# Patient Record
Sex: MALE | Race: WHITE | Employment: OTHER | URBAN - NONMETROPOLITAN AREA
[De-identification: names, ages, dates, MRNs, and addresses within clinical notes are randomized per-mention and may not be internally consistent; named-entity substitution may affect disease eponyms.]

---

## 2017-02-27 ENCOUNTER — OFFICE VISIT (OUTPATIENT)
Dept: INTERNAL MEDICINE CLINIC | Age: 62
End: 2017-02-27

## 2017-02-27 VITALS
WEIGHT: 135 LBS | TEMPERATURE: 98.8 F | SYSTOLIC BLOOD PRESSURE: 134 MMHG | BODY MASS INDEX: 21.69 KG/M2 | OXYGEN SATURATION: 93 % | HEIGHT: 66 IN | DIASTOLIC BLOOD PRESSURE: 66 MMHG | RESPIRATION RATE: 16 BRPM | HEART RATE: 92 BPM

## 2017-02-27 DIAGNOSIS — K52.832 LYMPHOCYTIC COLITIS: ICD-10-CM

## 2017-02-27 DIAGNOSIS — M54.2 NECK PAIN: ICD-10-CM

## 2017-02-27 DIAGNOSIS — J44.9 COPD, SEVERE (HCC): ICD-10-CM

## 2017-02-27 DIAGNOSIS — Z72.0 TOBACCO ABUSE DISORDER: ICD-10-CM

## 2017-02-27 DIAGNOSIS — F10.20 UNCOMPLICATED ALCOHOL DEPENDENCE (HCC): ICD-10-CM

## 2017-02-27 PROCEDURE — 99213 OFFICE O/P EST LOW 20 MIN: CPT | Performed by: INTERNAL MEDICINE

## 2017-02-27 RX ORDER — ALBUTEROL SULFATE 2.5 MG/3ML
2.5 SOLUTION RESPIRATORY (INHALATION) EVERY 6 HOURS
Qty: 120 VIAL | Refills: 5 | Status: SHIPPED | OUTPATIENT
Start: 2017-02-27 | End: 2017-05-10 | Stop reason: SDUPTHER

## 2017-02-27 RX ORDER — ALBUTEROL SULFATE 90 UG/1
2 AEROSOL, METERED RESPIRATORY (INHALATION) EVERY 6 HOURS
Qty: 1 INHALER | Refills: 5 | Status: SHIPPED | OUTPATIENT
Start: 2017-02-27 | End: 2017-05-10 | Stop reason: SDUPTHER

## 2017-02-27 RX ORDER — BUDESONIDE AND FORMOTEROL FUMARATE DIHYDRATE 160; 4.5 UG/1; UG/1
2 AEROSOL RESPIRATORY (INHALATION) 2 TIMES DAILY
Qty: 1 INHALER | Refills: 5 | Status: SHIPPED | OUTPATIENT
Start: 2017-02-27 | End: 2017-05-10 | Stop reason: SDUPTHER

## 2017-02-27 ASSESSMENT — ENCOUNTER SYMPTOMS
SPUTUM PRODUCTION: 1
GASTROINTESTINAL NEGATIVE: 1
WHEEZING: 1
HEMOPTYSIS: 0
EYES NEGATIVE: 1
COUGH: 1

## 2017-05-09 ENCOUNTER — TELEPHONE (OUTPATIENT)
Dept: INTERNAL MEDICINE CLINIC | Age: 62
End: 2017-05-09

## 2017-05-10 DIAGNOSIS — J44.9 COPD, SEVERE (HCC): ICD-10-CM

## 2017-05-10 RX ORDER — ALBUTEROL SULFATE 2.5 MG/3ML
2.5 SOLUTION RESPIRATORY (INHALATION) EVERY 6 HOURS
Qty: 120 VIAL | Refills: 5 | Status: SHIPPED | OUTPATIENT
Start: 2017-05-10 | End: 2017-09-11 | Stop reason: ALTCHOICE

## 2017-05-10 RX ORDER — BUDESONIDE AND FORMOTEROL FUMARATE DIHYDRATE 160; 4.5 UG/1; UG/1
2 AEROSOL RESPIRATORY (INHALATION) 2 TIMES DAILY
Qty: 1 INHALER | Refills: 5 | Status: SHIPPED | OUTPATIENT
Start: 2017-05-10 | End: 2017-12-04 | Stop reason: SDUPTHER

## 2017-05-10 RX ORDER — ALBUTEROL SULFATE 90 UG/1
2 AEROSOL, METERED RESPIRATORY (INHALATION) EVERY 6 HOURS
Qty: 1 INHALER | Refills: 5 | Status: SHIPPED | OUTPATIENT
Start: 2017-05-10 | End: 2017-12-04 | Stop reason: SDUPTHER

## 2017-05-26 ENCOUNTER — OFFICE VISIT (OUTPATIENT)
Dept: INTERNAL MEDICINE CLINIC | Age: 62
End: 2017-05-26

## 2017-05-26 VITALS
DIASTOLIC BLOOD PRESSURE: 78 MMHG | OXYGEN SATURATION: 94 % | SYSTOLIC BLOOD PRESSURE: 114 MMHG | HEART RATE: 72 BPM | TEMPERATURE: 98.6 F | RESPIRATION RATE: 16 BRPM | BODY MASS INDEX: 20.56 KG/M2 | WEIGHT: 127.4 LBS

## 2017-05-26 DIAGNOSIS — M72.0 DUPUYTREN'S CONTRACTURE: ICD-10-CM

## 2017-05-26 DIAGNOSIS — Z72.0 TOBACCO ABUSE DISORDER: ICD-10-CM

## 2017-05-26 DIAGNOSIS — M48.02 SPINAL STENOSIS OF CERVICAL REGION: ICD-10-CM

## 2017-05-26 DIAGNOSIS — Z12.5 SCREENING FOR PROSTATE CANCER: ICD-10-CM

## 2017-05-26 DIAGNOSIS — J44.9 COPD, SEVERE (HCC): Primary | ICD-10-CM

## 2017-05-26 DIAGNOSIS — Z11.4 SCREENING FOR HIV WITHOUT PRESENCE OF RISK FACTORS: ICD-10-CM

## 2017-05-26 DIAGNOSIS — Z11.59 NEED FOR HEPATITIS C SCREENING TEST: ICD-10-CM

## 2017-05-26 PROCEDURE — G8420 CALC BMI NORM PARAMETERS: HCPCS | Performed by: INTERNAL MEDICINE

## 2017-05-26 PROCEDURE — 3017F COLORECTAL CA SCREEN DOC REV: CPT | Performed by: INTERNAL MEDICINE

## 2017-05-26 PROCEDURE — G8427 DOCREV CUR MEDS BY ELIG CLIN: HCPCS | Performed by: INTERNAL MEDICINE

## 2017-05-26 PROCEDURE — 4004F PT TOBACCO SCREEN RCVD TLK: CPT | Performed by: INTERNAL MEDICINE

## 2017-05-26 PROCEDURE — 3023F SPIROM DOC REV: CPT | Performed by: INTERNAL MEDICINE

## 2017-05-26 PROCEDURE — G8926 SPIRO NO PERF OR DOC: HCPCS | Performed by: INTERNAL MEDICINE

## 2017-05-26 PROCEDURE — 99213 OFFICE O/P EST LOW 20 MIN: CPT | Performed by: INTERNAL MEDICINE

## 2017-05-26 ASSESSMENT — PATIENT HEALTH QUESTIONNAIRE - PHQ9
SUM OF ALL RESPONSES TO PHQ9 QUESTIONS 1 & 2: 0
SUM OF ALL RESPONSES TO PHQ QUESTIONS 1-9: 0
1. LITTLE INTEREST OR PLEASURE IN DOING THINGS: 0
2. FEELING DOWN, DEPRESSED OR HOPELESS: 0

## 2017-06-09 ENCOUNTER — HOSPITAL ENCOUNTER (OUTPATIENT)
Dept: LAB | Age: 62
Discharge: HOME | End: 2017-06-09
Attending: INTERNAL MEDICINE | Admitting: INTERNAL MEDICINE

## 2017-06-09 LAB
HEPATITIS C ANTIBODY: NON REACTIVE
HIV SCREEN: NON REACTIVE
PROSTATE SPECIFIC ANTIGEN: 1.13 NG/ML (ref 0–4)

## 2017-09-11 ENCOUNTER — OFFICE VISIT (OUTPATIENT)
Dept: INTERNAL MEDICINE CLINIC | Age: 62
End: 2017-09-11

## 2017-09-11 VITALS
TEMPERATURE: 98.9 F | BODY MASS INDEX: 19.5 KG/M2 | SYSTOLIC BLOOD PRESSURE: 122 MMHG | HEART RATE: 84 BPM | RESPIRATION RATE: 20 BRPM | OXYGEN SATURATION: 94 % | DIASTOLIC BLOOD PRESSURE: 72 MMHG | WEIGHT: 120.8 LBS

## 2017-09-11 DIAGNOSIS — M48.02 SPINAL STENOSIS OF CERVICAL REGION: ICD-10-CM

## 2017-09-11 DIAGNOSIS — M72.0 DUPUYTREN'S CONTRACTURE: ICD-10-CM

## 2017-09-11 DIAGNOSIS — F10.20 UNCOMPLICATED ALCOHOL DEPENDENCE (HCC): ICD-10-CM

## 2017-09-11 DIAGNOSIS — R07.89 CHEST WALL PAIN: Primary | ICD-10-CM

## 2017-09-11 DIAGNOSIS — Z72.0 TOBACCO ABUSE DISORDER: ICD-10-CM

## 2017-09-11 DIAGNOSIS — J44.9 COPD, SEVERE (HCC): ICD-10-CM

## 2017-09-11 DIAGNOSIS — R63.4 WEIGHT LOSS: ICD-10-CM

## 2017-09-11 PROCEDURE — G8427 DOCREV CUR MEDS BY ELIG CLIN: HCPCS | Performed by: INTERNAL MEDICINE

## 2017-09-11 PROCEDURE — 4004F PT TOBACCO SCREEN RCVD TLK: CPT | Performed by: INTERNAL MEDICINE

## 2017-09-11 PROCEDURE — G8926 SPIRO NO PERF OR DOC: HCPCS | Performed by: INTERNAL MEDICINE

## 2017-09-11 PROCEDURE — 99214 OFFICE O/P EST MOD 30 MIN: CPT | Performed by: INTERNAL MEDICINE

## 2017-09-11 PROCEDURE — G8420 CALC BMI NORM PARAMETERS: HCPCS | Performed by: INTERNAL MEDICINE

## 2017-09-11 PROCEDURE — 3023F SPIROM DOC REV: CPT | Performed by: INTERNAL MEDICINE

## 2017-09-11 PROCEDURE — 3017F COLORECTAL CA SCREEN DOC REV: CPT | Performed by: INTERNAL MEDICINE

## 2017-09-11 RX ORDER — IPRATROPIUM BROMIDE AND ALBUTEROL SULFATE 2.5; .5 MG/3ML; MG/3ML
1 SOLUTION RESPIRATORY (INHALATION) EVERY 4 HOURS
Qty: 360 ML | Refills: 0 | Status: SHIPPED | OUTPATIENT
Start: 2017-09-11 | End: 2017-12-04 | Stop reason: CLARIF

## 2017-09-11 RX ORDER — NAPROXEN 500 MG/1
500 TABLET ORAL 2 TIMES DAILY WITH MEALS
Qty: 30 TABLET | Refills: 0 | Status: SHIPPED | OUTPATIENT
Start: 2017-09-11 | End: 2017-12-04 | Stop reason: ALTCHOICE

## 2017-09-11 ASSESSMENT — ENCOUNTER SYMPTOMS
COUGH: 1
DIARRHEA: 0
HEMOPTYSIS: 0
SPUTUM PRODUCTION: 0
BLOOD IN STOOL: 0
ABDOMINAL PAIN: 0
CONSTIPATION: 0
EYES NEGATIVE: 1
ORTHOPNEA: 0
GASTROINTESTINAL NEGATIVE: 1
WHEEZING: 0

## 2017-09-12 ENCOUNTER — HOSPITAL ENCOUNTER (OUTPATIENT)
Dept: LAB | Age: 62
Discharge: HOME | End: 2017-09-12
Attending: INTERNAL MEDICINE | Admitting: INTERNAL MEDICINE

## 2017-09-12 DIAGNOSIS — R07.89 OTHER CHEST PAIN: ICD-10-CM

## 2017-09-12 LAB
ALBUMIN SERPL-MCNC: 3.8 GM/DL (ref 3.4–5)
ALP BLD-CCNC: 55 IU/L (ref 40–129)
ALT SERPL-CCNC: 14 U/L (ref 10–40)
ANION GAP SERPL CALCULATED.3IONS-SCNC: 10 MMOL/L (ref 4–16)
AST SERPL-CCNC: 19 IU/L (ref 15–37)
BASOPHILS ABSOLUTE: 0.1 K/CU MM
BASOPHILS RELATIVE PERCENT: 0.6 % (ref 0–1)
BILIRUB SERPL-MCNC: 0.5 MG/DL (ref 0–1)
BUN BLDV-MCNC: 8 MG/DL (ref 6–23)
CALCIUM SERPL-MCNC: 9.5 MG/DL (ref 8.3–10.6)
CHLORIDE BLD-SCNC: 100 MMOL/L (ref 99–110)
CO2: 30 MMOL/L (ref 21–32)
CREAT SERPL-MCNC: 0.7 MG/DL (ref 0.9–1.3)
DIFFERENTIAL TYPE: ABNORMAL
EOSINOPHILS ABSOLUTE: 0 K/CU MM
EOSINOPHILS RELATIVE PERCENT: 0 % (ref 0–3)
GFR AFRICAN AMERICAN: >60 ML/MIN/1.73M2
GFR NON-AFRICAN AMERICAN: >60 ML/MIN/1.73M2
GLUCOSE BLD-MCNC: 141 MG/DL (ref 70–140)
HCT VFR BLD CALC: 51.5 % (ref 42–52)
HEMOGLOBIN: 17.4 GM/DL (ref 13.5–18)
IMMATURE NEUTROPHIL %: 0.5 % (ref 0–0.43)
LYMPHOCYTES ABSOLUTE: 1.4 K/CU MM
LYMPHOCYTES RELATIVE PERCENT: 15.9 % (ref 24–44)
MCH RBC QN AUTO: 31.9 PG (ref 27–31)
MCHC RBC AUTO-ENTMCNC: 33.8 % (ref 32–36)
MCV RBC AUTO: 94.5 FL (ref 78–100)
MONOCYTES ABSOLUTE: 0.9 K/CU MM
MONOCYTES RELATIVE PERCENT: 10.1 % (ref 0–4)
PDW BLD-RTO: 15.5 % (ref 11.7–14.9)
PLATELET # BLD: 278 K/CU MM (ref 140–440)
PMV BLD AUTO: 8.9 FL (ref 7.5–11.1)
POTASSIUM SERPL-SCNC: 4.7 MMOL/L (ref 3.5–5.1)
RBC # BLD: 5.45 M/CU MM (ref 4.6–6.2)
SEGMENTED NEUTROPHILS ABSOLUTE COUNT: 6.4 K/CU MM
SEGMENTED NEUTROPHILS RELATIVE PERCENT: 72.9 % (ref 36–66)
SODIUM BLD-SCNC: 140 MMOL/L (ref 135–145)
TOTAL IMMATURE NEUTOROPHIL: 0.04 K/CU MM
TOTAL PROTEIN: 6.7 GM/DL (ref 6.4–8.2)
TSH HIGH SENSITIVITY: 1.55 UIU/ML (ref 0.27–4.2)
WBC # BLD: 8.8 K/CU MM (ref 4–10.5)

## 2017-09-29 ENCOUNTER — OFFICE VISIT (OUTPATIENT)
Dept: INTERNAL MEDICINE CLINIC | Age: 62
End: 2017-09-29

## 2017-09-29 VITALS
RESPIRATION RATE: 20 BRPM | BODY MASS INDEX: 19.82 KG/M2 | DIASTOLIC BLOOD PRESSURE: 80 MMHG | OXYGEN SATURATION: 94 % | WEIGHT: 122.8 LBS | TEMPERATURE: 98.5 F | HEART RATE: 92 BPM | SYSTOLIC BLOOD PRESSURE: 126 MMHG

## 2017-09-29 DIAGNOSIS — Z72.0 TOBACCO ABUSE DISORDER: ICD-10-CM

## 2017-09-29 DIAGNOSIS — J44.9 COPD, SEVERE (HCC): Primary | ICD-10-CM

## 2017-09-29 DIAGNOSIS — F10.20 UNCOMPLICATED ALCOHOL DEPENDENCE (HCC): ICD-10-CM

## 2017-09-29 DIAGNOSIS — R63.4 WEIGHT LOSS: ICD-10-CM

## 2017-09-29 DIAGNOSIS — J20.9 ACUTE BRONCHITIS, UNSPECIFIED ORGANISM: ICD-10-CM

## 2017-09-29 PROCEDURE — 99213 OFFICE O/P EST LOW 20 MIN: CPT | Performed by: INTERNAL MEDICINE

## 2017-09-29 PROCEDURE — G8420 CALC BMI NORM PARAMETERS: HCPCS | Performed by: INTERNAL MEDICINE

## 2017-09-29 PROCEDURE — G8926 SPIRO NO PERF OR DOC: HCPCS | Performed by: INTERNAL MEDICINE

## 2017-09-29 PROCEDURE — 3017F COLORECTAL CA SCREEN DOC REV: CPT | Performed by: INTERNAL MEDICINE

## 2017-09-29 PROCEDURE — G8427 DOCREV CUR MEDS BY ELIG CLIN: HCPCS | Performed by: INTERNAL MEDICINE

## 2017-09-29 PROCEDURE — 4004F PT TOBACCO SCREEN RCVD TLK: CPT | Performed by: INTERNAL MEDICINE

## 2017-09-29 PROCEDURE — 3023F SPIROM DOC REV: CPT | Performed by: INTERNAL MEDICINE

## 2017-09-29 ASSESSMENT — ENCOUNTER SYMPTOMS
SHORTNESS OF BREATH: 1
HEMOPTYSIS: 0
COUGH: 1
GASTROINTESTINAL NEGATIVE: 1
EYES NEGATIVE: 1

## 2017-12-04 ENCOUNTER — OFFICE VISIT (OUTPATIENT)
Dept: INTERNAL MEDICINE CLINIC | Age: 62
End: 2017-12-04

## 2017-12-04 VITALS
WEIGHT: 120.2 LBS | BODY MASS INDEX: 19.4 KG/M2 | SYSTOLIC BLOOD PRESSURE: 128 MMHG | TEMPERATURE: 98.4 F | DIASTOLIC BLOOD PRESSURE: 72 MMHG | RESPIRATION RATE: 16 BRPM | HEART RATE: 72 BPM | OXYGEN SATURATION: 96 %

## 2017-12-04 DIAGNOSIS — R63.4 WEIGHT LOSS: ICD-10-CM

## 2017-12-04 DIAGNOSIS — Z72.0 TOBACCO ABUSE DISORDER: ICD-10-CM

## 2017-12-04 DIAGNOSIS — Z23 NEED FOR PROPHYLACTIC VACCINATION AND INOCULATION AGAINST INFLUENZA: ICD-10-CM

## 2017-12-04 DIAGNOSIS — M72.0 DUPUYTREN'S CONTRACTURE: ICD-10-CM

## 2017-12-04 DIAGNOSIS — Z23 NEED FOR PROPHYLACTIC VACCINATION AGAINST STREPTOCOCCUS PNEUMONIAE (PNEUMOCOCCUS): ICD-10-CM

## 2017-12-04 DIAGNOSIS — J44.9 COPD, SEVERE (HCC): Primary | ICD-10-CM

## 2017-12-04 PROCEDURE — G8484 FLU IMMUNIZE NO ADMIN: HCPCS | Performed by: INTERNAL MEDICINE

## 2017-12-04 PROCEDURE — 99213 OFFICE O/P EST LOW 20 MIN: CPT | Performed by: INTERNAL MEDICINE

## 2017-12-04 PROCEDURE — G8926 SPIRO NO PERF OR DOC: HCPCS | Performed by: INTERNAL MEDICINE

## 2017-12-04 PROCEDURE — 3023F SPIROM DOC REV: CPT | Performed by: INTERNAL MEDICINE

## 2017-12-04 PROCEDURE — 3017F COLORECTAL CA SCREEN DOC REV: CPT | Performed by: INTERNAL MEDICINE

## 2017-12-04 PROCEDURE — G0009 ADMIN PNEUMOCOCCAL VACCINE: HCPCS | Performed by: INTERNAL MEDICINE

## 2017-12-04 PROCEDURE — G8427 DOCREV CUR MEDS BY ELIG CLIN: HCPCS | Performed by: INTERNAL MEDICINE

## 2017-12-04 PROCEDURE — 4004F PT TOBACCO SCREEN RCVD TLK: CPT | Performed by: INTERNAL MEDICINE

## 2017-12-04 PROCEDURE — G8420 CALC BMI NORM PARAMETERS: HCPCS | Performed by: INTERNAL MEDICINE

## 2017-12-04 PROCEDURE — 90686 IIV4 VACC NO PRSV 0.5 ML IM: CPT | Performed by: INTERNAL MEDICINE

## 2017-12-04 PROCEDURE — G0008 ADMIN INFLUENZA VIRUS VAC: HCPCS | Performed by: INTERNAL MEDICINE

## 2017-12-04 PROCEDURE — 90732 PPSV23 VACC 2 YRS+ SUBQ/IM: CPT | Performed by: INTERNAL MEDICINE

## 2017-12-04 RX ORDER — BUDESONIDE AND FORMOTEROL FUMARATE DIHYDRATE 160; 4.5 UG/1; UG/1
2 AEROSOL RESPIRATORY (INHALATION) 2 TIMES DAILY
Qty: 1 INHALER | Refills: 5 | Status: SHIPPED | OUTPATIENT
Start: 2017-12-04 | End: 2018-06-20 | Stop reason: SDUPTHER

## 2017-12-04 RX ORDER — ALBUTEROL SULFATE 2.5 MG/3ML
2.5 SOLUTION RESPIRATORY (INHALATION) EVERY 6 HOURS PRN
COMMUNITY
End: 2018-08-03

## 2017-12-04 RX ORDER — ALBUTEROL SULFATE 90 UG/1
2 AEROSOL, METERED RESPIRATORY (INHALATION) EVERY 6 HOURS
Qty: 1 INHALER | Refills: 5 | Status: SHIPPED | OUTPATIENT
Start: 2017-12-04 | End: 2018-06-20 | Stop reason: SDUPTHER

## 2017-12-04 RX ORDER — DEXTROMETHORPHAN HYDROBROMIDE AND PROMETHAZINE HYDROCHLORIDE 15; 6.25 MG/5ML; MG/5ML
5 SYRUP ORAL 4 TIMES DAILY PRN
Qty: 180 ML | Refills: 0 | Status: SHIPPED | OUTPATIENT
Start: 2017-12-04 | End: 2017-12-11

## 2017-12-04 ASSESSMENT — ENCOUNTER SYMPTOMS
SHORTNESS OF BREATH: 0
HEMOPTYSIS: 0
COUGH: 1
GASTROINTESTINAL NEGATIVE: 1
SPUTUM PRODUCTION: 0
EYES NEGATIVE: 1

## 2017-12-04 NOTE — PROGRESS NOTES
 Dupuytren's contracture     Tobacco abuse disorder     Weight loss     Need for prophylactic vaccination against Streptococcus pneumoniae (pneumococcus)     Need for prophylactic vaccination and inoculation against influenza        ASSESSMENT/PLAN:    COPD, severe ([address]  PFTs showed severe COPD in 5/14  Pt uses albuterol neb treatment and prn ventolin  Pt is on Symbicort   Pt is having difficulty getting other inhalers from insurance  Advanced to quit smoking. Patient has cough is sometimes. He is requesting some cough medicine. Dupuytren's contracture  Right hand contracture is little better    Tobacco abuse disorder  Counseled to quit smoking. Discussed options. Weight loss  Chest x ray : COPD  Labs normal  Colonoscopy : 9/2014  Pt will decide whom to pick as GI for repeat colonoscopy as Dr Ludy Goodwin moved    Will give flu vaccine and pneumovax. 23Today    Return to office in 3 months        Mediations reviewed with the patient. Continue current medications. Appropriate prescriptions are addressed. After visit summery provided. Follow up as directed sooner if needed. Questions answered and patient verbalizes understanding. Call for any problems, questions, or concerns. No Known Allergies  Current Outpatient Prescriptions   Medication Sig Dispense Refill    albuterol (PROVENTIL) (2.5 MG/3ML) 0.083% nebulizer solution Take 2.5 mg by nebulization every 6 hours as needed for Wheezing      Multiple Vitamins-Minerals (MULTIVITAMIN ADULTS PO) Take by mouth      albuterol sulfate HFA (VENTOLIN HFA) 108 (90 BASE) MCG/ACT inhaler Inhale 2 puffs into the lungs every 6 hours 1 Inhaler 5    budesonide-formoterol (SYMBICORT) 160-4.5 MCG/ACT AERO Inhale 2 puffs into the lungs 2 times daily 1 Inhaler 5     No current facility-administered medications for this visit.       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

## 2017-12-04 NOTE — PATIENT INSTRUCTIONS
Vaccine Information Sheet, \"Influenza - Inactivated\"  given to Erick Salmeron, or parent/legal guardian of  Erick Salmeron and verbalized understanding. Patient responses:    Have you ever had a reaction to a flu vaccine? NO  Are you able to eat eggs without adverse effects? YES  Do you have any current illness? NO  Have you ever had Guillian Corona Syndrome? NO    Flu vaccine given per order. Please see immunization tab.

## 2018-03-07 ENCOUNTER — OFFICE VISIT (OUTPATIENT)
Dept: INTERNAL MEDICINE CLINIC | Age: 63
End: 2018-03-07

## 2018-03-07 VITALS
OXYGEN SATURATION: 94 % | DIASTOLIC BLOOD PRESSURE: 82 MMHG | SYSTOLIC BLOOD PRESSURE: 120 MMHG | TEMPERATURE: 98.7 F | RESPIRATION RATE: 18 BRPM | HEART RATE: 86 BPM | WEIGHT: 122.4 LBS | BODY MASS INDEX: 19.76 KG/M2

## 2018-03-07 DIAGNOSIS — Z72.0 TOBACCO ABUSE DISORDER: ICD-10-CM

## 2018-03-07 DIAGNOSIS — F10.20 UNCOMPLICATED ALCOHOL DEPENDENCE (HCC): ICD-10-CM

## 2018-03-07 DIAGNOSIS — R63.4 WEIGHT LOSS: ICD-10-CM

## 2018-03-07 DIAGNOSIS — J44.9 COPD, SEVERE (HCC): ICD-10-CM

## 2018-03-07 PROCEDURE — G8484 FLU IMMUNIZE NO ADMIN: HCPCS | Performed by: INTERNAL MEDICINE

## 2018-03-07 PROCEDURE — G8427 DOCREV CUR MEDS BY ELIG CLIN: HCPCS | Performed by: INTERNAL MEDICINE

## 2018-03-07 PROCEDURE — 99213 OFFICE O/P EST LOW 20 MIN: CPT | Performed by: INTERNAL MEDICINE

## 2018-03-07 PROCEDURE — G8420 CALC BMI NORM PARAMETERS: HCPCS | Performed by: INTERNAL MEDICINE

## 2018-03-07 PROCEDURE — G8926 SPIRO NO PERF OR DOC: HCPCS | Performed by: INTERNAL MEDICINE

## 2018-03-07 PROCEDURE — 3023F SPIROM DOC REV: CPT | Performed by: INTERNAL MEDICINE

## 2018-03-07 PROCEDURE — 3017F COLORECTAL CA SCREEN DOC REV: CPT | Performed by: INTERNAL MEDICINE

## 2018-03-07 PROCEDURE — 4004F PT TOBACCO SCREEN RCVD TLK: CPT | Performed by: INTERNAL MEDICINE

## 2018-03-07 ASSESSMENT — ENCOUNTER SYMPTOMS
EYES NEGATIVE: 1
WHEEZING: 0
GASTROINTESTINAL NEGATIVE: 1
COUGH: 1

## 2018-03-07 NOTE — ASSESSMENT & PLAN NOTE
Discussed wt loss in detail. Lost 10-12 lbs in the last year  Chest x ray : COPD  Labs normal  Colonoscopy : 9/2014. Repeat with GI. He will call Dr Arreola Cough  Repeat labs.

## 2018-03-09 ENCOUNTER — HOSPITAL ENCOUNTER (OUTPATIENT)
Dept: CT IMAGING | Age: 63
Discharge: HOME | End: 2018-03-09
Attending: INTERNAL MEDICINE | Admitting: INTERNAL MEDICINE

## 2018-03-09 DIAGNOSIS — R63.4 LOSS OF WEIGHT: ICD-10-CM

## 2018-03-09 DIAGNOSIS — Z72.0 TOBACCO ABUSE: ICD-10-CM

## 2018-03-09 DIAGNOSIS — J44.9 OBSTRUCTIVE CHRONIC BRONCHITIS WITHOUT EXACERBATION (HCC): ICD-10-CM

## 2018-03-09 LAB
ALBUMIN SERPL-MCNC: 4 GM/DL (ref 3.4–5)
ALP BLD-CCNC: 46 IU/L (ref 40–129)
ALT SERPL-CCNC: 14 U/L (ref 10–40)
ANION GAP SERPL CALCULATED.3IONS-SCNC: 9 MMOL/L (ref 4–16)
AST SERPL-CCNC: 15 IU/L (ref 15–37)
BASOPHILS ABSOLUTE: 0.1 K/CU MM
BASOPHILS RELATIVE PERCENT: 0.7 % (ref 0–1)
BILIRUB SERPL-MCNC: 0.4 MG/DL (ref 0–1)
BUN BLDV-MCNC: 6 MG/DL (ref 6–23)
CALCIUM SERPL-MCNC: 9.1 MG/DL (ref 8.3–10.6)
CHLORIDE BLD-SCNC: 100 MMOL/L (ref 99–110)
CO2: 32 MMOL/L (ref 21–32)
CREAT SERPL-MCNC: 0.8 MG/DL (ref 0.9–1.3)
DIFFERENTIAL TYPE: ABNORMAL
EOSINOPHILS ABSOLUTE: 0 K/CU MM
EOSINOPHILS RELATIVE PERCENT: 0.1 % (ref 0–3)
GFR AFRICAN AMERICAN: >60 ML/MIN/1.73M2
GFR NON-AFRICAN AMERICAN: >60 ML/MIN/1.73M2
GLUCOSE FASTING: 138 MG/DL (ref 70–99)
HCT VFR BLD CALC: 48.5 % (ref 42–52)
HEMOGLOBIN: 16.2 GM/DL (ref 13.5–18)
IMMATURE NEUTROPHIL %: 0.3 % (ref 0–0.43)
LYMPHOCYTES ABSOLUTE: 1.5 K/CU MM
LYMPHOCYTES RELATIVE PERCENT: 15.6 % (ref 24–44)
MCH RBC QN AUTO: 32.5 PG (ref 27–31)
MCHC RBC AUTO-ENTMCNC: 33.4 % (ref 32–36)
MCV RBC AUTO: 97.4 FL (ref 78–100)
MONOCYTES ABSOLUTE: 1 K/CU MM
MONOCYTES RELATIVE PERCENT: 10.1 % (ref 0–4)
PDW BLD-RTO: 13.3 % (ref 11.7–14.9)
PLATELET # BLD: 252 K/CU MM (ref 140–440)
PMV BLD AUTO: 8.4 FL (ref 7.5–11.1)
POTASSIUM SERPL-SCNC: 4.4 MMOL/L (ref 3.5–5.1)
RBC # BLD: 4.98 M/CU MM (ref 4.6–6.2)
SEGMENTED NEUTROPHILS ABSOLUTE COUNT: 6.9 K/CU MM
SEGMENTED NEUTROPHILS RELATIVE PERCENT: 73.2 % (ref 36–66)
SODIUM BLD-SCNC: 141 MMOL/L (ref 135–145)
T4 FREE: 0.95 NG/DL (ref 0.9–1.8)
TOTAL IMMATURE NEUTOROPHIL: 0.03 K/CU MM
TOTAL PROTEIN: 6.5 GM/DL (ref 6.4–8.2)
TSH HIGH SENSITIVITY: 1.6 UIU/ML (ref 0.27–4.2)
WBC # BLD: 9.4 K/CU MM (ref 4–10.5)

## 2018-04-02 ENCOUNTER — OFFICE VISIT (OUTPATIENT)
Dept: INTERNAL MEDICINE CLINIC | Age: 63
End: 2018-04-02

## 2018-04-02 VITALS
DIASTOLIC BLOOD PRESSURE: 80 MMHG | RESPIRATION RATE: 16 BRPM | BODY MASS INDEX: 19.5 KG/M2 | OXYGEN SATURATION: 93 % | SYSTOLIC BLOOD PRESSURE: 128 MMHG | HEART RATE: 80 BPM | WEIGHT: 120.8 LBS | TEMPERATURE: 98.4 F

## 2018-04-02 DIAGNOSIS — J44.9 COPD, SEVERE (HCC): ICD-10-CM

## 2018-04-02 DIAGNOSIS — F10.20 UNCOMPLICATED ALCOHOL DEPENDENCE (HCC): ICD-10-CM

## 2018-04-02 DIAGNOSIS — Z72.0 TOBACCO ABUSE DISORDER: ICD-10-CM

## 2018-04-02 DIAGNOSIS — R63.4 WEIGHT LOSS: Primary | ICD-10-CM

## 2018-04-02 DIAGNOSIS — R73.09 HIGH GLUCOSE: ICD-10-CM

## 2018-04-02 DIAGNOSIS — M72.0 DUPUYTREN'S CONTRACTURE: ICD-10-CM

## 2018-04-02 DIAGNOSIS — K52.832 LYMPHOCYTIC COLITIS: ICD-10-CM

## 2018-04-02 LAB — HBA1C MFR BLD: 4.4 %

## 2018-04-02 PROCEDURE — 99213 OFFICE O/P EST LOW 20 MIN: CPT | Performed by: INTERNAL MEDICINE

## 2018-04-02 PROCEDURE — 4004F PT TOBACCO SCREEN RCVD TLK: CPT | Performed by: INTERNAL MEDICINE

## 2018-04-02 PROCEDURE — 3017F COLORECTAL CA SCREEN DOC REV: CPT | Performed by: INTERNAL MEDICINE

## 2018-04-02 PROCEDURE — G8427 DOCREV CUR MEDS BY ELIG CLIN: HCPCS | Performed by: INTERNAL MEDICINE

## 2018-04-02 PROCEDURE — 3023F SPIROM DOC REV: CPT | Performed by: INTERNAL MEDICINE

## 2018-04-02 PROCEDURE — 83036 HEMOGLOBIN GLYCOSYLATED A1C: CPT | Performed by: INTERNAL MEDICINE

## 2018-04-02 PROCEDURE — G8420 CALC BMI NORM PARAMETERS: HCPCS | Performed by: INTERNAL MEDICINE

## 2018-04-02 PROCEDURE — G8926 SPIRO NO PERF OR DOC: HCPCS | Performed by: INTERNAL MEDICINE

## 2018-04-02 ASSESSMENT — ENCOUNTER SYMPTOMS
RESPIRATORY NEGATIVE: 1
COUGH: 0
EYES NEGATIVE: 1
GASTROINTESTINAL NEGATIVE: 1
BLOOD IN STOOL: 0

## 2018-04-03 PROBLEM — J18.9 LINGULAR PNEUMONIA: Status: ACTIVE | Noted: 2018-04-03

## 2018-04-08 PROBLEM — F10.939 ALCOHOL WITHDRAWAL (HCC): Status: ACTIVE | Noted: 2018-04-08

## 2018-04-10 ENCOUNTER — TELEPHONE (OUTPATIENT)
Dept: INTERNAL MEDICINE CLINIC | Age: 63
End: 2018-04-10

## 2018-04-10 ENCOUNTER — CARE COORDINATION (OUTPATIENT)
Dept: CASE MANAGEMENT | Age: 63
End: 2018-04-10

## 2018-04-11 ENCOUNTER — OFFICE VISIT (OUTPATIENT)
Dept: INTERNAL MEDICINE CLINIC | Age: 63
End: 2018-04-11

## 2018-04-11 VITALS
BODY MASS INDEX: 21.05 KG/M2 | TEMPERATURE: 98.3 F | SYSTOLIC BLOOD PRESSURE: 130 MMHG | RESPIRATION RATE: 16 BRPM | DIASTOLIC BLOOD PRESSURE: 82 MMHG | WEIGHT: 130.4 LBS | HEART RATE: 82 BPM | OXYGEN SATURATION: 90 %

## 2018-04-11 DIAGNOSIS — J18.9 PNEUMONIA OF BOTH LUNGS DUE TO INFECTIOUS ORGANISM, UNSPECIFIED PART OF LUNG: Primary | ICD-10-CM

## 2018-04-11 DIAGNOSIS — M25.552 LEFT HIP PAIN: ICD-10-CM

## 2018-04-11 DIAGNOSIS — J44.9 COPD, SEVERE (HCC): ICD-10-CM

## 2018-04-11 DIAGNOSIS — R10.9 LEFT FLANK PAIN: ICD-10-CM

## 2018-04-11 DIAGNOSIS — F10.930 ALCOHOL WITHDRAWAL SYNDROME WITHOUT COMPLICATION (HCC): ICD-10-CM

## 2018-04-11 DIAGNOSIS — Z72.0 TOBACCO ABUSE DISORDER: ICD-10-CM

## 2018-04-11 PROCEDURE — G8427 DOCREV CUR MEDS BY ELIG CLIN: HCPCS | Performed by: INTERNAL MEDICINE

## 2018-04-11 PROCEDURE — G8926 SPIRO NO PERF OR DOC: HCPCS | Performed by: INTERNAL MEDICINE

## 2018-04-11 PROCEDURE — 4004F PT TOBACCO SCREEN RCVD TLK: CPT | Performed by: INTERNAL MEDICINE

## 2018-04-11 PROCEDURE — G8420 CALC BMI NORM PARAMETERS: HCPCS | Performed by: INTERNAL MEDICINE

## 2018-04-11 PROCEDURE — 1111F DSCHRG MED/CURRENT MED MERGE: CPT | Performed by: INTERNAL MEDICINE

## 2018-04-11 PROCEDURE — 99214 OFFICE O/P EST MOD 30 MIN: CPT | Performed by: INTERNAL MEDICINE

## 2018-04-11 PROCEDURE — 3017F COLORECTAL CA SCREEN DOC REV: CPT | Performed by: INTERNAL MEDICINE

## 2018-04-11 PROCEDURE — 3023F SPIROM DOC REV: CPT | Performed by: INTERNAL MEDICINE

## 2018-04-11 RX ORDER — HYDROCODONE BITARTRATE AND ACETAMINOPHEN 5; 325 MG/1; MG/1
1 TABLET ORAL EVERY 6 HOURS PRN
Qty: 25 TABLET | Refills: 0 | Status: SHIPPED | OUTPATIENT
Start: 2018-04-11 | End: 2018-04-18

## 2018-04-12 ENCOUNTER — HOSPITAL ENCOUNTER (OUTPATIENT)
Dept: CT IMAGING | Age: 63
Discharge: HOME | End: 2018-04-12
Attending: INTERNAL MEDICINE | Admitting: INTERNAL MEDICINE

## 2018-04-12 DIAGNOSIS — R10.9 ABDOMINAL PAIN: ICD-10-CM

## 2018-04-12 DIAGNOSIS — M25.552 LEFT HIP PAIN: ICD-10-CM

## 2018-04-12 DIAGNOSIS — R10.9 FLANK PAIN: ICD-10-CM

## 2018-04-16 ENCOUNTER — OFFICE VISIT (OUTPATIENT)
Dept: INTERNAL MEDICINE CLINIC | Age: 63
End: 2018-04-16

## 2018-04-16 ENCOUNTER — CARE COORDINATION (OUTPATIENT)
Dept: CASE MANAGEMENT | Age: 63
End: 2018-04-16

## 2018-04-16 VITALS
TEMPERATURE: 98.2 F | DIASTOLIC BLOOD PRESSURE: 78 MMHG | HEART RATE: 82 BPM | RESPIRATION RATE: 16 BRPM | WEIGHT: 126.2 LBS | BODY MASS INDEX: 20.37 KG/M2 | SYSTOLIC BLOOD PRESSURE: 122 MMHG | OXYGEN SATURATION: 90 %

## 2018-04-16 DIAGNOSIS — M54.5 LEFT LOW BACK PAIN, UNSPECIFIED CHRONICITY, WITH SCIATICA PRESENCE UNSPECIFIED: Primary | ICD-10-CM

## 2018-04-16 DIAGNOSIS — Z72.0 TOBACCO ABUSE DISORDER: ICD-10-CM

## 2018-04-16 DIAGNOSIS — R07.81 RIB PAIN ON LEFT SIDE: ICD-10-CM

## 2018-04-16 DIAGNOSIS — J44.9 COPD, SEVERE (HCC): ICD-10-CM

## 2018-04-16 DIAGNOSIS — J69.0 ASPIRATION PNEUMONIA, UNSPECIFIED ASPIRATION PNEUMONIA TYPE, UNSPECIFIED LATERALITY, UNSPECIFIED PART OF LUNG (HCC): ICD-10-CM

## 2018-04-16 PROCEDURE — 99213 OFFICE O/P EST LOW 20 MIN: CPT | Performed by: INTERNAL MEDICINE

## 2018-04-16 PROCEDURE — 1111F DSCHRG MED/CURRENT MED MERGE: CPT | Performed by: INTERNAL MEDICINE

## 2018-04-16 PROCEDURE — 4004F PT TOBACCO SCREEN RCVD TLK: CPT | Performed by: INTERNAL MEDICINE

## 2018-04-16 PROCEDURE — G8420 CALC BMI NORM PARAMETERS: HCPCS | Performed by: INTERNAL MEDICINE

## 2018-04-16 PROCEDURE — 3023F SPIROM DOC REV: CPT | Performed by: INTERNAL MEDICINE

## 2018-04-16 PROCEDURE — G8926 SPIRO NO PERF OR DOC: HCPCS | Performed by: INTERNAL MEDICINE

## 2018-04-16 PROCEDURE — G8427 DOCREV CUR MEDS BY ELIG CLIN: HCPCS | Performed by: INTERNAL MEDICINE

## 2018-04-16 PROCEDURE — 3017F COLORECTAL CA SCREEN DOC REV: CPT | Performed by: INTERNAL MEDICINE

## 2018-04-16 RX ORDER — GUAIFENESIN 600 MG/1
600 TABLET, EXTENDED RELEASE ORAL 2 TIMES DAILY
Qty: 60 TABLET | Refills: 2 | Status: SHIPPED | OUTPATIENT
Start: 2018-04-16 | End: 2018-04-25

## 2018-04-19 ENCOUNTER — CARE COORDINATION (OUTPATIENT)
Dept: CASE MANAGEMENT | Age: 63
End: 2018-04-19

## 2018-04-22 ASSESSMENT — ENCOUNTER SYMPTOMS
BLOOD IN STOOL: 0
NAUSEA: 0
EYES NEGATIVE: 1
SHORTNESS OF BREATH: 1
HEARTBURN: 0
ABDOMINAL PAIN: 1
ORTHOPNEA: 0
COUGH: 0
CONSTIPATION: 0
VOMITING: 0
SPUTUM PRODUCTION: 0

## 2018-04-23 ENCOUNTER — HOSPITAL ENCOUNTER (OUTPATIENT)
Dept: GENERAL RADIOLOGY | Age: 63
Discharge: HOME | End: 2018-04-23
Attending: INTERNAL MEDICINE | Admitting: INTERNAL MEDICINE

## 2018-04-23 ENCOUNTER — TELEPHONE (OUTPATIENT)
Dept: GASTROENTEROLOGY | Age: 63
End: 2018-04-23

## 2018-04-23 ENCOUNTER — TELEPHONE (OUTPATIENT)
Dept: INTERNAL MEDICINE CLINIC | Age: 63
End: 2018-04-23

## 2018-04-23 DIAGNOSIS — M79.89 PAIN AND SWELLING OF TOE, RIGHT: Primary | ICD-10-CM

## 2018-04-23 DIAGNOSIS — M79.89 SWELLING OF LIMB: ICD-10-CM

## 2018-04-23 DIAGNOSIS — M79.674 PAIN AND SWELLING OF TOE, RIGHT: Primary | ICD-10-CM

## 2018-04-23 DIAGNOSIS — M79.671 RIGHT FOOT PAIN: ICD-10-CM

## 2018-04-23 LAB — URIC ACID: 0.2 MG/DL (ref 3.5–7.2)

## 2018-04-24 ENCOUNTER — CARE COORDINATION (OUTPATIENT)
Dept: CASE MANAGEMENT | Age: 63
End: 2018-04-24

## 2018-04-25 ENCOUNTER — OFFICE VISIT (OUTPATIENT)
Dept: INTERNAL MEDICINE CLINIC | Age: 63
End: 2018-04-25

## 2018-04-25 VITALS
RESPIRATION RATE: 16 BRPM | DIASTOLIC BLOOD PRESSURE: 80 MMHG | SYSTOLIC BLOOD PRESSURE: 130 MMHG | TEMPERATURE: 98.5 F | OXYGEN SATURATION: 96 % | WEIGHT: 122 LBS | HEART RATE: 84 BPM | BODY MASS INDEX: 19.69 KG/M2

## 2018-04-25 DIAGNOSIS — Z72.0 TOBACCO ABUSE DISORDER: ICD-10-CM

## 2018-04-25 DIAGNOSIS — J44.9 COPD, SEVERE (HCC): ICD-10-CM

## 2018-04-25 DIAGNOSIS — M79.671 RIGHT FOOT PAIN: Primary | ICD-10-CM

## 2018-04-25 PROCEDURE — 99213 OFFICE O/P EST LOW 20 MIN: CPT | Performed by: INTERNAL MEDICINE

## 2018-04-25 PROCEDURE — 4004F PT TOBACCO SCREEN RCVD TLK: CPT | Performed by: INTERNAL MEDICINE

## 2018-04-25 PROCEDURE — G8427 DOCREV CUR MEDS BY ELIG CLIN: HCPCS | Performed by: INTERNAL MEDICINE

## 2018-04-25 PROCEDURE — G8420 CALC BMI NORM PARAMETERS: HCPCS | Performed by: INTERNAL MEDICINE

## 2018-04-25 PROCEDURE — 3017F COLORECTAL CA SCREEN DOC REV: CPT | Performed by: INTERNAL MEDICINE

## 2018-04-25 PROCEDURE — G8926 SPIRO NO PERF OR DOC: HCPCS | Performed by: INTERNAL MEDICINE

## 2018-04-25 PROCEDURE — 3023F SPIROM DOC REV: CPT | Performed by: INTERNAL MEDICINE

## 2018-04-25 PROCEDURE — 1111F DSCHRG MED/CURRENT MED MERGE: CPT | Performed by: INTERNAL MEDICINE

## 2018-05-04 ENCOUNTER — CARE COORDINATION (OUTPATIENT)
Dept: CARE COORDINATION | Age: 63
End: 2018-05-04

## 2018-05-07 ENCOUNTER — OFFICE VISIT (OUTPATIENT)
Dept: GASTROENTEROLOGY | Age: 63
End: 2018-05-07

## 2018-05-07 VITALS
BODY MASS INDEX: 19.73 KG/M2 | OXYGEN SATURATION: 96 % | HEART RATE: 91 BPM | DIASTOLIC BLOOD PRESSURE: 78 MMHG | HEIGHT: 66 IN | WEIGHT: 122.8 LBS | SYSTOLIC BLOOD PRESSURE: 110 MMHG

## 2018-05-07 DIAGNOSIS — Z86.010 HISTORY OF COLON POLYPS: Primary | ICD-10-CM

## 2018-05-07 DIAGNOSIS — Z87.19 HISTORY OF COLITIS: ICD-10-CM

## 2018-05-07 DIAGNOSIS — R63.4 WEIGHT LOSS, UNINTENTIONAL: ICD-10-CM

## 2018-05-07 PROCEDURE — 4004F PT TOBACCO SCREEN RCVD TLK: CPT | Performed by: NURSE PRACTITIONER

## 2018-05-07 PROCEDURE — 1111F DSCHRG MED/CURRENT MED MERGE: CPT | Performed by: NURSE PRACTITIONER

## 2018-05-07 PROCEDURE — 3017F COLORECTAL CA SCREEN DOC REV: CPT | Performed by: NURSE PRACTITIONER

## 2018-05-07 PROCEDURE — G8427 DOCREV CUR MEDS BY ELIG CLIN: HCPCS | Performed by: NURSE PRACTITIONER

## 2018-05-07 PROCEDURE — G8420 CALC BMI NORM PARAMETERS: HCPCS | Performed by: NURSE PRACTITIONER

## 2018-05-07 PROCEDURE — 99204 OFFICE O/P NEW MOD 45 MIN: CPT | Performed by: NURSE PRACTITIONER

## 2018-05-07 RX ORDER — POLYETHYLENE GLYCOL 3350 17 G/17G
238 POWDER, FOR SOLUTION ORAL ONCE
Qty: 1 BOTTLE | Refills: 0 | Status: SHIPPED | OUTPATIENT
Start: 2018-05-07 | End: 2018-05-07

## 2018-05-07 ASSESSMENT — ENCOUNTER SYMPTOMS
BACK PAIN: 1
COUGH: 1
BLURRED VISION: 0
CONSTIPATION: 0
EYE PAIN: 0
DOUBLE VISION: 0
ABDOMINAL PAIN: 0
NAUSEA: 0
VOMITING: 0
SPUTUM PRODUCTION: 1
HEARTBURN: 0
SHORTNESS OF BREATH: 1
BLOOD IN STOOL: 1
WHEEZING: 1
DIARRHEA: 0

## 2018-05-08 ENCOUNTER — TELEPHONE (OUTPATIENT)
Dept: INTERNAL MEDICINE CLINIC | Age: 63
End: 2018-05-08

## 2018-05-10 ENCOUNTER — CARE COORDINATION (OUTPATIENT)
Dept: CARE COORDINATION | Age: 63
End: 2018-05-10

## 2018-05-10 RX ORDER — MELOXICAM 15 MG/1
15 TABLET ORAL DAILY
Qty: 30 TABLET | Refills: 0 | Status: SHIPPED | OUTPATIENT
Start: 2018-05-10 | End: 2018-05-17

## 2018-05-10 RX ORDER — MELOXICAM 15 MG/1
15 TABLET ORAL DAILY
COMMUNITY
End: 2018-05-10 | Stop reason: SDUPTHER

## 2018-05-17 ENCOUNTER — CARE COORDINATION (OUTPATIENT)
Dept: CARE COORDINATION | Age: 63
End: 2018-05-17

## 2018-05-17 ENCOUNTER — OFFICE VISIT (OUTPATIENT)
Dept: INTERNAL MEDICINE CLINIC | Age: 63
End: 2018-05-17

## 2018-05-17 VITALS
RESPIRATION RATE: 20 BRPM | WEIGHT: 123.4 LBS | HEART RATE: 100 BPM | DIASTOLIC BLOOD PRESSURE: 68 MMHG | OXYGEN SATURATION: 95 % | SYSTOLIC BLOOD PRESSURE: 110 MMHG | BODY MASS INDEX: 19.92 KG/M2 | TEMPERATURE: 98.9 F

## 2018-05-17 DIAGNOSIS — M72.0 DUPUYTREN'S CONTRACTURE: ICD-10-CM

## 2018-05-17 DIAGNOSIS — Z72.0 TOBACCO ABUSE DISORDER: ICD-10-CM

## 2018-05-17 DIAGNOSIS — R63.4 WEIGHT LOSS: ICD-10-CM

## 2018-05-17 DIAGNOSIS — J44.9 COPD, SEVERE (HCC): Primary | ICD-10-CM

## 2018-05-17 DIAGNOSIS — J18.9 LINGULAR PNEUMONIA: ICD-10-CM

## 2018-05-17 DIAGNOSIS — F10.20 UNCOMPLICATED ALCOHOL DEPENDENCE (HCC): ICD-10-CM

## 2018-05-17 DIAGNOSIS — K52.832 LYMPHOCYTIC COLITIS: ICD-10-CM

## 2018-05-17 PROCEDURE — 99213 OFFICE O/P EST LOW 20 MIN: CPT | Performed by: INTERNAL MEDICINE

## 2018-05-17 PROCEDURE — 3023F SPIROM DOC REV: CPT | Performed by: INTERNAL MEDICINE

## 2018-05-17 PROCEDURE — G8420 CALC BMI NORM PARAMETERS: HCPCS | Performed by: INTERNAL MEDICINE

## 2018-05-17 PROCEDURE — 3017F COLORECTAL CA SCREEN DOC REV: CPT | Performed by: INTERNAL MEDICINE

## 2018-05-17 PROCEDURE — G8926 SPIRO NO PERF OR DOC: HCPCS | Performed by: INTERNAL MEDICINE

## 2018-05-17 PROCEDURE — G8427 DOCREV CUR MEDS BY ELIG CLIN: HCPCS | Performed by: INTERNAL MEDICINE

## 2018-05-17 PROCEDURE — 4004F PT TOBACCO SCREEN RCVD TLK: CPT | Performed by: INTERNAL MEDICINE

## 2018-05-17 RX ORDER — MELOXICAM 15 MG/1
15 TABLET ORAL DAILY
COMMUNITY
End: 2018-06-20 | Stop reason: ALTCHOICE

## 2018-05-17 ASSESSMENT — ENCOUNTER SYMPTOMS
CONSTIPATION: 0
GASTROINTESTINAL NEGATIVE: 1
SPUTUM PRODUCTION: 0
BLOOD IN STOOL: 0
HEMOPTYSIS: 0
HEARTBURN: 0
DIARRHEA: 0
COUGH: 1
EYES NEGATIVE: 1
SORE THROAT: 0

## 2018-06-18 ENCOUNTER — TELEPHONE (OUTPATIENT)
Dept: INTERNAL MEDICINE CLINIC | Age: 63
End: 2018-06-18

## 2018-06-18 ENCOUNTER — CARE COORDINATION (OUTPATIENT)
Dept: CASE MANAGEMENT | Age: 63
End: 2018-06-18

## 2018-06-19 ENCOUNTER — CARE COORDINATION (OUTPATIENT)
Dept: CASE MANAGEMENT | Age: 63
End: 2018-06-19

## 2018-06-19 ENCOUNTER — TELEPHONE (OUTPATIENT)
Dept: GASTROENTEROLOGY | Age: 63
End: 2018-06-19

## 2018-06-19 ENCOUNTER — CARE COORDINATION (OUTPATIENT)
Dept: CARE COORDINATION | Age: 63
End: 2018-06-19

## 2018-06-20 ENCOUNTER — OFFICE VISIT (OUTPATIENT)
Dept: INTERNAL MEDICINE CLINIC | Age: 63
End: 2018-06-20

## 2018-06-20 VITALS
RESPIRATION RATE: 19 BRPM | HEIGHT: 66 IN | HEART RATE: 95 BPM | DIASTOLIC BLOOD PRESSURE: 82 MMHG | SYSTOLIC BLOOD PRESSURE: 128 MMHG | WEIGHT: 123.2 LBS | BODY MASS INDEX: 19.8 KG/M2 | OXYGEN SATURATION: 96 % | TEMPERATURE: 98.7 F

## 2018-06-20 DIAGNOSIS — J44.9 COPD, SEVERE (HCC): Primary | ICD-10-CM

## 2018-06-20 DIAGNOSIS — Z72.0 TOBACCO ABUSE DISORDER: ICD-10-CM

## 2018-06-20 DIAGNOSIS — F10.20 UNCOMPLICATED ALCOHOL DEPENDENCE (HCC): ICD-10-CM

## 2018-06-20 DIAGNOSIS — R63.4 WEIGHT LOSS: ICD-10-CM

## 2018-06-20 PROCEDURE — 3023F SPIROM DOC REV: CPT | Performed by: INTERNAL MEDICINE

## 2018-06-20 PROCEDURE — 3017F COLORECTAL CA SCREEN DOC REV: CPT | Performed by: INTERNAL MEDICINE

## 2018-06-20 PROCEDURE — G8420 CALC BMI NORM PARAMETERS: HCPCS | Performed by: INTERNAL MEDICINE

## 2018-06-20 PROCEDURE — 4004F PT TOBACCO SCREEN RCVD TLK: CPT | Performed by: INTERNAL MEDICINE

## 2018-06-20 PROCEDURE — G8926 SPIRO NO PERF OR DOC: HCPCS | Performed by: INTERNAL MEDICINE

## 2018-06-20 PROCEDURE — G8427 DOCREV CUR MEDS BY ELIG CLIN: HCPCS | Performed by: INTERNAL MEDICINE

## 2018-06-20 PROCEDURE — 99213 OFFICE O/P EST LOW 20 MIN: CPT | Performed by: INTERNAL MEDICINE

## 2018-06-20 RX ORDER — IPRATROPIUM BROMIDE AND ALBUTEROL SULFATE 2.5; .5 MG/3ML; MG/3ML
1 SOLUTION RESPIRATORY (INHALATION) EVERY 6 HOURS
Qty: 360 ML | Refills: 1 | Status: ON HOLD | OUTPATIENT
Start: 2018-06-20 | End: 2018-11-29

## 2018-06-20 RX ORDER — ALBUTEROL SULFATE 90 UG/1
2 AEROSOL, METERED RESPIRATORY (INHALATION) EVERY 6 HOURS
Qty: 1 INHALER | Refills: 5 | Status: SHIPPED | OUTPATIENT
Start: 2018-06-20 | End: 2018-11-08 | Stop reason: SDUPTHER

## 2018-06-20 RX ORDER — BUDESONIDE AND FORMOTEROL FUMARATE DIHYDRATE 160; 4.5 UG/1; UG/1
2 AEROSOL RESPIRATORY (INHALATION) 2 TIMES DAILY
Qty: 1 INHALER | Refills: 5 | Status: ON HOLD | OUTPATIENT
Start: 2018-06-20 | End: 2018-11-29 | Stop reason: HOSPADM

## 2018-06-20 ASSESSMENT — PATIENT HEALTH QUESTIONNAIRE - PHQ9
1. LITTLE INTEREST OR PLEASURE IN DOING THINGS: 0
SUM OF ALL RESPONSES TO PHQ9 QUESTIONS 1 & 2: 0
2. FEELING DOWN, DEPRESSED OR HOPELESS: 0
SUM OF ALL RESPONSES TO PHQ QUESTIONS 1-9: 0

## 2018-06-21 PROBLEM — J18.9 LINGULAR PNEUMONIA: Status: RESOLVED | Noted: 2018-04-03 | Resolved: 2018-06-21

## 2018-07-16 ENCOUNTER — TELEPHONE (OUTPATIENT)
Dept: GASTROENTEROLOGY | Age: 63
End: 2018-07-16

## 2018-07-16 NOTE — TELEPHONE ENCOUNTER
Patient callled to reschedule c-scope/egd at [facility]. He did not give a reason. Pt rescheduled to soonest available, 10/4/18 at 900am, arrive at 700am at [facility]. Verified patient address and mailed new prep instruction packet. [facility] notified via fax. Patient expressed understanding and had no further questions.

## 2018-07-19 ENCOUNTER — CARE COORDINATION (OUTPATIENT)
Dept: CARE COORDINATION | Age: 63
End: 2018-07-19

## 2018-07-30 ENCOUNTER — TELEPHONE (OUTPATIENT)
Dept: GASTROENTEROLOGY | Age: 63
End: 2018-07-30

## 2018-07-30 NOTE — TELEPHONE ENCOUNTER
Pt called back stating he needed to keep his appt the same. He just enrolled in a Quit Smoking program through [facility] and that is every Wednesday. He is unable to schedule a Wednesday procedure appt. He will keep the appt the same.

## 2018-08-03 ENCOUNTER — CARE COORDINATION (OUTPATIENT)
Dept: CARE COORDINATION | Age: 63
End: 2018-08-03

## 2018-08-03 ENCOUNTER — OFFICE VISIT (OUTPATIENT)
Dept: INTERNAL MEDICINE CLINIC | Age: 63
End: 2018-08-03

## 2018-08-03 VITALS
OXYGEN SATURATION: 94 % | DIASTOLIC BLOOD PRESSURE: 74 MMHG | RESPIRATION RATE: 24 BRPM | HEART RATE: 95 BPM | TEMPERATURE: 98.6 F | HEIGHT: 66 IN | SYSTOLIC BLOOD PRESSURE: 102 MMHG | WEIGHT: 119.6 LBS | BODY MASS INDEX: 19.22 KG/M2

## 2018-08-03 DIAGNOSIS — K52.832 LYMPHOCYTIC COLITIS: ICD-10-CM

## 2018-08-03 DIAGNOSIS — R63.4 WEIGHT LOSS: ICD-10-CM

## 2018-08-03 DIAGNOSIS — J44.9 COPD, SEVERE (HCC): Primary | ICD-10-CM

## 2018-08-03 DIAGNOSIS — M72.0 DUPUYTREN'S CONTRACTURE: ICD-10-CM

## 2018-08-03 DIAGNOSIS — F10.20 UNCOMPLICATED ALCOHOL DEPENDENCE (HCC): ICD-10-CM

## 2018-08-03 DIAGNOSIS — Z72.0 TOBACCO ABUSE DISORDER: ICD-10-CM

## 2018-08-03 PROCEDURE — G8427 DOCREV CUR MEDS BY ELIG CLIN: HCPCS | Performed by: INTERNAL MEDICINE

## 2018-08-03 PROCEDURE — G8420 CALC BMI NORM PARAMETERS: HCPCS | Performed by: INTERNAL MEDICINE

## 2018-08-03 PROCEDURE — 4004F PT TOBACCO SCREEN RCVD TLK: CPT | Performed by: INTERNAL MEDICINE

## 2018-08-03 PROCEDURE — 3017F COLORECTAL CA SCREEN DOC REV: CPT | Performed by: INTERNAL MEDICINE

## 2018-08-03 PROCEDURE — G8926 SPIRO NO PERF OR DOC: HCPCS | Performed by: INTERNAL MEDICINE

## 2018-08-03 PROCEDURE — 3023F SPIROM DOC REV: CPT | Performed by: INTERNAL MEDICINE

## 2018-08-03 PROCEDURE — 99213 OFFICE O/P EST LOW 20 MIN: CPT | Performed by: INTERNAL MEDICINE

## 2018-08-03 NOTE — ASSESSMENT & PLAN NOTE
Had colonoscopy in 2014. Dr Nedra Espinal.   Had diarrhea for few days and that has resolved last week

## 2018-08-03 NOTE — PROGRESS NOTES
Karla Quiros  Patient's  is 1955  Seen in office on 8/3/2018      SUBJECTIVE:  Ramu Hernandez is a 66 y. o.year old male presents today   Chief Complaint   Patient presents with    COPD     patient is here for a 2 month follow up omn COPD. Patient is here for follow-up of COPD. Patient is taking inhalers he is feeling fairly well. Taking medications regularly. No side effects noted. Patient is a smoker. No chest pain. No shortness of breath. No cough or sputum production. No nausea, vomiting or diarrhea. Patient has weight loss. He had test done. ROS  Normal except as in HPI  OBJECTIVE: /74 (Site: Left Arm, Position: Sitting, Cuff Size: Medium Adult)   Pulse 95   Temp 98.6 °F (37 °C) (Oral)   Resp 24   Ht 5' 6\" (1.676 m)   Wt 119 lb 9.6 oz (54.3 kg)   SpO2 94%   BMI 19.30 kg/m²     Wt Readings from Last 3 Encounters:   18 119 lb 9.6 oz (54.3 kg)   18 123 lb 3.2 oz (55.9 kg)   18 125 lb (56.7 kg)      GENERAL:  Alert, oriented, pleasant, in no apparent distress. HEENT:  Conjunctiva pink, no scleral icterus. ENT clear. NECK:  Supple. No jugular venous distention noted. No masses felt,  CARDIOVASCULAR:  Normal S1 and S2    PULMONARY:  No respiratory distress. No wheezes or rales. ABDOMEN:  Soft and non-tender,no masses  or organomegaly. EXTREMITIES:  No cyanosis, clubbing, or significant edema. SKIN: Skin is warm and dry. NEUROLOGICAL:  Cranial nerves II through XII are grossly intact. IMPRESSION:    Encounter Diagnoses   Name Primary?  COPD, severe ([address] Yes    Tobacco abuse disorder     Lymphocytic colitis     Uncomplicated alcohol dependence ([address]     Dupuytren's contracture     Weight loss        ASSESSMENT/PLAN:    COPD, severe ([facility])  PFTs showed severe COPD in   Pt uses Duoneb neb treatment and prn ventolin  Pt is on Symbicort   Uses 2L/ O2 at night angela 10/17  Pt is stable. Tobacco abuse disorder  Patient is a smoker. Counseled to quit smoking. Various options given. Help number 1-800 QUIT NOW  given to patient. Pt states he stopped for 2 weeks and then started again  Pt is sent to [facility] and has appointment to see them next week    Alcohol dependence ([address]  Still drinking alcohol 3-4 beers every 2 days  Unable to quit them  Going to see [facility] for that. Dupuytren's contracture  Patient is stable. Weight loss  Chest x ray : COPD  Labs normal  Colonoscopy : 9/2014  CT chest in 3/18 and 4/18 : COPD and pneumonia : no mass  CT abdomen  In 4/2018: NAD    Lymphocytic colitis  Had colonoscopy in 2014. Dr Star Gonzalez. Had diarrhea for few days and that has resolved last week    Return to office in 3 months. Mediations reviewed with the patient. Continue current medications. Appropriate prescriptions are addressed. After visit summery provided. Follow up as directed sooner if needed. Questions answered and patient verbalizes understanding. Call for any problems, questions, or concerns. No Known Allergies  Current Outpatient Prescriptions   Medication Sig Dispense Refill    budesonide-formoterol (SYMBICORT) 160-4.5 MCG/ACT AERO Inhale 2 puffs into the lungs 2 times daily 1 Inhaler 5    albuterol sulfate HFA (VENTOLIN HFA) 108 (90 Base) MCG/ACT inhaler Inhale 2 puffs into the lungs every 6 hours 1 Inhaler 5    ipratropium-albuterol (DUONEB) 0.5-2.5 (3) MG/3ML SOLN nebulizer solution Inhale 3 mLs into the lungs every 6 hours 360 mL 1    Nutritional Supplements (ENSURE ACTIVE HIGH PROTEIN PO) Take 6 oz by mouth daily      OXYGEN Inhale 2 L into the lungs nightly      Multiple Vitamins-Minerals (MULTIVITAMIN ADULTS PO) Take by mouth daily       bisacodyl (BISACODYL) 5 MG EC tablet Take 4 tablets once for colonoscopy prep 4 tablet 0     No current facility-administered medications for this visit.       Past Medical History:   Diagnosis Date    Bacterial pneumonia 6/10/2016    Cervical abnormality     congenital Renal:   Lab Results   Component Value Date    BUN 8 06/18/2018    CREATININE 0.7 06/18/2018     06/18/2018    K 4.0 06/18/2018    ALT 13 04/03/2018    AST 16 04/03/2018    GLUCOSE 109 06/18/2018     PT/INR: No results found for: INR  A1C:   Lab Results   Component Value Date    LABA1C 4.4 04/02/2018           Edgar Win MD, 8/3/2018 , 10:37 AM

## 2018-08-10 ENCOUNTER — CARE COORDINATION (OUTPATIENT)
Dept: CARE COORDINATION | Age: 63
End: 2018-08-10

## 2018-09-04 ENCOUNTER — TELEPHONE (OUTPATIENT)
Dept: GASTROENTEROLOGY | Age: 63
End: 2018-09-04

## 2018-09-05 ENCOUNTER — TELEPHONE (OUTPATIENT)
Dept: GASTROENTEROLOGY | Age: 63
End: 2018-09-05

## 2018-10-02 ENCOUNTER — TELEPHONE (OUTPATIENT)
Dept: GASTROENTEROLOGY | Age: 63
End: 2018-10-02

## 2018-10-03 ENCOUNTER — TELEPHONE (OUTPATIENT)
Dept: GASTROENTEROLOGY | Age: 63
End: 2018-10-03

## 2018-10-03 ENCOUNTER — ANESTHESIA EVENT (OUTPATIENT)
Dept: ENDOSCOPY | Age: 63
End: 2018-10-03
Payer: MEDICARE

## 2018-10-03 ASSESSMENT — COPD QUESTIONNAIRES: CAT_SEVERITY: SEVERE

## 2018-10-03 ASSESSMENT — LIFESTYLE VARIABLES: SMOKING_STATUS: 1

## 2018-10-03 NOTE — ANESTHESIA PRE PROCEDURE
home oxygen therapy     2l/nc at night only    Sepsis due to pneumonia ([address] 6/10/2016    Spinal stenosis in cervical region 5/19/2014    MRI showed  Disc protrusion. Severe spinal stenosis at C3-C4    Spinal stenosis of cervical region 3/14/2016    S/P cervical discectomy and fusion with plate and screw. On 7/22/16. Dr Frederick Smallwood at [address] [employer] abuse disorder 5/19/2014    Wears glasses     to read       Past Surgical History:        Procedure Laterality Date    CERVICAL SPINE SURGERY  2015    \"have plate in neck done at [address]      9- Dr Nayely Hammond. f/u 3 years   [address]         Social History:    Social History   Substance Use Topics    Smoking status: Current Every Day Smoker     Packs/day: 1.00     Years: 50.00     Types: Cigarettes    Smokeless tobacco: Never Used      Comment: starts the [employer] program 8/8/18 to stop smoking    Alcohol use 1.8 oz/week     3 Cans of beer per week      Comment: every couple days/ average\" 6 pk of beer per day\" per pt with phone assessment 10/3/2018                                Ready to quit: Not Answered  Counseling given: Not Answered      Vital Signs (Current):   Vitals:    10/03/18 0827   Weight: 120 lb (54.4 kg)   Height: 5' 6\" (1.676 m)                                              BP Readings from Last 3 Encounters:   08/03/18 102/74   06/20/18 128/82   06/18/18 100/66       NPO Status:                                                                                 BMI:   Wt Readings from Last 3 Encounters:   08/13/18 140 lb (63.5 kg)   08/03/18 119 lb 9.6 oz (54.3 kg)   06/20/18 123 lb 3.2 oz (55.9 kg)     Body mass index is 19.37 kg/m².     CBC:   Lab Results   Component Value Date    WBC 8.8 06/18/2018    RBC 5.00 06/18/2018    HGB 15.9 06/18/2018    HCT 47.9 06/18/2018    MCV 95.8 06/18/2018    RDW 13.6 06/18/2018     06/18/2018       CMP:   Lab Results   Component Value Date     06/18/2018    K

## 2018-10-03 NOTE — TELEPHONE ENCOUNTER
Nazanin Pérez this patient is scheduled for procedure with you tomorrow at 900am. There were 2 cancellations so there is a gap from 1000am to 1200pm. Would you like me to mvoe this patient's procedure to 1000am so you can be done by 1100am and then have an hour for lunch or would you like me to leave this patient at the 900am appt time?

## 2018-10-04 ENCOUNTER — HOSPITAL ENCOUNTER (OUTPATIENT)
Age: 63
Setting detail: OUTPATIENT SURGERY
Discharge: HOME | End: 2018-10-04
Attending: INTERNAL MEDICINE | Admitting: INTERNAL MEDICINE
Payer: MEDICARE

## 2018-10-04 ENCOUNTER — ANESTHESIA (OUTPATIENT)
Dept: ENDOSCOPY | Age: 63
End: 2018-10-04
Payer: MEDICARE

## 2018-10-04 ENCOUNTER — TELEPHONE (OUTPATIENT)
Dept: GASTROENTEROLOGY | Age: 63
End: 2018-10-04

## 2018-10-04 VITALS
WEIGHT: 120 LBS | HEIGHT: 66 IN | BODY MASS INDEX: 19.29 KG/M2 | DIASTOLIC BLOOD PRESSURE: 85 MMHG | HEART RATE: 73 BPM | OXYGEN SATURATION: 100 % | TEMPERATURE: 98.3 F | SYSTOLIC BLOOD PRESSURE: 141 MMHG | RESPIRATION RATE: 16 BRPM

## 2018-10-04 VITALS — SYSTOLIC BLOOD PRESSURE: 114 MMHG | DIASTOLIC BLOOD PRESSURE: 85 MMHG | OXYGEN SATURATION: 100 %

## 2018-10-04 PROBLEM — R63.4 WEIGHT LOSS: Status: ACTIVE | Noted: 2018-10-04

## 2018-10-04 PROBLEM — K92.2 GI BLEEDING: Status: ACTIVE | Noted: 2018-10-04

## 2018-10-04 LAB
ANION GAP SERPL CALCULATED.3IONS-SCNC: 11 MMOL/L (ref 4–16)
BUN BLDV-MCNC: 5 MG/DL (ref 6–23)
CALCIUM SERPL-MCNC: 8.6 MG/DL (ref 8.3–10.6)
CHLORIDE BLD-SCNC: 100 MMOL/L (ref 99–110)
CO2: 28 MMOL/L (ref 21–32)
CREAT SERPL-MCNC: 0.6 MG/DL (ref 0.9–1.3)
GFR AFRICAN AMERICAN: >60 ML/MIN/1.73M2
GFR NON-AFRICAN AMERICAN: >60 ML/MIN/1.73M2
GLUCOSE BLD-MCNC: 86 MG/DL (ref 70–99)
POTASSIUM SERPL-SCNC: 4.2 MMOL/L (ref 3.5–5.1)
SODIUM BLD-SCNC: 139 MMOL/L (ref 135–145)

## 2018-10-04 PROCEDURE — 2709999900 HC NON-CHARGEABLE SUPPLY: Performed by: INTERNAL MEDICINE

## 2018-10-04 PROCEDURE — 7100000010 HC PHASE II RECOVERY - FIRST 15 MIN: Performed by: INTERNAL MEDICINE

## 2018-10-04 PROCEDURE — 2500000003 HC RX 250 WO HCPCS: Performed by: NURSE ANESTHETIST, CERTIFIED REGISTERED

## 2018-10-04 PROCEDURE — 87073 CULTURE BACTERIA ANAEROBIC: CPT

## 2018-10-04 PROCEDURE — 80048 BASIC METABOLIC PNL TOTAL CA: CPT

## 2018-10-04 PROCEDURE — 3609012400 HC EGD TRANSORAL BIOPSY SINGLE/MULTIPLE: Performed by: INTERNAL MEDICINE

## 2018-10-04 PROCEDURE — 7100000011 HC PHASE II RECOVERY - ADDTL 15 MIN: Performed by: INTERNAL MEDICINE

## 2018-10-04 PROCEDURE — 45385 COLONOSCOPY W/LESION REMOVAL: CPT | Performed by: INTERNAL MEDICINE

## 2018-10-04 PROCEDURE — 2580000003 HC RX 258: Performed by: ANESTHESIOLOGY

## 2018-10-04 PROCEDURE — 6360000002 HC RX W HCPCS: Performed by: NURSE ANESTHETIST, CERTIFIED REGISTERED

## 2018-10-04 PROCEDURE — 87205 SMEAR GRAM STAIN: CPT

## 2018-10-04 PROCEDURE — 87071 CULTURE AEROBIC QUANT OTHER: CPT

## 2018-10-04 PROCEDURE — 3609010600 HC COLONOSCOPY POLYPECTOMY SNARE/COLD BIOPSY: Performed by: INTERNAL MEDICINE

## 2018-10-04 PROCEDURE — 88305 TISSUE EXAM BY PATHOLOGIST: CPT

## 2018-10-04 PROCEDURE — 88342 IMHCHEM/IMCYTCHM 1ST ANTB: CPT

## 2018-10-04 PROCEDURE — 3700000000 HC ANESTHESIA ATTENDED CARE: Performed by: INTERNAL MEDICINE

## 2018-10-04 PROCEDURE — 43239 EGD BIOPSY SINGLE/MULTIPLE: CPT | Performed by: INTERNAL MEDICINE

## 2018-10-04 PROCEDURE — 3700000001 HC ADD 15 MINUTES (ANESTHESIA): Performed by: INTERNAL MEDICINE

## 2018-10-04 RX ORDER — LIDOCAINE HYDROCHLORIDE 20 MG/ML
INJECTION, SOLUTION EPIDURAL; INFILTRATION; INTRACAUDAL; PERINEURAL PRN
Status: DISCONTINUED | OUTPATIENT
Start: 2018-10-04 | End: 2018-10-04 | Stop reason: SDUPTHER

## 2018-10-04 RX ORDER — SODIUM CHLORIDE, SODIUM LACTATE, POTASSIUM CHLORIDE, CALCIUM CHLORIDE 600; 310; 30; 20 MG/100ML; MG/100ML; MG/100ML; MG/100ML
INJECTION, SOLUTION INTRAVENOUS CONTINUOUS
Status: DISCONTINUED | OUTPATIENT
Start: 2018-10-04 | End: 2018-10-04 | Stop reason: HOSPADM

## 2018-10-04 RX ORDER — OMEPRAZOLE 20 MG/1
20 CAPSULE, DELAYED RELEASE ORAL DAILY
Qty: 30 CAPSULE | Refills: 3 | Status: SHIPPED | OUTPATIENT
Start: 2018-10-04 | End: 2019-03-01 | Stop reason: ALTCHOICE

## 2018-10-04 RX ORDER — PROPOFOL 10 MG/ML
INJECTION, EMULSION INTRAVENOUS PRN
Status: DISCONTINUED | OUTPATIENT
Start: 2018-10-04 | End: 2018-10-04 | Stop reason: SDUPTHER

## 2018-10-04 RX ADMIN — PROPOFOL 30 MG: 10 INJECTION, EMULSION INTRAVENOUS at 09:25

## 2018-10-04 RX ADMIN — PROPOFOL 30 MG: 10 INJECTION, EMULSION INTRAVENOUS at 09:45

## 2018-10-04 RX ADMIN — PROPOFOL 50 MG: 10 INJECTION, EMULSION INTRAVENOUS at 09:22

## 2018-10-04 RX ADMIN — PROPOFOL 50 MG: 10 INJECTION, EMULSION INTRAVENOUS at 09:31

## 2018-10-04 RX ADMIN — PROPOFOL 50 MG: 10 INJECTION, EMULSION INTRAVENOUS at 09:53

## 2018-10-04 RX ADMIN — PROPOFOL 50 MG: 10 INJECTION, EMULSION INTRAVENOUS at 09:12

## 2018-10-04 RX ADMIN — PROPOFOL 50 MG: 10 INJECTION, EMULSION INTRAVENOUS at 10:06

## 2018-10-04 RX ADMIN — PROPOFOL 70 MG: 10 INJECTION, EMULSION INTRAVENOUS at 09:57

## 2018-10-04 RX ADMIN — LIDOCAINE HYDROCHLORIDE 100 MG: 20 INJECTION, SOLUTION EPIDURAL; INFILTRATION; INTRACAUDAL; PERINEURAL at 09:12

## 2018-10-04 RX ADMIN — PROPOFOL 20 MG: 10 INJECTION, EMULSION INTRAVENOUS at 09:27

## 2018-10-04 RX ADMIN — PROPOFOL 50 MG: 10 INJECTION, EMULSION INTRAVENOUS at 09:49

## 2018-10-04 RX ADMIN — PROPOFOL 70 MG: 10 INJECTION, EMULSION INTRAVENOUS at 09:38

## 2018-10-04 RX ADMIN — SODIUM CHLORIDE, POTASSIUM CHLORIDE, SODIUM LACTATE AND CALCIUM CHLORIDE: 600; 310; 30; 20 INJECTION, SOLUTION INTRAVENOUS at 10:09

## 2018-10-04 RX ADMIN — PROPOFOL 50 MG: 10 INJECTION, EMULSION INTRAVENOUS at 10:01

## 2018-10-04 RX ADMIN — PROPOFOL 30 MG: 10 INJECTION, EMULSION INTRAVENOUS at 09:51

## 2018-10-04 RX ADMIN — PROPOFOL 50 MG: 10 INJECTION, EMULSION INTRAVENOUS at 10:03

## 2018-10-04 RX ADMIN — PROPOFOL 50 MG: 10 INJECTION, EMULSION INTRAVENOUS at 09:20

## 2018-10-04 RX ADMIN — PROPOFOL 50 MG: 10 INJECTION, EMULSION INTRAVENOUS at 09:42

## 2018-10-04 RX ADMIN — SODIUM CHLORIDE, POTASSIUM CHLORIDE, SODIUM LACTATE AND CALCIUM CHLORIDE: 600; 310; 30; 20 INJECTION, SOLUTION INTRAVENOUS at 08:12

## 2018-10-04 RX ADMIN — SODIUM CHLORIDE, POTASSIUM CHLORIDE, SODIUM LACTATE AND CALCIUM CHLORIDE: 600; 310; 30; 20 INJECTION, SOLUTION INTRAVENOUS at 09:10

## 2018-10-04 ASSESSMENT — COPD QUESTIONNAIRES: CAT_SEVERITY: SEVERE

## 2018-10-04 ASSESSMENT — LIFESTYLE VARIABLES: SMOKING_STATUS: 1

## 2018-10-04 NOTE — ANESTHESIA PRE PROCEDURE
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 On 7/22/16. Dr Rachel Bunch at [address] polyps 6-    Dr. Green Begun COPD (chronic obstructive pulmonary disease) ([facility] 5/19/2014    PFTs showed severe COPD in 5/14- follows with Dr Shannan Ayala COPD exacerbation [facility]) 6/10/2016    Diverticulosis 6-    Dupuytren's contracture 5/26/2017    Right palm    H/O colonoscopy 9-18-14    Dr. Green Begun H/O echocardiogram 08/17/2016    EF 55-60%. Abnormal diastolic function. Mild TR. Borderline right atrium and RV dilatation.  History of alcohol abuse     \"average 6 pk beer per day\"    History of drug abuse     does use marijuana\"last used cocaine over 10 yrs ago\" per pt on 10/3/2018    Internal hemorrhoids 9-    Intervertebral disc protrusion 5/19/2014    Cervical region on MRI    Lingular pneumonia 4/3/2018    Lymphocytic colitis 9-18-14    Dr. Ham Cote, will treat if symptomatic    On home oxygen therapy     2l/nc at night only    Sepsis due to pneumonia ([facility] 6/10/2016    Spinal stenosis in cervical region 5/19/2014    MRI showed  Disc protrusion. Severe spinal stenosis at C3-C4    Spinal stenosis of cervical region 3/14/2016    S/P cervical discectomy and fusion with plate and screw. On 7/22/16. Dr Rachel Bunch at [address] [employer] abuse disorder 5/19/2014    Wears glasses     to read       Past Surgical History:        Procedure Laterality Date    CERVICAL SPINE SURGERY  2015    \"have plate in neck done at [address] [facility]      9- Dr Ani Cote.  f/u 3 years   [facility]    SIGMOIDOSCOPY         Social History:    Social History   Substance Use Topics    Smoking status: Current Every Day Smoker     Packs/day: 1.00     Years: 50.00     Types: Cigarettes    Smokeless tobacco: Never Used      Comment: starts the [employer] program 8/8/18 to stop smoking    Alcohol use 1.8 oz/week     3 Cans of beer per week      Comment: every couple days/ average\" 6 pk of beer per day\" per pt with phone assessment 10/3/2018                                Ready to quit: Not Answered  Counseling given: Not Answered      Vital Signs (Current):   Vitals:    10/03/18 0827   Weight: 120 lb (54.4 kg)   Height: 5' 6\" (1.676 m)                                              BP Readings from Last 3 Encounters:   08/03/18 102/74   06/20/18 128/82   06/18/18 100/66       NPO Status:                                                                                 BMI:   Wt Readings from Last 3 Encounters:   10/03/18 120 lb (54.4 kg)   08/13/18 140 lb (63.5 kg)   08/03/18 119 lb 9.6 oz (54.3 kg)     Body mass index is 19.37 kg/m². CBC:   Lab Results   Component Value Date    WBC 8.8 06/18/2018    RBC 5.00 06/18/2018    HGB 15.9 06/18/2018    HCT 47.9 06/18/2018    MCV 95.8 06/18/2018    RDW 13.6 06/18/2018     06/18/2018       CMP:   Lab Results   Component Value Date     06/18/2018    K 4.0 06/18/2018     06/18/2018    CO2 24 06/18/2018    BUN 8 06/18/2018    CREATININE 0.7 06/18/2018    GFRAA >60 06/18/2018    LABGLOM >60 06/18/2018    GLUCOSE 109 06/18/2018    PROT 6.8 04/03/2018    PROT 5.7 02/21/2012    CALCIUM 9.1 06/18/2018    BILITOT 0.7 04/03/2018    ALKPHOS 51 04/03/2018    AST 16 04/03/2018    ALT 13 04/03/2018       POC Tests: No results for input(s): POCGLU, POCNA, POCK, POCCL, POCBUN, POCHEMO, POCHCT in the last 72 hours.     Coags: No results found for: PROTIME, INR, APTT    HCG (If Applicable): No results found for: PREGTESTUR, PREGSERUM, HCG, HCGQUANT     ABGs:   Lab Results   Component Value Date    PO2ART 68.1 09/25/2017    EGP8CSJ 36.6 09/25/2017    ZVL0PGH 22.3 09/25/2017        Type & Screen (If Applicable):  No results found for: [facility]    Anesthesia Evaluation    Airway: Mallampati: II  TM distance: >3 FB   Neck ROM: full  Comment: MRI showed  Disc protrusion. Severe spinal stenosis at C3-C4  Mouth opening: > = 3 FB Dental:    (+) edentulous      Pulmonary:   (+) pneumonia:  COPD: severe,  decreased breath sounds,  current smoker                          ROS comment: 2l/nc at night only   Cardiovascular:            Rhythm: regular  Rate: normal           Beta Blocker:  Not on Beta Blocker      ROS comment: EF 55-60%. Abnormal diastolic function. Mild TR. Borderline right atrium and RV dilatation. Neuro/Psych:   (+) psychiatric history (ETOH):            GI/Hepatic/Renal:             Endo/Other:    (+) : arthritis: OA., .                  ROS comment: does use marijuana\"last used cocaine over 10 yrs ago\" per pt on 10/3/2018    average 6 pk beer per day Abdominal:           Vascular:                                      Anesthesia Plan      general and MAC     ASA 4       Induction: intravenous. Anesthetic plan and risks discussed with patient. Pre Anesthesia Evaluation complete. Anesthesia plan, risks, benefits, alternatives, and personnel discussed with patient and/or legal guardian. Patient and/or legal guardian verbalized an understanding and agreed to proceed. Anesthesia plan discussed with care team members and agreed upon.   HEMALATHA Nieto CRNA  10/4/2018        HEMALATHA Nieto CRNA   10/4/2018

## 2018-10-04 NOTE — ANESTHESIA POSTPROCEDURE EVALUATION
Department of Anesthesiology  Postprocedure Note    Patient: James Gutierrez  MRN: [id number]  YOB: 1955  Date of evaluation: 10/4/2018  Time:  10:45 AM     Procedure Summary     Date:  10/04/18 Room / Location:  [address] [place] [address] ENDO 01 / [facility] ENDOSCOPY    Anesthesia Start:  0911 Anesthesia Stop:  1009    Procedures:       EGD BIOPSY (N/A )      COLONOSCOPY POLYPECTOMY SNARE/COLD BIOPSY (N/A ) Diagnosis:  (wt loss, hx colitits)    Surgeon:  Joelle Moscoso MD Responsible Provider:  Fatmata Dixon DO    Anesthesia Type:  general, MAC ASA Status:  4          Anesthesia Type: general, MAC    Mi Phase I: Mi Score: 10    Mi Phase II:      Last vitals: Reviewed and per EMR flowsheets.        Anesthesia Post Evaluation    Patient location during evaluation: bedside  Patient participation: complete - patient participated  Level of consciousness: sleepy but conscious  Pain score: 0  Airway patency: patent  Nausea & Vomiting: no nausea and no vomiting  Complications: no  Cardiovascular status: blood pressure returned to baseline and hemodynamically stable  Respiratory status: acceptable, nasal cannula and spontaneous ventilation  Hydration status: euvolemic

## 2018-10-05 ENCOUNTER — TELEPHONE (OUTPATIENT)
Dept: GASTROENTEROLOGY | Age: 63
End: 2018-10-05

## 2018-10-08 NOTE — TELEPHONE ENCOUNTER
Per Dinh Parks:   Please notify patient that Prilosec 20 mg was sent to his pharmacy and is ready for . He is to take this first thing in the morning on an empty stomach half an hour before breakfast on an empty stomach.

## 2018-10-18 ENCOUNTER — CARE COORDINATION (OUTPATIENT)
Dept: CARE COORDINATION | Age: 63
End: 2018-10-18

## 2018-11-08 ENCOUNTER — OFFICE VISIT (OUTPATIENT)
Dept: INTERNAL MEDICINE CLINIC | Age: 63
End: 2018-11-08
Payer: MEDICARE

## 2018-11-08 VITALS
WEIGHT: 120.6 LBS | TEMPERATURE: 99 F | BODY MASS INDEX: 19.38 KG/M2 | SYSTOLIC BLOOD PRESSURE: 118 MMHG | HEIGHT: 66 IN | HEART RATE: 84 BPM | OXYGEN SATURATION: 94 % | RESPIRATION RATE: 16 BRPM | DIASTOLIC BLOOD PRESSURE: 80 MMHG

## 2018-11-08 DIAGNOSIS — F10.20 UNCOMPLICATED ALCOHOL DEPENDENCE (HCC): ICD-10-CM

## 2018-11-08 DIAGNOSIS — Z23 NEED FOR PROPHYLACTIC VACCINATION AND INOCULATION AGAINST VARICELLA: Primary | ICD-10-CM

## 2018-11-08 DIAGNOSIS — R63.4 WEIGHT LOSS: ICD-10-CM

## 2018-11-08 DIAGNOSIS — J44.9 COPD, SEVERE (HCC): ICD-10-CM

## 2018-11-08 DIAGNOSIS — Z72.0 TOBACCO ABUSE DISORDER: ICD-10-CM

## 2018-11-08 DIAGNOSIS — K52.832 LYMPHOCYTIC COLITIS: ICD-10-CM

## 2018-11-08 PROBLEM — F10.939 ALCOHOL WITHDRAWAL (HCC): Status: RESOLVED | Noted: 2018-04-08 | Resolved: 2018-11-08

## 2018-11-08 PROCEDURE — 99213 OFFICE O/P EST LOW 20 MIN: CPT | Performed by: INTERNAL MEDICINE

## 2018-11-08 ASSESSMENT — ENCOUNTER SYMPTOMS
EYES NEGATIVE: 1
RESPIRATORY NEGATIVE: 1
ALLERGIC/IMMUNOLOGIC NEGATIVE: 1
GASTROINTESTINAL NEGATIVE: 1

## 2018-11-08 NOTE — PROGRESS NOTES
Shelbie Cecy  Patient's  is 1955  Seen in office on 2018      SUBJECTIVE:  Hira crowder 66 y. o.year old male presents today   Chief Complaint   Patient presents with    3 Month Follow-Up     COPD     Flu Vaccine     wants the flu vaccine      Pt is here for f/u of COPD and wt loss  Pt states he is feeling well  Had colonoscopy in 10/18 : was good. Polyps were adenomas : f/u 5 years  EGD showed mild inflammation in stomach. Recommended PPI  Pt is feeling good  No chest pain. No SOB. No headache. Weight has been stable. Taking medications regularly. No side effects noted. Review of Systems   Constitutional: Negative. HENT: Negative. Eyes: Negative. Respiratory: Negative. Cardiovascular: Negative. Gastrointestinal: Negative. Endocrine: Negative. Genitourinary: Negative. Musculoskeletal: Negative. Skin: Negative. Allergic/Immunologic: Negative. Neurological: Negative. Hematological: Negative. Psychiatric/Behavioral: Negative. OBJECTIVE: /80 (Site: Left Upper Arm, Position: Sitting, Cuff Size: Medium Adult)   Pulse 84   Temp 99 °F (37.2 °C) (Oral)   Resp 16   Ht 5' 6\" (1.676 m)   Wt 120 lb 9.6 oz (54.7 kg)   SpO2 94%   BMI 19.47 kg/m²     Wt Readings from Last 3 Encounters:   18 120 lb 9.6 oz (54.7 kg)   10/04/18 120 lb (54.4 kg)   18 140 lb (63.5 kg) weight not correct      GENERAL: - Alert, oriented, pleasant, in no apparent distress. HEENT: - Conjunctiva pink, no scleral icterus. ENT clear. NECK: -Supple. No jugular venous distention noted. No masses felt,  CARDIOVASCULAR: - Normal S1 and S2    PULMONARY: - No respiratory distress. No wheezes or rales. ABDOMEN: - Soft and non-tender,no masses  ororganomegaly. EXTREMITIES: - No cyanosis, clubbing, or significant edema. SKIN: Skin is warm and dry. NEUROLOGICAL: - Cranial nerves II through XII are grossly intact. IMPRESSION:    Encounter Diagnoses   Name Primary?

## 2018-11-27 ENCOUNTER — APPOINTMENT (OUTPATIENT)
Dept: GENERAL RADIOLOGY | Age: 63
DRG: 193 | End: 2018-11-27
Payer: MEDICARE

## 2018-11-27 ENCOUNTER — HOSPITAL ENCOUNTER (INPATIENT)
Age: 63
LOS: 2 days | Discharge: HOME | DRG: 193 | End: 2018-11-29
Attending: EMERGENCY MEDICINE | Admitting: HOSPITALIST
Payer: MEDICARE

## 2018-11-27 DIAGNOSIS — J44.1 COPD EXACERBATION (HCC): ICD-10-CM

## 2018-11-27 DIAGNOSIS — F10.231 ALCOHOL DEPENDENCE WITH WITHDRAWAL DELIRIUM (HCC): ICD-10-CM

## 2018-11-27 DIAGNOSIS — J18.9 PNEUMONIA DUE TO ORGANISM: ICD-10-CM

## 2018-11-27 DIAGNOSIS — J44.9 COPD, SEVERE (HCC): ICD-10-CM

## 2018-11-27 DIAGNOSIS — Z72.0 TOBACCO ABUSE DISORDER: Primary | ICD-10-CM

## 2018-11-27 LAB
ADENOVIRUS DETECTION BY PCR: NOT DETECTED
ALBUMIN SERPL-MCNC: 3.1 GM/DL (ref 3.4–5)
ALP BLD-CCNC: 63 IU/L (ref 40–129)
ALT SERPL-CCNC: 14 U/L (ref 10–40)
AMYLASE: 42 U/L (ref 25–115)
ANION GAP SERPL CALCULATED.3IONS-SCNC: 15 MMOL/L (ref 4–16)
AST SERPL-CCNC: 13 IU/L (ref 15–37)
BASE EXCESS MIXED: 2.9 (ref 0–1.2)
BASE EXCESS: ABNORMAL (ref 0–3.3)
BASOPHILS ABSOLUTE: 0.1 K/CU MM
BASOPHILS RELATIVE PERCENT: 0.3 % (ref 0–1)
BILIRUB SERPL-MCNC: 0.5 MG/DL (ref 0–1)
BORDETELLA PERTUSSIS PCR: NOT DETECTED
BUN BLDV-MCNC: 5 MG/DL (ref 6–23)
CALCIUM SERPL-MCNC: 8.9 MG/DL (ref 8.3–10.6)
CHLAMYDOPHILA PNEUMONIA PCR: NOT DETECTED
CHLORIDE BLD-SCNC: 98 MMOL/L (ref 99–110)
CO2 CONTENT: 29.6 MMOL/L (ref 19–24)
CO2: 24 MMOL/L (ref 21–32)
CORONAVIRUS 229E PCR: NOT DETECTED
CORONAVIRUS HKU1 PCR: NOT DETECTED
CORONAVIRUS NL63 PCR: NOT DETECTED
CORONAVIRUS OC43 PCR: NOT DETECTED
CREAT SERPL-MCNC: 0.6 MG/DL (ref 0.9–1.3)
D DIMER: 346 NG/ML(DDU)
DIFFERENTIAL TYPE: ABNORMAL
EOSINOPHILS ABSOLUTE: 0 K/CU MM
EOSINOPHILS RELATIVE PERCENT: 0.3 % (ref 0–3)
GFR AFRICAN AMERICAN: >60 ML/MIN/1.73M2
GFR NON-AFRICAN AMERICAN: >60 ML/MIN/1.73M2
GLUCOSE BLD-MCNC: 106 MG/DL (ref 70–99)
HCO3 ARTERIAL: 28.2 MMOL/L (ref 18–23)
HCT VFR BLD CALC: 42.2 % (ref 42–52)
HCT VFR BLD CALC: 44 % (ref 42–52)
HEMOGLOBIN: 13.8 GM/DL (ref 13.5–18)
HEMOGLOBIN: 14.3 GM/DL (ref 13.5–18)
HUMAN METAPNEUMOVIRUS PCR: NOT DETECTED
IMMATURE NEUTROPHIL %: 0.5 % (ref 0–0.43)
INFLUENZA A BY PCR: NOT DETECTED
INFLUENZA A H1 (2009) PCR: NOT DETECTED
INFLUENZA A H1 PANDEMIC PCR: NOT DETECTED
INFLUENZA A H3 PCR: NOT DETECTED
INFLUENZA B BY PCR: NOT DETECTED
LIPASE: 24 IU/L (ref 13–60)
LYMPHOCYTES ABSOLUTE: 1.4 K/CU MM
LYMPHOCYTES RELATIVE PERCENT: 9.9 % (ref 24–44)
MCH RBC QN AUTO: 31.7 PG (ref 27–31)
MCH RBC QN AUTO: 32.3 PG (ref 27–31)
MCHC RBC AUTO-ENTMCNC: 32.5 % (ref 32–36)
MCHC RBC AUTO-ENTMCNC: 32.7 % (ref 32–36)
MCV RBC AUTO: 97.6 FL (ref 78–100)
MCV RBC AUTO: 98.8 FL (ref 78–100)
MONOCYTES ABSOLUTE: 1.7 K/CU MM
MONOCYTES RELATIVE PERCENT: 11.9 % (ref 0–4)
MYCOPLASMA PNEUMONIAE PCR: NOT DETECTED
O2 SATURATION: 97.4 % (ref 96–97)
PARAINFLUENZA 1 PCR: NOT DETECTED
PARAINFLUENZA 2 PCR: NOT DETECTED
PARAINFLUENZA 3 PCR: NOT DETECTED
PARAINFLUENZA 4 PCR: NOT DETECTED
PCO2 ARTERIAL: 44.9 MMHG (ref 32–45)
PDW BLD-RTO: 13.2 % (ref 11.7–14.9)
PDW BLD-RTO: 13.4 % (ref 11.7–14.9)
PH BLOOD: 7.41 (ref 7.34–7.45)
PHOSPHORUS: 3.3 MG/DL (ref 2.5–4.9)
PLATELET # BLD: 240 K/CU MM (ref 140–440)
PLATELET # BLD: 252 K/CU MM (ref 140–440)
PMV BLD AUTO: 8.9 FL (ref 7.5–11.1)
PMV BLD AUTO: 9 FL (ref 7.5–11.1)
PO2 ARTERIAL: 95.2 MMHG (ref 75–100)
POTASSIUM SERPL-SCNC: 4.4 MMOL/L (ref 3.5–5.1)
RBC # BLD: 4.27 M/CU MM (ref 4.6–6.2)
RBC # BLD: 4.51 M/CU MM (ref 4.6–6.2)
RHINOVIRUS ENTEROVIRUS PCR: NOT DETECTED
RSV PCR: NOT DETECTED
SEGMENTED NEUTROPHILS ABSOLUTE COUNT: 11 K/CU MM
SEGMENTED NEUTROPHILS RELATIVE PERCENT: 77.1 % (ref 36–66)
SODIUM BLD-SCNC: 137 MMOL/L (ref 135–145)
SOURCE, BLOOD GAS: ABNORMAL
TOTAL IMMATURE NEUTOROPHIL: 0.07 K/CU MM
TOTAL PROTEIN: 6.2 GM/DL (ref 6.4–8.2)
TROPONIN T: <0.01 NG/ML
TSH HIGH SENSITIVITY: 1.14 UIU/ML (ref 0.27–4.2)
WBC # BLD: 12.1 K/CU MM (ref 4–10.5)
WBC # BLD: 14.3 K/CU MM (ref 4–10.5)

## 2018-11-27 PROCEDURE — 89220 SPUTUM SPECIMEN COLLECTION: CPT

## 2018-11-27 PROCEDURE — 36600 WITHDRAWAL OF ARTERIAL BLOOD: CPT

## 2018-11-27 PROCEDURE — 93010 ELECTROCARDIOGRAM REPORT: CPT | Performed by: INTERNAL MEDICINE

## 2018-11-27 PROCEDURE — 87899 AGENT NOS ASSAY W/OPTIC: CPT

## 2018-11-27 PROCEDURE — 87040 BLOOD CULTURE FOR BACTERIA: CPT

## 2018-11-27 PROCEDURE — 2580000003 HC RX 258: Performed by: EMERGENCY MEDICINE

## 2018-11-27 PROCEDURE — 87070 CULTURE OTHR SPECIMN AEROBIC: CPT

## 2018-11-27 PROCEDURE — 6370000000 HC RX 637 (ALT 250 FOR IP): Performed by: NURSE PRACTITIONER

## 2018-11-27 PROCEDURE — 87449 NOS EACH ORGANISM AG IA: CPT

## 2018-11-27 PROCEDURE — 2140000000 HC CCU INTERMEDIATE R&B

## 2018-11-27 PROCEDURE — 84100 ASSAY OF PHOSPHORUS: CPT

## 2018-11-27 PROCEDURE — 96365 THER/PROPH/DIAG IV INF INIT: CPT

## 2018-11-27 PROCEDURE — 71045 X-RAY EXAM CHEST 1 VIEW: CPT

## 2018-11-27 PROCEDURE — 6370000000 HC RX 637 (ALT 250 FOR IP): Performed by: EMERGENCY MEDICINE

## 2018-11-27 PROCEDURE — 6370000000 HC RX 637 (ALT 250 FOR IP)

## 2018-11-27 PROCEDURE — 82803 BLOOD GASES ANY COMBINATION: CPT

## 2018-11-27 PROCEDURE — 84443 ASSAY THYROID STIM HORMONE: CPT

## 2018-11-27 PROCEDURE — 84484 ASSAY OF TROPONIN QUANT: CPT

## 2018-11-27 PROCEDURE — 82150 ASSAY OF AMYLASE: CPT

## 2018-11-27 PROCEDURE — 94640 AIRWAY INHALATION TREATMENT: CPT

## 2018-11-27 PROCEDURE — 85027 COMPLETE CBC AUTOMATED: CPT

## 2018-11-27 PROCEDURE — 87186 SC STD MICRODIL/AGAR DIL: CPT

## 2018-11-27 PROCEDURE — 85379 FIBRIN DEGRADATION QUANT: CPT

## 2018-11-27 PROCEDURE — 6360000002 HC RX W HCPCS: Performed by: NURSE PRACTITIONER

## 2018-11-27 PROCEDURE — 93005 ELECTROCARDIOGRAM TRACING: CPT | Performed by: EMERGENCY MEDICINE

## 2018-11-27 PROCEDURE — 83690 ASSAY OF LIPASE: CPT

## 2018-11-27 PROCEDURE — 80053 COMPREHEN METABOLIC PANEL: CPT

## 2018-11-27 PROCEDURE — 96367 TX/PROPH/DG ADDL SEQ IV INF: CPT

## 2018-11-27 PROCEDURE — 6370000000 HC RX 637 (ALT 250 FOR IP): Performed by: HOSPITALIST

## 2018-11-27 PROCEDURE — 87798 DETECT AGENT NOS DNA AMP: CPT

## 2018-11-27 PROCEDURE — 6360000002 HC RX W HCPCS: Performed by: EMERGENCY MEDICINE

## 2018-11-27 PROCEDURE — 85025 COMPLETE CBC W/AUTO DIFF WBC: CPT

## 2018-11-27 PROCEDURE — 87077 CULTURE AEROBIC IDENTIFY: CPT

## 2018-11-27 PROCEDURE — 2580000003 HC RX 258: Performed by: NURSE PRACTITIONER

## 2018-11-27 PROCEDURE — 6360000002 HC RX W HCPCS: Performed by: HOSPITALIST

## 2018-11-27 PROCEDURE — 87205 SMEAR GRAM STAIN: CPT

## 2018-11-27 PROCEDURE — 2700000000 HC OXYGEN THERAPY PER DAY

## 2018-11-27 PROCEDURE — 36415 COLL VENOUS BLD VENIPUNCTURE: CPT

## 2018-11-27 PROCEDURE — 99285 EMERGENCY DEPT VISIT HI MDM: CPT

## 2018-11-27 RX ORDER — 0.9 % SODIUM CHLORIDE 0.9 %
500 INTRAVENOUS SOLUTION INTRAVENOUS ONCE
Status: COMPLETED | OUTPATIENT
Start: 2018-11-27 | End: 2018-11-27

## 2018-11-27 RX ORDER — FOLIC ACID 1 MG/1
1 TABLET ORAL DAILY
Status: DISCONTINUED | OUTPATIENT
Start: 2018-11-27 | End: 2018-11-29 | Stop reason: HOSPADM

## 2018-11-27 RX ORDER — ASPIRIN 81 MG/1
324 TABLET, CHEWABLE ORAL ONCE
Status: COMPLETED | OUTPATIENT
Start: 2018-11-27 | End: 2018-11-27

## 2018-11-27 RX ORDER — LORAZEPAM 1 MG/1
1 TABLET ORAL
Status: DISCONTINUED | OUTPATIENT
Start: 2018-11-27 | End: 2018-11-29 | Stop reason: HOSPADM

## 2018-11-27 RX ORDER — M-VIT,TX,IRON,MINS/CALC/FOLIC 27MG-0.4MG
1 TABLET ORAL DAILY
Status: DISCONTINUED | OUTPATIENT
Start: 2018-11-27 | End: 2018-11-29 | Stop reason: HOSPADM

## 2018-11-27 RX ORDER — BENZONATATE 100 MG/1
100 CAPSULE ORAL 3 TIMES DAILY PRN
Status: DISCONTINUED | OUTPATIENT
Start: 2018-11-27 | End: 2018-11-29 | Stop reason: HOSPADM

## 2018-11-27 RX ORDER — LORAZEPAM 1 MG/1
4 TABLET ORAL
Status: DISCONTINUED | OUTPATIENT
Start: 2018-11-27 | End: 2018-11-29 | Stop reason: HOSPADM

## 2018-11-27 RX ORDER — KETOROLAC TROMETHAMINE 30 MG/ML
15 INJECTION, SOLUTION INTRAMUSCULAR; INTRAVENOUS EVERY 6 HOURS PRN
Status: DISCONTINUED | OUTPATIENT
Start: 2018-11-27 | End: 2018-11-29 | Stop reason: HOSPADM

## 2018-11-27 RX ORDER — DOCUSATE SODIUM 100 MG/1
100 CAPSULE, LIQUID FILLED ORAL 2 TIMES DAILY
Status: DISCONTINUED | OUTPATIENT
Start: 2018-11-27 | End: 2018-11-29 | Stop reason: HOSPADM

## 2018-11-27 RX ORDER — THIAMINE MONONITRATE (VIT B1) 100 MG
100 TABLET ORAL DAILY
Status: DISCONTINUED | OUTPATIENT
Start: 2018-11-27 | End: 2018-11-29 | Stop reason: HOSPADM

## 2018-11-27 RX ORDER — LORAZEPAM 1 MG/1
2 TABLET ORAL
Status: DISCONTINUED | OUTPATIENT
Start: 2018-11-27 | End: 2018-11-29 | Stop reason: HOSPADM

## 2018-11-27 RX ORDER — LORAZEPAM 2 MG/ML
2 INJECTION INTRAMUSCULAR
Status: DISCONTINUED | OUTPATIENT
Start: 2018-11-27 | End: 2018-11-29 | Stop reason: HOSPADM

## 2018-11-27 RX ORDER — LORAZEPAM 1 MG/1
3 TABLET ORAL
Status: DISCONTINUED | OUTPATIENT
Start: 2018-11-27 | End: 2018-11-29 | Stop reason: HOSPADM

## 2018-11-27 RX ORDER — NICOTINE 21 MG/24HR
1 PATCH, TRANSDERMAL 24 HOURS TRANSDERMAL DAILY
Status: DISCONTINUED | OUTPATIENT
Start: 2018-11-27 | End: 2018-11-29 | Stop reason: HOSPADM

## 2018-11-27 RX ORDER — ONDANSETRON 2 MG/ML
4 INJECTION INTRAMUSCULAR; INTRAVENOUS EVERY 6 HOURS PRN
Status: DISCONTINUED | OUTPATIENT
Start: 2018-11-27 | End: 2018-11-29 | Stop reason: HOSPADM

## 2018-11-27 RX ORDER — PANTOPRAZOLE SODIUM 40 MG/1
40 TABLET, DELAYED RELEASE ORAL
Status: DISCONTINUED | OUTPATIENT
Start: 2018-11-27 | End: 2018-11-29 | Stop reason: HOSPADM

## 2018-11-27 RX ORDER — HYDROCODONE BITARTRATE AND ACETAMINOPHEN 5; 325 MG/1; MG/1
1 TABLET ORAL EVERY 6 HOURS PRN
Status: DISCONTINUED | OUTPATIENT
Start: 2018-11-27 | End: 2018-11-29 | Stop reason: HOSPADM

## 2018-11-27 RX ORDER — IPRATROPIUM BROMIDE AND ALBUTEROL SULFATE 2.5; .5 MG/3ML; MG/3ML
1 SOLUTION RESPIRATORY (INHALATION)
Status: DISCONTINUED | OUTPATIENT
Start: 2018-11-27 | End: 2018-11-29 | Stop reason: HOSPADM

## 2018-11-27 RX ORDER — LORAZEPAM 2 MG/ML
1 INJECTION INTRAMUSCULAR
Status: DISCONTINUED | OUTPATIENT
Start: 2018-11-27 | End: 2018-11-29 | Stop reason: HOSPADM

## 2018-11-27 RX ORDER — LORAZEPAM 2 MG/ML
3 INJECTION INTRAMUSCULAR
Status: DISCONTINUED | OUTPATIENT
Start: 2018-11-27 | End: 2018-11-29 | Stop reason: HOSPADM

## 2018-11-27 RX ORDER — LORAZEPAM 2 MG/ML
4 INJECTION INTRAMUSCULAR
Status: DISCONTINUED | OUTPATIENT
Start: 2018-11-27 | End: 2018-11-29 | Stop reason: HOSPADM

## 2018-11-27 RX ORDER — IPRATROPIUM BROMIDE AND ALBUTEROL SULFATE 2.5; .5 MG/3ML; MG/3ML
SOLUTION RESPIRATORY (INHALATION)
Status: COMPLETED
Start: 2018-11-27 | End: 2018-11-27

## 2018-11-27 RX ORDER — METHYLPREDNISOLONE SODIUM SUCCINATE 40 MG/ML
40 INJECTION, POWDER, LYOPHILIZED, FOR SOLUTION INTRAMUSCULAR; INTRAVENOUS EVERY 12 HOURS
Status: DISCONTINUED | OUTPATIENT
Start: 2018-11-27 | End: 2018-11-28

## 2018-11-27 RX ORDER — SODIUM CHLORIDE 9 MG/ML
INJECTION, SOLUTION INTRAVENOUS CONTINUOUS
Status: DISCONTINUED | OUTPATIENT
Start: 2018-11-27 | End: 2018-11-29 | Stop reason: HOSPADM

## 2018-11-27 RX ADMIN — SODIUM CHLORIDE: 9 INJECTION, SOLUTION INTRAVENOUS at 19:56

## 2018-11-27 RX ADMIN — BENZONATATE 100 MG: 100 CAPSULE ORAL at 15:44

## 2018-11-27 RX ADMIN — MULTIPLE VITAMINS W/ MINERALS TAB 1 TABLET: TAB at 09:18

## 2018-11-27 RX ADMIN — Medication 100 MG: at 09:18

## 2018-11-27 RX ADMIN — MOMETASONE FUROATE AND FORMOTEROL FUMARATE DIHYDRATE 2 PUFF: 200; 5 AEROSOL RESPIRATORY (INHALATION) at 09:19

## 2018-11-27 RX ADMIN — METHYLPREDNISOLONE SODIUM SUCCINATE 40 MG: 40 INJECTION, POWDER, FOR SOLUTION INTRAMUSCULAR; INTRAVENOUS at 19:47

## 2018-11-27 RX ADMIN — HYDROCODONE BITARTRATE AND ACETAMINOPHEN 1 TABLET: 5; 325 TABLET ORAL at 06:11

## 2018-11-27 RX ADMIN — HYDROCODONE BITARTRATE AND ACETAMINOPHEN 1 TABLET: 5; 325 TABLET ORAL at 13:21

## 2018-11-27 RX ADMIN — KETOROLAC TROMETHAMINE 15 MG: 30 INJECTION, SOLUTION INTRAMUSCULAR at 21:57

## 2018-11-27 RX ADMIN — MOMETASONE FUROATE AND FORMOTEROL FUMARATE DIHYDRATE 2 PUFF: 200; 5 AEROSOL RESPIRATORY (INHALATION) at 19:47

## 2018-11-27 RX ADMIN — FOLIC ACID 1 MG: 1 TABLET ORAL at 09:18

## 2018-11-27 RX ADMIN — AZITHROMYCIN MONOHYDRATE 500 MG: 500 INJECTION, POWDER, LYOPHILIZED, FOR SOLUTION INTRAVENOUS at 04:10

## 2018-11-27 RX ADMIN — ENOXAPARIN SODIUM 40 MG: 40 INJECTION SUBCUTANEOUS at 09:18

## 2018-11-27 RX ADMIN — ASPIRIN 81 MG 324 MG: 81 TABLET ORAL at 03:34

## 2018-11-27 RX ADMIN — SODIUM CHLORIDE: 9 INJECTION, SOLUTION INTRAVENOUS at 06:11

## 2018-11-27 RX ADMIN — PANTOPRAZOLE SODIUM 40 MG: 40 TABLET, DELAYED RELEASE ORAL at 06:11

## 2018-11-27 RX ADMIN — IPRATROPIUM BROMIDE AND ALBUTEROL SULFATE 1 AMPULE: .5; 3 SOLUTION RESPIRATORY (INHALATION) at 14:58

## 2018-11-27 RX ADMIN — IPRATROPIUM BROMIDE AND ALBUTEROL SULFATE: .5; 3 SOLUTION RESPIRATORY (INHALATION) at 03:31

## 2018-11-27 RX ADMIN — DOCUSATE SODIUM 100 MG: 100 CAPSULE, LIQUID FILLED ORAL at 19:47

## 2018-11-27 RX ADMIN — IPRATROPIUM BROMIDE AND ALBUTEROL SULFATE 1 AMPULE: .5; 3 SOLUTION RESPIRATORY (INHALATION) at 20:05

## 2018-11-27 RX ADMIN — CEFTRIAXONE SODIUM 1 G: 1 INJECTION, POWDER, FOR SOLUTION INTRAMUSCULAR; INTRAVENOUS at 04:09

## 2018-11-27 RX ADMIN — IPRATROPIUM BROMIDE AND ALBUTEROL SULFATE 1 AMPULE: .5; 3 SOLUTION RESPIRATORY (INHALATION) at 11:38

## 2018-11-27 RX ADMIN — IPRATROPIUM BROMIDE AND ALBUTEROL SULFATE 1 AMPULE: .5; 3 SOLUTION RESPIRATORY (INHALATION) at 07:45

## 2018-11-27 RX ADMIN — SODIUM CHLORIDE 500 ML: 9 INJECTION, SOLUTION INTRAVENOUS at 03:34

## 2018-11-27 RX ADMIN — METHYLPREDNISOLONE SODIUM SUCCINATE 40 MG: 40 INJECTION, POWDER, FOR SOLUTION INTRAMUSCULAR; INTRAVENOUS at 06:11

## 2018-11-27 RX ADMIN — KETOROLAC TROMETHAMINE 15 MG: 30 INJECTION, SOLUTION INTRAMUSCULAR at 15:44

## 2018-11-27 ASSESSMENT — PAIN DESCRIPTION - LOCATION
LOCATION: CHEST

## 2018-11-27 ASSESSMENT — PAIN DESCRIPTION - PAIN TYPE
TYPE: ACUTE PAIN

## 2018-11-27 ASSESSMENT — PAIN SCALES - GENERAL
PAINLEVEL_OUTOF10: 9
PAINLEVEL_OUTOF10: 9
PAINLEVEL_OUTOF10: 7
PAINLEVEL_OUTOF10: 9
PAINLEVEL_OUTOF10: 7
PAINLEVEL_OUTOF10: 10
PAINLEVEL_OUTOF10: 8

## 2018-11-27 ASSESSMENT — PAIN DESCRIPTION - DESCRIPTORS
DESCRIPTORS: SHARP
DESCRIPTORS: ACHING;PRESSURE

## 2018-11-27 ASSESSMENT — PAIN DESCRIPTION - ORIENTATION
ORIENTATION: LEFT
ORIENTATION: OUTER;LEFT

## 2018-11-27 ASSESSMENT — PAIN DESCRIPTION - PROGRESSION: CLINICAL_PROGRESSION: NOT CHANGED

## 2018-11-27 ASSESSMENT — PAIN DESCRIPTION - FREQUENCY: FREQUENCY: CONTINUOUS

## 2018-11-27 ASSESSMENT — PAIN DESCRIPTION - ONSET: ONSET: ON-GOING

## 2018-11-27 NOTE — H&P
History and Physical      Name:  Preet Mark DOB/Age/Sex: 1955  (66 y.o. male)   MRN & CSN:  [id number] & [id number] Admission Date/Time: 2018  2:02 AM   Location:   PCP: Mike Soto MD       Hospital Day: 1        Admitting Physician: Dr. Gladys Hendricks MD     Assessment and Plan:   Preet Mark is a 66 y.o. male who presents with  Shortness of Breath  and Chest Pain     Acute on chronic respiratory failure. Likely multifactorial 2/2 COPD and LLL pneumonia. Presenting with Tachypneia(24) Leukocytosis(14.3). Increased chronic oxygen requirement to maintain saturation. CXR: Bill Fuss expanded lung volume. Bilateral patchy and nodular opacities, most pronounced in the left lung base . Hx of sepsis 2/2 strep pneumoniae (2018)   Admit to Med/Surg   Respiratory interventions- IS, supplemental oxygen, continuous/ spot check pulse oximetry, and TCDB. Continue routine inhalers and add prn bronchodilators, expectorants, and antitussives. IV abx- azithromycin/rocephin   Pending sputum and blood cultures   Pending viral PCR   Droplet precautions    IV steroids   Pending ABG     Chest pain-acute onset likely pleuritic. Neg troponin. No personal hx of CAD but has family hx. Telemetry monitoring/serial troponin    Considering CTA r/o PE given elevated d-dimer (346) but chronically elevated. Well score of 1- will hold for now and defer to daytime rounding physician.  Chronic alcohol abuse- ~752 oz alcoholic beverages per day   CIWA algorithm- ativan prn       Tobacco Use 1-2 PPD   Smoking cessation protocol   Nicotine supplementation     The above were explained to the patient and family in lay language, who indicated understanding and agree to the above plan. All concerns and questions addressed.     Diet  General   DVT Prophylaxis [x] Lovenox, []  Heparin, [] SCDs, [] Ambulation  [] Long term AC   GI Prophylaxis [x] PPI,  [] H2 Blocker,  [] Carafate,  [] Diet/Tube Feeds   Code Status Full     Disposition Admit to inpatient. Patient plans to return home upon discharge   MDM [] Low, [] Moderate,[x]  High     -Patient assessment and plan discussed and reviewed with admitting physician: Radhika Oshea MD.     History of Present Illness:     Chief Complaint: Shortness of Breath and Chest Pain    Apolonio Brunner is a 66 y.o. male  Who presents with acute onset left sided chest pain at 1900 that awoke him from sleep. PTA. Described as sharp stabbing 9/10 pain. Currently on symptomatic with inspiration. Presents with productive cough x 3 days with purulent sputum expectorated. He has hx of COPD with supplemental oxygen at night only but reports wearing throughout daySimilar symptoms with prior admission for sepsis 2/2 strep pneumoniae in april. States thought he was getting pneumonia d/t pain. Louise Bar He states he smokes 1-2 PPD. He describes himself as alcoholic abd drinks three 40 oz alcoholic beverages per day. States withdrawal symptoms occur \"right at day three\"     Ten point ROS: reviewed negative, unless as noted in above HPI. Objective:   No intake or output data in the 24 hours ending 11/27/18 0423     Vitals:   Vitals:    11/27/18 0213 11/27/18 0258   BP: 122/85    Pulse: 96    Resp: 22 24   SpO2: 96% 95%   Weight: 120 lb (54.4 kg)    Height: 5' 6\" (1.676 m)        Physical Exam: 11/27/18     GEN -Awake chronically ill appearing male, sitting upright in bed  lean body habitus. Appears given age. Unkempt appearance. EYES -Pupils are equally round. No scleral erythema, discharge, or conjunctivitis. HENT -MM are moist. Oral pharynx without exudates, no evidence of thrush. NECK -Supple, no apparent thyromegaly or masses. RESP -LLL insp/exp wheezing. RLL significantly diminished. Bronchial rhonchi. Symmetric chest movement while on 2 LPM NC. O2 Sats at 94-96%  C/V -S1/S2 auscultated. RRR without appreciable M/R/G (limited d/t wheezing). No JVD or carotid bruits.  Peripheral pulses equal bilaterally and palpable. Cap refill <3 sec. No peripheral edema. GI -Abdomen is soft non distended and without significant TTP. + BS. No masses or guarding. Rectal exam deferred.  -No CVA/ flank tenderness. Banuelos catheter is not present. LYMPH-No palpable cervical lymphadenopathy and no hepatosplenomegaly. No petechiae or ecchymoses. MS -No gross joint deformities. SKIN -Normal coloration, warm, dry. NEURO-Cranial nerves appear grossly intact, normal speech, no lateralizing weakness. PSYC-Awake, alert, oriented x 4- person, place, time, situation,  Appropriate affect. 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 On 7/22/16. Dr Mk Sanchez at [address] [facility] polyps 0-    Dr. Keara Lennon COPD (chronic obstructive pulmonary disease) ([facility] [address] 5/19/2014    PFTs showed severe COPD in 5/14- follows with Dr Brown Ahumada COPD exacerbation [facility]) 6/10/2016    Diverticulosis 0-    Dupuytren's contracture 5/26/2017    Right palm    H/O colonoscopy 9-18-14    Dr. Keara Lennon H/O echocardiogram 08/17/2016    EF 55-60%. Abnormal diastolic function. Mild TR. Borderline right atrium and RV dilatation.  History of alcohol abuse     \"average 6 pk beer per day\"    History of drug abuse     does use marijuana\"last used cocaine over 10 yrs ago\" per pt on 10/3/2018    Internal hemorrhoids 9-    Intervertebral disc protrusion 5/19/2014    Cervical region on MRI    Lingular pneumonia 4/3/2018    Lymphocytic colitis 9-18-14    Dr. Díaz Smoke, will treat if symptomatic    On home oxygen therapy     2l/nc at night only    Sepsis due to pneumonia ([facility] [address] 6/10/2016    Spinal stenosis in cervical region 5/19/2014    MRI showed  Disc protrusion.  Severe spinal

## 2018-11-27 NOTE — ED PROVIDER NOTES
Emergency [address] EMERGENCY DEPARTMENT    Patient: Harry Hargrove  MRN: [id number]  : 1955  Date of Evaluation: 2018  ED Provider: Juliette Lei MD    Chief Complaint       Chief Complaint   Patient presents with    Shortness of Breath     Patient states has had productive cough and SOB for past 3 days and was woken from sleep tonight at 1900 with left sided CP    Chest Pain     Jayant Reynoso is a 66 y.o. male who presents to the emergency department Report that he has shortness of breath and left sided sharp chest pain. She felt like it did in April when he had pneumonia. The patient smokes 1-2 packs of cigarettes daily. He said thick phlegm with coughing for last 2-3 days. He has been afebrile. He denies diaphoresis or nausea vomiting. The patient was seen by the respiratory therapist prior to me seeing him who knows the patient very well. He received 2 DuoNeb treatments prior to me evaluating him. Patient stated he felt much better. ROS:     At least 10 systems reviewed and otherwise acutely negative except as in the [address] 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 On 16. Dr Mamadou Duncan at [address] [facility] polyps 1-    Dr. Estefany Munoz COPD (chronic obstructive pulmonary disease) ([facility] [address] 2014    PFTs showed severe COPD in - follows with Dr Anatoly Vigil COPD exacerbation [facility]) 6/10/2016    Diverticulosis 0-    Dupuytren's contracture 2017    Right palm    H/O colonoscopy 14    Dr. Estefany Munoz H/O echocardiogram 2016    EF 55-60%. Abnormal diastolic function. Mild TR. Borderline right atrium and RV dilatation.      History of alcohol abuse     \"average 6 pk beer CHEST 11/27/2018 2:43 am COMPARISON: 06/18/2018. HISTORY: ORDERING SYSTEM PROVIDED HISTORY: Chest pain, shortness of breath TECHNOLOGIST PROVIDED HISTORY: Reason for exam:->Chest pain, shortness of breath Ordering Physician Provided Reason for Exam: Chest pain, shortness of breath, hx COPD Acuity: Acute Type of Exam: Initial Relevant Medical/Surgical History: Chest pain, shortness of breath, hx COPD FINDINGS: Hyper expanded lung volume. Bilateral patchy and nodular opacities, most pronounced in the left lung base. No pleural effusion or pneumothorax. Stable cardiomediastinal silhouette and great vessels. Stable regional skeleton. Bilateral patchy and nodular opacities are most pronounced in the left lung base and may relate to an infectious/inflammatory process. Follow up chest radiographs after the completion of medical therapy may be helpful to document improvement and evaluate for malignant potential. Hyper expanded lung volume can be seen in the setting of COPD. Procedures/EKG:   EKG Interpretation    Interpreted by emergency department physician    Rhythm: normal sinus   Rate: 97 beats per minute  Axis: right  Ectopy: none  Conduction: normal  ST Segments: nonspecific changes  T Waves: non specific changes  Q Waves: none    Clinical Impression: Sinus rhythm with rightward axis and no acute changes when compared to previous 12-lead EKG from June 18, 2018. [facility]      ED Course and MDM   In brief, Josephine Sullivan is a 66 y.o. male who presented to the emergency department With report that he has had a cough for last 3-4 days with production of phlegm. He's been afebrile. He wears home oxygen. The patient has end-stage COPD and continues to smoke a pack to 2 packs of cigarettes daily. The patient had a chest x-ray demonstrated bilateral patchy and nodular opacities more pronounced at the left side where he is having chest discomfort that is sharp. This is likely pneumonia.   The patient was started on Rocephin and a sister mycin. The patient will be admitted to the hospitalist service. He continues to wheeze after 3 DuoNeb treatments the first initiated at home. The patient however states he is feeling much better. He'll be admitted to the hospitalist service. He is stable in no acute distress. ED Medication Orders     Start Ordered     Status Ordering Provider    11/27/18 0415 11/27/18 0400  cefTRIAXone (ROCEPHIN) 1 g IVPB in 50 mL D5W minibag  ONCE      Last MAR action:  New Bag - by Amanda Jha on 11/27/18 at 0409 Nighat CHAN    11/27/18 0415 11/27/18 0400  azithromycin (ZITHROMAX) 500 mg in dextrose 5 % 250 mL IVPB  ONCE      Acknowledged BLAYNE MCCALL    11/27/18 0300 11/27/18 0246  0.9 % sodium chloride bolus  ONCE      Last MAR action:  New Bag - by Nolberto Rose on 11/27/18 at 0334 Miguel Segundo    11/27/18 0242 11/27/18 0242  ipratropium-albuterol (DUONEB) 0.5-2.5 (3) MG/3ML nebulizer solution     Comments:  BERNARDINO Decker: cabinet override    Last MAR action:  Given - by Nolberto Rose on 11/27/18 at 0331     11/27/18 0230 11/27/18 0229  aspirin chewable tablet 324 mg  ONCE      Last MAR action:  Given - by Belkis MCGUIRE on 11/27/18 at 0334 Nighat Cohn M      Total critical care time today provided was at least 15 minutes. This excludes seperately billable procedure. Critical care time provided for COPD exacerbation that required close evaluation and/or intervention with concern for patient decompensation. I have discussed [facility] ED presentation and ED course with Radha Cherry from the [facility] hospitalist service. Based on that discussion we have decided to admit Jeramie Castillo to their service for further evaluation and care. Final Impression      1. COPD exacerbation ([address]    2.  Pneumonia due to organism      DISPOSITION Decision To Admit 11/27/2018 03:59:01 AM     (Please note that portions of this note may

## 2018-11-28 LAB
BASE EXCESS MIXED: 1.4 (ref 0–1.2)
BASE EXCESS: ABNORMAL (ref 0–3.3)
CO2 CONTENT: 28.2 MMOL/L (ref 19–24)
HCO3 ARTERIAL: 26.8 MMOL/L (ref 18–23)
HCT VFR BLD CALC: 38.7 % (ref 42–52)
HEMOGLOBIN: 12.7 GM/DL (ref 13.5–18)
HIGH SENSITIVE C-REACTIVE PROTEIN: 85.3 MG/L
LEGIONELLA URINARY AG: NEGATIVE
MAGNESIUM: 2.3 MG/DL (ref 1.8–2.4)
MCH RBC QN AUTO: 32.1 PG (ref 27–31)
MCHC RBC AUTO-ENTMCNC: 32.8 % (ref 32–36)
MCV RBC AUTO: 97.7 FL (ref 78–100)
O2 SATURATION: 95.4 % (ref 96–97)
PCO2 ARTERIAL: 44.2 MMHG (ref 32–45)
PDW BLD-RTO: 13.1 % (ref 11.7–14.9)
PH BLOOD: 7.39 (ref 7.34–7.45)
PLATELET # BLD: 269 K/CU MM (ref 140–440)
PMV BLD AUTO: 9.1 FL (ref 7.5–11.1)
PO2 ARTERIAL: 79.4 MMHG (ref 75–100)
PROCALCITONIN: 0.06
RBC # BLD: 3.96 M/CU MM (ref 4.6–6.2)
SOURCE, BLOOD GAS: ABNORMAL
STREP PNEUMONIAE ANTIGEN: NORMAL
WBC # BLD: 11.4 K/CU MM (ref 4–10.5)

## 2018-11-28 PROCEDURE — 36415 COLL VENOUS BLD VENIPUNCTURE: CPT

## 2018-11-28 PROCEDURE — 87070 CULTURE OTHR SPECIMN AEROBIC: CPT

## 2018-11-28 PROCEDURE — 2140000000 HC CCU INTERMEDIATE R&B

## 2018-11-28 PROCEDURE — 94640 AIRWAY INHALATION TREATMENT: CPT

## 2018-11-28 PROCEDURE — 2700000000 HC OXYGEN THERAPY PER DAY

## 2018-11-28 PROCEDURE — 6370000000 HC RX 637 (ALT 250 FOR IP): Performed by: HOSPITALIST

## 2018-11-28 PROCEDURE — 83735 ASSAY OF MAGNESIUM: CPT

## 2018-11-28 PROCEDURE — 6370000000 HC RX 637 (ALT 250 FOR IP): Performed by: NURSE PRACTITIONER

## 2018-11-28 PROCEDURE — G0008 ADMIN INFLUENZA VIRUS VAC: HCPCS | Performed by: NURSE PRACTITIONER

## 2018-11-28 PROCEDURE — 36600 WITHDRAWAL OF ARTERIAL BLOOD: CPT

## 2018-11-28 PROCEDURE — 82803 BLOOD GASES ANY COMBINATION: CPT

## 2018-11-28 PROCEDURE — 6370000000 HC RX 637 (ALT 250 FOR IP): Performed by: FAMILY MEDICINE

## 2018-11-28 PROCEDURE — 6360000002 HC RX W HCPCS: Performed by: NURSE PRACTITIONER

## 2018-11-28 PROCEDURE — 84145 PROCALCITONIN (PCT): CPT

## 2018-11-28 PROCEDURE — 86141 C-REACTIVE PROTEIN HS: CPT

## 2018-11-28 PROCEDURE — 2580000003 HC RX 258: Performed by: NURSE PRACTITIONER

## 2018-11-28 PROCEDURE — 6360000002 HC RX W HCPCS: Performed by: FAMILY MEDICINE

## 2018-11-28 PROCEDURE — 90686 IIV4 VACC NO PRSV 0.5 ML IM: CPT | Performed by: NURSE PRACTITIONER

## 2018-11-28 PROCEDURE — 87205 SMEAR GRAM STAIN: CPT

## 2018-11-28 PROCEDURE — 2500000003 HC RX 250 WO HCPCS: Performed by: NURSE PRACTITIONER

## 2018-11-28 PROCEDURE — 85027 COMPLETE CBC AUTOMATED: CPT

## 2018-11-28 PROCEDURE — 6360000002 HC RX W HCPCS: Performed by: HOSPITALIST

## 2018-11-28 RX ORDER — METHYLPREDNISOLONE SODIUM SUCCINATE 125 MG/2ML
80 INJECTION, POWDER, LYOPHILIZED, FOR SOLUTION INTRAMUSCULAR; INTRAVENOUS EVERY 12 HOURS
Status: DISCONTINUED | OUTPATIENT
Start: 2018-11-28 | End: 2018-11-29 | Stop reason: HOSPADM

## 2018-11-28 RX ORDER — CODEINE PHOSPHATE AND GUAIFENESIN 10; 100 MG/5ML; MG/5ML
5 SOLUTION ORAL EVERY 4 HOURS PRN
Status: DISCONTINUED | OUTPATIENT
Start: 2018-11-28 | End: 2018-11-29 | Stop reason: HOSPADM

## 2018-11-28 RX ORDER — ACETYLCYSTEINE 200 MG/ML
600 SOLUTION ORAL; RESPIRATORY (INHALATION) 2 TIMES DAILY
Status: DISCONTINUED | OUTPATIENT
Start: 2018-11-28 | End: 2018-11-29 | Stop reason: HOSPADM

## 2018-11-28 RX ORDER — METHYLPREDNISOLONE SODIUM SUCCINATE 125 MG/2ML
80 INJECTION, POWDER, LYOPHILIZED, FOR SOLUTION INTRAMUSCULAR; INTRAVENOUS ONCE
Status: COMPLETED | OUTPATIENT
Start: 2018-11-28 | End: 2018-11-28

## 2018-11-28 RX ORDER — METHYLPREDNISOLONE SODIUM SUCCINATE 125 MG/2ML
125 INJECTION, POWDER, LYOPHILIZED, FOR SOLUTION INTRAMUSCULAR; INTRAVENOUS EVERY 12 HOURS
Status: DISCONTINUED | OUTPATIENT
Start: 2018-11-28 | End: 2018-11-28

## 2018-11-28 RX ADMIN — KETOROLAC TROMETHAMINE 15 MG: 30 INJECTION, SOLUTION INTRAMUSCULAR at 17:34

## 2018-11-28 RX ADMIN — FOLIC ACID 1 MG: 1 TABLET ORAL at 08:41

## 2018-11-28 RX ADMIN — ENOXAPARIN SODIUM 40 MG: 40 INJECTION SUBCUTANEOUS at 08:41

## 2018-11-28 RX ADMIN — KETOROLAC TROMETHAMINE 15 MG: 30 INJECTION, SOLUTION INTRAMUSCULAR at 11:01

## 2018-11-28 RX ADMIN — IPRATROPIUM BROMIDE AND ALBUTEROL SULFATE 1 AMPULE: .5; 3 SOLUTION RESPIRATORY (INHALATION) at 02:19

## 2018-11-28 RX ADMIN — GUAIFENESIN AND CODEINE PHOSPHATE 5 ML: 10; 100 LIQUID ORAL at 17:35

## 2018-11-28 RX ADMIN — IPRATROPIUM BROMIDE AND ALBUTEROL SULFATE 1 AMPULE: .5; 3 SOLUTION RESPIRATORY (INHALATION) at 19:14

## 2018-11-28 RX ADMIN — SODIUM CHLORIDE: 9 INJECTION, SOLUTION INTRAVENOUS at 10:14

## 2018-11-28 RX ADMIN — IPRATROPIUM BROMIDE AND ALBUTEROL SULFATE 1 AMPULE: .5; 3 SOLUTION RESPIRATORY (INHALATION) at 07:50

## 2018-11-28 RX ADMIN — MULTIPLE VITAMINS W/ MINERALS TAB 1 TABLET: TAB at 08:41

## 2018-11-28 RX ADMIN — KETOROLAC TROMETHAMINE 15 MG: 30 INJECTION, SOLUTION INTRAMUSCULAR at 23:28

## 2018-11-28 RX ADMIN — AZITHROMYCIN MONOHYDRATE 500 MG: 500 INJECTION, POWDER, LYOPHILIZED, FOR SOLUTION INTRAVENOUS at 03:37

## 2018-11-28 RX ADMIN — INFLUENZA A VIRUS A/MICHIGAN/45/2015 X-275 (H1N1) ANTIGEN (FORMALDEHYDE INACTIVATED), INFLUENZA A VIRUS A/SINGAPORE/INFIMH-16-0019/2016 IVR-186 (H3N2) ANTIGEN (FORMALDEHYDE INACTIVATED), INFLUENZA B VIRUS B/PHUKET/3073/2013 ANTIGEN (FORMALDEHYDE INACTIVATED), AND INFLUENZA B VIRUS B/MARYLAND/15/2016 BX-69A ANTIGEN (FORMALDEHYDE INACTIVATED) 0.5 ML: 15; 15; 15; 15 INJECTION, SUSPENSION INTRAMUSCULAR at 09:02

## 2018-11-28 RX ADMIN — CEFTRIAXONE SODIUM 1 G: 1 INJECTION, POWDER, FOR SOLUTION INTRAMUSCULAR; INTRAVENOUS at 02:51

## 2018-11-28 RX ADMIN — LORAZEPAM 2 MG: 1 TABLET ORAL at 12:54

## 2018-11-28 RX ADMIN — FOLIC ACID: 5 INJECTION, SOLUTION INTRAMUSCULAR; INTRAVENOUS; SUBCUTANEOUS at 19:59

## 2018-11-28 RX ADMIN — DOCUSATE SODIUM 100 MG: 100 CAPSULE, LIQUID FILLED ORAL at 19:58

## 2018-11-28 RX ADMIN — MOMETASONE FUROATE AND FORMOTEROL FUMARATE DIHYDRATE 2 PUFF: 200; 5 AEROSOL RESPIRATORY (INHALATION) at 08:54

## 2018-11-28 RX ADMIN — BENZONATATE 100 MG: 100 CAPSULE ORAL at 02:17

## 2018-11-28 RX ADMIN — METHYLPREDNISOLONE SODIUM SUCCINATE 40 MG: 40 INJECTION, POWDER, FOR SOLUTION INTRAMUSCULAR; INTRAVENOUS at 05:56

## 2018-11-28 RX ADMIN — IPRATROPIUM BROMIDE AND ALBUTEROL SULFATE 1 AMPULE: .5; 3 SOLUTION RESPIRATORY (INHALATION) at 15:30

## 2018-11-28 RX ADMIN — METHYLPREDNISOLONE SODIUM SUCCINATE 80 MG: 125 INJECTION, POWDER, FOR SOLUTION INTRAMUSCULAR; INTRAVENOUS at 09:44

## 2018-11-28 RX ADMIN — GUAIFENESIN AND CODEINE PHOSPHATE 5 ML: 10; 100 LIQUID ORAL at 22:10

## 2018-11-28 RX ADMIN — IPRATROPIUM BROMIDE AND ALBUTEROL SULFATE 1 AMPULE: .5; 3 SOLUTION RESPIRATORY (INHALATION) at 11:55

## 2018-11-28 RX ADMIN — ACETYLCYSTEINE 600 MG: 200 INHALANT RESPIRATORY (INHALATION) at 19:14

## 2018-11-28 RX ADMIN — KETOROLAC TROMETHAMINE 15 MG: 30 INJECTION, SOLUTION INTRAMUSCULAR at 04:07

## 2018-11-28 RX ADMIN — PANTOPRAZOLE SODIUM 40 MG: 40 TABLET, DELAYED RELEASE ORAL at 05:56

## 2018-11-28 RX ADMIN — GUAIFENESIN AND CODEINE PHOSPHATE 5 ML: 10; 100 LIQUID ORAL at 09:43

## 2018-11-28 RX ADMIN — METHYLPREDNISOLONE SODIUM SUCCINATE 80 MG: 125 INJECTION, POWDER, FOR SOLUTION INTRAMUSCULAR; INTRAVENOUS at 17:24

## 2018-11-28 RX ADMIN — Medication 100 MG: at 08:42

## 2018-11-28 RX ADMIN — LORAZEPAM 2 MG: 1 TABLET ORAL at 22:10

## 2018-11-28 RX ADMIN — DOCUSATE SODIUM 100 MG: 100 CAPSULE, LIQUID FILLED ORAL at 08:41

## 2018-11-28 RX ADMIN — ACETYLCYSTEINE 600 MG: 200 INHALANT RESPIRATORY (INHALATION) at 11:55

## 2018-11-28 RX ADMIN — MOMETASONE FUROATE AND FORMOTEROL FUMARATE DIHYDRATE 2 PUFF: 200; 5 AEROSOL RESPIRATORY (INHALATION) at 20:03

## 2018-11-28 ASSESSMENT — PAIN SCALES - GENERAL
PAINLEVEL_OUTOF10: 9
PAINLEVEL_OUTOF10: 8
PAINLEVEL_OUTOF10: 9
PAINLEVEL_OUTOF10: 8

## 2018-11-29 VITALS
HEIGHT: 66 IN | BODY MASS INDEX: 19.29 KG/M2 | RESPIRATION RATE: 18 BRPM | SYSTOLIC BLOOD PRESSURE: 130 MMHG | OXYGEN SATURATION: 99 % | WEIGHT: 120 LBS | TEMPERATURE: 97.4 F | DIASTOLIC BLOOD PRESSURE: 83 MMHG | HEART RATE: 86 BPM

## 2018-11-29 PROBLEM — J18.9 PNEUMONIA: Status: RESOLVED | Noted: 2018-11-27 | Resolved: 2018-11-29

## 2018-11-29 LAB
ANION GAP SERPL CALCULATED.3IONS-SCNC: 9 MMOL/L (ref 4–16)
BUN BLDV-MCNC: 8 MG/DL (ref 6–23)
CALCIUM SERPL-MCNC: 8.9 MG/DL (ref 8.3–10.6)
CHLORIDE BLD-SCNC: 106 MMOL/L (ref 99–110)
CO2: 26 MMOL/L (ref 21–32)
CREAT SERPL-MCNC: 0.6 MG/DL (ref 0.9–1.3)
GFR AFRICAN AMERICAN: >60 ML/MIN/1.73M2
GFR NON-AFRICAN AMERICAN: >60 ML/MIN/1.73M2
GLUCOSE BLD-MCNC: 156 MG/DL (ref 70–99)
HCT VFR BLD CALC: 36.3 % (ref 42–52)
HEMOGLOBIN: 11.7 GM/DL (ref 13.5–18)
HIGH SENSITIVE C-REACTIVE PROTEIN: 30.9 MG/L
MCH RBC QN AUTO: 31.8 PG (ref 27–31)
MCHC RBC AUTO-ENTMCNC: 32.2 % (ref 32–36)
MCV RBC AUTO: 98.6 FL (ref 78–100)
PDW BLD-RTO: 13.4 % (ref 11.7–14.9)
PLATELET # BLD: 280 K/CU MM (ref 140–440)
PMV BLD AUTO: 9.1 FL (ref 7.5–11.1)
POTASSIUM SERPL-SCNC: 4.6 MMOL/L (ref 3.5–5.1)
RBC # BLD: 3.68 M/CU MM (ref 4.6–6.2)
SODIUM BLD-SCNC: 141 MMOL/L (ref 135–145)
WBC # BLD: 10.6 K/CU MM (ref 4–10.5)

## 2018-11-29 PROCEDURE — 80048 BASIC METABOLIC PNL TOTAL CA: CPT

## 2018-11-29 PROCEDURE — 6360000002 HC RX W HCPCS: Performed by: FAMILY MEDICINE

## 2018-11-29 PROCEDURE — 86141 C-REACTIVE PROTEIN HS: CPT

## 2018-11-29 PROCEDURE — 36415 COLL VENOUS BLD VENIPUNCTURE: CPT

## 2018-11-29 PROCEDURE — 85027 COMPLETE CBC AUTOMATED: CPT

## 2018-11-29 PROCEDURE — 6360000002 HC RX W HCPCS: Performed by: HOSPITALIST

## 2018-11-29 PROCEDURE — 2700000000 HC OXYGEN THERAPY PER DAY

## 2018-11-29 PROCEDURE — 6360000002 HC RX W HCPCS: Performed by: NURSE PRACTITIONER

## 2018-11-29 PROCEDURE — 6370000000 HC RX 637 (ALT 250 FOR IP): Performed by: NURSE PRACTITIONER

## 2018-11-29 PROCEDURE — 6370000000 HC RX 637 (ALT 250 FOR IP): Performed by: FAMILY MEDICINE

## 2018-11-29 PROCEDURE — 94640 AIRWAY INHALATION TREATMENT: CPT

## 2018-11-29 PROCEDURE — 2580000003 HC RX 258: Performed by: NURSE PRACTITIONER

## 2018-11-29 RX ORDER — BUDESONIDE 0.5 MG/2ML
500 INHALANT ORAL 2 TIMES DAILY
Qty: 60 AMPULE | Refills: 0 | Status: SHIPPED | OUTPATIENT
Start: 2018-11-29 | End: 2018-12-04 | Stop reason: CLARIF

## 2018-11-29 RX ORDER — FOLIC ACID 1 MG/1
1 TABLET ORAL DAILY
Qty: 30 TABLET | Refills: 0 | Status: SHIPPED | OUTPATIENT
Start: 2018-11-30 | End: 2019-03-01

## 2018-11-29 RX ORDER — AZITHROMYCIN 500 MG/1
500 TABLET, FILM COATED ORAL DAILY
Qty: 3 TABLET | Refills: 0 | Status: SHIPPED | OUTPATIENT
Start: 2018-11-29 | End: 2018-12-02

## 2018-11-29 RX ORDER — CODEINE PHOSPHATE AND GUAIFENESIN 10; 100 MG/5ML; MG/5ML
5 SOLUTION ORAL 4 TIMES DAILY PRN
Qty: 60 ML | Refills: 0 | Status: SHIPPED | OUTPATIENT
Start: 2018-11-29 | End: 2018-12-02

## 2018-11-29 RX ORDER — CEPHALEXIN 250 MG/1
250 CAPSULE ORAL 4 TIMES DAILY
Qty: 20 CAPSULE | Refills: 0 | Status: SHIPPED | OUTPATIENT
Start: 2018-11-29 | End: 2018-12-04

## 2018-11-29 RX ORDER — ACETYLCYSTEINE 200 MG/ML
600 SOLUTION ORAL; RESPIRATORY (INHALATION) 2 TIMES DAILY
Qty: 30 ML | Refills: 0 | Status: SHIPPED | OUTPATIENT
Start: 2018-11-29 | End: 2019-05-01 | Stop reason: ALTCHOICE

## 2018-11-29 RX ORDER — LANOLIN ALCOHOL/MO/W.PET/CERES
100 CREAM (GRAM) TOPICAL DAILY
Qty: 30 TABLET | Refills: 0 | Status: SHIPPED | OUTPATIENT
Start: 2018-11-30 | End: 2019-03-01

## 2018-11-29 RX ORDER — PREDNISONE 20 MG/1
40 TABLET ORAL DAILY
Qty: 6 TABLET | Refills: 0 | Status: SHIPPED | OUTPATIENT
Start: 2018-11-29 | End: 2018-12-02

## 2018-11-29 RX ORDER — FORMOTEROL FUMARATE 20 UG/2ML
20 SOLUTION RESPIRATORY (INHALATION) 2 TIMES DAILY
Qty: 120 ML | Refills: 0 | Status: SHIPPED | OUTPATIENT
Start: 2018-11-29 | End: 2018-12-04 | Stop reason: CLARIF

## 2018-11-29 RX ORDER — IPRATROPIUM BROMIDE AND ALBUTEROL SULFATE 2.5; .5 MG/3ML; MG/3ML
1 SOLUTION RESPIRATORY (INHALATION) 4 TIMES DAILY
Qty: 360 ML | Refills: 0 | Status: SHIPPED | OUTPATIENT
Start: 2018-11-29 | End: 2019-03-01 | Stop reason: SDUPTHER

## 2018-11-29 RX ADMIN — GUAIFENESIN AND CODEINE PHOSPHATE 5 ML: 10; 100 LIQUID ORAL at 03:51

## 2018-11-29 RX ADMIN — METHYLPREDNISOLONE SODIUM SUCCINATE 80 MG: 125 INJECTION, POWDER, FOR SOLUTION INTRAMUSCULAR; INTRAVENOUS at 06:10

## 2018-11-29 RX ADMIN — Medication 100 MG: at 09:23

## 2018-11-29 RX ADMIN — DOCUSATE SODIUM 100 MG: 100 CAPSULE, LIQUID FILLED ORAL at 09:23

## 2018-11-29 RX ADMIN — PANTOPRAZOLE SODIUM 40 MG: 40 TABLET, DELAYED RELEASE ORAL at 06:10

## 2018-11-29 RX ADMIN — LORAZEPAM 2 MG: 2 INJECTION INTRAMUSCULAR; INTRAVENOUS at 09:23

## 2018-11-29 RX ADMIN — CEFTRIAXONE SODIUM 1 G: 1 INJECTION, POWDER, FOR SOLUTION INTRAMUSCULAR; INTRAVENOUS at 03:20

## 2018-11-29 RX ADMIN — KETOROLAC TROMETHAMINE 15 MG: 30 INJECTION, SOLUTION INTRAMUSCULAR at 06:22

## 2018-11-29 RX ADMIN — MULTIPLE VITAMINS W/ MINERALS TAB 1 TABLET: TAB at 09:23

## 2018-11-29 RX ADMIN — ACETYLCYSTEINE 600 MG: 200 INHALANT RESPIRATORY (INHALATION) at 07:55

## 2018-11-29 RX ADMIN — FOLIC ACID 1 MG: 1 TABLET ORAL at 09:23

## 2018-11-29 RX ADMIN — MOMETASONE FUROATE AND FORMOTEROL FUMARATE DIHYDRATE 2 PUFF: 200; 5 AEROSOL RESPIRATORY (INHALATION) at 09:23

## 2018-11-29 RX ADMIN — AZITHROMYCIN MONOHYDRATE 500 MG: 500 INJECTION, POWDER, LYOPHILIZED, FOR SOLUTION INTRAVENOUS at 03:51

## 2018-11-29 RX ADMIN — ENOXAPARIN SODIUM 40 MG: 40 INJECTION SUBCUTANEOUS at 09:23

## 2018-11-29 RX ADMIN — IPRATROPIUM BROMIDE AND ALBUTEROL SULFATE 1 AMPULE: .5; 3 SOLUTION RESPIRATORY (INHALATION) at 07:55

## 2018-11-29 RX ADMIN — LORAZEPAM 1 MG: 1 TABLET ORAL at 06:23

## 2018-11-29 ASSESSMENT — PAIN SCALES - GENERAL
PAINLEVEL_OUTOF10: 0
PAINLEVEL_OUTOF10: 8

## 2018-11-29 NOTE — DISCHARGE SUMMARY
edema  Respi: Moderate air entry in both lung fields with occasional wheezes heard bilaterally  GI: soft & non tender, with +ve bowel sounds, no guarding ,rigidity or rebound tenderness  : no inguinal lymphadenopathy, no CVA tenderness  CNS: no focal weakness or sensory deficits peripherally, DTR's equal bilaterally, CN2-12 normal  Skin: no rash, purpurea or eruptions  MS: normal muscle strength, normal ROM in all major joints      Procedures:  None significant    Significant Diagnostic Studies at discharge:   As above    Patient Instructions:   [facility]   Home Medication Instructions QGQ:[id number]    Printed on:11/29/18 1118   Medication Information                      acetylcysteine (MUCOMYST) 20 % nebulizer solution  Inhale 3 mLs into the lungs 2 times daily             albuterol sulfate HFA (VENTOLIN HFA) 108 (90 Base) MCG/ACT inhaler  Inhale 2 puffs into the lungs every 6 hours             azithromycin (ZITHROMAX) 500 MG tablet  Take 1 tablet by mouth daily for 3 days             budesonide (PULMICORT) 0.5 MG/2ML nebulizer suspension  Take 2 mLs by nebulization 2 times daily             cephALEXin (KEFLEX) 250 MG capsule  Take 1 capsule by mouth 4 times daily for 5 days             folic acid (FOLVITE) 1 MG tablet  Take 1 tablet by mouth daily             formoterol (PERFOROMIST) 20 MCG/2ML nebulizer solution  Take 2 mLs by nebulization 2 times daily             guaiFENesin-codeine (GUAIFENESIN AC) 100-10 MG/5ML liquid  Take 5 mLs by mouth 4 times daily as needed for Cough for up to 3 days. Laurena Rad              ipratropium-albuterol (DUONEB) 0.5-2.5 (3) MG/3ML SOLN nebulizer solution  Inhale 3 mLs into the lungs 4 times daily             Multiple Vitamins-Minerals (MULTIVITAMIN ADULTS PO)  Take by mouth daily              Nutritional Supplements (ENSURE ACTIVE HIGH PROTEIN PO)  Take 6 oz by mouth daily             omeprazole (PRILOSEC) 20 MG delayed release capsule  Take 1 capsule by mouth daily Take on an

## 2018-11-30 ENCOUNTER — CARE COORDINATION (OUTPATIENT)
Dept: CASE MANAGEMENT | Age: 63
End: 2018-11-30

## 2018-11-30 LAB
EKG ATRIAL RATE: 97 BPM
EKG DIAGNOSIS: NORMAL
EKG P AXIS: 85 DEGREES
EKG P-R INTERVAL: 148 MS
EKG Q-T INTERVAL: 356 MS
EKG QRS DURATION: 88 MS
EKG QTC CALCULATION (BAZETT): 452 MS
EKG R AXIS: 103 DEGREES
EKG T AXIS: 71 DEGREES
EKG VENTRICULAR RATE: 97 BPM

## 2018-12-01 ENCOUNTER — CARE COORDINATION (OUTPATIENT)
Dept: CASE MANAGEMENT | Age: 63
End: 2018-12-01

## 2018-12-01 LAB
CULTURE: NORMAL
GRAM SMEAR: NORMAL
Lab: NORMAL
Lab: NORMAL
ORGANISM: NORMAL
REPORT STATUS: NORMAL
REPORT STATUS: NORMAL
SPECIMEN: NORMAL
SPECIMEN: NORMAL

## 2018-12-02 ENCOUNTER — CARE COORDINATION (OUTPATIENT)
Dept: CASE MANAGEMENT | Age: 63
End: 2018-12-02

## 2018-12-02 LAB
CULTURE: NORMAL
CULTURE: NORMAL
Lab: NORMAL
Lab: NORMAL
REPORT STATUS: NORMAL
REPORT STATUS: NORMAL
SPECIMEN: NORMAL
SPECIMEN: NORMAL

## 2018-12-02 NOTE — CARE COORDINATION
[facility] Transitions Initial Follow Up Call    Call within 2 business days of discharge: Yes    Patient: Darryn Dixon Patient : 1955   MRN: [id number]  Reason for Admission: There are no discharge diagnoses documented for the most recent discharge. Discharge Date: 18 RARS: Readmission Risk Score: 12         Facility:   [address] [place] [address] Transitions 24 Hour Call    Do you have all of your prescriptions and are they filled?:  Yes  Patient DME:  Straight cane  Patient Home Equipment:  Oxygen, Nebulizer  Do you have support at home?:  Partner/Spouse/SO  Are you an active caregiver in your home?:  No  Care Transitions Interventions         Follow Up: Attempted to reach patient for Care Transition. No answer to phone. Message left on voicemail to inform of final transition call. CTC contact information given with request for call back. No further outreach planned.    Future Appointments  Date Time Provider Sukumar Skinner   2018 9:45 AM Anthony Roberts MD [facility]   2019 8:00 AM Anthony Roberts MD [facility]   2019 9:00 AM Nellie Shukla MD [facility]       Jo Welsh RN

## 2018-12-04 ENCOUNTER — OFFICE VISIT (OUTPATIENT)
Dept: INTERNAL MEDICINE CLINIC | Age: 63
End: 2018-12-04
Payer: MEDICARE

## 2018-12-04 ENCOUNTER — CARE COORDINATION (OUTPATIENT)
Dept: CARE COORDINATION | Age: 63
End: 2018-12-04

## 2018-12-04 VITALS
WEIGHT: 118.2 LBS | TEMPERATURE: 98.7 F | HEART RATE: 89 BPM | OXYGEN SATURATION: 98 % | HEIGHT: 66 IN | DIASTOLIC BLOOD PRESSURE: 70 MMHG | SYSTOLIC BLOOD PRESSURE: 112 MMHG | BODY MASS INDEX: 18.99 KG/M2

## 2018-12-04 DIAGNOSIS — Z72.0 TOBACCO ABUSE DISORDER: ICD-10-CM

## 2018-12-04 DIAGNOSIS — J18.9 PNEUMONIA OF BOTH LUNGS DUE TO INFECTIOUS ORGANISM, UNSPECIFIED PART OF LUNG: Primary | ICD-10-CM

## 2018-12-04 DIAGNOSIS — F10.231 ALCOHOL DEPENDENCE WITH WITHDRAWAL DELIRIUM (HCC): ICD-10-CM

## 2018-12-04 DIAGNOSIS — J44.9 COPD, SEVERE (HCC): ICD-10-CM

## 2018-12-04 PROCEDURE — 1111F DSCHRG MED/CURRENT MED MERGE: CPT | Performed by: INTERNAL MEDICINE

## 2018-12-04 PROCEDURE — 99496 TRANSJ CARE MGMT HIGH F2F 7D: CPT | Performed by: INTERNAL MEDICINE

## 2018-12-04 RX ORDER — BUDESONIDE AND FORMOTEROL FUMARATE DIHYDRATE 160; 4.5 UG/1; UG/1
AEROSOL RESPIRATORY (INHALATION)
COMMUNITY
Start: 2018-12-03 | End: 2019-03-01 | Stop reason: SDUPTHER

## 2018-12-04 ASSESSMENT — ENCOUNTER SYMPTOMS
SHORTNESS OF BREATH: 1
SORE THROAT: 0
WHEEZING: 1
CHEST TIGHTNESS: 0
CHOKING: 0
VOICE CHANGE: 0
COUGH: 1
GASTROINTESTINAL NEGATIVE: 1
ALLERGIC/IMMUNOLOGIC NEGATIVE: 1

## 2018-12-12 ENCOUNTER — HOSPITAL ENCOUNTER (OUTPATIENT)
Dept: GENERAL RADIOLOGY | Age: 63
Discharge: HOME | End: 2018-12-12
Payer: MEDICARE

## 2018-12-12 ENCOUNTER — HOSPITAL ENCOUNTER (OUTPATIENT)
Age: 63
Discharge: HOME | End: 2018-12-12
Payer: MEDICARE

## 2018-12-12 PROCEDURE — 71046 X-RAY EXAM CHEST 2 VIEWS: CPT

## 2018-12-17 ENCOUNTER — OFFICE VISIT (OUTPATIENT)
Dept: INTERNAL MEDICINE CLINIC | Age: 63
End: 2018-12-17
Payer: MEDICARE

## 2018-12-17 VITALS
WEIGHT: 117.2 LBS | OXYGEN SATURATION: 95 % | SYSTOLIC BLOOD PRESSURE: 110 MMHG | RESPIRATION RATE: 20 BRPM | BODY MASS INDEX: 18.92 KG/M2 | TEMPERATURE: 99 F | DIASTOLIC BLOOD PRESSURE: 70 MMHG | HEART RATE: 80 BPM

## 2018-12-17 DIAGNOSIS — J44.9 COPD, SEVERE (HCC): ICD-10-CM

## 2018-12-17 DIAGNOSIS — K52.832 LYMPHOCYTIC COLITIS: ICD-10-CM

## 2018-12-17 DIAGNOSIS — J18.9 PNEUMONIA OF LEFT LOWER LOBE DUE TO INFECTIOUS ORGANISM: Primary | ICD-10-CM

## 2018-12-17 DIAGNOSIS — Z72.0 TOBACCO ABUSE DISORDER: ICD-10-CM

## 2018-12-17 DIAGNOSIS — F10.231 ALCOHOL DEPENDENCE WITH WITHDRAWAL DELIRIUM (HCC): ICD-10-CM

## 2018-12-17 DIAGNOSIS — Z12.5 SCREENING PSA (PROSTATE SPECIFIC ANTIGEN): ICD-10-CM

## 2018-12-17 DIAGNOSIS — R63.4 WEIGHT LOSS: ICD-10-CM

## 2018-12-17 LAB — PROSTATE SPECIFIC ANTIGEN: 6.12 NG/ML (ref 0–4)

## 2018-12-17 PROCEDURE — 99213 OFFICE O/P EST LOW 20 MIN: CPT | Performed by: INTERNAL MEDICINE

## 2018-12-17 PROCEDURE — 36415 COLL VENOUS BLD VENIPUNCTURE: CPT | Performed by: INTERNAL MEDICINE

## 2018-12-17 ASSESSMENT — ENCOUNTER SYMPTOMS
GASTROINTESTINAL NEGATIVE: 1
RESPIRATORY NEGATIVE: 1
SHORTNESS OF BREATH: 0
COUGH: 0
WHEEZING: 0
EYES NEGATIVE: 1

## 2018-12-21 PROBLEM — R97.20 ELEVATED PSA: Status: ACTIVE | Noted: 2018-12-21

## 2018-12-27 DIAGNOSIS — R97.20 ELEVATED PSA: Primary | ICD-10-CM

## 2019-01-24 ENCOUNTER — CARE COORDINATION (OUTPATIENT)
Dept: CARE COORDINATION | Age: 64
End: 2019-01-24

## 2019-02-19 ENCOUNTER — OFFICE VISIT (OUTPATIENT)
Dept: INTERNAL MEDICINE CLINIC | Age: 64
End: 2019-02-19
Payer: MEDICARE

## 2019-02-19 VITALS
WEIGHT: 124 LBS | DIASTOLIC BLOOD PRESSURE: 62 MMHG | BODY MASS INDEX: 20.01 KG/M2 | HEART RATE: 88 BPM | TEMPERATURE: 98.7 F | OXYGEN SATURATION: 96 % | SYSTOLIC BLOOD PRESSURE: 100 MMHG

## 2019-02-19 DIAGNOSIS — J44.9 COPD, SEVERE (HCC): ICD-10-CM

## 2019-02-19 DIAGNOSIS — S61.452A CAT BITE OF HAND, LEFT, INITIAL ENCOUNTER: Primary | ICD-10-CM
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
## 2019-02-19 PROCEDURE — 99213 OFFICE O/P EST LOW 20 MIN: CPT | Performed by: INTERNAL MEDICINE

## 2019-02-19 RX ORDER — AMOXICILLIN AND CLAVULANATE POTASSIUM 875; 125 MG/1; MG/1
1 TABLET, FILM COATED ORAL 2 TIMES DAILY
Qty: 20 TABLET | Refills: 0 | Status: SHIPPED | OUTPATIENT
Start: 2019-02-19 | End: 2019-03-01 | Stop reason: ALTCHOICE

## 2019-02-19 RX ORDER — TRAMADOL HYDROCHLORIDE 50 MG/1
50 TABLET ORAL EVERY 6 HOURS PRN
Qty: 8 TABLET | Refills: 0 | Status: SHIPPED | OUTPATIENT
Start: 2019-02-19 | End: 2019-02-21

## 2019-02-19 ASSESSMENT — PATIENT HEALTH QUESTIONNAIRE - PHQ9: DEPRESSION UNABLE TO ASSESS: PT REFUSES

## 2019-03-01 ENCOUNTER — OFFICE VISIT (OUTPATIENT)
Dept: INTERNAL MEDICINE CLINIC | Age: 64
End: 2019-03-01
Payer: MEDICARE

## 2019-03-01 VITALS
OXYGEN SATURATION: 96 % | TEMPERATURE: 98.6 F | DIASTOLIC BLOOD PRESSURE: 70 MMHG | WEIGHT: 124.2 LBS | SYSTOLIC BLOOD PRESSURE: 112 MMHG | BODY MASS INDEX: 20.05 KG/M2 | HEART RATE: 72 BPM | RESPIRATION RATE: 16 BRPM

## 2019-03-01 DIAGNOSIS — T14.8XXA ANIMAL BITE: Primary | ICD-10-CM

## 2019-03-01 DIAGNOSIS — J44.9 COPD, SEVERE (HCC): ICD-10-CM

## 2019-03-01 PROCEDURE — 99213 OFFICE O/P EST LOW 20 MIN: CPT | Performed by: INTERNAL MEDICINE

## 2019-03-01 RX ORDER — IPRATROPIUM BROMIDE AND ALBUTEROL SULFATE 2.5; .5 MG/3ML; MG/3ML
1 SOLUTION RESPIRATORY (INHALATION) 4 TIMES DAILY
Qty: 360 ML | Refills: 5 | Status: SHIPPED | OUTPATIENT
Start: 2019-03-01 | End: 2019-08-20 | Stop reason: SDUPTHER

## 2019-03-01 RX ORDER — BUDESONIDE AND FORMOTEROL FUMARATE DIHYDRATE 160; 4.5 UG/1; UG/1
2 AEROSOL RESPIRATORY (INHALATION) 2 TIMES DAILY
Qty: 1 INHALER | Refills: 5 | Status: SHIPPED | OUTPATIENT
Start: 2019-03-01 | End: 2019-08-20 | Stop reason: SDUPTHER

## 2019-03-01 RX ORDER — ALBUTEROL SULFATE 90 UG/1
2 AEROSOL, METERED RESPIRATORY (INHALATION) EVERY 6 HOURS
Qty: 1 INHALER | Refills: 5 | Status: ON HOLD | OUTPATIENT
Start: 2019-03-01 | End: 2019-06-30 | Stop reason: SDUPTHER

## 2019-03-01 ASSESSMENT — PATIENT HEALTH QUESTIONNAIRE - PHQ9
SUM OF ALL RESPONSES TO PHQ QUESTIONS 1-9: 0
SUM OF ALL RESPONSES TO PHQ9 QUESTIONS 1 & 2: 0
1. LITTLE INTEREST OR PLEASURE IN DOING THINGS: 0
2. FEELING DOWN, DEPRESSED OR HOPELESS: 0
SUM OF ALL RESPONSES TO PHQ QUESTIONS 1-9: 0

## 2019-03-07 ENCOUNTER — CARE COORDINATION (OUTPATIENT)
Dept: CARE COORDINATION | Age: 64
End: 2019-03-07

## 2019-03-08 ENCOUNTER — CARE COORDINATION (OUTPATIENT)
Dept: CARE COORDINATION | Age: 64
End: 2019-03-08

## 2019-04-12 ENCOUNTER — HOSPITAL ENCOUNTER (OUTPATIENT)
Age: 64
Discharge: HOME | End: 2019-04-12
Payer: MEDICARE

## 2019-04-12 PROCEDURE — 36415 COLL VENOUS BLD VENIPUNCTURE: CPT

## 2019-04-12 PROCEDURE — 84153 ASSAY OF PSA TOTAL: CPT

## 2019-04-12 PROCEDURE — 84154 ASSAY OF PSA FREE: CPT

## 2019-04-14 LAB
PROSTATE SPECIFIC ANTIGEN FREE: 0.2 NG/ML
PROSTATE SPECIFIC ANTIGEN PERCENT FREE: 11 %
PROSTATE SPECIFIC ANTIGEN PERCENT FREE: NORMAL %
PROSTATE SPECIFIC ANTIGEN: 1.9 NG/ML (ref 0–4)
PROSTATE SPECIFIC ANTIGEN: NORMAL NG/ML (ref 0–4)

## 2019-05-01 ENCOUNTER — OFFICE VISIT (OUTPATIENT)
Dept: INTERNAL MEDICINE CLINIC | Age: 64
End: 2019-05-01
Payer: COMMERCIAL

## 2019-05-01 VITALS
BODY MASS INDEX: 19.4 KG/M2 | SYSTOLIC BLOOD PRESSURE: 122 MMHG | RESPIRATION RATE: 20 BRPM | HEART RATE: 96 BPM | WEIGHT: 120.2 LBS | TEMPERATURE: 98.8 F | DIASTOLIC BLOOD PRESSURE: 70 MMHG | OXYGEN SATURATION: 93 %

## 2019-05-01 DIAGNOSIS — M54.50 ACUTE MIDLINE LOW BACK PAIN WITHOUT SCIATICA: ICD-10-CM

## 2019-05-01 DIAGNOSIS — J44.9 COPD, SEVERE (HCC): Primary | ICD-10-CM

## 2019-05-01 DIAGNOSIS — R97.20 ELEVATED PSA: ICD-10-CM

## 2019-05-01 DIAGNOSIS — K52.832 LYMPHOCYTIC COLITIS: ICD-10-CM

## 2019-05-01 PROBLEM — J18.9 PNEUMONIA OF LEFT LOWER LOBE DUE TO INFECTIOUS ORGANISM: Status: RESOLVED | Noted: 2018-11-27 | Resolved: 2019-05-01

## 2019-05-01 PROCEDURE — 99213 OFFICE O/P EST LOW 20 MIN: CPT | Performed by: INTERNAL MEDICINE

## 2019-05-01 RX ORDER — TIZANIDINE 4 MG/1
4 TABLET ORAL EVERY 8 HOURS PRN
Qty: 30 TABLET | Refills: 0 | Status: SHIPPED | OUTPATIENT
Start: 2019-05-01 | End: 2019-09-12 | Stop reason: SDUPTHER

## 2019-05-01 RX ORDER — METHYLPREDNISOLONE 4 MG/1
TABLET ORAL
Qty: 1 KIT | Refills: 0 | Status: SHIPPED | OUTPATIENT
Start: 2019-05-01 | End: 2019-05-07

## 2019-05-01 NOTE — PROGRESS NOTES
Layla Cash  Patient's  is 1955  Seen in office on 2019      SUBJECTIVE:  Augustin Blunt vel 60 y. o.year old male presents today   Chief Complaint   Patient presents with    Wheezing     inspiratory and expiratory    Arthritis     lower back pain, worse last two weeks, otc meds not helping     Pt is here for f/u of COPD   Has wheezing off and on. Not much cough. No fever or chills  C/o low back pain for 2 weeks. Denies any injury. No problems controlling bladder or bowel  No saddle anesthesia   No fever or chills  No weakness of the legs. Denies any diarrhea. Bowels normal  Now. No melena or hematochezia. No other complaints. Taking medications regularly. No side effects noted. Review of Systems    OBJECTIVE: /70   Pulse 96   Temp 98.8 °F (37.1 °C)   Resp 20   Wt 120 lb 3.2 oz (54.5 kg)   SpO2 93%   BMI 19.40 kg/m²     Wt Readings from Last 3 Encounters:   19 120 lb 3.2 oz (54.5 kg)   19 124 lb 3.2 oz (56.3 kg)   19 124 lb (56.2 kg)      GENERAL: - Alert, oriented, pleasant, in no apparent distress. HEENT: - Conjunctiva pink, no scleral icterus. ENT clear. NECK: -Supple. No jugular venous distention noted. No masses felt,  CARDIOVASCULAR: - Normal S1 and S2    PULMONARY: - No respiratory distress. No wheezes or rales. ABDOMEN: - Soft and non-tender,no masses  ororganomegaly. EXTREMITIES: - No cyanosis, clubbing, or significant edema. SKIN: Skin is warm and dry. NEUROLOGICAL: - Cranial nerves II through XII are grossly intact. Back : mild tenderness of the lower back     IMPRESSION:    Encounter Diagnoses   Name Primary?     COPD, severe (HCC) Yes    Elevated PSA     Lymphocytic colitis     Acute midline low back pain without sciatica        ASSESSMENT/PLAN:    COPD, severe (HCC)  PFTs showed severe COPD in   Pt uses Duoneb neb treatment and prn ventolin and symbicort   Uses 2L/ O2 at night angela 10/17  Pt still smokes off and on about 3 packs a week.    Elevated PSA  12/2018 : PSA is 6 : was referred to Dr. Bess Cerrato  Repeat PSA on 4/12/19 : was 1.9      Lymphocytic colitis  Pt states he is not having any more diarrhea. Feels well. Acute midline low back pain without sciatica  Pt has lower back pain for 2 weeks  Will get x ray of lumbar spine  Treat with zanaflex and medrol dose lorne    Orders Placed This Encounter   Procedures    XR LUMBAR SPINE (2-3 VIEWS)     Orders Placed This Encounter   Medications    tiZANidine (ZANAFLEX) 4 MG tablet     Sig: Take 1 tablet by mouth every 8 hours as needed (back pain)     Dispense:  30 tablet     Refill:  0    methylPREDNISolone (MEDROL DOSEPACK) 4 MG tablet     Sig: Take by mouth. Dispense:  1 kit     Refill:  0         Mediations reviewed with the patient. Continue current medications. Appropriate prescriptions are addressed. After visit summeryprovided. Follow up as directed sooner if needed. Questions answered and patient verbalizes understanding. Call for any problems, questions, or concerns. No Known Allergies  Current Outpatient Medications   Medication Sig Dispense Refill    SYMBICORT 160-4.5 MCG/ACT AERO Inhale 2 puffs into the lungs 2 times daily 1 Inhaler 5    ipratropium-albuterol (DUONEB) 0.5-2.5 (3) MG/3ML SOLN nebulizer solution Inhale 3 mLs into the lungs 4 times daily 360 mL 5    albuterol sulfate HFA (VENTOLIN HFA) 108 (90 Base) MCG/ACT inhaler Inhale 2 puffs into the lungs every 6 hours 1 Inhaler 5    OXYGEN Inhale 2 L into the lungs nightly      Multiple Vitamins-Minerals (MULTIVITAMIN ADULTS PO) Take by mouth daily        No current facility-administered medications for this visit. 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  On 7/22/16. Dr Lisa Coleman at [address] [facility] polyps 3-    Dr. Nevaeh Cruz COPD (chronic obstructive pulmonary disease) ([facility] [address] 5/19/2014    PFTs showed severe COPD in 5/14- follows with Dr Naveed Rider COPD exacerbation [facility]) 6/10/2016    Diverticulosis 9-    Dupuytren's contracture 5/26/2017    Right palm    Elevated PSA 12/21/2018 12/2018 : PSA is 6    H/O colonoscopy 9-18-14    Dr. Nevaeh Cruz H/O echocardiogram 08/17/2016    EF 55-60%. Abnormal diastolic function. Mild TR. Borderline right atrium and RV dilatation.  Hiatal hernia     History of alcohol abuse     \"average 6 pk beer per day\"    History of drug abuse     does use marijuana\"last used cocaine over 10 yrs ago\" per pt on 10/3/2018    Internal hemorrhoids 9-    Intervertebral disc protrusion 5/19/2014    Cervical region on MRI    Lingular pneumonia 4/3/2018    Lymphocytic colitis 9-18-14    Dr. Caitlyn Kee, will treat if symptomatic    On home oxygen therapy     2l/nc at night only    Pneumonia of left lower lobe due to infectious organism ([facility] [address] 11/27/2018    Sepsis due to pneumonia ([facility] [address] 6/10/2016    Spinal stenosis in cervical region 5/19/2014    MRI showed  Disc protrusion. Severe spinal stenosis at C3-C4    Spinal stenosis of cervical region 3/14/2016    S/P cervical discectomy and fusion with plate and screw. On 7/22/16. Dr Lisa Coleman [place] [address] [place] [place] [facility] 5/19/2014    Wears glasses     to read     Past Surgical History:   Procedure Laterality Date    CERVICAL SPINE SURGERY  2015    \"have plate in neck done at [facility]      9- Dr Chrissie Kee.  f/u 3 years    COLONOSCOPY  10/04/2018    COLONOSCOPY N/A 10/4/2018    COLONOSCOPY POLYPECTOMY SNARE/COLD BIOPSY performed by Lisbet Archuleta MD at [facility] in 5 years   [facility] POLYPECTOMY  10/04/2018    2 Polyps Removed    SIGMOIDOSCOPY      UPPER GASTROINTESTINAL ENDOSCOPY  10/04/2018    with Biopsy    UPPER GASTROINTESTINAL

## 2019-05-06 ENCOUNTER — CARE COORDINATION (OUTPATIENT)
Dept: CARE COORDINATION | Age: 64
End: 2019-05-06

## 2019-05-07 NOTE — ASSESSMENT & PLAN NOTE
Pt has lower back pain for 2 weeks  Will get x ray of lumbar spine  Treat with zanaflex and medrol dose lorne

## 2019-05-08 ENCOUNTER — HOSPITAL ENCOUNTER (OUTPATIENT)
Age: 64
Discharge: HOME | End: 2019-05-08
Payer: MEDICARE

## 2019-05-08 ENCOUNTER — HOSPITAL ENCOUNTER (OUTPATIENT)
Dept: GENERAL RADIOLOGY | Age: 64
Discharge: HOME | End: 2019-05-08
Payer: MEDICARE

## 2019-05-08 DIAGNOSIS — M54.50 ACUTE MIDLINE LOW BACK PAIN WITHOUT SCIATICA: ICD-10-CM

## 2019-05-08 PROCEDURE — 72100 X-RAY EXAM L-S SPINE 2/3 VWS: CPT

## 2019-05-30 ENCOUNTER — TELEPHONE (OUTPATIENT)
Dept: INTERNAL MEDICINE CLINIC | Age: 64
End: 2019-05-30

## 2019-05-30 ENCOUNTER — HOSPITAL ENCOUNTER (EMERGENCY)
Age: 64
Discharge: HOME | End: 2019-05-30
Attending: EMERGENCY MEDICINE
Payer: MEDICARE

## 2019-05-30 ENCOUNTER — APPOINTMENT (OUTPATIENT)
Dept: GENERAL RADIOLOGY | Age: 64
End: 2019-05-30
Payer: MEDICARE

## 2019-05-30 VITALS
BODY MASS INDEX: 19.29 KG/M2 | SYSTOLIC BLOOD PRESSURE: 110 MMHG | HEIGHT: 66 IN | HEART RATE: 81 BPM | OXYGEN SATURATION: 95 % | DIASTOLIC BLOOD PRESSURE: 74 MMHG | WEIGHT: 120 LBS | RESPIRATION RATE: 23 BRPM | TEMPERATURE: 97.4 F

## 2019-05-30 DIAGNOSIS — R53.1 GENERALIZED WEAKNESS: ICD-10-CM

## 2019-05-30 DIAGNOSIS — R05.9 COUGH: Primary | ICD-10-CM

## 2019-05-30 DIAGNOSIS — R07.81 RIB PAIN: ICD-10-CM

## 2019-05-30 LAB
ALBUMIN SERPL-MCNC: 3.9 GM/DL (ref 3.4–5)
ALP BLD-CCNC: 48 IU/L (ref 40–129)
ALT SERPL-CCNC: 19 U/L (ref 10–40)
ANION GAP SERPL CALCULATED.3IONS-SCNC: 20 MMOL/L (ref 4–16)
AST SERPL-CCNC: 24 IU/L (ref 15–37)
BASOPHILS ABSOLUTE: 0.1 K/CU MM
BASOPHILS RELATIVE PERCENT: 0.6 % (ref 0–1)
BILIRUB SERPL-MCNC: 0.3 MG/DL (ref 0–1)
BUN BLDV-MCNC: 7 MG/DL (ref 6–23)
CALCIUM SERPL-MCNC: 8.6 MG/DL (ref 8.3–10.6)
CHLORIDE BLD-SCNC: 100 MMOL/L (ref 99–110)
CO2: 18 MMOL/L (ref 21–32)
CREAT SERPL-MCNC: 0.8 MG/DL (ref 0.9–1.3)
D DIMER: <200 NG/ML(DDU)
DIFFERENTIAL TYPE: ABNORMAL
EOSINOPHILS ABSOLUTE: 0 K/CU MM
EOSINOPHILS RELATIVE PERCENT: 0.1 % (ref 0–3)
GFR AFRICAN AMERICAN: >60 ML/MIN/1.73M2
GFR NON-AFRICAN AMERICAN: >60 ML/MIN/1.73M2
GLUCOSE BLD-MCNC: 124 MG/DL (ref 70–99)
HCT VFR BLD CALC: 47.3 % (ref 42–52)
HEMOGLOBIN: 15.5 GM/DL (ref 13.5–18)
IMMATURE NEUTROPHIL %: 0.2 % (ref 0–0.43)
LYMPHOCYTES ABSOLUTE: 1.8 K/CU MM
LYMPHOCYTES RELATIVE PERCENT: 21.2 % (ref 24–44)
MCH RBC QN AUTO: 31.4 PG (ref 27–31)
MCHC RBC AUTO-ENTMCNC: 32.8 % (ref 32–36)
MCV RBC AUTO: 95.7 FL (ref 78–100)
MONOCYTES ABSOLUTE: 0.9 K/CU MM
MONOCYTES RELATIVE PERCENT: 10.4 % (ref 0–4)
PDW BLD-RTO: 13.6 % (ref 11.7–14.9)
PH VENOUS: 7.38 (ref 7.32–7.42)
PLATELET # BLD: 278 K/CU MM (ref 140–440)
PMV BLD AUTO: 8.8 FL (ref 7.5–11.1)
POTASSIUM SERPL-SCNC: 3.7 MMOL/L (ref 3.5–5.1)
RBC # BLD: 4.94 M/CU MM (ref 4.6–6.2)
SEGMENTED NEUTROPHILS ABSOLUTE COUNT: 5.7 K/CU MM
SEGMENTED NEUTROPHILS RELATIVE PERCENT: 67.5 % (ref 36–66)
SODIUM BLD-SCNC: 138 MMOL/L (ref 135–145)
TOTAL IMMATURE NEUTOROPHIL: 0.02 K/CU MM
TOTAL PROTEIN: 6.2 GM/DL (ref 6.4–8.2)
TROPONIN T: <0.01 NG/ML
TROPONIN T: <0.01 NG/ML
WBC # BLD: 8.5 K/CU MM (ref 4–10.5)

## 2019-05-30 PROCEDURE — 85025 COMPLETE CBC W/AUTO DIFF WBC: CPT

## 2019-05-30 PROCEDURE — 6370000000 HC RX 637 (ALT 250 FOR IP): Performed by: EMERGENCY MEDICINE

## 2019-05-30 PROCEDURE — 93005 ELECTROCARDIOGRAM TRACING: CPT | Performed by: EMERGENCY MEDICINE

## 2019-05-30 PROCEDURE — 94640 AIRWAY INHALATION TREATMENT: CPT

## 2019-05-30 PROCEDURE — 99284 EMERGENCY DEPT VISIT MOD MDM: CPT

## 2019-05-30 PROCEDURE — 84484 ASSAY OF TROPONIN QUANT: CPT

## 2019-05-30 PROCEDURE — 93010 ELECTROCARDIOGRAM REPORT: CPT | Performed by: INTERNAL MEDICINE

## 2019-05-30 PROCEDURE — 82800 BLOOD PH: CPT

## 2019-05-30 PROCEDURE — 2580000003 HC RX 258: Performed by: EMERGENCY MEDICINE

## 2019-05-30 PROCEDURE — 71046 X-RAY EXAM CHEST 2 VIEWS: CPT

## 2019-05-30 PROCEDURE — 85379 FIBRIN DEGRADATION QUANT: CPT

## 2019-05-30 PROCEDURE — 80053 COMPREHEN METABOLIC PANEL: CPT

## 2019-05-30 RX ORDER — IPRATROPIUM BROMIDE AND ALBUTEROL SULFATE 2.5; .5 MG/3ML; MG/3ML
1 SOLUTION RESPIRATORY (INHALATION) ONCE
Status: COMPLETED | OUTPATIENT
Start: 2019-05-30 | End: 2019-05-30

## 2019-05-30 RX ORDER — PREDNISONE 20 MG/1
40 TABLET ORAL DAILY
Qty: 8 TABLET | Refills: 0 | Status: SHIPPED | OUTPATIENT
Start: 2019-05-30 | End: 2019-06-03

## 2019-05-30 RX ORDER — 0.9 % SODIUM CHLORIDE 0.9 %
1000 INTRAVENOUS SOLUTION INTRAVENOUS ONCE
Status: COMPLETED | OUTPATIENT
Start: 2019-05-30 | End: 2019-05-30

## 2019-05-30 RX ADMIN — SODIUM CHLORIDE 1000 ML: 9 INJECTION, SOLUTION INTRAVENOUS at 17:33

## 2019-05-30 RX ADMIN — IPRATROPIUM BROMIDE AND ALBUTEROL SULFATE 1 AMPULE: .5; 3 SOLUTION RESPIRATORY (INHALATION) at 17:15

## 2019-05-30 ASSESSMENT — PAIN DESCRIPTION - ORIENTATION: ORIENTATION: RIGHT;LEFT

## 2019-05-30 ASSESSMENT — PAIN SCALES - GENERAL: PAINLEVEL_OUTOF10: 4

## 2019-05-30 ASSESSMENT — PAIN DESCRIPTION - PAIN TYPE: TYPE: ACUTE PAIN;CHRONIC PAIN

## 2019-05-30 ASSESSMENT — PAIN DESCRIPTION - LOCATION: LOCATION: RIB CAGE

## 2019-05-30 NOTE — ED PROVIDER NOTES
Triage Chief Complaint:   Hypotension (has been using friends wrist BP. (has been in the 90's. Feels weak.)); Dizziness; and Cough (states (has severe COPD). c/o bilateral rib pain. )    [place]:  Nelson Waterman is a 60 y.o. male that presents with concern regarding low blood pressure. Patient reports that this afternoon he felt increasingly tired and with generalized weakness as well as bilateral rib pains. Patient went to a friend's house and he checked his blood pressure there and it was in the \"90s\". This is lower than patient's normal blood pressure and this scared him. Additionally, patient with generalized lightheadedness and fatigue. Furthermore, patient with bilateral rib pains that are described as a \"pressure\" currently 4 out of 10 and constant. Baseline coughing. No fevers. No central chest pain. Patient is still smoking and with severe COPD. Patient wears oxygen at 2 L at night.     ROS:  General:  No fevers, no chills, + generalized weakness, + fatigue  Eyes:  No recent vison changes, no discharge  ENT:  No sore throat, no nasal congestion, no hearing changes  Cardiovascular:  No chest pain, no palpitations, + chest pressure, + lightheadedness  Respiratory:  No shortness of breath, + chronic cough, no wheezing  Gastrointestinal:  No pain, no nausea, no vomiting, no diarrhea  Musculoskeletal:  No muscle pain, no joint pain  Skin:  No rash, no pruritis, no easy bruising  Neurologic:  No speech problems, no headache, no extremity numbness, no extremity tingling, no extremity weakness  Psychiatric:  No anxiety  Genitourinary:  No dysuria, no hematuria  Endocrine:  No unexpected weight gain, no unexpected weight loss  Extremities:  no edema, no pain    Past Medical History:   Diagnosis Date    Arthritis     \"in my back and neck\"    Bacterial pneumonia 6/10/2016    Cervical abnormality     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 On 7/22/16. Dr Latasha Ureña at [address] [facility] polyps 7-    Dr. Gomez Blizzard COPD (chronic obstructive pulmonary disease) ([facility] [address] [address] 5/19/2014    PFTs showed severe COPD in 5/14- follows with Dr Valente Innocent COPD exacerbation [facility]) 6/10/2016    Diverticulosis 9-    Dupuytren's contracture 5/26/2017    Right palm    Elevated PSA 12/21/2018 12/2018 : PSA is 6    H/O colonoscopy 9-18-14    Dr. Gomez Blizzard H/O echocardiogram 08/17/2016    EF 55-60%. Abnormal diastolic function. Mild TR. Borderline right atrium and RV dilatation.  Hiatal hernia     History of alcohol abuse     \"average 6 pk beer per day\"    History of drug abuse     does use marijuana\"last used cocaine over 10 yrs ago\" per pt on 10/3/2018    Internal hemorrhoids 9-    Intervertebral disc protrusion 5/19/2014    Cervical region on MRI    Lingular pneumonia 4/3/2018    Lymphocytic colitis 9-18-14    Dr. Jacque Castorena, will treat if symptomatic    On home oxygen therapy     2l/nc at night only    Pneumonia of left lower lobe due to infectious organism ([facility] [address] [facility] 11/27/2018    Sepsis due to pneumonia ([facility] [address] 6/10/2016    Spinal stenosis in cervical region 5/19/2014    MRI showed  Disc protrusion. Severe spinal stenosis at C3-C4    Spinal stenosis of cervical region 3/14/2016    S/P cervical discectomy and fusion with plate and screw. On 7/22/16. Dr Latasha Ureña at [address] [employer] abuse disorder 5/19/2014    Wears glasses     to read     Past Surgical History:   Procedure Laterality Date    CERVICAL SPINE SURGERY  2015    \"have plate in neck done at [facility]      9- Dr Yasmany Castorena.  f/u 3 years    COLONOSCOPY  10/04/2018    COLONOSCOPY N/A 10/4/2018    COLONOSCOPY POLYPECTOMY SNARE/COLD BIOPSY performed by Daina Barker MD at [facility] in 5 years   [address] POLYPECTOMY  10/04/2018    2 Polyps Removed    SIGMOIDOSCOPY      UPPER GASTROINTESTINAL ENDOSCOPY  10/04/2018    with Biopsy    UPPER GASTROINTESTINAL ENDOSCOPY N/A 10/4/2018    EGD BIOPSY performed by Denis Womack MD at [facility] ENDOSCOPY     Family History   Problem Relation Age of Onset   Rosalva Green Cancer Mother         lung ca    Heart Disease Father     Heart Attack Father     Diabetes Father     Colon Cancer Neg Hx      Social History     Socioeconomic History    Marital status: Single     Spouse name: Not on file    Number of children: Not on file    Years of education: Not on file    Highest education level: Not on file   Occupational History    Not on file   Social Needs    Financial resource strain: Not on file    Food insecurity:     Worry: Not on file     Inability: Not on file    Transportation needs:     Medical: Not on file     Non-medical: Not on file   Tobacco Use    Smoking status: Current Every Day Smoker     Packs/day: 2.00     Years: 50.00     Pack years: 100.00     Types: Cigarettes    Smokeless tobacco: Never Used    Tobacco comment: smokes 1-2 ppd   Substance and Sexual Activity    Alcohol use:  Yes     Alcohol/week: 1.8 oz     Types: 3 Cans of beer per week     Comment: per day of 12 ozs    Drug use: Yes     Types: Marijuana     Comment: 1-2 times per week marijuana    Sexual activity: Not on file   Lifestyle    Physical activity:     Days per week: Not on file     Minutes per session: Not on file    Stress: Not on file   Relationships    Social connections:     Talks on phone: Not on file     Gets together: Not on file     Attends Rastafari service: Not on file     Active member of club or organization: Not on file     Attends meetings of clubs or organizations: Not on file     Relationship status: Not on file    Intimate partner violence:     Fear of current or ex partner: Not on file     Emotionally abused: Not on file     Physically abused: Not on file     Forced sexual activity: Not on file   Other Topics Concern    Not on file   Social History Narrative    Not on file     No current facility-administered medications for this encounter. Current Outpatient Medications   Medication Sig Dispense Refill    predniSONE (DELTASONE) 20 MG tablet Take 2 tablets by mouth daily for 4 days 8 tablet 0    tiZANidine (ZANAFLEX) 4 MG tablet Take 1 tablet by mouth every 8 hours as needed (back pain) 30 tablet 0    SYMBICORT 160-4.5 MCG/ACT AERO Inhale 2 puffs into the lungs 2 times daily 1 Inhaler 5    ipratropium-albuterol (DUONEB) 0.5-2.5 (3) MG/3ML SOLN nebulizer solution Inhale 3 mLs into the lungs 4 times daily 360 mL 5    albuterol sulfate HFA (VENTOLIN HFA) 108 (90 Base) MCG/ACT inhaler Inhale 2 puffs into the lungs every 6 hours 1 Inhaler 5    OXYGEN Inhale 2 L into the lungs nightly      Multiple Vitamins-Minerals (MULTIVITAMIN ADULTS PO) Take by mouth daily        No Known Allergies    Nursing Notes Reviewed    Physical Exam:  ED Triage Vitals [05/30/19 1626]   Enc Vitals Group      BP (!) 111/58      Pulse 98      Resp       Temp 97.4 °F (36.3 °C)      Temp Source Oral      SpO2 91 %      Weight 120 lb (54.4 kg)      Height 5' 6\" (1.676 m)      Head Circumference       Peak Flow       Pain Score       Pain Loc       Pain Edu? Excl. in [address]? My pulse ox interpretation is - normal    General appearance:  No acute distress. Appears chronically ill and older than stated age. Pleasant. Skin:  Warm. Dry. Tan. Eye:  Extraocular movements intact. Ears, nose, mouth and throat:  Dentures in place. Neck:  Trachea midline. Extremity:  No swelling. Normal ROM     Heart:  Regular rate and rhythm, normal S1 & S2, no extra heart sounds. Perfusion:  intact  Respiratory:  Faint expiratory wheezing bilaterally. Moving good air. Respirations nonlabored. Speaking clearly in full sentences. Abdominal:  Normal bowel sounds. Soft. Nontender. Non distended. Back:  No CVA tenderness to palpation     Neurological:  Alert and oriented times 3. No focal neuro deficits.              Psychiatric: Interval 180 ms    QRS Duration 96 ms    Q-T Interval 374 ms    QTc Calculation (Bazett) 439 ms    P Axis 85 degrees    R Axis 103 degrees    T Axis 74 degrees    Diagnosis       Normal sinus rhythm  Pulmonary disease pattern  Incomplete right bundle branch block  Possible Right ventricular hypertrophy  Abnormal ECG  When compared with ECG of 27-NOV-2018 02:10,  No significant change was found  Confirmed by NORMAN Nathan ([id number]) on 5/30/2019 6:14:38 PM        Radiographs (if obtained):  [] The following radiograph was interpreted by myself in the absence of a radiologist:   [x] Radiologist's Report Reviewed:  XR CHEST STANDARD (2 VW)   Final Result   No acute process. EKG (if obtained): (All EKG's are interpreted by myself in the absence of a cardiologist)  12 lead EKG per my interpretation:  Normal Sinus Rhythm at 83  Axis is   Normal  QTc is  within an acceptable range  There is no specific T wave changes appreciated. There is no specific ST wave changes appreciated. Incomplete RBBB  NO STEMI    Prior EKG to compare with was available and no clinically significant change and overall morphology noted. Chart review shows recent radiographs:  Xr Lumbar Spine (2-3 Views)    Result Date: 5/8/2019  EXAMINATION: 3 XRAY VIEWS OF THE LUMBAR SPINE 5/8/2019 10:12 am COMPARISON: 01/17/2009. HISTORY: ORDERING SYSTEM PROVIDED HISTORY: Acute midline low back pain without sciatica TECHNOLOGIST PROVIDED HISTORY: Ordering Physician Provided Reason for Exam: low back pain Acuity: Chronic Type of Exam: Ongoing Additional signs and symptoms: NKI or trauma Relevant Medical/Surgical History: arthritis FINDINGS: There are 5 non rib-bearing lumbar type vertebra. The osseous structures appear osteopenic. No acute osseous abnormality is seen. The vertebral body heights appear maintained. No significant spondylolisthesis seen. Mild scattered osteophytic changes of the lumbar spine.   Loss of disc space height with endplate irregularity at L5-S1. Scattered facet arthrosis also noted. 1. Osteopenia without acute abnormality of the lumbar spine. 2. Degenerative changes most prominent at L5-S1. MDM:  Pt presents as above. Emergent conditions considered. Presentation prompted initial labs, EKG and imaging. IV was established and IV fluid bolus was given. DuoNeb given. EKG with normal sinus rhythm as above. CBC and CMP without clinically significant derangement. Troponin negative. Dimer negative. VBG without acidemia. Chest x-ray negative for acute cardiopulmonary process. Given patient's risk factors decision made to obtain delta troponin which was pending and signed out to the overnight physician, Dr. Kathie Castellanos. I do believe patient with likely COPD exacerbation. Patient was concerned regarding low blood pressures at home but was using a wrist blood pressure cuff and blood pressure here in the emergency department is with normotension. Should patient's delta troponin be negative acutely patient is appropriate for outpatient follow up. Clinical Impression:  1. Cough    2. Generalized weakness    3.  Rib pain      Disposition referral (if applicable):  Alfredo Garcia MD  [facility] [address] [phone]    Schedule an appointment as soon as possible for a visit   Follow-up with your primary doctor    [facility] Emergency Department  [address] [place] [address]  [phone]  Go to   As needed    Disposition medications (if applicable):  Discharge Medication List as of 5/30/2019  9:18 PM      START taking these medications    Details   predniSONE (DELTASONE) 20 MG tablet Take 2 tablets by mouth daily for 4 days, Disp-8 tablet, R-0Print             Comment: Please note this report has been produced using speech recognition software and may contain errors related to that system including errors in grammar, punctuation, and spelling, as well as words and phrases that may be inappropriate. If there are any questions or concerns please feel free to contact the dictating provider for clarification.        Abdelrahman Diaz MD  06/02/19 1424

## 2019-05-30 NOTE — ED PROVIDER NOTES
19: 1829 [facility] was checked out to me by Dr. Tamara Cooper. Please see his/her initial documentation for details of the patient's ED presentation, physical exam and completed studies. In brief, Carin Diane is a 60 y.o. male that presents for complaints of generalized weakness and bilateral rib pain as well as cough and concerns for potential hypotension prior to arrival. At this time, we are currently awaiting repeat troponin level which is due at 8 PM. Patient was reassessed, he denies any active symptoms at this time and states that he feels better after treatment. Vital signs are stable at this time.     Diagnostic Work-up:  Results for orders placed or performed during the hospital encounter of 05/30/19   CBC auto diff   Result Value Ref Range    WBC 8.5 4.0 - 10.5 K/CU MM    RBC 4.94 4.6 - 6.2 M/CU MM    Hemoglobin 15.5 13.5 - 18.0 GM/DL    Hematocrit 47.3 42 - 52 %    MCV 95.7 78 - 100 FL    MCH 31.4 (H) 27 - 31 PG    MCHC 32.8 32.0 - 36.0 %    RDW 13.6 11.7 - 14.9 %    Platelets 931 214 - 967 K/CU MM    MPV 8.8 7.5 - 11.1 FL    Differential Type AUTOMATED DIFFERENTIAL     Segs Relative 67.5 (H) 36 - 66 %    Lymphocytes % 21.2 (L) 24 - 44 %    Monocytes % 10.4 (H) 0 - 4 %    Eosinophils % 0.1 0 - 3 %    Basophils % 0.6 0 - 1 %    Segs Absolute 5.7 K/CU MM    Lymphocytes # 1.8 K/CU MM    Monocytes # 0.9 K/CU MM    Eosinophils # 0.0 K/CU MM    Basophils # 0.1 K/CU MM    Immature Neutrophil % 0.2 0 - 0.43 %    Total Immature Neutrophil 0.02 K/CU MM   CMP   Result Value Ref Range    Sodium 138 135 - 145 MMOL/L    Potassium 3.7 3.5 - 5.1 MMOL/L    Chloride 100 99 - 110 mMol/L    CO2 18 (L) 21 - 32 MMOL/L    BUN 7 6 - 23 MG/DL    CREATININE 0.8 (L) 0.9 - 1.3 MG/DL    Glucose 124 (H) 70 - 99 MG/DL    Calcium 8.6 8.3 - 10.6 MG/DL    Alb 3.9 3.4 - 5.0 GM/DL    Total Protein 6.2 (L) 6.4 - 8.2 GM/DL    Total Bilirubin 0.3 0.0 - 1.0 MG/DL    ALT 19 10 - 40 U/L    AST 24 15 - 37 IU/L    Alkaline Phosphatase 48 40 - 129 IU/L    GFR Non-African American >60 >60 mL/min/1.73m2    GFR African American >60 >60 mL/min/1.73m2    Anion Gap 20 (H) 4 - 16   Troponin   Result Value Ref Range    Troponin T <0.010 <0.01 NG/ML   pH, venous   Result Value Ref Range    pH, Vamsi 7.38 7.32 - 7.42   Troponin   Result Value Ref Range    Troponin T <0.010 <0.01 NG/ML   D-dimer, Quantitative   Result Value Ref Range    D-Dimer, Quant <200 <230 NG/mL(DDU)     MDM:  Repeat troponin level remained negative. Patient asymptomatic at this time. Vital signs are stable. He requests to be discharged home and I feel he is stable to do so. Will discharge patient on a short burst of prednisone. He has DuoNeb treatments and albuterol inhaler at home to use. Recommended close follow-up with his PCP and returning to the ER for new or worsening symptoms. Patient voiced full understanding of all discharge instructions and return precautions. He is discharged in stable condition. Final Impression:  1. Cough    2. Generalized weakness    3. Rib pain          Please note that portions of this note may have been complete with a voice recognition program.  Efforts were made to edit the dictations, but occasional words are mis-transcribed.          Dina Kim DO  05/30/19 [id number]

## 2019-05-31 ENCOUNTER — CARE COORDINATION (OUTPATIENT)
Dept: CARE COORDINATION | Age: 64
End: 2019-05-31

## 2019-05-31 LAB
EKG ATRIAL RATE: 83 BPM
EKG DIAGNOSIS: NORMAL
EKG P AXIS: 85 DEGREES
EKG P-R INTERVAL: 180 MS
EKG Q-T INTERVAL: 374 MS
EKG QRS DURATION: 96 MS
EKG QTC CALCULATION (BAZETT): 439 MS
EKG R AXIS: 103 DEGREES
EKG T AXIS: 74 DEGREES
EKG VENTRICULAR RATE: 83 BPM

## 2019-06-11 ENCOUNTER — OFFICE VISIT (OUTPATIENT)
Dept: INTERNAL MEDICINE CLINIC | Age: 64
End: 2019-06-11
Payer: MEDICARE

## 2019-06-11 ENCOUNTER — CARE COORDINATION (OUTPATIENT)
Dept: CARE COORDINATION | Age: 64
End: 2019-06-11

## 2019-06-11 VITALS
WEIGHT: 118.4 LBS | BODY MASS INDEX: 19.11 KG/M2 | SYSTOLIC BLOOD PRESSURE: 120 MMHG | HEART RATE: 84 BPM | DIASTOLIC BLOOD PRESSURE: 70 MMHG | OXYGEN SATURATION: 96 % | TEMPERATURE: 98.7 F | RESPIRATION RATE: 16 BRPM

## 2019-06-11 DIAGNOSIS — J44.9 COPD, SEVERE (HCC): Primary | ICD-10-CM

## 2019-06-11 DIAGNOSIS — K52.832 LYMPHOCYTIC COLITIS: ICD-10-CM

## 2019-06-11 DIAGNOSIS — F10.231 ALCOHOL DEPENDENCE WITH WITHDRAWAL DELIRIUM (HCC): ICD-10-CM

## 2019-06-11 DIAGNOSIS — M72.0 DUPUYTREN'S CONTRACTURE: ICD-10-CM

## 2019-06-11 DIAGNOSIS — M54.50 ACUTE MIDLINE LOW BACK PAIN WITHOUT SCIATICA: ICD-10-CM

## 2019-06-11 DIAGNOSIS — Z87.891 PERSONAL HISTORY OF TOBACCO USE: ICD-10-CM

## 2019-06-11 DIAGNOSIS — R97.20 ELEVATED PSA: ICD-10-CM

## 2019-06-11 DIAGNOSIS — R63.4 WEIGHT LOSS: ICD-10-CM

## 2019-06-11 DIAGNOSIS — Z72.0 TOBACCO ABUSE DISORDER: ICD-10-CM

## 2019-06-11 PROCEDURE — 3023F SPIROM DOC REV: CPT | Performed by: INTERNAL MEDICINE

## 2019-06-11 PROCEDURE — G8427 DOCREV CUR MEDS BY ELIG CLIN: HCPCS | Performed by: INTERNAL MEDICINE

## 2019-06-11 PROCEDURE — G8926 SPIRO NO PERF OR DOC: HCPCS | Performed by: INTERNAL MEDICINE

## 2019-06-11 PROCEDURE — 99214 OFFICE O/P EST MOD 30 MIN: CPT | Performed by: INTERNAL MEDICINE

## 2019-06-11 PROCEDURE — 4004F PT TOBACCO SCREEN RCVD TLK: CPT | Performed by: INTERNAL MEDICINE

## 2019-06-11 PROCEDURE — 3017F COLORECTAL CA SCREEN DOC REV: CPT | Performed by: INTERNAL MEDICINE

## 2019-06-11 PROCEDURE — G8420 CALC BMI NORM PARAMETERS: HCPCS | Performed by: INTERNAL MEDICINE

## 2019-06-11 PROCEDURE — G0296 VISIT TO DETERM LDCT ELIG: HCPCS | Performed by: INTERNAL MEDICINE

## 2019-06-11 ASSESSMENT — ENCOUNTER SYMPTOMS
COUGH: 1
SHORTNESS OF BREATH: 1
EYES NEGATIVE: 1
GASTROINTESTINAL NEGATIVE: 1
ALLERGIC/IMMUNOLOGIC NEGATIVE: 1
WHEEZING: 1

## 2019-06-11 NOTE — PROGRESS NOTES
Breanne Briceno  Patient's  is 1955  Seen in office on 2019      SUBJECTIVE:  Tres crowder 60 y. o.year old male presents today   Chief Complaint   Patient presents with    Follow-Up from ER      ED follow up 2019    Wheezing     inspiratory wheeze    Fatigue     constant    Shortness of Breath     \"I got winded just walking inside office today\"     Pt states he went to ER for weakness and low BP  W/u in ER good  Patient stated he had weakness at that day. He had chest pain on the sides of the ribs. He was also short of breath. Chest x-ray showed no acute disease. Troponins were negative. Patient was discharged home on prednisone. Patient has started smoking again and is smoking 1 pack/day. Is using his inhalers on a regular basis. He uses oxygen at night. Patient states he gets short of breath when he ambulates but is not using oxygen at that time. He has not seen Dr. Farooq Jones recently. Advised him to see him also. Patient is on DuoNeb, Symbicort and Ventolin as needed. Back pain resolved. X-ray of the lumbar spine showed degenerative arthritis but no fractures. Patient is not taking tizanidine now. Patient is complaining of fatigue. CMP was normal and CBC was also normal.  White count was normal  D-dimer and troponins were negative  Taking medications regularly. No side effects noted. Review of Systems   Constitutional: Positive for fatigue. Negative for chills and fever. HENT: Negative. Eyes: Negative. Respiratory: Positive for cough, shortness of breath and wheezing. Cardiovascular: Negative. Negative for chest pain, palpitations and leg swelling. Gastrointestinal: Negative. Endocrine: Negative. Genitourinary: Negative. Musculoskeletal: Negative. Skin: Negative. Allergic/Immunologic: Negative. Neurological: Negative. Hematological: Negative. Psychiatric/Behavioral: Negative.         OBJECTIVE: /70   Pulse 84   Temp 98.7 °F (37.1 °C) (Oral)   Resp 16   Wt 118 lb 6.4 oz (53.7 kg)   SpO2 96%   BMI 19.11 kg/m²     Wt Readings from Last 3 Encounters:   06/11/19 118 lb 6.4 oz (53.7 kg)   05/30/19 120 lb (54.4 kg)   05/01/19 120 lb 3.2 oz (54.5 kg)      GENERAL: - Alert, oriented, pleasant, in no apparent distress. HEENT: - Conjunctiva pink, no scleral icterus. ENT clear. NECK: -Supple. No jugular venous distention noted. No masses felt,  CARDIOVASCULAR: - Normal S1 and S2    PULMONARY: - No respiratory distress. Patient has poor air entry bilaterally. Has expiratory wheezes bilaterally. ABDOMEN: - Soft and non-tender,no masses  ororganomegaly. EXTREMITIES: - No cyanosis, clubbing, or significant edema. SKIN: Skin is warm and dry. NEUROLOGICAL: - Cranial nerves II through XII are grossly intact. IMPRESSION:    Encounter Diagnoses   Name Primary?  COPD, severe ([address] Yes    Acute midline low back pain without sciatica     Alcohol dependence with withdrawal delirium ([address]     Dupuytren's contracture     Lymphocytic colitis     Tobacco abuse disorder     Weight loss        ASSESSMENT/PLAN:    COPD, severe ([address]  He has has severe COPD and is using DuoNeb and Symbicort. Using oxygen at night only  Advised patient to take oxygen while ambulating. Patient to see Dr. An Hutson also. Patient will call and make the appointment    Acute midline low back pain without sciatica  The lower back pain has resolved. Patient has Zanaflex which she will take as needed. Alcohol dependence ([address]  Patient is drinking alcohol on a regular basis. Patient states he drinks every fourth day  Advised patient to quit. Give the number of Ernesto Fries for the smoking and alcohol. Dupuytren's contracture  Patient has Dupuytren's contracture on the right palm. Patient is using rubber balls to squeeze.     Elevated PSA  Patient had elevated PSA but has came down to normal.  Patient does not want to go back to Dr. Elli Blas colitis  She is doing well. Has no diarrhea. No abdominal cramps. Tobacco abuse disorder  Discussed with patient in detail to stop smoking. Gave various options. Advised him to see [facility] again. Patient states he had been there for smoking cessation program.    Weight loss  Patient has lost weight work-up did not show any abnormality. Patient has severe COPD. Return to office in 3 months  We will get low-dose CT scan. Mediations reviewed with the patient. Continue current medications. Appropriate prescriptions are addressed. After visit summeryprovided. Follow up as directed sooner if needed. Questions answered and patient verbalizes understanding. Call for any problems, questions, or concerns. No Known Allergies  Current Outpatient Medications   Medication Sig Dispense Refill    tiZANidine (ZANAFLEX) 4 MG tablet Take 1 tablet by mouth every 8 hours as needed (back pain) 30 tablet 0    SYMBICORT 160-4.5 MCG/ACT AERO Inhale 2 puffs into the lungs 2 times daily 1 Inhaler 5    ipratropium-albuterol (DUONEB) 0.5-2.5 (3) MG/3ML SOLN nebulizer solution Inhale 3 mLs into the lungs 4 times daily 360 mL 5    albuterol sulfate HFA (VENTOLIN HFA) 108 (90 Base) MCG/ACT inhaler Inhale 2 puffs into the lungs every 6 hours 1 Inhaler 5    OXYGEN Inhale 2 L into the lungs nightly      Multiple Vitamins-Minerals (MULTIVITAMIN ADULTS PO) Take by mouth daily        No current facility-administered medications for this visit. 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 On 7/22/16.  Dr Kathie Steele at [facility] polyps 5-    Dr. Mary Lazo COPD (chronic obstructive pulmonary disease) ([facility] 5/19/2014    PFTs showed severe COPD in 5/14- follows with Dr Lissy James exacerbation ([facility] 6/10/2016    Diverticulosis 3-    Dupuytren's contracture 5/26/2017    Right palm    Elevated PSA 12/21/2018 12/2018 : PSA is 6    H/O colonoscopy 9-18-14    Dr. Franco Wild H/O echocardiogram 08/17/2016    EF 55-60%. Abnormal diastolic function. Mild TR. Borderline right atrium and RV dilatation.  Hiatal hernia     History of alcohol abuse     \"average 6 pk beer per day\"    History of drug abuse     does use marijuana\"last used cocaine over 10 yrs ago\" per pt on 10/3/2018    Internal hemorrhoids 9-    Intervertebral disc protrusion 5/19/2014    Cervical region on MRI    Lingular pneumonia 4/3/2018    Lymphocytic colitis 9-18-14    Dr. Anayeli Muñiz, will treat if symptomatic    On home oxygen therapy     2l/nc at night only    Pneumonia of left lower lobe due to infectious organism ([facility] 11/27/2018    Sepsis due to pneumonia ([facility] 6/10/2016    Spinal stenosis in cervical region 5/19/2014    MRI showed  Disc protrusion. Severe spinal stenosis at C3-C4    Spinal stenosis of cervical region 3/14/2016    S/P cervical discectomy and fusion with plate and screw. On 7/22/16. Dr Santiago Munguia at [address] [facility] abuse disorder 5/19/2014    Wears glasses     to read     Past Surgical History:   Procedure Laterality Date    CERVICAL SPINE SURGERY  2015    \"have plate in neck done at [facility]      9- Dr Fredi Muñiz.  f/u 3 years    COLONOSCOPY  10/04/2018    COLONOSCOPY N/A 10/4/2018    COLONOSCOPY POLYPECTOMY SNARE/COLD BIOPSY performed by Marvin Goldsmith MD at [facility] 5 [facility]   [facility] [place]  10/04/2018    2 Polyps Removed    SIGMOIDOSCOPY      UPPER GASTROINTESTINAL ENDOSCOPY  10/04/2018    with Biopsy    UPPER GASTROINTESTINAL ENDOSCOPY N/A 10/4/2018    EGD BIOPSY performed by Marvin Goldsmith MD at [facility] ENDOSCOPY     Social History     Tobacco Use    Smoking status: Current Every Day Smoker     Packs/day: 2.00     Years: 50.00 Pack years: 100.00     Types: Cigarettes    Smokeless tobacco: Never Used    Tobacco comment: smokes 1-2 ppd   Substance Use Topics    Alcohol use: Yes     Alcohol/week: 1.8 oz     Types: 3 Cans of beer per week     Comment: per day of 12 ozs       LAB REVIEW:  CBC:   Lab Results   Component Value Date    WBC 8.5 05/30/2019    HGB 15.5 05/30/2019    HCT 47.3 05/30/2019     05/30/2019     Lipids:   Lab Results   Component Value Date    CHOL 210 (H) 01/29/2015    TRIG 61 01/29/2015    HDL 77 01/29/2015    LDLDIRECT 129 (H) 01/29/2015     Renal:   Lab Results   Component Value Date    BUN 7 05/30/2019    CREATININE 0.8 05/30/2019     05/30/2019    K 3.7 05/30/2019    ALT 19 05/30/2019    AST 24 05/30/2019    GLUCOSE 124 05/30/2019     PT/INR: No results found for: INR  A1C:   Lab Results   Component Value Date    LABA1C 4.4 04/02/2018           Luisa Verma MD, 6/11/2019 , 9:38 AM   Low Dose CT (LDCT) Lung Screening criteria met   Age 55-77   Pack year smoking >30   Still smoking or less than 15 year since quit   No sign or symptoms of lung cancer   > 11 months since last LDCT     Risks and benefits of lung cancer screening with LDCT scans discussed:    Significance of positive screen - False-positive LDCT results often occur. 95% of all positive results do not lead to a diagnosis of cancer. Usually further imaging can resolve most false-positive results; however, some patients may require invasive procedures. Over diagnosis risk - 10% to 12% of screen-detected lung cancer cases are over diagnosed--that is, the cancer would not have been detected in the patient's lifetime without the screening. Need for follow up screens annually to continue lung cancer screening effectiveness     Risks associated with radiation from annual LDCT- Radiation exposure is about the same as for a mammogram, which is about 1/3 of the annual background radiation exposure from everyday life.   Starting screening at age 54 is not likely to increase cancer risk from radiation exposure. Patients with comorbidities resulting in life expectancy of < 10 years, or that would preclude treatment of an abnormality identified on CT, should not be screened due to lack of benefit.     To obtain maximal benefit from this screening, smoking cessation and long-term abstinence from smoking is critical

## 2019-06-11 NOTE — CARE COORDINATION
Ambulatory Care Coordination Note  CM Risk Score: 2  David Mortality Risk Score:      ACC: Mary Ricks RN    Summary Note:Met with pt briefly following pcp ov. Pt reports he is getting meds ok and denies need/barriers at this time. Reports was advised by pcp to wear o2 more as had been just wearing at night. Advised to call if any needs arise, will continue to follow. Care Coordination Interventions    Program Enrollment:  Rising Risk  Referral from Primary Care Provider:  No  Suggested Interventions and Community Resources  Smoking Cessation:  Not Started  Zone Management Tools: In Process         Goals Addressed                 This Visit's Progress     Wellness Goal   Worsening     Patient Self-Management Goal for Health Maintenance  Goal: I will chose a goal related to tobacco cessation:  I will continue to remain smoke free. Barriers: none  Plan for overcoming my barriers: N/A  Confidence: 10/10  Anticipated Goal Completion Date: 2/4/19            Prior to Admission medications    Medication Sig Start Date End Date Taking?  Authorizing Provider   tiZANidine (ZANAFLEX) 4 MG tablet Take 1 tablet by mouth every 8 hours as needed (back pain) 5/1/19   Melany Weathers MD   SYMBICORT 160-4.5 MCG/ACT AERO Inhale 2 puffs into the lungs 2 times daily 3/1/19   Melany Weathers MD   ipratropium-albuterol (DUONEB) 0.5-2.5 (3) MG/3ML SOLN nebulizer solution Inhale 3 mLs into the lungs 4 times daily 3/1/19   Melany Weathers MD   albuterol sulfate HFA (VENTOLIN HFA) 108 (90 Base) MCG/ACT inhaler Inhale 2 puffs into the lungs every 6 hours 3/1/19   Melany Weathers MD   OXYGEN Inhale 2 L into the lungs nightly    Historical Provider, MD   Multiple Vitamins-Minerals (MULTIVITAMIN ADULTS PO) Take by mouth daily     Historical Provider, MD       Future Appointments   Date Time Provider Sukumar Skinner   6/18/2019 10:30 AM [facility] CT ROOM 1 [facility]   9/11/2019  8:30 AM Melany Weathers MD [facility]

## 2019-06-11 NOTE — ASSESSMENT & PLAN NOTE
He has has severe COPD and is using DuoNeb and Symbicort. Using oxygen at night only  Advised patient to take oxygen while ambulating. Patient to see Dr. Lani Bro also.   Patient will call and make the appointment

## 2019-06-11 NOTE — ASSESSMENT & PLAN NOTE
Discussed with patient in detail to stop smoking. Gave various options. Advised him to see [id number] [facility] reach again.   Patient states he had been there for smoking cessation program.

## 2019-06-18 ENCOUNTER — HOSPITAL ENCOUNTER (OUTPATIENT)
Dept: CT IMAGING | Age: 64
Discharge: HOME | End: 2019-06-18
Payer: MEDICARE

## 2019-06-18 DIAGNOSIS — Z87.891 PERSONAL HISTORY OF TOBACCO USE: ICD-10-CM

## 2019-06-18 PROCEDURE — G0297 LDCT FOR LUNG CA SCREEN: HCPCS

## 2019-06-28 ENCOUNTER — APPOINTMENT (OUTPATIENT)
Dept: GENERAL RADIOLOGY | Age: 64
End: 2019-06-28
Payer: MEDICARE

## 2019-06-28 ENCOUNTER — HOSPITAL ENCOUNTER (OUTPATIENT)
Age: 64
Setting detail: OBSERVATION
Discharge: HOME | End: 2019-06-30
Attending: EMERGENCY MEDICINE | Admitting: FAMILY MEDICINE
Payer: MEDICARE

## 2019-06-28 DIAGNOSIS — J44.9 COPD, SEVERE (HCC): ICD-10-CM

## 2019-06-28 DIAGNOSIS — R06.03 RESPIRATORY DISTRESS: ICD-10-CM

## 2019-06-28 DIAGNOSIS — J44.1 COPD EXACERBATION (HCC): Primary | ICD-10-CM

## 2019-06-28 LAB
ALBUMIN SERPL-MCNC: 3.7 GM/DL (ref 3.4–5)
ALP BLD-CCNC: 55 IU/L (ref 40–129)
ALT SERPL-CCNC: 19 U/L (ref 10–40)
ANION GAP SERPL CALCULATED.3IONS-SCNC: 12 MMOL/L (ref 4–16)
AST SERPL-CCNC: 24 IU/L (ref 15–37)
BASOPHILS ABSOLUTE: 0.1 K/CU MM
BASOPHILS RELATIVE PERCENT: 0.5 % (ref 0–1)
BILIRUB SERPL-MCNC: 0.5 MG/DL (ref 0–1)
BUN BLDV-MCNC: 6 MG/DL (ref 6–23)
CALCIUM SERPL-MCNC: 8.8 MG/DL (ref 8.3–10.6)
CHLORIDE BLD-SCNC: 100 MMOL/L (ref 99–110)
CO2: 26 MMOL/L (ref 21–32)
CREAT SERPL-MCNC: 0.7 MG/DL (ref 0.9–1.3)
DIFFERENTIAL TYPE: ABNORMAL
EOSINOPHILS ABSOLUTE: 0 K/CU MM
EOSINOPHILS RELATIVE PERCENT: 0 % (ref 0–3)
GFR AFRICAN AMERICAN: >60 ML/MIN/1.73M2
GFR NON-AFRICAN AMERICAN: >60 ML/MIN/1.73M2
GLUCOSE BLD-MCNC: 107 MG/DL (ref 70–99)
HCT VFR BLD CALC: 43.9 % (ref 42–52)
HEMOGLOBIN: 14.4 GM/DL (ref 13.5–18)
IMMATURE NEUTROPHIL %: 0.3 % (ref 0–0.43)
LYMPHOCYTES ABSOLUTE: 1.4 K/CU MM
LYMPHOCYTES RELATIVE PERCENT: 12.4 % (ref 24–44)
MCH RBC QN AUTO: 31.7 PG (ref 27–31)
MCHC RBC AUTO-ENTMCNC: 32.8 % (ref 32–36)
MCV RBC AUTO: 96.7 FL (ref 78–100)
MONOCYTES ABSOLUTE: 1.4 K/CU MM
MONOCYTES RELATIVE PERCENT: 11.8 % (ref 0–4)
PDW BLD-RTO: 14.2 % (ref 11.7–14.9)
PLATELET # BLD: 245 K/CU MM (ref 140–440)
PMV BLD AUTO: 9 FL (ref 7.5–11.1)
POTASSIUM SERPL-SCNC: 4 MMOL/L (ref 3.5–5.1)
PRO-BNP: 81.05 PG/ML
RBC # BLD: 4.54 M/CU MM (ref 4.6–6.2)
SEGMENTED NEUTROPHILS ABSOLUTE COUNT: 8.6 K/CU MM
SEGMENTED NEUTROPHILS RELATIVE PERCENT: 75 % (ref 36–66)
SODIUM BLD-SCNC: 138 MMOL/L (ref 135–145)
TOTAL IMMATURE NEUTOROPHIL: 0.04 K/CU MM
TOTAL PROTEIN: 6.4 GM/DL (ref 6.4–8.2)
TROPONIN T: <0.01 NG/ML
TROPONIN T: <0.01 NG/ML
WBC # BLD: 11.4 K/CU MM (ref 4–10.5)

## 2019-06-28 PROCEDURE — G0378 HOSPITAL OBSERVATION PER HR: HCPCS

## 2019-06-28 PROCEDURE — 87798 DETECT AGENT NOS DNA AMP: CPT

## 2019-06-28 PROCEDURE — 6370000000 HC RX 637 (ALT 250 FOR IP): Performed by: NURSE PRACTITIONER

## 2019-06-28 PROCEDURE — 2580000003 HC RX 258: Performed by: NURSE PRACTITIONER

## 2019-06-28 PROCEDURE — 87633 RESP VIRUS 12-25 TARGETS: CPT

## 2019-06-28 PROCEDURE — 6360000002 HC RX W HCPCS: Performed by: NURSE PRACTITIONER

## 2019-06-28 PROCEDURE — 93005 ELECTROCARDIOGRAM TRACING: CPT | Performed by: EMERGENCY MEDICINE

## 2019-06-28 PROCEDURE — 2700000000 HC OXYGEN THERAPY PER DAY

## 2019-06-28 PROCEDURE — 96376 TX/PRO/DX INJ SAME DRUG ADON: CPT

## 2019-06-28 PROCEDURE — 96375 TX/PRO/DX INJ NEW DRUG ADDON: CPT

## 2019-06-28 PROCEDURE — 83880 ASSAY OF NATRIURETIC PEPTIDE: CPT

## 2019-06-28 PROCEDURE — 99285 EMERGENCY DEPT VISIT HI MDM: CPT

## 2019-06-28 PROCEDURE — 96365 THER/PROPH/DIAG IV INF INIT: CPT

## 2019-06-28 PROCEDURE — 85025 COMPLETE CBC W/AUTO DIFF WBC: CPT

## 2019-06-28 PROCEDURE — 94640 AIRWAY INHALATION TREATMENT: CPT

## 2019-06-28 PROCEDURE — 87486 CHLMYD PNEUM DNA AMP PROBE: CPT

## 2019-06-28 PROCEDURE — 6360000002 HC RX W HCPCS: Performed by: EMERGENCY MEDICINE

## 2019-06-28 PROCEDURE — 6370000000 HC RX 637 (ALT 250 FOR IP): Performed by: EMERGENCY MEDICINE

## 2019-06-28 PROCEDURE — 84484 ASSAY OF TROPONIN QUANT: CPT

## 2019-06-28 PROCEDURE — 96374 THER/PROPH/DIAG INJ IV PUSH: CPT

## 2019-06-28 PROCEDURE — 80053 COMPREHEN METABOLIC PANEL: CPT

## 2019-06-28 PROCEDURE — 36415 COLL VENOUS BLD VENIPUNCTURE: CPT

## 2019-06-28 PROCEDURE — 71045 X-RAY EXAM CHEST 1 VIEW: CPT

## 2019-06-28 PROCEDURE — 87581 M.PNEUMON DNA AMP PROBE: CPT

## 2019-06-28 RX ORDER — IPRATROPIUM BROMIDE AND ALBUTEROL SULFATE 2.5; .5 MG/3ML; MG/3ML
SOLUTION RESPIRATORY (INHALATION)
Status: DISCONTINUED
Start: 2019-06-28 | End: 2019-06-28

## 2019-06-28 RX ORDER — BACLOFEN 10 MG/1
10 TABLET ORAL 3 TIMES DAILY
Status: DISCONTINUED | OUTPATIENT
Start: 2019-06-28 | End: 2019-06-29

## 2019-06-28 RX ORDER — SODIUM CHLORIDE 0.9 % (FLUSH) 0.9 %
10 SYRINGE (ML) INJECTION EVERY 12 HOURS SCHEDULED
Status: DISCONTINUED | OUTPATIENT
Start: 2019-06-28 | End: 2019-06-30 | Stop reason: HOSPADM

## 2019-06-28 RX ORDER — LORAZEPAM 2 MG/ML
4 INJECTION INTRAMUSCULAR
Status: DISCONTINUED | OUTPATIENT
Start: 2019-06-28 | End: 2019-06-30 | Stop reason: HOSPADM

## 2019-06-28 RX ORDER — ONDANSETRON 2 MG/ML
4 INJECTION INTRAMUSCULAR; INTRAVENOUS EVERY 6 HOURS PRN
Status: DISCONTINUED | OUTPATIENT
Start: 2019-06-28 | End: 2019-06-30 | Stop reason: HOSPADM

## 2019-06-28 RX ORDER — SODIUM CHLORIDE 0.9 % (FLUSH) 0.9 %
10 SYRINGE (ML) INJECTION PRN
Status: DISCONTINUED | OUTPATIENT
Start: 2019-06-28 | End: 2019-06-30 | Stop reason: HOSPADM

## 2019-06-28 RX ORDER — NICOTINE 21 MG/24HR
1 PATCH, TRANSDERMAL 24 HOURS TRANSDERMAL DAILY
Status: DISCONTINUED | OUTPATIENT
Start: 2019-06-28 | End: 2019-06-30 | Stop reason: HOSPADM

## 2019-06-28 RX ORDER — IPRATROPIUM BROMIDE AND ALBUTEROL SULFATE 2.5; .5 MG/3ML; MG/3ML
1 SOLUTION RESPIRATORY (INHALATION) ONCE
Status: COMPLETED | OUTPATIENT
Start: 2019-06-28 | End: 2019-06-28

## 2019-06-28 RX ORDER — METHYLPREDNISOLONE SODIUM SUCCINATE 125 MG/2ML
60 INJECTION, POWDER, LYOPHILIZED, FOR SOLUTION INTRAMUSCULAR; INTRAVENOUS ONCE
Status: COMPLETED | OUTPATIENT
Start: 2019-06-28 | End: 2019-06-28

## 2019-06-28 RX ORDER — LORAZEPAM 1 MG/1
2 TABLET ORAL
Status: DISCONTINUED | OUTPATIENT
Start: 2019-06-28 | End: 2019-06-30 | Stop reason: HOSPADM

## 2019-06-28 RX ORDER — PREDNISONE 20 MG/1
40 TABLET ORAL DAILY
Status: DISCONTINUED | OUTPATIENT
Start: 2019-07-01 | End: 2019-06-29

## 2019-06-28 RX ORDER — ACETAMINOPHEN 325 MG/1
650 TABLET ORAL EVERY 4 HOURS PRN
Status: DISCONTINUED | OUTPATIENT
Start: 2019-06-28 | End: 2019-06-30 | Stop reason: HOSPADM

## 2019-06-28 RX ORDER — LORAZEPAM 2 MG/ML
2 INJECTION INTRAMUSCULAR
Status: DISCONTINUED | OUTPATIENT
Start: 2019-06-28 | End: 2019-06-30 | Stop reason: HOSPADM

## 2019-06-28 RX ORDER — METHYLPREDNISOLONE SODIUM SUCCINATE 40 MG/ML
40 INJECTION, POWDER, LYOPHILIZED, FOR SOLUTION INTRAMUSCULAR; INTRAVENOUS EVERY 6 HOURS
Status: DISCONTINUED | OUTPATIENT
Start: 2019-06-29 | End: 2019-06-29

## 2019-06-28 RX ORDER — M-VIT,TX,IRON,MINS/CALC/FOLIC 27MG-0.4MG
1 TABLET ORAL DAILY
Status: DISCONTINUED | OUTPATIENT
Start: 2019-06-28 | End: 2019-06-30 | Stop reason: HOSPADM

## 2019-06-28 RX ORDER — LORAZEPAM 2 MG/ML
3 INJECTION INTRAMUSCULAR
Status: DISCONTINUED | OUTPATIENT
Start: 2019-06-28 | End: 2019-06-30 | Stop reason: HOSPADM

## 2019-06-28 RX ORDER — LORAZEPAM 1 MG/1
4 TABLET ORAL
Status: DISCONTINUED | OUTPATIENT
Start: 2019-06-28 | End: 2019-06-30 | Stop reason: HOSPADM

## 2019-06-28 RX ORDER — TIZANIDINE 4 MG/1
4 TABLET ORAL EVERY 8 HOURS PRN
Status: DISCONTINUED | OUTPATIENT
Start: 2019-06-28 | End: 2019-06-30 | Stop reason: HOSPADM

## 2019-06-28 RX ORDER — M-VIT,TX,IRON,MINS/CALC/FOLIC 27MG-0.4MG
1 TABLET ORAL DAILY
Status: DISCONTINUED | OUTPATIENT
Start: 2019-06-29 | End: 2019-06-28

## 2019-06-28 RX ORDER — LORAZEPAM 1 MG/1
1 TABLET ORAL
Status: DISCONTINUED | OUTPATIENT
Start: 2019-06-28 | End: 2019-06-30 | Stop reason: HOSPADM

## 2019-06-28 RX ORDER — FAMOTIDINE 20 MG/1
20 TABLET, FILM COATED ORAL 2 TIMES DAILY
Status: DISCONTINUED | OUTPATIENT
Start: 2019-06-28 | End: 2019-06-30 | Stop reason: HOSPADM

## 2019-06-28 RX ORDER — LORAZEPAM 2 MG/ML
1 INJECTION INTRAMUSCULAR
Status: DISCONTINUED | OUTPATIENT
Start: 2019-06-28 | End: 2019-06-30 | Stop reason: HOSPADM

## 2019-06-28 RX ORDER — LORAZEPAM 1 MG/1
3 TABLET ORAL
Status: DISCONTINUED | OUTPATIENT
Start: 2019-06-28 | End: 2019-06-30 | Stop reason: HOSPADM

## 2019-06-28 RX ORDER — IPRATROPIUM BROMIDE AND ALBUTEROL SULFATE 2.5; .5 MG/3ML; MG/3ML
1 SOLUTION RESPIRATORY (INHALATION)
Status: DISCONTINUED | OUTPATIENT
Start: 2019-06-28 | End: 2019-06-30 | Stop reason: HOSPADM

## 2019-06-28 RX ORDER — POLYETHYLENE GLYCOL 3350 17 G/17G
17 POWDER, FOR SOLUTION ORAL DAILY PRN
Status: DISCONTINUED | OUTPATIENT
Start: 2019-06-28 | End: 2019-06-30 | Stop reason: HOSPADM

## 2019-06-28 RX ADMIN — IPRATROPIUM BROMIDE AND ALBUTEROL SULFATE 1 AMPULE: .5; 3 SOLUTION RESPIRATORY (INHALATION) at 16:10

## 2019-06-28 RX ADMIN — IPRATROPIUM BROMIDE AND ALBUTEROL SULFATE 1 AMPULE: .5; 3 SOLUTION RESPIRATORY (INHALATION) at 19:55

## 2019-06-28 RX ADMIN — MOMETASONE FUROATE AND FORMOTEROL FUMARATE DIHYDRATE 2 PUFF: 200; 5 AEROSOL RESPIRATORY (INHALATION) at 20:18

## 2019-06-28 RX ADMIN — BACLOFEN 10 MG: 10 TABLET ORAL at 21:48

## 2019-06-28 RX ADMIN — FAMOTIDINE 20 MG: 20 TABLET, FILM COATED ORAL at 20:19

## 2019-06-28 RX ADMIN — SODIUM CHLORIDE, PRESERVATIVE FREE 10 ML: 5 INJECTION INTRAVENOUS at 20:19

## 2019-06-28 RX ADMIN — METHYLPREDNISOLONE SODIUM SUCCINATE 40 MG: 40 INJECTION, POWDER, FOR SOLUTION INTRAMUSCULAR; INTRAVENOUS at 23:45

## 2019-06-28 RX ADMIN — METHYLPREDNISOLONE SODIUM SUCCINATE 60 MG: 125 INJECTION, POWDER, FOR SOLUTION INTRAMUSCULAR; INTRAVENOUS at 16:49

## 2019-06-28 RX ADMIN — CEFTRIAXONE SODIUM 1 G: 1 INJECTION, POWDER, FOR SOLUTION INTRAMUSCULAR; INTRAVENOUS at 20:19

## 2019-06-28 ASSESSMENT — PAIN DESCRIPTION - LOCATION
LOCATION: CHEST
LOCATION: CHEST

## 2019-06-28 ASSESSMENT — PAIN SCALES - GENERAL
PAINLEVEL_OUTOF10: 7
PAINLEVEL_OUTOF10: 7

## 2019-06-28 ASSESSMENT — PAIN DESCRIPTION - ONSET: ONSET: ON-GOING

## 2019-06-28 ASSESSMENT — PAIN DESCRIPTION - FREQUENCY: FREQUENCY: INTERMITTENT

## 2019-06-28 ASSESSMENT — PAIN DESCRIPTION - PAIN TYPE
TYPE: ACUTE PAIN
TYPE: ACUTE PAIN

## 2019-06-28 ASSESSMENT — PAIN DESCRIPTION - DESCRIPTORS
DESCRIPTORS: TIGHTNESS
DESCRIPTORS: ACHING

## 2019-06-28 ASSESSMENT — PAIN - FUNCTIONAL ASSESSMENT: PAIN_FUNCTIONAL_ASSESSMENT: ACTIVITIES ARE NOT PREVENTED

## 2019-06-28 ASSESSMENT — PAIN DESCRIPTION - PROGRESSION: CLINICAL_PROGRESSION: NOT CHANGED

## 2019-06-28 ASSESSMENT — PAIN DESCRIPTION - ORIENTATION: ORIENTATION: RIGHT

## 2019-06-28 NOTE — ED PROVIDER NOTES
of education: Not on file    Highest education level: Not on file   Occupational History    Not on file   Social Needs    Financial resource strain: Not on file    Food insecurity:     Worry: Not on file     Inability: Not on file    Transportation needs:     Medical: Not on file     Non-medical: Not on file   Tobacco Use    Smoking status: Current Every Day Smoker     Packs/day: 1.50     Years: 50.00     Pack years: 75.00     Types: Cigarettes    Smokeless tobacco: Never Used    Tobacco comment: smokes 1-2 ppd   Substance and Sexual Activity    Alcohol use: Yes     Alcohol/week: 1.8 oz     Types: 3 Cans of beer per week     Comment: per day of 12 ozs    Drug use: Yes     Types: Marijuana     Comment: 1-2 times per week marijuana    Sexual activity: Not on file   Lifestyle    Physical activity:     Days per week: Not on file     Minutes per session: Not on file    Stress: Not on file   Relationships    Social connections:     Talks on phone: Not on file     Gets together: Not on file     Attends Buddhism service: Not on file     Active member of club or organization: Not on file     Attends meetings of clubs or organizations: Not on file     Relationship status: Not on file    Intimate partner violence:     Fear of current or ex partner: Not on file     Emotionally abused: Not on file     Physically abused: Not on file     Forced sexual activity: Not on file   Other Topics Concern    Not on file   Social History Narrative    Not on file     No current facility-administered medications for this encounter.       Current Outpatient Medications   Medication Sig Dispense Refill    tiZANidine (ZANAFLEX) 4 MG tablet Take 1 tablet by mouth every 8 hours as needed (back pain) 30 tablet 0    ipratropium-albuterol (DUONEB) 0.5-2.5 (3) MG/3ML SOLN nebulizer solution Inhale 3 mLs into the lungs 4 times daily 360 mL 5    albuterol sulfate HFA (VENTOLIN HFA) 108 (90 Base) MCG/ACT inhaler Inhale 2 puffs into the seperately billable procedure. Critical care time provided for respiratory distress that required close evaluation and/or intervention with concern for patient decompensation.        Joshua Burgess MD  06/28/19 [id number]

## 2019-06-29 ENCOUNTER — APPOINTMENT (OUTPATIENT)
Dept: CT IMAGING | Age: 64
End: 2019-06-29
Payer: MEDICARE

## 2019-06-29 LAB
ADENOVIRUS DETECTION BY PCR: NOT DETECTED
ANION GAP SERPL CALCULATED.3IONS-SCNC: 9 MMOL/L (ref 4–16)
BASOPHILS ABSOLUTE: 0 K/CU MM
BASOPHILS RELATIVE PERCENT: 0 % (ref 0–1)
BORDETELLA PERTUSSIS PCR: NOT DETECTED
BUN BLDV-MCNC: 11 MG/DL (ref 6–23)
CALCIUM SERPL-MCNC: 9 MG/DL (ref 8.3–10.6)
CHLAMYDOPHILA PNEUMONIA PCR: NOT DETECTED
CHLORIDE BLD-SCNC: 99 MMOL/L (ref 99–110)
CO2: 29 MMOL/L (ref 21–32)
CORONAVIRUS 229E PCR: NOT DETECTED
CORONAVIRUS HKU1 PCR: NOT DETECTED
CORONAVIRUS NL63 PCR: NOT DETECTED
CORONAVIRUS OC43 PCR: NOT DETECTED
CREAT SERPL-MCNC: 0.6 MG/DL (ref 0.9–1.3)
D DIMER: 408 NG/ML(DDU)
DIFFERENTIAL TYPE: ABNORMAL
EOSINOPHILS ABSOLUTE: 0 K/CU MM
EOSINOPHILS RELATIVE PERCENT: 0.2 % (ref 0–3)
GFR AFRICAN AMERICAN: >60 ML/MIN/1.73M2
GFR NON-AFRICAN AMERICAN: >60 ML/MIN/1.73M2
GLUCOSE BLD-MCNC: 338 MG/DL (ref 70–99)
HCT VFR BLD CALC: 43.5 % (ref 42–52)
HEMOGLOBIN: 14.2 GM/DL (ref 13.5–18)
HUMAN METAPNEUMOVIRUS PCR: NOT DETECTED
IMMATURE NEUTROPHIL %: 0.8 % (ref 0–0.43)
INFLUENZA A BY PCR: NOT DETECTED
INFLUENZA A H1 (2009) PCR: NOT DETECTED
INFLUENZA A H1 PANDEMIC PCR: NOT DETECTED
INFLUENZA A H3 PCR: NOT DETECTED
INFLUENZA B BY PCR: NOT DETECTED
LYMPHOCYTES ABSOLUTE: 0.3 K/CU MM
LYMPHOCYTES RELATIVE PERCENT: 5.1 % (ref 24–44)
MCH RBC QN AUTO: 31.7 PG (ref 27–31)
MCHC RBC AUTO-ENTMCNC: 32.6 % (ref 32–36)
MCV RBC AUTO: 97.1 FL (ref 78–100)
MONOCYTES ABSOLUTE: 0.1 K/CU MM
MONOCYTES RELATIVE PERCENT: 1.3 % (ref 0–4)
MYCOPLASMA PNEUMONIAE PCR: NOT DETECTED
PARAINFLUENZA 1 PCR: NOT DETECTED
PARAINFLUENZA 2 PCR: NOT DETECTED
PARAINFLUENZA 3 PCR: NOT DETECTED
PARAINFLUENZA 4 PCR: NOT DETECTED
PDW BLD-RTO: 13.9 % (ref 11.7–14.9)
PLATELET # BLD: 241 K/CU MM (ref 140–440)
PMV BLD AUTO: 9.2 FL (ref 7.5–11.1)
POTASSIUM SERPL-SCNC: 4.6 MMOL/L (ref 3.5–5.1)
RBC # BLD: 4.48 M/CU MM (ref 4.6–6.2)
RHINOVIRUS ENTEROVIRUS PCR: NOT DETECTED
RSV PCR: NOT DETECTED
SEGMENTED NEUTROPHILS ABSOLUTE COUNT: 5.8 K/CU MM
SEGMENTED NEUTROPHILS RELATIVE PERCENT: 92.6 % (ref 36–66)
SODIUM BLD-SCNC: 137 MMOL/L (ref 135–145)
TOTAL IMMATURE NEUTOROPHIL: 0.05 K/CU MM
WBC # BLD: 6.3 K/CU MM (ref 4–10.5)

## 2019-06-29 PROCEDURE — 2700000000 HC OXYGEN THERAPY PER DAY

## 2019-06-29 PROCEDURE — 85025 COMPLETE CBC W/AUTO DIFF WBC: CPT

## 2019-06-29 PROCEDURE — 6360000002 HC RX W HCPCS: Performed by: NURSE PRACTITIONER

## 2019-06-29 PROCEDURE — 6370000000 HC RX 637 (ALT 250 FOR IP): Performed by: NURSE PRACTITIONER

## 2019-06-29 PROCEDURE — 85379 FIBRIN DEGRADATION QUANT: CPT

## 2019-06-29 PROCEDURE — 96367 TX/PROPH/DG ADDL SEQ IV INF: CPT

## 2019-06-29 PROCEDURE — 93010 ELECTROCARDIOGRAM REPORT: CPT | Performed by: INTERNAL MEDICINE

## 2019-06-29 PROCEDURE — 96376 TX/PRO/DX INJ SAME DRUG ADON: CPT

## 2019-06-29 PROCEDURE — 2580000003 HC RX 258: Performed by: NURSE PRACTITIONER

## 2019-06-29 PROCEDURE — 94640 AIRWAY INHALATION TREATMENT: CPT

## 2019-06-29 PROCEDURE — 2500000003 HC RX 250 WO HCPCS: Performed by: NURSE PRACTITIONER

## 2019-06-29 PROCEDURE — 6360000002 HC RX W HCPCS: Performed by: FAMILY MEDICINE

## 2019-06-29 PROCEDURE — 6360000004 HC RX CONTRAST MEDICATION: Performed by: FAMILY MEDICINE

## 2019-06-29 PROCEDURE — 71275 CT ANGIOGRAPHY CHEST: CPT

## 2019-06-29 PROCEDURE — 2580000003 HC RX 258: Performed by: FAMILY MEDICINE

## 2019-06-29 PROCEDURE — 36415 COLL VENOUS BLD VENIPUNCTURE: CPT

## 2019-06-29 PROCEDURE — G0378 HOSPITAL OBSERVATION PER HR: HCPCS

## 2019-06-29 PROCEDURE — 96372 THER/PROPH/DIAG INJ SC/IM: CPT

## 2019-06-29 PROCEDURE — 96366 THER/PROPH/DIAG IV INF ADDON: CPT

## 2019-06-29 PROCEDURE — 80048 BASIC METABOLIC PNL TOTAL CA: CPT

## 2019-06-29 RX ORDER — METHYLPREDNISOLONE SODIUM SUCCINATE 125 MG/2ML
80 INJECTION, POWDER, LYOPHILIZED, FOR SOLUTION INTRAMUSCULAR; INTRAVENOUS ONCE
Status: COMPLETED | OUTPATIENT
Start: 2019-06-29 | End: 2019-06-29

## 2019-06-29 RX ORDER — ACETYLCYSTEINE 200 MG/ML
600 SOLUTION ORAL; RESPIRATORY (INHALATION) 2 TIMES DAILY
Status: DISCONTINUED | OUTPATIENT
Start: 2019-06-29 | End: 2019-06-30 | Stop reason: HOSPADM

## 2019-06-29 RX ORDER — BACLOFEN 10 MG/1
10 TABLET ORAL 3 TIMES DAILY PRN
Status: DISCONTINUED | OUTPATIENT
Start: 2019-06-29 | End: 2019-06-30 | Stop reason: HOSPADM

## 2019-06-29 RX ORDER — GUAIFENESIN/DEXTROMETHORPHAN 100-10MG/5
5 SYRUP ORAL EVERY 4 HOURS PRN
Status: DISCONTINUED | OUTPATIENT
Start: 2019-06-29 | End: 2019-06-30 | Stop reason: HOSPADM

## 2019-06-29 RX ORDER — METHYLPREDNISOLONE SODIUM SUCCINATE 125 MG/2ML
125 INJECTION, POWDER, LYOPHILIZED, FOR SOLUTION INTRAMUSCULAR; INTRAVENOUS DAILY
Status: DISCONTINUED | OUTPATIENT
Start: 2019-06-30 | End: 2019-06-30 | Stop reason: HOSPADM

## 2019-06-29 RX ADMIN — IPRATROPIUM BROMIDE AND ALBUTEROL SULFATE 1 AMPULE: .5; 3 SOLUTION RESPIRATORY (INHALATION) at 07:51

## 2019-06-29 RX ADMIN — ENOXAPARIN SODIUM 40 MG: 40 INJECTION SUBCUTANEOUS at 08:48

## 2019-06-29 RX ADMIN — FAMOTIDINE 20 MG: 20 TABLET, FILM COATED ORAL at 08:48

## 2019-06-29 RX ADMIN — AZITHROMYCIN DIHYDRATE 500 MG: 500 INJECTION, POWDER, LYOPHILIZED, FOR SOLUTION INTRAVENOUS at 09:19

## 2019-06-29 RX ADMIN — BACLOFEN 10 MG: 10 TABLET ORAL at 20:30

## 2019-06-29 RX ADMIN — METHYLPREDNISOLONE SODIUM SUCCINATE 80 MG: 125 INJECTION, POWDER, FOR SOLUTION INTRAMUSCULAR; INTRAVENOUS at 08:58

## 2019-06-29 RX ADMIN — IPRATROPIUM BROMIDE AND ALBUTEROL SULFATE 1 AMPULE: .5; 3 SOLUTION RESPIRATORY (INHALATION) at 15:58

## 2019-06-29 RX ADMIN — IPRATROPIUM BROMIDE AND ALBUTEROL SULFATE 1 AMPULE: .5; 3 SOLUTION RESPIRATORY (INHALATION) at 11:51

## 2019-06-29 RX ADMIN — IPRATROPIUM BROMIDE AND ALBUTEROL SULFATE 1 AMPULE: .5; 3 SOLUTION RESPIRATORY (INHALATION) at 23:42

## 2019-06-29 RX ADMIN — IPRATROPIUM BROMIDE AND ALBUTEROL SULFATE 1 AMPULE: .5; 3 SOLUTION RESPIRATORY (INHALATION) at 19:18

## 2019-06-29 RX ADMIN — MULTIPLE VITAMINS W/ MINERALS TAB 1 TABLET: TAB at 08:58

## 2019-06-29 RX ADMIN — SODIUM CHLORIDE, PRESERVATIVE FREE 10 ML: 5 INJECTION INTRAVENOUS at 09:00

## 2019-06-29 RX ADMIN — IOPAMIDOL 75 ML: 755 INJECTION, SOLUTION INTRAVENOUS at 13:03

## 2019-06-29 RX ADMIN — ACETYLCYSTEINE 600 MG: 200 SOLUTION ORAL; RESPIRATORY (INHALATION) at 19:18

## 2019-06-29 RX ADMIN — ACETYLCYSTEINE 600 MG: 200 SOLUTION ORAL; RESPIRATORY (INHALATION) at 11:52

## 2019-06-29 RX ADMIN — FAMOTIDINE 20 MG: 20 TABLET, FILM COATED ORAL at 20:30

## 2019-06-29 RX ADMIN — SODIUM CHLORIDE, PRESERVATIVE FREE 10 ML: 5 INJECTION INTRAVENOUS at 20:30

## 2019-06-29 RX ADMIN — CEFTRIAXONE SODIUM 1 G: 1 INJECTION, POWDER, FOR SOLUTION INTRAMUSCULAR; INTRAVENOUS at 08:47

## 2019-06-29 RX ADMIN — GUAIFENESIN AND DEXTROMETHORPHAN 5 ML: 100; 10 SYRUP ORAL at 20:30

## 2019-06-29 RX ADMIN — FOLIC ACID: 5 INJECTION, SOLUTION INTRAMUSCULAR; INTRAVENOUS; SUBCUTANEOUS at 08:45

## 2019-06-29 RX ADMIN — METHYLPREDNISOLONE SODIUM SUCCINATE 40 MG: 40 INJECTION, POWDER, FOR SOLUTION INTRAMUSCULAR; INTRAVENOUS at 06:02

## 2019-06-29 RX ADMIN — FOLIC ACID: 5 INJECTION, SOLUTION INTRAMUSCULAR; INTRAVENOUS; SUBCUTANEOUS at 12:20

## 2019-06-29 RX ADMIN — IPRATROPIUM BROMIDE AND ALBUTEROL SULFATE 1 AMPULE: .5; 3 SOLUTION RESPIRATORY (INHALATION) at 04:50

## 2019-06-29 ASSESSMENT — PAIN SCALES - GENERAL
PAINLEVEL_OUTOF10: 0

## 2019-06-30 VITALS
SYSTOLIC BLOOD PRESSURE: 122 MMHG | TEMPERATURE: 97.2 F | DIASTOLIC BLOOD PRESSURE: 81 MMHG | HEIGHT: 66 IN | RESPIRATION RATE: 16 BRPM | OXYGEN SATURATION: 95 % | BODY MASS INDEX: 18.04 KG/M2 | WEIGHT: 112.25 LBS | HEART RATE: 93 BPM

## 2019-06-30 PROBLEM — R06.03 RESPIRATORY DISTRESS: Status: RESOLVED | Noted: 2019-06-30 | Resolved: 2019-06-30

## 2019-06-30 PROBLEM — R06.03 RESPIRATORY DISTRESS: Status: ACTIVE | Noted: 2019-06-30

## 2019-06-30 PROBLEM — J44.1 COPD WITH ACUTE EXACERBATION (HCC): Status: RESOLVED | Noted: 2019-06-28 | Resolved: 2019-06-30

## 2019-06-30 LAB
ANION GAP SERPL CALCULATED.3IONS-SCNC: 11 MMOL/L (ref 4–16)
BUN BLDV-MCNC: 10 MG/DL (ref 6–23)
CALCIUM SERPL-MCNC: 8.9 MG/DL (ref 8.3–10.6)
CHLORIDE BLD-SCNC: 105 MMOL/L (ref 99–110)
CO2: 28 MMOL/L (ref 21–32)
CREAT SERPL-MCNC: 0.7 MG/DL (ref 0.9–1.3)
GFR AFRICAN AMERICAN: >60 ML/MIN/1.73M2
GFR NON-AFRICAN AMERICAN: >60 ML/MIN/1.73M2
GLUCOSE BLD-MCNC: 195 MG/DL (ref 70–99)
HCT VFR BLD CALC: 43.7 % (ref 42–52)
HEMOGLOBIN: 14 GM/DL (ref 13.5–18)
MCH RBC QN AUTO: 31.5 PG (ref 27–31)
MCHC RBC AUTO-ENTMCNC: 32 % (ref 32–36)
MCV RBC AUTO: 98.2 FL (ref 78–100)
PDW BLD-RTO: 14.1 % (ref 11.7–14.9)
PLATELET # BLD: 256 K/CU MM (ref 140–440)
PMV BLD AUTO: 9.4 FL (ref 7.5–11.1)
POTASSIUM SERPL-SCNC: 3.8 MMOL/L (ref 3.5–5.1)
RBC # BLD: 4.45 M/CU MM (ref 4.6–6.2)
SODIUM BLD-SCNC: 144 MMOL/L (ref 135–145)
WBC # BLD: 12.4 K/CU MM (ref 4–10.5)

## 2019-06-30 PROCEDURE — 6360000002 HC RX W HCPCS: Performed by: FAMILY MEDICINE

## 2019-06-30 PROCEDURE — G0378 HOSPITAL OBSERVATION PER HR: HCPCS

## 2019-06-30 PROCEDURE — 94640 AIRWAY INHALATION TREATMENT: CPT

## 2019-06-30 PROCEDURE — 2580000003 HC RX 258: Performed by: NURSE PRACTITIONER

## 2019-06-30 PROCEDURE — 6370000000 HC RX 637 (ALT 250 FOR IP): Performed by: NURSE PRACTITIONER

## 2019-06-30 PROCEDURE — 85027 COMPLETE CBC AUTOMATED: CPT

## 2019-06-30 PROCEDURE — 80048 BASIC METABOLIC PNL TOTAL CA: CPT

## 2019-06-30 PROCEDURE — 96376 TX/PRO/DX INJ SAME DRUG ADON: CPT

## 2019-06-30 PROCEDURE — 6360000002 HC RX W HCPCS: Performed by: NURSE PRACTITIONER

## 2019-06-30 PROCEDURE — 2500000003 HC RX 250 WO HCPCS: Performed by: NURSE PRACTITIONER

## 2019-06-30 PROCEDURE — 2580000003 HC RX 258: Performed by: FAMILY MEDICINE

## 2019-06-30 PROCEDURE — 36415 COLL VENOUS BLD VENIPUNCTURE: CPT

## 2019-06-30 PROCEDURE — 2700000000 HC OXYGEN THERAPY PER DAY

## 2019-06-30 PROCEDURE — 96366 THER/PROPH/DIAG IV INF ADDON: CPT

## 2019-06-30 RX ORDER — BUDESONIDE 0.5 MG/2ML
500 INHALANT ORAL 2 TIMES DAILY
Qty: 60 AMPULE | Refills: 0 | Status: SHIPPED | OUTPATIENT
Start: 2019-06-30 | End: 2019-07-08

## 2019-06-30 RX ORDER — PREDNISONE 20 MG/1
40 TABLET ORAL DAILY
Qty: 6 TABLET | Refills: 0 | Status: SHIPPED | OUTPATIENT
Start: 2019-06-30 | End: 2019-07-03

## 2019-06-30 RX ORDER — ALBUTEROL SULFATE 90 UG/1
2 AEROSOL, METERED RESPIRATORY (INHALATION)
Qty: 1 INHALER | Refills: 0 | Status: SHIPPED
Start: 2019-06-30 | End: 2019-08-20 | Stop reason: SDUPTHER

## 2019-06-30 RX ORDER — ACETYLCYSTEINE 200 MG/ML
600 SOLUTION ORAL; RESPIRATORY (INHALATION) 2 TIMES DAILY
Qty: 30 ML | Refills: 0 | Status: SHIPPED | OUTPATIENT
Start: 2019-06-30 | End: 2019-08-14 | Stop reason: ALTCHOICE

## 2019-06-30 RX ORDER — GUAIFENESIN AND CODEINE PHOSPHATE 100; 10 MG/5ML; MG/5ML
5 SOLUTION ORAL 3 TIMES DAILY PRN
Qty: 75 ML | Refills: 0 | Status: SHIPPED | OUTPATIENT
Start: 2019-06-30 | End: 2019-07-05

## 2019-06-30 RX ORDER — DOXYCYCLINE HYCLATE 100 MG
100 TABLET ORAL 2 TIMES DAILY
Qty: 14 TABLET | Refills: 0 | Status: SHIPPED | OUTPATIENT
Start: 2019-06-30 | End: 2019-07-07

## 2019-06-30 RX ADMIN — SODIUM CHLORIDE, PRESERVATIVE FREE 10 ML: 5 INJECTION INTRAVENOUS at 08:26

## 2019-06-30 RX ADMIN — AZITHROMYCIN DIHYDRATE 500 MG: 500 INJECTION, POWDER, LYOPHILIZED, FOR SOLUTION INTRAVENOUS at 09:16

## 2019-06-30 RX ADMIN — IPRATROPIUM BROMIDE AND ALBUTEROL SULFATE 1 AMPULE: .5; 3 SOLUTION RESPIRATORY (INHALATION) at 04:14

## 2019-06-30 RX ADMIN — IPRATROPIUM BROMIDE AND ALBUTEROL SULFATE 1 AMPULE: .5; 3 SOLUTION RESPIRATORY (INHALATION) at 07:11

## 2019-06-30 RX ADMIN — CEFTRIAXONE SODIUM 1 G: 1 INJECTION, POWDER, FOR SOLUTION INTRAMUSCULAR; INTRAVENOUS at 08:36

## 2019-06-30 RX ADMIN — GUAIFENESIN AND DEXTROMETHORPHAN 5 ML: 100; 10 SYRUP ORAL at 04:11

## 2019-06-30 RX ADMIN — MULTIPLE VITAMINS W/ MINERALS TAB 1 TABLET: TAB at 08:27

## 2019-06-30 RX ADMIN — METHYLPREDNISOLONE SODIUM SUCCINATE 125 MG: 125 INJECTION, POWDER, FOR SOLUTION INTRAMUSCULAR; INTRAVENOUS at 08:27

## 2019-06-30 RX ADMIN — GUAIFENESIN AND DEXTROMETHORPHAN 5 ML: 100; 10 SYRUP ORAL at 10:20

## 2019-06-30 RX ADMIN — SODIUM CHLORIDE, PRESERVATIVE FREE 10 ML: 5 INJECTION INTRAVENOUS at 09:25

## 2019-06-30 RX ADMIN — SODIUM CHLORIDE, PRESERVATIVE FREE 10 ML: 5 INJECTION INTRAVENOUS at 10:37

## 2019-06-30 RX ADMIN — FAMOTIDINE 20 MG: 20 TABLET, FILM COATED ORAL at 08:27

## 2019-06-30 RX ADMIN — IPRATROPIUM BROMIDE AND ALBUTEROL SULFATE 1 AMPULE: .5; 3 SOLUTION RESPIRATORY (INHALATION) at 10:28

## 2019-06-30 RX ADMIN — FOLIC ACID: 5 INJECTION, SOLUTION INTRAMUSCULAR; INTRAVENOUS; SUBCUTANEOUS at 10:41

## 2019-06-30 ASSESSMENT — PAIN SCALES - GENERAL
PAINLEVEL_OUTOF10: 0

## 2019-06-30 NOTE — PROGRESS NOTES
Patient ambulated in hallway on room air with Sao2 remaining 90-92%. Tolerated well with mild shortness of breath noted.

## 2019-06-30 NOTE — DISCHARGE SUMMARY
Lalo Donahue 1955 [id number]  PCP:  Marycarmen Dent MD    Admit date: 6/28/2019  Admitting Physician: Latoya Perez MD    Discharge date: 6/30/2019 Discharge Physician: Latoya Perez MD      Reason for admission:   Chief Complaint   Patient presents with    Shortness of Breath     Pt arrives per wheelchair with family stating while fishing this am he became weak and sob, could not walk 10 feet without stopping to catch breath. Pt states about 2 weeks ago sob but not as bad, felt better until today     Present on Admission:   (Resolved) [facility] acute exacerbation [facility]   Tobacco abuse disorder   Alcohol dependence ([facility]   COPD, severe ([facility]   (Resolved) Respiratory distress       Discharge Diagnoses Include:  1. Severe COPD  2. Continued tobacco use  3. Alcohol dependency    Hospital Course[de-identified] Patient was admitted to the hospital with an episode of acute on chronic shortness of breath secondary to COPD exacerbation. Patient was mildly hypoxic on presentation because of which he was put on supplemental oxygen. He was also initiated on antibiotics and steroids to which he responded well. Patient was taken off supplemental oxygen the subsequent day and observed overnight, as patient continued to do well he was discharged home to complete his course of antibiotics, steroids and changes were also made to his nebulizers.   He was to follow-up with his primary and his pulmonologist.  Home oxygen eval was also done but patient did not qualify for the same    /81   Pulse 93   Temp 97.2 °F (36.2 °C) (Temporal)   Resp 16   Ht 5' 6\" (1.676 m)   Wt 112 lb 4 oz (50.9 kg)   SpO2 95%   BMI 18.12 kg/m²         Head: atraumatic & normocephalic  Ears: TM's bialterall normal with normal canals  Eyes: without pallor or icterus  Oral cavity: moist mucous membranes with normal dentition  Neck: supple with no lymphadenopathy, JVD down, trachea central  CVS: normal S1S2 with no additional heart sounds  Respi: Moderate air entry in both lung fields with clear lung sounds  GI: soft & non tender, with +ve bowel sounds, no guarding ,rigidity or rebound tenderness  : no inguinal lymphadenopathy, no CVA tenderness  CNS: no focal weakness or sensory deficits peripherally, DTR's equal bilaterally, CN2-12 normal  Skin: no rash, purpurea or eruptions  MS: normal muscle strength, normal ROM in all major joints      Procedures: Home O2 eval    Significant Diagnostic Studies at discharge:   None significant    Patient Instructions:   Bridgett Arciniega   [place] Medication Instructions DBO:[id number]    Printed on:06/30/19 8844   Medication Information                      acetylcysteine (MUCOMYST) 20 % nebulizer solution  Inhale 3 mLs into the lungs 2 times daily             albuterol sulfate HFA (VENTOLIN HFA) 108 (90 Base) MCG/ACT inhaler  Inhale 2 puffs into the lungs every 2 hours as needed for Wheezing             budesonide (PULMICORT) 0.5 MG/2ML nebulizer suspension  Take 2 mLs by nebulization 2 times daily             doxycycline hyclate (VIBRA-TABS) 100 MG tablet  Take 1 tablet by mouth 2 times daily for 7 days             guaiFENesin-codeine (TUSSI-ORGANIDIN NR) 100-10 MG/5ML syrup  Take 5 mLs by mouth 3 times daily as needed for Cough for up to 5 days.              ipratropium-albuterol (DUONEB) 0.5-2.5 (3) MG/3ML SOLN nebulizer solution  Inhale 3 mLs into the lungs 4 times daily             Multiple Vitamins-Minerals (MULTIVITAMIN ADULTS PO)  Take by mouth daily              OXYGEN  Inhale 2 L into the lungs nightly             predniSONE (DELTASONE) 20 MG tablet  Take 2 tablets by mouth daily for 3 days             SYMBICORT 160-4.5 MCG/ACT AERO  Inhale 2 puffs into the lungs 2 times daily             tiZANidine (ZANAFLEX) 4 MG tablet  Take 1 tablet by mouth every 8 hours as needed (back pain)                  Code Status: Full Code     Consults: As above    Diet: General    Activity: As tolerated

## 2019-07-01 ENCOUNTER — TELEPHONE (OUTPATIENT)
Dept: INTERNAL MEDICINE CLINIC | Age: 64
End: 2019-07-01

## 2019-07-01 LAB
EKG ATRIAL RATE: 101 BPM
EKG DIAGNOSIS: NORMAL
EKG P AXIS: 56 DEGREES
EKG P-R INTERVAL: 126 MS
EKG Q-T INTERVAL: 354 MS
EKG QRS DURATION: 92 MS
EKG QTC CALCULATION (BAZETT): 459 MS
EKG R AXIS: 107 DEGREES
EKG T AXIS: 66 DEGREES
EKG VENTRICULAR RATE: 101 BPM

## 2019-07-08 ENCOUNTER — APPOINTMENT (OUTPATIENT)
Dept: GENERAL RADIOLOGY | Age: 64
DRG: 190 | End: 2019-07-08
Payer: MEDICARE

## 2019-07-08 ENCOUNTER — HOSPITAL ENCOUNTER (INPATIENT)
Age: 64
LOS: 4 days | Discharge: HOME | DRG: 190 | End: 2019-07-12
Attending: EMERGENCY MEDICINE | Admitting: HOSPITALIST
Payer: MEDICARE

## 2019-07-08 DIAGNOSIS — J44.1 COPD EXACERBATION (HCC): Primary | ICD-10-CM

## 2019-07-08 LAB
ANION GAP SERPL CALCULATED.3IONS-SCNC: 16 MMOL/L (ref 4–16)
BASE EXCESS MIXED: 2.7 (ref 0–1.2)
BASE EXCESS MIXED: 2.7 (ref 0–2)
BASE EXCESS: ABNORMAL (ref 0–3.3)
BASOPHILS ABSOLUTE: 0.1 K/CU MM
BASOPHILS RELATIVE PERCENT: 0.4 % (ref 0–1)
BUN BLDV-MCNC: 8 MG/DL (ref 6–23)
CALCIUM SERPL-MCNC: 9.3 MG/DL (ref 8.3–10.6)
CHLORIDE BLD-SCNC: 102 MMOL/L (ref 99–110)
CO2 CONTENT: 28.7 MMOL/L (ref 19–24)
CO2 CONTENT: 28.7 MMOL/L (ref 22–30)
CO2: 25 MMOL/L (ref 21–32)
CREAT SERPL-MCNC: 0.8 MG/DL (ref 0.9–1.3)
DIFFERENTIAL TYPE: ABNORMAL
EOSINOPHILS ABSOLUTE: 0 K/CU MM
EOSINOPHILS RELATIVE PERCENT: 0.1 % (ref 0–3)
GFR AFRICAN AMERICAN: >60 ML/MIN/1.73M2
GFR NON-AFRICAN AMERICAN: >60 ML/MIN/1.73M2
GLUCOSE BLD-MCNC: 74 MG/DL (ref 70–99)
HCO3 VENOUS: 27.4 MMOL/L (ref 19–25)
HCO3 VENOUS: 27.4 MMOL/L (ref 19–25)
HCT VFR BLD CALC: 46.1 % (ref 42–52)
HEMOGLOBIN: 15.3 GM/DL (ref 13.5–18)
IMMATURE NEUTROPHIL %: 0.5 % (ref 0–0.43)
LYMPHOCYTES ABSOLUTE: 1.7 K/CU MM
LYMPHOCYTES RELATIVE PERCENT: 12.5 % (ref 24–44)
MCH RBC QN AUTO: 31.9 PG (ref 27–31)
MCHC RBC AUTO-ENTMCNC: 33.2 % (ref 32–36)
MCV RBC AUTO: 96 FL (ref 78–100)
MONOCYTES ABSOLUTE: 1.1 K/CU MM
MONOCYTES RELATIVE PERCENT: 8.2 % (ref 0–4)
O2 SAT, VEN: 93.2 % (ref 50–70)
O2 SAT, VEN: 93.2 % (ref 50–70)
PCO2, VEN: 41.3 MMHG (ref 38–52)
PCO2, VEN: 41.3 MMHG (ref 38–52)
PDW BLD-RTO: 14.3 % (ref 11.7–14.9)
PH VENOUS: 7.43 (ref 7.32–7.42)
PH VENOUS: 7.43 (ref 7.32–7.42)
PLATELET # BLD: 358 K/CU MM (ref 140–440)
PMV BLD AUTO: 8.5 FL (ref 7.5–11.1)
PO2, VEN: 66.2 MMHG (ref 28–48)
PO2, VEN: 66.2 MMHG (ref 28–48)
POTASSIUM SERPL-SCNC: 4.3 MMOL/L (ref 3.5–5.1)
RBC # BLD: 4.8 M/CU MM (ref 4.6–6.2)
SEGMENTED NEUTROPHILS ABSOLUTE COUNT: 10.7 K/CU MM
SEGMENTED NEUTROPHILS RELATIVE PERCENT: 78.3 % (ref 36–66)
SODIUM BLD-SCNC: 143 MMOL/L (ref 135–145)
SOURCE, BLOOD GAS: ABNORMAL
SOURCE, BLOOD GAS: ABNORMAL
TOTAL IMMATURE NEUTOROPHIL: 0.07 K/CU MM
TROPONIN T: <0.01 NG/ML
WBC # BLD: 13.7 K/CU MM (ref 4–10.5)

## 2019-07-08 PROCEDURE — 6370000000 HC RX 637 (ALT 250 FOR IP): Performed by: NURSE PRACTITIONER

## 2019-07-08 PROCEDURE — 96374 THER/PROPH/DIAG INJ IV PUSH: CPT

## 2019-07-08 PROCEDURE — 6360000002 HC RX W HCPCS: Performed by: EMERGENCY MEDICINE

## 2019-07-08 PROCEDURE — 2580000003 HC RX 258: Performed by: NURSE PRACTITIONER

## 2019-07-08 PROCEDURE — 80048 BASIC METABOLIC PNL TOTAL CA: CPT

## 2019-07-08 PROCEDURE — 87486 CHLMYD PNEUM DNA AMP PROBE: CPT

## 2019-07-08 PROCEDURE — 71045 X-RAY EXAM CHEST 1 VIEW: CPT

## 2019-07-08 PROCEDURE — 87205 SMEAR GRAM STAIN: CPT

## 2019-07-08 PROCEDURE — 6370000000 HC RX 637 (ALT 250 FOR IP): Performed by: HOSPITALIST

## 2019-07-08 PROCEDURE — 87798 DETECT AGENT NOS DNA AMP: CPT

## 2019-07-08 PROCEDURE — 85025 COMPLETE CBC W/AUTO DIFF WBC: CPT

## 2019-07-08 PROCEDURE — 94640 AIRWAY INHALATION TREATMENT: CPT

## 2019-07-08 PROCEDURE — 87070 CULTURE OTHR SPECIMN AEROBIC: CPT

## 2019-07-08 PROCEDURE — 84484 ASSAY OF TROPONIN QUANT: CPT

## 2019-07-08 PROCEDURE — 6370000000 HC RX 637 (ALT 250 FOR IP): Performed by: EMERGENCY MEDICINE

## 2019-07-08 PROCEDURE — 87581 M.PNEUMON DNA AMP PROBE: CPT

## 2019-07-08 PROCEDURE — 6360000002 HC RX W HCPCS: Performed by: HOSPITALIST

## 2019-07-08 PROCEDURE — 1200000000 HC SEMI PRIVATE

## 2019-07-08 PROCEDURE — 99291 CRITICAL CARE FIRST HOUR: CPT

## 2019-07-08 PROCEDURE — 87633 RESP VIRUS 12-25 TARGETS: CPT

## 2019-07-08 RX ORDER — LORAZEPAM 2 MG/ML
4 INJECTION INTRAMUSCULAR
Status: DISCONTINUED | OUTPATIENT
Start: 2019-07-08 | End: 2019-07-12 | Stop reason: HOSPADM

## 2019-07-08 RX ORDER — IPRATROPIUM BROMIDE AND ALBUTEROL SULFATE 2.5; .5 MG/3ML; MG/3ML
1 SOLUTION RESPIRATORY (INHALATION)
Status: DISCONTINUED | OUTPATIENT
Start: 2019-07-08 | End: 2019-07-12 | Stop reason: HOSPADM

## 2019-07-08 RX ORDER — LORAZEPAM 1 MG/1
1 TABLET ORAL
Status: DISCONTINUED | OUTPATIENT
Start: 2019-07-08 | End: 2019-07-12 | Stop reason: HOSPADM

## 2019-07-08 RX ORDER — IPRATROPIUM BROMIDE AND ALBUTEROL SULFATE 2.5; .5 MG/3ML; MG/3ML
1 SOLUTION RESPIRATORY (INHALATION) ONCE
Status: COMPLETED | OUTPATIENT
Start: 2019-07-08 | End: 2019-07-08

## 2019-07-08 RX ORDER — LORAZEPAM 1 MG/1
4 TABLET ORAL
Status: DISCONTINUED | OUTPATIENT
Start: 2019-07-08 | End: 2019-07-12 | Stop reason: HOSPADM

## 2019-07-08 RX ORDER — NICOTINE 21 MG/24HR
1 PATCH, TRANSDERMAL 24 HOURS TRANSDERMAL DAILY
Status: DISCONTINUED | OUTPATIENT
Start: 2019-07-08 | End: 2019-07-12 | Stop reason: HOSPADM

## 2019-07-08 RX ORDER — SODIUM CHLORIDE 0.9 % (FLUSH) 0.9 %
10 SYRINGE (ML) INJECTION PRN
Status: DISCONTINUED | OUTPATIENT
Start: 2019-07-08 | End: 2019-07-08 | Stop reason: SDUPTHER

## 2019-07-08 RX ORDER — GUAIFENESIN/DEXTROMETHORPHAN 100-10MG/5
5 SYRUP ORAL EVERY 4 HOURS PRN
Status: DISCONTINUED | OUTPATIENT
Start: 2019-07-08 | End: 2019-07-12 | Stop reason: HOSPADM

## 2019-07-08 RX ORDER — LORAZEPAM 1 MG/1
2 TABLET ORAL
Status: DISCONTINUED | OUTPATIENT
Start: 2019-07-08 | End: 2019-07-12 | Stop reason: HOSPADM

## 2019-07-08 RX ORDER — M-VIT,TX,IRON,MINS/CALC/FOLIC 27MG-0.4MG
1 TABLET ORAL DAILY
Status: DISCONTINUED | OUTPATIENT
Start: 2019-07-08 | End: 2019-07-12 | Stop reason: HOSPADM

## 2019-07-08 RX ORDER — ONDANSETRON 2 MG/ML
4 INJECTION INTRAMUSCULAR; INTRAVENOUS EVERY 6 HOURS PRN
Status: DISCONTINUED | OUTPATIENT
Start: 2019-07-08 | End: 2019-07-12 | Stop reason: HOSPADM

## 2019-07-08 RX ORDER — LORAZEPAM 1 MG/1
3 TABLET ORAL
Status: DISCONTINUED | OUTPATIENT
Start: 2019-07-08 | End: 2019-07-12 | Stop reason: HOSPADM

## 2019-07-08 RX ORDER — SODIUM CHLORIDE 0.9 % (FLUSH) 0.9 %
10 SYRINGE (ML) INJECTION PRN
Status: DISCONTINUED | OUTPATIENT
Start: 2019-07-08 | End: 2019-07-12 | Stop reason: HOSPADM

## 2019-07-08 RX ORDER — METHYLPREDNISOLONE SODIUM SUCCINATE 40 MG/ML
40 INJECTION, POWDER, LYOPHILIZED, FOR SOLUTION INTRAMUSCULAR; INTRAVENOUS EVERY 6 HOURS
Status: DISCONTINUED | OUTPATIENT
Start: 2019-07-08 | End: 2019-07-09

## 2019-07-08 RX ORDER — METHYLPREDNISOLONE SODIUM SUCCINATE 125 MG/2ML
125 INJECTION, POWDER, LYOPHILIZED, FOR SOLUTION INTRAMUSCULAR; INTRAVENOUS ONCE
Status: COMPLETED | OUTPATIENT
Start: 2019-07-08 | End: 2019-07-08

## 2019-07-08 RX ORDER — ACETYLCYSTEINE 200 MG/ML
600 SOLUTION ORAL; RESPIRATORY (INHALATION) 2 TIMES DAILY
Status: DISCONTINUED | OUTPATIENT
Start: 2019-07-08 | End: 2019-07-12 | Stop reason: HOSPADM

## 2019-07-08 RX ORDER — TIZANIDINE 4 MG/1
4 TABLET ORAL EVERY 8 HOURS PRN
Status: DISCONTINUED | OUTPATIENT
Start: 2019-07-08 | End: 2019-07-12 | Stop reason: HOSPADM

## 2019-07-08 RX ORDER — SODIUM CHLORIDE 0.9 % (FLUSH) 0.9 %
10 SYRINGE (ML) INJECTION EVERY 12 HOURS SCHEDULED
Status: DISCONTINUED | OUTPATIENT
Start: 2019-07-08 | End: 2019-07-08 | Stop reason: SDUPTHER

## 2019-07-08 RX ORDER — SODIUM CHLORIDE 0.9 % (FLUSH) 0.9 %
10 SYRINGE (ML) INJECTION EVERY 12 HOURS SCHEDULED
Status: DISCONTINUED | OUTPATIENT
Start: 2019-07-08 | End: 2019-07-12 | Stop reason: HOSPADM

## 2019-07-08 RX ORDER — PREDNISONE 20 MG/1
40 TABLET ORAL DAILY
Status: DISCONTINUED | OUTPATIENT
Start: 2019-07-11 | End: 2019-07-09

## 2019-07-08 RX ORDER — LORAZEPAM 2 MG/ML
3 INJECTION INTRAMUSCULAR
Status: DISCONTINUED | OUTPATIENT
Start: 2019-07-08 | End: 2019-07-12 | Stop reason: HOSPADM

## 2019-07-08 RX ORDER — LORAZEPAM 2 MG/ML
2 INJECTION INTRAMUSCULAR
Status: DISCONTINUED | OUTPATIENT
Start: 2019-07-08 | End: 2019-07-12 | Stop reason: HOSPADM

## 2019-07-08 RX ORDER — LORAZEPAM 2 MG/ML
1 INJECTION INTRAMUSCULAR
Status: DISCONTINUED | OUTPATIENT
Start: 2019-07-08 | End: 2019-07-12 | Stop reason: HOSPADM

## 2019-07-08 RX ADMIN — SODIUM CHLORIDE, PRESERVATIVE FREE 10 ML: 5 INJECTION INTRAVENOUS at 20:34

## 2019-07-08 RX ADMIN — ENOXAPARIN SODIUM 40 MG: 40 INJECTION SUBCUTANEOUS at 20:31

## 2019-07-08 RX ADMIN — METHYLPREDNISOLONE SODIUM SUCCINATE 40 MG: 40 INJECTION, POWDER, FOR SOLUTION INTRAMUSCULAR; INTRAVENOUS at 20:33

## 2019-07-08 RX ADMIN — METHYLPREDNISOLONE SODIUM SUCCINATE 125 MG: 125 INJECTION, POWDER, FOR SOLUTION INTRAMUSCULAR; INTRAVENOUS at 16:26

## 2019-07-08 RX ADMIN — IPRATROPIUM BROMIDE AND ALBUTEROL SULFATE 1 AMPULE: .5; 3 SOLUTION RESPIRATORY (INHALATION) at 16:10

## 2019-07-08 RX ADMIN — MULTIPLE VITAMINS W/ MINERALS TAB 1 TABLET: TAB at 20:31

## 2019-07-08 RX ADMIN — MOMETASONE FUROATE AND FORMOTEROL FUMARATE DIHYDRATE 2 PUFF: 200; 5 AEROSOL RESPIRATORY (INHALATION) at 20:31

## 2019-07-08 RX ADMIN — IPRATROPIUM BROMIDE AND ALBUTEROL SULFATE 1 AMPULE: .5; 3 SOLUTION RESPIRATORY (INHALATION) at 20:56

## 2019-07-08 RX ADMIN — GUAIFENESIN AND DEXTROMETHORPHAN 5 ML: 100; 10 SYRUP ORAL at 23:12

## 2019-07-08 ASSESSMENT — PAIN DESCRIPTION - PAIN TYPE
TYPE: ACUTE PAIN
TYPE: ACUTE PAIN

## 2019-07-08 ASSESSMENT — PAIN SCALES - GENERAL
PAINLEVEL_OUTOF10: 5
PAINLEVEL_OUTOF10: 0

## 2019-07-08 ASSESSMENT — PAIN DESCRIPTION - FREQUENCY
FREQUENCY: CONTINUOUS
FREQUENCY: CONTINUOUS

## 2019-07-08 ASSESSMENT — PAIN DESCRIPTION - LOCATION
LOCATION: CHEST
LOCATION: CHEST

## 2019-07-08 ASSESSMENT — PAIN DESCRIPTION - DESCRIPTORS
DESCRIPTORS: PRESSURE
DESCRIPTORS: PRESSURE

## 2019-07-08 ASSESSMENT — PAIN DESCRIPTION - PROGRESSION
CLINICAL_PROGRESSION: NOT CHANGED
CLINICAL_PROGRESSION: NOT CHANGED

## 2019-07-08 ASSESSMENT — ENCOUNTER SYMPTOMS
SHORTNESS OF BREATH: 1
WHEEZING: 1
COUGH: 1

## 2019-07-08 ASSESSMENT — PAIN DESCRIPTION - ONSET: ONSET: ON-GOING

## 2019-07-08 NOTE — ED NOTES
Dr. Janette Castaneda is speaking with Dr. Amina Chavarria regarding admission for this patient.      Alison Kehr  07/08/19 [id number]

## 2019-07-08 NOTE — H&P
History and Physical      Name:  Kari Lagos /Age/Sex: 1955  (60 y.o. male)   MRN & CSN:  [id number] & [id number] Admission Date/Time: 2019  3:35 PM   Location:  012012-01 PCP: Shanice Franks MD       Hospital Day: 1    Assessment and Plan:   Kari Lagos is a 60 y.o.  male  who presents with     1. Acute on chronic COPD exacerbation- CXR negative for acute cardiopulmonary processes. WBC 13.7. Patient on long term steroids. Duo nebs q 4 WA. IV steroid taper. Artist Hazy Resp PCR. Droplet precautions. Sputum culture. Procalcitonin. Venous blood gas ph 7.43 Venous O2 66.2. Daily CBC and BMP. 2. Alcohol abuse- Drinks a six pack of beer a day. CIWA scale with lorazepam coverage. Fall precautions seizure precautions. 3. Tobacco abuse- Nicotine TD patch daily for nicotine cravings. Smoking cessation education prior to discharge. Smoking history 1.5 ppd x 50 years      Diet General   DVT Prophylaxis [x] Lovenox, []  Heparin, [] SCDs, [] Ambulation   GI Prophylaxis [] PPI,  [x] H2 Blocker,  [] Carafate,  [] Diet/Tube Feeds   Code Status FULL   Disposition Patient requires continued admission due to need for further evaluation and management of acute on chronic COPDE   MDM [] Low, [] Moderate,[x]  High  Patient's risk as above due to  complexity of medical data reviewed and treatment options available      History of Present Illness:     Chief Complaint:   Chief Complaint   Patient presents with    Shortness of Breath     Here from Dr. Juan Diego Iyer office per his RN as he is continuing to be SOB after d/c from inpatient unit last week       Kari Lagos is a 60 y.o.  male  who presents with progressively worsening shortness of breath that started this afternoon as he was walking from parking lot to PCP office. Patient was hospitalized last week for same symptomology and attributes this flare to the heat and humidity outside. Patient is on supplemental oxygen at night.  Patient is on supplemental oxygen at night Cervical radiculopathy 8/30/2016    S/P cervical discectomy and fusion with plate and screw. On 7/22/16. Dr Kayden Jeffrey at [address] [facility] polyps 9-    Dr. Echo Neumann COPD (chronic obstructive pulmonary disease) ([facility] [address] 5/19/2014    PFTs showed severe COPD in 5/14- follows with Dr Miranda Alberto COPD exacerbation [facility]) 6/10/2016    Diverticulosis 9-    Dupuytren's contracture 5/26/2017    Right palm    Elevated PSA 12/21/2018 12/2018 : PSA is 6    H/O colonoscopy 9-18-14    Dr. Echo Neumann H/O echocardiogram 08/17/2016    EF 55-60%. Abnormal diastolic function. Mild TR. Borderline right atrium and RV dilatation.  Hiatal hernia     History of alcohol abuse     \"average 6 pk beer per day\"    History of drug abuse     does use marijuana\"last used cocaine over 10 yrs ago\" per pt on 10/3/2018    Internal hemorrhoids 9-    Intervertebral disc protrusion 5/19/2014    Cervical region on MRI    Lingular pneumonia 4/3/2018    Lymphocytic colitis 9-18-14    Dr. Kelli Banks, will treat if symptomatic    On home oxygen therapy     2l/nc at night only    Pneumonia of left lower lobe due to infectious organism ([facility] [address] 11/27/2018    Sepsis due to pneumonia ([facility] [address] 6/10/2016    Spinal stenosis in cervical region 5/19/2014    MRI showed  Disc protrusion. Severe spinal stenosis at C3-C4    Spinal stenosis of cervical region 3/14/2016    S/P cervical discectomy and fusion with plate and screw. On 7/22/16. Dr Kayden Jeffrey at [address] [employer] abuse disorder 5/19/2014    Wears glasses     to read     PSHX:  has a past surgical history that includes Sigmoidoscopy; Dental surgery (1974); Cervical spine surgery (2015); Colonoscopy; Upper gastrointestinal endoscopy (10/04/2018); Colonoscopy (10/04/2018); polypectomy (10/04/2018); Upper gastrointestinal endoscopy (N/A, 10/4/2018); and Colonoscopy (N/A, 10/4/2018).     Allergies: No Known Allergies    FAM HX: family history includes Cancer in his mother; Diabetes in his father; Heart (ZANAFLEX) 4 MG tablet Take 1 tablet by mouth every 8 hours as needed (back pain) 5/1/19  Yes Maxwell Huertas MD   SYMBICORT 160-4.5 MCG/ACT AERO Inhale 2 puffs into the lungs 2 times daily 3/1/19  Yes Maxwell Huertas MD   ipratropium-albuterol (DUONEB) 0.5-2.5 (3) MG/3ML SOLN nebulizer solution Inhale 3 mLs into the lungs 4 times daily 3/1/19  Yes Maxwell Huertas MD   OXYGEN Inhale 2 L into the lungs nightly   Yes Historical Provider, MD   Multiple Vitamins-Minerals (MULTIVITAMIN ADULTS PO) Take by mouth daily    Yes Historical Provider, MD     Electronically signed by HEMALATHA Parker CNP on 7/8/2019 at 7:35 PM

## 2019-07-08 NOTE — ED PROVIDER NOTES
Sexual Activity    Alcohol use: Yes     Alcohol/week: 1.8 oz     Types: 3 Cans of beer per week     Comment: per day of 12 ozs    Drug use: Yes     Types: Marijuana     Comment: 1-2 times per week marijuana    Sexual activity: Not on file   Lifestyle    Physical activity:     Days per week: Not on file     Minutes per session: Not on file    Stress: Not on file   Relationships    Social connections:     Talks on phone: Not on file     Gets together: Not on file     Attends Confucianist service: Not on file     Active member of club or organization: Not on file     Attends meetings of clubs or organizations: Not on file     Relationship status: Not on file    Intimate partner violence:     Fear of current or ex partner: Not on file     Emotionally abused: Not on file     Physically abused: Not on file     Forced sexual activity: Not on file   Other Topics Concern    Not on file   Social History Narrative    Not on file     No current facility-administered medications for this encounter. Current Outpatient Medications   Medication Sig Dispense Refill    albuterol sulfate HFA (VENTOLIN HFA) 108 (90 Base) MCG/ACT inhaler Inhale 2 puffs into the lungs every 2 hours as needed for Wheezing 1 Inhaler 0    acetylcysteine (MUCOMYST) 20 % nebulizer solution Inhale 3 mLs into the lungs 2 times daily 30 mL 0    tiZANidine (ZANAFLEX) 4 MG tablet Take 1 tablet by mouth every 8 hours as needed (back pain) 30 tablet 0    SYMBICORT 160-4.5 MCG/ACT AERO Inhale 2 puffs into the lungs 2 times daily 1 Inhaler 5    ipratropium-albuterol (DUONEB) 0.5-2.5 (3) MG/3ML SOLN nebulizer solution Inhale 3 mLs into the lungs 4 times daily 360 mL 5    OXYGEN Inhale 2 L into the lungs nightly      Multiple Vitamins-Minerals (MULTIVITAMIN ADULTS PO) Take by mouth daily        No Known Allergies      ROS:    Review of Systems   Constitutional: Positive for activity change and fatigue.    Respiratory: Positive for cough, shortness of Type of Exam: Initial Relevant Medical/Surgical History: COPD,smoker FINDINGS: The heart size is normal.  There is no pneumothorax, edema or focal consolidation. The bony thorax is grossly intact. No evidence of acute cardiopulmonary disease. Cta Pulmonary W Contrast    Result Date: 6/29/2019  EXAMINATION: CTA OF THE CHEST 6/29/2019 1:00 pm TECHNIQUE: CTA of the chest was performed after the administration of intravenous contrast.  Multiplanar reformatted images are provided for review. MIP images are provided for review. Dose modulation, iterative reconstruction, and/or weight based adjustment of the mA/kV was utilized to reduce the radiation dose to as low as reasonably achievable. COMPARISON: Screening CT done June 18, 2019 chest x-ray done June 28, 2019 HISTORY: ORDERING SYSTEM PROVIDED HISTORY: PE TECHNOLOGIST PROVIDED HISTORY: Ordering Physician Provided Reason for Exam: PE Acuity: Acute Type of Exam: Initial FINDINGS: Pulmonary Arteries: Pulmonary arteries are adequately opacified for evaluation. No evidence of intraluminal filling defect to suggest pulmonary embolism. Main pulmonary artery is normal in caliber. Mediastinum: No evidence of mediastinal lymphadenopathy. The heart and pericardium demonstrate no acute abnormality. There is no acute abnormality of the thoracic aorta. Lungs/pleura: There several small stable subcentimeter pulmonary nodules scattered in both the left and right lungs unchanged from the patient's prior screening CT done June 18, 2019. There is some atelectatic changes seen anteriorly within the right lower lobe. The lower lung parenchyma is otherwise normal. Upper Abdomen: Limited images of the upper abdomen are unremarkable. Soft Tissues/Bones: No acute bone or soft tissue abnormality. No evidence of pulmonary embolism or acute pulmonary abnormality. Stable small 5 mm nodule scattered within both lungs unchanged from the June 18, 2019 CT screening study.      Ct unfortunately he will require repeat admission for pulmonary toilet    The total Critical Care time is 40 minutes which excludes separately billable procedures. Clinical Impression:  1. COPD exacerbation ([address]      Disposition referral (if applicable):  No follow-up provider specified. Disposition medications (if applicable):  New Prescriptions    No medications on file           Deshawn De Jesus DO, FACEP      Comment: Please note this report has been produced using speech recognition software and maycontain errors related to that system including errors in grammar, punctuation, and spelling, as well as words and phrases that may be inappropriate. If there are any questions or concerns please feel free to contact thedictating provider for clarification.         Thomas Casper DO  07/09/19 [id number]

## 2019-07-09 LAB
ADENOVIRUS DETECTION BY PCR: NOT DETECTED
ANION GAP SERPL CALCULATED.3IONS-SCNC: 14 MMOL/L (ref 4–16)
BORDETELLA PERTUSSIS PCR: NOT DETECTED
BUN BLDV-MCNC: 14 MG/DL (ref 6–23)
CALCIUM SERPL-MCNC: 9.2 MG/DL (ref 8.3–10.6)
CHLAMYDOPHILA PNEUMONIA PCR: NOT DETECTED
CHLORIDE BLD-SCNC: 99 MMOL/L (ref 99–110)
CO2: 25 MMOL/L (ref 21–32)
CORONAVIRUS 229E PCR: NOT DETECTED
CORONAVIRUS HKU1 PCR: NOT DETECTED
CORONAVIRUS NL63 PCR: NOT DETECTED
CORONAVIRUS OC43 PCR: NOT DETECTED
CREAT SERPL-MCNC: 0.6 MG/DL (ref 0.9–1.3)
GFR AFRICAN AMERICAN: >60 ML/MIN/1.73M2
GFR NON-AFRICAN AMERICAN: >60 ML/MIN/1.73M2
GLUCOSE BLD-MCNC: 169 MG/DL (ref 70–99)
HCT VFR BLD CALC: 48.3 % (ref 42–52)
HEMOGLOBIN: 15.2 GM/DL (ref 13.5–18)
HUMAN METAPNEUMOVIRUS PCR: NOT DETECTED
INFLUENZA A BY PCR: NOT DETECTED
INFLUENZA A H1 (2009) PCR: NOT DETECTED
INFLUENZA A H1 PANDEMIC PCR: NOT DETECTED
INFLUENZA A H3 PCR: NOT DETECTED
INFLUENZA B BY PCR: NOT DETECTED
MCH RBC QN AUTO: 31.7 PG (ref 27–31)
MCHC RBC AUTO-ENTMCNC: 31.5 % (ref 32–36)
MCV RBC AUTO: 100.6 FL (ref 78–100)
MYCOPLASMA PNEUMONIAE PCR: NOT DETECTED
PARAINFLUENZA 1 PCR: NOT DETECTED
PARAINFLUENZA 2 PCR: NOT DETECTED
PARAINFLUENZA 3 PCR: NOT DETECTED
PARAINFLUENZA 4 PCR: NOT DETECTED
PDW BLD-RTO: 14.5 % (ref 11.7–14.9)
PLATELET # BLD: 254 K/CU MM (ref 140–440)
PMV BLD AUTO: 10.3 FL (ref 7.5–11.1)
POTASSIUM SERPL-SCNC: 4.8 MMOL/L (ref 3.5–5.1)
PROCALCITONIN: 0.08
RBC # BLD: 4.8 M/CU MM (ref 4.6–6.2)
RHINOVIRUS ENTEROVIRUS PCR: NOT DETECTED
RSV PCR: NOT DETECTED
SODIUM BLD-SCNC: 138 MMOL/L (ref 135–145)
WBC # BLD: 9.5 K/CU MM (ref 4–10.5)

## 2019-07-09 PROCEDURE — 84145 PROCALCITONIN (PCT): CPT

## 2019-07-09 PROCEDURE — 2580000003 HC RX 258: Performed by: NURSE PRACTITIONER

## 2019-07-09 PROCEDURE — 1200000000 HC SEMI PRIVATE

## 2019-07-09 PROCEDURE — 6370000000 HC RX 637 (ALT 250 FOR IP): Performed by: NURSE PRACTITIONER

## 2019-07-09 PROCEDURE — 85027 COMPLETE CBC AUTOMATED: CPT

## 2019-07-09 PROCEDURE — 6360000002 HC RX W HCPCS: Performed by: HOSPITALIST

## 2019-07-09 PROCEDURE — 6360000002 HC RX W HCPCS: Performed by: FAMILY MEDICINE

## 2019-07-09 PROCEDURE — 6370000000 HC RX 637 (ALT 250 FOR IP): Performed by: HOSPITALIST

## 2019-07-09 PROCEDURE — 2700000000 HC OXYGEN THERAPY PER DAY

## 2019-07-09 PROCEDURE — 94640 AIRWAY INHALATION TREATMENT: CPT

## 2019-07-09 PROCEDURE — 6370000000 HC RX 637 (ALT 250 FOR IP): Performed by: FAMILY MEDICINE

## 2019-07-09 PROCEDURE — 36415 COLL VENOUS BLD VENIPUNCTURE: CPT

## 2019-07-09 PROCEDURE — 80048 BASIC METABOLIC PNL TOTAL CA: CPT

## 2019-07-09 RX ORDER — METHYLPREDNISOLONE SODIUM SUCCINATE 125 MG/2ML
125 INJECTION, POWDER, LYOPHILIZED, FOR SOLUTION INTRAMUSCULAR; INTRAVENOUS DAILY
Status: DISCONTINUED | OUTPATIENT
Start: 2019-07-09 | End: 2019-07-12 | Stop reason: HOSPADM

## 2019-07-09 RX ORDER — AZITHROMYCIN 250 MG/1
500 TABLET, FILM COATED ORAL DAILY
Status: DISCONTINUED | OUTPATIENT
Start: 2019-07-09 | End: 2019-07-12 | Stop reason: HOSPADM

## 2019-07-09 RX ADMIN — SODIUM CHLORIDE, PRESERVATIVE FREE 10 ML: 5 INJECTION INTRAVENOUS at 21:07

## 2019-07-09 RX ADMIN — MULTIPLE VITAMINS W/ MINERALS TAB 1 TABLET: TAB at 09:41

## 2019-07-09 RX ADMIN — IPRATROPIUM BROMIDE AND ALBUTEROL SULFATE 1 AMPULE: .5; 3 SOLUTION RESPIRATORY (INHALATION) at 05:52

## 2019-07-09 RX ADMIN — ONDANSETRON 4 MG: 2 INJECTION INTRAMUSCULAR; INTRAVENOUS at 13:12

## 2019-07-09 RX ADMIN — IPRATROPIUM BROMIDE AND ALBUTEROL SULFATE 1 AMPULE: .5; 3 SOLUTION RESPIRATORY (INHALATION) at 07:42

## 2019-07-09 RX ADMIN — GUAIFENESIN AND DEXTROMETHORPHAN 5 ML: 100; 10 SYRUP ORAL at 07:56

## 2019-07-09 RX ADMIN — SODIUM CHLORIDE, PRESERVATIVE FREE 10 ML: 5 INJECTION INTRAVENOUS at 09:44

## 2019-07-09 RX ADMIN — METHYLPREDNISOLONE SODIUM SUCCINATE 40 MG: 40 INJECTION, POWDER, FOR SOLUTION INTRAMUSCULAR; INTRAVENOUS at 03:18

## 2019-07-09 RX ADMIN — MOMETASONE FUROATE AND FORMOTEROL FUMARATE DIHYDRATE 2 PUFF: 200; 5 AEROSOL RESPIRATORY (INHALATION) at 09:41

## 2019-07-09 RX ADMIN — GUAIFENESIN AND DEXTROMETHORPHAN 5 ML: 100; 10 SYRUP ORAL at 17:32

## 2019-07-09 RX ADMIN — IPRATROPIUM BROMIDE AND ALBUTEROL SULFATE 1 AMPULE: .5; 3 SOLUTION RESPIRATORY (INHALATION) at 11:05

## 2019-07-09 RX ADMIN — GUAIFENESIN AND DEXTROMETHORPHAN 5 ML: 100; 10 SYRUP ORAL at 22:12

## 2019-07-09 RX ADMIN — MOMETASONE FUROATE AND FORMOTEROL FUMARATE DIHYDRATE 2 PUFF: 200; 5 AEROSOL RESPIRATORY (INHALATION) at 21:06

## 2019-07-09 RX ADMIN — METHYLPREDNISOLONE SODIUM SUCCINATE 125 MG: 125 INJECTION, POWDER, FOR SOLUTION INTRAMUSCULAR; INTRAVENOUS at 09:42

## 2019-07-09 RX ADMIN — AZITHROMYCIN 500 MG: 250 TABLET, FILM COATED ORAL at 09:41

## 2019-07-09 RX ADMIN — IPRATROPIUM BROMIDE AND ALBUTEROL SULFATE 1 AMPULE: .5; 3 SOLUTION RESPIRATORY (INHALATION) at 19:54

## 2019-07-09 RX ADMIN — IPRATROPIUM BROMIDE AND ALBUTEROL SULFATE 1 AMPULE: .5; 3 SOLUTION RESPIRATORY (INHALATION) at 14:38

## 2019-07-09 RX ADMIN — ENOXAPARIN SODIUM 40 MG: 40 INJECTION SUBCUTANEOUS at 21:06

## 2019-07-09 RX ADMIN — GUAIFENESIN AND DEXTROMETHORPHAN 5 ML: 100; 10 SYRUP ORAL at 13:12

## 2019-07-09 ASSESSMENT — PAIN DESCRIPTION - PAIN TYPE: TYPE: ACUTE PAIN

## 2019-07-09 ASSESSMENT — PAIN SCALES - GENERAL
PAINLEVEL_OUTOF10: 0
PAINLEVEL_OUTOF10: 1
PAINLEVEL_OUTOF10: 0
PAINLEVEL_OUTOF10: 0

## 2019-07-09 ASSESSMENT — PAIN DESCRIPTION - LOCATION: LOCATION: CHEST

## 2019-07-09 ASSESSMENT — PAIN DESCRIPTION - FREQUENCY: FREQUENCY: CONTINUOUS

## 2019-07-09 ASSESSMENT — PAIN DESCRIPTION - DESCRIPTORS: DESCRIPTORS: PRESSURE

## 2019-07-09 NOTE — CARE COORDINATION
CM met with the patient for discharge planning. Patient lives in a 1 story home (3 steps to enter home) with his girlfriend, has insurance with Rx coverage & PCP, independent with ADL's, is not employed (receives disability), and drives. The patient states he uses a cane but only as needed. Patient states he has a nebulizer at home and wears oxygen at bedtime (2 l/nc). Patient is still currently smoking, states he has been smoking for 50 years and has tried to quit in the past and was unsuccessful. CM provided patient with information regarding the [address] [place] [address] smoking cessation program, patient voiced understanding. Patient is unable to identify any needs at this time, CM available if needs arise.

## 2019-07-10 LAB
CULTURE: NORMAL
CULTURE: NORMAL
GRAM SMEAR: NORMAL
Lab: NORMAL
SPECIMEN: NORMAL

## 2019-07-10 PROCEDURE — 6370000000 HC RX 637 (ALT 250 FOR IP): Performed by: FAMILY MEDICINE

## 2019-07-10 PROCEDURE — 6370000000 HC RX 637 (ALT 250 FOR IP): Performed by: HOSPITALIST

## 2019-07-10 PROCEDURE — 94669 MECHANICAL CHEST WALL OSCILL: CPT

## 2019-07-10 PROCEDURE — 1200000000 HC SEMI PRIVATE

## 2019-07-10 PROCEDURE — 2580000003 HC RX 258: Performed by: NURSE PRACTITIONER

## 2019-07-10 PROCEDURE — 94640 AIRWAY INHALATION TREATMENT: CPT

## 2019-07-10 PROCEDURE — 6360000002 HC RX W HCPCS: Performed by: HOSPITALIST

## 2019-07-10 PROCEDURE — 6370000000 HC RX 637 (ALT 250 FOR IP): Performed by: NURSE PRACTITIONER

## 2019-07-10 PROCEDURE — 2700000000 HC OXYGEN THERAPY PER DAY

## 2019-07-10 PROCEDURE — 6360000002 HC RX W HCPCS: Performed by: FAMILY MEDICINE

## 2019-07-10 RX ADMIN — AZITHROMYCIN 500 MG: 250 TABLET, FILM COATED ORAL at 08:49

## 2019-07-10 RX ADMIN — MOMETASONE FUROATE AND FORMOTEROL FUMARATE DIHYDRATE 2 PUFF: 200; 5 AEROSOL RESPIRATORY (INHALATION) at 20:51

## 2019-07-10 RX ADMIN — IPRATROPIUM BROMIDE AND ALBUTEROL SULFATE 1 AMPULE: .5; 3 SOLUTION RESPIRATORY (INHALATION) at 15:00

## 2019-07-10 RX ADMIN — IPRATROPIUM BROMIDE AND ALBUTEROL SULFATE 1 AMPULE: .5; 3 SOLUTION RESPIRATORY (INHALATION) at 11:05

## 2019-07-10 RX ADMIN — IPRATROPIUM BROMIDE AND ALBUTEROL SULFATE 1 AMPULE: .5; 3 SOLUTION RESPIRATORY (INHALATION) at 19:05

## 2019-07-10 RX ADMIN — SODIUM CHLORIDE, PRESERVATIVE FREE 10 ML: 5 INJECTION INTRAVENOUS at 08:49

## 2019-07-10 RX ADMIN — SODIUM CHLORIDE, PRESERVATIVE FREE 10 ML: 5 INJECTION INTRAVENOUS at 20:58

## 2019-07-10 RX ADMIN — IPRATROPIUM BROMIDE AND ALBUTEROL SULFATE 1 AMPULE: .5; 3 SOLUTION RESPIRATORY (INHALATION) at 07:10

## 2019-07-10 RX ADMIN — MULTIPLE VITAMINS W/ MINERALS TAB 1 TABLET: TAB at 08:49

## 2019-07-10 RX ADMIN — MOMETASONE FUROATE AND FORMOTEROL FUMARATE DIHYDRATE 2 PUFF: 200; 5 AEROSOL RESPIRATORY (INHALATION) at 08:49

## 2019-07-10 RX ADMIN — TIZANIDINE 4 MG: 4 TABLET ORAL at 20:57

## 2019-07-10 RX ADMIN — GUAIFENESIN AND DEXTROMETHORPHAN 5 ML: 100; 10 SYRUP ORAL at 06:56

## 2019-07-10 RX ADMIN — GUAIFENESIN AND DEXTROMETHORPHAN 5 ML: 100; 10 SYRUP ORAL at 15:18

## 2019-07-10 RX ADMIN — IPRATROPIUM BROMIDE AND ALBUTEROL SULFATE 1 AMPULE: .5; 3 SOLUTION RESPIRATORY (INHALATION) at 22:06

## 2019-07-10 RX ADMIN — IPRATROPIUM BROMIDE AND ALBUTEROL SULFATE 1 AMPULE: .5; 3 SOLUTION RESPIRATORY (INHALATION) at 02:17

## 2019-07-10 RX ADMIN — ENOXAPARIN SODIUM 40 MG: 40 INJECTION SUBCUTANEOUS at 20:57

## 2019-07-10 RX ADMIN — GUAIFENESIN AND DEXTROMETHORPHAN 5 ML: 100; 10 SYRUP ORAL at 20:51

## 2019-07-10 RX ADMIN — METHYLPREDNISOLONE SODIUM SUCCINATE 125 MG: 125 INJECTION, POWDER, FOR SOLUTION INTRAMUSCULAR; INTRAVENOUS at 08:49

## 2019-07-10 RX ADMIN — GUAIFENESIN AND DEXTROMETHORPHAN 5 ML: 100; 10 SYRUP ORAL at 02:28

## 2019-07-10 RX ADMIN — GUAIFENESIN AND DEXTROMETHORPHAN 5 ML: 100; 10 SYRUP ORAL at 11:01

## 2019-07-10 ASSESSMENT — PAIN SCALES - GENERAL
PAINLEVEL_OUTOF10: 0

## 2019-07-10 NOTE — PROGRESS NOTES
OBJECTIVE:    General appearance: Conscious, coherent, cooperative in mild distress from tachypnea  Head: atraumatic & normocephalic  Oral cavity: moist mucous membranes with normal dentition  Neck: supple with no lymphadenopathy, JVD down, trachea central  CVS: normal S1S2 with no additional heart sounds  Respi: Moderate air entry in both lung fields with wheezes heard extensively bilaterally  Abdo: soft & non tender, with +ve bowel sounds, no guarding ,rigidity or rebound tenderness  CNS: no focal weakness or sensory deficits peripherally, DTR's equal bilaterally, CN2-12 normal  Skin: no rash, purpurea or eruptions  MS: no muscle tenderness, normal ROM in all major Joints      Data    Recent Labs     07/08/19  1610 07/09/19  0450   WBC 13.7* 9.5   HGB 15.3 15.2   HCT 46.1 48.3    254      Recent Labs     07/08/19  1610 07/09/19  0450    138   K 4.3 4.8    99   CO2 25 25   BUN 8 14   CREATININE 0.8* 0.6*     No results for input(s): AST, ALT, ALB, BILIDIR, BILITOT, ALKPHOS in the last 72 hours. Electronically signed by Grace Pat MD on 7/10/2019 at 8:26 AM      Comment: Please note this report has been produced using speech recognition software and may contain errors related to that system including errors in grammar, punctuation, and spelling, as well as words and phrases that may be inappropriate.  If there are any questions or concerns please feel free to contact the dictating provider for clarification

## 2019-07-11 PROCEDURE — 2580000003 HC RX 258: Performed by: NURSE PRACTITIONER

## 2019-07-11 PROCEDURE — 1200000000 HC SEMI PRIVATE

## 2019-07-11 PROCEDURE — 6370000000 HC RX 637 (ALT 250 FOR IP): Performed by: FAMILY MEDICINE

## 2019-07-11 PROCEDURE — 94640 AIRWAY INHALATION TREATMENT: CPT

## 2019-07-11 PROCEDURE — 6360000002 HC RX W HCPCS: Performed by: FAMILY MEDICINE

## 2019-07-11 PROCEDURE — 6360000002 HC RX W HCPCS: Performed by: HOSPITALIST

## 2019-07-11 PROCEDURE — 2700000000 HC OXYGEN THERAPY PER DAY

## 2019-07-11 PROCEDURE — 6370000000 HC RX 637 (ALT 250 FOR IP): Performed by: NURSE PRACTITIONER

## 2019-07-11 PROCEDURE — 6370000000 HC RX 637 (ALT 250 FOR IP): Performed by: HOSPITALIST

## 2019-07-11 RX ADMIN — GUAIFENESIN AND DEXTROMETHORPHAN 5 ML: 100; 10 SYRUP ORAL at 02:15

## 2019-07-11 RX ADMIN — METHYLPREDNISOLONE SODIUM SUCCINATE 125 MG: 125 INJECTION, POWDER, FOR SOLUTION INTRAMUSCULAR; INTRAVENOUS at 08:20

## 2019-07-11 RX ADMIN — SODIUM CHLORIDE, PRESERVATIVE FREE 10 ML: 5 INJECTION INTRAVENOUS at 08:26

## 2019-07-11 RX ADMIN — AZITHROMYCIN 500 MG: 250 TABLET, FILM COATED ORAL at 08:21

## 2019-07-11 RX ADMIN — GUAIFENESIN AND DEXTROMETHORPHAN 5 ML: 100; 10 SYRUP ORAL at 16:19

## 2019-07-11 RX ADMIN — SODIUM CHLORIDE, PRESERVATIVE FREE 10 ML: 5 INJECTION INTRAVENOUS at 21:51

## 2019-07-11 RX ADMIN — IPRATROPIUM BROMIDE AND ALBUTEROL SULFATE 1 AMPULE: .5; 3 SOLUTION RESPIRATORY (INHALATION) at 09:55

## 2019-07-11 RX ADMIN — ENOXAPARIN SODIUM 40 MG: 40 INJECTION SUBCUTANEOUS at 20:25

## 2019-07-11 RX ADMIN — IPRATROPIUM BROMIDE AND ALBUTEROL SULFATE 1 AMPULE: .5; 3 SOLUTION RESPIRATORY (INHALATION) at 06:39

## 2019-07-11 RX ADMIN — IPRATROPIUM BROMIDE AND ALBUTEROL SULFATE 1 AMPULE: .5; 3 SOLUTION RESPIRATORY (INHALATION) at 02:19

## 2019-07-11 RX ADMIN — MOMETASONE FUROATE AND FORMOTEROL FUMARATE DIHYDRATE 2 PUFF: 200; 5 AEROSOL RESPIRATORY (INHALATION) at 20:25

## 2019-07-11 RX ADMIN — GUAIFENESIN AND DEXTROMETHORPHAN 5 ML: 100; 10 SYRUP ORAL at 20:24

## 2019-07-11 RX ADMIN — MULTIPLE VITAMINS W/ MINERALS TAB 1 TABLET: TAB at 08:21

## 2019-07-11 RX ADMIN — IPRATROPIUM BROMIDE AND ALBUTEROL SULFATE 1 AMPULE: .5; 3 SOLUTION RESPIRATORY (INHALATION) at 14:00

## 2019-07-11 RX ADMIN — IPRATROPIUM BROMIDE AND ALBUTEROL SULFATE 1 AMPULE: .5; 3 SOLUTION RESPIRATORY (INHALATION) at 18:10

## 2019-07-11 RX ADMIN — GUAIFENESIN AND DEXTROMETHORPHAN 5 ML: 100; 10 SYRUP ORAL at 11:59

## 2019-07-11 RX ADMIN — GUAIFENESIN AND DEXTROMETHORPHAN 5 ML: 100; 10 SYRUP ORAL at 06:45

## 2019-07-11 RX ADMIN — MOMETASONE FUROATE AND FORMOTEROL FUMARATE DIHYDRATE 2 PUFF: 200; 5 AEROSOL RESPIRATORY (INHALATION) at 08:20

## 2019-07-11 RX ADMIN — IPRATROPIUM BROMIDE AND ALBUTEROL SULFATE 1 AMPULE: .5; 3 SOLUTION RESPIRATORY (INHALATION) at 22:18

## 2019-07-11 ASSESSMENT — PAIN SCALES - GENERAL
PAINLEVEL_OUTOF10: 0

## 2019-07-11 NOTE — PLAN OF CARE
Problem: Pain:  Goal: Pain level will decrease  Description  Pain level will decrease  Outcome: Ongoing  Goal: Control of acute pain  Description  Control of acute pain  Outcome: Ongoing  Goal: Control of chronic pain  Description  Control of chronic pain  Outcome: Ongoing     Problem: Falls - Risk of:  Goal: Will remain free from falls  Description  Will remain free from falls  Outcome: Ongoing  Goal: Absence of physical injury  Description  Absence of physical injury  Outcome: Ongoing     Problem: Discharge Planning:  Goal: Discharged to appropriate level of care  Description  Discharged to appropriate level of care  Outcome: Ongoing     Problem:  Activity Intolerance:  Goal: Ability to tolerate increased activity will improve  Description  Ability to tolerate increased activity will improve  Outcome: Ongoing     Problem: Airway Clearance - Ineffective:  Goal: Ability to maintain a clear airway will improve  Description  Ability to maintain a clear airway will improve  Outcome: Ongoing     Problem: Breathing Pattern - Ineffective:  Goal: Ability to achieve and maintain a regular respiratory rate will improve  Description  Ability to achieve and maintain a regular respiratory rate will improve  Outcome: Ongoing     Problem: Gas Exchange - Impaired:  Goal: Levels of oxygenation will improve  Description  Levels of oxygenation will improve  Outcome: Ongoing

## 2019-07-12 VITALS
TEMPERATURE: 97.2 F | DIASTOLIC BLOOD PRESSURE: 67 MMHG | WEIGHT: 119 LBS | BODY MASS INDEX: 19.13 KG/M2 | HEART RATE: 82 BPM | RESPIRATION RATE: 16 BRPM | OXYGEN SATURATION: 94 % | SYSTOLIC BLOOD PRESSURE: 121 MMHG | HEIGHT: 66 IN

## 2019-07-12 PROBLEM — J44.1 COPD EXACERBATION (HCC): Status: RESOLVED | Noted: 2019-07-08 | Resolved: 2019-07-12

## 2019-07-12 PROCEDURE — 94761 N-INVAS EAR/PLS OXIMETRY MLT: CPT

## 2019-07-12 PROCEDURE — 2580000003 HC RX 258: Performed by: NURSE PRACTITIONER

## 2019-07-12 PROCEDURE — 6370000000 HC RX 637 (ALT 250 FOR IP): Performed by: NURSE PRACTITIONER

## 2019-07-12 PROCEDURE — 6360000002 HC RX W HCPCS: Performed by: FAMILY MEDICINE

## 2019-07-12 PROCEDURE — 6370000000 HC RX 637 (ALT 250 FOR IP): Performed by: HOSPITALIST

## 2019-07-12 PROCEDURE — 6370000000 HC RX 637 (ALT 250 FOR IP): Performed by: FAMILY MEDICINE

## 2019-07-12 PROCEDURE — 2700000000 HC OXYGEN THERAPY PER DAY

## 2019-07-12 PROCEDURE — 94640 AIRWAY INHALATION TREATMENT: CPT

## 2019-07-12 RX ORDER — BUDESONIDE 0.5 MG/2ML
500 INHALANT ORAL 2 TIMES DAILY
Qty: 60 AMPULE | Refills: 0
Start: 2019-07-12 | End: 2019-08-14 | Stop reason: ALTCHOICE

## 2019-07-12 RX ORDER — GUAIFENESIN/DEXTROMETHORPHAN 100-10MG/5
5 SYRUP ORAL EVERY 4 HOURS PRN
Qty: 120 ML | Refills: 0 | Status: SHIPPED | OUTPATIENT
Start: 2019-07-12 | End: 2019-07-22

## 2019-07-12 RX ORDER — AZITHROMYCIN 500 MG/1
500 TABLET, FILM COATED ORAL DAILY
Qty: 3 TABLET | Refills: 0 | Status: SHIPPED | OUTPATIENT
Start: 2019-07-13 | End: 2019-07-16

## 2019-07-12 RX ADMIN — IPRATROPIUM BROMIDE AND ALBUTEROL SULFATE 1 AMPULE: .5; 3 SOLUTION RESPIRATORY (INHALATION) at 07:10

## 2019-07-12 RX ADMIN — GUAIFENESIN AND DEXTROMETHORPHAN 5 ML: 100; 10 SYRUP ORAL at 03:16

## 2019-07-12 RX ADMIN — MOMETASONE FUROATE AND FORMOTEROL FUMARATE DIHYDRATE 2 PUFF: 200; 5 AEROSOL RESPIRATORY (INHALATION) at 08:09

## 2019-07-12 RX ADMIN — IPRATROPIUM BROMIDE AND ALBUTEROL SULFATE 1 AMPULE: .5; 3 SOLUTION RESPIRATORY (INHALATION) at 03:18

## 2019-07-12 RX ADMIN — AZITHROMYCIN 500 MG: 250 TABLET, FILM COATED ORAL at 08:00

## 2019-07-12 RX ADMIN — METHYLPREDNISOLONE SODIUM SUCCINATE 125 MG: 125 INJECTION, POWDER, FOR SOLUTION INTRAMUSCULAR; INTRAVENOUS at 07:59

## 2019-07-12 RX ADMIN — GUAIFENESIN AND DEXTROMETHORPHAN 5 ML: 100; 10 SYRUP ORAL at 08:00

## 2019-07-12 RX ADMIN — SODIUM CHLORIDE, PRESERVATIVE FREE 10 ML: 5 INJECTION INTRAVENOUS at 08:00

## 2019-07-12 RX ADMIN — MULTIPLE VITAMINS W/ MINERALS TAB 1 TABLET: TAB at 08:01

## 2019-07-12 ASSESSMENT — PAIN SCALES - GENERAL
PAINLEVEL_OUTOF10: 0

## 2019-07-18 ENCOUNTER — OFFICE VISIT (OUTPATIENT)
Dept: INTERNAL MEDICINE CLINIC | Age: 64
End: 2019-07-18
Payer: MEDICARE

## 2019-07-18 VITALS
RESPIRATION RATE: 16 BRPM | HEART RATE: 91 BPM | WEIGHT: 116 LBS | SYSTOLIC BLOOD PRESSURE: 90 MMHG | BODY MASS INDEX: 18.72 KG/M2 | OXYGEN SATURATION: 98 % | DIASTOLIC BLOOD PRESSURE: 64 MMHG

## 2019-07-18 DIAGNOSIS — K52.832 LYMPHOCYTIC COLITIS: ICD-10-CM

## 2019-07-18 DIAGNOSIS — R97.20 ELEVATED PSA: ICD-10-CM

## 2019-07-18 DIAGNOSIS — M54.50 ACUTE MIDLINE LOW BACK PAIN WITHOUT SCIATICA: ICD-10-CM

## 2019-07-18 DIAGNOSIS — R63.4 WEIGHT LOSS: ICD-10-CM

## 2019-07-18 DIAGNOSIS — J44.9 COPD, SEVERE (HCC): ICD-10-CM

## 2019-07-18 DIAGNOSIS — F10.231 ALCOHOL DEPENDENCE WITH WITHDRAWAL DELIRIUM (HCC): ICD-10-CM

## 2019-07-18 DIAGNOSIS — Z72.0 TOBACCO ABUSE DISORDER: ICD-10-CM

## 2019-07-18 PROCEDURE — G8420 CALC BMI NORM PARAMETERS: HCPCS | Performed by: INTERNAL MEDICINE

## 2019-07-18 PROCEDURE — 3023F SPIROM DOC REV: CPT | Performed by: INTERNAL MEDICINE

## 2019-07-18 PROCEDURE — G8926 SPIRO NO PERF OR DOC: HCPCS | Performed by: INTERNAL MEDICINE

## 2019-07-18 PROCEDURE — 99214 OFFICE O/P EST MOD 30 MIN: CPT | Performed by: INTERNAL MEDICINE

## 2019-07-18 PROCEDURE — 4004F PT TOBACCO SCREEN RCVD TLK: CPT | Performed by: INTERNAL MEDICINE

## 2019-07-18 PROCEDURE — 1111F DSCHRG MED/CURRENT MED MERGE: CPT | Performed by: INTERNAL MEDICINE

## 2019-07-18 PROCEDURE — 3017F COLORECTAL CA SCREEN DOC REV: CPT | Performed by: INTERNAL MEDICINE

## 2019-07-18 PROCEDURE — G8427 DOCREV CUR MEDS BY ELIG CLIN: HCPCS | Performed by: INTERNAL MEDICINE

## 2019-07-18 NOTE — ASSESSMENT & PLAN NOTE
PFTs showed severe COPD in 5/14  Pt uses Duoneb neb treatment and prn ventolin and symbicort   Uses 2L/ O2 at night angela 10/17  Still smokes.    7/2019 : stopped smoking for one week  See pulmonologist Dr Irvin Guevara

## 2019-07-18 NOTE — ASSESSMENT & PLAN NOTE
Pt has lower back pain for 2 weeks  x ray of lumbar spine showed degenerative arthritis  Treated with zanaflex and medrol dose lorne  Doing better.  Takes zanaflex prn

## 2019-07-18 NOTE — PROGRESS NOTES
Post-Discharge Transitional Care Management Services or Hospital Follow Up      Denae Jones   YOB: 1955    Date of Office Visit:  7/18/2019  Date of Hospital Admission: 7/8/19  Date of Hospital Discharge: 7/12/19  Risk of hospital readmission (high >=14%.  Medium >=10%) :Readmission Risk Score: 14      Care management risk score Rising risk (score 2-5) and Complex Care (Scores >=6): 2     Non face to face  following discharge, date last encounter closed (first attempt may have been earlier): *No documented post hospital discharge outreach found in the last 14 days    Call initiated 2 business days of discharge: *No response recorded in the last 14 days    Patient Active Problem List   Diagnosis    Tobacco abuse disorder    COPD, severe ([address]    Alcohol dependence ([facility] [address]    Dupuytren's contracture    Weight loss    Lymphocytic colitis    Elevated PSA    Acute midline low back pain without sciatica       No Known Allergies    Medications listed as ordered at the time of discharge from [address] Medication Instructions AILYN:    Printed on:07/18/19 4090   Medication Information                      acetylcysteine (MUCOMYST) 20 % nebulizer solution  Inhale 3 mLs into the lungs 2 times daily             albuterol sulfate HFA (VENTOLIN HFA) 108 (90 Base) MCG/ACT inhaler  Inhale 2 puffs into the lungs every 2 hours as needed for Wheezing             budesonide (PULMICORT) 0.5 MG/2ML nebulizer suspension  Take 2 mLs by nebulization 2 times daily             guaiFENesin-dextromethorphan (ROBITUSSIN DM) 100-10 MG/5ML syrup  Take 5 mLs by mouth every 4 hours as needed for Cough             ipratropium-albuterol (DUONEB) 0.5-2.5 (3) MG/3ML SOLN nebulizer solution  Inhale 3 mLs into the lungs 4 times daily             Multiple Vitamins-Minerals (MULTIVITAMIN ADULTS PO)  Take by mouth daily              OXYGEN  Inhale 2 L into the lungs nightly             SYMBICORT 160-4.5 MCG/ACT

## 2019-08-12 ENCOUNTER — CARE COORDINATION (OUTPATIENT)
Dept: CARE COORDINATION | Age: 64
End: 2019-08-12

## 2019-08-20 DIAGNOSIS — J44.9 COPD, SEVERE (HCC): ICD-10-CM

## 2019-08-20 RX ORDER — IPRATROPIUM BROMIDE AND ALBUTEROL SULFATE 2.5; .5 MG/3ML; MG/3ML
1 SOLUTION RESPIRATORY (INHALATION) 4 TIMES DAILY
Qty: 1080 ML | Refills: 1 | Status: SHIPPED | OUTPATIENT
Start: 2019-08-20 | End: 2020-01-09

## 2019-08-20 RX ORDER — ALBUTEROL SULFATE 90 UG/1
2 AEROSOL, METERED RESPIRATORY (INHALATION)
Qty: 3 INHALER | Refills: 1 | Status: SHIPPED | OUTPATIENT
Start: 2019-08-20 | End: 2019-11-08 | Stop reason: SDUPTHER

## 2019-08-20 RX ORDER — BUDESONIDE AND FORMOTEROL FUMARATE DIHYDRATE 160; 4.5 UG/1; UG/1
2 AEROSOL RESPIRATORY (INHALATION) 2 TIMES DAILY
Qty: 3 INHALER | Refills: 1 | Status: SHIPPED | OUTPATIENT
Start: 2019-08-20 | End: 2019-11-08 | Stop reason: SDUPTHER

## 2019-08-27 ENCOUNTER — TELEPHONE (OUTPATIENT)
Dept: INTERNAL MEDICINE CLINIC | Age: 64
End: 2019-08-27

## 2019-09-12 ENCOUNTER — CARE COORDINATION (OUTPATIENT)
Dept: CARE COORDINATION | Age: 64
End: 2019-09-12

## 2019-09-12 ENCOUNTER — OFFICE VISIT (OUTPATIENT)
Dept: INTERNAL MEDICINE CLINIC | Age: 64
End: 2019-09-12
Payer: MEDICARE

## 2019-09-12 VITALS
OXYGEN SATURATION: 93 % | BODY MASS INDEX: 18.88 KG/M2 | SYSTOLIC BLOOD PRESSURE: 122 MMHG | DIASTOLIC BLOOD PRESSURE: 80 MMHG | TEMPERATURE: 99 F | RESPIRATION RATE: 20 BRPM | WEIGHT: 117 LBS | HEART RATE: 88 BPM

## 2019-09-12 DIAGNOSIS — Z72.0 TOBACCO ABUSE DISORDER: ICD-10-CM

## 2019-09-12 DIAGNOSIS — G89.29 CHRONIC MIDLINE LOW BACK PAIN WITHOUT SCIATICA: ICD-10-CM

## 2019-09-12 DIAGNOSIS — F10.231 ALCOHOL DEPENDENCE WITH WITHDRAWAL DELIRIUM (HCC): ICD-10-CM

## 2019-09-12 DIAGNOSIS — M54.50 CHRONIC MIDLINE LOW BACK PAIN WITHOUT SCIATICA: ICD-10-CM

## 2019-09-12 DIAGNOSIS — J44.9 COPD, SEVERE (HCC): Primary | ICD-10-CM

## 2019-09-12 PROCEDURE — G8427 DOCREV CUR MEDS BY ELIG CLIN: HCPCS | Performed by: INTERNAL MEDICINE

## 2019-09-12 PROCEDURE — 99213 OFFICE O/P EST LOW 20 MIN: CPT | Performed by: INTERNAL MEDICINE

## 2019-09-12 PROCEDURE — 3017F COLORECTAL CA SCREEN DOC REV: CPT | Performed by: INTERNAL MEDICINE

## 2019-09-12 PROCEDURE — G8420 CALC BMI NORM PARAMETERS: HCPCS | Performed by: INTERNAL MEDICINE

## 2019-09-12 PROCEDURE — 3023F SPIROM DOC REV: CPT | Performed by: INTERNAL MEDICINE

## 2019-09-12 PROCEDURE — G8926 SPIRO NO PERF OR DOC: HCPCS | Performed by: INTERNAL MEDICINE

## 2019-09-12 PROCEDURE — 4004F PT TOBACCO SCREEN RCVD TLK: CPT | Performed by: INTERNAL MEDICINE

## 2019-09-12 RX ORDER — TIZANIDINE 4 MG/1
4 TABLET ORAL EVERY 8 HOURS PRN
Qty: 30 TABLET | Refills: 0 | Status: SHIPPED | OUTPATIENT
Start: 2019-09-12 | End: 2019-11-08 | Stop reason: SDUPTHER

## 2019-09-12 NOTE — PROGRESS NOTES
Cal Mcwilliams  Patient's  is 1955  Seen in office on 2019      SUBJECTIVE:  Regina crowder 60 y. o.year old male presents today   Chief Complaint   Patient presents with    3 Month Follow-Up     COPD, no endurance, difficulty bathing taking an hour due to SOB,wheezing    Medication Refill    Other     sees Dr. Junie Moody     Pt is here for f/u of COPD and lower back pain and neck pain  Pt is using zanaflex prn  No chest pain no SOB. No HA  No NVD  Taking med  Drinks alcohol every 3 days . Smokes 1ppd  Has cough. No sputum productions. Patient is still smoking. Has seen [facility] smoking cessation program in the past and was able to quit for short time but this started smoking again. Patient has COPD and has seen Dr. Skylar Brannon. He mentioned to him he may start him on prednisone in the future. Taking medications regularly. No side effects noted. Patient denies any chest pain. Has cough with little sputum production. He wheezes sometimes. No nausea, vomiting or diarrhea. No abdominal pain. Review of Systems  Review of system normal except as in HPI  OBJECTIVE: /80   Pulse 88   Temp 99 °F (37.2 °C)   Resp 20   Wt 117 lb (53.1 kg) Comment: with shoes  SpO2 93%   BMI 18.88 kg/m²     Wt Readings from Last 3 Encounters:   19 117 lb (53.1 kg)   19 115 lb (52.2 kg)   19 116 lb (52.6 kg)      GENERAL: - Alert, oriented, pleasant, in no apparent distress. HEENT: - Conjunctiva pink, no scleral icterus. ENT clear. NECK: -Supple. No jugular venous distention noted. No masses felt,  CARDIOVASCULAR: - Normal S1 and S2    PULMONARY: - No respiratory distress. No wheezes or rales. ABDOMEN: - Soft and non-tender,no masses  ororganomegaly. EXTREMITIES: - No cyanosis, clubbing, or significant edema. SKIN: Skin is warm and dry. NEUROLOGICAL: - Cranial nerves II through XII are grossly intact. IMPRESSION:    Encounter Diagnoses   Name Primary?     COPD, severe ([address] Yes  Tobacco abuse disorder     Alcohol dependence with withdrawal delirium (HCC)     Chronic midline low back pain without sciatica        ASSESSMENT/PLAN:    COPD, severe (HCC)  PFTs showed severe COPD in 5/14  Pt uses Duoneb neb treatment and prn ventolin and symbicort   Uses 2L/ O2 at night angela 10/17  Still smokes. Seen  pulmonologist Dr Carla Ponce        Elevated PSA  12/2018 : PSA is 6 : was referred to Dr. Jessie Aguirre  Repeat PSA on 4/12/19 : was 1.9. Pt did not keep f/u appointment     Tobacco abuse disorder  Patient is a smoker. Counseled to quit smoking. Various options given. Help number 1-800 QUIT NOW  given to patient. Stopped few days ago in 12/2018. Went to [facility] in the past , will refer again. Acute midline low back pain without sciatica  Pt has lower back pain for 2 weeks  x ray of lumbar spine showed degenerative arthritis  Treated with zanaflex and medrol dose lorne  Doing better. Takes zanaflex prn      Alcohol dependence ([address]  Still drinking alcohol  2  beers every 3 days  Unable to quit them  Pt states he has cut down and drinks     Return to office in 3 months  Orders Placed This Encounter   Procedures   [address] [facility] [employer] Smoking Cessation [facility]         Mediations reviewed with the patient. Continue current medications. Appropriate prescriptions are addressed. After visit summeryprovided. Follow up as directed sooner if needed. Questions answered and patient verbalizes understanding. Call for any problems, questions, or concerns.        No Known Allergies  Current Outpatient Medications   Medication Sig Dispense Refill    ipratropium-albuterol (DUONEB) 0.5-2.5 (3) MG/3ML SOLN nebulizer solution Inhale 3 mLs into the lungs 4 times daily 1080 mL 1    SYMBICORT 160-4.5 MCG/ACT AERO Inhale 2 puffs into the lungs 2 times daily 3 Inhaler 1    albuterol sulfate HFA (VENTOLIN HFA) 108 (90 Base) MCG/ACT inhaler Inhale 2 puffs into the lungs every 2 hours as needed for Wheezing 3 Inhaler 1   

## 2019-10-08 ENCOUNTER — APPOINTMENT (OUTPATIENT)
Dept: GENERAL RADIOLOGY | Age: 64
End: 2019-10-08
Payer: MEDICARE

## 2019-10-08 ENCOUNTER — HOSPITAL ENCOUNTER (OUTPATIENT)
Age: 64
Setting detail: OBSERVATION
Discharge: HOME | End: 2019-10-09
Attending: EMERGENCY MEDICINE | Admitting: FAMILY MEDICINE
Payer: MEDICARE

## 2019-10-08 DIAGNOSIS — J44.1 COPD EXACERBATION (HCC): Primary | ICD-10-CM

## 2019-10-08 PROBLEM — F10.10 ALCOHOL ABUSE: Status: ACTIVE | Noted: 2019-10-08

## 2019-10-08 PROBLEM — Z72.0 NICOTINE ABUSE: Status: ACTIVE | Noted: 2019-10-08

## 2019-10-08 LAB
ANION GAP SERPL CALCULATED.3IONS-SCNC: 16 MMOL/L (ref 4–16)
BASE EXCESS MIXED: 1.6 (ref 0–1.2)
BASE EXCESS: ABNORMAL (ref 0–3.3)
BASOPHILS ABSOLUTE: 0.1 K/CU MM
BASOPHILS RELATIVE PERCENT: 0.5 % (ref 0–1)
BUN BLDV-MCNC: 6 MG/DL (ref 6–23)
CALCIUM SERPL-MCNC: 9.6 MG/DL (ref 8.3–10.6)
CHLORIDE BLD-SCNC: 102 MMOL/L (ref 99–110)
CO2: 27 MMOL/L (ref 21–32)
CO2: 29 MMOL/L (ref 21–32)
CREAT SERPL-MCNC: 0.7 MG/DL (ref 0.9–1.3)
DIFFERENTIAL TYPE: ABNORMAL
EKG ATRIAL RATE: 97 BPM
EKG DIAGNOSIS: NORMAL
EKG P AXIS: 86 DEGREES
EKG P-R INTERVAL: 152 MS
EKG Q-T INTERVAL: 362 MS
EKG QRS DURATION: 92 MS
EKG QTC CALCULATION (BAZETT): 459 MS
EKG R AXIS: 106 DEGREES
EKG T AXIS: 78 DEGREES
EKG VENTRICULAR RATE: 97 BPM
EOSINOPHILS ABSOLUTE: 0 K/CU MM
EOSINOPHILS RELATIVE PERCENT: 0 % (ref 0–3)
GFR AFRICAN AMERICAN: >60 ML/MIN/1.73M2
GFR NON-AFRICAN AMERICAN: >60 ML/MIN/1.73M2
GLUCOSE BLD-MCNC: 131 MG/DL (ref 70–99)
GLUCOSE BLD-MCNC: 132 MG/DL (ref 70–99)
HCO3 ARTERIAL: 27.7 MMOL/L (ref 18–23)
HCT VFR BLD CALC: 50.5 % (ref 42–52)
HCT VFR BLD CALC: 56 % (ref 42–52)
HEMOGLOBIN: 17 GM/DL (ref 13.5–18)
HEMOGLOBIN: 19.1 GM/DL (ref 13.5–18)
IMMATURE NEUTROPHIL %: 0.3 % (ref 0–0.43)
LYMPHOCYTES ABSOLUTE: 2 K/CU MM
LYMPHOCYTES RELATIVE PERCENT: 20.4 % (ref 24–44)
MCH RBC QN AUTO: 32.8 PG (ref 27–31)
MCHC RBC AUTO-ENTMCNC: 33.7 % (ref 32–36)
MCV RBC AUTO: 97.5 FL (ref 78–100)
MONOCYTES ABSOLUTE: 0.9 K/CU MM
MONOCYTES RELATIVE PERCENT: 9.2 % (ref 0–4)
O2 SATURATION: 98 % (ref 96–97)
PCO2 ARTERIAL: 46.8 MMHG (ref 32–45)
PDW BLD-RTO: 13.7 % (ref 11.7–14.9)
PH BLOOD: 7.38 (ref 7.34–7.45)
PLATELET # BLD: 281 K/CU MM (ref 140–440)
PMV BLD AUTO: 8.8 FL (ref 7.5–11.1)
PO2 ARTERIAL: 107.6 MMHG (ref 75–100)
POC CALCIUM: 1.08 MMOL/L (ref 1.12–1.32)
POC CHLORIDE: 105 MMOL/L (ref 98–109)
POC CREATININE: 0.5 MG/DL (ref 0.9–1.3)
POTASSIUM SERPL-SCNC: 3.5 MMOL/L (ref 3.5–4.5)
POTASSIUM SERPL-SCNC: 3.7 MMOL/L (ref 3.5–5.1)
RBC # BLD: 5.18 M/CU MM (ref 4.6–6.2)
SEGMENTED NEUTROPHILS ABSOLUTE COUNT: 6.9 K/CU MM
SEGMENTED NEUTROPHILS RELATIVE PERCENT: 69.6 % (ref 36–66)
SODIUM BLD-SCNC: 142 MMOL/L (ref 138–146)
SODIUM BLD-SCNC: 145 MMOL/L (ref 135–145)
SOURCE, BLOOD GAS: ABNORMAL
TOTAL IMMATURE NEUTOROPHIL: 0.03 K/CU MM
WBC # BLD: 10 K/CU MM (ref 4–10.5)

## 2019-10-08 PROCEDURE — 36415 COLL VENOUS BLD VENIPUNCTURE: CPT

## 2019-10-08 PROCEDURE — 6360000002 HC RX W HCPCS: Performed by: NURSE PRACTITIONER

## 2019-10-08 PROCEDURE — 99285 EMERGENCY DEPT VISIT HI MDM: CPT

## 2019-10-08 PROCEDURE — 87581 M.PNEUMON DNA AMP PROBE: CPT

## 2019-10-08 PROCEDURE — G0378 HOSPITAL OBSERVATION PER HR: HCPCS

## 2019-10-08 PROCEDURE — 85025 COMPLETE CBC W/AUTO DIFF WBC: CPT

## 2019-10-08 PROCEDURE — 94640 AIRWAY INHALATION TREATMENT: CPT

## 2019-10-08 PROCEDURE — 80048 BASIC METABOLIC PNL TOTAL CA: CPT

## 2019-10-08 PROCEDURE — 6370000000 HC RX 637 (ALT 250 FOR IP): Performed by: NURSE PRACTITIONER

## 2019-10-08 PROCEDURE — 87633 RESP VIRUS 12-25 TARGETS: CPT

## 2019-10-08 PROCEDURE — 96372 THER/PROPH/DIAG INJ SC/IM: CPT

## 2019-10-08 PROCEDURE — 6360000002 HC RX W HCPCS: Performed by: EMERGENCY MEDICINE

## 2019-10-08 PROCEDURE — 96374 THER/PROPH/DIAG INJ IV PUSH: CPT

## 2019-10-08 PROCEDURE — 93005 ELECTROCARDIOGRAM TRACING: CPT | Performed by: EMERGENCY MEDICINE

## 2019-10-08 PROCEDURE — 87486 CHLMYD PNEUM DNA AMP PROBE: CPT

## 2019-10-08 PROCEDURE — 6370000000 HC RX 637 (ALT 250 FOR IP): Performed by: EMERGENCY MEDICINE

## 2019-10-08 PROCEDURE — 2580000003 HC RX 258: Performed by: NURSE PRACTITIONER

## 2019-10-08 PROCEDURE — 87798 DETECT AGENT NOS DNA AMP: CPT

## 2019-10-08 PROCEDURE — 86140 C-REACTIVE PROTEIN: CPT

## 2019-10-08 PROCEDURE — 71045 X-RAY EXAM CHEST 1 VIEW: CPT

## 2019-10-08 PROCEDURE — 93010 ELECTROCARDIOGRAM REPORT: CPT | Performed by: INTERNAL MEDICINE

## 2019-10-08 RX ORDER — M-VIT,TX,IRON,MINS/CALC/FOLIC 27MG-0.4MG
1 TABLET ORAL DAILY
Status: DISCONTINUED | OUTPATIENT
Start: 2019-10-09 | End: 2019-10-09 | Stop reason: HOSPADM

## 2019-10-08 RX ORDER — DOXYCYCLINE HYCLATE 100 MG
100 TABLET ORAL EVERY 12 HOURS SCHEDULED
Status: DISCONTINUED | OUTPATIENT
Start: 2019-10-08 | End: 2019-10-09 | Stop reason: HOSPADM

## 2019-10-08 RX ORDER — METHYLPREDNISOLONE SODIUM SUCCINATE 125 MG/2ML
125 INJECTION, POWDER, LYOPHILIZED, FOR SOLUTION INTRAMUSCULAR; INTRAVENOUS ONCE
Status: COMPLETED | OUTPATIENT
Start: 2019-10-08 | End: 2019-10-08

## 2019-10-08 RX ORDER — ONDANSETRON 2 MG/ML
4 INJECTION INTRAMUSCULAR; INTRAVENOUS EVERY 6 HOURS PRN
Status: DISCONTINUED | OUTPATIENT
Start: 2019-10-08 | End: 2019-10-09 | Stop reason: HOSPADM

## 2019-10-08 RX ORDER — ALBUTEROL SULFATE 90 UG/1
2 AEROSOL, METERED RESPIRATORY (INHALATION)
Status: DISCONTINUED | OUTPATIENT
Start: 2019-10-08 | End: 2019-10-09 | Stop reason: HOSPADM

## 2019-10-08 RX ORDER — METHYLPREDNISOLONE SODIUM SUCCINATE 125 MG/2ML
80 INJECTION, POWDER, LYOPHILIZED, FOR SOLUTION INTRAMUSCULAR; INTRAVENOUS EVERY 12 HOURS
Status: DISCONTINUED | OUTPATIENT
Start: 2019-10-09 | End: 2019-10-09 | Stop reason: HOSPADM

## 2019-10-08 RX ORDER — ALBUTEROL SULFATE 2.5 MG/3ML
2.5 SOLUTION RESPIRATORY (INHALATION)
Status: DISCONTINUED | OUTPATIENT
Start: 2019-10-08 | End: 2019-10-09 | Stop reason: HOSPADM

## 2019-10-08 RX ORDER — IPRATROPIUM BROMIDE AND ALBUTEROL SULFATE 2.5; .5 MG/3ML; MG/3ML
1 SOLUTION RESPIRATORY (INHALATION) ONCE
Status: COMPLETED | OUTPATIENT
Start: 2019-10-08 | End: 2019-10-08

## 2019-10-08 RX ORDER — PREDNISONE 20 MG/1
40 TABLET ORAL DAILY
Status: DISCONTINUED | OUTPATIENT
Start: 2019-10-11 | End: 2019-10-09 | Stop reason: HOSPADM

## 2019-10-08 RX ORDER — ACETAMINOPHEN 325 MG/1
650 TABLET ORAL EVERY 4 HOURS PRN
Status: DISCONTINUED | OUTPATIENT
Start: 2019-10-08 | End: 2019-10-09 | Stop reason: HOSPADM

## 2019-10-08 RX ORDER — IPRATROPIUM BROMIDE AND ALBUTEROL SULFATE 2.5; .5 MG/3ML; MG/3ML
1 SOLUTION RESPIRATORY (INHALATION) 4 TIMES DAILY
Status: DISCONTINUED | OUTPATIENT
Start: 2019-10-08 | End: 2019-10-09 | Stop reason: HOSPADM

## 2019-10-08 RX ORDER — TIZANIDINE 4 MG/1
4 TABLET ORAL EVERY 8 HOURS PRN
Status: DISCONTINUED | OUTPATIENT
Start: 2019-10-08 | End: 2019-10-09 | Stop reason: HOSPADM

## 2019-10-08 RX ORDER — SODIUM CHLORIDE 0.9 % (FLUSH) 0.9 %
10 SYRINGE (ML) INJECTION EVERY 12 HOURS SCHEDULED
Status: DISCONTINUED | OUTPATIENT
Start: 2019-10-08 | End: 2019-10-09 | Stop reason: HOSPADM

## 2019-10-08 RX ORDER — NICOTINE 21 MG/24HR
1 PATCH, TRANSDERMAL 24 HOURS TRANSDERMAL DAILY
Status: DISCONTINUED | OUTPATIENT
Start: 2019-10-08 | End: 2019-10-09 | Stop reason: HOSPADM

## 2019-10-08 RX ORDER — ACETAMINOPHEN 325 MG/1
650 TABLET ORAL ONCE
Status: DISCONTINUED | OUTPATIENT
Start: 2019-10-08 | End: 2019-10-09 | Stop reason: HOSPADM

## 2019-10-08 RX ORDER — SODIUM CHLORIDE 0.9 % (FLUSH) 0.9 %
10 SYRINGE (ML) INJECTION PRN
Status: DISCONTINUED | OUTPATIENT
Start: 2019-10-08 | End: 2019-10-09 | Stop reason: HOSPADM

## 2019-10-08 RX ORDER — BUDESONIDE AND FORMOTEROL FUMARATE DIHYDRATE 160; 4.5 UG/1; UG/1
2 AEROSOL RESPIRATORY (INHALATION) 2 TIMES DAILY
Status: DISCONTINUED | OUTPATIENT
Start: 2019-10-08 | End: 2019-10-08 | Stop reason: CLARIF

## 2019-10-08 RX ORDER — GUAIFENESIN/DEXTROMETHORPHAN 100-10MG/5
5 SYRUP ORAL EVERY 4 HOURS PRN
Status: DISCONTINUED | OUTPATIENT
Start: 2019-10-08 | End: 2019-10-09 | Stop reason: HOSPADM

## 2019-10-08 RX ORDER — DOXYCYCLINE HYCLATE 100 MG
100 TABLET ORAL EVERY 12 HOURS SCHEDULED
Status: DISCONTINUED | OUTPATIENT
Start: 2019-10-08 | End: 2019-10-08

## 2019-10-08 RX ADMIN — TIZANIDINE 4 MG: 4 TABLET ORAL at 21:21

## 2019-10-08 RX ADMIN — GUAIFENESIN AND DEXTROMETHORPHAN 5 ML: 100; 10 SYRUP ORAL at 21:56

## 2019-10-08 RX ADMIN — IPRATROPIUM BROMIDE AND ALBUTEROL SULFATE 1 AMPULE: .5; 3 SOLUTION RESPIRATORY (INHALATION) at 16:07

## 2019-10-08 RX ADMIN — METHYLPREDNISOLONE SODIUM SUCCINATE 125 MG: 125 INJECTION, POWDER, FOR SOLUTION INTRAMUSCULAR; INTRAVENOUS at 16:04

## 2019-10-08 RX ADMIN — DOXYCYCLINE HYCLATE 100 MG: 100 TABLET, COATED ORAL at 17:55

## 2019-10-08 RX ADMIN — MOMETASONE FUROATE AND FORMOTEROL FUMARATE DIHYDRATE 2 PUFF: 200; 5 AEROSOL RESPIRATORY (INHALATION) at 21:20

## 2019-10-08 RX ADMIN — GUAIFENESIN, DEXTROMETHORPHAN HBR 1 TABLET: 600; 30 TABLET ORAL at 17:55

## 2019-10-08 RX ADMIN — IPRATROPIUM BROMIDE AND ALBUTEROL SULFATE 3 ML: .5; 3 SOLUTION RESPIRATORY (INHALATION) at 20:08

## 2019-10-08 RX ADMIN — ENOXAPARIN SODIUM 40 MG: 40 INJECTION SUBCUTANEOUS at 21:21

## 2019-10-08 RX ADMIN — SODIUM CHLORIDE, PRESERVATIVE FREE 10 ML: 5 INJECTION INTRAVENOUS at 21:20

## 2019-10-08 ASSESSMENT — PAIN DESCRIPTION - DESCRIPTORS: DESCRIPTORS: SHARP

## 2019-10-08 ASSESSMENT — PAIN SCALES - GENERAL
PAINLEVEL_OUTOF10: 3
PAINLEVEL_OUTOF10: 8

## 2019-10-08 ASSESSMENT — ENCOUNTER SYMPTOMS
WHEEZING: 1
GASTROINTESTINAL NEGATIVE: 1
SHORTNESS OF BREATH: 1
COUGH: 1

## 2019-10-08 ASSESSMENT — PAIN DESCRIPTION - LOCATION
LOCATION: BACK
LOCATION: CHEST

## 2019-10-08 ASSESSMENT — PAIN DESCRIPTION - PAIN TYPE
TYPE: ACUTE PAIN
TYPE: CHRONIC PAIN

## 2019-10-08 ASSESSMENT — PAIN DESCRIPTION - ORIENTATION: ORIENTATION: LEFT

## 2019-10-09 ENCOUNTER — TELEPHONE (OUTPATIENT)
Dept: INTERNAL MEDICINE CLINIC | Age: 64
End: 2019-10-09

## 2019-10-09 VITALS
RESPIRATION RATE: 20 BRPM | BODY MASS INDEX: 19.29 KG/M2 | OXYGEN SATURATION: 96 % | DIASTOLIC BLOOD PRESSURE: 76 MMHG | TEMPERATURE: 98 F | WEIGHT: 120 LBS | HEART RATE: 78 BPM | SYSTOLIC BLOOD PRESSURE: 124 MMHG | HEIGHT: 66 IN

## 2019-10-09 PROBLEM — J44.1 COPD EXACERBATION (HCC): Status: RESOLVED | Noted: 2019-10-08 | Resolved: 2019-10-09

## 2019-10-09 LAB
ADENOVIRUS DETECTION BY PCR: NOT DETECTED
ANION GAP SERPL CALCULATED.3IONS-SCNC: 15 MMOL/L (ref 4–16)
BASOPHILS ABSOLUTE: 0 K/CU MM
BASOPHILS RELATIVE PERCENT: 0.2 % (ref 0–1)
BORDETELLA PERTUSSIS PCR: NOT DETECTED
BUN BLDV-MCNC: 9 MG/DL (ref 6–23)
C-REACTIVE PROTEIN, HIGH SENSITIVITY: 1.7 MG/L
CALCIUM SERPL-MCNC: 9.1 MG/DL (ref 8.3–10.6)
CHLAMYDOPHILA PNEUMONIA PCR: NOT DETECTED
CHLORIDE BLD-SCNC: 100 MMOL/L (ref 99–110)
CO2: 25 MMOL/L (ref 21–32)
CORONAVIRUS 229E PCR: NOT DETECTED
CORONAVIRUS HKU1 PCR: NOT DETECTED
CORONAVIRUS NL63 PCR: NOT DETECTED
CORONAVIRUS OC43 PCR: NOT DETECTED
CREAT SERPL-MCNC: 0.7 MG/DL (ref 0.9–1.3)
DIFFERENTIAL TYPE: ABNORMAL
EOSINOPHILS ABSOLUTE: 0 K/CU MM
EOSINOPHILS RELATIVE PERCENT: 0 % (ref 0–3)
GFR AFRICAN AMERICAN: >60 ML/MIN/1.73M2
GFR NON-AFRICAN AMERICAN: >60 ML/MIN/1.73M2
GLUCOSE BLD-MCNC: 169 MG/DL (ref 70–99)
HCT VFR BLD CALC: 45.3 % (ref 42–52)
HEMOGLOBIN: 14.7 GM/DL (ref 13.5–18)
HUMAN METAPNEUMOVIRUS PCR: NOT DETECTED
IMMATURE NEUTROPHIL %: 0.5 % (ref 0–0.43)
INFLUENZA A BY PCR: NOT DETECTED
INFLUENZA A H1 (2009) PCR: NOT DETECTED
INFLUENZA A H1 PANDEMIC PCR: NOT DETECTED
INFLUENZA A H3 PCR: NOT DETECTED
INFLUENZA B BY PCR: NOT DETECTED
LYMPHOCYTES ABSOLUTE: 0.5 K/CU MM
LYMPHOCYTES RELATIVE PERCENT: 7.9 % (ref 24–44)
MCH RBC QN AUTO: 32.3 PG (ref 27–31)
MCHC RBC AUTO-ENTMCNC: 32.5 % (ref 32–36)
MCV RBC AUTO: 99.6 FL (ref 78–100)
MONOCYTES ABSOLUTE: 0.2 K/CU MM
MONOCYTES RELATIVE PERCENT: 3.7 % (ref 0–4)
MYCOPLASMA PNEUMONIAE PCR: NOT DETECTED
PARAINFLUENZA 1 PCR: NOT DETECTED
PARAINFLUENZA 2 PCR: NOT DETECTED
PARAINFLUENZA 3 PCR: NOT DETECTED
PARAINFLUENZA 4 PCR: NOT DETECTED
PDW BLD-RTO: 13.6 % (ref 11.7–14.9)
PLATELET # BLD: 218 K/CU MM (ref 140–440)
PMV BLD AUTO: 9 FL (ref 7.5–11.1)
POTASSIUM SERPL-SCNC: 5.1 MMOL/L (ref 3.5–5.1)
RBC # BLD: 4.55 M/CU MM (ref 4.6–6.2)
RHINOVIRUS ENTEROVIRUS PCR: NOT DETECTED
RSV PCR: NOT DETECTED
SEGMENTED NEUTROPHILS ABSOLUTE COUNT: 5 K/CU MM
SEGMENTED NEUTROPHILS RELATIVE PERCENT: 87.7 % (ref 36–66)
SODIUM BLD-SCNC: 140 MMOL/L (ref 135–145)
TOTAL IMMATURE NEUTOROPHIL: 0.03 K/CU MM
WBC # BLD: 5.7 K/CU MM (ref 4–10.5)

## 2019-10-09 PROCEDURE — 90686 IIV4 VACC NO PRSV 0.5 ML IM: CPT | Performed by: NURSE PRACTITIONER

## 2019-10-09 PROCEDURE — 96376 TX/PRO/DX INJ SAME DRUG ADON: CPT

## 2019-10-09 PROCEDURE — G0008 ADMIN INFLUENZA VIRUS VAC: HCPCS | Performed by: NURSE PRACTITIONER

## 2019-10-09 PROCEDURE — 6370000000 HC RX 637 (ALT 250 FOR IP): Performed by: NURSE PRACTITIONER

## 2019-10-09 PROCEDURE — 36415 COLL VENOUS BLD VENIPUNCTURE: CPT

## 2019-10-09 PROCEDURE — 6360000002 HC RX W HCPCS: Performed by: FAMILY MEDICINE

## 2019-10-09 PROCEDURE — 6370000000 HC RX 637 (ALT 250 FOR IP): Performed by: EMERGENCY MEDICINE

## 2019-10-09 PROCEDURE — 80048 BASIC METABOLIC PNL TOTAL CA: CPT

## 2019-10-09 PROCEDURE — G0378 HOSPITAL OBSERVATION PER HR: HCPCS

## 2019-10-09 PROCEDURE — 94640 AIRWAY INHALATION TREATMENT: CPT

## 2019-10-09 PROCEDURE — 2580000003 HC RX 258: Performed by: NURSE PRACTITIONER

## 2019-10-09 PROCEDURE — 85025 COMPLETE CBC W/AUTO DIFF WBC: CPT

## 2019-10-09 PROCEDURE — 6360000002 HC RX W HCPCS: Performed by: NURSE PRACTITIONER

## 2019-10-09 PROCEDURE — 2700000000 HC OXYGEN THERAPY PER DAY

## 2019-10-09 RX ORDER — DOXYCYCLINE HYCLATE 100 MG
100 TABLET ORAL EVERY 12 HOURS SCHEDULED
Qty: 18 TABLET | Refills: 0 | Status: SHIPPED | OUTPATIENT
Start: 2019-10-09 | End: 2019-10-18

## 2019-10-09 RX ORDER — PREDNISONE 20 MG/1
40 TABLET ORAL DAILY
Qty: 6 TABLET | Refills: 0 | Status: SHIPPED | OUTPATIENT
Start: 2019-10-10 | End: 2019-10-13

## 2019-10-09 RX ORDER — METHYLPREDNISOLONE SODIUM SUCCINATE 125 MG/2ML
80 INJECTION, POWDER, LYOPHILIZED, FOR SOLUTION INTRAMUSCULAR; INTRAVENOUS ONCE
Status: COMPLETED | OUTPATIENT
Start: 2019-10-09 | End: 2019-10-09

## 2019-10-09 RX ORDER — METHYLPREDNISOLONE SODIUM SUCCINATE 125 MG/2ML
125 INJECTION, POWDER, LYOPHILIZED, FOR SOLUTION INTRAMUSCULAR; INTRAVENOUS DAILY
Status: DISCONTINUED | OUTPATIENT
Start: 2019-10-09 | End: 2019-10-09

## 2019-10-09 RX ORDER — GUAIFENESIN/DEXTROMETHORPHAN 100-10MG/5
5 SYRUP ORAL EVERY 4 HOURS PRN
Qty: 120 ML | Refills: 0 | Status: SHIPPED | OUTPATIENT
Start: 2019-10-09 | End: 2019-10-19

## 2019-10-09 RX ADMIN — IPRATROPIUM BROMIDE AND ALBUTEROL SULFATE 3 ML: .5; 3 SOLUTION RESPIRATORY (INHALATION) at 07:46

## 2019-10-09 RX ADMIN — METHYLPREDNISOLONE SODIUM SUCCINATE 80 MG: 125 INJECTION, POWDER, FOR SOLUTION INTRAMUSCULAR; INTRAVENOUS at 05:45

## 2019-10-09 RX ADMIN — GUAIFENESIN AND DEXTROMETHORPHAN 5 ML: 100; 10 SYRUP ORAL at 05:45

## 2019-10-09 RX ADMIN — MOMETASONE FUROATE AND FORMOTEROL FUMARATE DIHYDRATE 2 PUFF: 200; 5 AEROSOL RESPIRATORY (INHALATION) at 08:53

## 2019-10-09 RX ADMIN — GUAIFENESIN AND DEXTROMETHORPHAN 5 ML: 100; 10 SYRUP ORAL at 10:01

## 2019-10-09 RX ADMIN — SODIUM CHLORIDE, PRESERVATIVE FREE 10 ML: 5 INJECTION INTRAVENOUS at 08:57

## 2019-10-09 RX ADMIN — DOXYCYCLINE HYCLATE 100 MG: 100 TABLET, COATED ORAL at 08:52

## 2019-10-09 RX ADMIN — MULTIPLE VITAMINS W/ MINERALS TAB 1 TABLET: TAB at 08:52

## 2019-10-09 RX ADMIN — INFLUENZA A VIRUS A/BRISBANE/02/2018 IVR-190 (H1N1) ANTIGEN (PROPIOLACTONE INACTIVATED), INFLUENZA A VIRUS A/KANSAS/14/2017 X-327 (H3N2) ANTIGEN (PROPIOLACTONE INACTIVATED), INFLUENZA B VIRUS B/MARYLAND/15/2016 ANTIGEN (PROPIOLACTONE INACTIVATED), INFLUENZA B VIRUS B/PHUKET/3073/2013 BVR-1B ANTIGEN (PROPIOLACTONE INACTIVATED) 0.5 ML: 15; 15; 15; 15 INJECTION, SUSPENSION INTRAMUSCULAR at 08:55

## 2019-10-09 RX ADMIN — ALBUTEROL SULFATE 2.5 MG: 2.5 SOLUTION RESPIRATORY (INHALATION) at 02:38

## 2019-10-09 RX ADMIN — METHYLPREDNISOLONE SODIUM SUCCINATE 80 MG: 125 INJECTION, POWDER, FOR SOLUTION INTRAMUSCULAR; INTRAVENOUS at 08:52

## 2019-10-11 ENCOUNTER — OFFICE VISIT (OUTPATIENT)
Dept: INTERNAL MEDICINE CLINIC | Age: 64
End: 2019-10-11
Payer: MEDICARE

## 2019-10-11 VITALS
WEIGHT: 114 LBS | OXYGEN SATURATION: 93 % | DIASTOLIC BLOOD PRESSURE: 70 MMHG | BODY MASS INDEX: 18.4 KG/M2 | HEART RATE: 88 BPM | RESPIRATION RATE: 20 BRPM | TEMPERATURE: 98.6 F | SYSTOLIC BLOOD PRESSURE: 112 MMHG

## 2019-10-11 DIAGNOSIS — J44.1 COPD EXACERBATION (HCC): ICD-10-CM

## 2019-10-11 DIAGNOSIS — M54.50 ACUTE MIDLINE LOW BACK PAIN WITHOUT SCIATICA: ICD-10-CM

## 2019-10-11 DIAGNOSIS — Z72.0 TOBACCO ABUSE DISORDER: ICD-10-CM

## 2019-10-11 DIAGNOSIS — F10.231 ALCOHOL DEPENDENCE WITH WITHDRAWAL DELIRIUM (HCC): ICD-10-CM

## 2019-10-11 DIAGNOSIS — K52.832 LYMPHOCYTIC COLITIS: ICD-10-CM

## 2019-10-11 DIAGNOSIS — J44.9 COPD, SEVERE (HCC): ICD-10-CM

## 2019-10-11 DIAGNOSIS — F10.10 ALCOHOL ABUSE: ICD-10-CM

## 2019-10-11 PROCEDURE — 1111F DSCHRG MED/CURRENT MED MERGE: CPT | Performed by: INTERNAL MEDICINE

## 2019-10-11 PROCEDURE — 99495 TRANSJ CARE MGMT MOD F2F 14D: CPT | Performed by: INTERNAL MEDICINE

## 2019-10-11 RX ORDER — TIZANIDINE 4 MG/1
4 TABLET ORAL EVERY 8 HOURS PRN
Qty: 30 TABLET | Refills: 1 | Status: CANCELLED | OUTPATIENT
Start: 2019-10-11

## 2019-10-31 ENCOUNTER — HOSPITAL ENCOUNTER (OUTPATIENT)
Age: 64
Setting detail: OBSERVATION
Discharge: HOME | End: 2019-11-02
Attending: EMERGENCY MEDICINE | Admitting: FAMILY MEDICINE
Payer: MEDICARE

## 2019-10-31 ENCOUNTER — APPOINTMENT (OUTPATIENT)
Dept: GENERAL RADIOLOGY | Age: 64
End: 2019-10-31
Payer: MEDICARE

## 2019-10-31 DIAGNOSIS — J44.1 COPD EXACERBATION (HCC): Primary | ICD-10-CM

## 2019-10-31 LAB
ALBUMIN SERPL-MCNC: 4.1 GM/DL (ref 3.4–5)
ALP BLD-CCNC: 50 IU/L (ref 40–129)
ALT SERPL-CCNC: 19 U/L (ref 10–40)
ANION GAP SERPL CALCULATED.3IONS-SCNC: 24 MMOL/L (ref 4–16)
AST SERPL-CCNC: 25 IU/L (ref 15–37)
BASOPHILS ABSOLUTE: 0.1 K/CU MM
BASOPHILS RELATIVE PERCENT: 0.6 % (ref 0–1)
BILIRUB SERPL-MCNC: 0.2 MG/DL (ref 0–1)
BUN BLDV-MCNC: 4 MG/DL (ref 6–23)
CALCIUM SERPL-MCNC: 9.1 MG/DL (ref 8.3–10.6)
CHLORIDE BLD-SCNC: 105 MMOL/L (ref 99–110)
CO2: 19 MMOL/L (ref 21–32)
CREAT SERPL-MCNC: 0.7 MG/DL (ref 0.9–1.3)
DIFFERENTIAL TYPE: ABNORMAL
EOSINOPHILS ABSOLUTE: 0 K/CU MM
EOSINOPHILS RELATIVE PERCENT: 0 % (ref 0–3)
GFR AFRICAN AMERICAN: >60 ML/MIN/1.73M2
GFR NON-AFRICAN AMERICAN: >60 ML/MIN/1.73M2
GLUCOSE BLD-MCNC: 62 MG/DL (ref 70–99)
HCT VFR BLD CALC: 48.4 % (ref 42–52)
HEMOGLOBIN: 15.9 GM/DL (ref 13.5–18)
IMMATURE NEUTROPHIL %: 0.2 % (ref 0–0.43)
LACTATE: ABNORMAL MMOL/L (ref 0.4–2)
LACTATE: ABNORMAL MMOL/L (ref 0.4–2)
LYMPHOCYTES ABSOLUTE: 1.4 K/CU MM
LYMPHOCYTES RELATIVE PERCENT: 17.2 % (ref 24–44)
MCH RBC QN AUTO: 32.3 PG (ref 27–31)
MCHC RBC AUTO-ENTMCNC: 32.9 % (ref 32–36)
MCV RBC AUTO: 98.4 FL (ref 78–100)
MONOCYTES ABSOLUTE: 1 K/CU MM
MONOCYTES RELATIVE PERCENT: 12.7 % (ref 0–4)
PDW BLD-RTO: 13.4 % (ref 11.7–14.9)
PLATELET # BLD: 260 K/CU MM (ref 140–440)
PMV BLD AUTO: 9 FL (ref 7.5–11.1)
POTASSIUM SERPL-SCNC: 4.2 MMOL/L (ref 3.5–5.1)
PRO-BNP: 50.23 PG/ML
RBC # BLD: 4.92 M/CU MM (ref 4.6–6.2)
SEGMENTED NEUTROPHILS ABSOLUTE COUNT: 5.7 K/CU MM
SEGMENTED NEUTROPHILS RELATIVE PERCENT: 69.3 % (ref 36–66)
SODIUM BLD-SCNC: 148 MMOL/L (ref 135–145)
TOTAL IMMATURE NEUTOROPHIL: 0.02 K/CU MM
TOTAL PROTEIN: 6.2 GM/DL (ref 6.4–8.2)
TROPONIN T: <0.01 NG/ML
TROPONIN T: <0.01 NG/ML
WBC # BLD: 8.2 K/CU MM (ref 4–10.5)

## 2019-10-31 PROCEDURE — 84484 ASSAY OF TROPONIN QUANT: CPT

## 2019-10-31 PROCEDURE — G0378 HOSPITAL OBSERVATION PER HR: HCPCS

## 2019-10-31 PROCEDURE — 87798 DETECT AGENT NOS DNA AMP: CPT

## 2019-10-31 PROCEDURE — 83880 ASSAY OF NATRIURETIC PEPTIDE: CPT

## 2019-10-31 PROCEDURE — 94640 AIRWAY INHALATION TREATMENT: CPT

## 2019-10-31 PROCEDURE — 6370000000 HC RX 637 (ALT 250 FOR IP): Performed by: NURSE PRACTITIONER

## 2019-10-31 PROCEDURE — 99285 EMERGENCY DEPT VISIT HI MDM: CPT

## 2019-10-31 PROCEDURE — 85025 COMPLETE CBC W/AUTO DIFF WBC: CPT

## 2019-10-31 PROCEDURE — 93005 ELECTROCARDIOGRAM TRACING: CPT | Performed by: EMERGENCY MEDICINE

## 2019-10-31 PROCEDURE — 71045 X-RAY EXAM CHEST 1 VIEW: CPT

## 2019-10-31 PROCEDURE — 87581 M.PNEUMON DNA AMP PROBE: CPT

## 2019-10-31 PROCEDURE — 87040 BLOOD CULTURE FOR BACTERIA: CPT

## 2019-10-31 PROCEDURE — 83605 ASSAY OF LACTIC ACID: CPT

## 2019-10-31 PROCEDURE — 6360000002 HC RX W HCPCS: Performed by: EMERGENCY MEDICINE

## 2019-10-31 PROCEDURE — 80053 COMPREHEN METABOLIC PANEL: CPT

## 2019-10-31 PROCEDURE — 87633 RESP VIRUS 12-25 TARGETS: CPT

## 2019-10-31 PROCEDURE — 6370000000 HC RX 637 (ALT 250 FOR IP): Performed by: EMERGENCY MEDICINE

## 2019-10-31 PROCEDURE — 96374 THER/PROPH/DIAG INJ IV PUSH: CPT

## 2019-10-31 PROCEDURE — 87486 CHLMYD PNEUM DNA AMP PROBE: CPT

## 2019-10-31 PROCEDURE — 2580000003 HC RX 258: Performed by: NURSE PRACTITIONER

## 2019-10-31 PROCEDURE — 36415 COLL VENOUS BLD VENIPUNCTURE: CPT

## 2019-10-31 PROCEDURE — 2700000000 HC OXYGEN THERAPY PER DAY

## 2019-10-31 RX ORDER — NICOTINE 21 MG/24HR
1 PATCH, TRANSDERMAL 24 HOURS TRANSDERMAL DAILY
Status: DISCONTINUED | OUTPATIENT
Start: 2019-10-31 | End: 2019-11-02 | Stop reason: HOSPADM

## 2019-10-31 RX ORDER — FAMOTIDINE 20 MG/1
20 TABLET, FILM COATED ORAL 2 TIMES DAILY
Status: DISCONTINUED | OUTPATIENT
Start: 2019-10-31 | End: 2019-11-02 | Stop reason: HOSPADM

## 2019-10-31 RX ORDER — SODIUM CHLORIDE 0.9 % (FLUSH) 0.9 %
10 SYRINGE (ML) INJECTION EVERY 12 HOURS SCHEDULED
Status: DISCONTINUED | OUTPATIENT
Start: 2019-10-31 | End: 2019-11-02 | Stop reason: HOSPADM

## 2019-10-31 RX ORDER — TIZANIDINE 4 MG/1
4 TABLET ORAL EVERY 8 HOURS PRN
Status: DISCONTINUED | OUTPATIENT
Start: 2019-10-31 | End: 2019-11-02 | Stop reason: HOSPADM

## 2019-10-31 RX ORDER — ONDANSETRON 2 MG/ML
4 INJECTION INTRAMUSCULAR; INTRAVENOUS EVERY 6 HOURS PRN
Status: DISCONTINUED | OUTPATIENT
Start: 2019-10-31 | End: 2019-11-02 | Stop reason: HOSPADM

## 2019-10-31 RX ORDER — SODIUM CHLORIDE 0.9 % (FLUSH) 0.9 %
10 SYRINGE (ML) INJECTION PRN
Status: DISCONTINUED | OUTPATIENT
Start: 2019-10-31 | End: 2019-11-02 | Stop reason: HOSPADM

## 2019-10-31 RX ORDER — IPRATROPIUM BROMIDE AND ALBUTEROL SULFATE 2.5; .5 MG/3ML; MG/3ML
1 SOLUTION RESPIRATORY (INHALATION) ONCE
Status: COMPLETED | OUTPATIENT
Start: 2019-10-31 | End: 2019-10-31

## 2019-10-31 RX ORDER — POLYETHYLENE GLYCOL 3350 17 G/17G
17 POWDER, FOR SOLUTION ORAL DAILY PRN
Status: DISCONTINUED | OUTPATIENT
Start: 2019-10-31 | End: 2019-11-02 | Stop reason: HOSPADM

## 2019-10-31 RX ORDER — AZITHROMYCIN 250 MG/1
250 TABLET, FILM COATED ORAL NIGHTLY
Status: DISCONTINUED | OUTPATIENT
Start: 2019-11-01 | End: 2019-11-02 | Stop reason: HOSPADM

## 2019-10-31 RX ORDER — AZITHROMYCIN 250 MG/1
500 TABLET, FILM COATED ORAL NIGHTLY
Status: COMPLETED | OUTPATIENT
Start: 2019-10-31 | End: 2019-10-31

## 2019-10-31 RX ORDER — IPRATROPIUM BROMIDE AND ALBUTEROL SULFATE 2.5; .5 MG/3ML; MG/3ML
1 SOLUTION RESPIRATORY (INHALATION)
Status: DISCONTINUED | OUTPATIENT
Start: 2019-10-31 | End: 2019-11-02 | Stop reason: HOSPADM

## 2019-10-31 RX ORDER — METHYLPREDNISOLONE SODIUM SUCCINATE 40 MG/ML
40 INJECTION, POWDER, LYOPHILIZED, FOR SOLUTION INTRAMUSCULAR; INTRAVENOUS EVERY 6 HOURS
Status: DISCONTINUED | OUTPATIENT
Start: 2019-11-01 | End: 2019-11-02 | Stop reason: HOSPADM

## 2019-10-31 RX ORDER — METHYLPREDNISOLONE SODIUM SUCCINATE 125 MG/2ML
125 INJECTION, POWDER, LYOPHILIZED, FOR SOLUTION INTRAMUSCULAR; INTRAVENOUS ONCE
Status: COMPLETED | OUTPATIENT
Start: 2019-10-31 | End: 2019-10-31

## 2019-10-31 RX ORDER — PREDNISONE 20 MG/1
40 TABLET ORAL DAILY
Status: DISCONTINUED | OUTPATIENT
Start: 2019-11-03 | End: 2019-11-02 | Stop reason: HOSPADM

## 2019-10-31 RX ORDER — M-VIT,TX,IRON,MINS/CALC/FOLIC 27MG-0.4MG
1 TABLET ORAL DAILY
Status: DISCONTINUED | OUTPATIENT
Start: 2019-11-01 | End: 2019-11-02 | Stop reason: HOSPADM

## 2019-10-31 RX ORDER — ACETAMINOPHEN 325 MG/1
650 TABLET ORAL EVERY 4 HOURS PRN
Status: DISCONTINUED | OUTPATIENT
Start: 2019-10-31 | End: 2019-11-02 | Stop reason: HOSPADM

## 2019-10-31 RX ORDER — SODIUM CHLORIDE 9 MG/ML
1000 INJECTION, SOLUTION INTRAVENOUS CONTINUOUS
Status: DISCONTINUED | OUTPATIENT
Start: 2019-10-31 | End: 2019-11-01

## 2019-10-31 RX ADMIN — IPRATROPIUM BROMIDE AND ALBUTEROL SULFATE 1 AMPULE: .5; 3 SOLUTION RESPIRATORY (INHALATION) at 23:25

## 2019-10-31 RX ADMIN — SODIUM CHLORIDE 1000 ML: 9 INJECTION, SOLUTION INTRAVENOUS at 20:41

## 2019-10-31 RX ADMIN — FAMOTIDINE 20 MG: 20 TABLET ORAL at 20:41

## 2019-10-31 RX ADMIN — MOMETASONE FUROATE AND FORMOTEROL FUMARATE DIHYDRATE 2 PUFF: 200; 5 AEROSOL RESPIRATORY (INHALATION) at 20:41

## 2019-10-31 RX ADMIN — AZITHROMYCIN 500 MG: 250 TABLET, FILM COATED ORAL at 20:41

## 2019-10-31 RX ADMIN — IPRATROPIUM BROMIDE AND ALBUTEROL SULFATE 1 AMPULE: .5; 3 SOLUTION RESPIRATORY (INHALATION) at 18:09

## 2019-10-31 RX ADMIN — METHYLPREDNISOLONE SODIUM SUCCINATE 125 MG: 125 INJECTION, POWDER, FOR SOLUTION INTRAMUSCULAR; INTRAVENOUS at 18:14

## 2019-10-31 ASSESSMENT — PAIN SCALES - GENERAL
PAINLEVEL_OUTOF10: 0
PAINLEVEL_OUTOF10: 2
PAINLEVEL_OUTOF10: 8
PAINLEVEL_OUTOF10: 0

## 2019-10-31 ASSESSMENT — PAIN DESCRIPTION - LOCATION
LOCATION: CHEST
LOCATION: CHEST

## 2019-10-31 ASSESSMENT — PAIN DESCRIPTION - FREQUENCY
FREQUENCY: CONTINUOUS
FREQUENCY: INTERMITTENT

## 2019-10-31 ASSESSMENT — PAIN DESCRIPTION - PROGRESSION: CLINICAL_PROGRESSION: NOT CHANGED

## 2019-10-31 ASSESSMENT — PAIN - FUNCTIONAL ASSESSMENT: PAIN_FUNCTIONAL_ASSESSMENT: PREVENTS OR INTERFERES SOME ACTIVE ACTIVITIES AND ADLS

## 2019-10-31 ASSESSMENT — PAIN DESCRIPTION - DESCRIPTORS
DESCRIPTORS: SHARP
DESCRIPTORS: ACHING

## 2019-10-31 ASSESSMENT — PAIN DESCRIPTION - ONSET: ONSET: ON-GOING

## 2019-10-31 ASSESSMENT — PAIN DESCRIPTION - PAIN TYPE
TYPE: ACUTE PAIN
TYPE: ACUTE PAIN

## 2019-10-31 ASSESSMENT — PAIN DESCRIPTION - ORIENTATION: ORIENTATION: RIGHT

## 2019-11-01 LAB
ADENOVIRUS DETECTION BY PCR: NOT DETECTED
ANION GAP SERPL CALCULATED.3IONS-SCNC: 11 MMOL/L (ref 4–16)
BASOPHILS ABSOLUTE: 0 K/CU MM
BASOPHILS RELATIVE PERCENT: 0.2 % (ref 0–1)
BORDETELLA PERTUSSIS PCR: NOT DETECTED
BUN BLDV-MCNC: 9 MG/DL (ref 6–23)
CALCIUM SERPL-MCNC: 9 MG/DL (ref 8.3–10.6)
CHLAMYDOPHILA PNEUMONIA PCR: NOT DETECTED
CHLORIDE BLD-SCNC: 102 MMOL/L (ref 99–110)
CO2: 29 MMOL/L (ref 21–32)
CORONAVIRUS 229E PCR: NOT DETECTED
CORONAVIRUS HKU1 PCR: NOT DETECTED
CORONAVIRUS NL63 PCR: NOT DETECTED
CORONAVIRUS OC43 PCR: NOT DETECTED
CREAT SERPL-MCNC: 0.6 MG/DL (ref 0.9–1.3)
DIFFERENTIAL TYPE: ABNORMAL
EOSINOPHILS ABSOLUTE: 0 K/CU MM
EOSINOPHILS RELATIVE PERCENT: 0 % (ref 0–3)
GFR AFRICAN AMERICAN: >60 ML/MIN/1.73M2
GFR NON-AFRICAN AMERICAN: >60 ML/MIN/1.73M2
GLUCOSE BLD-MCNC: 329 MG/DL (ref 70–99)
HCT VFR BLD CALC: 42.9 % (ref 42–52)
HEMOGLOBIN: 14.2 GM/DL (ref 13.5–18)
HUMAN METAPNEUMOVIRUS PCR: NOT DETECTED
IMMATURE NEUTROPHIL %: 0.4 % (ref 0–0.43)
INFLUENZA A BY PCR: NOT DETECTED
INFLUENZA A H1 (2009) PCR: NOT DETECTED
INFLUENZA A H1 PANDEMIC PCR: NOT DETECTED
INFLUENZA A H3 PCR: NOT DETECTED
INFLUENZA B BY PCR: NOT DETECTED
LACTATE: 1.8 MMOL/L (ref 0.4–2)
LACTATE: ABNORMAL MMOL/L (ref 0.4–2)
LYMPHOCYTES ABSOLUTE: 0.3 K/CU MM
LYMPHOCYTES RELATIVE PERCENT: 6 % (ref 24–44)
MCH RBC QN AUTO: 32.5 PG (ref 27–31)
MCHC RBC AUTO-ENTMCNC: 33.1 % (ref 32–36)
MCV RBC AUTO: 98.2 FL (ref 78–100)
MONOCYTES ABSOLUTE: 0.1 K/CU MM
MONOCYTES RELATIVE PERCENT: 1.1 % (ref 0–4)
MYCOPLASMA PNEUMONIAE PCR: NOT DETECTED
PARAINFLUENZA 1 PCR: NOT DETECTED
PARAINFLUENZA 2 PCR: NOT DETECTED
PARAINFLUENZA 3 PCR: NOT DETECTED
PARAINFLUENZA 4 PCR: NOT DETECTED
PDW BLD-RTO: 13.3 % (ref 11.7–14.9)
PLATELET # BLD: 241 K/CU MM (ref 140–440)
PMV BLD AUTO: 8.9 FL (ref 7.5–11.1)
POTASSIUM SERPL-SCNC: 4.3 MMOL/L (ref 3.5–5.1)
RBC # BLD: 4.37 M/CU MM (ref 4.6–6.2)
RHINOVIRUS ENTEROVIRUS PCR: NOT DETECTED
RSV PCR: NOT DETECTED
SEGMENTED NEUTROPHILS ABSOLUTE COUNT: 5.1 K/CU MM
SEGMENTED NEUTROPHILS RELATIVE PERCENT: 92.3 % (ref 36–66)
SODIUM BLD-SCNC: 142 MMOL/L (ref 135–145)
TOTAL IMMATURE NEUTOROPHIL: 0.02 K/CU MM
WBC # BLD: 5.5 K/CU MM (ref 4–10.5)

## 2019-11-01 PROCEDURE — 36415 COLL VENOUS BLD VENIPUNCTURE: CPT

## 2019-11-01 PROCEDURE — 2580000003 HC RX 258: Performed by: NURSE PRACTITIONER

## 2019-11-01 PROCEDURE — 96376 TX/PRO/DX INJ SAME DRUG ADON: CPT

## 2019-11-01 PROCEDURE — 2700000000 HC OXYGEN THERAPY PER DAY

## 2019-11-01 PROCEDURE — 83605 ASSAY OF LACTIC ACID: CPT

## 2019-11-01 PROCEDURE — 6370000000 HC RX 637 (ALT 250 FOR IP): Performed by: NURSE PRACTITIONER

## 2019-11-01 PROCEDURE — 6370000000 HC RX 637 (ALT 250 FOR IP): Performed by: FAMILY MEDICINE

## 2019-11-01 PROCEDURE — 93010 ELECTROCARDIOGRAM REPORT: CPT | Performed by: INTERNAL MEDICINE

## 2019-11-01 PROCEDURE — 6360000002 HC RX W HCPCS: Performed by: NURSE PRACTITIONER

## 2019-11-01 PROCEDURE — 80048 BASIC METABOLIC PNL TOTAL CA: CPT

## 2019-11-01 PROCEDURE — G0378 HOSPITAL OBSERVATION PER HR: HCPCS

## 2019-11-01 PROCEDURE — 94640 AIRWAY INHALATION TREATMENT: CPT

## 2019-11-01 PROCEDURE — 85025 COMPLETE CBC W/AUTO DIFF WBC: CPT

## 2019-11-01 RX ORDER — GUAIFENESIN/DEXTROMETHORPHAN 100-10MG/5
5 SYRUP ORAL EVERY 4 HOURS PRN
Status: DISCONTINUED | OUTPATIENT
Start: 2019-11-01 | End: 2019-11-02 | Stop reason: HOSPADM

## 2019-11-01 RX ORDER — IBUPROFEN 400 MG/1
400 TABLET ORAL EVERY 6 HOURS PRN
Status: DISCONTINUED | OUTPATIENT
Start: 2019-11-01 | End: 2019-11-02 | Stop reason: HOSPADM

## 2019-11-01 RX ADMIN — GUAIFENESIN AND DEXTROMETHORPHAN 5 ML: 100; 10 SYRUP ORAL at 10:07

## 2019-11-01 RX ADMIN — FAMOTIDINE 20 MG: 20 TABLET ORAL at 09:01

## 2019-11-01 RX ADMIN — IBUPROFEN 400 MG: 400 TABLET ORAL at 18:33

## 2019-11-01 RX ADMIN — MOMETASONE FUROATE AND FORMOTEROL FUMARATE DIHYDRATE 2 PUFF: 200; 5 AEROSOL RESPIRATORY (INHALATION) at 20:11

## 2019-11-01 RX ADMIN — IBUPROFEN 400 MG: 400 TABLET ORAL at 11:09

## 2019-11-01 RX ADMIN — GUAIFENESIN AND DEXTROMETHORPHAN 5 ML: 100; 10 SYRUP ORAL at 00:40

## 2019-11-01 RX ADMIN — GUAIFENESIN AND DEXTROMETHORPHAN 5 ML: 100; 10 SYRUP ORAL at 05:47

## 2019-11-01 RX ADMIN — IPRATROPIUM BROMIDE AND ALBUTEROL SULFATE 1 AMPULE: .5; 3 SOLUTION RESPIRATORY (INHALATION) at 07:35

## 2019-11-01 RX ADMIN — GUAIFENESIN AND DEXTROMETHORPHAN 5 ML: 100; 10 SYRUP ORAL at 14:29

## 2019-11-01 RX ADMIN — GUAIFENESIN AND DEXTROMETHORPHAN 5 ML: 100; 10 SYRUP ORAL at 23:21

## 2019-11-01 RX ADMIN — IPRATROPIUM BROMIDE AND ALBUTEROL SULFATE 1 AMPULE: .5; 3 SOLUTION RESPIRATORY (INHALATION) at 15:20

## 2019-11-01 RX ADMIN — METHYLPREDNISOLONE SODIUM SUCCINATE 40 MG: 40 INJECTION, POWDER, FOR SOLUTION INTRAMUSCULAR; INTRAVENOUS at 00:29

## 2019-11-01 RX ADMIN — MOMETASONE FUROATE AND FORMOTEROL FUMARATE DIHYDRATE 2 PUFF: 200; 5 AEROSOL RESPIRATORY (INHALATION) at 08:59

## 2019-11-01 RX ADMIN — SODIUM CHLORIDE, PRESERVATIVE FREE 10 ML: 5 INJECTION INTRAVENOUS at 20:11

## 2019-11-01 RX ADMIN — AZITHROMYCIN 250 MG: 250 TABLET, FILM COATED ORAL at 20:11

## 2019-11-01 RX ADMIN — IPRATROPIUM BROMIDE AND ALBUTEROL SULFATE 1 AMPULE: .5; 3 SOLUTION RESPIRATORY (INHALATION) at 11:35

## 2019-11-01 RX ADMIN — IPRATROPIUM BROMIDE AND ALBUTEROL SULFATE 1 AMPULE: .5; 3 SOLUTION RESPIRATORY (INHALATION) at 22:47

## 2019-11-01 RX ADMIN — METHYLPREDNISOLONE SODIUM SUCCINATE 40 MG: 40 INJECTION, POWDER, FOR SOLUTION INTRAMUSCULAR; INTRAVENOUS at 18:32

## 2019-11-01 RX ADMIN — TIZANIDINE 4 MG: 4 TABLET ORAL at 10:07

## 2019-11-01 RX ADMIN — GUAIFENESIN AND DEXTROMETHORPHAN 5 ML: 100; 10 SYRUP ORAL at 18:33

## 2019-11-01 RX ADMIN — METHYLPREDNISOLONE SODIUM SUCCINATE 40 MG: 40 INJECTION, POWDER, FOR SOLUTION INTRAMUSCULAR; INTRAVENOUS at 23:21

## 2019-11-01 RX ADMIN — IPRATROPIUM BROMIDE AND ALBUTEROL SULFATE 1 AMPULE: .5; 3 SOLUTION RESPIRATORY (INHALATION) at 18:58

## 2019-11-01 RX ADMIN — MULTIPLE VITAMINS W/ MINERALS TAB 1 TABLET: TAB at 09:01

## 2019-11-01 RX ADMIN — FAMOTIDINE 20 MG: 20 TABLET ORAL at 20:11

## 2019-11-01 RX ADMIN — SODIUM CHLORIDE 1000 ML: 9 INJECTION, SOLUTION INTRAVENOUS at 05:47

## 2019-11-01 RX ADMIN — METHYLPREDNISOLONE SODIUM SUCCINATE 40 MG: 40 INJECTION, POWDER, FOR SOLUTION INTRAMUSCULAR; INTRAVENOUS at 13:28

## 2019-11-01 RX ADMIN — METHYLPREDNISOLONE SODIUM SUCCINATE 40 MG: 40 INJECTION, POWDER, FOR SOLUTION INTRAMUSCULAR; INTRAVENOUS at 05:47

## 2019-11-01 ASSESSMENT — PAIN SCALES - GENERAL
PAINLEVEL_OUTOF10: 4
PAINLEVEL_OUTOF10: 6
PAINLEVEL_OUTOF10: 4
PAINLEVEL_OUTOF10: 6
PAINLEVEL_OUTOF10: 0
PAINLEVEL_OUTOF10: 6
PAINLEVEL_OUTOF10: 0

## 2019-11-01 ASSESSMENT — PAIN DESCRIPTION - DESCRIPTORS
DESCRIPTORS: ACHING
DESCRIPTORS: ACHING
DESCRIPTORS: ACHING;SHARP

## 2019-11-01 ASSESSMENT — PAIN DESCRIPTION - FREQUENCY
FREQUENCY: CONTINUOUS

## 2019-11-01 ASSESSMENT — PAIN DESCRIPTION - LOCATION
LOCATION: CHEST

## 2019-11-01 ASSESSMENT — PAIN DESCRIPTION - ORIENTATION
ORIENTATION: MID

## 2019-11-01 ASSESSMENT — PAIN - FUNCTIONAL ASSESSMENT: PAIN_FUNCTIONAL_ASSESSMENT: PREVENTS OR INTERFERES SOME ACTIVE ACTIVITIES AND ADLS

## 2019-11-01 ASSESSMENT — PAIN DESCRIPTION - PAIN TYPE
TYPE: ACUTE PAIN

## 2019-11-01 ASSESSMENT — PAIN DESCRIPTION - ONSET: ONSET: ON-GOING

## 2019-11-01 ASSESSMENT — PAIN DESCRIPTION - PROGRESSION: CLINICAL_PROGRESSION: NOT CHANGED

## 2019-11-02 VITALS
HEART RATE: 85 BPM | BODY MASS INDEX: 18.08 KG/M2 | HEIGHT: 66 IN | SYSTOLIC BLOOD PRESSURE: 121 MMHG | RESPIRATION RATE: 16 BRPM | DIASTOLIC BLOOD PRESSURE: 77 MMHG | OXYGEN SATURATION: 96 % | WEIGHT: 112.5 LBS | TEMPERATURE: 97.8 F

## 2019-11-02 PROBLEM — J44.1 COPD WITH ACUTE EXACERBATION (HCC): Status: RESOLVED | Noted: 2019-10-31 | Resolved: 2019-11-02

## 2019-11-02 LAB
LV EF: 58 %
LVEF MODALITY: NORMAL

## 2019-11-02 PROCEDURE — 6360000002 HC RX W HCPCS: Performed by: NURSE PRACTITIONER

## 2019-11-02 PROCEDURE — 6370000000 HC RX 637 (ALT 250 FOR IP): Performed by: FAMILY MEDICINE

## 2019-11-02 PROCEDURE — 94640 AIRWAY INHALATION TREATMENT: CPT

## 2019-11-02 PROCEDURE — 2700000000 HC OXYGEN THERAPY PER DAY

## 2019-11-02 PROCEDURE — 94761 N-INVAS EAR/PLS OXIMETRY MLT: CPT

## 2019-11-02 PROCEDURE — 6370000000 HC RX 637 (ALT 250 FOR IP): Performed by: NURSE PRACTITIONER

## 2019-11-02 PROCEDURE — 96376 TX/PRO/DX INJ SAME DRUG ADON: CPT

## 2019-11-02 PROCEDURE — 93306 TTE W/DOPPLER COMPLETE: CPT

## 2019-11-02 PROCEDURE — G0378 HOSPITAL OBSERVATION PER HR: HCPCS

## 2019-11-02 RX ORDER — NICOTINE 21 MG/24HR
1 PATCH, TRANSDERMAL 24 HOURS TRANSDERMAL DAILY
Qty: 30 PATCH | Refills: 0 | Status: SHIPPED | OUTPATIENT
Start: 2019-11-03 | End: 2020-03-12

## 2019-11-02 RX ORDER — PREDNISONE 20 MG/1
40 TABLET ORAL DAILY
Qty: 6 TABLET | Refills: 0 | Status: SHIPPED | OUTPATIENT
Start: 2019-11-03 | End: 2019-11-06

## 2019-11-02 RX ORDER — AZITHROMYCIN 250 MG/1
250 TABLET, FILM COATED ORAL NIGHTLY
Qty: 3 TABLET | Refills: 0 | Status: SHIPPED | OUTPATIENT
Start: 2019-11-02 | End: 2019-11-05

## 2019-11-02 RX ADMIN — MOMETASONE FUROATE AND FORMOTEROL FUMARATE DIHYDRATE 2 PUFF: 200; 5 AEROSOL RESPIRATORY (INHALATION) at 08:35

## 2019-11-02 RX ADMIN — FAMOTIDINE 20 MG: 20 TABLET ORAL at 08:35

## 2019-11-02 RX ADMIN — IBUPROFEN 400 MG: 400 TABLET ORAL at 10:15

## 2019-11-02 RX ADMIN — IBUPROFEN 400 MG: 400 TABLET ORAL at 03:23

## 2019-11-02 RX ADMIN — IPRATROPIUM BROMIDE AND ALBUTEROL SULFATE 1 AMPULE: .5; 3 SOLUTION RESPIRATORY (INHALATION) at 07:25

## 2019-11-02 RX ADMIN — METHYLPREDNISOLONE SODIUM SUCCINATE 40 MG: 40 INJECTION, POWDER, FOR SOLUTION INTRAMUSCULAR; INTRAVENOUS at 05:49

## 2019-11-02 RX ADMIN — IPRATROPIUM BROMIDE AND ALBUTEROL SULFATE 1 AMPULE: .5; 3 SOLUTION RESPIRATORY (INHALATION) at 03:16

## 2019-11-02 RX ADMIN — MULTIPLE VITAMINS W/ MINERALS TAB 1 TABLET: TAB at 08:35

## 2019-11-02 RX ADMIN — GUAIFENESIN AND DEXTROMETHORPHAN 5 ML: 100; 10 SYRUP ORAL at 07:38

## 2019-11-02 RX ADMIN — GUAIFENESIN AND DEXTROMETHORPHAN 5 ML: 100; 10 SYRUP ORAL at 03:23

## 2019-11-02 ASSESSMENT — PAIN SCALES - GENERAL
PAINLEVEL_OUTOF10: 0
PAINLEVEL_OUTOF10: 6
PAINLEVEL_OUTOF10: 0
PAINLEVEL_OUTOF10: 0
PAINLEVEL_OUTOF10: 6

## 2019-11-02 ASSESSMENT — PAIN DESCRIPTION - PAIN TYPE: TYPE: ACUTE PAIN

## 2019-11-02 ASSESSMENT — PAIN DESCRIPTION - ORIENTATION: ORIENTATION: MID

## 2019-11-02 ASSESSMENT — PAIN DESCRIPTION - PROGRESSION: CLINICAL_PROGRESSION: NOT CHANGED

## 2019-11-02 ASSESSMENT — PAIN DESCRIPTION - FREQUENCY: FREQUENCY: INTERMITTENT

## 2019-11-02 ASSESSMENT — PAIN DESCRIPTION - DESCRIPTORS: DESCRIPTORS: ACHING

## 2019-11-02 ASSESSMENT — PAIN DESCRIPTION - ONSET: ONSET: ON-GOING

## 2019-11-02 ASSESSMENT — PAIN DESCRIPTION - LOCATION: LOCATION: CHEST

## 2019-11-02 ASSESSMENT — PAIN - FUNCTIONAL ASSESSMENT: PAIN_FUNCTIONAL_ASSESSMENT: PREVENTS OR INTERFERES SOME ACTIVE ACTIVITIES AND ADLS

## 2019-11-04 LAB
EKG ATRIAL RATE: 101 BPM
EKG DIAGNOSIS: NORMAL
EKG P AXIS: 87 DEGREES
EKG P-R INTERVAL: 152 MS
EKG Q-T INTERVAL: 358 MS
EKG QRS DURATION: 90 MS
EKG QTC CALCULATION (BAZETT): 464 MS
EKG R AXIS: 120 DEGREES
EKG T AXIS: 73 DEGREES
EKG VENTRICULAR RATE: 101 BPM

## 2019-11-05 ENCOUNTER — TELEPHONE (OUTPATIENT)
Dept: INTERNAL MEDICINE CLINIC | Age: 64
End: 2019-11-05

## 2019-11-06 LAB
CULTURE: NORMAL
CULTURE: NORMAL
Lab: NORMAL
Lab: NORMAL
SPECIMEN: NORMAL
SPECIMEN: NORMAL

## 2019-11-08 ENCOUNTER — OFFICE VISIT (OUTPATIENT)
Dept: INTERNAL MEDICINE CLINIC | Age: 64
End: 2019-11-08
Payer: MEDICARE

## 2019-11-08 VITALS
HEART RATE: 84 BPM | WEIGHT: 117.6 LBS | SYSTOLIC BLOOD PRESSURE: 112 MMHG | DIASTOLIC BLOOD PRESSURE: 76 MMHG | BODY MASS INDEX: 18.98 KG/M2 | OXYGEN SATURATION: 92 % | TEMPERATURE: 98.7 F | RESPIRATION RATE: 20 BRPM

## 2019-11-08 DIAGNOSIS — R97.20 ELEVATED PSA: ICD-10-CM

## 2019-11-08 DIAGNOSIS — J44.9 COPD, SEVERE (HCC): ICD-10-CM

## 2019-11-08 DIAGNOSIS — K52.832 LYMPHOCYTIC COLITIS: ICD-10-CM

## 2019-11-08 DIAGNOSIS — R63.4 WEIGHT LOSS: ICD-10-CM

## 2019-11-08 DIAGNOSIS — M54.50 ACUTE MIDLINE LOW BACK PAIN WITHOUT SCIATICA: ICD-10-CM

## 2019-11-08 DIAGNOSIS — F10.10 ALCOHOL ABUSE: ICD-10-CM

## 2019-11-08 DIAGNOSIS — Z72.0 TOBACCO ABUSE DISORDER: ICD-10-CM

## 2019-11-08 DIAGNOSIS — J44.1 COPD EXACERBATION (HCC): Primary | ICD-10-CM

## 2019-11-08 PROCEDURE — 99214 OFFICE O/P EST MOD 30 MIN: CPT | Performed by: INTERNAL MEDICINE

## 2019-11-08 PROCEDURE — 1036F TOBACCO NON-USER: CPT | Performed by: INTERNAL MEDICINE

## 2019-11-08 PROCEDURE — G8427 DOCREV CUR MEDS BY ELIG CLIN: HCPCS | Performed by: INTERNAL MEDICINE

## 2019-11-08 PROCEDURE — G8482 FLU IMMUNIZE ORDER/ADMIN: HCPCS | Performed by: INTERNAL MEDICINE

## 2019-11-08 PROCEDURE — G8926 SPIRO NO PERF OR DOC: HCPCS | Performed by: INTERNAL MEDICINE

## 2019-11-08 PROCEDURE — 3023F SPIROM DOC REV: CPT | Performed by: INTERNAL MEDICINE

## 2019-11-08 PROCEDURE — 3017F COLORECTAL CA SCREEN DOC REV: CPT | Performed by: INTERNAL MEDICINE

## 2019-11-08 PROCEDURE — G8420 CALC BMI NORM PARAMETERS: HCPCS | Performed by: INTERNAL MEDICINE

## 2019-11-08 PROCEDURE — 1111F DSCHRG MED/CURRENT MED MERGE: CPT | Performed by: INTERNAL MEDICINE

## 2019-11-08 RX ORDER — BUDESONIDE AND FORMOTEROL FUMARATE DIHYDRATE 160; 4.5 UG/1; UG/1
2 AEROSOL RESPIRATORY (INHALATION) 2 TIMES DAILY
Qty: 3 INHALER | Refills: 1 | Status: SHIPPED | OUTPATIENT
Start: 2019-11-08 | End: 2020-06-09 | Stop reason: SDUPTHER

## 2019-11-08 RX ORDER — TIZANIDINE 4 MG/1
4 TABLET ORAL EVERY 8 HOURS PRN
Qty: 30 TABLET | Refills: 0 | Status: SHIPPED | OUTPATIENT
Start: 2019-11-08 | End: 2020-02-27 | Stop reason: ALTCHOICE

## 2019-11-08 RX ORDER — PROMETHAZINE HYDROCHLORIDE AND CODEINE PHOSPHATE 6.25; 1 MG/5ML; MG/5ML
5 SYRUP ORAL EVERY 4 HOURS PRN
Qty: 180 ML | Refills: 0 | Status: SHIPPED | OUTPATIENT
Start: 2019-11-08 | End: 2019-11-15

## 2019-11-08 RX ORDER — ALBUTEROL SULFATE 90 UG/1
2 AEROSOL, METERED RESPIRATORY (INHALATION)
Qty: 3 INHALER | Refills: 1 | Status: SHIPPED | OUTPATIENT
Start: 2019-11-08 | End: 2020-06-09 | Stop reason: SDUPTHER

## 2019-11-08 ASSESSMENT — ENCOUNTER SYMPTOMS
WHEEZING: 1
SHORTNESS OF BREATH: 1
ALLERGIC/IMMUNOLOGIC NEGATIVE: 1
GASTROINTESTINAL NEGATIVE: 1
COUGH: 1
EYES NEGATIVE: 1

## 2019-12-12 ENCOUNTER — OFFICE VISIT (OUTPATIENT)
Dept: INTERNAL MEDICINE CLINIC | Age: 64
End: 2019-12-12
Payer: MEDICARE

## 2019-12-12 VITALS
WEIGHT: 118.6 LBS | BODY MASS INDEX: 19.14 KG/M2 | DIASTOLIC BLOOD PRESSURE: 70 MMHG | SYSTOLIC BLOOD PRESSURE: 112 MMHG | TEMPERATURE: 98.8 F | HEART RATE: 92 BPM | RESPIRATION RATE: 20 BRPM | OXYGEN SATURATION: 92 %

## 2019-12-12 DIAGNOSIS — Z72.0 TOBACCO ABUSE DISORDER: ICD-10-CM

## 2019-12-12 DIAGNOSIS — R63.4 WEIGHT LOSS: ICD-10-CM

## 2019-12-12 DIAGNOSIS — J44.9 COPD, SEVERE (HCC): Primary | ICD-10-CM

## 2019-12-12 PROBLEM — J44.1 COPD EXACERBATION (HCC): Status: RESOLVED | Noted: 2019-10-08 | Resolved: 2019-12-12

## 2019-12-12 PROCEDURE — 1036F TOBACCO NON-USER: CPT | Performed by: INTERNAL MEDICINE

## 2019-12-12 PROCEDURE — G8420 CALC BMI NORM PARAMETERS: HCPCS | Performed by: INTERNAL MEDICINE

## 2019-12-12 PROCEDURE — 99213 OFFICE O/P EST LOW 20 MIN: CPT | Performed by: INTERNAL MEDICINE

## 2019-12-12 PROCEDURE — 3023F SPIROM DOC REV: CPT | Performed by: INTERNAL MEDICINE

## 2019-12-12 PROCEDURE — 90670 PCV13 VACCINE IM: CPT | Performed by: INTERNAL MEDICINE

## 2019-12-12 PROCEDURE — G8926 SPIRO NO PERF OR DOC: HCPCS | Performed by: INTERNAL MEDICINE

## 2019-12-12 PROCEDURE — G8427 DOCREV CUR MEDS BY ELIG CLIN: HCPCS | Performed by: INTERNAL MEDICINE

## 2019-12-12 PROCEDURE — 3017F COLORECTAL CA SCREEN DOC REV: CPT | Performed by: INTERNAL MEDICINE

## 2019-12-12 PROCEDURE — G8482 FLU IMMUNIZE ORDER/ADMIN: HCPCS | Performed by: INTERNAL MEDICINE

## 2019-12-12 PROCEDURE — G0009 ADMIN PNEUMOCOCCAL VACCINE: HCPCS | Performed by: INTERNAL MEDICINE

## 2019-12-12 ASSESSMENT — ENCOUNTER SYMPTOMS
SHORTNESS OF BREATH: 1
GASTROINTESTINAL NEGATIVE: 1
COUGH: 1
EYES NEGATIVE: 1
WHEEZING: 0
ALLERGIC/IMMUNOLOGIC NEGATIVE: 1

## 2019-12-24 ENCOUNTER — TELEPHONE (OUTPATIENT)
Dept: INTERNAL MEDICINE CLINIC | Age: 64
End: 2019-12-24

## 2019-12-24 RX ORDER — PREDNISONE 20 MG/1
20 TABLET ORAL DAILY
Qty: 5 TABLET | Refills: 0 | Status: SHIPPED | OUTPATIENT
Start: 2019-12-24 | End: 2019-12-29

## 2019-12-26 ENCOUNTER — TELEPHONE (OUTPATIENT)
Dept: INTERNAL MEDICINE CLINIC | Age: 64
End: 2019-12-26

## 2020-01-09 RX ORDER — IPRATROPIUM BROMIDE AND ALBUTEROL SULFATE 2.5; .5 MG/3ML; MG/3ML
SOLUTION RESPIRATORY (INHALATION)
Qty: 1080 ML | Refills: 1 | Status: SHIPPED | OUTPATIENT
Start: 2020-01-09 | End: 2020-05-26

## 2020-01-30 ENCOUNTER — HOSPITAL ENCOUNTER (OUTPATIENT)
Dept: CARDIAC REHAB | Age: 65
Setting detail: THERAPIES SERIES
Discharge: HOME | End: 2020-01-30
Payer: MEDICARE

## 2020-02-05 ENCOUNTER — HOSPITAL ENCOUNTER (OUTPATIENT)
Dept: CARDIAC REHAB | Age: 65
Discharge: HOME | End: 2020-02-05
Payer: MEDICARE

## 2020-02-05 PROCEDURE — 94729 DIFFUSING CAPACITY: CPT

## 2020-02-05 PROCEDURE — 94060 EVALUATION OF WHEEZING: CPT

## 2020-02-05 PROCEDURE — 6360000002 HC RX W HCPCS

## 2020-02-05 PROCEDURE — 94640 AIRWAY INHALATION TREATMENT: CPT

## 2020-02-17 ENCOUNTER — APPOINTMENT (OUTPATIENT)
Dept: GENERAL RADIOLOGY | Age: 65
End: 2020-02-17
Payer: MEDICARE

## 2020-02-17 ENCOUNTER — HOSPITAL ENCOUNTER (EMERGENCY)
Age: 65
Discharge: HOME | End: 2020-02-17
Attending: EMERGENCY MEDICINE
Payer: MEDICARE

## 2020-02-17 VITALS
SYSTOLIC BLOOD PRESSURE: 118 MMHG | WEIGHT: 118 LBS | HEART RATE: 90 BPM | RESPIRATION RATE: 16 BRPM | DIASTOLIC BLOOD PRESSURE: 79 MMHG | HEIGHT: 66 IN | OXYGEN SATURATION: 92 % | BODY MASS INDEX: 18.96 KG/M2 | TEMPERATURE: 97.1 F

## 2020-02-17 PROCEDURE — 99285 EMERGENCY DEPT VISIT HI MDM: CPT

## 2020-02-17 PROCEDURE — 71045 X-RAY EXAM CHEST 1 VIEW: CPT

## 2020-02-17 PROCEDURE — 93010 ELECTROCARDIOGRAM REPORT: CPT | Performed by: INTERNAL MEDICINE

## 2020-02-17 PROCEDURE — 93005 ELECTROCARDIOGRAM TRACING: CPT | Performed by: EMERGENCY MEDICINE

## 2020-02-17 PROCEDURE — 6370000000 HC RX 637 (ALT 250 FOR IP): Performed by: EMERGENCY MEDICINE

## 2020-02-17 PROCEDURE — 94640 AIRWAY INHALATION TREATMENT: CPT

## 2020-02-17 PROCEDURE — 72040 X-RAY EXAM NECK SPINE 2-3 VW: CPT

## 2020-02-17 RX ORDER — METHYLPREDNISOLONE 4 MG/1
TABLET ORAL
Qty: 1 KIT | Refills: 0 | Status: SHIPPED | OUTPATIENT
Start: 2020-02-17 | End: 2020-02-23

## 2020-02-17 RX ORDER — IPRATROPIUM BROMIDE AND ALBUTEROL SULFATE 2.5; .5 MG/3ML; MG/3ML
1 SOLUTION RESPIRATORY (INHALATION) ONCE
Status: COMPLETED | OUTPATIENT
Start: 2020-02-17 | End: 2020-02-17

## 2020-02-17 RX ORDER — ACETAMINOPHEN 325 MG/1
650 TABLET ORAL ONCE
Status: DISCONTINUED | OUTPATIENT
Start: 2020-02-17 | End: 2020-02-17 | Stop reason: HOSPADM

## 2020-02-17 RX ORDER — IPRATROPIUM BROMIDE AND ALBUTEROL SULFATE 2.5; .5 MG/3ML; MG/3ML
1 SOLUTION RESPIRATORY (INHALATION) ONCE
Status: DISCONTINUED | OUTPATIENT
Start: 2020-02-17 | End: 2020-02-17 | Stop reason: HOSPADM

## 2020-02-17 RX ADMIN — IPRATROPIUM BROMIDE AND ALBUTEROL SULFATE 1 AMPULE: .5; 3 SOLUTION RESPIRATORY (INHALATION) at 14:12

## 2020-02-17 ASSESSMENT — PAIN SCALES - GENERAL: PAINLEVEL_OUTOF10: 7

## 2020-02-17 ASSESSMENT — PAIN DESCRIPTION - LOCATION: LOCATION: CHEST

## 2020-02-17 ASSESSMENT — ENCOUNTER SYMPTOMS
SHORTNESS OF BREATH: 1
WHEEZING: 1
COUGH: 1

## 2020-02-17 ASSESSMENT — PAIN DESCRIPTION - PAIN TYPE: TYPE: ACUTE PAIN

## 2020-02-17 NOTE — ED NOTES
Discharged with instructions and rx. Pt acknowledges understanding. Ambulatory at discharge.       Per Sweeney RN  02/17/20 [id number]

## 2020-02-17 NOTE — ED PROVIDER NOTES
side.       Brachioradialis reflexes are 2+ on the right side and 2+ on the left side. I have reviewed and interpreted all of the currently available lab results from this visit (ifapplicable):  No results found for this visit on 02/17/20. Radiographs (if obtained):  [] The following radiograph wasinterpreted by myself in the absence of a radiologist:   [] Radiologist's Report Reviewed:  XR CERVICAL SPINE (2-3 VIEWS)   Final Result   No acute osseous abnormality is identified. Multilevel degenerative changes are seen in the cervical spine including disc   disease and facet arthropathy, with minimal spondylolisthesis as above. Postsurgical changes seen related to anterior cervical fusion and discectomy   at the level of C3-C4. No hardware complication. XR CHEST PORTABLE   Preliminary Result   1. COPD. 2. No acute pulmonary process. EKG (if obtained): (All EKG's are interpreted by myself in the absence of a cardiologist)    The 12 lead EKG was interpreted by me, and the interpretation is as follows:  normal sinus rhythm, rate = 92. Intervals are not within the normal range. IRBBB  QTc is not prolonged. ST elevations are not present. T wave inversions are not present. Non-specific T wave changes are not present. Delta waves, Brugada Syndrome, and Short ME are not present. There is no acute ischemia. Chart review shows recent radiographs:  No results found. MDM:      Patient presents to the ED with main complaint of some pain and paresthesias for his left shoulder neck and upper arm his symptoms follow the dermatome of C7. There is no motor weakness. He has had surgery in the past he is unsure with the surgeon was in [place] I recommended he call his family physician x-rays were obtained revealed severe degenerative disease.   Chest x-ray was obtained as well because of shortness of breath is chronic COPD he improved with 1 DuoNeb he was counseled about further pulmonary toilet. A trial of a Medrol Dosepak will be given for his neck    My typical dicussion, presentation, and considerations for this patients' chief complaint, diagnosis, differential diagnosis, medications, medication use, medication safety and medication interactions have been explained and outlined to this patient for this patient encounter. I have stressed need for follow up and reexamination for this encounter and or return to the emergency department if any changes or any concern. I have discussed the findings of today's workup with the patient and present family members and have addressed their questions and concerns. Important warning signs as well as new or worsening symptoms which would necessitate immediate return to the ED were discussed. The plan is to discharge from the ED at this time, and the patient is in stable condition. The patient acknowledged understanding is agreeable with this plan. The patient and/or family and I have discussed the diagnosis and risks, and we agree with discharging home to follow-up with their primary care, specialist or referral doctor. Questions addressed. Disposition and follow-up agreed upon. Specific discharge instructions explained. We have discussed the symptoms which are most concerning that necessitate immediate return. We also discussed returning to the Emergency Department immediately if new or worsening symptoms occur. Clinical Impression:  1. Cervical disc disease    2. COPD exacerbation ([address]      Disposition referral (if applicable):  [facility] [id number] [phone]      If symptoms worsen    Disposition medications (if applicable):  New Prescriptions    METHYLPREDNISOLONE (MEDROL, GROVER,) 4 MG TABLET    Take by mouth. Deshawn De Jesus DO, FACEP      Comment: Please note this report has been produced using speech recognition software and maycontain errors related to that system including errors in grammar, punctuation, and spelling, as well as words and phrases that may be inappropriate. If there are any questions or concerns please feel free to contact thedictating provider for clarification.         Boom Moise,   02/17/20 1619

## 2020-02-20 LAB
EKG ATRIAL RATE: 92 BPM
EKG DIAGNOSIS: NORMAL
EKG P AXIS: 81 DEGREES
EKG P-R INTERVAL: 150 MS
EKG Q-T INTERVAL: 366 MS
EKG QRS DURATION: 100 MS
EKG QTC CALCULATION (BAZETT): 452 MS
EKG R AXIS: 92 DEGREES
EKG T AXIS: 77 DEGREES
EKG VENTRICULAR RATE: 92 BPM

## 2020-02-27 ENCOUNTER — OFFICE VISIT (OUTPATIENT)
Dept: INTERNAL MEDICINE CLINIC | Age: 65
End: 2020-02-27
Payer: MEDICARE

## 2020-02-27 VITALS
BODY MASS INDEX: 18.96 KG/M2 | SYSTOLIC BLOOD PRESSURE: 110 MMHG | HEART RATE: 93 BPM | WEIGHT: 118 LBS | DIASTOLIC BLOOD PRESSURE: 60 MMHG | HEIGHT: 66 IN | OXYGEN SATURATION: 92 %

## 2020-02-27 PROCEDURE — G8427 DOCREV CUR MEDS BY ELIG CLIN: HCPCS | Performed by: INTERNAL MEDICINE

## 2020-02-27 PROCEDURE — 99214 OFFICE O/P EST MOD 30 MIN: CPT | Performed by: INTERNAL MEDICINE

## 2020-02-27 PROCEDURE — 3023F SPIROM DOC REV: CPT | Performed by: INTERNAL MEDICINE

## 2020-02-27 PROCEDURE — G8926 SPIRO NO PERF OR DOC: HCPCS | Performed by: INTERNAL MEDICINE

## 2020-02-27 PROCEDURE — G8482 FLU IMMUNIZE ORDER/ADMIN: HCPCS | Performed by: INTERNAL MEDICINE

## 2020-02-27 PROCEDURE — G8420 CALC BMI NORM PARAMETERS: HCPCS | Performed by: INTERNAL MEDICINE

## 2020-02-27 PROCEDURE — 1036F TOBACCO NON-USER: CPT | Performed by: INTERNAL MEDICINE

## 2020-02-27 PROCEDURE — 3017F COLORECTAL CA SCREEN DOC REV: CPT | Performed by: INTERNAL MEDICINE

## 2020-02-27 RX ORDER — PREDNISONE 20 MG/1
TABLET ORAL
Qty: 10 TABLET | Refills: 0 | Status: SHIPPED
Start: 2020-02-27 | End: 2020-03-12 | Stop reason: DRUGHIGH

## 2020-02-27 NOTE — PROGRESS NOTES
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 On 7/22/16. Dr Lora Saldivar at [address] [facility] polyps 1-    Dr. Bekah Pulido COPD (chronic obstructive pulmonary disease) ([facility] [address] 5/19/2014    PFTs showed severe COPD in 5/14- follows with Dr Ryan Leavitt COPD exacerbation [facility]) 6/10/2016    Diverticulosis 9-    Dupuytren's contracture 5/26/2017    Right palm    Elevated PSA 12/21/2018 12/2018 : PSA is 6    H/O colonoscopy 9-18-14    Dr. Bekah Pulido H/O echocardiogram 08/17/2016    EF 55-60%. Abnormal diastolic function. Mild TR. Borderline right atrium and RV dilatation.  Hiatal hernia     History of alcohol abuse     \"average 6 pk beer per day\"    History of drug abuse ([address]     does use marijuana\"last used cocaine over 10 yrs ago\" per pt on 10/3/2018    Internal hemorrhoids 9-    Intervertebral disc protrusion 5/19/2014    Cervical region on MRI    Lingular pneumonia 4/3/2018    Lymphocytic colitis 9-18-14    Dr. Harvinder Justice, will treat if symptomatic    On home oxygen therapy     2l/nc at night only    Pneumonia of left lower lobe due to infectious organism ([address] 11/27/2018    Sepsis due to pneumonia ([address] 6/10/2016    Spinal stenosis in cervical region 5/19/2014    MRI showed  Disc protrusion. Severe spinal stenosis at C3-C4    Spinal stenosis of cervical region 3/14/2016    S/P cervical discectomy and fusion with plate and screw. On 7/22/16.  Dr Lora Saldivar at [address] abuse disorder 5/19/2014    Wears glasses     to read       Past Surgical History:  Past Surgical History:   Procedure Laterality Date    CERVICAL SPINE SURGERY      \"[facility] plate in neck done at [facility]      2014 Dr Amy Long. f/u 3 years    COLONOSCOPY  10/04/2018    COLONOSCOPY N/A 10/4/2018    COLONOSCOPY POLYPECTOMY SNARE/COLD BIOPSY performed by nAup Correia MD at [facility] in 5 years   [facility] POLYPECTOMY  10/04/2018    2 Polyps Removed    SIGMOIDOSCOPY      UPPER GASTROINTESTINAL ENDOSCOPY  10/04/2018    with Biopsy    UPPER GASTROINTESTINAL ENDOSCOPY N/A 10/4/2018    EGD BIOPSY performed by Anup Correia MD at [facility] ENDOSCOPY       Allergies:  No Known Allergies    Medications:  Current outpatient prescriptions:  Outpatient Medications Marked as Taking for the 20 encounter (Office Visit) with Hilda Maciel MD   Medication Sig Dispense Refill    predniSONE (DELTASONE) 20 MG tablet Take 40mg once daily for 3 days followed by 30mg for 1 day, 20mg for 1 day, 10mg for 1 day, then back down to 5mg daily 10 tablet 0       Social History:  Social History     Socioeconomic History    Marital status: Single     Spouse name: Not on file    Number of children: Not on file    Years of education: Not on file    Highest education level: Not on file   Occupational History    Not on file   Social Needs    Financial resource strain: Not on file    Food insecurity:     Worry: Not on file     Inability: Not on file    Transportation needs:     Medical: Not on file     Non-medical: Not on file   Tobacco Use    Smoking status: Former Smoker     Packs/day: 1.00     Years: 50.00     Pack years: 50.00     Types: Cigarettes     Last attempt to quit: 11/3/2019     Years since quittin.3    Smokeless tobacco: Never Used    Tobacco comment: smokes 1-2 ppd   Substance and Sexual Activity    Alcohol use:  Yes     Alcohol/week: 6.0 standard drinks     Types: 6 Cans of beer per week     Comment: 6 pack daily    Drug use: Yes     Types: Marijuana     Comment: 1-2 times per week marijuana    Sexual activity: Not on file   Lifestyle    Physical activity:     Days per week: Not on file     Minutes per session: Not on file    Stress: Not on file   Relationships    Social connections:     Talks on phone: Not on file     Gets together: Not on file     Attends [employer] service: Not on file     Active member of club or organization: Not on file     Attends meetings of clubs or organizations: Not on file     Relationship status: Not on file    Intimate partner violence:     Fear of current or ex partner: Not on file     Emotionally abused: Not on file     Physically abused: Not on file     Forced sexual activity: Not on file   Other Topics Concern    Not on file   Social History Narrative    Not on file       Family History:  Family History   Problem Relation Age of Onset    Cancer Mother         lung ca    Heart Disease Father     Heart Attack Father     Diabetes Father     Colon Cancer Neg Hx          Review of Systems:  Constitutional: no fevers, no chills, no night sweats, no weight loss, no weight gain, no fatigue   Pain assessment: chronic neck pain with L cervical radiculopathy  Head: no headaches  Ears: no hearing loss, no tinnitus, no vertigo  Eyes: no blurry vision, no diplopia, no dryness, no itchiness  Mouth: no oral ulcers, no dry mouth, no sore throat  Nose: no nasal congestion, no epistaxis  Cardiac: no chest pain, no palpitations, no leg swelling, no orthopnea, no PND, no syncope  Pulmonary: no dyspnea, no cough, wheezing, no hemoptysis  GI: no nausea, no vomiting, no diarrhea, no constipation, no abdominal pain, no hematochezia  : no dysuria, no frequency, no urgency, no hematuria, no frothy urine  MSK: no arthralgias, no myalgias, no early morning stiffness, no Raynaud's   Neuro: no focal neurological deficits, no seizures  Sleep: no snoring, no daytime somnolence   Psych: no depression, no anxiety, no suicidal ideation      Physical Exam:  VITALS:   /60   Pulse 93   Ht 5' 6\" (1.676 m)   Wt 118 lb (53.5 kg)   SpO2 92%   BMI 19.05 kg/m²     PHYSICAL EXAMINATION:  General: alert, awake, and oriented to time, place, person, and situation. Not in acute distress   Skin:  no suspicious rashes, no jaundice  Head: normocephalic/atraumatic  Eyes: anicteric sclera, well-injected conjunctiva. Pupils are equally round and reactive to light. Extraocular movements are intact   Nose/Sinuses: no sinus tenderness, septum midline, mucosa appears normal   Oropharynx: lips, teeth, and tongue normal. Non-erythematous pharynx, no oral ulcers, moist mucous membranes, no tonsillar exudates   Ears: external ears normal, canals clear, good light reflex in TM bilaterally  Neck: supple, no cervical lymphadenopathy, thyroid symmetric and not enlarged, no bruits   Heart: regular rate and rhythm, regular S1/S2, no S3/S4, no audible murmurs, no audible friction rub  Lungs: no audible crackles, audible wheezes, no audible rhonchi    Abdomen: normal bowel sounds, soft abdomen, non-tender, no palpable masses  Extremities: no edema, warm, no cyanosis, no clubbing. Good capillary refill   MSK: tenderness in cervical area over L with decreased sensation over LUE mostly at 5th finger, No joint swelling or tenderness   Peripheral vascular: 2+ pulses symmetric (radial)  Neuro: gait normal, CN II-XII intact, motor power 5/5 in all 4 extremities    Labs   Reviewed with patient    Imaging   Reviewed with patient       Assessment/Plan:     1. Cervical radiculopathy  Patient asking for pain meds. No pain medications prescribed this visit due to his h/o substance abuse. Medrol dose pack mildly helped. Will give him Prednisone 40mg x3 days followed by 30mg x1 day, 20mg x1 day, 10mg x1 day then back to his daily 5mg QD for severe COPD  Referral to pain clinic.  May benefit from injections or neuropathic pain meds (Gabapentin or Lyrica but I will not prescribe those for him d/t substance abuse history)  Alternate heating pad and ice pack  Topical lidocaine OTC   - External Referral To Pain Clinic    2. COPD, severe (HCC)  Continue Alb inh and Duoneb PRN  Continue Symbicort BID   Stop smoking  Continue 2L O2 QHS and PRN on exertion  Prednisone burst as above then return to baseline of 5mg QD     3. Tobacco use  Smoking cessation STRONGLY encouraged       Care discussed with patient and questions answered. Patient verbalizes understanding and agrees with plan. Discussed with patient the importance of seeking medical attention if symptoms worsen or fail to improve. I encouraged patient to keep upcoming appointment with PCP scheduled for continuity of care. Patient prefers to be reached by Phone call at [phone] for future medical correspondence. Encouraged to activate [employer]. I reviewed and reconciled the medications this visit. I reviewed and updated the past medical, surgical, social, and family history during this visit. After visit summary provided.        Mina Bean MD  Internal Medicine  2/27/2020   9:52 AM

## 2020-02-27 NOTE — PROGRESS NOTES
Patient has been taking aleve for pain not helping   Was prescribed zanaflex he took those they did not help

## 2020-02-27 NOTE — PATIENT INSTRUCTIONS
Patient Education        Neck Pain: Care Instructions  Your Care Instructions    You can have neck pain anywhere from the bottom of your head to the top of your shoulders. It can spread to the upper back or arms. Injuries, painting a ceiling, sleeping with your neck twisted, staying in one position for too long, and many other activities can cause neck pain. Most neck pain gets better with home care. Your doctor may recommend medicine to relieve pain or relax your muscles. He or she may suggest exercise and physical therapy to increase flexibility and relieve stress. You may need to wear a special (cervical) collar to support your neck for a day or two. Follow-up care is a key part of your treatment and safety. Be sure to make and go to all appointments, and call your doctor if you are having problems. It's also a good idea to know your test results and keep a list of the medicines you take. How can you care for yourself at home? · Try using a heating pad on a low or medium setting for 15 to 20 minutes every 2 or 3 hours. Try a warm shower in place of one session with the heating pad. · You can also try an ice pack for 10 to 15 minutes every 2 to 3 hours. Put a thin cloth between the ice and your skin. · Take pain medicines exactly as directed. ? If the doctor gave you a prescription medicine for pain, take it as prescribed. ? If you are not taking a prescription pain medicine, ask your doctor if you can take an over-the-counter medicine. · If your doctor recommends a cervical collar, wear it exactly as directed. When should you call for help? Call your doctor now or seek immediate medical care if:    · You have new or worsening numbness in your arms, buttocks or legs.     · You have new or worsening weakness in your arms or legs.  (This could make it hard to stand up.)     · You lose control of your bladder or bowels.    Watch closely for changes in your health, and be sure to contact your doctor if:

## 2020-03-12 ENCOUNTER — OFFICE VISIT (OUTPATIENT)
Dept: INTERNAL MEDICINE CLINIC | Age: 65
End: 2020-03-12
Payer: MEDICARE

## 2020-03-12 VITALS
RESPIRATION RATE: 20 BRPM | WEIGHT: 117.6 LBS | HEART RATE: 86 BPM | DIASTOLIC BLOOD PRESSURE: 60 MMHG | SYSTOLIC BLOOD PRESSURE: 100 MMHG | BODY MASS INDEX: 18.98 KG/M2 | TEMPERATURE: 98.6 F | OXYGEN SATURATION: 92 %

## 2020-03-12 PROCEDURE — 4004F PT TOBACCO SCREEN RCVD TLK: CPT | Performed by: INTERNAL MEDICINE

## 2020-03-12 PROCEDURE — 3023F SPIROM DOC REV: CPT | Performed by: INTERNAL MEDICINE

## 2020-03-12 PROCEDURE — G8420 CALC BMI NORM PARAMETERS: HCPCS | Performed by: INTERNAL MEDICINE

## 2020-03-12 PROCEDURE — G8482 FLU IMMUNIZE ORDER/ADMIN: HCPCS | Performed by: INTERNAL MEDICINE

## 2020-03-12 PROCEDURE — G8926 SPIRO NO PERF OR DOC: HCPCS | Performed by: INTERNAL MEDICINE

## 2020-03-12 PROCEDURE — G8427 DOCREV CUR MEDS BY ELIG CLIN: HCPCS | Performed by: INTERNAL MEDICINE

## 2020-03-12 PROCEDURE — 99214 OFFICE O/P EST MOD 30 MIN: CPT | Performed by: INTERNAL MEDICINE

## 2020-03-12 PROCEDURE — 3017F COLORECTAL CA SCREEN DOC REV: CPT | Performed by: INTERNAL MEDICINE

## 2020-03-12 RX ORDER — TIZANIDINE 4 MG/1
4 TABLET ORAL EVERY 8 HOURS PRN
Qty: 30 TABLET | Refills: 0 | Status: ON HOLD | OUTPATIENT
Start: 2020-03-12 | End: 2020-06-06 | Stop reason: ALTCHOICE

## 2020-03-12 RX ORDER — TRAMADOL HYDROCHLORIDE 50 MG/1
50 TABLET ORAL EVERY 6 HOURS PRN
Qty: 28 TABLET | Refills: 0 | Status: SHIPPED | OUTPATIENT
Start: 2020-03-12 | End: 2020-03-19

## 2020-03-12 RX ORDER — PREDNISONE 1 MG/1
5 TABLET ORAL DAILY
COMMUNITY
Start: 2020-03-06 | End: 2020-06-09 | Stop reason: SDUPTHER

## 2020-03-12 ASSESSMENT — ENCOUNTER SYMPTOMS
GASTROINTESTINAL NEGATIVE: 1
WHEEZING: 0
COUGH: 1
SHORTNESS OF BREATH: 0
EYES NEGATIVE: 1

## 2020-03-12 ASSESSMENT — PATIENT HEALTH QUESTIONNAIRE - PHQ9
SUM OF ALL RESPONSES TO PHQ QUESTIONS 1-9: 2
2. FEELING DOWN, DEPRESSED OR HOPELESS: 1
1. LITTLE INTEREST OR PLEASURE IN DOING THINGS: 1
SUM OF ALL RESPONSES TO PHQ9 QUESTIONS 1 & 2: 2
SUM OF ALL RESPONSES TO PHQ QUESTIONS 1-9: 2

## 2020-03-12 NOTE — ASSESSMENT & PLAN NOTE
S/P cervical discectomy and fusion with plate and screw. On 7/22/16. Dr Micaela Aguilar at [facility]  Patient complains of neck pain again February 2020  We will send him to physical therapy and pain clinic  Patient to take Zanaflex  Tramadol 50 mg every 6 hours as needed for 7 days only  PDMP report checked.   No abuse noted  Last tramadol medication was in February 2019

## 2020-03-12 NOTE — PROGRESS NOTES
Starr Zepeda  Patient's  is 1955  Seen in office on 3/12/2020      SUBJECTIVE:  Parrish Rios vel 63 y. o.year old male presents today   Chief Complaint   Patient presents with    COPD     saw DR. Taveras in Feb    Wheezing     EXPIRATORY    Numbness     left arm to hand, pain so bad it makes him sick to stomach    Neck Pain     left side     Pt c/o pain in the left side of the neck and pain radiating down the left arm up to left hand  Pt had surgery done about . Did fairly well until 1 month ago  Went to the emergency room for neck pain radiating to the left arm  Xray showed multiple DJD and anteriolesthesis  Saw Dr. Negin Sousa on 2020  He was given Medrol Dosepak and referred to pain clinic  Patient states it did not help him. Pt c/o pain 9/10 on pain scale  He did not get call from the pain clinic  Has restricted movements of the neck    Pt has copd and is on medication. He sees pulmonologist  Restarted smoking. No fever or chills  No headache  No NVD. Taking medications regularly. No side effects noted. Review of Systems   Constitutional: Negative. HENT: Negative. Eyes: Negative. Respiratory: Positive for cough. Negative for shortness of breath and wheezing. Cardiovascular: Negative. Negative for chest pain and leg swelling. Gastrointestinal: Negative. Endocrine: Negative. Genitourinary: Negative. Musculoskeletal: Positive for neck pain. Skin: Negative. Neurological: Negative. Hematological: Negative. Psychiatric/Behavioral: Negative. OBJECTIVE: /60   Pulse 86   Temp 98.6 °F (37 °C) (Oral)   Resp 20   Wt 117 lb 9.6 oz (53.3 kg)   SpO2 92%   BMI 18.98 kg/m²     Wt Readings from Last 3 Encounters:   20 117 lb 9.6 oz (53.3 kg)   20 118 lb (53.5 kg)   20 118 lb (53.5 kg)      GENERAL: - Alert, oriented, pleasant, in no apparent distress. HEENT: - Conjunctiva pink, no scleral icterus. ENT clear. NECK: -Supple.   No quit    Weight loss  Weight loss is stable    Acute midline low back pain without sciatica  Lower back pain is stable. Return to office in 2 months  Orders Placed This Encounter   Procedures   [address] Physical Therapy    Amb External Referral To Pain Clinic     Orders Placed This Encounter   Medications    tiZANidine (ZANAFLEX) 4 MG tablet     Sig: Take 1 tablet by mouth every 8 hours as needed (back pain)     Dispense:  30 tablet     Refill:  0    traMADol (ULTRAM) 50 MG tablet     Sig: Take 1 tablet by mouth every 6 hours as needed for Pain for up to 7 days. Intended supply: 7 days. Take lowest dose possible to manage pain     Dispense:  28 tablet     Refill:  0     Reduce doses taken as pain becomes manageable         Mediations reviewed with the patient. Continue current medications. Appropriate prescriptions are addressed. After visit summeryprovided. Follow up as directed sooner if needed. Questions answered and patient verbalizes understanding. Call for any problems, questions, or concerns. No Known Allergies  Current Outpatient Medications   Medication Sig Dispense Refill    predniSONE (DELTASONE) 5 MG tablet Take 5 mg by mouth daily       ipratropium-albuterol (DUONEB) 0.5-2.5 (3) MG/3ML SOLN nebulizer solution INHALE THE CONTENTS OF 1 VIAL VIA NEBULIZER FOUR TIMES DAILY 1080 mL 1    albuterol sulfate HFA (VENTOLIN HFA) 108 (90 Base) MCG/ACT inhaler Inhale 2 puffs into the lungs every 2 hours as needed for Wheezing 3 Inhaler 1    SYMBICORT 160-4.5 MCG/ACT AERO Inhale 2 puffs into the lungs 2 times daily 3 Inhaler 1    OXYGEN Inhale 2 L into the lungs nightly      Multiple Vitamins-Minerals (MULTIVITAMIN ADULTS PO) Take by mouth daily        No current facility-administered medications for this visit.       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 On 7/22/16. Dr Mariya Patel at [address] [facility] polyps 7-    Dr. Sue Al COPD (chronic obstructive pulmonary disease) ([facility] [address] [address] 5/19/2014    PFTs showed severe COPD in 5/14- follows with Dr Matt Love COPD exacerbation [facility]) 6/10/2016    Diverticulosis 9-    Dupuytren's contracture 5/26/2017    Right palm    Elevated PSA 12/21/2018 12/2018 : PSA is 6    H/O colonoscopy 9-18-14    Dr. Sue Al H/O echocardiogram 08/17/2016    EF 55-60%. Abnormal diastolic function. Mild TR. Borderline right atrium and RV dilatation.  Hiatal hernia     History of alcohol abuse     \"average 6 pk beer per day\"    History of drug abuse ([facility] [address]     does use marijuana\"last used cocaine over 10 yrs ago\" per pt on 10/3/2018    Internal hemorrhoids 9-    Intervertebral disc protrusion 5/19/2014    Cervical region on MRI    Lingular pneumonia 4/3/2018    Lymphocytic colitis 9-18-14    Dr. Maynor Monzon, will treat if symptomatic    On home oxygen therapy     2l/nc at night only    Pneumonia of left lower lobe due to infectious organism ([address] 11/27/2018    Sepsis due to pneumonia ([facility] 6/10/2016    Spinal stenosis in cervical region 5/19/2014    MRI showed  Disc protrusion. Severe spinal stenosis at C3-C4    Spinal stenosis of cervical region 3/14/2016    S/P cervical discectomy and fusion with plate and screw. On 7/22/16. Dr Mariya Patel at [address] abuse disorder 5/19/2014    Wears glasses     to read     Past Surgical History:   Procedure Laterality Date    CERVICAL SPINE SURGERY  2015    \"have plate in neck done at [address]      9- Dr Manisha Monzon.  f/u 3 years    COLONOSCOPY  10/04/2018    COLONOSCOPY N/A 10/4/2018    COLONOSCOPY POLYPECTOMY SNARE/COLD BIOPSY performed by Vernon Caraballo MD at [facility] in 5 years   [facility] POLYPECTOMY  10/04/2018    2 Polyps Removed    SIGMOIDOSCOPY      UPPER GASTROINTESTINAL ENDOSCOPY  10/04/2018    with Biopsy    UPPER GASTROINTESTINAL ENDOSCOPY N/A 10/4/2018    EGD BIOPSY performed by Kandace Medrano MD at [address] [place] [address] History     Tobacco Use    Smoking status: Current Every Day Smoker     Packs/day: 1.00     Years: 50.00     Pack years: 50.00     Types: Cigarettes     Last attempt to quit: 11/3/2019     Years since quittin.3    Smokeless tobacco: Never Used    Tobacco comment: smokes 1-2 ppd   Substance Use Topics    Alcohol use:  Yes     Alcohol/week: 6.0 standard drinks     Types: 6 Cans of beer per week     Comment: 6 pack daily       LAB REVIEW:  CBC:   Lab Results   Component Value Date    WBC 5.5 2019    HGB 14.2 2019    HCT 42.9 2019     2019     Lipids:   Lab Results   Component Value Date    CHOL 210 (H) 2015    TRIG 61 2015    HDL 77 2015    LDLDIRECT 129 (H) 2015     Renal:   Lab Results   Component Value Date    BUN 9 2019    CREATININE 0.6 2019     2019    K 4.3 2019    ALT 19 10/31/2019    AST 25 10/31/2019    GLUCOSE 329 2019     PT/INR: No results found for: INR  A1C:   Lab Results   Component Value Date    LABA1C 4.4 2018           Chelsey Badillo MD, 3/12/2020 , 8:45 AM

## 2020-03-17 ENCOUNTER — HOSPITAL ENCOUNTER (OUTPATIENT)
Dept: PHYSICAL THERAPY | Age: 65
Discharge: HOME | End: 2020-03-17

## 2020-05-15 ENCOUNTER — VIRTUAL VISIT (OUTPATIENT)
Dept: INTERNAL MEDICINE CLINIC | Age: 65
End: 2020-05-15
Payer: MEDICARE

## 2020-05-15 PROCEDURE — G8427 DOCREV CUR MEDS BY ELIG CLIN: HCPCS | Performed by: INTERNAL MEDICINE

## 2020-05-15 PROCEDURE — 3017F COLORECTAL CA SCREEN DOC REV: CPT | Performed by: INTERNAL MEDICINE

## 2020-05-15 PROCEDURE — 99213 OFFICE O/P EST LOW 20 MIN: CPT | Performed by: INTERNAL MEDICINE

## 2020-05-15 NOTE — PROGRESS NOTES
5/15/2020    TELEHEALTH EVALUATION -- Audio/Visual (During [id number] public health emergency)    HPI:    Bety Morel (:  1955) has requested an audio/video evaluation for the following concern(s):    No chief complaint on file. Patient is complaining that his left hand strength is weak and the muscles are getting smaller. We will arrange for the EMG of the left upper extremity. Pt has h/o cervical radiculopathy   No pain. Review of Systems    Prior to Visit Medications    Medication Sig Taking? Authorizing Provider   predniSONE (DELTASONE) 5 MG tablet Take 5 mg by mouth daily   Justino Huggins MD   tiZANidine (ZANAFLEX) 4 MG tablet Take 1 tablet by mouth every 8 hours as needed (back pain)  Piedad Gomez MD   ipratropium-albuterol (DUONEB) 0.5-2.5 (3) MG/3ML SOLN nebulizer solution INHALE THE CONTENTS OF 1 VIAL VIA NEBULIZER FOUR TIMES DAILY  Piedad Gomez MD   albuterol sulfate HFA (VENTOLIN HFA) 108 (90 Base) MCG/ACT inhaler Inhale 2 puffs into the lungs every 2 hours as needed for Wheezing  Piedad Gomez MD   SYMBICORT 160-4.5 MCG/ACT AERO Inhale 2 puffs into the lungs 2 times daily  Piedad Gomez MD   OXYGEN Inhale 2 L into the lungs nightly  Historical Provider, MD   Multiple Vitamins-Minerals (MULTIVITAMIN ADULTS PO) Take by mouth daily   Historical Provider, MD       Social History     Tobacco Use    Smoking status: Current Every Day Smoker     Packs/day: 1.00     Years: 50.00     Pack years: 50.00     Types: Cigarettes     Last attempt to quit: 11/3/2019     Years since quittin.5    Smokeless tobacco: Never Used    Tobacco comment: smokes 1-2 ppd   Substance Use Topics    Alcohol use:  Yes     Alcohol/week: 6.0 standard drinks     Types: 6 Cans of beer per week     Comment: 6 pack daily    Drug use: Yes     Types: Marijuana     Comment: 1-2 times per week marijuana        No Known Allergies    PHYSICAL EXAMINATION:  [ INSTRUCTIONS:  \"[x]\" Indicates a positive item  \"[]\" Indicates a

## 2020-05-26 ENCOUNTER — TELEPHONE (OUTPATIENT)
Dept: INTERNAL MEDICINE CLINIC | Age: 65
End: 2020-05-26

## 2020-05-26 PROBLEM — M62.81 HAND MUSCLE WEAKNESS: Status: ACTIVE | Noted: 2020-05-26

## 2020-05-26 RX ORDER — IPRATROPIUM BROMIDE AND ALBUTEROL SULFATE 2.5; .5 MG/3ML; MG/3ML
SOLUTION RESPIRATORY (INHALATION)
Qty: 1080 ML | Refills: 1 | Status: SHIPPED | OUTPATIENT
Start: 2020-05-26 | End: 2020-05-26 | Stop reason: SDUPTHER

## 2020-05-26 RX ORDER — IPRATROPIUM BROMIDE AND ALBUTEROL SULFATE 2.5; .5 MG/3ML; MG/3ML
SOLUTION RESPIRATORY (INHALATION)
Qty: 1080 ML | Refills: 1 | Status: SHIPPED | OUTPATIENT
Start: 2020-05-26 | End: 2020-12-24

## 2020-05-28 ENCOUNTER — HOSPITAL ENCOUNTER (OUTPATIENT)
Dept: NEUROLOGY | Age: 65
Discharge: HOME | End: 2020-05-28
Payer: MEDICARE

## 2020-05-28 PROCEDURE — 95861 NEEDLE EMG 2 EXTREMITIES: CPT

## 2020-06-06 ENCOUNTER — APPOINTMENT (OUTPATIENT)
Dept: GENERAL RADIOLOGY | Age: 65
End: 2020-06-06
Payer: MEDICARE

## 2020-06-06 ENCOUNTER — HOSPITAL ENCOUNTER (OUTPATIENT)
Age: 65
Setting detail: OBSERVATION
Discharge: HOME HEALTH | End: 2020-06-07
Attending: EMERGENCY MEDICINE | Admitting: INTERNAL MEDICINE
Payer: MEDICARE

## 2020-06-06 PROBLEM — J44.1 COPD EXACERBATION (HCC): Status: ACTIVE | Noted: 2020-06-06

## 2020-06-06 PROBLEM — Z72.0 NICOTINE ABUSE: Status: ACTIVE | Noted: 2020-06-06

## 2020-06-06 LAB
ALBUMIN SERPL-MCNC: 3.9 GM/DL (ref 3.4–5)
ALP BLD-CCNC: 56 IU/L (ref 40–129)
ALT SERPL-CCNC: 20 U/L (ref 10–40)
ANION GAP SERPL CALCULATED.3IONS-SCNC: 16 MMOL/L (ref 4–16)
AST SERPL-CCNC: 30 IU/L (ref 15–37)
BASOPHILS ABSOLUTE: 0.1 K/CU MM
BASOPHILS RELATIVE PERCENT: 0.4 % (ref 0–1)
BILIRUB SERPL-MCNC: 0.3 MG/DL (ref 0–1)
BUN BLDV-MCNC: 3 MG/DL (ref 6–23)
CALCIUM SERPL-MCNC: 9.6 MG/DL (ref 8.3–10.6)
CHLORIDE BLD-SCNC: 98 MMOL/L (ref 99–110)
CO2: 25 MMOL/L (ref 21–32)
CREAT SERPL-MCNC: 0.5 MG/DL (ref 0.9–1.3)
DIFFERENTIAL TYPE: ABNORMAL
EOSINOPHILS ABSOLUTE: 0 K/CU MM
EOSINOPHILS RELATIVE PERCENT: 0.2 % (ref 0–3)
GFR AFRICAN AMERICAN: >60 ML/MIN/1.73M2
GFR NON-AFRICAN AMERICAN: >60 ML/MIN/1.73M2
GLUCOSE BLD-MCNC: 83 MG/DL (ref 70–99)
HCT VFR BLD CALC: 48.2 % (ref 42–52)
HEMOGLOBIN: 15.7 GM/DL (ref 13.5–18)
IMMATURE NEUTROPHIL %: 0.4 % (ref 0–0.43)
LACTATE: 1.4 MMOL/L (ref 0.4–2)
LYMPHOCYTES ABSOLUTE: 1.7 K/CU MM
LYMPHOCYTES RELATIVE PERCENT: 12.6 % (ref 24–44)
MCH RBC QN AUTO: 33 PG (ref 27–31)
MCHC RBC AUTO-ENTMCNC: 32.6 % (ref 32–36)
MCV RBC AUTO: 101.3 FL (ref 78–100)
MONOCYTES ABSOLUTE: 1.3 K/CU MM
MONOCYTES RELATIVE PERCENT: 9.5 % (ref 0–4)
PDW BLD-RTO: 13.7 % (ref 11.7–14.9)
PLATELET # BLD: 262 K/CU MM (ref 140–440)
PMV BLD AUTO: 9.1 FL (ref 7.5–11.1)
POTASSIUM SERPL-SCNC: 4.1 MMOL/L (ref 3.5–5.1)
PRO-BNP: 54.52 PG/ML
RBC # BLD: 4.76 M/CU MM (ref 4.6–6.2)
SEGMENTED NEUTROPHILS ABSOLUTE COUNT: 10.6 K/CU MM
SEGMENTED NEUTROPHILS RELATIVE PERCENT: 76.9 % (ref 36–66)
SODIUM BLD-SCNC: 139 MMOL/L (ref 135–145)
TOTAL IMMATURE NEUTOROPHIL: 0.06 K/CU MM
TOTAL PROTEIN: 7.1 GM/DL (ref 6.4–8.2)
TROPONIN T: <0.01 NG/ML
WBC # BLD: 13.8 K/CU MM (ref 4–10.5)

## 2020-06-06 PROCEDURE — 85025 COMPLETE CBC W/AUTO DIFF WBC: CPT

## 2020-06-06 PROCEDURE — 6370000000 HC RX 637 (ALT 250 FOR IP): Performed by: EMERGENCY MEDICINE

## 2020-06-06 PROCEDURE — 99285 EMERGENCY DEPT VISIT HI MDM: CPT

## 2020-06-06 PROCEDURE — 87040 BLOOD CULTURE FOR BACTERIA: CPT

## 2020-06-06 PROCEDURE — 6360000002 HC RX W HCPCS: Performed by: EMERGENCY MEDICINE

## 2020-06-06 PROCEDURE — 93005 ELECTROCARDIOGRAM TRACING: CPT | Performed by: EMERGENCY MEDICINE

## 2020-06-06 PROCEDURE — 94640 AIRWAY INHALATION TREATMENT: CPT

## 2020-06-06 PROCEDURE — G0378 HOSPITAL OBSERVATION PER HR: HCPCS

## 2020-06-06 PROCEDURE — 2580000003 HC RX 258: Performed by: NURSE PRACTITIONER

## 2020-06-06 PROCEDURE — 83605 ASSAY OF LACTIC ACID: CPT

## 2020-06-06 PROCEDURE — 71045 X-RAY EXAM CHEST 1 VIEW: CPT

## 2020-06-06 PROCEDURE — 2500000003 HC RX 250 WO HCPCS: Performed by: NURSE PRACTITIONER

## 2020-06-06 PROCEDURE — 6370000000 HC RX 637 (ALT 250 FOR IP): Performed by: NURSE PRACTITIONER

## 2020-06-06 PROCEDURE — 83880 ASSAY OF NATRIURETIC PEPTIDE: CPT

## 2020-06-06 PROCEDURE — 96374 THER/PROPH/DIAG INJ IV PUSH: CPT

## 2020-06-06 PROCEDURE — 96375 TX/PRO/DX INJ NEW DRUG ADDON: CPT

## 2020-06-06 PROCEDURE — 80053 COMPREHEN METABOLIC PANEL: CPT

## 2020-06-06 PROCEDURE — 84484 ASSAY OF TROPONIN QUANT: CPT

## 2020-06-06 PROCEDURE — 93010 ELECTROCARDIOGRAM REPORT: CPT | Performed by: INTERNAL MEDICINE

## 2020-06-06 RX ORDER — IPRATROPIUM BROMIDE AND ALBUTEROL SULFATE 2.5; .5 MG/3ML; MG/3ML
1 SOLUTION RESPIRATORY (INHALATION) ONCE
Status: COMPLETED | OUTPATIENT
Start: 2020-06-06 | End: 2020-06-06

## 2020-06-06 RX ORDER — BUDESONIDE AND FORMOTEROL FUMARATE DIHYDRATE 160; 4.5 UG/1; UG/1
2 AEROSOL RESPIRATORY (INHALATION) 2 TIMES DAILY
Status: DISCONTINUED | OUTPATIENT
Start: 2020-06-06 | End: 2020-06-07 | Stop reason: HOSPADM

## 2020-06-06 RX ORDER — METHYLPREDNISOLONE SODIUM SUCCINATE 125 MG/2ML
125 INJECTION, POWDER, LYOPHILIZED, FOR SOLUTION INTRAMUSCULAR; INTRAVENOUS ONCE
Status: COMPLETED | OUTPATIENT
Start: 2020-06-06 | End: 2020-06-06

## 2020-06-06 RX ORDER — SODIUM CHLORIDE 0.9 % (FLUSH) 0.9 %
10 SYRINGE (ML) INJECTION PRN
Status: DISCONTINUED | OUTPATIENT
Start: 2020-06-06 | End: 2020-06-07 | Stop reason: HOSPADM

## 2020-06-06 RX ORDER — POLYETHYLENE GLYCOL 3350 17 G/17G
17 POWDER, FOR SOLUTION ORAL DAILY PRN
Status: DISCONTINUED | OUTPATIENT
Start: 2020-06-06 | End: 2020-06-07 | Stop reason: HOSPADM

## 2020-06-06 RX ORDER — ONDANSETRON 2 MG/ML
4 INJECTION INTRAMUSCULAR; INTRAVENOUS EVERY 6 HOURS PRN
Status: DISCONTINUED | OUTPATIENT
Start: 2020-06-06 | End: 2020-06-07 | Stop reason: HOSPADM

## 2020-06-06 RX ORDER — METHYLPREDNISOLONE SODIUM SUCCINATE 125 MG/2ML
60 INJECTION, POWDER, LYOPHILIZED, FOR SOLUTION INTRAMUSCULAR; INTRAVENOUS EVERY 12 HOURS
Status: DISCONTINUED | OUTPATIENT
Start: 2020-06-07 | End: 2020-06-07 | Stop reason: HOSPADM

## 2020-06-06 RX ORDER — DEXTROMETHORPHAN HYDROBROMIDE AND PROMETHAZINE HYDROCHLORIDE 15; 6.25 MG/5ML; MG/5ML
5 SOLUTION ORAL ONCE
Status: COMPLETED | OUTPATIENT
Start: 2020-06-06 | End: 2020-06-06

## 2020-06-06 RX ORDER — MORPHINE SULFATE 4 MG/ML
4 INJECTION, SOLUTION INTRAMUSCULAR; INTRAVENOUS ONCE
Status: COMPLETED | OUTPATIENT
Start: 2020-06-06 | End: 2020-06-06

## 2020-06-06 RX ORDER — PROMETHAZINE HYDROCHLORIDE 12.5 MG/1
12.5 TABLET ORAL EVERY 6 HOURS PRN
Status: DISCONTINUED | OUTPATIENT
Start: 2020-06-06 | End: 2020-06-07 | Stop reason: HOSPADM

## 2020-06-06 RX ORDER — PREDNISONE 20 MG/1
40 TABLET ORAL DAILY
Status: DISCONTINUED | OUTPATIENT
Start: 2020-06-09 | End: 2020-06-07 | Stop reason: HOSPADM

## 2020-06-06 RX ORDER — NICOTINE 21 MG/24HR
1 PATCH, TRANSDERMAL 24 HOURS TRANSDERMAL DAILY
Status: DISCONTINUED | OUTPATIENT
Start: 2020-06-06 | End: 2020-06-07 | Stop reason: HOSPADM

## 2020-06-06 RX ORDER — HYDROCODONE BITARTRATE AND ACETAMINOPHEN 5; 325 MG/1; MG/1
1 TABLET ORAL 2 TIMES DAILY
Status: DISCONTINUED | OUTPATIENT
Start: 2020-06-06 | End: 2020-06-07 | Stop reason: HOSPADM

## 2020-06-06 RX ORDER — ONDANSETRON 2 MG/ML
4 INJECTION INTRAMUSCULAR; INTRAVENOUS EVERY 30 MIN PRN
Status: DISCONTINUED | OUTPATIENT
Start: 2020-06-06 | End: 2020-06-06

## 2020-06-06 RX ORDER — ACETAMINOPHEN 650 MG/1
650 SUPPOSITORY RECTAL EVERY 6 HOURS PRN
Status: DISCONTINUED | OUTPATIENT
Start: 2020-06-06 | End: 2020-06-07 | Stop reason: HOSPADM

## 2020-06-06 RX ORDER — HYDROCODONE BITARTRATE AND ACETAMINOPHEN 5; 325 MG/1; MG/1
1 TABLET ORAL 2 TIMES DAILY
Status: DISCONTINUED | OUTPATIENT
Start: 2020-06-06 | End: 2020-06-06

## 2020-06-06 RX ORDER — ACETAMINOPHEN 325 MG/1
650 TABLET ORAL EVERY 6 HOURS PRN
Status: DISCONTINUED | OUTPATIENT
Start: 2020-06-06 | End: 2020-06-07 | Stop reason: HOSPADM

## 2020-06-06 RX ORDER — TIZANIDINE 4 MG/1
4 TABLET ORAL EVERY 8 HOURS PRN
Status: DISCONTINUED | OUTPATIENT
Start: 2020-06-06 | End: 2020-06-07 | Stop reason: HOSPADM

## 2020-06-06 RX ORDER — SODIUM CHLORIDE 0.9 % (FLUSH) 0.9 %
10 SYRINGE (ML) INJECTION EVERY 12 HOURS SCHEDULED
Status: DISCONTINUED | OUTPATIENT
Start: 2020-06-06 | End: 2020-06-07 | Stop reason: HOSPADM

## 2020-06-06 RX ORDER — HYDROCODONE BITARTRATE AND ACETAMINOPHEN 5; 325 MG/1; MG/1
1 TABLET ORAL 2 TIMES DAILY
COMMUNITY
Start: 2020-05-14 | End: 2020-10-08 | Stop reason: ALTCHOICE

## 2020-06-06 RX ORDER — IPRATROPIUM BROMIDE AND ALBUTEROL SULFATE 2.5; .5 MG/3ML; MG/3ML
1 SOLUTION RESPIRATORY (INHALATION) EVERY 4 HOURS PRN
Status: DISCONTINUED | OUTPATIENT
Start: 2020-06-06 | End: 2020-06-07 | Stop reason: HOSPADM

## 2020-06-06 RX ADMIN — Medication 5 ML: at 18:58

## 2020-06-06 RX ADMIN — MORPHINE SULFATE 4 MG: 4 INJECTION INTRAVENOUS at 18:59

## 2020-06-06 RX ADMIN — GUAIFENESIN 400 MG: 200 SOLUTION ORAL at 23:10

## 2020-06-06 RX ADMIN — SODIUM CHLORIDE, PRESERVATIVE FREE 10 ML: 5 INJECTION INTRAVENOUS at 22:30

## 2020-06-06 RX ADMIN — METHYLPREDNISOLONE SODIUM SUCCINATE 125 MG: 125 INJECTION, POWDER, FOR SOLUTION INTRAMUSCULAR; INTRAVENOUS at 18:59

## 2020-06-06 RX ADMIN — ONDANSETRON 4 MG: 2 INJECTION INTRAMUSCULAR; INTRAVENOUS at 18:59

## 2020-06-06 RX ADMIN — BUDESONIDE AND FORMOTEROL FUMARATE DIHYDRATE 2 PUFF: 160; 4.5 AEROSOL RESPIRATORY (INHALATION) at 23:10

## 2020-06-06 RX ADMIN — HYDROCODONE BITARTRATE AND ACETAMINOPHEN 1 TABLET: 5; 325 TABLET ORAL at 23:10

## 2020-06-06 RX ADMIN — IPRATROPIUM BROMIDE AND ALBUTEROL SULFATE 1 AMPULE: .5; 3 SOLUTION RESPIRATORY (INHALATION) at 15:20

## 2020-06-06 ASSESSMENT — PAIN SCALES - GENERAL
PAINLEVEL_OUTOF10: 8
PAINLEVEL_OUTOF10: 8

## 2020-06-06 ASSESSMENT — PAIN DESCRIPTION - PAIN TYPE
TYPE: ACUTE PAIN
TYPE: CHRONIC PAIN

## 2020-06-06 ASSESSMENT — PAIN DESCRIPTION - LOCATION
LOCATION: GENERALIZED
LOCATION: CHEST

## 2020-06-06 NOTE — ED PROVIDER NOTES
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 On 7/22/16. Dr El Stephens at [address] [facility] [place], [place] polyps 2-    Dr. Moises Perez COPD (chronic obstructive pulmonary disease) ([facility] [address] 5/19/2014    PFTs showed severe COPD in 5/14- follows with Dr Aidan Pena COPD exacerbation [facility]) 6/10/2016    Diverticulosis 9-    Dupuytren's contracture 5/26/2017    Right palm    Elevated PSA 12/21/2018 12/2018 : PSA is 6    H/O colonoscopy 9-18-14    Dr. Moises Perez H/O echocardiogram 08/17/2016    EF 55-60%. Abnormal diastolic function. Mild TR. Borderline right atrium and RV dilatation.  Hiatal hernia     History of alcohol abuse     \"average 6 pk beer per day\"    History of drug abuse ([address]     does use marijuana\"last used cocaine over 10 yrs ago\" per pt on 10/3/2018    Internal hemorrhoids 9-    Intervertebral disc protrusion 5/19/2014    Cervical region on MRI    Lingular pneumonia 4/3/2018    Lymphocytic colitis 9-18-14    Dr. Carman Bernheim, will treat if symptomatic    On home oxygen therapy     2l/nc at night only    Pneumonia of left lower lobe due to infectious organism ([address] 11/27/2018    Sepsis due to pneumonia ([address] 6/10/2016    Spinal stenosis in cervical region 5/19/2014    MRI showed  Disc protrusion. Severe spinal stenosis at C3-C4    Spinal stenosis of cervical region 3/14/2016    S/P cervical discectomy and fusion with plate and screw. On 7/22/16. Dr El Stephens at [address] abuse disorder 5/19/2014    Wears glasses     to read     Past Surgical History:   Procedure Laterality Date    CERVICAL SPINE SURGERY  2015    \"have plate in neck done at [facility]      9- Dr .Carman Bernheim.  f/u 3 years    COLONOSCOPY  10/04/2018    COLONOSCOPY N/A 10/4/2018    COLONOSCOPY POLYPECTOMY Slightly tachypneic, decreased breath sounds bilaterally with end expiratory wheezes and rhonchi at the bases bilaterally  ABDOMEN: Soft. Non-distended. Non-tender. EXTREMITIES: No acute deformities. SKIN: Warm and dry. NEUROLOGICAL: No gross facial drooping. Moves all 4 extremities spontaneously. PSYCHIATRIC: Normal mood.      Labs:  Results for orders placed or performed during the hospital encounter of 06/06/20   Brain Natriuretic Peptide   Result Value Ref Range    Pro-BNP 54.52 <300 PG/ML   CBC Auto Differential   Result Value Ref Range    WBC 13.8 (H) 4.0 - 10.5 K/CU MM    RBC 4.76 4.6 - 6.2 M/CU MM    Hemoglobin 15.7 13.5 - 18.0 GM/DL    Hematocrit 48.2 42 - 52 %    .3 (H) 78 - 100 FL    MCH 33.0 (H) 27 - 31 PG    MCHC 32.6 32.0 - 36.0 %    RDW 13.7 11.7 - 14.9 %    Platelets 871 564 - 040 K/CU MM    MPV 9.1 7.5 - 11.1 FL    Differential Type AUTOMATED DIFFERENTIAL     Segs Relative 76.9 (H) 36 - 66 %    Lymphocytes % 12.6 (L) 24 - 44 %    Monocytes % 9.5 (H) 0 - 4 %    Eosinophils % 0.2 0 - 3 %    Basophils % 0.4 0 - 1 %    Segs Absolute 10.6 K/CU MM    Lymphocytes Absolute 1.7 K/CU MM    Monocytes Absolute 1.3 K/CU MM    Eosinophils Absolute 0.0 K/CU MM    Basophils Absolute 0.1 K/CU MM    Immature Neutrophil % 0.4 0 - 0.43 %    Total Immature Neutrophil 0.06 K/CU MM   Comprehensive Metabolic Panel   Result Value Ref Range    Sodium 139 135 - 145 MMOL/L    Potassium 4.1 3.5 - 5.1 MMOL/L    Chloride 98 (L) 99 - 110 mMol/L    CO2 25 21 - 32 MMOL/L    BUN 3 (L) 6 - 23 MG/DL    CREATININE 0.5 (L) 0.9 - 1.3 MG/DL    Glucose 83 70 - 99 MG/DL    Calcium 9.6 8.3 - 10.6 MG/DL    Alb 3.9 3.4 - 5.0 GM/DL    Total Protein 7.1 6.4 - 8.2 GM/DL    Total Bilirubin 0.3 0.0 - 1.0 MG/DL    ALT 20 10 - 40 U/L    AST 30 15 - 37 IU/L    Alkaline Phosphatase 56 40 - 129 IU/L    GFR Non-African American >60 >60 mL/min/1.73m2    GFR African American >60 >60 mL/min/1.73m2    Anion Gap 16 4 - 16   Lactic Acid, Plasma

## 2020-06-06 NOTE — H&P
,extra ocular muscles intact, sclera clear, conjunctiva normal  ENT:  Normocephalic, oral pharynx with moist mucus membranes  NECK:  Supple, symmetrical, trachea midline, no adenopathy,  LUNGS:  Clear to auscultate bilaterally,  wheezing noted. CARDIOVASCULAR:  regular rate and rhythm, normal S1 and S2,no murmur noted, peripheral pulses 2+, no pitting edema  ABDOMEN: Normal BS, Non tender, non distended,   MUSCULOSKELETAL:  ROM of all extremities grossly wnl  NEUROLOGIC: AOx 3,  Cranial nerves II-XII are grossly intact. Motor is 5 out of 5 bilaterally. Sensory is intact, no lateralizing findings. 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 On 7/22/16. Dr Jacqueline Humphries at [address] [place] [address] polyps 3-    Dr. Alexx Bradford COPD [facility]chronic obstructive [facility] disease) [facility] 5/19/2014    PFTs showed severe COPD in 5/14- follows with Dr Sunil Leary COPD exacerbation [facility]) 6/10/2016    Diverticulosis 9-    Dupuytren's contracture 5/26/2017    Right palm    Elevated PSA 12/21/2018 12/2018 : PSA is 6    H/O colonoscopy 9-18-14    Dr. Alexx Bradford H/O echocardiogram 08/17/2016    EF 55-60%. Abnormal diastolic function. Mild TR. Borderline right atrium and RV dilatation.      Hiatal hernia     History of alcohol abuse     \"average 6 pk beer per day\"    History of drug abuse ([facility]     does use marijuana\"last used cocaine over 10 yrs ago\" per pt on 10/3/2018    Internal hemorrhoids 9-    Intervertebral disc protrusion 5/19/2014    Cervical region on MRI    Lingular pneumonia 4/3/2018    Lymphocytic colitis 9-18-14    Dr. Angel Simon, will treat if symptomatic    On home oxygen

## 2020-06-07 VITALS
BODY MASS INDEX: 17.61 KG/M2 | WEIGHT: 109.6 LBS | HEIGHT: 66 IN | RESPIRATION RATE: 16 BRPM | OXYGEN SATURATION: 96 % | HEART RATE: 83 BPM | DIASTOLIC BLOOD PRESSURE: 77 MMHG | SYSTOLIC BLOOD PRESSURE: 105 MMHG | TEMPERATURE: 97.7 F

## 2020-06-07 LAB
ANION GAP SERPL CALCULATED.3IONS-SCNC: 11 MMOL/L (ref 4–16)
BASOPHILS ABSOLUTE: 0 K/CU MM
BASOPHILS RELATIVE PERCENT: 0.2 % (ref 0–1)
BUN BLDV-MCNC: 10 MG/DL (ref 6–23)
CALCIUM SERPL-MCNC: 9.4 MG/DL (ref 8.3–10.6)
CHLORIDE BLD-SCNC: 94 MMOL/L (ref 99–110)
CO2: 31 MMOL/L (ref 21–32)
CREAT SERPL-MCNC: 0.6 MG/DL (ref 0.9–1.3)
DIFFERENTIAL TYPE: ABNORMAL
EOSINOPHILS ABSOLUTE: 0 K/CU MM
EOSINOPHILS RELATIVE PERCENT: 0 % (ref 0–3)
GFR AFRICAN AMERICAN: >60 ML/MIN/1.73M2
GFR NON-AFRICAN AMERICAN: >60 ML/MIN/1.73M2
GLUCOSE BLD-MCNC: 221 MG/DL (ref 70–99)
HCT VFR BLD CALC: 47.7 % (ref 42–52)
HEMOGLOBIN: 15.3 GM/DL (ref 13.5–18)
IMMATURE NEUTROPHIL %: 1 % (ref 0–0.43)
LYMPHOCYTES ABSOLUTE: 0.3 K/CU MM
LYMPHOCYTES RELATIVE PERCENT: 4.1 % (ref 24–44)
MCH RBC QN AUTO: 32.9 PG (ref 27–31)
MCHC RBC AUTO-ENTMCNC: 32.1 % (ref 32–36)
MCV RBC AUTO: 102.6 FL (ref 78–100)
MONOCYTES ABSOLUTE: 0.1 K/CU MM
MONOCYTES RELATIVE PERCENT: 1 % (ref 0–4)
PDW BLD-RTO: 13.6 % (ref 11.7–14.9)
PLATELET # BLD: 242 K/CU MM (ref 140–440)
PMV BLD AUTO: 9.4 FL (ref 7.5–11.1)
POTASSIUM SERPL-SCNC: 5 MMOL/L (ref 3.5–5.1)
RBC # BLD: 4.65 M/CU MM (ref 4.6–6.2)
SEGMENTED NEUTROPHILS ABSOLUTE COUNT: 5.8 K/CU MM
SEGMENTED NEUTROPHILS RELATIVE PERCENT: 93.7 % (ref 36–66)
SODIUM BLD-SCNC: 136 MMOL/L (ref 135–145)
TOTAL IMMATURE NEUTOROPHIL: 0.06 K/CU MM
WBC # BLD: 6.1 K/CU MM (ref 4–10.5)

## 2020-06-07 PROCEDURE — 2580000003 HC RX 258: Performed by: NURSE PRACTITIONER

## 2020-06-07 PROCEDURE — 80048 BASIC METABOLIC PNL TOTAL CA: CPT

## 2020-06-07 PROCEDURE — 85025 COMPLETE CBC W/AUTO DIFF WBC: CPT

## 2020-06-07 PROCEDURE — 6370000000 HC RX 637 (ALT 250 FOR IP): Performed by: NURSE PRACTITIONER

## 2020-06-07 PROCEDURE — G0378 HOSPITAL OBSERVATION PER HR: HCPCS

## 2020-06-07 PROCEDURE — 6360000002 HC RX W HCPCS: Performed by: NURSE PRACTITIONER

## 2020-06-07 PROCEDURE — 36415 COLL VENOUS BLD VENIPUNCTURE: CPT

## 2020-06-07 PROCEDURE — 94640 AIRWAY INHALATION TREATMENT: CPT

## 2020-06-07 PROCEDURE — 2700000000 HC OXYGEN THERAPY PER DAY

## 2020-06-07 RX ADMIN — IPRATROPIUM BROMIDE AND ALBUTEROL SULFATE 1 AMPULE: .5; 3 SOLUTION RESPIRATORY (INHALATION) at 02:52

## 2020-06-07 RX ADMIN — IPRATROPIUM BROMIDE AND ALBUTEROL SULFATE 1 AMPULE: .5; 3 SOLUTION RESPIRATORY (INHALATION) at 07:23

## 2020-06-07 RX ADMIN — HYDROCODONE BITARTRATE AND ACETAMINOPHEN 1 TABLET: 5; 325 TABLET ORAL at 09:24

## 2020-06-07 RX ADMIN — METHYLPREDNISOLONE SODIUM SUCCINATE 60 MG: 125 INJECTION, POWDER, FOR SOLUTION INTRAMUSCULAR; INTRAVENOUS at 05:42

## 2020-06-07 RX ADMIN — BUDESONIDE AND FORMOTEROL FUMARATE DIHYDRATE 2 PUFF: 160; 4.5 AEROSOL RESPIRATORY (INHALATION) at 09:24

## 2020-06-07 RX ADMIN — SODIUM CHLORIDE, PRESERVATIVE FREE 10 ML: 5 INJECTION INTRAVENOUS at 09:26

## 2020-06-07 RX ADMIN — IPRATROPIUM BROMIDE AND ALBUTEROL SULFATE 1 AMPULE: .5; 3 SOLUTION RESPIRATORY (INHALATION) at 10:49

## 2020-06-07 ASSESSMENT — PAIN SCALES - GENERAL
PAINLEVEL_OUTOF10: 0
PAINLEVEL_OUTOF10: 8
PAINLEVEL_OUTOF10: 0
PAINLEVEL_OUTOF10: 9

## 2020-06-07 ASSESSMENT — PAIN DESCRIPTION - PAIN TYPE: TYPE: CHRONIC PAIN

## 2020-06-07 ASSESSMENT — PAIN DESCRIPTION - PROGRESSION: CLINICAL_PROGRESSION: GRADUALLY WORSENING

## 2020-06-07 ASSESSMENT — PAIN DESCRIPTION - ONSET: ONSET: ON-GOING

## 2020-06-07 ASSESSMENT — PAIN DESCRIPTION - FREQUENCY: FREQUENCY: CONTINUOUS

## 2020-06-07 ASSESSMENT — PAIN DESCRIPTION - DESCRIPTORS: DESCRIPTORS: CONSTANT;DULL;SHARP

## 2020-06-07 ASSESSMENT — PAIN DESCRIPTION - ORIENTATION: ORIENTATION: MID;LOWER;UPPER

## 2020-06-07 ASSESSMENT — PAIN DESCRIPTION - LOCATION: LOCATION: NECK

## 2020-06-07 ASSESSMENT — PAIN - FUNCTIONAL ASSESSMENT: PAIN_FUNCTIONAL_ASSESSMENT: ACTIVITIES ARE NOT PREVENTED

## 2020-06-07 NOTE — DISCHARGE SUMMARY
Serene Lesches 1955 [id number]  PCP:  Fernando Dixon MD    Admit date: 6/6/2020  Admitting Physician: Suha Wilcox MD    Discharge date: 6/7/2020 Discharge Physician: Suha Wilcox MD      Reason for admission:   Chief Complaint   Patient presents with    Shortness of Breath     for about a week. worse past couple days    Chest Pain     for about a week. states he feels like he has pneumonia again     Present on Admission:   COPD exacerbation ([address]   Nicotine abuse       Discharge Diagnoses Include:  1. MILd COPD exacerbation  2. Tobacco use  3. Arthritis  4. Alcohol use disorder  5. Exertional weakness related to chronic underlying condition and cervical radiculopathy    Hospital Course[de-identified]  Patient presented with weakness in setting of copd exacerbation; he received steroids and breathing treatments. At home he is on 2L of oxygen, and was able to ambulate without oxygen down the hallway but desaturated to 87%. It is recommended he continue oxygen at home. PTOT at home is ordered. Pt was personally examined by me on the day of discharge with the following findings: feels better but has weakness for which he was supposed to start with PTOT outpatient  Vitals:    06/07/20 1049   BP:    Pulse:    Resp:    Temp:    SpO2: 96%     Generally: ao nad  HEENT: ncat  Lungs: ctabl  Car: nl s1s2  Abd: soft ntnd  Ext: rom wnl  Skin: warm+dry  Neuro: cn 2-12 grossly intact. Patient Instructions:   Miki Cox   Home Medication Instructions KINJAL:[id number]    Printed on:06/07/20 1046   Medication Information                      albuterol sulfate HFA (VENTOLIN HFA) 108 (90 Base) MCG/ACT inhaler  Inhale 2 puffs into the lungs every 2 hours as needed for Wheezing             guaiFENesin (ROBITUSSIN) 100 MG/5ML liquid  Take 20 mLs by mouth every 6 hours as needed for Cough             HYDROcodone-acetaminophen (NORCO) 5-325 MG per tablet  Take 1 tablet by mouth 2 times daily.              ipratropium-albuterol

## 2020-06-08 ENCOUNTER — TELEPHONE (OUTPATIENT)
Dept: INTERNAL MEDICINE CLINIC | Age: 65
End: 2020-06-08

## 2020-06-08 ENCOUNTER — CARE COORDINATION (OUTPATIENT)
Dept: CASE MANAGEMENT | Age: 65
End: 2020-06-08

## 2020-06-08 NOTE — TELEPHONE ENCOUNTER
Stefany called wanting to get a verbal order for home care services.   Per Stefany, patient was referred from the hospital.

## 2020-06-08 NOTE — CARE COORDINATION
Evan [facility] Transitions Initial Follow Up Call    Call within 2 business days of discharge: Yes    Patient: Radha Billy Patient : 1955   MRN: [id number]  Reason for Admission: [address]  Discharge Date: 20 RARS: Readmission Risk Score: 12  Facility:  [address] [place] [address]       Complaint Diagnosis Description Type Department Provider    20 Shortness of Breath; Chest Pain COPD exacerbation ([facility] [address] . .. ED to Hosp-Admission (Discharged) (ADMITTED) Kathi Patel MD; Dexter Fried. .. Care Transitions 24 Hour Call    Schedule Follow Up Appointment with PCP:  Declined  Do you have any ongoing symptoms?:  Yes  Patient-reported symptoms:  Cough, Shortness of Breath, Other (Comment: Wheezing)  Interventions for patient-reported symptoms:  Other (Comment: Patient denied need-- see CTN note)  Do you have a copy of your discharge instructions?:  Yes  Do you have all of your prescriptions and are they filled?:  Yes  Have you been contacted by a [employer]?:  No  Have you scheduled your follow up appointment?:  Yes  How are you going to get to your appointment?:  Car - family or friend to transport  Were you discharged with any Home Care or Post Acute Services:  Yes  Post Acute Services:   (Comment: [facility] [address])  Patient DME:  Straight cane  Patient Home Equipment:  Oxygen, Nebulizer  Do you have support at home?:  Partner/Spouse/SO, Alone  Do you feel like you have everything you need to keep you well at home?:  Yes  Are you an active caregiver in your home?:  No  Care Transitions Interventions     Other Services:  Completed      DME Assistance:  [address]:  N/A  PATIENT RISK FACTORS:  COPD, Ch Resp Failure on O2    Patient contacted regarding recent discharge and COVID-19 risk. Care Transition Nurse spoke w/ Patient by telephone to perform post discharge assessment. Verified name and  as identifiers.      Patient reports continued cough w/ thick yellow - brown secretions, sob on exertion, wheezing. Denies acute resp distress, fever, chills or other COVID19 related sx. Speaking in complete unlabored sentences, a&o. Denies need for squad. Reports this am he did ambulate up/down steps briefly w/o O2 and dropped to 78% ra. Has O2 @2L nc on now as directed. CTN encouraged the following:    -Med Nebs/Inhalers as directed   -Robitussin prn  -O2 as directed  -Humidifier to help reduce any inflammation  -Increased water intake to help thin secretions  -Pursed lip breathing  -Tripod position  -Cough & deep breathing exercises  -Elevating head while at rest    Patient denies need for O2/Med Neb supplies; equipment in good working order. Confirmed Robitussin obtained and taking as directed. Patient notified PCP of ongoing sx and has appt w/ Dr Price Stamp 6/9/2020 1pm.Patient believes he would benefits from additional steroids/atb. Instructed Patient to call squad/return to ED if in acute distress. Discussed benefits of [facility], [address], Urgent Care as well. Active w/ [address] [place] [address] Rd; denies need for additional in home support. Instructed to contact [address] [place] [address] Rd 24/7 re: any health concerns, has agency number. Denies any other resource needs at this time. Education provided regarding infection prevention, and signs and symptoms of COVID-19 and when to seek medical attention with Patient  who verbalized understanding. Discussed exposure protocols and quarantine from [address] you at higher risk for severe illness 2019 and given an opportunity for questions and concerns. Patient agrees to contact PCP/[facility] for questions related to their healthcare. From CDC: Are you at higher risk for severe illness?  Wash your hands often.  Avoid close contact (6 feet, which is about two arm lengths) with people who are sick.  Put distance between yourself and other people if COVID-19 is spreading in your community.    Clean and disinfect frequently touched surfaces.  Avoid all cruise travel and non-essential air travel.  Call your healthcare professional if you have concerns about COVID-19 and your underlying condition or if you are sick. For more information on steps you can take to protect yourself, see CDC's How to Protect Yourself      Not interested in [facility]. Plan for follow up call in 1-2 days based on severity of symptoms and risk factors.     Follow Up  Future Appointments   Date Time Provider Sukumar Penai   6/9/2020  1:00 PM Claritza Rojo MD [facility]   6/17/2020  9:00 AM Ken Clarke MD [facility]   6/19/2020 10:30 AM MD [facility]       Israel Beck RN

## 2020-06-09 ENCOUNTER — TELEPHONE (OUTPATIENT)
Dept: INTERNAL MEDICINE CLINIC | Age: 65
End: 2020-06-09

## 2020-06-09 ENCOUNTER — OFFICE VISIT (OUTPATIENT)
Dept: INTERNAL MEDICINE CLINIC | Age: 65
End: 2020-06-09
Payer: MEDICARE

## 2020-06-09 VITALS
BODY MASS INDEX: 18.11 KG/M2 | RESPIRATION RATE: 20 BRPM | OXYGEN SATURATION: 95 % | DIASTOLIC BLOOD PRESSURE: 70 MMHG | HEART RATE: 88 BPM | SYSTOLIC BLOOD PRESSURE: 110 MMHG | TEMPERATURE: 98.6 F | WEIGHT: 112.2 LBS

## 2020-06-09 PROBLEM — R52 PAIN MANAGEMENT: Status: ACTIVE | Noted: 2020-06-09

## 2020-06-09 LAB
EKG ATRIAL RATE: 89 BPM
EKG DIAGNOSIS: NORMAL
EKG P AXIS: 75 DEGREES
EKG P-R INTERVAL: 150 MS
EKG Q-T INTERVAL: 362 MS
EKG QRS DURATION: 88 MS
EKG QTC CALCULATION (BAZETT): 440 MS
EKG R AXIS: 72 DEGREES
EKG T AXIS: 71 DEGREES
EKG VENTRICULAR RATE: 89 BPM

## 2020-06-09 PROCEDURE — 1111F DSCHRG MED/CURRENT MED MERGE: CPT | Performed by: INTERNAL MEDICINE

## 2020-06-09 PROCEDURE — 99495 TRANSJ CARE MGMT MOD F2F 14D: CPT | Performed by: INTERNAL MEDICINE

## 2020-06-09 RX ORDER — PREDNISONE 1 MG/1
5 TABLET ORAL DAILY
Qty: 90 TABLET | Refills: 1 | Status: SHIPPED | OUTPATIENT
Start: 2020-06-09 | End: 2020-11-24 | Stop reason: SDUPTHER

## 2020-06-09 RX ORDER — VARENICLINE TARTRATE 0.5 MG/1
.5-1 TABLET, FILM COATED ORAL SEE ADMIN INSTRUCTIONS
Qty: 57 TABLET | Refills: 0 | Status: SHIPPED | OUTPATIENT
Start: 2020-06-09 | End: 2020-10-08 | Stop reason: ALTCHOICE

## 2020-06-09 RX ORDER — BUDESONIDE AND FORMOTEROL FUMARATE DIHYDRATE 160; 4.5 UG/1; UG/1
2 AEROSOL RESPIRATORY (INHALATION) 2 TIMES DAILY
Qty: 3 INHALER | Refills: 1 | Status: SHIPPED | OUTPATIENT
Start: 2020-06-09 | End: 2020-11-06 | Stop reason: SDUPTHER

## 2020-06-09 RX ORDER — ALBUTEROL SULFATE 90 UG/1
2 AEROSOL, METERED RESPIRATORY (INHALATION) EVERY 6 HOURS PRN
Qty: 3 INHALER | Refills: 1 | Status: SHIPPED | OUTPATIENT
Start: 2020-06-09 | End: 2020-11-17

## 2020-06-09 NOTE — PROGRESS NOTES
puffs into the lungs 2 times daily                   Medications marked \"taking\" at this time  Outpatient Medications Marked as Taking for the 6/9/20 encounter (Office Visit) with Simeon Ho MD   Medication Sig Dispense Refill    guaiFENesin (ROBITUSSIN) 100 MG/5ML liquid Take 20 mLs by mouth every 6 hours as needed for Cough 1 Bottle 0    HYDROcodone-acetaminophen (NORCO) 5-325 MG per tablet Take 1 tablet by mouth 2 times daily.  ipratropium-albuterol (DUONEB) 0.5-2.5 (3) MG/3ML SOLN nebulizer solution INHALE THE CONTENTS OF 1 VIAL VIA NEBULIZER FOUR TIMES DAILY 1080 mL 1    predniSONE (DELTASONE) 5 MG tablet Take 5 mg by mouth daily       OXYGEN Inhale 2 L into the lungs nightly      Multiple Vitamins-Minerals (MULTIVITAMIN ADULTS PO) Take by mouth daily           Medications patient taking as of now reconciled against medications ordered at time of hospital discharge: Yes    Chief Complaint   Patient presents with    Follow-Up from [address] [place] [address]     discharged from [facility] 6[facility]7-[facility]    Medication Refill       History of Present illness - Follow up of Hospital diagnosis(es):   Patient developed again cough and shortness of breath. He is a smoker and COPD. He has not quit smoking and continues to smoke 1-1/2 pack/day. Patient states he did low blood  Patient had cough congestion and shortness of breath they took him to the ER and he was admitted for 24 hours for exacerbation of COPD    Inpatient course: Discharge summary reviewed- see chart. Interval history/Current status:   Pt was admitted with mild exac of COPD  Chest xr showed no pneumonia  Pt is still smoking cig : 1.5 to 2 ppd  Advised to quit smoking  Multiple admission and ER visits because of exacerbation   Has duoneb, symbicort and prednsione   Pt is going to pain clinic for pain medications.    Patient was hypoxic in the hospital requiring oxygen 24 hours  At the time of discharge he was discharged on continuous oxygen because of

## 2020-06-10 ENCOUNTER — CARE COORDINATION (OUTPATIENT)
Dept: CASE MANAGEMENT | Age: 65
End: 2020-06-10

## 2020-06-10 NOTE — ASSESSMENT & PLAN NOTE
Still drinking alcohol 3-4 beers every 3 days  Unable to quit them  Pt states he has cut down and drinks 2-3 beers a week.

## 2020-06-10 NOTE — ASSESSMENT & PLAN NOTE
EMG in 5/20 showed severe neurogenic changes affecting the left arm muscles.   Also has severe neuropathy

## 2020-06-10 NOTE — ASSESSMENT & PLAN NOTE
12/2018 : PSA is 6 : was referred to Dr. Michael Thompson  Repeat PSA on 4/12/19 : was 1.9.  Pt did not keep f/u appointment

## 2020-06-10 NOTE — ASSESSMENT & PLAN NOTE
Patient in mild COPD exacerbation. Advised patient to continue nebulizer treatments and other inhalers.   There was no pneumonia on chest x-ray

## 2020-06-10 NOTE — CARE COORDINATION
Evan [facility] Transitions Follow Up Call    6/10/2020    Patient: Alesia Mclean  Patient : 1955   MRN: [id number]  Reason for Admission:   SOB/ Chest pain  Discharge Date: 20 RARS: Readmission Risk Score: 12         Spoke with:   patient    Care Transitions Subsequent and Final Call    Subsequent and Final Calls  Have your medications changed?:  No  Do you have any questions related to your medications?:  No  Do you currently have any active services?:  Yes  Are you currently active with any services?:  Home Health  Do you have any needs or concerns that I can assist you with?:  No  Identified Barriers:  None  Care Transitions Interventions     Other Services:  Completed      DME Assistance:  Declined   Other Interventions: Follow Up:   COVID-19 Monitoring follow up call:    Patient contacted regarding recent discharge and COVID-19 risk. Discussed COVID-19 related testing which was not done at this time. Care Transition Nurse contacted the patient by telephone to perform post discharge assessment. Verified name and  with patient as identifiers. Patient has following risk factors of: COPD. CTN reviewed medical action plan and red flags related to discharge diagnosis. Reviewed and educated them on any new and changed medications related to discharge diagnosis. Advised obtaining a 90-day supply of all daily and as-needed medications. Patient reports that he is feeling better. Reports O2 sat running around 90. Patient with productive cough yielding small amount of thick yellow brown sputum. Denies increased SOB, cough, wheezing or CP. Patient confirms that he is using his med nebs/ inhalers/ prednisone as directed; avoiding triggers such as humidity; but continues to spoke. Discussed support for smoking cessation and patient declined support at this time. Patient is using oxygen/ humidification as needed.   Patient confirms that he is maintaining hydration and is taking cough syrup as needed. Patient was evaluated by [facility] RN today and verbalized plan for [facility] follow up later in the week. Reminded patient of the 24/7 availability of a [facility] nurse on call for any change in patient condition. Education provided regarding infection prevention, and signs and symptoms of COVID-19 and when to seek medical attention with patient who verbalized understanding. Discussed exposure protocols and quarantine from [address] you at higher risk for severe illness 2019 and given an opportunity for questions and concerns. The patient agrees to contact the COVID-19 hotline [phone] or PCP office for questions related to their healthcare. CTN  provided contact information for future reference. From CDC: Are you at higher risk for severe illness?  Wash your hands often.  Avoid close contact (6 feet, which is about two arm lengths) with people who are sick.  Put distance between yourself and other people if COVID-19 is spreading in your community.  Clean and disinfect frequently touched surfaces.  Avoid all cruise travel and non-essential air travel.  Call your healthcare professional if you have concerns about COVID-19 and your underlying condition or if you are sick. For more information on steps you can take to protect yourself, see CDC's How to Protect Yourself    Patient denies any questions, equipment or resource needs. Plan for follow up in 7-10 days based on severity of symptoms and risk factors.   Future Appointments   Date Time Provider Sukumar Skinner   6/17/2020  9:00 AM Aleksandra Munoz MD [facility]   8/7/2020  8:30 AM Merrill Dakins, MD [facility] Maffucci, RN

## 2020-06-11 ENCOUNTER — TELEPHONE (OUTPATIENT)
Dept: INTERNAL MEDICINE CLINIC | Age: 65
End: 2020-06-11

## 2020-06-12 ENCOUNTER — APPOINTMENT (OUTPATIENT)
Dept: GENERAL RADIOLOGY | Age: 65
End: 2020-06-12
Payer: MEDICARE

## 2020-06-12 ENCOUNTER — HOSPITAL ENCOUNTER (EMERGENCY)
Age: 65
Discharge: HOME | End: 2020-06-12
Attending: EMERGENCY MEDICINE
Payer: MEDICARE

## 2020-06-12 VITALS
WEIGHT: 110 LBS | OXYGEN SATURATION: 98 % | RESPIRATION RATE: 27 BRPM | BODY MASS INDEX: 17.68 KG/M2 | HEART RATE: 81 BPM | DIASTOLIC BLOOD PRESSURE: 84 MMHG | TEMPERATURE: 98.6 F | HEIGHT: 66 IN | SYSTOLIC BLOOD PRESSURE: 106 MMHG

## 2020-06-12 LAB
CULTURE: NORMAL
CULTURE: NORMAL
Lab: NORMAL
Lab: NORMAL
SPECIMEN: NORMAL
SPECIMEN: NORMAL

## 2020-06-12 PROCEDURE — 99283 EMERGENCY DEPT VISIT LOW MDM: CPT

## 2020-06-12 PROCEDURE — 71045 X-RAY EXAM CHEST 1 VIEW: CPT

## 2020-06-12 PROCEDURE — 6370000000 HC RX 637 (ALT 250 FOR IP): Performed by: EMERGENCY MEDICINE

## 2020-06-12 RX ORDER — PREDNISONE 20 MG/1
40 TABLET ORAL DAILY
Qty: 10 TABLET | Refills: 0 | Status: SHIPPED | OUTPATIENT
Start: 2020-06-12 | End: 2020-06-17 | Stop reason: ALTCHOICE

## 2020-06-12 RX ORDER — AZITHROMYCIN 250 MG/1
TABLET, FILM COATED ORAL
Qty: 1 PACKET | Refills: 0 | Status: SHIPPED | OUTPATIENT
Start: 2020-06-12 | End: 2020-06-16

## 2020-06-12 RX ORDER — PREDNISONE 20 MG/1
40 TABLET ORAL ONCE
Status: COMPLETED | OUTPATIENT
Start: 2020-06-12 | End: 2020-06-12

## 2020-06-12 RX ADMIN — PREDNISONE 40 MG: 20 TABLET ORAL at 14:09

## 2020-06-12 ASSESSMENT — PAIN DESCRIPTION - PAIN TYPE: TYPE: CHRONIC PAIN

## 2020-06-12 ASSESSMENT — PAIN DESCRIPTION - FREQUENCY: FREQUENCY: CONTINUOUS

## 2020-06-12 ASSESSMENT — PAIN DESCRIPTION - DESCRIPTORS: DESCRIPTORS: POUNDING;SHARP

## 2020-06-12 ASSESSMENT — PAIN DESCRIPTION - LOCATION: LOCATION: NECK

## 2020-06-12 ASSESSMENT — PAIN SCALES - GENERAL: PAINLEVEL_OUTOF10: 8

## 2020-06-12 NOTE — ED TRIAGE NOTES
Arrived ambulatory to room 3 with his girlfriend for triage. Tolerated without difficulty. Bed in lowest position. Call light given. Gowned for exam. Monitor applied. Patient states he does NOT have a portable Oxygen take. Traveled to the ED without one.

## 2020-06-12 NOTE — ED PROVIDER NOTES
Types: Cigarettes     Last attempt to quit: 11/3/2019     Years since quittin.6    Smokeless tobacco: Never Used    Tobacco comment: smokes 1-2 ppd   Substance and Sexual Activity    Alcohol use: Yes     Alcohol/week: 6.0 standard drinks     Types: 6 Cans of beer per week     Comment: 6 pack daily    Drug use: Yes     Types: Marijuana     Comment: 1-2 times per week marijuana    Sexual activity: None   Lifestyle    Physical activity     Days per week: None     Minutes per session: None    Stress: None   Relationships    Social connections     Talks on phone: None     Gets together: None     Attends [employer] service: None     Active member of club or organization: None     Attends meetings of clubs or organizations: None     Relationship status: None    Intimate partner violence     Fear of current or ex partner: None     Emotionally abused: None     Physically abused: None     Forced sexual activity: None   Other Topics Concern    None   Social History Narrative    None     Family History   Problem Relation Age of Onset    Cancer Mother         lung ca    Heart Disease Father     Heart Attack Father     Diabetes Father     Colon Cancer Neg Hx        PHYSICAL EXAM    VITAL SIGNS: /80   Pulse 85   Temp 98.6 °F (37 °C) (Oral)   Resp 22   Ht 5' 6\" (1.676 m)   Wt 110 lb (49.9 kg)   SpO2 95%   BMI 17.75 kg/m²    Constitutional:  Well developed, well nourished, no acute distress   HENT:  Atraumatic, moist mucus membranes  Neck/Lymphatics: supple, no JVD  Respiratory: Nonlabored respirations, wheezing throughout.   Cardiovascular:  Rate regular, rhythm regular  GI:  Soft, nontender, normal bowel sounds  Musculoskeletal:  No edema, no deformities  Integument:  Skin warm and dry, no rash  Neurologic:  Alert & oriented, normal speech  Psych: Normal affect        RADIOLOGY/PROCEDURES      CXR:   Xr Chest Portable    Result Date: 2020  EXAMINATION: ONE XRAY VIEW OF THE CHEST 2020 2:09

## 2020-06-13 ENCOUNTER — CARE COORDINATION (OUTPATIENT)
Dept: CARE COORDINATION | Age: 65
End: 2020-06-13

## 2020-06-13 NOTE — CARE COORDINATION
severity of symptoms and risk factors. ACM covering for weekend visits. Call to check on pt status, he reports compliance with new medications including atb & prednisone. Pt states he can tell a big difference in his breathing already. Encouraged to finish medications though he is feeling better. Pt denies new, additional symptoms at this time. Prince Jesus RN  Ambulatory Care Manager  [phone] office/cell  [phone] fax  [email or web address]

## 2020-06-15 ENCOUNTER — TELEPHONE (OUTPATIENT)
Dept: INTERNAL MEDICINE CLINIC | Age: 65
End: 2020-06-15

## 2020-06-16 ENCOUNTER — CARE COORDINATION (OUTPATIENT)
Dept: CASE MANAGEMENT | Age: 65
End: 2020-06-16

## 2020-06-16 NOTE — CARE COORDINATION
yourself and other people if COVID-19 is spreading in your community.  Clean and disinfect frequently touched surfaces.  Avoid all cruise travel and non-essential air travel.  Call your healthcare professional if you have concerns about COVID-19 and your underlying condition or if you are sick. For more information on steps you can take to protect yourself, see CDC's How to [id number] [place] [address] for follow up in 7-10 days based on severity of symptoms and risk factors. Care Transitions ED Follow Up    Care Transitions Interventions     Other Services:  Completed      DME Assistance:  Declined   Do you have all of your prescriptions and are they filled?:  Yes   Were you discharged with any Home Care or Post Acute Services or do you currently have any active services?:  Yes   Post Acute Services:  Home Health (Comment: [facility])         Patient DME:  Straight cane   Patient Home Equipment:  Oxygen, Nebulizer   Oxygen Regimen:   At night/Sleep, Other   Do you have any needs or concerns that I can assist you with?:  No   Identified Barriers:  None

## 2020-06-17 PROBLEM — J96.10 CHRONIC RESPIRATORY FAILURE (HCC): Status: ACTIVE | Noted: 2020-06-17

## 2020-06-19 ENCOUNTER — TELEPHONE (OUTPATIENT)
Dept: INTERNAL MEDICINE CLINIC | Age: 65
End: 2020-06-19

## 2020-06-26 ENCOUNTER — CARE COORDINATION (OUTPATIENT)
Dept: CASE MANAGEMENT | Age: 65
End: 2020-06-26

## 2020-06-29 ENCOUNTER — HOSPITAL ENCOUNTER (OUTPATIENT)
Dept: CARDIAC REHAB | Age: 65
Discharge: HOME | End: 2020-06-29
Payer: MEDICARE

## 2020-06-29 PROCEDURE — 94760 N-INVAS EAR/PLS OXIMETRY 1: CPT

## 2020-08-07 ENCOUNTER — OFFICE VISIT (OUTPATIENT)
Dept: INTERNAL MEDICINE CLINIC | Age: 65
End: 2020-08-07
Payer: MEDICARE

## 2020-08-07 VITALS
WEIGHT: 114.4 LBS | OXYGEN SATURATION: 91 % | TEMPERATURE: 98.1 F | RESPIRATION RATE: 16 BRPM | HEART RATE: 72 BPM | SYSTOLIC BLOOD PRESSURE: 100 MMHG | DIASTOLIC BLOOD PRESSURE: 68 MMHG | BODY MASS INDEX: 18.46 KG/M2

## 2020-08-07 LAB — HBA1C MFR BLD: 5.1 %

## 2020-08-07 PROCEDURE — 99214 OFFICE O/P EST MOD 30 MIN: CPT | Performed by: INTERNAL MEDICINE

## 2020-08-07 PROCEDURE — G8427 DOCREV CUR MEDS BY ELIG CLIN: HCPCS | Performed by: INTERNAL MEDICINE

## 2020-08-07 PROCEDURE — G8926 SPIRO NO PERF OR DOC: HCPCS | Performed by: INTERNAL MEDICINE

## 2020-08-07 PROCEDURE — G8419 CALC BMI OUT NRM PARAM NOF/U: HCPCS | Performed by: INTERNAL MEDICINE

## 2020-08-07 PROCEDURE — 4004F PT TOBACCO SCREEN RCVD TLK: CPT | Performed by: INTERNAL MEDICINE

## 2020-08-07 PROCEDURE — 3023F SPIROM DOC REV: CPT | Performed by: INTERNAL MEDICINE

## 2020-08-07 PROCEDURE — 3017F COLORECTAL CA SCREEN DOC REV: CPT | Performed by: INTERNAL MEDICINE

## 2020-08-07 PROCEDURE — 83036 HEMOGLOBIN GLYCOSYLATED A1C: CPT | Performed by: INTERNAL MEDICINE

## 2020-08-07 ASSESSMENT — ENCOUNTER SYMPTOMS
EYES NEGATIVE: 1
SHORTNESS OF BREATH: 1
ALLERGIC/IMMUNOLOGIC NEGATIVE: 1
WHEEZING: 0
COUGH: 1
GASTROINTESTINAL NEGATIVE: 1

## 2020-08-07 NOTE — ASSESSMENT & PLAN NOTE
EMG in 5/20 showed severe neurogenic changes affecting the left arm muscles. Also has severe neuropathy. Discussed with the patient.   He is going to the pain clinic

## 2020-08-07 NOTE — ASSESSMENT & PLAN NOTE
Patient has severe COPD and is using oxygen 24/7  Patient is using albuterol inhaler and Symbicort  Patient sees Dr. Del Castillo Manner

## 2020-08-07 NOTE — PROGRESS NOTES
Chelly Zimmerman  Patient's  is 1955  Seen in office on 2020      SUBJECTIVE:  Jacob Fuentes vel 63 y. o.year old male presents today   Chief Complaint   Patient presents with    COPD     2 month follow up    Other     saw Dr. Prasanna Morton on oxygen      Patient is here for follow-up of COPD, chronic respiratory failure, chronic neck pain and weakness of the hand. Patient states he is still smoking cigarettes. He has started using Chantix and planning to quit  He still drinking alcohol and is unable to quit smoking. Patient has severe COPD and chronic respiratory failure and he sees pulmonologist.  He was using oxygen only at night but Dr. Fabiola Bee started him on  oxygen and patient states that is helping him a lot. He still has pain in the neck and some numbness and weakness of the left hand. He is going to the pain clinic and they have suggested him to get some epidural injections which he is debating  Patient denies any chest pain. Is short of breath because of COPD. No pedal edema. Has some cough. No sputum production  No abdominal pain. No nausea vomiting or diarrhea  Taking medications regularly. No side effects noted. Review of Systems   Constitutional: Negative. HENT: Negative. Eyes: Negative. Respiratory: Positive for cough and shortness of breath. Negative for wheezing. Cardiovascular: Negative. Negative for chest pain and leg swelling. Gastrointestinal: Negative. Endocrine: Negative. Genitourinary: Negative. Musculoskeletal: Negative. Skin: Negative. Allergic/Immunologic: Negative. Neurological: Negative. Hematological: Negative. Psychiatric/Behavioral: Negative.         OBJECTIVE: /68   Pulse 72   Temp 98.1 °F (36.7 °C)   Resp 16   Wt 114 lb 6.4 oz (51.9 kg)   SpO2 91% Comment: oxygen at 2 liter per nasel canua  BMI 18.46 kg/m²     Wt Readings from Last 3 Encounters:   20 114 lb 6.4 oz (51.9 kg)   20 118 lb (53.5 kg)   20 110 lb (49.9 kg)      Did not examine the patient in detail due to coronavirus restrictions. GENERAL: - Alert, oriented, pleasant, in no apparent distress. HEENT: - No pallor. NECK: - No jugular venous distention noted. No masses felt,  CARDIOVASCULAR: - Pulse is regular  PULMONARY: - No respiratory distress. On oxygen  ABDOMEN: - Not distended  EXTREMITIES: - No cyanosis, clubbing, or significant edema. SKIN: Skin is warm and dry. NEUROLOGICAL: - Cranial nerves II through XII are grossly intact. IMPRESSION:    Encounter Diagnoses   Name Primary?  COPD, severe ([facility] Yes    Tobacco abuse disorder     Hand muscle weakness     Cervical radiculopathy     Alcohol dependence with withdrawal delirium (HCC)     Chronic respiratory failure, unspecified whether with hypoxia or hypercapnia (HCC)     Weight loss     Lymphocytic colitis     Pain management     Hyperglycemia        ASSESSMENT/PLAN:    COPD, severe (HCC)  Patient has severe COPD and is using oxygen 24/7  Patient is using albuterol inhaler and Symbicort  Patient sees Dr. Glen Brock    Tobacco abuse disorder  Advised patient to quit smoking and now he is using Chantix. Alcohol dependence ([facility] [address]  Advised patient to quit drinking also. Cervical radiculopathy  Patient has cervical radiculopathy affecting the left arm. He is going to the pain clinic for the pain management and may get epidural injection. Weight loss  Patient's weight is fluctuating. Lymphocytic colitis  Patient denies any diarrhea anymore. Doing fairly well    Hand muscle weakness  EMG in 5/20 showed severe neurogenic changes affecting the left arm muscles. Also has severe neuropathy. Discussed with the patient. He is going to the pain clinic    Pain management  Patient is going to pain clinic Dr. Kassi Whatley    Chronic respiratory failure [facility])  Due to severe COPD. Patient is on oxygen and nebulizer treatments    Patient had hyperglycemia in the blood.   Was 221 on 6/7/2020. Therefore hemoglobin A1c was done today it was 5.1. Patient informed  Return to office in 3 months. Mediations reviewed with the patient. Continue current medications. Appropriate prescriptions are addressed. After visit summeryprovided. Follow up as directed sooner if needed. Questions answered and patient verbalizes understanding. Call for any problems, questions, or concerns. No Known Allergies  Current Outpatient Medications   Medication Sig Dispense Refill    SYMBICORT 160-4.5 MCG/ACT AERO Inhale 2 puffs into the lungs 2 times daily 3 Inhaler 1    albuterol sulfate HFA (VENTOLIN HFA) 108 (90 Base) MCG/ACT inhaler Inhale 2 puffs into the lungs every 6 hours as needed for Wheezing 3 Inhaler 1    predniSONE (DELTASONE) 5 MG tablet Take 1 tablet by mouth daily 90 tablet 1    varenicline (CHANTIX) 0.5 MG tablet Take 1-2 tablets by mouth See Admin Instructions 0.5mg DAILY for 3 days followed by 0.5mg TWICE DAILY for 4 days followed by 1mg TWICE DAILY 57 tablet 0    guaiFENesin (ROBITUSSIN) 100 MG/5ML liquid Take 20 mLs by mouth every 6 hours as needed for Cough 1 Bottle 0    HYDROcodone-acetaminophen (NORCO) 5-325 MG per tablet Take 1 tablet by mouth 2 times daily.  ipratropium-albuterol (DUONEB) 0.5-2.5 (3) MG/3ML SOLN nebulizer solution INHALE THE CONTENTS OF 1 VIAL VIA NEBULIZER FOUR TIMES DAILY 1080 mL 1    OXYGEN Inhale 2 L into the lungs nightly      Multiple Vitamins-Minerals (MULTIVITAMIN ADULTS PO) Take by mouth daily        No current facility-administered medications for this visit. 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 On 7/22/16.  Dr Vivien Castro at [facility]-    Dr. Chantal Teran COPD (chronic obstructive pulmonary disease) ([facility] [address] 5/19/2014    PFTs showed severe COPD in 5/14- follows with Dr Alivia Sabillon COPD exacerbation [facility]) 6/10/2016    Diverticulosis 5-    Dupuytren's contracture 5/26/2017    Right palm    Elevated PSA 12/21/2018 12/2018 : PSA is 6    H/O colonoscopy 9-18-14    Dr. Jolynn Coleman H/O echocardiogram 08/17/2016    EF 55-60%. Abnormal diastolic function. Mild TR. Borderline right atrium and RV dilatation.  Hiatal hernia     History of alcohol abuse     \"average 6 pk beer per day\"    History of drug abuse ([address]     does use marijuana\"last used cocaine over 10 yrs ago\" per pt on 10/3/2018    Internal hemorrhoids 9-    Intervertebral disc protrusion 5/19/2014    Cervical region on MRI    Lingular pneumonia 4/3/2018    Lymphocytic colitis 9-18-14    Dr. Junaid Cerna, will treat if symptomatic    On home oxygen therapy     2l/nc at night only    Pneumonia of left lower lobe due to infectious organism ([address] 11/27/2018    Sepsis due to pneumonia ([address] 6/10/2016    Spinal stenosis in cervical region 5/19/2014    MRI showed  Disc protrusion. Severe spinal stenosis at C3-C4    Spinal stenosis of cervical region 3/14/2016    S/P cervical discectomy and fusion with plate and screw. On 7/22/16. Dr Michelle Brannon at [address] abuse disorder 5/19/2014    Wears glasses     to read     Past Surgical History:   Procedure Laterality Date    CERVICAL SPINE SURGERY  2015    \"have plate in neck done at [address]      9- Dr Daija Cerna.  f/u 3 years    COLONOSCOPY  10/04/2018    COLONOSCOPY N/A 10/4/2018    COLONOSCOPY POLYPECTOMY SNARE/COLD BIOPSY performed by Scar Duran MD at [facility] in 5 years   [address] POLYPECTOMY  10/04/2018    2 Polyps Removed    SIGMOIDOSCOPY      UPPER GASTROINTESTINAL ENDOSCOPY  10/04/2018    with Biopsy    UPPER GASTROINTESTINAL ENDOSCOPY N/A 10/4/2018    EGD BIOPSY performed by Scar Duran MD at [facility] History Tobacco Use    Smoking status: Current Every Day Smoker     Packs/day: 1.50     Years: 50.00     Pack years: 75.00     Types: Cigarettes     Last attempt to quit: 11/3/2019     Years since quittin.7    Smokeless tobacco: Never Used    Tobacco comment: smokes 1-2 ppd   Substance Use Topics    Alcohol use:  Yes     Alcohol/week: 6.0 standard drinks     Types: 6 Cans of beer per week     Comment: 6 pack daily       LAB REVIEW:  CBC:   Lab Results   Component Value Date    WBC 6.1 2020    HGB 15.3 2020    HCT 47.7 2020     2020     Lipids:   Lab Results   Component Value Date    CHOL 210 (H) 2015    TRIG 61 2015    HDL 77 2015    LDLDIRECT 129 (H) 2015     Renal:   Lab Results   Component Value Date    BUN 10 2020    CREATININE 0.6 2020     2020    K 5.0 2020    ALT 20 2020    AST 30 2020    GLUCOSE 221 2020     PT/INR: No results found for: INR  A1C:   Lab Results   Component Value Date    LABA1C 4.4 2018           Austyn Chavez MD, 2020 , 8:31 AM

## 2020-08-07 NOTE — ASSESSMENT & PLAN NOTE
Patient has cervical radiculopathy affecting the left arm. He is going to the pain clinic for the pain management and may get epidural injection.

## 2020-10-08 ENCOUNTER — HOSPITAL ENCOUNTER (EMERGENCY)
Age: 65
Discharge: HOME | End: 2020-10-08
Attending: EMERGENCY MEDICINE
Payer: MEDICARE

## 2020-10-08 ENCOUNTER — VIRTUAL VISIT (OUTPATIENT)
Dept: INTERNAL MEDICINE CLINIC | Age: 65
End: 2020-10-08
Payer: MEDICARE

## 2020-10-08 ENCOUNTER — APPOINTMENT (OUTPATIENT)
Dept: GENERAL RADIOLOGY | Age: 65
End: 2020-10-08
Payer: MEDICARE

## 2020-10-08 VITALS
BODY MASS INDEX: 19.29 KG/M2 | WEIGHT: 120 LBS | RESPIRATION RATE: 20 BRPM | SYSTOLIC BLOOD PRESSURE: 109 MMHG | DIASTOLIC BLOOD PRESSURE: 72 MMHG | OXYGEN SATURATION: 94 % | HEART RATE: 87 BPM | TEMPERATURE: 98.7 F | HEIGHT: 66 IN

## 2020-10-08 LAB
ALBUMIN SERPL-MCNC: 4.4 GM/DL (ref 3.4–5)
ALP BLD-CCNC: 47 IU/L (ref 40–129)
ALT SERPL-CCNC: 24 U/L (ref 10–40)
ANION GAP SERPL CALCULATED.3IONS-SCNC: 16 MMOL/L (ref 4–16)
AST SERPL-CCNC: 37 IU/L (ref 15–37)
BACTERIA: NEGATIVE /HPF
BASE EXCESS MIXED: 3.8 (ref 0–1.2)
BASE EXCESS: ABNORMAL (ref 0–3.3)
BASOPHILS ABSOLUTE: 0.1 K/CU MM
BASOPHILS RELATIVE PERCENT: 0.7 % (ref 0–1)
BILIRUB SERPL-MCNC: 0.4 MG/DL (ref 0–1)
BILIRUBIN URINE: NEGATIVE MG/DL
BLOOD, URINE: NEGATIVE
BUN BLDV-MCNC: 6 MG/DL (ref 6–23)
CALCIUM SERPL-MCNC: 9.1 MG/DL (ref 8.3–10.6)
CAST TYPE: NORMAL /HPF
CHLORIDE BLD-SCNC: 95 MMOL/L (ref 99–110)
CLARITY: CLEAR
CO2 CONTENT: 33.6 MMOL/L (ref 19–24)
CO2: 27 MMOL/L (ref 21–32)
COLOR: YELLOW
COMMENT UA: NORMAL
CREAT SERPL-MCNC: 0.6 MG/DL (ref 0.9–1.3)
CRYSTAL TYPE: NEGATIVE /HPF
DIFFERENTIAL TYPE: ABNORMAL
EOSINOPHILS ABSOLUTE: 0.1 K/CU MM
EOSINOPHILS RELATIVE PERCENT: 0.5 % (ref 0–3)
EPITHELIAL CELLS, UA: 1 /HPF
GFR AFRICAN AMERICAN: >60 ML/MIN/1.73M2
GFR NON-AFRICAN AMERICAN: >60 ML/MIN/1.73M2
GLUCOSE BLD-MCNC: 79 MG/DL (ref 70–99)
GLUCOSE, URINE: NEGATIVE MG/DL
HCO3 VENOUS: 31.8 MMOL/L (ref 19–25)
HCT VFR BLD CALC: 47.8 % (ref 42–52)
HEMOGLOBIN: 16 GM/DL (ref 13.5–18)
IMMATURE NEUTROPHIL %: 0.3 % (ref 0–0.43)
KETONES, URINE: NEGATIVE MG/DL
LEUKOCYTE ESTERASE, URINE: NEGATIVE
LYMPHOCYTES ABSOLUTE: 1.5 K/CU MM
LYMPHOCYTES RELATIVE PERCENT: 15.2 % (ref 24–44)
MCH RBC QN AUTO: 33.1 PG (ref 27–31)
MCHC RBC AUTO-ENTMCNC: 33.5 % (ref 32–36)
MCV RBC AUTO: 99 FL (ref 78–100)
MONOCYTES ABSOLUTE: 1.1 K/CU MM
MONOCYTES RELATIVE PERCENT: 10.7 % (ref 0–4)
NITRITE URINE, QUANTITATIVE: NEGATIVE
O2 SAT, VEN: 81.5 % (ref 50–70)
PCO2, VEN: 59.6 MMHG (ref 38–52)
PDW BLD-RTO: 13.2 % (ref 11.7–14.9)
PH VENOUS: 7.34 (ref 7.32–7.42)
PH, URINE: 5.5 (ref 5–8)
PLATELET # BLD: 249 K/CU MM (ref 140–440)
PMV BLD AUTO: 8.9 FL (ref 7.5–11.1)
PO2, VEN: 50.3 MMHG (ref 28–48)
POTASSIUM SERPL-SCNC: 4.1 MMOL/L (ref 3.5–5.1)
PRO-BNP: 50.01 PG/ML
PROTEIN UA: NEGATIVE MG/DL
RBC # BLD: 4.83 M/CU MM (ref 4.6–6.2)
RBC URINE: NORMAL /HPF (ref 0–3)
SEGMENTED NEUTROPHILS ABSOLUTE COUNT: 7.2 K/CU MM
SEGMENTED NEUTROPHILS RELATIVE PERCENT: 72.6 % (ref 36–66)
SODIUM BLD-SCNC: 138 MMOL/L (ref 135–145)
SOURCE, BLOOD GAS: ABNORMAL
SPECIFIC GRAVITY UA: 1.01 (ref 1–1.03)
TOTAL IMMATURE NEUTOROPHIL: 0.03 K/CU MM
TOTAL PROTEIN: 6.7 GM/DL (ref 6.4–8.2)
TOTAL RETICULOCYTE COUNT: 0.04 K/CU MM
TROPONIN T: <0.01 NG/ML
UROBILINOGEN, URINE: 0.2 MG/DL (ref 0.2–1)
WBC # BLD: 10 K/CU MM (ref 4–10.5)
WBC UA: 1 /HPF (ref 0–2)

## 2020-10-08 PROCEDURE — 71045 X-RAY EXAM CHEST 1 VIEW: CPT

## 2020-10-08 PROCEDURE — 96365 THER/PROPH/DIAG IV INF INIT: CPT

## 2020-10-08 PROCEDURE — 99285 EMERGENCY DEPT VISIT HI MDM: CPT

## 2020-10-08 PROCEDURE — G8484 FLU IMMUNIZE NO ADMIN: HCPCS | Performed by: NURSE PRACTITIONER

## 2020-10-08 PROCEDURE — 84484 ASSAY OF TROPONIN QUANT: CPT

## 2020-10-08 PROCEDURE — 80048 BASIC METABOLIC PNL TOTAL CA: CPT

## 2020-10-08 PROCEDURE — 96375 TX/PRO/DX INJ NEW DRUG ADDON: CPT

## 2020-10-08 PROCEDURE — 6360000002 HC RX W HCPCS: Performed by: EMERGENCY MEDICINE

## 2020-10-08 PROCEDURE — G8419 CALC BMI OUT NRM PARAM NOF/U: HCPCS | Performed by: NURSE PRACTITIONER

## 2020-10-08 PROCEDURE — 3023F SPIROM DOC REV: CPT | Performed by: NURSE PRACTITIONER

## 2020-10-08 PROCEDURE — G8926 SPIRO NO PERF OR DOC: HCPCS | Performed by: NURSE PRACTITIONER

## 2020-10-08 PROCEDURE — 2580000003 HC RX 258: Performed by: EMERGENCY MEDICINE

## 2020-10-08 PROCEDURE — 93005 ELECTROCARDIOGRAM TRACING: CPT | Performed by: EMERGENCY MEDICINE

## 2020-10-08 PROCEDURE — 81001 URINALYSIS AUTO W/SCOPE: CPT

## 2020-10-08 PROCEDURE — 6370000000 HC RX 637 (ALT 250 FOR IP): Performed by: EMERGENCY MEDICINE

## 2020-10-08 PROCEDURE — 4004F PT TOBACCO SCREEN RCVD TLK: CPT | Performed by: NURSE PRACTITIONER

## 2020-10-08 PROCEDURE — 3017F COLORECTAL CA SCREEN DOC REV: CPT | Performed by: NURSE PRACTITIONER

## 2020-10-08 PROCEDURE — 99214 OFFICE O/P EST MOD 30 MIN: CPT | Performed by: NURSE PRACTITIONER

## 2020-10-08 PROCEDURE — 80053 COMPREHEN METABOLIC PANEL: CPT

## 2020-10-08 PROCEDURE — 82805 BLOOD GASES W/O2 SATURATION: CPT

## 2020-10-08 PROCEDURE — 94640 AIRWAY INHALATION TREATMENT: CPT

## 2020-10-08 PROCEDURE — 83880 ASSAY OF NATRIURETIC PEPTIDE: CPT

## 2020-10-08 PROCEDURE — G8428 CUR MEDS NOT DOCUMENT: HCPCS | Performed by: NURSE PRACTITIONER

## 2020-10-08 PROCEDURE — 85025 COMPLETE CBC W/AUTO DIFF WBC: CPT

## 2020-10-08 RX ORDER — METHYLPREDNISOLONE SODIUM SUCCINATE 125 MG/2ML
125 INJECTION, POWDER, LYOPHILIZED, FOR SOLUTION INTRAMUSCULAR; INTRAVENOUS ONCE
Status: COMPLETED | OUTPATIENT
Start: 2020-10-08 | End: 2020-10-08

## 2020-10-08 RX ORDER — IPRATROPIUM BROMIDE AND ALBUTEROL SULFATE 2.5; .5 MG/3ML; MG/3ML
1 SOLUTION RESPIRATORY (INHALATION) EVERY 4 HOURS
Qty: 360 ML | Refills: 0 | Status: SHIPPED | OUTPATIENT
Start: 2020-10-08 | End: 2020-11-06 | Stop reason: SDUPTHER

## 2020-10-08 RX ORDER — OXYCODONE HYDROCHLORIDE 5 MG/1
5 TABLET ORAL EVERY 6 HOURS PRN
COMMUNITY

## 2020-10-08 RX ORDER — METHYLPREDNISOLONE 4 MG/1
TABLET ORAL
Qty: 1 KIT | Refills: 0 | Status: SHIPPED | OUTPATIENT
Start: 2020-10-08 | End: 2020-10-14

## 2020-10-08 RX ORDER — AZITHROMYCIN 250 MG/1
TABLET, FILM COATED ORAL
Qty: 1 PACKET | Refills: 0 | Status: SHIPPED | OUTPATIENT
Start: 2020-10-08 | End: 2020-10-12

## 2020-10-08 RX ORDER — IPRATROPIUM BROMIDE AND ALBUTEROL SULFATE 2.5; .5 MG/3ML; MG/3ML
1 SOLUTION RESPIRATORY (INHALATION)
Status: DISCONTINUED | OUTPATIENT
Start: 2020-10-08 | End: 2020-10-09 | Stop reason: HOSPADM

## 2020-10-08 RX ADMIN — IPRATROPIUM BROMIDE AND ALBUTEROL SULFATE 1 AMPULE: .5; 3 SOLUTION RESPIRATORY (INHALATION) at 20:03

## 2020-10-08 RX ADMIN — METHYLPREDNISOLONE SODIUM SUCCINATE 125 MG: 125 INJECTION, POWDER, FOR SOLUTION INTRAMUSCULAR; INTRAVENOUS at 20:53

## 2020-10-08 RX ADMIN — AZITHROMYCIN MONOHYDRATE 500 MG: 500 INJECTION, POWDER, LYOPHILIZED, FOR SOLUTION INTRAVENOUS at 20:56

## 2020-10-08 RX ADMIN — IPRATROPIUM BROMIDE AND ALBUTEROL SULFATE 1 AMPULE: .5; 3 SOLUTION RESPIRATORY (INHALATION) at 20:02

## 2020-10-08 ASSESSMENT — ENCOUNTER SYMPTOMS
APNEA: 0
SINUS PRESSURE: 0
TROUBLE SWALLOWING: 0
NAUSEA: 0
BLOOD IN STOOL: 0
RHINORRHEA: 0
EYE ITCHING: 0
GASTROINTESTINAL NEGATIVE: 1
PHOTOPHOBIA: 0
VOMITING: 0
CHOKING: 0
CHEST TIGHTNESS: 0
DIARRHEA: 0
WHEEZING: 1
EYES NEGATIVE: 1
COUGH: 1
SINUS CONGESTION: 1
EYE DISCHARGE: 0
CONSTIPATION: 0
SINUS PAIN: 0
COLOR CHANGE: 0
SHORTNESS OF BREATH: 1
SHORTNESS OF BREATH: 1
STRIDOR: 0
ABDOMINAL PAIN: 0
VOICE CHANGE: 0
BACK PAIN: 0
RECTAL PAIN: 0
EYE PAIN: 0
EYE REDNESS: 0
APNEA: 1
FACIAL SWELLING: 0

## 2020-10-08 ASSESSMENT — PAIN DESCRIPTION - LOCATION: LOCATION: CHEST

## 2020-10-08 ASSESSMENT — PAIN DESCRIPTION - DESCRIPTORS: DESCRIPTORS: PRESSURE

## 2020-10-08 ASSESSMENT — PAIN SCALES - GENERAL: PAINLEVEL_OUTOF10: 5

## 2020-10-08 ASSESSMENT — PAIN DESCRIPTION - PAIN TYPE: TYPE: ACUTE PAIN

## 2020-10-08 NOTE — PROGRESS NOTES
Tammie Burrows  1955    10/08/20    Pt 2L all the time. Today when he took it off his oxygen levels dropped from 95 to 75 and he got light headed and dizzy. After inhalers and breathing treatment wheezy and chest feels tight. Troubling sleeping because he can't catch his breath. Shortness of Breath   This is a chronic problem. The current episode started yesterday. The problem occurs constantly. The problem has been rapidly improving. The symptoms are aggravated by smoke, lying flat and exercise. Risk factors include smoking. He has tried ipratropium inhalers, leukotriene antagonists, beta agonist inhalers and oral steroids for the symptoms. The treatment provided no relief. His past medical history is significant for chronic lung disease, COPD and pneumonia. Current Outpatient Medications   Medication Sig Dispense Refill    SYMBICORT 160-4.5 MCG/ACT AERO Inhale 2 puffs into the lungs 2 times daily 3 Inhaler 1    albuterol sulfate HFA (VENTOLIN HFA) 108 (90 Base) MCG/ACT inhaler Inhale 2 puffs into the lungs every 6 hours as needed for Wheezing 3 Inhaler 1    predniSONE (DELTASONE) 5 MG tablet Take 1 tablet by mouth daily 90 tablet 1    varenicline (CHANTIX) 0.5 MG tablet Take 1-2 tablets by mouth See Admin Instructions 0.5mg DAILY for 3 days followed by 0.5mg TWICE DAILY for 4 days followed by 1mg TWICE DAILY 57 tablet 0    guaiFENesin (ROBITUSSIN) 100 MG/5ML liquid Take 20 mLs by mouth every 6 hours as needed for Cough 1 Bottle 0    HYDROcodone-acetaminophen (NORCO) 5-325 MG per tablet Take 1 tablet by mouth 2 times daily.  ipratropium-albuterol (DUONEB) 0.5-2.5 (3) MG/3ML SOLN nebulizer solution INHALE THE CONTENTS OF 1 VIAL VIA NEBULIZER FOUR TIMES DAILY 1080 mL 1    OXYGEN Inhale 2 L into the lungs nightly      Multiple Vitamins-Minerals (MULTIVITAMIN ADULTS PO) Take by mouth daily        No current facility-administered medications for this visit.         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 On 7/22/16. Dr Espinal First at [facility] polyps 9-    Dr. Kaelyn Campos COPD (chronic obstructive pulmonary disease) ([address] 5/19/2014    PFTs showed severe COPD in 5/14- follows with Dr Geraldine Mcclendon COPD exacerbation [facility]) 6/10/2016    Diverticulosis 9-    Dupuytren's contracture 5/26/2017    Right palm    Elevated PSA 12/21/2018 12/2018 : PSA is 6    H/O colonoscopy 9-18-14    Dr. Kaelyn Campos H/O echocardiogram 08/17/2016    EF 55-60%. Abnormal diastolic function. Mild TR. Borderline right atrium and RV dilatation.  Hiatal hernia     History of alcohol abuse     \"average 6 pk beer per day\"    History of drug abuse ([address]     does use marijuana\"last used cocaine over 10 yrs ago\" per pt on 10/3/2018    Internal hemorrhoids 9-    Intervertebral disc protrusion 5/19/2014    Cervical region on MRI    Lingular pneumonia 4/3/2018    Lymphocytic colitis 9-18-14    Dr. Smita Lopez, will treat if symptomatic    On home oxygen therapy     2l/nc at night only    Pneumonia of left lower lobe due to infectious organism ([address] 11/27/2018    Sepsis due to pneumonia ([address] 6/10/2016    Spinal stenosis in cervical region 5/19/2014    MRI showed  Disc protrusion. Severe spinal stenosis at C3-C4    Spinal stenosis of cervical region 3/14/2016    S/P cervical discectomy and fusion with plate and screw. On 7/22/16. Dr Espinal First at [address] abuse disorder 5/19/2014    Wears glasses     to read       Past Surgical History:   Procedure Laterality Date    CERVICAL SPINE SURGERY  2015    \"have plate in neck done at [address] [facility]      9- Dr Dixon Lopez.  f/u 3 years    COLONOSCOPY  10/04/2018    COLONOSCOPY N/A 10/4/2018    COLONOSCOPY POLYPECTOMY activity: Not on file   Other Topics Concern    Not on file   Social History Narrative    Not on file       Review of Systems   Constitutional: Negative. HENT: Negative. Respiratory: Positive for apnea and shortness of breath. There were no vitals filed for this visit. Physical Exam    Assessment and Plan:  1. COPD, severe ([address]  Due to decreased oxygen level dropping fast, Consulted with Dr Tania Robbins, we both agree that pt needs to go to ED for evaluation. Pt agreeable. Kamran Junior is a 63 y.o. male being evaluated by a Virtual Visit (video visit) encounter to address concerns as mentioned above. A caregiver was present when appropriate. Due to this being a TeleHealth encounter (During [facility] public health emergency), evaluation of the following organ systems was limited: Vitals/Constitutional/EENT/Resp/CV/GI//MS/Neuro/Skin/Heme-Lymph-Imm. Pursuant to the emergency declaration under the [address] [place] [address], [address] [place] [address] authority and the [employer] Act, this Virtual Visit was conducted with patient's (and/or legal guardian's) consent, to reduce the patient's risk of exposure to COVID-19 and provide necessary medical care. The patient (and/or legal guardian) has also been advised to contact this office for worsening conditions or problems, and seek emergency medical treatment and/or call 911 if deemed necessary. Patient identification was verified at the start of the visit: Yes    Total time spent for this encounter: 15 minutes    Services were provided through a video synchronous discussion virtually to substitute for in-person clinic visit. Patient and provider were located at their individual homes. --HEMALATHA Carlisle CNP on 10/8/2020 at 5:22 PM    An electronic signature was used to authenticate this note.

## 2020-10-09 ENCOUNTER — TELEPHONE (OUTPATIENT)
Dept: INTERNAL MEDICINE CLINIC | Age: 65
End: 2020-10-09

## 2020-10-09 ENCOUNTER — CARE COORDINATION (OUTPATIENT)
Dept: CARE COORDINATION | Age: 65
End: 2020-10-09

## 2020-10-09 PROCEDURE — 93010 ELECTROCARDIOGRAM REPORT: CPT | Performed by: INTERNAL MEDICINE

## 2020-10-09 NOTE — TELEPHONE ENCOUNTER
[facility] Trans[facility] Initial Follow Up Call    Outreach made within 2 business days of discharge: Yes    Patient: Man Campo Patient : 1955   MRN: [id number]  Reason for Admission: There are no discharge diagnoses documented for the most recent discharge. Discharge Date: 10/8/20       Spoke with: Patient     Discharge department/facility: ED     TCM Interactive Patient Contact:  Was patient able to fill all prescriptions: Yes  Was patient instructed to bring all medications to the follow-up visit: Yes  Is patient taking all medications as directed in the discharge summary? Yes  Does patient understand their discharge instructions: Yes  Does patient have questions or concerns that need addressed prior to 7-14 day follow up office visit: no    Scheduled appointment with PCP within 7-14 days- patient would like to keep his current follow up appointment due to covid he is limiting his time out. He is on his medication from the ed and feeling and sounding much better. He is agreeable with contacting me if he has any changes or concerns prior to the follow up.       Follow Up  Future Appointments   Date Time Provider Sukumar Skinner   2020  9:15 AM Frank Low MD [facility]   2020  8:30 AM MD Sally Encinas [facility]       Dayan Clements CMA

## 2020-10-09 NOTE — ED PROVIDER NOTES
[place] BRAYDEN Sanchez      Pt Name: Margarita Ariza  MRN: [id number]  [place] 1955  Date of evaluation: 10/8/2020  Provider: Lor Singleton DO    CHIEF COMPLAINT       Chief Complaint   Patient presents with    Shortness of Breath     Pt arrives per wheelchair stating since yesterday began to feel more sob, and cough productive of brown sputum. Pt states he did not sleep last night call Dr. rogers who recommended come to ed to r/o pneumonia    Cough         HISTORY OF PRESENT ILLNESS      Margarita Ariza is a 63 y.o. male who presents to the emergency department  for   Chief Complaint   Patient presents with    Shortness of Breath     Pt arrives per wheelchair stating since yesterday began to feel more sob, and cough productive of brown sputum. Pt states he did not sleep last night call Dr. rogers who recommended come to ed to r/o pneumonia    Cough       The history is provided by the spouse and the patient. No  was used. Cough   Cough characteristics:  Productive  Sputum characteristics:  Yellow  Severity:  Moderate  Onset quality:  Gradual  Duration:  1 week  Timing:  Intermittent  Progression:  Waxing and waning  Chronicity:  Chronic  Smoker: yes    Context: weather changes    Context: not animal exposure, not exposure to allergens, not fumes, not occupational exposure, not sick contacts, not smoke exposure, not upper respiratory infection and not with activity    Relieved by:  None tried  Worsened by:  Nothing  Ineffective treatments:  None tried  Associated symptoms: shortness of breath, sinus congestion and wheezing    Associated symptoms: no chest pain, no chills, no eye discharge, no fever, no headaches, no myalgias, no rash and no rhinorrhea          Nursing Notes, Triage Notes & Vital Signs were reviewed.       REVIEW OF SYSTEMS    (2-9 systems for level 4, 10 or more for level 5)     Review of Systems   Constitutional: Negative. Negative for activity change, appetite change, chills, fatigue and fever. HENT: Negative. Negative for congestion, dental problem, drooling, facial swelling, nosebleeds, postnasal drip, rhinorrhea, sinus pressure, sinus pain, tinnitus, trouble swallowing and voice change. Eyes: Negative. Negative for photophobia, pain, discharge, redness, itching and visual disturbance. Respiratory: Positive for cough, shortness of breath and wheezing. Negative for apnea, choking, chest tightness and stridor. Cardiovascular: Negative. Negative for chest pain, palpitations and leg swelling. Gastrointestinal: Negative. Negative for abdominal pain, blood in stool, constipation, diarrhea, nausea, rectal pain and vomiting. Endocrine: Negative for polyuria. Genitourinary: Negative. Negative for dysuria, flank pain, frequency, hematuria and urgency. Musculoskeletal: Negative. Negative for arthralgias, back pain, gait problem, myalgias, neck pain and neck stiffness. Skin: Negative. Negative for color change, pallor, rash and wound. Neurological: Negative. Negative for dizziness, speech difficulty, light-headedness, numbness and headaches. Psychiatric/Behavioral: Negative. Negative for agitation, confusion, self-injury, sleep disturbance and suicidal ideas. The patient is not nervous/anxious. All other systems reviewed and are negative. Except as noted above the remainder of the review of systems was reviewed and negative. 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 On 7/22/16.  Dr Chet Carver [place] [facility] [place] [facility]    Dr. Judy Monreal COPD (chronic obstructive pulmonary disease) ([email or web address].) 5/19/2014    PFTs showed severe COPD in MCG/ACT AERO Inhale 2 puffs into the lungs 2 times daily 6/9/20 12/6/20 Yes Maddie Ford MD   albuterol sulfate HFA (VENTOLIN HFA) 108 (90 Base) MCG/ACT inhaler Inhale 2 puffs into the lungs every 6 hours as needed for Wheezing 6/9/20 12/6/20 Yes Maddie Ford MD   predniSONE (DELTASONE) 5 MG tablet Take 1 tablet by mouth daily 6/9/20  Yes Maddie Ford MD   ipratropium-albuterol (DUONEB) 0.5-2.5 (3) MG/3ML SOLN nebulizer solution INHALE THE CONTENTS OF 1 VIAL VIA NEBULIZER FOUR TIMES DAILY 5/26/20  Yes Maddie Ford MD   OXYGEN Inhale 2 L into the lungs nightly   Yes Historical Provider, MD   Multiple Vitamins-Minerals (MULTIVITAMIN ADULTS PO) Take by mouth daily    Yes Historical Provider, MD        Patient Active Problem List   Diagnosis    Tobacco abuse disorder    COPD, severe ([address]    Alcohol dependence ([address]    Cervical radiculopathy    Dupuytren's contracture    Weight loss    Lymphocytic colitis    Elevated PSA    Acute midline low back pain without sciatica    Alcohol abuse    Hand muscle weakness    Pain management    Chronic respiratory failure ([address]         SURGICAL HISTORY       Past Surgical History:   Procedure Laterality Date    CERVICAL SPINE SURGERY  2015    \"have plate in neck done at [facility]      9- Dr Neeta Abbasi.  f/u 3 years    COLONOSCOPY  10/04/2018    COLONOSCOPY N/A 10/4/2018    COLONOSCOPY POLYPECTOMY SNARE/COLD BIOPSY performed by Anju [facility]is [facility] [place]   [facility] [place]    2 Polyps Removed    SIGMOIDOSCOPY      UPPER GASTROINTESTINAL ENDOSCOPY  10/04/2018    with Biopsy    UPPER GASTROINTESTINAL ENDOSCOPY N/A 10/4/2018    EGD BIOPSY performed by Anju Alexis MD at [facility]       Previous Medications    ALBUTEROL SULFATE HFA (VENTOLIN HFA) 108 (90 BASE) MCG/ACT INHALER    Inhale 2 puffs into the lungs every 6 hours as needed for Wheezing IPRATROPIUM-ALBUTEROL (DUONEB) 0.5-2.5 (3) MG/3ML SOLN NEBULIZER SOLUTION    INHALE THE CONTENTS OF 1 VIAL VIA NEBULIZER FOUR TIMES DAILY    MULTIPLE VITAMINS-MINERALS (MULTIVITAMIN ADULTS PO)    Take by mouth daily     OXYCODONE (ROXICODONE) 5 MG IMMEDIATE RELEASE TABLET    Take 5 mg by mouth every 6 hours as needed for Pain. OXYGEN    Inhale 2 L into the lungs nightly    PREDNISONE (DELTASONE) 5 MG TABLET    Take 1 tablet by mouth daily    SYMBICORT 160-4.5 MCG/ACT AERO    Inhale 2 puffs into the lungs 2 times daily       ALLERGIES     Patient has no known allergies. FAMILY HISTORY       Family History   Problem Relation Age of Onset   Alia Her Cancer Mother         lung ca    Heart Disease Father     Heart Attack Father     Diabetes Father     Colon Cancer Neg Hx           SOCIAL HISTORY       Social History     Socioeconomic History    Marital status: Single     Spouse name: None    Number of children: None    Years of education: None    Highest education level: None   Occupational History    None   Social Needs    Financial resource strain: None    Food insecurity     Worry: None     Inability: None    Transportation needs     Medical: None     Non-medical: None   Tobacco Use    Smoking status: Current Every Day Smoker     Packs/day: 1.50     Years: 50.00     Pack years: 75.00     Types: Cigarettes     Last attempt to quit: 11/3/2019     Years since quittin.9    Smokeless tobacco: Never Used    Tobacco comment: smokes 1-2 ppd   Substance and Sexual Activity    Alcohol use:  Yes     Alcohol/week: 6.0 standard drinks     Types: 6 Cans of beer per week     Comment: 3 beers daily    Drug use: Yes     Types: Marijuana     Comment: 1-2 times per week marijuana    Sexual activity: Not Currently   Lifestyle    Physical activity     Days per week: None     Minutes per session: None    Stress: None   Relationships    Social connections     Talks on phone: None     Gets together: None     Attends Jew service: None     Active member of club or organization: None     Attends meetings of clubs or organizations: None     Relationship status: None    Intimate partner violence     Fear of current or ex partner: None     Emotionally abused: None     Physically abused: None     Forced sexual activity: None   Other Topics Concern    None   Social History Narrative    None       SCREENINGS               PHYSICAL EXAM    (up to 7 for level 4, 8 or more for level 5)     ED Triage Vitals [10/08/20 1900]   BP Temp Temp Source Pulse Resp SpO2 Height Weight   118/73 98.7 °F (37.1 °C) Oral 85 18 98 % 5' 6\" (1.676 m) 120 lb (54.4 kg)       Physical Exam  Vitals signs and nursing note reviewed. Constitutional:       General: He is not in acute distress. Appearance: Normal appearance. He is normal weight. He is not ill-appearing, toxic-appearing or diaphoretic. HENT:      Head: Normocephalic and atraumatic. Right Ear: Tympanic membrane, ear canal and external ear normal. There is no impacted cerumen. Left Ear: Tympanic membrane, ear canal and external ear normal. There is no impacted cerumen. Nose: Nose normal. No congestion or rhinorrhea. Mouth/Throat:      Mouth: Mucous membranes are dry. Pharynx: Oropharynx is clear. No oropharyngeal exudate or posterior oropharyngeal erythema. Eyes:      General: No scleral icterus. Right eye: No discharge. Left eye: No discharge. Extraocular Movements: Extraocular movements intact. Conjunctiva/sclera: Conjunctivae normal.      Pupils: Pupils are equal, round, and reactive to light. Neck:      Musculoskeletal: Normal range of motion and neck supple. No neck rigidity or muscular tenderness. Vascular: No carotid bruit. Cardiovascular:      Rate and Rhythm: Normal rate. Pulses: Normal pulses. Heart sounds: Normal heart sounds. No murmur. No friction rub. No gallop.     Pulmonary:      Effort: Pulmonary effort is normal. No respiratory distress. Breath sounds: No stridor. Examination of the right-upper field reveals wheezing. Examination of the left-upper field reveals wheezing. Examination of the right-middle field reveals wheezing. Examination of the left-middle field reveals wheezing. Examination of the right-lower field reveals decreased breath sounds and wheezing. Examination of the left-lower field reveals decreased breath sounds and wheezing. Decreased breath sounds and wheezing present. No rhonchi or rales. Chest:      Chest wall: No mass, deformity, tenderness, crepitus or edema. Abdominal:      General: Abdomen is flat. Bowel sounds are normal. There is no distension. Palpations: Abdomen is soft. There is no mass. Tenderness: There is no abdominal tenderness. There is no right CVA tenderness, left CVA tenderness, guarding or rebound. Hernia: No hernia is present. Musculoskeletal: Normal range of motion. General: No swelling, tenderness, deformity or signs of injury. Right lower leg: No edema. Left lower leg: No edema. Lymphadenopathy:      Cervical: No cervical adenopathy. Skin:     Capillary Refill: Capillary refill takes less than 2 seconds. Coloration: Skin is not jaundiced or pale. Findings: No bruising, erythema, lesion or rash. Neurological:      General: No focal deficit present. Mental Status: He is alert and oriented to person, place, and time. Mental status is at baseline. Cranial Nerves: No cranial nerve deficit. Sensory: No sensory deficit. Motor: No weakness. Coordination: Coordination normal.      Gait: Gait normal.      Deep Tendon Reflexes: Reflexes normal.   Psychiatric:         Mood and Affect: Mood normal.         Behavior: Behavior normal.         Thought Content:  Thought content normal.         Judgment: Judgment normal.         DIAGNOSTIC RESULTS     Labs Reviewed   CBC WITH AUTO DIFFERENTIAL - Abnormal; Notable for the following components:       Result Value    MCH 33.1 (*)     Segs Relative 72.6 (*)     Lymphocytes % 15.2 (*)     Monocytes % 10.7 (*)     All other components within normal limits   COMPREHENSIVE METABOLIC PANEL W/ REFLEX TO MG FOR LOW K - Abnormal; Notable for the following components:    Chloride 95 (*)     CREATININE 0.6 (*)     All other components within normal limits   POCT VENOUS - Abnormal; Notable for the following components:    pCO2, Vamsi 59.6 (*)     pO2, Vamsi 50.3 (*)     Base Exc, Mixed 3.8 (*)     HCO3, Venous 31.8 (*)     O2 Sat, Vamsi 81.5 (*)     CO2 Content 33.6 (*)     All other components within normal limits   TROPONIN   BRAIN NATRIURETIC PEPTIDE   URINALYSIS          EKG: All EKG's are interpreted by the Emergency Department Physician who either signs or Co-signs this chart in the absence of a cardiologist.       EKG Interpretation    Interpreted by emergency department physician    Rhythm: normal sinus   Rate: normal  Axis: normal  Ectopy: none  Conduction: normal  ST Segments: nonspecific changes  T Waves: non specific changes  Q Waves: nonspecific    Clinical Impression: non-specific EKG    Urbano Patel     RADIOLOGY:     Non-plain film images such as CT, Ultrasound and MRI are read by the radiologist. Plain radiographic images are visualized and preliminarily interpreted by the emergency physician. Interpretation per the Radiologist below, if available at the time of this note:    XR CHEST PORTABLE   Preliminary Result   No acute cardiopulmonary process.                ED BEDSIDE ULTRASOUND:   Performed by ED Physician Urbano Patel DO       LABS:  Labs Reviewed   CBC WITH AUTO DIFFERENTIAL - Abnormal; Notable for the following components:       Result Value    MCH 33.1 (*)     Segs Relative 72.6 (*)     Lymphocytes % 15.2 (*)     Monocytes % 10.7 (*)     All other components within normal limits   COMPREHENSIVE METABOLIC PANEL W/ REFLEX TO MG FOR LOW K - Abnormal; Notable for the following components:    Chloride 95 (*)     CREATININE 0.6 (*)     All other components within normal limits   POCT VENOUS - Abnormal; Notable for the following components:    pCO2, Vamsi 59.6 (*)     pO2, Vamsi 50.3 (*)     Base Exc, Mixed 3.8 (*)     HCO3, Venous 31.8 (*)     O2 Sat, Vamsi 81.5 (*)     CO2 Content 33.6 (*)     All other components within normal limits   TROPONIN   BRAIN NATRIURETIC PEPTIDE   URINALYSIS       All other labs were within normal range or not returned as of this dictation. EMERGENCY DEPARTMENT COURSE and DIFFERENTIAL DIAGNOSIS/MDM:   Vitals:    Vitals:    10/08/20 1900 10/08/20 2004   BP: 118/73    Pulse: 85    Resp: 18 17   Temp: 98.7 °F (37.1 °C)    TempSrc: Oral    SpO2: 98% 96%   Weight: 120 lb (54.4 kg)    Height: 5' 6\" (1.676 m)            MDM  Number of Diagnoses or Management Options     Amount and/or Complexity of Data Reviewed  Clinical lab tests: ordered and reviewed  Tests in the radiology section of CPT®: ordered and reviewed  Tests in the medicine section of CPT®: ordered and reviewed    Risk of Complications, Morbidity, and/or Mortality  Presenting problems: moderate  Diagnostic procedures: moderate  Management options: moderate    Critical Care  Total time providing critical care: < 30 minutes    Patient Progress  Patient progress: improved        REASSESSMENT          CRITICAL CARE TIME     This excludes seperately billable procedures and family discussion time. Critical care time provided for obtaining history, conducting a physical exam, performing and monitoring interventions, ordering, collecting and interpreting tests, and establishing medical decision-making. There was a potential for life/limb threatening pathology requiring close evaluation and intervention with concern for patient decompensation.     CONSULTS:  None    PROCEDURES:  None performed unless otherwise noted below     Procedures        FINAL IMPRESSION      1. COPD exacerbation ([address]    2. Tobacco abuse disorder          DISPOSITION/PLAN   DISPOSITION Decision To Discharge 10/08/2020 08:42:36 PM      PATIENT REFERRED TO:  MD Mandy Mcdonald [facility] [id number] [phone]    In 2 days        DISCHARGE MEDICATIONS:  New Prescriptions    AZITHROMYCIN (ZITHROMAX Z-GROVER) 250 MG TABLET    Take 2 tablets (500 mg) on Day 1, and then take 1 tablet (250 mg) on days 2 through 5. IPRATROPIUM-ALBUTEROL (DUONEB) 0.5-2.5 (3) MG/3ML SOLN NEBULIZER SOLUTION    Inhale 3 mLs into the lungs every 4 hours    METHYLPREDNISOLONE (MEDROL, GROVER,) 4 MG TABLET    Take by mouth. ED Provider Disposition Time  DISPOSITION Decision To Discharge 10/08/2020 08:42:36 PM      Appropriate personal protective equipment was worn during the patient's evaluation. These included surgical, eye protection, surgical mask or in 95 respirator and gloves. The patient was also placed in a surgical mask for the prevention of possible spread of respiratory viral illnesses. The Patient was instructed to read the package inserts with any medication that was prescribed. Major potential reactions and medication interactions were discussed. The Patient understands that there are numerous possible adverse reactions not covered. The patient was also instructed to arrange follow-up with his or her primary care provider for review of any pending labwork or incidental findings on any radiology results that were obtained. All efforts were made to discuss any incidental findings that require further monitoring. Controlled Substances Monitoring:     No flowsheet data found.     (Please note that portions of this note were completed with a voice recognition program.  Efforts were made to edit the dictations but occasionally words are mis-transcribed.)    Estela Alvarenga DO (electronically signed)  Attending Emergency Physician            Estela Alvarenga DO  10/08/20 204

## 2020-10-09 NOTE — ED NOTES
Discharge instructions and prescriptions reviewed with pt and verbalizes understanding.      Jagruti Isidro RN  10/08/20 2038

## 2020-10-09 NOTE — CARE COORDINATION
Patient contacted regarding [place]. Discussed COVID-19 related testing which was not done at this time. Test results were not done. Patient informed of results, if available? No    Care Transition Nurse/ Ambulatory Care Manager contacted the patient by telephone to perform post discharge assessment. Call within 2 business days of discharge: Yes. Verified name and  with patient as identifiers. Provided introduction to self, and explanation of the CTN/ACM role, and reason for call due to risk factors for infection and/or exposure to COVID-19. Symptoms reviewed with patient who verbalized the following symptoms: no new symptoms and no worsening symptoms. Due to no new or worsening symptoms encounter was not routed to provider for escalation. Discussed follow-up appointments. If no appointment was previously scheduled, appointment scheduling offered: Yes  [facility] follow up appointment(s):   Future Appointments   Date Time Provider Sukumar Skinner   2020  9:15 AM Paul Reeder MD [facility]   2020  8:30 AM MD Joslyn Boone [facility] follow up appointment(s):     Non-face-to-face services provided:  Obtained and reviewed discharge summary and/or continuity of care documents     Patient has following risk factors of: COPD. CTN/ACM reviewed discharge instructions, medical action plan and red flags such as increased shortness of breath, increasing fever and signs of decompensation with patient who verbalized understanding. Discussed exposure protocols and quarantine with CDC Guidelines What to do if you are sick with coronavirus disease .  Patient was given an opportunity for questions and concerns. The patient agrees to contact the  PCP office for questions related to their healthcare. CTN/ACM provided contact information for future needs.     Reviewed and educated patient on any new and changed medications related to discharge diagnosis Patient/family/caregiver given information for [employer] and agrees to enroll no  Patient's preferred e-mail:    Patient's preferred phone number:   Based on Loop alert triggers, patient will be contacted by nurse care manager for worsening symptoms. Plan for follow-up call in 5-7 days based on severity of symptoms and risk factors.

## 2020-10-12 LAB
EKG ATRIAL RATE: 82 BPM
EKG DIAGNOSIS: NORMAL
EKG P AXIS: 82 DEGREES
EKG P-R INTERVAL: 160 MS
EKG Q-T INTERVAL: 380 MS
EKG QRS DURATION: 98 MS
EKG QTC CALCULATION (BAZETT): 443 MS
EKG R AXIS: 73 DEGREES
EKG T AXIS: 72 DEGREES
EKG VENTRICULAR RATE: 82 BPM

## 2020-10-16 ENCOUNTER — CARE COORDINATION (OUTPATIENT)
Dept: CARE COORDINATION | Age: 65
End: 2020-10-16

## 2020-10-16 NOTE — CARE COORDINATION
Patient contacted regarding COVID-19 risk and screening. Discussed COVID-19 related testing which was not done at this time. Test results were not done. Patient informed of results, if available? No.    Care Transition Nurse/ Ambulatory Care Manager contacted the patient by telephone to perform follow-up assessment. Verified name and  with patient as identifiers. Patient has following risk factors of: COPD. Symptoms reviewed with patient who verbalized the following symptoms: no new symptoms and no worsening symptoms. Due to no new or worsening symptoms encounter was not routed to provider for escalation. Education provided regarding infection prevention, and signs and symptoms of COVID-19 and when to seek medical attention with patient who verbalized understanding. Discussed exposure protocols and quarantine from [address] you at higher risk for severe illness  and given an opportunity for questions and concerns. The patient agrees to contact the COVID-19 hotline [phone] or PCP office for questions related to their healthcare. CTN/ACM provided contact information for future reference. From CDC: Are you at higher risk for severe illness?  Wash your hands often.  Avoid close contact (6 feet, which is about two arm lengths) with people who are sick.  Put distance between yourself and other people if COVID-19 is spreading in your community.  Clean and disinfect frequently touched surfaces.  Avoid all cruise travel and non-essential air travel.  Call your healthcare professional if you have concerns about COVID-19 and your underlying condition or if you are sick. For more information on steps you can take to protect yourself, see CDC's How to [id number] [place] [address] for follow-up call in 7-14 days based on severity of symptoms and risk factors. Doing a lot better. Finished Meds. Denies needs/symptoms at this time.

## 2020-10-23 ENCOUNTER — CARE COORDINATION (OUTPATIENT)
Dept: CARE COORDINATION | Age: 65
End: 2020-10-23

## 2020-10-23 NOTE — CARE COORDINATION
You Patient resolved from the Care Transitions episode on 10/9/20  Discussed COVID-19 related testing which was not done at this time. Test results were not done. Patient informed of results, if available? No    Patient/family has been provided the following resources and education related to COVID-19:                         Signs, symptoms and red flags related to COVID-19            CDC exposure and quarantine guidelines            [employer] exposure contact - [phone]            Contact for their local Department of Health                 Patient currently reports that the following symptoms have improved:  no new/worsening symptoms     No further outreach scheduled with this CTN/ACM. Episode of Care resolved. Patient has this CTN/ACM contact information if future needs arise.

## 2020-11-06 ENCOUNTER — OFFICE VISIT (OUTPATIENT)
Dept: INTERNAL MEDICINE CLINIC | Age: 65
End: 2020-11-06
Payer: MEDICARE

## 2020-11-06 VITALS
DIASTOLIC BLOOD PRESSURE: 70 MMHG | RESPIRATION RATE: 20 BRPM | BODY MASS INDEX: 18.4 KG/M2 | WEIGHT: 114 LBS | OXYGEN SATURATION: 94 % | HEART RATE: 76 BPM | TEMPERATURE: 98.7 F | SYSTOLIC BLOOD PRESSURE: 118 MMHG

## 2020-11-06 PROCEDURE — G8926 SPIRO NO PERF OR DOC: HCPCS | Performed by: INTERNAL MEDICINE

## 2020-11-06 PROCEDURE — G8419 CALC BMI OUT NRM PARAM NOF/U: HCPCS | Performed by: INTERNAL MEDICINE

## 2020-11-06 PROCEDURE — 3023F SPIROM DOC REV: CPT | Performed by: INTERNAL MEDICINE

## 2020-11-06 PROCEDURE — G0008 ADMIN INFLUENZA VIRUS VAC: HCPCS | Performed by: INTERNAL MEDICINE

## 2020-11-06 PROCEDURE — G8482 FLU IMMUNIZE ORDER/ADMIN: HCPCS | Performed by: INTERNAL MEDICINE

## 2020-11-06 PROCEDURE — 90686 IIV4 VACC NO PRSV 0.5 ML IM: CPT | Performed by: INTERNAL MEDICINE

## 2020-11-06 PROCEDURE — 4004F PT TOBACCO SCREEN RCVD TLK: CPT | Performed by: INTERNAL MEDICINE

## 2020-11-06 PROCEDURE — G8427 DOCREV CUR MEDS BY ELIG CLIN: HCPCS | Performed by: INTERNAL MEDICINE

## 2020-11-06 PROCEDURE — 3017F COLORECTAL CA SCREEN DOC REV: CPT | Performed by: INTERNAL MEDICINE

## 2020-11-06 PROCEDURE — 99214 OFFICE O/P EST MOD 30 MIN: CPT | Performed by: INTERNAL MEDICINE

## 2020-11-06 ASSESSMENT — ENCOUNTER SYMPTOMS
ALLERGIC/IMMUNOLOGIC NEGATIVE: 1
WHEEZING: 0
GASTROINTESTINAL NEGATIVE: 1
SHORTNESS OF BREATH: 1
COUGH: 0
EYES NEGATIVE: 1

## 2020-11-06 NOTE — PROGRESS NOTES
Vaccine Information Sheet, \"Influenza - Inactivated\"  given to Alondra Resides, or parent/legal guardian of  Alondra Middleton and verbalized understanding. Patient responses:    Have you ever had a reaction to a flu vaccine? No  Do you have any current illness? No  Have you ever had Guillian Rutland Syndrome? No  Do you have a serious allergy to any of the follow: Neomycin, Polymyxin, Thimerosal, eggs or egg products? No    Flu vaccine given per order. Please see immunization tab. Risks and benefits explained. Current VIS given.       Immunizations Administered     Name Date Dose Route    Influenza, Quadv, IM, PF (6 mo and older Fluzone, Flulaval, Fluarix, and 3 yrs and older Afluria) 11/6/2020 0.5 mL Intramuscular    Site: Deltoid- Left    Lot: [id number]    NDC: 54057-931-45

## 2020-11-06 NOTE — PROGRESS NOTES
Rod Willis  Patient's  is 1955  Seen in office on 2020      SUBJECTIVE:  Rosemary Simmons vel 63 y. o.year old male presents today   Chief Complaint   Patient presents with    Follow Up After Procedure    Fatigue    Other     wants to quit smoking and chantix did not help    Flu Vaccine     patient would like vaccine today     Pt is here for f/u COPD, chronic resp failure, tobacco abuse , chronic neck pain  Pt went to ER last month for SOB dx exacerb of COPD and treated with Z-lorne and neb treatment  Pt states he is feeling well  His wt is stable even though it is written 120 lbs when he went to ER. Pt states he was not weighed, and when they asked him his weight he told them 120 lbs  No NVD> appetite is good. Taking medications regularly. No side effects noted. Review of Systems   Constitutional: Negative. Negative for chills, diaphoresis and fever. HENT: Negative. Eyes: Negative. Respiratory: Positive for shortness of breath. Negative for cough and wheezing. Cardiovascular: Negative. Negative for chest pain, palpitations and leg swelling. Gastrointestinal: Negative. Endocrine: Negative. Genitourinary: Negative. Musculoskeletal: Negative. Skin: Negative. Allergic/Immunologic: Negative. Neurological: Negative. Hematological: Negative. Psychiatric/Behavioral: Negative. OBJECTIVE: /70   Pulse 76   Temp 98.7 °F (37.1 °C)   Resp 20   Wt 114 lb (51.7 kg)   SpO2 94% Comment: oxygen at 2 liters per NC  BMI 18.40 kg/m²     Wt Readings from Last 3 Encounters:   20 114 lb (51.7 kg)   20 118 lb (53.5 kg)   10/08/20 120 lb (54.4 kg)      Patient was seen following COVID-19 precautions. Face mask, gloves and goggles were donned. Patient was wearing facemask. GENERAL: - Alert, oriented, pleasant, in no apparent distress. HEENT: - Conjunctiva pink, no scleral icterus. ENT clear. NECK: -Supple. No jugular venous distention noted.  No masses felt,  CARDIOVASCULAR: - Normal S1 and S2    PULMONARY: - No respiratory distress. Patient is on oxygen 2 L/min. Patient had some wheezes  ABDOMEN: - Soft and non-tender,no masses  ororganomegaly. EXTREMITIES: - No cyanosis, clubbing, or significant edema. SKIN: Skin is warm and dry. NEUROLOGICAL: - Cranial nerves II through XII are grossly intact. IMPRESSION:    Encounter Diagnoses   Name Primary?  COPD, severe ([facility] Yes    Alcohol dependence with withdrawal delirium ([facility])     Tobacco abuse disorder     Acute midline low back pain without sciatica     Alcohol abuse     Cervical radiculopathy     Chronic respiratory failure, unspecified whether with hypoxia or hypercapnia ([facility])     Pain management     Hand muscle weakness     Weight loss        ASSESSMENT/PLAN:    COPD, severe ([facility]  Patient has severe COPD and is using oxygen 24/7 2L/m  Patient is using albuterol inhaler, neb treatment, prednisone 5 mg daily Symbicort  Patient sees Dr. Karolina Casillas on regular basis.      Tobacco abuse disorder  Advised patient to quit smoking  chantix did not help  Refer to [facility] smoking cessation program.      Alcohol dependence ([facility]  Advised patient to quit drinking also. 6 packs a week.      Cervical radiculopathy  Patient has cervical radiculopathy affecting the left arm. Going to [facility] pain clinic and is on oxtcodone 5 mg q 6 hours prn.      Weight loss  Patient's weight is fluctuating. Stable.      Lymphocytic colitis  Patient denies any diarrhea anymore. Doing fairly well     Hand muscle weakness  EMG in 5/20 showed severe neurogenic changes affecting the left arm muscles. Also has severe neuropathy. Discussed with the patient. He is going to the pain clinic     Pain management  Patient is going to pain clinic Dr. Rob Parra     Chronic respiratory failure [facility])  Due to severe COPD. Patient is on oxygen and nebulizer treatments    Lower back pain : not bad per patient.        Flu vaccine given today. Return to office in 3 months. Mediations reviewed with the patient. Continue current medications. Appropriate prescriptions are addressed. After visit summeryprovided. Follow up as directed sooner if needed. Questions answered and patient verbalizes understanding. Call for any problems, questions, or concerns. No Known Allergies  Current Outpatient Medications   Medication Sig Dispense Refill    budesonide-formoterol (SYMBICORT) 160-4.5 MCG/ACT AERO Inhale 2 puffs into the lungs 2 times daily 3 Inhaler 3    oxyCODONE (ROXICODONE) 5 MG immediate release tablet Take 5 mg by mouth every 6 hours as needed for Pain.  albuterol sulfate HFA (VENTOLIN HFA) 108 (90 Base) MCG/ACT inhaler Inhale 2 puffs into the lungs every 6 hours as needed for Wheezing 3 Inhaler 1    predniSONE (DELTASONE) 5 MG tablet Take 1 tablet by mouth daily 90 tablet 1    ipratropium-albuterol (DUONEB) 0.5-2.5 (3) MG/3ML SOLN nebulizer solution INHALE THE CONTENTS OF 1 VIAL VIA NEBULIZER FOUR TIMES DAILY 1080 mL 1    OXYGEN Inhale 2 L into the lungs nightly      Multiple Vitamins-Minerals (MULTIVITAMIN ADULTS PO) Take by mouth daily        No current facility-administered medications for this visit. 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 On 7/22/16.  Dr Rex Osgood at [address] [place] [address] 8-    Dr. Teddy Washburn COPD [facility]chronic obstructive pulmonary disease) [facility] 5/19/2014    PFTs showed severe COPD in 5/14- follows with Dr Campuzano Swiss COPD exacerbation [facility]) 6/10/2016    Diverticulosis 4-    Dupuytren's contracture 5/26/2017    Right palm    Elevated PSA 12/21/2018 12/2018 : PSA is 6    H/O colonoscopy 9-18-14    Dr. Teddy Washburn H/O echocardiogram 08/17/2016    EF 55-60%. Abnormal diastolic function. Mild TR. Borderline right atrium and RV dilatation.  Hiatal hernia     History of alcohol abuse     \"average 6 pk beer per day\"    History of drug abuse ([facility]     does use marijuana\"last used cocaine over 10 yrs ago\" per pt on 10/3/2018    Internal hemorrhoids 2014    Intervertebral disc protrusion 2014    Cervical region on MRI    Lingular pneumonia 4/3/2018    Lymphocytic colitis 14    Dr. Drew Epley, will treat if symptomatic    On home oxygen therapy     2l/nc at night only    Pneumonia of left lower lobe due to infectious organism 2018    Sepsis due to pneumonia ([facility] 6/10/2016    Spinal stenosis in cervical region 2014    MRI showed  Disc protrusion. Severe spinal stenosis at C3-C4    Spinal stenosis of cervical region 3/14/2016    S/P cervical discectomy and fusion with plate and screw. [facility]    Wears glasses     to read     Past Surgical History:   Procedure Laterality Date    CERVICAL SPINE SURGERY      \"have plate in neck done at [address] [facility]      2014 Dr .Drew Epley. f/u 3 years    COLONOSCOPY  10/04/2018    COLONOSCOPY N/A 10/4/2018    COLONOSCOPY POLYPECTOMY SNARE/COLD BIOPSY performed by Radha De La O MD at [facility] in 5 years   [facility] POLYPECTOMY  10/04/2018    2 Polyps Removed    SIGMOIDOSCOPY      UPPER GASTROINTESTINAL ENDOSCOPY  10/04/2018    with Biopsy    UPPER GASTROINTESTINAL ENDOSCOPY N/A 10/4/2018    EGD BIOPSY performed by Radha De La O MD at [address] [place] [address] History     Tobacco Use    Smoking status: Current Every Day Smoker     Packs/day: 1.50     Years: 50.00     Pack years: 75.00     Types: Cigarettes     Last attempt to quit: 11/3/2019     Years since quittin.0    Smokeless tobacco: Never Used    Tobacco comment: smokes 1-2 ppd   Substance Use Topics    Alcohol use:  Yes     Alcohol/week: 6.0 standard drinks     Types: 6 Cans of beer per week     Comment: 3 beers daily       LAB REVIEW:  CBC:   Lab Results   Component Value Date    WBC 10.0 10/08/2020    HGB 16.0 10/08/2020    HCT 47.8 10/08/2020     10/08/2020     Lipids:   Lab Results   Component Value Date    CHOL 210 (H) 01/29/2015    TRIG 61 01/29/2015    HDL 77 01/29/2015    LDLDIRECT 129 (H) 01/29/2015     Renal:   Lab Results   Component Value Date    BUN 6 10/08/2020    CREATININE 0.6 10/08/2020     10/08/2020    K 4.1 10/08/2020    ALT 24 10/08/2020    AST 37 10/08/2020    GLUCOSE 79 10/08/2020     PT/INR: No results found for: INR  A1C:   Lab Results   Component Value Date    LABA1C 5.1 08/07/2020           Herminio Mcgarry MD, 11/6/2020 , 8:24 AM

## 2020-11-18 RX ORDER — ALBUTEROL SULFATE 90 UG/1
AEROSOL, METERED RESPIRATORY (INHALATION)
Qty: 54 G | Refills: 5 | Status: SHIPPED | OUTPATIENT
Start: 2020-11-18 | End: 2021-11-30

## 2020-11-20 ENCOUNTER — VIRTUAL VISIT (OUTPATIENT)
Dept: INTERNAL MEDICINE CLINIC | Age: 65
End: 2020-11-20
Payer: MEDICARE

## 2020-11-20 ENCOUNTER — TELEPHONE (OUTPATIENT)
Dept: INTERNAL MEDICINE CLINIC | Age: 65
End: 2020-11-20

## 2020-11-20 VITALS — HEIGHT: 66 IN | BODY MASS INDEX: 18.32 KG/M2 | WEIGHT: 114 LBS

## 2020-11-20 PROCEDURE — 3017F COLORECTAL CA SCREEN DOC REV: CPT | Performed by: INTERNAL MEDICINE

## 2020-11-20 PROCEDURE — G0438 PPPS, INITIAL VISIT: HCPCS | Performed by: INTERNAL MEDICINE

## 2020-11-20 ASSESSMENT — LIFESTYLE VARIABLES
HOW OFTEN DURING THE LAST YEAR HAVE YOU FOUND THAT YOU WERE NOT ABLE TO STOP DRINKING ONCE YOU HAD STARTED: 0
HOW MANY STANDARD DRINKS CONTAINING ALCOHOL DO YOU HAVE ON A TYPICAL DAY: 0
HOW OFTEN DURING THE LAST YEAR HAVE YOU BEEN UNABLE TO REMEMBER WHAT HAPPENED THE NIGHT BEFORE BECAUSE YOU HAD BEEN DRINKING: 0
HAS A RELATIVE, FRIEND, DOCTOR, OR ANOTHER HEALTH PROFESSIONAL EXPRESSED CONCERN ABOUT YOUR DRINKING OR SUGGESTED YOU CUT DOWN: 0
HOW OFTEN DO YOU HAVE SIX OR MORE DRINKS ON ONE OCCASION: 0
HOW OFTEN DURING THE LAST YEAR HAVE YOU NEEDED AN ALCOHOLIC DRINK FIRST THING IN THE MORNING TO GET YOURSELF GOING AFTER A NIGHT OF HEAVY DRINKING: 0
HOW OFTEN DURING THE LAST YEAR HAVE YOU HAD A FEELING OF GUILT OR REMORSE AFTER DRINKING: 0
HAVE YOU OR SOMEONE ELSE BEEN INJURED AS A RESULT OF YOUR DRINKING: 0
AUDIT TOTAL SCORE: 4
AUDIT-C TOTAL SCORE: 4
HOW OFTEN DURING THE LAST YEAR HAVE YOU FAILED TO DO WHAT WAS NORMALLY EXPECTED FROM YOU BECAUSE OF DRINKING: 0
HOW OFTEN DO YOU HAVE A DRINK CONTAINING ALCOHOL: 4

## 2020-11-20 ASSESSMENT — PATIENT HEALTH QUESTIONNAIRE - PHQ9
1. LITTLE INTEREST OR PLEASURE IN DOING THINGS: 0
SUM OF ALL RESPONSES TO PHQ QUESTIONS 1-9: 1
SUM OF ALL RESPONSES TO PHQ QUESTIONS 1-9: 1
SUM OF ALL RESPONSES TO PHQ9 QUESTIONS 1 & 2: 1
SUM OF ALL RESPONSES TO PHQ QUESTIONS 1-9: 1
2. FEELING DOWN, DEPRESSED OR HOPELESS: 1

## 2020-11-20 NOTE — TELEPHONE ENCOUNTER
When calling pt for AWV, pt stated he began taking OTC potassium 500mg a couple of days ago for cramping. Informed pt I would let Dr. Parvin Ventura know as he may not need to be taking this. Pt stated he would stop taking it until he hears from our office. Please advise. Thank you.

## 2020-11-20 NOTE — PROGRESS NOTES
Medicare Annual Wellness Visit  Name: Ant Pascual Date: 2020   MRN: [id number] Sex: Male   Age: 63 y.o. Ethnicity: Non-/Non    : 1955 Race: Adriana Berumen is here for Medicare AWV    Screenings for behavioral, psychosocial and functional/safety risks, and cognitive dysfunction are all negative except as indicated below. These results, as well as other patient data from the [address] form, are documented in Flowsheets linked to this Encounter. No Known Allergies    Prior to Visit Medications    Medication Sig Taking? Authorizing Provider   POTASSIUM GLUCONATE PO Take 1 capsule by mouth daily Indications: pt reports 500mg OTC Yes Historical Provider, MD   albuterol sulfate  (90 Base) MCG/ACT inhaler INHALE 2 PUFFS EVERY 6 HOURS AS NEEDED FOR WHEEZING Yes Ronak Mason MD   budesonide-formoterol (SYMBICORT) 160-4.5 MCG/ACT AERO Inhale 2 puffs into the lungs 2 times daily Yes Yasmeen Yancey MD   oxyCODONE (ROXICODONE) 5 MG immediate release tablet Take 5 mg by mouth every 6 hours as needed for Pain. 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 On 16.  [facility] Miracle Noriega [facility]    Dr. Alan Lopez COPD (chronic obstructive pulmonary disease) [facility] 2014 PFTs showed severe COPD in 5/14- follows with Dr Christopher Gomez COPD exacerbation [facility]) 6/10/2016    Diverticulosis 6-    Dupuytren's contracture 5/26/2017    Right palm    Elevated PSA 12/21/2018 12/2018 : PSA is 6    H/O colonoscopy 9-18-14    Dr. Jazmine Foley H/O echocardiogram 08/17/2016    EF 55-60%. Abnormal diastolic function. Mild TR. Borderline right atrium and RV dilatation.  Hiatal hernia     History of alcohol abuse     \"average 6 pk beer per day\"    History of drug abuse ([facility]     does use marijuana\"last used cocaine over 10 yrs ago\" per pt on 10/3/2018    Internal hemorrhoids 9-    Intervertebral disc protrusion 5/19/2014    Cervical region on MRI    Lingular pneumonia 4/3/2018    Lymphocytic colitis 9-18-14    Dr. Vira Plaza, will treat if symptomatic    On home oxygen therapy     2l/nc at night only    Pneumonia of left lower lobe due to infectious organism 11/27/2018    Sepsis due to pneumonia ([facility] 6/10/2016    Spinal stenosis in cervical region 5/19/2014    MRI showed  Disc protrusion. Severe spinal stenosis at C3-C4    Spinal stenosis of cervical region 3/14/2016    S/P cervical discectomy and fusion with plate and screw. On 7/22/16. Dr Becca Mcintyre at [address] abuse disorder 5/19/2014    Wears glasses     to read       Past Surgical History:   Procedure Laterality Date    CERVICAL SPINE SURGERY  2015    \"have plate in neck done at [facility]      9- Dr Jon Plaza.  f/u 3 years    COLONOSCOPY  10/04/2018    COLONOSCOPY N/A 10/4/2018    COLONOSCOPY POLYPECTOMY SNARE/COLD BIOPSY performed [facility] in [facility] years   [facility] POLYPECTOMY  10/04/2018    2 Polyps Removed    SIGMOIDOSCOPY      UPPER GASTROINTESTINAL ENDOSCOPY  10/04/2018    with Biopsy    UPPER GASTROINTESTINAL ENDOSCOPY N/A 10/4/2018    EGD BIOPSY performed by Marcelo [facility]ilar MD at [facility] ENDOSCOPY       Family History   Problem Relation Age of Onset    Cancer Mother         lung ca    Heart Disease Father     Heart Attack Father     Diabetes Father     Colon Cancer Neg Hx        CareTeam (Including outside providers/suppliers regularly involved in providing care):   Patient Care Team:  Sylvia Redmond MD as PCP - General (Internal Medicine)  Sylvia Redmond MD as PCP - REHABILITATION [facility] Empaneled Provider  Katy Marrero MD as Consulting Physician (Pulmonary Disease)    Wt Readings from Last 3 Encounters:   11/20/20 114 lb (51.7 kg)   11/06/20 114 lb (51.7 kg)   11/04/20 118 lb (53.5 kg)     Vitals:    11/20/20 0846   Weight: 114 lb (51.7 kg)   Height: 5' 6\" (1.676 m)     Body mass index is 18.4 kg/m². Based upon direct observation of the patient, evaluation of cognition reveals recent and remote memory intact. Patient's complete Health Risk Assessment and screening values have been reviewed and are found in Flowsheets. The following problems were reviewed today and where indicated follow up appointments were made and/or referrals ordered. Positive Risk Factor Screenings with Interventions:     Fall Risk:  2 or more falls in past year?: (!) yes  Fall with injury in past year?: no  Fall Risk Interventions:    · Patient declines any further evaluation/treatment for this issue    Cognitive:   Words recalled: 2 Words Recalled  Total Score Interpretation: Positive Mini-Cog  Did the patient refuse to take the cognition test?: No  Cognitive Impairment Interventions:  · Patient declines any further evaluation/treatment for cognitive impairment   · Patient did not sound as if he was cognitively impaired    Substance History:  Social History     Tobacco History     Smoking Status  Current Every Day Smoker Last attempt to quit  11/3/2019 Smoking Frequency  1.5 packs/day for 50 years (76 pk yrs) Smoking Tobacco Type  Cigarettes    Smokeless Tobacco Use  Never Used    Tobacco Comment  smokes 1-2 ppd          Alcohol History     Alcohol Use Status  Yes Drinks/Week  6 Cans of notice any trouble with your hearing?: No  Do you have difficulty driving, watching TV, or doing any of your daily activities because of your eyesight?: No  Have you had an eye exam within the past year?: (!) No  Hearing/Vision Interventions:  · Vision concerns:  patient encouraged to make appointment with his/her eye specialist    Safety:  Safety  Do you have working smoke detectors?: Yes  Have all throw rugs been removed or fastened?: Yes  Do you have non-slip mats or surfaces in all bathtubs/showers?: Yes  Do all of your stairways have a railing or banister?: (!) No  Are your doorways, halls and stairs free of clutter?: Yes  Do you always fasten your seatbelt when you are in a car?: Yes  Safety Interventions:  · Home safety tips provided verbally    Personalized Preventive Plan   Current Health Maintenance Status  Immunization History   Administered Date(s) Administered    Influenza Virus Vaccine 09/21/2015    Influenza, Intradermal, Quadrivalent, Preservative Free 11/23/2016    Influenza, Quadv, 6 mo and older, IM, PF (Flulaval, Fluarix) 11/28/2018    Influenza, Quadv, IM, PF (6 mo and older Fluzone, Flulaval, Fluarix, and 3 yrs and older Afluria) 12/04/2017, 10/09/2019, 11/06/2020    Pneumococcal Conjugate 13-valent (Vdtdoir19) 12/12/2019    Pneumococcal Polysaccharide (Dpozymooo16) 12/04/2017    Tdap (Boostrix, Adacel) 06/26/2014        Health Maintenance   Topic Date Due    Shingles Vaccine (1 of 2) 01/24/2007    Annual Wellness Visit (AWV)  05/29/2019    Lipid screen  01/29/2020    Low dose CT lung screening  06/18/2020    Colon cancer screen colonoscopy  10/04/2023    DTaP/Tdap/Td vaccine (2 - Td) 06/26/2024    Flu vaccine  Completed    Pneumococcal 0-64 years Vaccine  Completed    Hepatitis C screen  Completed    HIV screen  Completed    Hepatitis A vaccine  Aged Out    Hepatitis B vaccine  Aged Out    Hib vaccine  Aged Out    Meningococcal (ACWY) vaccine  Aged Out     Recommendations for Preventive Services Due: see orders and patient instructions/AVS.    Unable to obtain 3 vital signs due to patient not having equipment to take temperature/BP. Recommended screening schedule for the next 5-10 years is provided to the patient in written form: see Patient Arlene Alaniz is a 63 y.o. male being evaluated by a Virtual Visit (Shaylee Jha o deepak) encounter to address concerns as mentioned above. A caregiver was present when appropriate. Due to this being a TeleHealth encounter (During [facility] public health emergency), evaluation of the following organ systems was limited: Vitals/Constitutional/EENT/Resp/CV/GI//MS/Neuro/Skin/Heme-Lymph-Imm. Pursuant to the emergency declaration under the [address] [place] [address] authority and the [employer] Act, this Virtual Visit was conducted with patient's (and/or legal guardian's) consent, to reduce the patient's risk of exposure to COVID-19 and provide necessary medical care. The patient (and/or legal guardian) has also been advised to contact this office for worsening conditions or problems, and seek emergency medical treatment and/or call 911 if deemed necessary. Patient identification was verified at the start of the visit: Yes    Total time spent for this encounter: Not billed by time    Services were provided through a audio synchronous discussion virtually to substitute for in-person clinic visit. Patient and provider were located at their individual homes. --Elzie Holter on 11/20/2020 at 8:51 AM    An electronic signature was used to authenticate this note. Jad Gibbons, 11/20/2020, performed the documented evaluation under the direct supervision of the attending physician.

## 2020-11-20 NOTE — PATIENT INSTRUCTIONS
Personalized Preventive Plan for Lissa Khan - 11/20/2020  Medicare offers a range of preventive health benefits. Some of the tests and screenings are paid in full while other may be subject to a deductible, co-insurance, and/or copay. Some of these benefits include a comprehensive review of your medical history including lifestyle, illnesses that may run in your family, and various assessments and screenings as appropriate. After reviewing your medical record and screening and assessments performed today your provider may have ordered immunizations, labs, imaging, and/or referrals for you. A list of these orders (if applicable) as well as your Preventive Care list are included within your After Visit Summary for your review. Other Preventive Recommendations:    · A preventive eye exam performed by an eye specialist is recommended every 1-2 years to screen for glaucoma; cataracts, macular degeneration, and other eye disorders. · A preventive dental visit is recommended every 6 months. · Try to get at least 150 minutes of exercise per week or 10,000 steps per day on a pedometer . · Order or download the FREE \"Exercise & Physical Activity: Your Everyday Guide\" from [employer]. Call 7-137.660.4315 or search [employer] online. · You need 7070-3023 mg of calcium and 2883-8960 IU of vitamin D per day. It is possible to meet your calcium requirement with diet alone, but a vitamin D supplement is usually necessary to meet this goal.  · When exposed to the sun, use a sunscreen that protects against both UVA and UVB radiation with an SPF of 30 or greater. Reapply every 2 to 3 hours or after sweating, drying off with a towel, or swimming. · Always wear a seat belt when traveling in a car. Always wear a helmet when riding a bicycle or motorcycle.

## 2020-11-24 NOTE — TELEPHONE ENCOUNTER
Patient stated that he is having severe leg cramps into his toes since he has stopped taking the potassium patient wants to know what you advise he take for this

## 2020-12-24 RX ORDER — IPRATROPIUM BROMIDE AND ALBUTEROL SULFATE 2.5; .5 MG/3ML; MG/3ML
SOLUTION RESPIRATORY (INHALATION)
Qty: 1080 ML | Refills: 1 | Status: SHIPPED | OUTPATIENT
Start: 2020-12-24 | End: 2021-05-07

## 2021-02-10 ENCOUNTER — HOSPITAL ENCOUNTER (OUTPATIENT)
Age: 66
Setting detail: OBSERVATION
Discharge: HOME | End: 2021-02-12
Attending: EMERGENCY MEDICINE | Admitting: INTERNAL MEDICINE
Payer: MEDICARE

## 2021-02-10 ENCOUNTER — APPOINTMENT (OUTPATIENT)
Dept: GENERAL RADIOLOGY | Age: 66
End: 2021-02-10
Payer: MEDICARE

## 2021-02-10 DIAGNOSIS — R06.89 DYSPNEA AND RESPIRATORY ABNORMALITIES: ICD-10-CM

## 2021-02-10 DIAGNOSIS — J44.1 COPD WITH ACUTE EXACERBATION (HCC): Primary | ICD-10-CM

## 2021-02-10 DIAGNOSIS — R06.00 DYSPNEA AND RESPIRATORY ABNORMALITIES: ICD-10-CM

## 2021-02-10 PROBLEM — Z72.0 TOBACCO ABUSE: Status: ACTIVE | Noted: 2021-02-10

## 2021-02-10 LAB
ALBUMIN SERPL-MCNC: 4.2 GM/DL (ref 3.4–5)
ALP BLD-CCNC: 60 IU/L (ref 40–129)
ALT SERPL-CCNC: 19 U/L (ref 10–40)
ANION GAP SERPL CALCULATED.3IONS-SCNC: 14 MMOL/L (ref 4–16)
AST SERPL-CCNC: 21 IU/L (ref 15–37)
BASOPHILS ABSOLUTE: 0.1 K/CU MM
BASOPHILS RELATIVE PERCENT: 0.6 % (ref 0–1)
BILIRUB SERPL-MCNC: 0.2 MG/DL (ref 0–1)
BUN BLDV-MCNC: 5 MG/DL (ref 6–23)
CALCIUM SERPL-MCNC: 9.5 MG/DL (ref 8.3–10.6)
CHLORIDE BLD-SCNC: 97 MMOL/L (ref 99–110)
CO2: 32 MMOL/L (ref 21–32)
CREAT SERPL-MCNC: 0.7 MG/DL (ref 0.9–1.3)
DIFFERENTIAL TYPE: ABNORMAL
EOSINOPHILS ABSOLUTE: 0 K/CU MM
EOSINOPHILS RELATIVE PERCENT: 0.2 % (ref 0–3)
GFR AFRICAN AMERICAN: >60 ML/MIN/1.73M2
GFR NON-AFRICAN AMERICAN: >60 ML/MIN/1.73M2
GLUCOSE BLD-MCNC: 99 MG/DL (ref 70–99)
HCT VFR BLD CALC: 50.2 % (ref 42–52)
HEMOGLOBIN: 16.4 GM/DL (ref 13.5–18)
IMMATURE NEUTROPHIL %: 0.3 % (ref 0–0.43)
LYMPHOCYTES ABSOLUTE: 2.2 K/CU MM
LYMPHOCYTES RELATIVE PERCENT: 19.9 % (ref 24–44)
MCH RBC QN AUTO: 32.2 PG (ref 27–31)
MCHC RBC AUTO-ENTMCNC: 32.7 % (ref 32–36)
MCV RBC AUTO: 98.6 FL (ref 78–100)
MONOCYTES ABSOLUTE: 0.8 K/CU MM
MONOCYTES RELATIVE PERCENT: 7.5 % (ref 0–4)
PDW BLD-RTO: 13.2 % (ref 11.7–14.9)
PLATELET # BLD: 335 K/CU MM (ref 140–440)
PMV BLD AUTO: 8.5 FL (ref 7.5–11.1)
POTASSIUM SERPL-SCNC: 3.6 MMOL/L (ref 3.5–5.1)
PRO-BNP: 32.27 PG/ML
RBC # BLD: 5.09 M/CU MM (ref 4.6–6.2)
SEGMENTED NEUTROPHILS ABSOLUTE COUNT: 7.8 K/CU MM
SEGMENTED NEUTROPHILS RELATIVE PERCENT: 71.5 % (ref 36–66)
SODIUM BLD-SCNC: 143 MMOL/L (ref 135–145)
TOTAL IMMATURE NEUTOROPHIL: 0.03 K/CU MM
TOTAL PROTEIN: 6.9 GM/DL (ref 6.4–8.2)
TROPONIN T: <0.01 NG/ML
WBC # BLD: 10.9 K/CU MM (ref 4–10.5)

## 2021-02-10 PROCEDURE — 99285 EMERGENCY DEPT VISIT HI MDM: CPT

## 2021-02-10 PROCEDURE — 93005 ELECTROCARDIOGRAM TRACING: CPT | Performed by: EMERGENCY MEDICINE

## 2021-02-10 PROCEDURE — 71045 X-RAY EXAM CHEST 1 VIEW: CPT

## 2021-02-10 PROCEDURE — 80053 COMPREHEN METABOLIC PANEL: CPT

## 2021-02-10 PROCEDURE — G0378 HOSPITAL OBSERVATION PER HR: HCPCS

## 2021-02-10 PROCEDURE — 6370000000 HC RX 637 (ALT 250 FOR IP): Performed by: EMERGENCY MEDICINE

## 2021-02-10 PROCEDURE — 84484 ASSAY OF TROPONIN QUANT: CPT

## 2021-02-10 PROCEDURE — 85025 COMPLETE CBC W/AUTO DIFF WBC: CPT

## 2021-02-10 PROCEDURE — 96376 TX/PRO/DX INJ SAME DRUG ADON: CPT

## 2021-02-10 PROCEDURE — 2700000000 HC OXYGEN THERAPY PER DAY

## 2021-02-10 PROCEDURE — 2580000003 HC RX 258: Performed by: NURSE PRACTITIONER

## 2021-02-10 PROCEDURE — 83880 ASSAY OF NATRIURETIC PEPTIDE: CPT

## 2021-02-10 PROCEDURE — U0002 COVID-19 LAB TEST NON-CDC: HCPCS

## 2021-02-10 PROCEDURE — 94640 AIRWAY INHALATION TREATMENT: CPT

## 2021-02-10 PROCEDURE — 6370000000 HC RX 637 (ALT 250 FOR IP): Performed by: NURSE PRACTITIONER

## 2021-02-10 PROCEDURE — 6360000002 HC RX W HCPCS: Performed by: EMERGENCY MEDICINE

## 2021-02-10 PROCEDURE — 96374 THER/PROPH/DIAG INJ IV PUSH: CPT

## 2021-02-10 RX ORDER — IPRATROPIUM BROMIDE AND ALBUTEROL SULFATE 2.5; .5 MG/3ML; MG/3ML
1 SOLUTION RESPIRATORY (INHALATION) ONCE
Status: COMPLETED | OUTPATIENT
Start: 2021-02-10 | End: 2021-02-10

## 2021-02-10 RX ORDER — M-VIT,TX,IRON,MINS/CALC/FOLIC 27MG-0.4MG
1 TABLET ORAL DAILY
Status: DISCONTINUED | OUTPATIENT
Start: 2021-02-10 | End: 2021-02-12 | Stop reason: HOSPADM

## 2021-02-10 RX ORDER — ALBUTEROL SULFATE 2.5 MG/3ML
2.5 SOLUTION RESPIRATORY (INHALATION) EVERY 6 HOURS PRN
Status: DISCONTINUED | OUTPATIENT
Start: 2021-02-10 | End: 2021-02-12 | Stop reason: HOSPADM

## 2021-02-10 RX ORDER — OXYCODONE HYDROCHLORIDE 5 MG/1
5 TABLET ORAL EVERY 6 HOURS PRN
Status: DISCONTINUED | OUTPATIENT
Start: 2021-02-10 | End: 2021-02-12 | Stop reason: HOSPADM

## 2021-02-10 RX ORDER — PROMETHAZINE HYDROCHLORIDE 12.5 MG/1
12.5 TABLET ORAL EVERY 6 HOURS PRN
Status: DISCONTINUED | OUTPATIENT
Start: 2021-02-10 | End: 2021-02-12 | Stop reason: HOSPADM

## 2021-02-10 RX ORDER — SODIUM CHLORIDE 0.9 % (FLUSH) 0.9 %
10 SYRINGE (ML) INJECTION EVERY 12 HOURS SCHEDULED
Status: DISCONTINUED | OUTPATIENT
Start: 2021-02-10 | End: 2021-02-12 | Stop reason: HOSPADM

## 2021-02-10 RX ORDER — ACETAMINOPHEN 325 MG/1
650 TABLET ORAL EVERY 6 HOURS PRN
Status: DISCONTINUED | OUTPATIENT
Start: 2021-02-10 | End: 2021-02-12 | Stop reason: HOSPADM

## 2021-02-10 RX ORDER — CHOLECALCIFEROL (VITAMIN D3) 1250 MCG
1 CAPSULE ORAL DAILY
COMMUNITY

## 2021-02-10 RX ORDER — SODIUM CHLORIDE 0.9 % (FLUSH) 0.9 %
10 SYRINGE (ML) INJECTION PRN
Status: DISCONTINUED | OUTPATIENT
Start: 2021-02-10 | End: 2021-02-12 | Stop reason: HOSPADM

## 2021-02-10 RX ORDER — ACETAMINOPHEN 650 MG/1
650 SUPPOSITORY RECTAL EVERY 6 HOURS PRN
Status: DISCONTINUED | OUTPATIENT
Start: 2021-02-10 | End: 2021-02-12 | Stop reason: HOSPADM

## 2021-02-10 RX ORDER — PREDNISONE 20 MG/1
40 TABLET ORAL DAILY
Status: DISCONTINUED | OUTPATIENT
Start: 2021-02-13 | End: 2021-02-12 | Stop reason: HOSPADM

## 2021-02-10 RX ORDER — BUDESONIDE AND FORMOTEROL FUMARATE DIHYDRATE 160; 4.5 UG/1; UG/1
2 AEROSOL RESPIRATORY (INHALATION) 2 TIMES DAILY
Status: DISCONTINUED | OUTPATIENT
Start: 2021-02-10 | End: 2021-02-12 | Stop reason: HOSPADM

## 2021-02-10 RX ORDER — POLYETHYLENE GLYCOL 3350 17 G/17G
17 POWDER, FOR SOLUTION ORAL DAILY PRN
Status: DISCONTINUED | OUTPATIENT
Start: 2021-02-10 | End: 2021-02-12 | Stop reason: HOSPADM

## 2021-02-10 RX ORDER — METHYLPREDNISOLONE SODIUM SUCCINATE 125 MG/2ML
60 INJECTION, POWDER, LYOPHILIZED, FOR SOLUTION INTRAMUSCULAR; INTRAVENOUS ONCE
Status: COMPLETED | OUTPATIENT
Start: 2021-02-10 | End: 2021-02-10

## 2021-02-10 RX ORDER — ONDANSETRON 2 MG/ML
4 INJECTION INTRAMUSCULAR; INTRAVENOUS EVERY 6 HOURS PRN
Status: DISCONTINUED | OUTPATIENT
Start: 2021-02-10 | End: 2021-02-12 | Stop reason: HOSPADM

## 2021-02-10 RX ORDER — IPRATROPIUM BROMIDE AND ALBUTEROL SULFATE 2.5; .5 MG/3ML; MG/3ML
1 SOLUTION RESPIRATORY (INHALATION) EVERY 4 HOURS PRN
Status: DISCONTINUED | OUTPATIENT
Start: 2021-02-10 | End: 2021-02-12 | Stop reason: HOSPADM

## 2021-02-10 RX ORDER — METHYLPREDNISOLONE SODIUM SUCCINATE 125 MG/2ML
60 INJECTION, POWDER, LYOPHILIZED, FOR SOLUTION INTRAMUSCULAR; INTRAVENOUS EVERY 12 HOURS
Status: DISCONTINUED | OUTPATIENT
Start: 2021-02-10 | End: 2021-02-12 | Stop reason: HOSPADM

## 2021-02-10 RX ORDER — ALBUTEROL SULFATE 2.5 MG/3ML
SOLUTION RESPIRATORY (INHALATION)
Status: DISPENSED
Start: 2021-02-10 | End: 2021-02-11

## 2021-02-10 RX ORDER — NICOTINE 21 MG/24HR
1 PATCH, TRANSDERMAL 24 HOURS TRANSDERMAL DAILY
Status: DISCONTINUED | OUTPATIENT
Start: 2021-02-10 | End: 2021-02-12 | Stop reason: HOSPADM

## 2021-02-10 RX ADMIN — SODIUM CHLORIDE, PRESERVATIVE FREE 10 ML: 5 INJECTION INTRAVENOUS at 21:58

## 2021-02-10 RX ADMIN — METHYLPREDNISOLONE SODIUM SUCCINATE 60 MG: 125 INJECTION, POWDER, FOR SOLUTION INTRAMUSCULAR; INTRAVENOUS at 18:43

## 2021-02-10 RX ADMIN — IPRATROPIUM BROMIDE AND ALBUTEROL SULFATE 1 AMPULE: .5; 3 SOLUTION RESPIRATORY (INHALATION) at 18:32

## 2021-02-10 RX ADMIN — IPRATROPIUM BROMIDE AND ALBUTEROL SULFATE 1 AMPULE: .5; 3 SOLUTION RESPIRATORY (INHALATION) at 18:29

## 2021-02-10 RX ADMIN — OXYCODONE 5 MG: 5 TABLET ORAL at 21:57

## 2021-02-10 ASSESSMENT — PAIN DESCRIPTION - ORIENTATION: ORIENTATION: UPPER

## 2021-02-10 ASSESSMENT — PAIN DESCRIPTION - PAIN TYPE
TYPE: CHRONIC PAIN
TYPE: ACUTE PAIN

## 2021-02-10 ASSESSMENT — PAIN SCALES - GENERAL
PAINLEVEL_OUTOF10: 0
PAINLEVEL_OUTOF10: 8

## 2021-02-10 ASSESSMENT — PAIN DESCRIPTION - LOCATION
LOCATION: CHEST
LOCATION: BACK

## 2021-02-10 ASSESSMENT — PAIN DESCRIPTION - FREQUENCY: FREQUENCY: CONTINUOUS

## 2021-02-10 NOTE — ED NOTES
Chemodenervation  Date/Time: 12/2/2019 1:39 PM  Performed by: Justino Monteiro MD  Authorized by: Justino Monteiro MD     Pre-procedure details:     Prepped With: Alcohol    Anesthesia (see MAR for exact dosages): Anesthesia method:  None  Procedure details:     Position:  Upright    Guidance: EMG    Botox:     Brand:  Botox    mL's of preservative free sterile saline:  2    Final Concentration per CC:  50 units    Needle Gauge:  30 G 2 5 inch  Procedures:     Botox Procedures: blepharospasm and cervical dystonia      Indications: blepharospasm and spasmodic torticollis      Date of last injection:  7/15/2019  Post-procedure details:     Chemodenervation:  Head or face and neck, excluding muscles of the larynx    Patient tolerance of procedure: Tolerated well, no immediate complications  Comments:         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 with good response to Botox injections        Injection lasting about 4 months  Exam today with mild lower facial movements  No blinking  Slight right turn toritcollis and latercollis  No spasmodic tremor     2 5 unit(s) in 1 injection(s) was injected into the right  muscle  2 5 unit(s) in 1 injection(s) was injected into the left  muscle  10 unit(s) in 4 (2 5 ) injection(s) was injected into the right orbicularis oculi (upper inner, upper and lower outer and lateral canthus)  10 unit(s) in 4 (x2 5) injection(s) was injected into the left orbicularis oculi      25 unit(s) was injected into the left sternocleidomastoid muscle  --    50 (25 side, 25 low) unit(s) was injected into the right splenius capitus muscle  --    25 unit(s) was injected into the right levator scapulae muscle     Total injected 125 units  Discarded 75     Pt arrives tripoding in bed, states cough productive of frothy sputum     Jared Kapadia, MOMO  02/10/21 2763

## 2021-02-10 NOTE — ED PROVIDER NOTES
Emergency Department Encounter    Patient: Elham Kenny  MRN: [id number]  : 1955  Date of Evaluation: 2/10/2021  ED Provider:  Sonal Braden    Triage Chief Complaint:   Shortness of Breath (Pt arrives per ems stating approx 1600 today he woke from a nap and began to feel sob and chest pain . Ems report 02 sat 75% on 2 LNC from home ) and Chest Pain    [place]:  Elham Kenny is a 61 y.o. male that presents with concern for shortness of breath, chest tightness, started about an hour prior to arrival, called 911. O2 sat was in the 70s on his home 2 L. They gave him a DuoNeb, he is now up to 90s on 4 L for them. However still feeling like his chest is very tight and he is wheezing and short of breath. Has not had any increased cough or sputum production. No fevers. Feels like previous COPD exacerbations, they come on very quickly for him. No recent steroids other than 5 mg prednisone daily which is chronic for him. No recent antibiotics. No recent hospitalizations. The chest pain is all around his chest and feels tight with his breathing. No leg swelling or pain. No history of blood clots. Denies relieving factors and exacerbating factors. States he had laid down on about an hour after he lay down is when this started. No nausea or vomiting. No abdominal pain. No syncope. 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 On 16.  Dr Aurora Mercer at [address] [facility] polyps 1-    Dr. Henry Gallegos COPD (chronic obstructive pulmonary disease) ([facility] 2014    PFTs showed severe COPD in - follows with Dr Cathleen Kirkland COPD exacerbation ([facility] 6/10/2016    Diverticulosis 9-    Dupuytren's contracture 5/26/2017    Right palm    Elevated PSA 12/21/2018 12/2018 : PSA is 6    H/O colonoscopy 9-18-14    Dr. Shilpa Monreal H/O echocardiogram 08/17/2016    EF 55-60%. Abnormal diastolic function. Mild TR. Borderline right atrium and RV dilatation.  Hiatal hernia     History of alcohol abuse     \"average 6 pk beer per day\"    History of drug abuse ([facility]     does use marijuana\"last used cocaine over 10 yrs ago\" per pt on 10/3/2018    Internal hemorrhoids 9-    Intervertebral disc protrusion 5/19/2014    Cervical region on MRI    Lingular pneumonia 4/3/2018    Lymphocytic colitis 9-18-14    Dr. Cynthia Bruner, will treat if symptomatic    On home oxygen therapy     2l/nc at night only    Pneumonia of left lower lobe due to infectious organism 11/27/2018    Sepsis due to pneumonia ([facility] 6/10/2016    Spinal stenosis in cervical region 5/19/2014    MRI showed  Disc protrusion. Severe spinal stenosis at C3-C4    Spinal stenosis of cervical region 3/14/2016    S/P cervical discectomy and fusion with plate and screw. On 7/22/16. Dr David Santamaria at [address] [employer] [place] abuse disorder 5/19/2014    Wears glasses     to read     Past Surgical History:   Procedure Laterality Date    CERVICAL SPINE SURGERY  2015    \"have plate in neck done at [facility]      9- Dr Melodie Bruner.  f/u 3 years    COLONOSCOPY  10/04/2018    COLONOSCOPY N/A 10/4/2018    COLONOSCOPY POLYPECTOMY SNARE/COLD BIOPSY performed by Francisco Leyva MD at [facility] in 5 years   [facility] POLYPECTOMY  10/04/2018    2 Polyps Removed    SIGMOIDOSCOPY      UPPER GASTROINTESTINAL ENDOSCOPY  10/04/2018    with Biopsy    UPPER GASTROINTESTINAL ENDOSCOPY N/A 10/4/2018    EGD BIOPSY performed by Francisco Leyva MD at [facility] ENDOSCOPY     Family History   Problem Relation Age of Onset    Cancer Mother         lung ca    Heart Disease Father     Heart Attack Father     Diabetes Father     Colon Cancer Neg Hx      Social History     Socioeconomic History    Marital status: Single     Spouse name: Not on file    Number of children: Not on file    Years of education: Not on file    Highest education level: Not on file   Occupational History    Not on file   Social Needs    Financial resource strain: Not on file    Food insecurity     Worry: Not on file     Inability: Not on file    Transportation needs     Medical: Not on file     Non-medical: Not on file   Tobacco Use    Smoking status: Current Every Day Smoker     Packs/day: 1.50     Years: 50.00     Pack years: 75.00     Types: Cigarettes     Last attempt to quit: 11/3/2019     Years since quittin.2    Smokeless tobacco: Never Used    Tobacco comment: smokes 1-2 ppd   Substance and Sexual Activity    Alcohol use:  Yes     Alcohol/week: 6.0 standard drinks     Types: 6 Cans of beer per week     Comment: 3 beers daily    Drug use: Yes     Types: Marijuana     Comment: 1-2 times per week marijuana    Sexual activity: Not Currently   Lifestyle    Physical activity     Days per week: Not on file     Minutes per session: Not on file    Stress: Not on file   Relationships    Social connections     Talks on phone: Not on file     Gets together: Not on file     Attends Tenriism service: Not on file     Active member of club or organization: Not on file     Attends meetings of clubs or organizations: Not on file     Relationship status: Not on file    Intimate partner violence     Fear of current or ex partner: Not on file     Emotionally abused: Not on file     Physically abused: Not on file     Forced sexual activity: Not on file   Other Topics Concern    Not on file   Social History Narrative    Not on file     Current Facility-Administered Medications   Medication Dose Route Frequency Provider Last Rate Last Admin    albuterol (PROVENTIL) (2.5 MG/3ML) 0.083% nebulizer solution              Current Outpatient Medications Medication Sig Dispense Refill    Cholecalciferol (VITAMIN D3) 1.25 MG (35835 UT) CAPS Take by mouth      ipratropium-albuterol (DUONEB) 0.5-2.5 (3) MG/3ML SOLN nebulizer solution INHALE THE CONTENTS OF 1 VIAL VIA NEBULIZER FOUR TIMES DAILY 1080 mL 1    predniSONE (DELTASONE) 5 MG tablet Take 1 tablet by mouth daily 90 tablet 3    POTASSIUM GLUCONATE PO Take 1 capsule by mouth daily Indications: pt reports 500mg OTC      albuterol sulfate  (90 Base) MCG/ACT inhaler INHALE 2 PUFFS EVERY 6 HOURS AS NEEDED FOR WHEEZING 54 g 5    budesonide-formoterol (SYMBICORT) 160-4.5 MCG/ACT AERO Inhale 2 puffs into the lungs 2 times daily 3 Inhaler 3    oxyCODONE (ROXICODONE) 5 MG immediate release tablet Take 5 mg by mouth every 6 hours as needed for Pain.  OXYGEN Inhale 2 L into the lungs continuous       Multiple Vitamins-Minerals (MULTIVITAMIN ADULTS PO) Take by mouth daily        No Known Allergies    Nursing Notes Reviewed    Physical Exam:  ED Triage Vitals [02/10/21 1810]   Enc Vitals Group      BP       Pulse 104      Resp 22      Temp       Temp src       SpO2 94 %      Weight 105 lb (47.6 kg)      Height 5' 6\" (1.676 m)      Head Circumference       Peak Flow       Pain Score       Pain Loc       Pain Edu? Excl. in [address]? My pulse ox interpretation is - normal- on 4L NC    General appearance:  Speaking short sentences, hunched over with accessory muscle use for breathing, tachypneic. Skin:  Warm. Dry. Eye:  Extraocular movements intact. Ears, nose, mouth and throat:  Oral mucosa moist   Neck:  Trachea midline. Extremity:  No swelling. Normal ROM     Heart: Tachycardic with regular rhythm, normal S1 & S2, no extra heart sounds. Perfusion:  intact  Respiratory:  Speaking short sentences, hunched over with accessory muscle use for breathing, tachypneic. Moderate to severe respiratory distress.   Very faint breath sounds, wheezing with expiration   bilaterally  Abdominal: Soft.  Nontender. Non distended.   Neurological:  Alert and oriented          Psychiatric:  Appropriate    I have reviewed and interpreted all of the currently available lab results from this visit (if applicable):  Results for orders placed or performed during the hospital encounter of 02/10/21   CBC Auto Differential   Result Value Ref Range    WBC 10.9 (H) 4.0 - 10.5 K/CU MM    RBC 5.09 4.6 - 6.2 M/CU MM    Hemoglobin 16.4 13.5 - 18.0 GM/DL    Hematocrit 50.2 42 - 52 %    MCV 98.6 78 - 100 FL    MCH 32.2 (H) 27 - 31 PG    MCHC 32.7 32.0 - 36.0 %    RDW 13.2 11.7 - 14.9 %    Platelets 059 285 - 811 K/CU MM    MPV 8.5 7.5 - 11.1 FL    Differential Type AUTOMATED DIFFERENTIAL     Segs Relative 71.5 (H) 36 - 66 %    Lymphocytes % 19.9 (L) 24 - 44 %    Monocytes % 7.5 (H) 0 - 4 %    Eosinophils % 0.2 0 - 3 %    Basophils % 0.6 0 - 1 %    Segs Absolute 7.8 K/CU MM    Lymphocytes Absolute 2.2 K/CU MM    Monocytes Absolute 0.8 K/CU MM    Eosinophils Absolute 0.0 K/CU MM    Basophils Absolute 0.1 K/CU MM    Immature Neutrophil % 0.3 0 - 0.43 %    Total Immature Neutrophil 0.03 K/CU MM   EKG 12 Lead   Result Value Ref Range    Ventricular Rate 104 BPM    Atrial Rate 104 BPM    P-R Interval 156 ms    QRS Duration 92 ms    Q-T Interval 342 ms    QTc Calculation (Bazett) 449 ms    P Axis 87 degrees    R Axis 107 degrees    T Axis 82 degrees    Diagnosis       Sinus tachycardia  Right atrial enlargement  Rightward axis  Pulmonary disease pattern  Incomplete right bundle branch block  Nonspecific ST abnormality  Abnormal ECG  When compared with ECG of 08-OCT-2020 19:30,  No significant change was found        Radiographs (if obtained):  Radiologist's Report Reviewed:  Xr Chest Portable    Result Date: 10/9/2020  EXAMINATION: ONE XRAY VIEW OF THE CHEST 10/8/2020 6:53 pm COMPARISON: 06/12/2020 HISTORY: ORDERING SYSTEM PROVIDED HISTORY: SOB TECHNOLOGIST PROVIDED HISTORY: Reason for exam:->SOB Reason for Exam: SOB Acuity: Acute Type of Exam: Initial Additional signs and symptoms: cough Relevant Medical/Surgical History: COPD, emphysema FINDINGS: The trachea is midline. The cardiac silhouette is unremarkable. The lungs are clear without evidence of infiltrate, mass, or pneumothorax. There is no pleural fluid. There is hyperinflation of the lung fields. There is atherosclerotic calcification of the aortic knob. No acute cardiopulmonary process. EKG (if obtained): (All EKG's are interpreted by myself in the absence of a cardiologist)  Sinus tachycardia with rate of 104 bpm, incomplete right bundle branch block. Nonspecific ST abnormality. There is a lot of baseline artifact from his respiratory distress. No ST elevation noted. No previous to compare      MDM:  77-year-old male with history as above presents in moderate to severe respiratory distress as above. His oxygen saturation is stable on 4 L nasal cannula now but he is still in some distress, duo nebs and Solu-Medrol were ordered, chest x-ray and labs. Plan for likely admission he is agreeable to this. We will send rapid Covid in order to be able to cohort him appropriately. Patient signed out to Dr. Geraldine Jiménez pending workup. Clinical Impression:  No diagnosis found. Disposition referral (if applicable):  No follow-up provider specified. Disposition medications (if applicable):  New Prescriptions    No medications on file     ED Provider Disposition Time  DISPOSITION        Comment: Please note this report has been produced using speech recognition software and may contain errors related to that system including errors in grammar, punctuation, and spelling, as well as words and phrases that may be inappropriate. Efforts were made to edit the dictations.        Mack Stewart MD  02/10/21 1858

## 2021-02-10 NOTE — ED NOTES
Small skin tear to knuckle of left hand, bleeding controlled, cleansed and bandaid applied     Mary Goel RN  02/10/21 5946

## 2021-02-11 LAB
ANION GAP SERPL CALCULATED.3IONS-SCNC: 12 MMOL/L (ref 4–16)
BASOPHILS ABSOLUTE: 0 K/CU MM
BASOPHILS RELATIVE PERCENT: 0.1 % (ref 0–1)
BUN BLDV-MCNC: 11 MG/DL (ref 6–23)
CALCIUM SERPL-MCNC: 9.2 MG/DL (ref 8.3–10.6)
CHLORIDE BLD-SCNC: 98 MMOL/L (ref 99–110)
CO2: 29 MMOL/L (ref 21–32)
CREAT SERPL-MCNC: 0.6 MG/DL (ref 0.9–1.3)
DIFFERENTIAL TYPE: ABNORMAL
EOSINOPHILS ABSOLUTE: 0 K/CU MM
EOSINOPHILS RELATIVE PERCENT: 0 % (ref 0–3)
GFR AFRICAN AMERICAN: >60 ML/MIN/1.73M2
GFR NON-AFRICAN AMERICAN: >60 ML/MIN/1.73M2
GLUCOSE BLD-MCNC: 227 MG/DL (ref 70–99)
HCT VFR BLD CALC: 47.5 % (ref 42–52)
HEMOGLOBIN: 15.2 GM/DL (ref 13.5–18)
IMMATURE NEUTROPHIL %: 0.1 % (ref 0–0.43)
LYMPHOCYTES ABSOLUTE: 0.4 K/CU MM
LYMPHOCYTES RELATIVE PERCENT: 5.6 % (ref 24–44)
MCH RBC QN AUTO: 32.1 PG (ref 27–31)
MCHC RBC AUTO-ENTMCNC: 32 % (ref 32–36)
MCV RBC AUTO: 100.2 FL (ref 78–100)
MONOCYTES ABSOLUTE: 0.2 K/CU MM
MONOCYTES RELATIVE PERCENT: 2 % (ref 0–4)
PDW BLD-RTO: 13.2 % (ref 11.7–14.9)
PLATELET # BLD: 279 K/CU MM (ref 140–440)
PMV BLD AUTO: 9 FL (ref 7.5–11.1)
POTASSIUM SERPL-SCNC: 4.6 MMOL/L (ref 3.5–5.1)
RBC # BLD: 4.74 M/CU MM (ref 4.6–6.2)
SARS-COV-2, NAAT: NOT DETECTED
SARS-COV-2: NORMAL
SEGMENTED NEUTROPHILS ABSOLUTE COUNT: 6.9 K/CU MM
SEGMENTED NEUTROPHILS RELATIVE PERCENT: 92.2 % (ref 36–66)
SODIUM BLD-SCNC: 139 MMOL/L (ref 135–145)
SOURCE: NORMAL
SOURCE: NORMAL
TOTAL IMMATURE NEUTOROPHIL: 0.01 K/CU MM
WBC # BLD: 7.5 K/CU MM (ref 4–10.5)

## 2021-02-11 PROCEDURE — G0378 HOSPITAL OBSERVATION PER HR: HCPCS

## 2021-02-11 PROCEDURE — 2700000000 HC OXYGEN THERAPY PER DAY

## 2021-02-11 PROCEDURE — 6370000000 HC RX 637 (ALT 250 FOR IP): Performed by: NURSE PRACTITIONER

## 2021-02-11 PROCEDURE — 85025 COMPLETE CBC W/AUTO DIFF WBC: CPT

## 2021-02-11 PROCEDURE — 80048 BASIC METABOLIC PNL TOTAL CA: CPT

## 2021-02-11 PROCEDURE — 6360000002 HC RX W HCPCS: Performed by: NURSE PRACTITIONER

## 2021-02-11 PROCEDURE — 94640 AIRWAY INHALATION TREATMENT: CPT

## 2021-02-11 PROCEDURE — 96376 TX/PRO/DX INJ SAME DRUG ADON: CPT

## 2021-02-11 PROCEDURE — 93010 ELECTROCARDIOGRAM REPORT: CPT | Performed by: INTERNAL MEDICINE

## 2021-02-11 PROCEDURE — 36415 COLL VENOUS BLD VENIPUNCTURE: CPT

## 2021-02-11 PROCEDURE — 94761 N-INVAS EAR/PLS OXIMETRY MLT: CPT

## 2021-02-11 PROCEDURE — 2580000003 HC RX 258: Performed by: NURSE PRACTITIONER

## 2021-02-11 RX ADMIN — SODIUM CHLORIDE, PRESERVATIVE FREE 10 ML: 5 INJECTION INTRAVENOUS at 20:12

## 2021-02-11 RX ADMIN — IPRATROPIUM BROMIDE AND ALBUTEROL SULFATE 1 AMPULE: .5; 3 SOLUTION RESPIRATORY (INHALATION) at 14:38

## 2021-02-11 RX ADMIN — BUDESONIDE AND FORMOTEROL FUMARATE DIHYDRATE 2 PUFF: 160; 4.5 AEROSOL RESPIRATORY (INHALATION) at 09:55

## 2021-02-11 RX ADMIN — BUDESONIDE AND FORMOTEROL FUMARATE DIHYDRATE 2 PUFF: 160; 4.5 AEROSOL RESPIRATORY (INHALATION) at 19:35

## 2021-02-11 RX ADMIN — METHYLPREDNISOLONE SODIUM SUCCINATE 60 MG: 125 INJECTION, POWDER, FOR SOLUTION INTRAMUSCULAR; INTRAVENOUS at 20:12

## 2021-02-11 RX ADMIN — METHYLPREDNISOLONE SODIUM SUCCINATE 60 MG: 125 INJECTION, POWDER, FOR SOLUTION INTRAMUSCULAR; INTRAVENOUS at 08:50

## 2021-02-11 RX ADMIN — SODIUM CHLORIDE, PRESERVATIVE FREE 10 ML: 5 INJECTION INTRAVENOUS at 08:51

## 2021-02-11 RX ADMIN — OXYCODONE 5 MG: 5 TABLET ORAL at 04:46

## 2021-02-11 RX ADMIN — OXYCODONE 5 MG: 5 TABLET ORAL at 11:47

## 2021-02-11 RX ADMIN — IPRATROPIUM BROMIDE AND ALBUTEROL SULFATE 1 AMPULE: .5; 3 SOLUTION RESPIRATORY (INHALATION) at 04:56

## 2021-02-11 RX ADMIN — IPRATROPIUM BROMIDE AND ALBUTEROL SULFATE 1 AMPULE: .5; 3 SOLUTION RESPIRATORY (INHALATION) at 09:55

## 2021-02-11 RX ADMIN — MULTIPLE VITAMINS W/ MINERALS TAB 1 TABLET: TAB at 08:51

## 2021-02-11 RX ADMIN — OXYCODONE 5 MG: 5 TABLET ORAL at 17:48

## 2021-02-11 RX ADMIN — IPRATROPIUM BROMIDE AND ALBUTEROL SULFATE 1 AMPULE: .5; 3 SOLUTION RESPIRATORY (INHALATION) at 19:09

## 2021-02-11 ASSESSMENT — PAIN DESCRIPTION - PROGRESSION
CLINICAL_PROGRESSION: NOT CHANGED
CLINICAL_PROGRESSION: NOT CHANGED

## 2021-02-11 ASSESSMENT — PAIN DESCRIPTION - FREQUENCY: FREQUENCY: CONTINUOUS

## 2021-02-11 ASSESSMENT — PAIN SCALES - GENERAL
PAINLEVEL_OUTOF10: 0
PAINLEVEL_OUTOF10: 9
PAINLEVEL_OUTOF10: 9
PAINLEVEL_OUTOF10: 8
PAINLEVEL_OUTOF10: 0
PAINLEVEL_OUTOF10: 0
PAINLEVEL_OUTOF10: 4
PAINLEVEL_OUTOF10: 3

## 2021-02-11 ASSESSMENT — PAIN DESCRIPTION - ORIENTATION: ORIENTATION: UPPER

## 2021-02-11 ASSESSMENT — PAIN DESCRIPTION - PAIN TYPE
TYPE: CHRONIC PAIN

## 2021-02-11 ASSESSMENT — PAIN DESCRIPTION - ONSET: ONSET: ON-GOING

## 2021-02-11 ASSESSMENT — PAIN - FUNCTIONAL ASSESSMENT
PAIN_FUNCTIONAL_ASSESSMENT: ACTIVITIES ARE NOT PREVENTED
PAIN_FUNCTIONAL_ASSESSMENT: ACTIVITIES ARE NOT PREVENTED

## 2021-02-11 ASSESSMENT — PAIN DESCRIPTION - LOCATION: LOCATION: NECK

## 2021-02-11 ASSESSMENT — PAIN DESCRIPTION - DESCRIPTORS
DESCRIPTORS: ACHING

## 2021-02-11 NOTE — PROGRESS NOTES
This nurse got patient up and walked in the hallway. The patient made it to the stairwell door by the nurses station and then felt like they needed to go back. SpO2 dropped to 87% while ambulating and patient felt short of breath. Patient was able to get back to the room and back to bed. Dr. Sixto Mcconnell was notified of sob while ambulating and spO2 dropping below 90% while ambulating. Dr. Sixto Mcconnell stated the patient will not be discharged today and might be discharged tomorrow.

## 2021-02-11 NOTE — PROGRESS NOTES
Hospitalist Progress Note       Foreign Márquez M.D.  2/11/2021 6:57 AM  Admit Date: 2/10/2021    PCP: Sandi Guerin MD     Assessment and Plan:   1. COPD Exacerbation     Cough, shortness of breath, hypoxia  wheezing     WBC10.9High K/CU MM      Oxygen supplement, Breathing treatment (Duoneb/Albuterol)   GI and DVT prophylaxis   - is on home 2L oxygen    2 Nicotine abuse    One half pk  A day smoker   Encouraged to quit, education provided   Nicotine patch 14mg/day    Patient Active Problem List:     Tobacco abuse disorder     COPD, severe (HCC)     Cervical radiculopathy     Dupuytren's contracture     Weight loss     Lymphocytic colitis     Elevated PSA     Acute midline low back pain without sciatica     Alcohol abuse     Hand muscle weakness     Pain management     Chronic respiratory failure (HCC)     COPD exacerbation (HCC)     Tobacco abuse      Subjective:     Chief Complaint   Patient presents with    Shortness of Breath     Pt arrives per ems stating approx 1600 today he woke from a nap and began to feel sob and chest pain . Ems report 02 sat 75% on 2 LNC from home     Chest Pain       F/U:  Interval History: feels very weak; still having some wheezing; still a smoker at home    Objective:        Intake/Output Summary (Last 24 hours) at 2/11/2021 0657  Last data filed at 2/11/2021 0348  Gross per 24 hour   Intake 500 ml   Output --   Net 500 ml      Vitals:   Vitals:    02/11/21 0609   BP:    Pulse:    Resp:    Temp:    SpO2: 96%     Physical Exam:  Gen:  awake, alert, cooperative, no apparent distress, EYES:  Lids and lashes normal, pupils equal, round and reactive to light, extra ocular muscles intact, sclera clear, conjunctiva normal  ENT:  Normocephalic, oral pharynx with moist mucus membranes, tonsils without erythema or exudates,  NECK:  Supple, symmetrical, trachea midline, no adenopathy,  LUNGS:  Scattered wheezingCARDIOVASCULAR:  regular rate and rhythm, normal S1 and S2, no S3 or S4, and no murmur noted  ABDOMEN: Normal BS, Non tender, non distended, no HSM noted. MUSCULOSKELETAL:  ROM of all extremities grossly wnl  NEUROLOGIC: AOx 3,  Cranial nerves II-XII are grossly intact. Motor is 5 out of 5 bilaterally. Sensory is intact  SKIN:  no bruising or bleeding, normal skin color, texture, turgor and no redness, warmth, or swelling    Xr Chest Portable    Result Date: 2/10/2021  EXAMINATION: ONE XRAY VIEW OF THE CHEST 2/10/2021 7:01 pm COMPARISON: 10/08/2020 HISTORY: ORDERING SYSTEM PROVIDED HISTORY: SOB TECHNOLOGIST PROVIDED HISTORY: Reason for exam:->SOB Reason for Exam: SOB Acuity: Acute Type of Exam: Initial FINDINGS: The lungs are without acute focal process. There is no effusion or pneumothorax. The cardiomediastinal silhouette is stable. The osseous structures are stable. No acute process. -    DATA:    CBC   Recent Labs     02/10/21  1825 02/11/21  0530   WBC 10.9* 7.5   HGB 16.4 15.2   HCT 50.2 47.5    279      BMP   Recent Labs     02/10/21  1825 02/11/21  0530    139   K 3.6 4.6   CL 97* 98*   CO2 32 29   BUN 5* 11   CREATININE 0.7* 0.6*     LFT'S   Recent Labs     02/10/21  1825   AST 21   ALT 19   BILITOT 0.2   ALKPHOS 60     COAG No results for input(s): INR in the last 72 hours. CARDIAC ENZYMES  No results for input(s): CKTOTAL, CKMB, CKMBINDEX, TROPONINI in the last 72 hours.   U/A:    Lab Results   Component Value Date    COLORU YELLOW 10/08/2020    WBCUA 1 10/08/2020    RBCUA NO CELLS SEEN 10/08/2020    BACTERIA NEGATIVE 10/08/2020    CLARITYU CLEAR 10/08/2020    SPECGRAV 1.015 10/08/2020    LEUKOCYTESUR NEGATIVE 10/08/2020    BLOODU NEGATIVE 10/08/2020         Justina Montero MD  Rounding Hospitalist

## 2021-02-11 NOTE — H&P
History and Physical      Name:  Cheko Rendon /Age/Sex: 1955  (61 y.o. male)   MRN & CSN:  [id number] & [id number] Admission Date/Time: 2/10/2021  6:10 PM   Location:   PCP: Shai Woodson MD       Cheko Rendon is a 61 y.o.  male  who presents with Shortness of Breath (Pt arrives per ems stating approx 1600 today he woke from a nap and began to feel sob and chest pain . Ems report 02 sat 75% on 2 LNC from home ) and Chest Pain      Assessment and Plan:     1.   COPD Exacerbation     Cough, shortness of breath, hypoxia  wheezing     WBC10.9High K/CU MM      Oxygen supplement, Breathing treatment (Duoneb/Albuterol)   GI and DVT prophylaxis    2    Nicotine abuse    One half pk  A day smoker   Encouraged to quit, education provided   Nicotine patch 14mg/day      Medications:   Medications:    albuterol          Infusions:   PRN Meds:      Current Facility-Administered Medications:     albuterol (PROVENTIL) (2.5 MG/3ML) 0.083% nebulizer solution, , , ,     Current Outpatient Medications:     Cholecalciferol (VITAMIN D3) 1.25 MG (19420 UT) CAPS, Take by mouth, Disp: , Rfl:     ipratropium-albuterol (DUONEB) 0.5-2.5 (3) MG/3ML SOLN nebulizer solution, INHALE THE CONTENTS OF 1 VIAL VIA NEBULIZER FOUR TIMES DAILY, Disp: 1080 mL, Rfl: 1    predniSONE (DELTASONE) 5 MG tablet, Take 1 tablet by mouth daily, Disp: 90 tablet, Rfl: 3    POTASSIUM GLUCONATE PO, Take 1 capsule by mouth daily Indications: pt reports 500mg OTC, Disp: , Rfl:     albuterol sulfate  (90 Base) MCG/ACT inhaler, INHALE 2 PUFFS EVERY 6 HOURS AS NEEDED FOR WHEEZING, Disp: 54 g, Rfl: 5    budesonide-formoterol (SYMBICORT) 160-4.5 MCG/ACT AERO, Inhale 2 puffs into the lungs 2 times daily, Disp: 3 Inhaler, Rfl: 3    oxyCODONE (ROXICODONE) 5 MG immediate release tablet, Take 5 mg by mouth every 6 hours as needed for Pain., Disp: , Rfl:     OXYGEN, Inhale 2 L into the lungs continuous , Disp: , Rfl:     Multiple Vitamins-Minerals (MULTIVITAMIN ADULTS PO), Take by mouth daily , Disp: , Rfl:     History of present illness     Chief Complaint: Shortness of Breath (Pt arrives per ems stating approx 1600 today he woke from a nap and began to feel sob and chest pain . Ems report 02 sat 75% on 2 LNC from home ) and Chest Pain      Kristina Hudson is a 61 y.o.  male   With medical history  That include  COPD, nicotine abuse, and cervical radiculopathy. He  presents with complaint of shortness of breath and cough. According to patient, he woke up from sleep and unable to catch his breath. He uses 2L of oxygen at home. Patient oxygen saturation on arrival at ER was in the 70's. He denies fever  Headache and abdominal pain. He did not do anything to make it improve prior to coming to the hospital.     Review of Systems     GENERAL:  Denies fever, chills, night sweats, or changes in weight. EYES:  Denies recent visual changes. ENT:  Denies ear pain, hearing loss or tinnitus  RESP:  Positive for  cough, dyspnea,  And  wheezing. CV:  Denies any chest pain with exertion  palpitations, syncope, or edema. GI:  Denies any dysphagia, nausea, vomiting, abdominal pain, heartburn, changes in bowel habit, melena or rectal bleeding  MUSCULOSKELETAL:  Denies any joint swelling, joint pain, or loss of range of motion. NEURO:  Denies any headaches, tremors, dizziness, vertigo, memory loss, confusion, weakness, numbness or tingling.   PSYCH:  Denies any sleeping problems, history of abuse,   HEME/LYMPHATIC/IMMUNO:  Denies , bruising, bleeding abnormalities   ENDO:  Denies any heat or cold intolerance,       Objective:   No intake or output data in the 24 hours ending 02/10/21 2023   Vitals:   Vitals:    02/10/21 2000   BP: 97/73   Pulse: 106   Resp: 22   SpO2: 94%     Physical Exam:   Gen:  awake, alert, cooperative, Mild respiratory  distress  EYES:Lids and lashes normal, pupils equal, round ,extra ocular muscles intact, sclera clear, conjunctiva normal  ENT:  Normocephalic, oral pharynx with moist mucus membranes  NECK:  Supple, symmetrical, trachea midline, no adenopathy,  LUNGS:  Diminished breath sounds  bilaterally,  wheezing noted. CARDIOVASCULAR:  Tachycardic d rhythm, normal S1 and S2,no murmur noted, peripheral pulses 2+, no pitting edema  ABDOMEN: Normal BS, Non tender, non distended,   MUSCULOSKELETAL:  ROM of all extremities grossly wnl  NEUROLOGIC: AOx 3,  Cranial nerves II-XII are grossly intact. Motor is 5 out of 5 bilaterally. Sensory is intact, no lateralizing findings. 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 On 7/22/16. Dr Adalid Ro at [address] [place] [address] 5-    Dr. Leigh Ann Stahl COPD (chronic obstructive pulmonary disease) ([facility] 5/19/2014    PFTs showed severe COPD in 5/14- follows with Dr Khalida Lieberman COPD exacerbation [facility]) 6/10/2016    Diverticulosis 9-    Dupuytren's contracture 5/26/2017    Right palm    Elevated PSA 12/21/2018 12/2018 : PSA is 6    H/O colonoscopy 9-18-14    Dr. Leigh Ann Stahl H/O echocardiogram 08/17/2016    EF 55-60%. Abnormal diastolic function. Mild TR. Borderline right atrium and RV dilatation.      Hiatal hernia     History of alcohol abuse     \"average 6 pk beer per day\"    History of drug abuse ([facility]     does use marijuana\"last used cocaine over 10 yrs ago\" per pt on 10/3/2018    Internal hemorrhoids 9-    Intervertebral disc protrusion 5/19/2014    Cervical region on MRI    Lingular pneumonia 4/3/2018    Lymphocytic colitis 9-18-14    Dr. Felicita Castro, will treat if symptomatic    On home oxygen therapy     2l/nc at night only    Pneumonia of left lower lobe due to infectious organism 2018    Sepsis due to pneumonia ([facility] 6/10/2016    Spinal stenosis in cervical region 2014    MRI showed  Disc protrusion. Severe spinal stenosis at C3-C4    Spinal stenosis of cervical region 3/14/2016    S/P cervical discectomy and fusion with plate and screw. On 16. Dr Les Barahona at [address] abuse disorder 2014    Wears glasses     to read     PSHX:  has a past surgical history that includes Sigmoidoscopy; Dental surgery (); Cervical spine surgery (); Colonoscopy; Upper gastrointestinal endoscopy (10/04/2018); Colonoscopy (10/04/2018); polypectomy (10/04/2018); Upper gastrointestinal endoscopy (N/A, 10/4/2018); and Colonoscopy (N/A, 10/4/2018). Allergies: No Known Allergies    FAM HX: family history includes Cancer in his mother; Diabetes in his father; Heart Attack in his father; Heart Disease in his father. Soc HX:   Social History     Socioeconomic History    Marital status: Single     Spouse name: None    Number of children: None    Years of education: None    Highest education level: None   Occupational History    None   Social Needs    Financial resource strain: None    Food insecurity     Worry: None     Inability: None    Transportation needs     Medical: None     Non-medical: None   Tobacco Use    Smoking status: Current Every Day Smoker     Packs/day: 1.50     Years: 50.00     Pack years: 75.00     Types: Cigarettes     Last attempt to quit: 11/3/2019     Years since quittin.2    Smokeless tobacco: Never Used    Tobacco comment: smokes 1-2 ppd   Substance and Sexual Activity    Alcohol use:  Yes     Alcohol/week: 6.0 standard drinks     Types: 6 Cans of beer per week     Comment: 3 beers daily    Drug use: Yes     Types: Marijuana     Comment: 1-2 times per week marijuana    Sexual activity: Not Currently   Lifestyle    Physical activity     Days per week: None     Minutes per session: None    Stress: None Relationships    Social connections     Talks on phone: None     Gets together: None     Attends Anabaptism service: None     Active member of club or organization: None     Attends meetings of clubs or organizations: None     Relationship status: None    Intimate partner violence     Fear of current or ex partner: None     Emotionally abused: None     Physically abused: None     Forced sexual activity: None   Other Topics Concern    None   Social History Narrative    None       Electronically signed by HEMALATHA Longo CNP on 2/10/2021 at 8:23 PM

## 2021-02-11 NOTE — CARE COORDINATION
CM met with the patient for discharge planning, patient sitting up in bed resting quietly. Patient lives in a single story home (2 steps to enter home and has a basement) with his significant other, has insurance with Rx coverage & PCP, states he is independent with ADL's, drives, receives disability income, and was not receiving [address] prior to admission. Kelsey Ifeanyis states he has a cane at home but only uses as needed, uses a nebulizer & inhaler at home, and wears oxygen 24 hours/day (2 l/nc). Patient does not feel that [address] is needed at this time.   Patient plans to return home upon discharge and is unable to identify any needs at this time, CM available if needs arise.

## 2021-02-11 NOTE — PLAN OF CARE
Problem: Falls - Risk of:  Goal: Will remain free from falls  Description: Will remain free from falls  2/11/2021 1126 by Kaity Santizo RN  Outcome: Ongoing  2/11/2021 1125 by Kaity Santizo RN  Outcome: Ongoing  Goal: Absence of physical injury  Description: Absence of physical injury  2/11/2021 1126 by Kaity Santizo RN  Outcome: Ongoing  2/11/2021 1125 by Kaity Santizo RN  Outcome: Ongoing     Problem: PAIN  Goal: Patient's pain/discomfort is manageable  Outcome: Ongoing     Problem: KNOWLEDGE DEFICIT  Goal: Patient/S.O. demonstrates understanding of disease process, treatment plan, medications, and discharge instructions.   Outcome: Ongoing     Problem: DISCHARGE BARRIERS  Goal: Patient's continuum of care needs are met  Outcome: Ongoing

## 2021-02-11 NOTE — ED PROVIDER NOTES
ADDENDUM:    Care of the patient was assumed  from Dr. Jaimee Wilson. I have reviewed the notes, assessments, and/or procedures performed, I concur with her/his documentation on Dairl Screws. I reviewed the medical record and evaluated the patient. ED COURSE/MDM:  Laboratory and imaging data were reviewed and care plan was arranged with the patient(see separate lab/imaging reports). RADIOLOGY:  Already resulted studies have been reviewed. XR CHEST PORTABLE   Final Result   No acute process. Labs Reviewed   CBC WITH AUTO DIFFERENTIAL - Abnormal; Notable for the following components:       Result Value    WBC 10.9 (*)     MCH 32.2 (*)     Segs Relative 71.5 (*)     Lymphocytes % 19.9 (*)     Monocytes % 7.5 (*)     All other components within normal limits   COMPREHENSIVE METABOLIC PANEL - Abnormal; Notable for the following components:    Chloride 97 (*)     BUN 5 (*)     CREATININE 0.7 (*)     All other components within normal limits   BRAIN NATRIURETIC PEPTIDE   COVID-19   TROPONIN       Medications   albuterol (PROVENTIL) (2.5 MG/3ML) 0.083% nebulizer solution (has no administration in time range)   methylPREDNISolone sodium (SOLU-MEDROL) injection 60 mg (60 mg Intravenous Given 2/10/21 1843)   ipratropium-albuterol (DUONEB) nebulizer solution 1 ampule (1 ampule Inhalation Given 2/10/21 1832)   ipratropium-albuterol (DUONEB) nebulizer solution 1 ampule (1 ampule Inhalation Given 2/10/21 1832)   ipratropium-albuterol (DUONEB) nebulizer solution 1 ampule (1 ampule Inhalation Given 2/10/21 1829)       Vitals:    02/10/21 1830 02/10/21 1900 02/10/21 1930 02/10/21 2000   BP: 104/70 108/66 97/67 97/73   Pulse: 100 99 103 106   Resp: 18 19 18 22   SpO2: 95% 100% 95% 94%   Weight:       Height:         XR CHEST PORTABLE   Final Result   No acute process.            Labs Reviewed   CBC WITH AUTO DIFFERENTIAL - Abnormal; Notable for the following components:       Result Value    WBC 10.9 (*)     [facility] 32.2 (*)     Segs Relative 71.5 (*)     Lymphocytes % 19.9 (*)     Monocytes % 7.5 (*)     All other components within normal limits   COMPREHENSIVE METABOLIC PANEL - Abnormal; Notable for the following components:    Chloride 97 (*)     BUN 5 (*)     CREATININE 0.7 (*)     All other components within normal limits   BRAIN NATRIURETIC PEPTIDE   COVID-19   TROPONIN       Patient is stable and will be admitted to [facility]. I have discussed with hospitalist.  My typical dicussion, presentation, and considerations for this patients' chief complaint, diagnosis, differential diagnosis, medications, medication use,  medication safety and medication interactions have been explained and outlined to this patient for this patient encounter. I have stressed need for follow up and reexamination for this encounter and I have contacted hospitalist for admission  FINAL IMPRESSION:  1. COPD with acute exacerbation ([facility]    2.  Dyspnea and respiratory abnormalities        New Prescriptions    No medications on file               Yoly Alcantar DO  02/10/21 1270

## 2021-02-12 VITALS
BODY MASS INDEX: 17.84 KG/M2 | RESPIRATION RATE: 16 BRPM | TEMPERATURE: 98.7 F | HEART RATE: 88 BPM | SYSTOLIC BLOOD PRESSURE: 108 MMHG | WEIGHT: 111 LBS | DIASTOLIC BLOOD PRESSURE: 55 MMHG | HEIGHT: 66 IN | OXYGEN SATURATION: 95 %

## 2021-02-12 LAB
ANION GAP SERPL CALCULATED.3IONS-SCNC: 14 MMOL/L (ref 4–16)
BASOPHILS ABSOLUTE: 0 K/CU MM
BASOPHILS RELATIVE PERCENT: 0 % (ref 0–1)
BUN BLDV-MCNC: 10 MG/DL (ref 6–23)
CALCIUM SERPL-MCNC: 8.7 MG/DL (ref 8.3–10.6)
CHLORIDE BLD-SCNC: 102 MMOL/L (ref 99–110)
CO2: 24 MMOL/L (ref 21–32)
CREAT SERPL-MCNC: 0.6 MG/DL (ref 0.9–1.3)
DIFFERENTIAL TYPE: ABNORMAL
EOSINOPHILS ABSOLUTE: 0 K/CU MM
EOSINOPHILS RELATIVE PERCENT: 0.1 % (ref 0–3)
GFR AFRICAN AMERICAN: >60 ML/MIN/1.73M2
GFR NON-AFRICAN AMERICAN: >60 ML/MIN/1.73M2
GLUCOSE BLD-MCNC: 172 MG/DL (ref 70–99)
HCT VFR BLD CALC: 42.2 % (ref 42–52)
HEMOGLOBIN: 13.7 GM/DL (ref 13.5–18)
IMMATURE NEUTROPHIL %: 0.5 % (ref 0–0.43)
LYMPHOCYTES ABSOLUTE: 0.4 K/CU MM
LYMPHOCYTES RELATIVE PERCENT: 3.8 % (ref 24–44)
MCH RBC QN AUTO: 31.8 PG (ref 27–31)
MCHC RBC AUTO-ENTMCNC: 32.5 % (ref 32–36)
MCV RBC AUTO: 97.9 FL (ref 78–100)
MONOCYTES ABSOLUTE: 0.4 K/CU MM
MONOCYTES RELATIVE PERCENT: 4.1 % (ref 0–4)
PDW BLD-RTO: 13.2 % (ref 11.7–14.9)
PLATELET # BLD: 245 K/CU MM (ref 140–440)
PMV BLD AUTO: 8.9 FL (ref 7.5–11.1)
POTASSIUM SERPL-SCNC: 4.4 MMOL/L (ref 3.5–5.1)
RBC # BLD: 4.31 M/CU MM (ref 4.6–6.2)
SEGMENTED NEUTROPHILS ABSOLUTE COUNT: 9.5 K/CU MM
SEGMENTED NEUTROPHILS RELATIVE PERCENT: 91.5 % (ref 36–66)
SODIUM BLD-SCNC: 140 MMOL/L (ref 135–145)
TOTAL IMMATURE NEUTOROPHIL: 0.05 K/CU MM
WBC # BLD: 10.4 K/CU MM (ref 4–10.5)

## 2021-02-12 PROCEDURE — 6360000002 HC RX W HCPCS: Performed by: NURSE PRACTITIONER

## 2021-02-12 PROCEDURE — 85025 COMPLETE CBC W/AUTO DIFF WBC: CPT

## 2021-02-12 PROCEDURE — 80048 BASIC METABOLIC PNL TOTAL CA: CPT

## 2021-02-12 PROCEDURE — G0378 HOSPITAL OBSERVATION PER HR: HCPCS

## 2021-02-12 PROCEDURE — 2700000000 HC OXYGEN THERAPY PER DAY

## 2021-02-12 PROCEDURE — 2580000003 HC RX 258: Performed by: NURSE PRACTITIONER

## 2021-02-12 PROCEDURE — 36415 COLL VENOUS BLD VENIPUNCTURE: CPT

## 2021-02-12 PROCEDURE — 96376 TX/PRO/DX INJ SAME DRUG ADON: CPT

## 2021-02-12 PROCEDURE — 94640 AIRWAY INHALATION TREATMENT: CPT

## 2021-02-12 PROCEDURE — 94761 N-INVAS EAR/PLS OXIMETRY MLT: CPT

## 2021-02-12 PROCEDURE — 6370000000 HC RX 637 (ALT 250 FOR IP): Performed by: NURSE PRACTITIONER

## 2021-02-12 RX ORDER — NICOTINE 21 MG/24HR
1 PATCH, TRANSDERMAL 24 HOURS TRANSDERMAL DAILY
Qty: 30 PATCH | Refills: 3 | Status: SHIPPED | OUTPATIENT
Start: 2021-02-12 | End: 2021-07-23

## 2021-02-12 RX ORDER — AZITHROMYCIN 500 MG/1
500 TABLET, FILM COATED ORAL DAILY
Qty: 3 TABLET | Refills: 0 | Status: SHIPPED | OUTPATIENT
Start: 2021-02-12 | End: 2021-02-15

## 2021-02-12 RX ORDER — PREDNISONE 20 MG/1
40 TABLET ORAL DAILY
Qty: 20 TABLET | Refills: 0 | Status: SHIPPED | OUTPATIENT
Start: 2021-02-13 | End: 2021-02-23

## 2021-02-12 RX ADMIN — SODIUM CHLORIDE, PRESERVATIVE FREE 10 ML: 5 INJECTION INTRAVENOUS at 07:51

## 2021-02-12 RX ADMIN — OXYCODONE 5 MG: 5 TABLET ORAL at 07:58

## 2021-02-12 RX ADMIN — BUDESONIDE AND FORMOTEROL FUMARATE DIHYDRATE 2 PUFF: 160; 4.5 AEROSOL RESPIRATORY (INHALATION) at 07:28

## 2021-02-12 RX ADMIN — OXYCODONE 5 MG: 5 TABLET ORAL at 01:54

## 2021-02-12 RX ADMIN — MULTIPLE VITAMINS W/ MINERALS TAB 1 TABLET: TAB at 07:50

## 2021-02-12 RX ADMIN — IPRATROPIUM BROMIDE AND ALBUTEROL SULFATE 1 AMPULE: .5; 3 SOLUTION RESPIRATORY (INHALATION) at 02:01

## 2021-02-12 RX ADMIN — METHYLPREDNISOLONE SODIUM SUCCINATE 60 MG: 125 INJECTION, POWDER, FOR SOLUTION INTRAMUSCULAR; INTRAVENOUS at 07:50

## 2021-02-12 RX ADMIN — IPRATROPIUM BROMIDE AND ALBUTEROL SULFATE 1 AMPULE: .5; 3 SOLUTION RESPIRATORY (INHALATION) at 07:27

## 2021-02-12 ASSESSMENT — PAIN DESCRIPTION - LOCATION
LOCATION: NECK
LOCATION: NECK

## 2021-02-12 ASSESSMENT — PAIN SCALES - GENERAL
PAINLEVEL_OUTOF10: 0
PAINLEVEL_OUTOF10: 8
PAINLEVEL_OUTOF10: 6
PAINLEVEL_OUTOF10: 8
PAINLEVEL_OUTOF10: 0
PAINLEVEL_OUTOF10: 0

## 2021-02-12 ASSESSMENT — PAIN DESCRIPTION - PROGRESSION
CLINICAL_PROGRESSION: NOT CHANGED
CLINICAL_PROGRESSION: NOT CHANGED

## 2021-02-12 ASSESSMENT — PAIN DESCRIPTION - ORIENTATION
ORIENTATION: POSTERIOR
ORIENTATION: POSTERIOR

## 2021-02-12 ASSESSMENT — PAIN DESCRIPTION - FREQUENCY: FREQUENCY: CONTINUOUS

## 2021-02-12 ASSESSMENT — PAIN - FUNCTIONAL ASSESSMENT: PAIN_FUNCTIONAL_ASSESSMENT: ACTIVITIES ARE NOT PREVENTED

## 2021-02-12 NOTE — PLAN OF CARE
Problem: Falls - Risk of:  Goal: Will remain free from falls  Description: Will remain free from falls  2/11/2021 2221 by Nae Victor RN  Outcome: Ongoing  2/11/2021 1126 by Demetrius Smallwood RN  Outcome: Ongoing  2/11/2021 1125 by Demetrius Smallwood RN  Outcome: Ongoing  Goal: Absence of physical injury  Description: Absence of physical injury  2/11/2021 2221 by Nae Victor RN  Outcome: Ongoing  2/11/2021 1126 by Demetrius Smallwood RN  Outcome: Ongoing  2/11/2021 1125 by Demetrius Smallwood RN  Outcome: Ongoing     Problem: PAIN  Goal: Patient's pain/discomfort is manageable  2/11/2021 2221 by Nae Victor RN  Outcome: Ongoing  2/11/2021 1126 by Demetrius Smallwood RN  Outcome: Ongoing     Problem: KNOWLEDGE DEFICIT  Goal: Patient/S.O. demonstrates understanding of disease process, treatment plan, medications, and discharge instructions.   2/11/2021 2221 by Nae Victor RN  Outcome: Ongoing  2/11/2021 1126 by Demetrius Smallwood RN  Outcome: Ongoing     Problem: DISCHARGE BARRIERS  Goal: Patient's continuum of care needs are met  2/11/2021 2221 by Nae Victor RN  Outcome: Ongoing  2/11/2021 1126 by Demetrius Smallwood RN  Outcome: Ongoing

## 2021-02-12 NOTE — PROGRESS NOTES
Discharge instructions reviewed with patient and new medications side effects discussed. IV was removed with no issues. Patient did not have any questions or needs at time of discharge and no skin issues noted.

## 2021-02-23 LAB
EKG ATRIAL RATE: 104 BPM
EKG DIAGNOSIS: NORMAL
EKG P AXIS: 87 DEGREES
EKG P-R INTERVAL: 156 MS
EKG Q-T INTERVAL: 342 MS
EKG QRS DURATION: 92 MS
EKG QTC CALCULATION (BAZETT): 449 MS
EKG R AXIS: 107 DEGREES
EKG T AXIS: 82 DEGREES
EKG VENTRICULAR RATE: 104 BPM

## 2021-03-05 ENCOUNTER — OFFICE VISIT (OUTPATIENT)
Dept: INTERNAL MEDICINE CLINIC | Age: 66
End: 2021-03-05
Payer: MEDICARE

## 2021-03-05 VITALS
OXYGEN SATURATION: 94 % | DIASTOLIC BLOOD PRESSURE: 60 MMHG | TEMPERATURE: 98.4 F | HEART RATE: 84 BPM | WEIGHT: 111 LBS | RESPIRATION RATE: 20 BRPM | BODY MASS INDEX: 17.92 KG/M2 | SYSTOLIC BLOOD PRESSURE: 108 MMHG

## 2021-03-05 DIAGNOSIS — R63.4 WEIGHT LOSS: ICD-10-CM

## 2021-03-05 DIAGNOSIS — R97.20 ELEVATED PSA: ICD-10-CM

## 2021-03-05 DIAGNOSIS — K52.832 LYMPHOCYTIC COLITIS: ICD-10-CM

## 2021-03-05 DIAGNOSIS — Z12.5 SCREENING PSA (PROSTATE SPECIFIC ANTIGEN): ICD-10-CM

## 2021-03-05 DIAGNOSIS — M54.12 CERVICAL RADICULOPATHY: ICD-10-CM

## 2021-03-05 DIAGNOSIS — E78.5 MILD HYPERLIPIDEMIA: ICD-10-CM

## 2021-03-05 DIAGNOSIS — J44.9 COPD, SEVERE (HCC): Primary | ICD-10-CM

## 2021-03-05 DIAGNOSIS — Z72.0 TOBACCO ABUSE DISORDER: ICD-10-CM

## 2021-03-05 DIAGNOSIS — M72.0 DUPUYTREN'S CONTRACTURE: ICD-10-CM

## 2021-03-05 DIAGNOSIS — R52 PAIN MANAGEMENT: ICD-10-CM

## 2021-03-05 DIAGNOSIS — F10.10 ALCOHOL ABUSE: ICD-10-CM

## 2021-03-05 DIAGNOSIS — J96.10 CHRONIC RESPIRATORY FAILURE, UNSPECIFIED WHETHER WITH HYPOXIA OR HYPERCAPNIA (HCC): ICD-10-CM

## 2021-03-05 PROCEDURE — G8482 FLU IMMUNIZE ORDER/ADMIN: HCPCS | Performed by: INTERNAL MEDICINE

## 2021-03-05 PROCEDURE — 4004F PT TOBACCO SCREEN RCVD TLK: CPT | Performed by: INTERNAL MEDICINE

## 2021-03-05 PROCEDURE — G8926 SPIRO NO PERF OR DOC: HCPCS | Performed by: INTERNAL MEDICINE

## 2021-03-05 PROCEDURE — G8427 DOCREV CUR MEDS BY ELIG CLIN: HCPCS | Performed by: INTERNAL MEDICINE

## 2021-03-05 PROCEDURE — G8419 CALC BMI OUT NRM PARAM NOF/U: HCPCS | Performed by: INTERNAL MEDICINE

## 2021-03-05 PROCEDURE — 3017F COLORECTAL CA SCREEN DOC REV: CPT | Performed by: INTERNAL MEDICINE

## 2021-03-05 PROCEDURE — 99214 OFFICE O/P EST MOD 30 MIN: CPT | Performed by: INTERNAL MEDICINE

## 2021-03-05 PROCEDURE — 3023F SPIROM DOC REV: CPT | Performed by: INTERNAL MEDICINE

## 2021-03-05 RX ORDER — TRAZODONE HYDROCHLORIDE 50 MG/1
50 TABLET ORAL NIGHTLY
Qty: 30 TABLET | Refills: 1 | Status: SHIPPED | OUTPATIENT
Start: 2021-03-05 | End: 2021-07-23 | Stop reason: SDUPTHER

## 2021-03-05 ASSESSMENT — ENCOUNTER SYMPTOMS
GASTROINTESTINAL NEGATIVE: 1
SHORTNESS OF BREATH: 1
EYES NEGATIVE: 1
WHEEZING: 1
ALLERGIC/IMMUNOLOGIC NEGATIVE: 1
COUGH: 1
BACK PAIN: 1

## 2021-03-05 ASSESSMENT — PATIENT HEALTH QUESTIONNAIRE - PHQ9
SUM OF ALL RESPONSES TO PHQ9 QUESTIONS 1 & 2: 2
SUM OF ALL RESPONSES TO PHQ QUESTIONS 1-9: 2
SUM OF ALL RESPONSES TO PHQ QUESTIONS 1-9: 2
1. LITTLE INTEREST OR PLEASURE IN DOING THINGS: 1
SUM OF ALL RESPONSES TO PHQ QUESTIONS 1-9: 2

## 2021-03-05 NOTE — ASSESSMENT & PLAN NOTE
Patient has severe COPD and had exacerbation recently. He is better. He is on DuoNeb, Symbicort and oxygen 2 L/min. He has a pulmonology Dr. Clarence Lovett and has follow-up appointment. Chest x-ray recently showed no pneumonia.

## 2021-03-05 NOTE — PROGRESS NOTES
OBJECTIVE: /60   Pulse 84   Temp 98.4 °F (36.9 °C) (Oral)   Resp 20   Wt 111 lb (50.3 kg)   SpO2 94% Comment: oxygen at 2L per NC  BMI 17.92 kg/m²     Wt Readings from Last 3 Encounters:   03/05/21 111 lb (50.3 kg)   02/10/21 111 lb (50.3 kg)   11/20/20 114 lb (51.7 kg)        Patient was seen taking COVID-19 precautions. Face mask, gloves and eyes protectors were used. Patient also wore facemask. GENERAL: - Alert, oriented, pleasant, in no apparent distress. Patient looks comfortable  HEENT: - Conjunctiva pink, no scleral icterus. ENT clear. NECK: -Supple. No jugular venous distention noted. No masses felt,  CARDIOVASCULAR: - Normal S1 and S2    PULMONARY: - No respiratory distress. Expiratory wheezes present patient is on oxygen through nasal cannula  ABDOMEN: - Soft and non-tender,no masses  ororganomegaly. EXTREMITIES: - No cyanosis, clubbing, or significant edema. SKIN: Skin is warm and dry. NEUROLOGICAL: - Cranial nerves II through XII are grossly intact. IMPRESSION:    Encounter Diagnoses   Name Primary?  COPD, severe ([address] Yes    Chronic respiratory failure, unspecified whether with hypoxia or hypercapnia (HCC)     Cervical radiculopathy     Alcohol abuse     Dupuytren's contracture     Pain management     Tobacco abuse disorder     Mild hyperlipidemia     Screening PSA (prostate specific antigen)     Elevated PSA     Lymphocytic colitis     Weight loss        ASSESSMENT/PLAN:    COPD, severe ([address]  Patient has severe COPD and had exacerbation recently. He is better. He is on DuoNeb, Symbicort and oxygen 2 L/min. He has a pulmonology Dr. Tona Ortiz and has follow-up appointment. Chest x-ray recently showed no pneumonia. Chronic respiratory failure (HCC)  Patient has severe COPD causing chronic respiratory failure. He is on oxygen. Alcohol abuse  Patient states she has decreased alcohol intake. He does not want to quit.   Patient states he enjoys drinking. Cervical radiculopathy  Patient has chronic neck pain and goes to the pain clinic now and is getting oxycodone from them. Dupuytren's contracture  Patient has contracture actually of both hands. Elevated PSA  Patient has not followed up with Dr. Lebron Arroyo. Last PSA was 1.9. Will check PSA again. Lymphocytic colitis  Patient denies any diarrhea anymore. He is doing well    Pain management  Patient [facility] to [facility] pain clinic for the pain medications    Tobacco abuse disorder  Vies patient again to quit smoking completely. Options given. Patient states he wants to quit slowly. Weight loss  Patient's weight is stable now. Orders Placed This Encounter   Procedures    Psa screening    Comprehensive Metabolic Panel    Lipid, Fasting     Return to office in 2 months    Mediations reviewed with the patient. Continue current medications. Appropriate prescriptions are addressed. After visit summeryprovided. Follow up as directed sooner if needed. Questions answered and patient verbalizes understanding. Call for any problems, questions, or concerns. No Known Allergies  Current Outpatient Medications   Medication Sig Dispense Refill    nicotine (NICODERM CQ) 14 MG/24HR Place 1 patch onto the skin daily 30 patch 3    Cholecalciferol (VITAMIN D3) 1.25 MG (84301 UT) CAPS Take by mouth daily       ipratropium-albuterol (DUONEB) 0.5-2.5 (3) MG/3ML SOLN nebulizer solution INHALE THE CONTENTS OF 1 VIAL VIA NEBULIZER FOUR TIMES DAILY 1080 mL 1    predniSONE (DELTASONE) 5 MG tablet Take 1 tablet by mouth daily 90 tablet 3    albuterol sulfate  (90 Base) MCG/ACT inhaler INHALE 2 PUFFS EVERY 6 HOURS AS NEEDED FOR WHEEZING 54 g 5    budesonide-formoterol (SYMBICORT) 160-4.5 MCG/ACT AERO Inhale 2 puffs into the lungs 2 times daily 3 Inhaler 3    oxyCODONE (ROXICODONE) 5 MG immediate release tablet Take 5 mg by mouth every 6 hours as needed for Pain.       OXYGEN Inhale 2 L into the lungs continuous       Multiple Vitamins-Minerals (MULTIVITAMIN ADULTS PO) Take by mouth daily        No current facility-administered medications for this visit. 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 On 7/22/16. Dr Timothy Knight at [address] [facility] polyps 3-    Dr. Mili Celis COPD (chronic obstructive pulmonary disease) ([facility] 5/19/2014    PFTs showed severe COPD in 5/14- follows with Dr Read Bamberger COPD exacerbation [facility]) 6/10/2016    Diverticulosis 9-    Dupuytren's contracture 5/26/2017    Right palm    Elevated PSA 12/21/2018 12/2018 : PSA is 6    H/O colonoscopy 9-18-14    Dr. Mili Celis H/O echocardiogram 08/17/2016    EF 55-60%. Abnormal diastolic function. Mild TR. Borderline right atrium and RV dilatation.  Hiatal hernia     History of alcohol abuse     \"average 6 pk beer per day\"    History of drug abuse ([facility]     does use marijuana\"last used cocaine over 10 yrs ago\" per pt on 10/3/2018    Internal hemorrhoids 9-    Intervertebral disc protrusion 5/19/2014    Cervical region on MRI    Lingular pneumonia 4/3/2018    Lymphocytic colitis 9-18-14    Dr. Guera Nieves, will treat if symptomatic    On home oxygen therapy     2l/nc at night only    Pneumonia of left lower lobe due to infectious organism 11/27/2018    Sepsis due to pneumonia ([facility] 6/10/2016    Spinal stenosis in cervical region 5/19/2014    MRI showed  Disc protrusion. Severe spinal stenosis at C3-C4    Spinal stenosis of cervical region 3/14/2016    S/P cervical discectomy and fusion with plate and screw. On 7/22/16.  Dr Timothy Knight at [address] [employer] abuse disorder 5/19/2014    Wears glasses     to read     Past Surgical History:   Procedure Laterality Date    CERVICAL SPINE SURGERY     \"have plate in neck done at [facility]      2014 Dr Paulino Neri. f/u 3 years    COLONOSCOPY  10/04/2018    COLONOSCOPY N/A 10/4/2018    COLONOSCOPY POLYPECTOMY SNARE/COLD BIOPSY performed by Amanda Cardenas MD at [facility] in 5 years   [facility] POLYPECTOMY  10/04/2018    2 Polyps Removed    SIGMOIDOSCOPY      UPPER GASTROINTESTINAL ENDOSCOPY  10/04/2018    with Biopsy    UPPER GASTROINTESTINAL ENDOSCOPY N/A 10/4/2018    EGD BIOPSY performed by Amanda Cardenas MD at [facility] History     Tobacco Use    Smoking status: Current Every Day Smoker     Packs/day: 1.50     Years: 50.00     Pack years: 75.00     Types: Cigarettes     Last attempt to quit: 11/3/2019     Years since quittin.3    Smokeless tobacco: Never Used    Tobacco comment: smokes 1-2 ppd   Substance Use Topics    Alcohol use:  Yes     Alcohol/week: 6.0 standard drinks     Types: 6 Cans of beer per week     Comment: 3 beers daily       LAB REVIEW:  CBC:   Lab Results   Component Value Date    WBC 10.4 2021    HGB 13.7 2021    HCT 42.2 2021     2021     Lipids:   Lab Results   Component Value Date    HDL 77 2015    LDLDIRECT 129 (H) 2015     Renal:   Lab Results   Component Value Date    BUN 10 2021    CREATININE 0.6 2021     2021    K 4.4 2021    ALT 19 02/10/2021    AST 21 02/10/2021    GLUCOSE 172 2021    GLUF 138 2018     PT/INR: No results found for: INR  A1C:   Lab Results   Component Value Date    LABA1C 5.1 2020           Eliecer Oro MD, 3/5/2021 , 8:24 AM

## 2021-03-05 NOTE — ASSESSMENT & PLAN NOTE
Vies patient again to quit smoking completely. Options given. Patient states he wants to quit slowly.

## 2021-03-09 ENCOUNTER — HOSPITAL ENCOUNTER (OUTPATIENT)
Age: 66
Discharge: HOME | End: 2021-03-09
Payer: MEDICARE

## 2021-03-09 LAB
ALBUMIN SERPL-MCNC: 3.8 GM/DL (ref 3.4–5)
ALP BLD-CCNC: 65 IU/L (ref 40–129)
ALT SERPL-CCNC: 12 U/L (ref 10–40)
ANION GAP SERPL CALCULATED.3IONS-SCNC: 9 MMOL/L (ref 4–16)
AST SERPL-CCNC: 15 IU/L (ref 15–37)
BILIRUB SERPL-MCNC: 0.4 MG/DL (ref 0–1)
BUN BLDV-MCNC: 8 MG/DL (ref 6–23)
CALCIUM SERPL-MCNC: 9.3 MG/DL (ref 8.3–10.6)
CHLORIDE BLD-SCNC: 99 MMOL/L (ref 99–110)
CHOLESTEROL, FASTING: 186 MG/DL
CO2: 30 MMOL/L (ref 21–32)
CREAT SERPL-MCNC: 0.6 MG/DL (ref 0.9–1.3)
GFR AFRICAN AMERICAN: >60 ML/MIN/1.73M2
GFR NON-AFRICAN AMERICAN: >60 ML/MIN/1.73M2
GLUCOSE FASTING: 125 MG/DL (ref 70–99)
HDLC SERPL-MCNC: 90 MG/DL
LDL CHOLESTEROL DIRECT: 88 MG/DL
POTASSIUM SERPL-SCNC: 4.3 MMOL/L (ref 3.5–5.1)
PROSTATE SPECIFIC ANTIGEN: 0.6 NG/ML (ref 0–4)
SODIUM BLD-SCNC: 138 MMOL/L (ref 135–145)
TOTAL PROTEIN: 6.3 GM/DL (ref 6.4–8.2)
TRIGLYCERIDE, FASTING: 46 MG/DL

## 2021-03-09 PROCEDURE — 36415 COLL VENOUS BLD VENIPUNCTURE: CPT

## 2021-03-09 PROCEDURE — 80061 LIPID PANEL: CPT

## 2021-03-09 PROCEDURE — 80053 COMPREHEN METABOLIC PANEL: CPT

## 2021-03-09 PROCEDURE — G0103 PSA SCREENING: HCPCS

## 2021-05-07 RX ORDER — IPRATROPIUM BROMIDE AND ALBUTEROL SULFATE 2.5; .5 MG/3ML; MG/3ML
SOLUTION RESPIRATORY (INHALATION)
Qty: 1080 ML | Refills: 1 | Status: SHIPPED | OUTPATIENT
Start: 2021-05-07 | End: 2021-09-27

## 2021-05-26 ENCOUNTER — TELEPHONE (OUTPATIENT)
Dept: INTERNAL MEDICINE CLINIC | Age: 66
End: 2021-05-26

## 2021-05-26 NOTE — TELEPHONE ENCOUNTER
Attempted to contact patient and reschedule the 6/8/2021 appointment due to the provider being out of the office. Message left with call back information.

## 2021-06-17 ENCOUNTER — OFFICE VISIT (OUTPATIENT)
Dept: INTERNAL MEDICINE CLINIC | Age: 66
End: 2021-06-17
Payer: MEDICARE

## 2021-06-17 VITALS
RESPIRATION RATE: 20 BRPM | DIASTOLIC BLOOD PRESSURE: 66 MMHG | WEIGHT: 115.8 LBS | SYSTOLIC BLOOD PRESSURE: 128 MMHG | TEMPERATURE: 98.6 F | HEART RATE: 72 BPM | BODY MASS INDEX: 18.69 KG/M2 | OXYGEN SATURATION: 92 %

## 2021-06-17 DIAGNOSIS — M72.0 DUPUYTREN'S CONTRACTURE: ICD-10-CM

## 2021-06-17 DIAGNOSIS — J96.10 CHRONIC RESPIRATORY FAILURE, UNSPECIFIED WHETHER WITH HYPOXIA OR HYPERCAPNIA (HCC): ICD-10-CM

## 2021-06-17 DIAGNOSIS — J44.9 COPD, SEVERE (HCC): ICD-10-CM

## 2021-06-17 DIAGNOSIS — M54.12 CERVICAL RADICULOPATHY: ICD-10-CM

## 2021-06-17 DIAGNOSIS — R63.4 WEIGHT LOSS: ICD-10-CM

## 2021-06-17 DIAGNOSIS — K52.832 LYMPHOCYTIC COLITIS: ICD-10-CM

## 2021-06-17 DIAGNOSIS — R97.20 ELEVATED PSA: ICD-10-CM

## 2021-06-17 DIAGNOSIS — Z72.0 TOBACCO ABUSE: Primary | ICD-10-CM

## 2021-06-17 DIAGNOSIS — Z87.891 PERSONAL HISTORY OF TOBACCO USE: ICD-10-CM

## 2021-06-17 DIAGNOSIS — F10.10 ALCOHOL ABUSE: ICD-10-CM

## 2021-06-17 DIAGNOSIS — Z72.0 TOBACCO ABUSE DISORDER: ICD-10-CM

## 2021-06-17 DIAGNOSIS — M54.50 ACUTE MIDLINE LOW BACK PAIN WITHOUT SCIATICA: ICD-10-CM

## 2021-06-17 DIAGNOSIS — M62.81 HAND MUSCLE WEAKNESS: ICD-10-CM

## 2021-06-17 DIAGNOSIS — R52 PAIN MANAGEMENT: ICD-10-CM

## 2021-06-17 PROBLEM — J44.1 COPD EXACERBATION (HCC): Status: RESOLVED | Noted: 2021-02-10 | Resolved: 2021-06-17

## 2021-06-17 PROCEDURE — G8420 CALC BMI NORM PARAMETERS: HCPCS | Performed by: INTERNAL MEDICINE

## 2021-06-17 PROCEDURE — 99214 OFFICE O/P EST MOD 30 MIN: CPT | Performed by: INTERNAL MEDICINE

## 2021-06-17 PROCEDURE — 3017F COLORECTAL CA SCREEN DOC REV: CPT | Performed by: INTERNAL MEDICINE

## 2021-06-17 PROCEDURE — G8427 DOCREV CUR MEDS BY ELIG CLIN: HCPCS | Performed by: INTERNAL MEDICINE

## 2021-06-17 PROCEDURE — G0296 VISIT TO DETERM LDCT ELIG: HCPCS | Performed by: INTERNAL MEDICINE

## 2021-06-17 PROCEDURE — G8926 SPIRO NO PERF OR DOC: HCPCS | Performed by: INTERNAL MEDICINE

## 2021-06-17 PROCEDURE — 4004F PT TOBACCO SCREEN RCVD TLK: CPT | Performed by: INTERNAL MEDICINE

## 2021-06-17 PROCEDURE — 3023F SPIROM DOC REV: CPT | Performed by: INTERNAL MEDICINE

## 2021-06-17 RX ORDER — VIT C/B6/B5/MAGNESIUM/HERB 173 50-5-6-5MG
1 CAPSULE ORAL DAILY
COMMUNITY

## 2021-06-17 ASSESSMENT — PATIENT HEALTH QUESTIONNAIRE - PHQ9
SUM OF ALL RESPONSES TO PHQ QUESTIONS 1-9: 2
SUM OF ALL RESPONSES TO PHQ9 QUESTIONS 1 & 2: 2
SUM OF ALL RESPONSES TO PHQ QUESTIONS 1-9: 2
SUM OF ALL RESPONSES TO PHQ QUESTIONS 1-9: 2
1. LITTLE INTEREST OR PLEASURE IN DOING THINGS: 1
2. FEELING DOWN, DEPRESSED OR HOPELESS: 1

## 2021-06-17 ASSESSMENT — ENCOUNTER SYMPTOMS
WHEEZING: 0
GASTROINTESTINAL NEGATIVE: 1
EYES NEGATIVE: 1
COUGH: 1
ALLERGIC/IMMUNOLOGIC NEGATIVE: 1
SHORTNESS OF BREATH: 1

## 2021-06-17 NOTE — ASSESSMENT & PLAN NOTE
PFTs showed severe COPD in 5/14  Pt uses Duoneb neb treatment and prn ventolin and symbicort   Uses 2L/ O2 24/7  Pulmonologist Dr. Darcy Vazquez. Has appointment next month  Advised pt to quit smoking. He started smoking again after his girl friend's death.

## 2021-06-17 NOTE — PROGRESS NOTES
Jayson Mckeon  Patient's  is 1955  Seen in office on 2021      SUBJECTIVE:  Marcy crowder 61 y. o.year old male presents today   Chief Complaint   Patient presents with    COPD    Results     lab review    Depression     girlfriend passed with pancreatic cancer 2021     Pt is here for f/u of COPD, depression,, chronic resp failure   Lower back pain has resolved. Neck pain still there. He sees pain clinic and oxycodone and is going to get injection in the neck. Patient denies any side effects to the pain medications. Pt has copd and is takng inhalers regularly. Patient had to stop smoking but since the death of his girlfriend has started smoking again and now is trying again to quit it. Patient states his girlfriend  because of pancreatic cancer. They were together for 14 years. He is grieving but states he is not depressed. He does not want any medication or counseling for that. Patient states it is improving. No suicidal ideations. Patient had labs done in 2021. The results were reviewed with the patient. His blood sugar was 125. Patient states he was not fasting at the time. His previous blood sugars are normal.  Hemoglobin A1c was also normal.  He denies any polyuria or polydipsia. Taking medications regularly. No side effects noted. Review of Systems   Constitutional: Negative. Negative for chills, diaphoresis and fever. HENT: Negative. Eyes: Negative. Respiratory: Positive for cough and shortness of breath. Negative for wheezing. Cardiovascular: Negative. Negative for chest pain, palpitations and leg swelling. Gastrointestinal: Negative. Endocrine: Negative. Genitourinary: Negative. Musculoskeletal: Positive for neck pain. Allergic/Immunologic: Negative. Neurological: Negative. Hematological: Negative. Psychiatric/Behavioral: Positive for dysphoric mood.        OBJECTIVE: /66   Pulse 72   Temp 98.6 °F (37 °C) (Oral)   Resp 20 resolved    Alcohol abuse   Patient states he is still drinking alcohol. Unable to quit. Offered counseling but patient declined    Cervical radiculopathy   Patient has chronic neck pain and had surgery in the past for that. Is going to the pain clinic and is on oxycodone. He is going to get some injections in the cervical spine in near future. Elevated PSA   12/2018 : PSA is 6 : was referred to Dr. Zev Escobar  Repeat PSA on 4/12/19 : was 1.9. Pt did not keep f/u appointment   PSA 0.16 March 2021    Hand muscle weakness  Weakness of the left arm has improved. Lymphocytic colitis   Patient denies any diarrhea now. Pain management   Patient is having chronic neck pain and is seeing pain clinic. Tobacco abuse disorder   Patient is smoking again. Advised patient to quit smoking and is trying to do it on his own. Weight loss   Patient is gaining weight again. Lung screening for the colon cancer discussed with patient in detail. He had it done in 2019. He had quit smoking but started smoking again. Advised to to get screened for lung cancer and he agreed. Return to office in 3 months. Orders Placed This Encounter   Procedures    CT lung screen [Initial/Annual]    CT Lung Screen (Annual)    AR VISIT TO DISCUSS LUNG CA SCREEN W LDCT         Mediations reviewed with the patient. Continue current medications. Appropriate prescriptions are addressed. After visit summeryprovided. Follow up as directed sooner if needed. Questions answered and patient verbalizes understanding. Call for any problems, questions, or concerns.        No Known Allergies  Current Outpatient Medications   Medication Sig Dispense Refill    Turmeric 500 MG CAPS Take 1 capsule by mouth daily      ipratropium-albuterol (DUONEB) 0.5-2.5 (3) MG/3ML SOLN nebulizer solution INHALE THE CONTENTS OF 1 VIAL VIA NEBULIZER FOUR TIMES DAILY 1080 mL 1    traZODone (DESYREL) 50 MG tablet Take 1 tablet by mouth nightly (Patient taking differently: Take 50 mg by mouth nightly as needed for Sleep ) 30 tablet 1    Cholecalciferol (VITAMIN D3) 1.25 MG (36892 UT) CAPS Take by mouth daily       predniSONE (DELTASONE) 5 MG tablet Take 1 tablet by mouth daily 90 tablet 3    albuterol sulfate  (90 Base) MCG/ACT inhaler INHALE 2 PUFFS EVERY 6 HOURS AS NEEDED FOR WHEEZING 54 g 5    budesonide-formoterol (SYMBICORT) 160-4.5 MCG/ACT AERO Inhale 2 puffs into the lungs 2 times daily 3 Inhaler 3    oxyCODONE (ROXICODONE) 5 MG immediate release tablet Take 5 mg by mouth every 6 hours as needed for Pain.  OXYGEN Inhale 2 L into the lungs continuous       Multiple Vitamins-Minerals (MULTIVITAMIN ADULTS PO) Take by mouth daily       nicotine (NICODERM CQ) 14 MG/24HR Place 1 patch onto the skin daily (Patient not taking: Reported on 6/17/2021) 30 patch 3     No current facility-administered medications for this visit. 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 On 7/22/16. Dr Edmondson Asp at [address] [place] [address] 4-    Dr. Pj Weir [facility] 5[facility]/2014    PFTs showed severe COPD in 5/14- follows with Dr Tacos Orellana COPD exacerbation [facility]) 6/10/2016    Diverticulosis 9-    Dupuytren's contracture 5/26/2017    Right palm    Elevated PSA 12/21/2018 12/2018 : PSA is 6    H/O colonoscopy 9-18-14    Dr. Pj Weir H/O echocardiogram 08/17/2016    EF 55-60%. Abnormal diastolic function. Mild TR. Borderline right atrium and RV dilatation.      Hiatal hernia     History of alcohol abuse     \"average 6 pk beer per day\"    History of drug abuse ([facility]     does use marijuana\"last used cocaine over 10 yrs ago\" per pt on 10/3/2018    Internal hemorrhoids 9-    03/09/2021    TRIGLYCFAST 46 03/09/2021    CHOLFAST 186 03/09/2021     Renal:   Lab Results   Component Value Date    BUN 8 03/09/2021    CREATININE 0.6 03/09/2021     03/09/2021    K 4.3 03/09/2021    ALT 12 03/09/2021    AST 15 03/09/2021    GLUCOSE 172 02/12/2021    GLUF 125 03/09/2021     PT/INR: No results found for: INR  A1C:   Lab Results   Component Value Date    LABA1C 5.1 08/07/2020           Tanner Enriquez MD, 6/17/2021 , 8:15 AM   Low Dose CT (LDCT) Lung Screening criteria met   Age 55-77   Pack year smoking >30   Still smoking or less than 15 year since quit   No sign or symptoms of lung cancer   > 11 months since last LDCT     Risks and benefits of lung cancer screening with LDCT scans discussed:    Significance of positive screen - False-positive LDCT results often occur. 95% of all positive results do not lead to a diagnosis of cancer. Usually further imaging can resolve most false-positive results; however, some patients may require invasive procedures. Over diagnosis risk - 10% to 12% of screen-detected lung cancer cases are over diagnosed--that is, the cancer would not have been detected in the patient's lifetime without the screening. Need for follow up screens annually to continue lung cancer screening effectiveness     Risks associated with radiation from annual LDCT- Radiation exposure is about the same as for a mammogram, which is about 1/3 of the annual background radiation exposure from everyday life. Starting screening at age 54 is not likely to increase cancer risk from radiation exposure. Patients with comorbidities resulting in life expectancy of < 10 years, or that would preclude treatment of an abnormality identified on CT, should not be screened due to lack of benefit.     To obtain maximal benefit from this screening, smoking cessation and long-term abstinence from smoking is critical

## 2021-06-17 NOTE — ASSESSMENT & PLAN NOTE
Patient states he is still drinking alcohol. Unable to quit.   Offered counseling but patient declined

## 2021-06-17 NOTE — ASSESSMENT & PLAN NOTE
12/2018 : PSA is 6 : was referred to Dr. Bev Sorensen  Repeat PSA on 4/12/19 : was 1.9.  Pt did not keep f/u appointment   PSA 0.16 March 2021

## 2021-06-17 NOTE — ASSESSMENT & PLAN NOTE
Patient has chronic neck pain and had surgery in the past for that. Is going to the pain clinic and is on oxycodone. He is going to get some injections in the cervical spine in near future.

## 2021-06-24 ENCOUNTER — HOSPITAL ENCOUNTER (OUTPATIENT)
Dept: CT IMAGING | Age: 66
Discharge: HOME | End: 2021-06-24
Payer: MEDICARE

## 2021-06-24 DIAGNOSIS — Z87.891 PERSONAL HISTORY OF TOBACCO USE: ICD-10-CM

## 2021-06-24 PROCEDURE — 71271 CT THORAX LUNG CANCER SCR C-: CPT

## 2021-07-23 ENCOUNTER — OFFICE VISIT (OUTPATIENT)
Dept: INTERNAL MEDICINE CLINIC | Age: 66
End: 2021-07-23
Payer: MEDICARE

## 2021-07-23 VITALS
OXYGEN SATURATION: 98 % | DIASTOLIC BLOOD PRESSURE: 68 MMHG | SYSTOLIC BLOOD PRESSURE: 124 MMHG | TEMPERATURE: 99.1 F | HEART RATE: 76 BPM | WEIGHT: 113.6 LBS | BODY MASS INDEX: 18.34 KG/M2 | RESPIRATION RATE: 20 BRPM

## 2021-07-23 DIAGNOSIS — F10.10 ALCOHOL ABUSE: ICD-10-CM

## 2021-07-23 DIAGNOSIS — F32.1 CURRENT MODERATE EPISODE OF MAJOR DEPRESSIVE DISORDER WITHOUT PRIOR EPISODE (HCC): Primary | ICD-10-CM

## 2021-07-23 DIAGNOSIS — F51.02 ADJUSTMENT INSOMNIA: ICD-10-CM

## 2021-07-23 PROCEDURE — G8419 CALC BMI OUT NRM PARAM NOF/U: HCPCS | Performed by: INTERNAL MEDICINE

## 2021-07-23 PROCEDURE — G8427 DOCREV CUR MEDS BY ELIG CLIN: HCPCS | Performed by: INTERNAL MEDICINE

## 2021-07-23 PROCEDURE — 4004F PT TOBACCO SCREEN RCVD TLK: CPT | Performed by: INTERNAL MEDICINE

## 2021-07-23 PROCEDURE — 3017F COLORECTAL CA SCREEN DOC REV: CPT | Performed by: INTERNAL MEDICINE

## 2021-07-23 PROCEDURE — 99213 OFFICE O/P EST LOW 20 MIN: CPT | Performed by: INTERNAL MEDICINE

## 2021-07-23 RX ORDER — TRAZODONE HYDROCHLORIDE 100 MG/1
100 TABLET ORAL NIGHTLY
Qty: 30 TABLET | Refills: 1 | Status: SHIPPED | OUTPATIENT
Start: 2021-07-23 | End: 2021-09-08

## 2021-07-23 NOTE — PROGRESS NOTES
Skin abrasion with tear x 2 on left forearm. 1/2 in area noted and some bleeding. Re dressed area with bacitracin and applied non adherent pad and coban. Patient scraped arm pm door casing.

## 2021-07-23 NOTE — PROGRESS NOTES
Herberth Burnette  Patient's  is 1955  Seen in office on 2021      SUBJECTIVE:  Sammy crowder 61 y. o.year old male presents today   Chief Complaint   Patient presents with    Depression     \"cannot function,concentrate\"    Insomnia     having trouble getting to sleep and staying asleep    Anorexia     has to force hiself to eat    Other     back to smoking and drinking     Patient is complaining of depression since her girlfriend's death from pancreatic cancer. Patient stated he was with her for 14 years she  about 2 months ago. Initially he was able to handle the loss but he is feeling more depressed now. He denies any suicidal ideations but he started drinking alcohol more and started smoking more. He joined the hospice grief counseling yesterday and had first session yesterday. He is here today to discuss that further and wants to take something for depression. He is already taking trazodone 50 mg at at bedtime as needed for sleep. Patient states he feels depressed but no suicidal ideation. He is unable to concentrate on any work. He still having difficulty sleeping. He has lost his appetite also. Patient denies any feelings of harming himself. Patient has chronic COPD and is on oxygen for chronic respiratory failure. Taking medications regularly. No side effects noted. Review of Systems    OBJECTIVE: /68   Pulse 76   Temp 99.1 °F (37.3 °C) (Oral)   Resp 20   Wt 113 lb 9.6 oz (51.5 kg)   SpO2 98%   BMI 18.34 kg/m²     Wt Readings from Last 3 Encounters:   21 113 lb 9.6 oz (51.5 kg)   21 115 lb 12.8 oz (52.5 kg)   03/10/21 110 lb (49.9 kg)        Patient was seen taking COVID-19 precautions. Face mask, gloves were used. Patient also wore facemask. GENERAL: - Alert, oriented, pleasant, in no apparent distress. HEENT: - Conjunctiva pink, no scleral icterus. ENT clear. NECK: -Supple. No jugular venous distention noted.  No masses felt,  CARDIOVASCULAR: - Normal S1 and S2    PULMONARY: - No respiratory distress. Chronic wheezes bilaterally  ABDOMEN: - Soft and non-tender,no masses  ororganomegaly. EXTREMITIES: - No cyanosis, clubbing, or significant edema. SKIN: Skin is warm and dry. NEUROLOGICAL: - Cranial nerves II through XII are grossly intact. IMPRESSION:    Encounter Diagnoses   Name Primary?  Current moderate episode of major depressive disorder without prior episode (HCC) Yes    Alcohol abuse     Adjustment insomnia        ASSESSMENT/PLAN:    Pt has depression since her girl friend's death  Pt started going to hospice grief counseling since  yesterday  Pt takes trazodone 50 mg  for sleep  Will increase dose to 100 mg daily and increase it if needed  Continue psychotherapy/grief counseling  If symptoms get worse call or go to the emergency room  Discussed about alcohol and smoking. Advised patient to decrease alcohol intake and cut down smoking. He does not want any additional help for that. Return to office in a month or sooner if needed    Mediations reviewed with the patient. Continue current medications. Appropriate prescriptions are addressed. After visit summeryprovided. Follow up as directed sooner if needed. Questions answered and patient verbalizes understanding. Call for any problems, questions, or concerns.        No Known Allergies  Current Outpatient Medications   Medication Sig Dispense Refill    Turmeric 500 MG CAPS Take 1 capsule by mouth daily      ipratropium-albuterol (DUONEB) 0.5-2.5 (3) MG/3ML SOLN nebulizer solution INHALE THE CONTENTS OF 1 VIAL VIA NEBULIZER FOUR TIMES DAILY 1080 mL 1    traZODone (DESYREL) 50 MG tablet Take 1 tablet by mouth nightly (Patient taking differently: Take 50 mg by mouth nightly as needed for Sleep ) 30 tablet 1    Cholecalciferol (VITAMIN D3) 1.25 MG (47737 UT) CAPS Take by mouth daily       predniSONE (DELTASONE) 5 MG tablet Take 1 tablet by mouth daily 90 tablet 3    albuterol sulfate  (90 Base) MCG/ACT inhaler INHALE 2 PUFFS EVERY 6 HOURS AS NEEDED FOR WHEEZING 54 g 5    budesonide-formoterol (SYMBICORT) 160-4.5 MCG/ACT AERO Inhale 2 puffs into the lungs 2 times daily 3 Inhaler 3    oxyCODONE (ROXICODONE) 5 MG immediate release tablet Take 5 mg by mouth every 6 hours as needed for Pain.  OXYGEN Inhale 2 L into the lungs continuous       Multiple Vitamins-Minerals (MULTIVITAMIN ADULTS PO) Take by mouth daily        No current facility-administered medications for this visit. 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 On 7/22/16. Dr Courtney Davison at [address] [place] [address] polyps 6-    Dr. Liane Beck COPD (chronic obstructive pulmonary disease) ([facility] 5/19/2014    PFTs showed severe COPD in 5/14- follows with Dr Elder Mills COPD exacerbation [facility]) 6/10/2016    Diverticulosis 9-    Dupuytren's contracture 5/26/2017    Right palm    Elevated PSA 12/21/2018 12/2018 : PSA is 6    H/O colonoscopy 9-18-14    Dr. Liane Beck H/O echocardiogram 08/17/2016    EF 55-60%. Abnormal diastolic function. Mild TR. Borderline right atrium and RV dilatation.      Hiatal hernia     History of alcohol abuse     \"average 6 pk beer per day\"    History of drug abuse ([facility]     does use marijuana\"last used cocaine over 10 yrs ago\" per pt on 10/3/2018    Internal hemorrhoids 9-    Intervertebral disc protrusion 5/19/2014    Cervical region on MRI    Lingular pneumonia 4/3/2018    Lymphocytic colitis 9-18-14    Dr. Danielito Eastman, will treat if symptomatic    On home oxygen therapy     2l/nc at night only    Pneumonia of left lower lobe due to infectious organism 11/27/2018    Sepsis due to pneumonia ([facility] 6/10/2016    Spinal stenosis in cervical region 5/19/2014    MRI showed  Disc protrusion. Severe spinal stenosis at C3-C4    Spinal stenosis of cervical region 3/14/2016    S/P cervical discectomy and fusion with plate and screw. On 7/22/16. Dr Kiara Cummings at [address] abuse disorder 5/19/2014    Wears glasses     to read     Past Surgical History:   Procedure Laterality Date    CERVICAL SPINE SURGERY  2015    \"have plate in neck done at [address]      9- Dr Socorro Willis. f/u 3 years    COLONOSCOPY  10/04/2018    COLONOSCOPY N/A 10/4/2018    COLONOSCOPY POLYPECTOMY SNARE/COLD BIOPSY performed by Trenton Madison MD at [facility] in 5 years   [facility] POLYPECTOMY  10/04/2018    2 Polyps Removed    SIGMOIDOSCOPY      UPPER GASTROINTESTINAL ENDOSCOPY  10/04/2018    with Biopsy    UPPER GASTROINTESTINAL ENDOSCOPY N/A 10/4/2018    EGD BIOPSY performed by Trenton Madison MD at [facility] ENDOSCOPY     Social History     Tobacco Use    Smoking status: Current Every Day Smoker     Packs/day: 1.50     Years: 50.00     Pack years: 75.00     Types: Cigarettes    Smokeless tobacco: Never Used    Tobacco comment: smokes 1-2 ppd   Substance Use Topics    Alcohol use:  Yes     Alcohol/week: 6.0 standard drinks     Types: 6 Cans of beer per week     Comment: 3 beers daily       LAB REVIEW:  CBC:   Lab Results   Component Value Date    WBC 10.4 02/12/2021    HGB 13.7 02/12/2021    HCT 42.2 02/12/2021     02/12/2021     Lipids:   Lab Results   Component Value Date    HDL 90 03/09/2021    LDLDIRECT 88 03/09/2021    TRIGLYCFAST 46 03/09/2021    CHOLFAST 186 03/09/2021     Renal:   Lab Results   Component Value Date    BUN 8 03/09/2021    CREATININE 0.6 03/09/2021     03/09/2021    K 4.3 03/09/2021    ALT 12 03/09/2021    AST 15 03/09/2021    GLUCOSE 172 02/12/2021    GLUF 125 03/09/2021     PT/INR: No results found for: INR  A1C:   Lab Results   Component Value Date    LABA1C 5.1 08/07/2020           Rip Ardon MD, 7/23/2021 , 8:15 AM

## 2021-08-25 ENCOUNTER — OFFICE VISIT (OUTPATIENT)
Dept: INTERNAL MEDICINE CLINIC | Age: 66
End: 2021-08-25
Payer: MEDICARE

## 2021-08-25 VITALS
HEART RATE: 84 BPM | DIASTOLIC BLOOD PRESSURE: 60 MMHG | OXYGEN SATURATION: 91 % | HEIGHT: 66 IN | BODY MASS INDEX: 17.84 KG/M2 | SYSTOLIC BLOOD PRESSURE: 124 MMHG | WEIGHT: 111 LBS

## 2021-08-25 DIAGNOSIS — F51.02 ADJUSTMENT INSOMNIA: ICD-10-CM

## 2021-08-25 DIAGNOSIS — J96.10 CHRONIC RESPIRATORY FAILURE, UNSPECIFIED WHETHER WITH HYPOXIA OR HYPERCAPNIA (HCC): ICD-10-CM

## 2021-08-25 DIAGNOSIS — R63.4 WEIGHT LOSS: ICD-10-CM

## 2021-08-25 DIAGNOSIS — J44.9 COPD, SEVERE (HCC): Primary | ICD-10-CM

## 2021-08-25 DIAGNOSIS — F10.10 ALCOHOL ABUSE: ICD-10-CM

## 2021-08-25 DIAGNOSIS — Z72.0 TOBACCO ABUSE DISORDER: ICD-10-CM

## 2021-08-25 DIAGNOSIS — R52 PAIN MANAGEMENT: ICD-10-CM

## 2021-08-25 DIAGNOSIS — M54.12 CERVICAL RADICULOPATHY: ICD-10-CM

## 2021-08-25 DIAGNOSIS — F32.1 CURRENT MODERATE EPISODE OF MAJOR DEPRESSIVE DISORDER WITHOUT PRIOR EPISODE (HCC): ICD-10-CM

## 2021-08-25 PROCEDURE — G8419 CALC BMI OUT NRM PARAM NOF/U: HCPCS | Performed by: INTERNAL MEDICINE

## 2021-08-25 PROCEDURE — G8427 DOCREV CUR MEDS BY ELIG CLIN: HCPCS | Performed by: INTERNAL MEDICINE

## 2021-08-25 PROCEDURE — 3023F SPIROM DOC REV: CPT | Performed by: INTERNAL MEDICINE

## 2021-08-25 PROCEDURE — 3017F COLORECTAL CA SCREEN DOC REV: CPT | Performed by: INTERNAL MEDICINE

## 2021-08-25 PROCEDURE — G8926 SPIRO NO PERF OR DOC: HCPCS | Performed by: INTERNAL MEDICINE

## 2021-08-25 PROCEDURE — 4004F PT TOBACCO SCREEN RCVD TLK: CPT | Performed by: INTERNAL MEDICINE

## 2021-08-25 PROCEDURE — 99214 OFFICE O/P EST MOD 30 MIN: CPT | Performed by: INTERNAL MEDICINE

## 2021-08-25 ASSESSMENT — ENCOUNTER SYMPTOMS
GASTROINTESTINAL NEGATIVE: 1
WHEEZING: 0
EYES NEGATIVE: 1
COUGH: 1
ALLERGIC/IMMUNOLOGIC NEGATIVE: 1
SHORTNESS OF BREATH: 1

## 2021-08-25 NOTE — PROGRESS NOTES
Kellen Duenas  Patient's  is 1955  Seen in office on 2021      SUBJECTIVE:  Aishwarya Zepeda vel 61 y. o.year old male presents today     For COPD , depression, chronic resp failure  Depression in control and is feeling better  Sleeping better   Still smoking , about 1ppd  Pt has COPD and is on O2 and wheezes some times  No SOB at rest but SOBOE  No chest pain. No cough     Taking medications regularly. No side effects noted. Review of Systems   Constitutional: Negative. Negative for chills, diaphoresis, fatigue and fever. HENT: Negative. Eyes: Negative. Respiratory: Positive for cough and shortness of breath. Negative for wheezing. Cardiovascular: Negative for chest pain, palpitations and leg swelling. Gastrointestinal: Negative. Endocrine: Negative. Genitourinary: Negative. Musculoskeletal: Positive for neck pain and neck stiffness. Skin: Negative. Allergic/Immunologic: Negative. Neurological: Negative. Hematological: Negative. Psychiatric/Behavioral: Negative. OBJECTIVE: /60   Pulse 84   Ht 5' 6\" (1.676 m)   Wt 111 lb (50.3 kg)   SpO2 91%   BMI 17.92 kg/m²     Wt Readings from Last 3 Encounters:   21 111 lb (50.3 kg)   21 115 lb (52.2 kg)   21 113 lb 9.6 oz (51.5 kg)        Patient was seen taking COVID-19 precautions. Face mask, gloves were used. Patient also wore facemask. GENERAL: - Alert, oriented, pleasant, in no apparent distress. HEENT: - Conjunctiva pink, no scleral icterus. ENT clear. NECK: -Supple. No jugular venous distention noted. No masses felt,  CARDIOVASCULAR: - Normal S1 and S2    PULMONARY: - No respiratory distress. No wheezes or rales. ABDOMEN: - Soft and non-tender,no masses  ororganomegaly. EXTREMITIES: - No cyanosis, clubbing, or significant edema. SKIN: Skin is warm and dry. NEUROLOGICAL: - Cranial nerves II through XII are grossly intact. IMPRESSION:    Encounter Diagnoses   Name Primary?  COPD, severe ([facility] [address] Yes    Pain management     Current moderate episode of major depressive disorder without prior episode (HCC)     Chronic respiratory failure, unspecified whether with hypoxia or hypercapnia (HCC)     Adjustment insomnia     Tobacco abuse disorder     Cervical radiculopathy     Weight loss     Alcohol abuse        ASSESSMENT/PLAN:    Patient Active Problem List    Diagnosis Date Noted    Current moderate episode of major depressive disorder without prior episode ([facility] [address] 2021     Priority: High     Trazodone 100 mg daily  and counseling by hospice after his girlfriend  . Pt stopped going to counseling when it was switched to telephone        Chronic respiratory failure ([facility] [address] 2020     Priority: High     Due to severe COPD. Patient is on oxygen 2L/m and nebulizer treatments      COPD, severe ([facility] [address] 2014     Priority: High     PFTs showed severe COPD in   Pt uses Duoneb neb treatment and prn ventolin and symbicort   Uses 2L/ O2   Pulmonologist Dr. Chet Ochoa Adjustment insomnia 2021     Priority: Medium     Patient is on trazodone and is helping       Pain management 2020     Patient is going to pain clinic Dr. Griselda Leavitt. [facility] pain clinic  Dx pain in the cervical spine. Had surgery for neck in the past .      Hand muscle weakness 2020     EMG in  showed severe neurogenic changes affecting the left arm muscles. Also has severe neuropathy. Discussed with the patient and is going to the pain clinic      Alcohol abuse 10/08/2019     Pt has decreased drinking from 6 pack a day to 6 pack a week  Advised pt to decrease                 Elevated PSA 2018 : PSA is 6 : was referred to Dr. Eyal Simpson  Repeat PSA on 19 : was 1.9. Pt did not keep f/u appointment   PSA 0.2021        Lymphocytic colitis 2018     Had colonoscopy in . Dr Eliot Fitzpatrick.   Colonoscopy in 10/2018 done bt Dr Siria Payan  : adenomatous polyps   No diarrhea       Weight loss 09/29/2017     Chest x ray : COPD  Labs normal  Colonoscopy : 9/2014  CT chest in 3/18 and 4/18 : COPD and pneumonia : no mass  CT abdomen  In 4/2018: NAD  Wt is fluctuating   Encouraged to eat more  Lost wt after his girl friends death. Now start eating better.  Dupuytren's contracture 05/26/2017     Right palm      Cervical radiculopathy 08/30/2016     S/P cervical discectomy and fusion with plate and screw. On 7/22/16. Dr Rossy Linares at [facility]  Patient is going to the [place] pain clinic  Pt is on oxycodone 5 mg q 6 hours      Tobacco abuse disorder 05/19/2014     Patient is a smoker. Counseled to quit smoking. Various options given. Help number 1-800 QUIT NOW  given to patient. Long discussion to quit smoking. Unable to quit smoking  Went to [facility] reach and then restarted smoking again         Orders Placed This Encounter   Procedures    CBC Auto Differential    Comprehensive Metabolic Panel    T4, Free    TSH without Reflex         Mediations reviewed with the patient. Continue current medications. Appropriate prescriptions are addressed. After visit summeryprovided. Follow up as directed sooner if needed. Questions answered and patient verbalizes understanding. Call for any problems, questions, or concerns.        No Known Allergies  Current Outpatient Medications   Medication Sig Dispense Refill    traZODone (DESYREL) 100 MG tablet Take 1 tablet by mouth nightly 30 tablet 1    Turmeric 500 MG CAPS Take 1 capsule by mouth daily      ipratropium-albuterol (DUONEB) 0.5-2.5 (3) MG/3ML SOLN nebulizer solution INHALE THE CONTENTS OF 1 VIAL VIA NEBULIZER FOUR TIMES DAILY 1080 mL 1    Cholecalciferol (VITAMIN D3) 1.25 MG (47731 UT) CAPS Take by mouth daily       predniSONE (DELTASONE) 5 MG tablet Take 1 tablet by mouth daily 90 tablet 3    albuterol sulfate  (90 Base) MCG/ACT inhaler INHALE 2 PUFFS EVERY 6 HOURS AS NEEDED FOR WHEEZING 54 g 5    budesonide-formoterol (SYMBICORT) 160-4.5 MCG/ACT AERO Inhale 2 puffs into the lungs 2 times daily 3 Inhaler 3    oxyCODONE (ROXICODONE) 5 MG immediate release tablet Take 5 mg by mouth every 6 hours as needed for Pain.  OXYGEN Inhale 2 L into the lungs continuous       Multiple Vitamins-Minerals (MULTIVITAMIN ADULTS PO) Take by mouth daily        No current facility-administered medications for this visit. 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 On 7/22/16. Dr Cele Huang at [address] [place] [address] 0-    Dr. Richy Guerrero COPD (chronic obstructive pulmonary disease) ([facility] [address] 5/19/2014    PFTs showed severe COPD in 5/14- follows with Dr Babak Hayward COPD exacerbation [facility]) 6/10/2016    Diverticulosis 9-    Dupuytren's contracture 5/26/2017    Right palm    Elevated PSA 12/21/2018 12/2018 : PSA is 6    H/O colonoscopy 9-18-14    Dr. Richy Guerrero H/O echocardiogram 08/17/2016    EF 55-60%. Abnormal diastolic function. Mild TR. Borderline right atrium and RV dilatation.  Hiatal hernia     History of alcohol abuse     \"average 6 pk beer per day\"    History of drug abuse ([facility] [address]     does use marijuana\"last used cocaine over 10 yrs ago\" per pt on 10/3/2018    Internal hemorrhoids 9-    Intervertebral disc protrusion 5/19/2014    Cervical region on MRI    Lingular pneumonia 4/3/2018    Lymphocytic colitis 9-18-14    Dr. Wilberto Sandoval, will treat if symptomatic    On home oxygen therapy     2l/nc at night only    Pneumonia of left lower lobe due to infectious organism 11/27/2018    Sepsis due to pneumonia ([facility] [address] 6/10/2016    Spinal stenosis in cervical region 5/19/2014    MRI showed  Disc protrusion.  Severe spinal stenosis at C3-C4    Spinal stenosis of cervical region 3/14/2016    S/P cervical discectomy and fusion with plate and screw. On 7/22/16. Dr Fidelina Camacho at [address] abuse disorder 5/19/2014    Wears glasses     to read     Past Surgical History:   Procedure Laterality Date    CERVICAL SPINE SURGERY  2015    \"have plate in neck done at [facility]      9- Dr Fabi Brothers. f/u 3 years    COLONOSCOPY  10/04/2018    COLONOSCOPY N/A 10/4/2018    COLONOSCOPY POLYPECTOMY SNARE/COLD BIOPSY performed by Geraldine Andrade MD at [facility] in 5 years   [facility] POLYPECTOMY  10/04/2018    2 Polyps Removed    SIGMOIDOSCOPY      UPPER GASTROINTESTINAL ENDOSCOPY  10/04/2018    with Biopsy    UPPER GASTROINTESTINAL ENDOSCOPY N/A 10/4/2018    EGD BIOPSY performed by Geraldine Andrade MD at [facility] ENDOSCOPY     Social History     Tobacco Use    Smoking status: Current Every Day Smoker     Packs/day: 1.50     Years: 50.00     Pack years: 75.00     Types: Cigarettes    Smokeless tobacco: Never Used    Tobacco comment: smokes 1-2 ppd   Substance Use Topics    Alcohol use:  Yes     Alcohol/week: 6.0 standard drinks     Types: 6 Cans of beer per week     Comment: 3 beers daily       LAB REVIEW:  CBC:   Lab Results   Component Value Date    WBC 10.4 02/12/2021    HGB 13.7 02/12/2021    HCT 42.2 02/12/2021     02/12/2021     Lipids:   Lab Results   Component Value Date    HDL 90 03/09/2021    LDLDIRECT 88 03/09/2021    TRIGLYCFAST 46 03/09/2021    CHOLFAST 186 03/09/2021     Renal:   Lab Results   Component Value Date    BUN 8 03/09/2021    CREATININE 0.6 03/09/2021     03/09/2021    K 4.3 03/09/2021    ALT 12 03/09/2021    AST 15 03/09/2021    GLUCOSE 172 02/12/2021    GLUF 125 03/09/2021     PT/INR: No results found for: INR  A1C:   Lab Results   Component Value Date    LABA1C 5.1 08/07/2020           Ming Jasso MD, 8/25/2021 , 9:22 AM

## 2021-09-08 RX ORDER — TRAZODONE HYDROCHLORIDE 100 MG/1
100 TABLET ORAL NIGHTLY
Qty: 30 TABLET | Refills: 3 | Status: SHIPPED | OUTPATIENT
Start: 2021-09-08

## 2021-09-14 ENCOUNTER — HOSPITAL ENCOUNTER (OUTPATIENT)
Age: 66
Discharge: HOME | End: 2021-09-14
Payer: MEDICARE

## 2021-09-14 LAB
ALBUMIN SERPL-MCNC: 3.8 GM/DL (ref 3.4–5)
ALP BLD-CCNC: 56 IU/L (ref 40–129)
ALT SERPL-CCNC: 15 U/L (ref 10–40)
ANION GAP SERPL CALCULATED.3IONS-SCNC: 6 MMOL/L (ref 4–16)
AST SERPL-CCNC: 20 IU/L (ref 15–37)
BASOPHILS ABSOLUTE: 0 K/CU MM
BASOPHILS RELATIVE PERCENT: 0.3 % (ref 0–1)
BILIRUB SERPL-MCNC: 0.3 MG/DL (ref 0–1)
BUN BLDV-MCNC: 4 MG/DL (ref 6–23)
CALCIUM SERPL-MCNC: 9 MG/DL (ref 8.3–10.6)
CHLORIDE BLD-SCNC: 98 MMOL/L (ref 99–110)
CO2: 35 MMOL/L (ref 21–32)
CREAT SERPL-MCNC: 0.6 MG/DL (ref 0.9–1.3)
DIFFERENTIAL TYPE: ABNORMAL
EOSINOPHILS ABSOLUTE: 0 K/CU MM
EOSINOPHILS RELATIVE PERCENT: 0 % (ref 0–3)
GFR AFRICAN AMERICAN: >60 ML/MIN/1.73M2
GFR NON-AFRICAN AMERICAN: >60 ML/MIN/1.73M2
GLUCOSE FASTING: 123 MG/DL (ref 70–99)
HCT VFR BLD CALC: 45.1 % (ref 42–52)
HEMOGLOBIN: 14.5 GM/DL (ref 13.5–18)
IMMATURE NEUTROPHIL %: 0.4 % (ref 0–0.43)
LYMPHOCYTES ABSOLUTE: 0.8 K/CU MM
LYMPHOCYTES RELATIVE PERCENT: 7.9 % (ref 24–44)
MCH RBC QN AUTO: 31.9 PG (ref 27–31)
MCHC RBC AUTO-ENTMCNC: 32.2 % (ref 32–36)
MCV RBC AUTO: 99.1 FL (ref 78–100)
MONOCYTES ABSOLUTE: 0.7 K/CU MM
MONOCYTES RELATIVE PERCENT: 6.9 % (ref 0–4)
PDW BLD-RTO: 13.4 % (ref 11.7–14.9)
PLATELET # BLD: 268 K/CU MM (ref 140–440)
PMV BLD AUTO: 9 FL (ref 7.5–11.1)
POTASSIUM SERPL-SCNC: 4.3 MMOL/L (ref 3.5–5.1)
RBC # BLD: 4.55 M/CU MM (ref 4.6–6.2)
SEGMENTED NEUTROPHILS ABSOLUTE COUNT: 8.3 K/CU MM
SEGMENTED NEUTROPHILS RELATIVE PERCENT: 84.5 % (ref 36–66)
SODIUM BLD-SCNC: 139 MMOL/L (ref 135–145)
T4 FREE: 1.13 NG/DL (ref 0.9–1.8)
TOTAL IMMATURE NEUTOROPHIL: 0.04 K/CU MM
TOTAL PROTEIN: 6.1 GM/DL (ref 6.4–8.2)
TSH HIGH SENSITIVITY: 1.32 UIU/ML (ref 0.27–4.2)
WBC # BLD: 9.8 K/CU MM (ref 4–10.5)

## 2021-09-14 PROCEDURE — 36415 COLL VENOUS BLD VENIPUNCTURE: CPT

## 2021-09-14 PROCEDURE — 85025 COMPLETE CBC W/AUTO DIFF WBC: CPT

## 2021-09-14 PROCEDURE — 84443 ASSAY THYROID STIM HORMONE: CPT

## 2021-09-14 PROCEDURE — 80053 COMPREHEN METABOLIC PANEL: CPT

## 2021-09-14 PROCEDURE — 84439 ASSAY OF FREE THYROXINE: CPT

## 2021-09-15 ENCOUNTER — TELEPHONE (OUTPATIENT)
Dept: INTERNAL MEDICINE CLINIC | Age: 66
End: 2021-09-15

## 2021-09-27 RX ORDER — IPRATROPIUM BROMIDE AND ALBUTEROL SULFATE 2.5; .5 MG/3ML; MG/3ML
SOLUTION RESPIRATORY (INHALATION)
Qty: 1080 ML | Refills: 1 | Status: SHIPPED | OUTPATIENT
Start: 2021-09-27 | End: 2022-03-10

## 2021-11-11 ENCOUNTER — HOSPITAL ENCOUNTER (INPATIENT)
Age: 66
LOS: 2 days | Discharge: HOME | DRG: 193 | End: 2021-11-13
Attending: EMERGENCY MEDICINE | Admitting: INTERNAL MEDICINE
Payer: MEDICARE

## 2021-11-11 ENCOUNTER — APPOINTMENT (OUTPATIENT)
Dept: GENERAL RADIOLOGY | Age: 66
DRG: 193 | End: 2021-11-11
Payer: MEDICARE

## 2021-11-11 DIAGNOSIS — J18.9 PNEUMONIA OF RIGHT UPPER LOBE DUE TO INFECTIOUS ORGANISM: ICD-10-CM

## 2021-11-11 DIAGNOSIS — J44.1 COPD WITH ACUTE EXACERBATION (HCC): Primary | ICD-10-CM

## 2021-11-11 PROBLEM — Z72.0 NICOTINE ABUSE: Status: ACTIVE | Noted: 2021-11-11

## 2021-11-11 PROBLEM — G47.00 INSOMNIA: Status: ACTIVE | Noted: 2021-11-11

## 2021-11-11 LAB
ANION GAP SERPL CALCULATED.3IONS-SCNC: 8 MMOL/L (ref 4–16)
BASOPHILS ABSOLUTE: 0.1 K/CU MM
BASOPHILS RELATIVE PERCENT: 0.2 % (ref 0–1)
BUN BLDV-MCNC: 8 MG/DL (ref 6–23)
CALCIUM SERPL-MCNC: 9.1 MG/DL (ref 8.3–10.6)
CHLORIDE BLD-SCNC: 94 MMOL/L (ref 99–110)
CO2: 34 MMOL/L (ref 21–32)
CREAT SERPL-MCNC: 0.5 MG/DL (ref 0.9–1.3)
DIFFERENTIAL TYPE: ABNORMAL
EOSINOPHILS ABSOLUTE: 0 K/CU MM
EOSINOPHILS RELATIVE PERCENT: 0 % (ref 0–3)
GFR AFRICAN AMERICAN: >60 ML/MIN/1.73M2
GFR NON-AFRICAN AMERICAN: >60 ML/MIN/1.73M2
GLUCOSE BLD-MCNC: 112 MG/DL (ref 70–99)
HCT VFR BLD CALC: 42.9 % (ref 42–52)
HEMOGLOBIN: 14 GM/DL (ref 13.5–18)
IMMATURE NEUTROPHIL %: 0.5 % (ref 0–0.43)
LACTATE: 1.1 MMOL/L (ref 0.4–2)
LYMPHOCYTES ABSOLUTE: 1.3 K/CU MM
LYMPHOCYTES RELATIVE PERCENT: 5.1 % (ref 24–44)
MCH RBC QN AUTO: 31.6 PG (ref 27–31)
MCHC RBC AUTO-ENTMCNC: 32.6 % (ref 32–36)
MCV RBC AUTO: 96.8 FL (ref 78–100)
MONOCYTES ABSOLUTE: 1.8 K/CU MM
MONOCYTES RELATIVE PERCENT: 6.8 % (ref 0–4)
PDW BLD-RTO: 12.8 % (ref 11.7–14.9)
PLATELET # BLD: 323 K/CU MM (ref 140–440)
PMV BLD AUTO: 8.8 FL (ref 7.5–11.1)
POTASSIUM SERPL-SCNC: 4 MMOL/L (ref 3.5–5.1)
RBC # BLD: 4.43 M/CU MM (ref 4.6–6.2)
SARS-COV-2, NAAT: NOT DETECTED
SEGMENTED NEUTROPHILS ABSOLUTE COUNT: 22.7 K/CU MM
SEGMENTED NEUTROPHILS RELATIVE PERCENT: 87.4 % (ref 36–66)
SODIUM BLD-SCNC: 136 MMOL/L (ref 135–145)
SOURCE: NORMAL
TOTAL IMMATURE NEUTOROPHIL: 0.14 K/CU MM
TROPONIN T: <0.01 NG/ML
WBC # BLD: 26 K/CU MM (ref 4–10.5)

## 2021-11-11 PROCEDURE — 87077 CULTURE AEROBIC IDENTIFY: CPT

## 2021-11-11 PROCEDURE — 6370000000 HC RX 637 (ALT 250 FOR IP): Performed by: EMERGENCY MEDICINE

## 2021-11-11 PROCEDURE — 85025 COMPLETE CBC W/AUTO DIFF WBC: CPT

## 2021-11-11 PROCEDURE — 1200000000 HC SEMI PRIVATE

## 2021-11-11 PROCEDURE — 2580000003 HC RX 258: Performed by: NURSE PRACTITIONER

## 2021-11-11 PROCEDURE — 93005 ELECTROCARDIOGRAM TRACING: CPT | Performed by: EMERGENCY MEDICINE

## 2021-11-11 PROCEDURE — 2140000000 HC CCU INTERMEDIATE R&B

## 2021-11-11 PROCEDURE — 71045 X-RAY EXAM CHEST 1 VIEW: CPT

## 2021-11-11 PROCEDURE — 99284 EMERGENCY DEPT VISIT MOD MDM: CPT

## 2021-11-11 PROCEDURE — 87040 BLOOD CULTURE FOR BACTERIA: CPT

## 2021-11-11 PROCEDURE — 2580000003 HC RX 258: Performed by: EMERGENCY MEDICINE

## 2021-11-11 PROCEDURE — 83605 ASSAY OF LACTIC ACID: CPT

## 2021-11-11 PROCEDURE — 0202U NFCT DS 22 TRGT SARS-COV-2: CPT

## 2021-11-11 PROCEDURE — 6360000002 HC RX W HCPCS: Performed by: EMERGENCY MEDICINE

## 2021-11-11 PROCEDURE — 80048 BASIC METABOLIC PNL TOTAL CA: CPT

## 2021-11-11 PROCEDURE — 87899 AGENT NOS ASSAY W/OPTIC: CPT

## 2021-11-11 PROCEDURE — 87635 SARS-COV-2 COVID-19 AMP PRB: CPT

## 2021-11-11 PROCEDURE — 94640 AIRWAY INHALATION TREATMENT: CPT

## 2021-11-11 PROCEDURE — 84484 ASSAY OF TROPONIN QUANT: CPT

## 2021-11-11 PROCEDURE — 86141 C-REACTIVE PROTEIN HS: CPT

## 2021-11-11 PROCEDURE — 87449 NOS EACH ORGANISM AG IA: CPT

## 2021-11-11 PROCEDURE — 96374 THER/PROPH/DIAG INJ IV PUSH: CPT

## 2021-11-11 RX ORDER — M-VIT,TX,IRON,MINS/CALC/FOLIC 27MG-0.4MG
1 TABLET ORAL DAILY
Status: DISCONTINUED | OUTPATIENT
Start: 2021-11-12 | End: 2021-11-13 | Stop reason: HOSPADM

## 2021-11-11 RX ORDER — ONDANSETRON 4 MG/1
4 TABLET, ORALLY DISINTEGRATING ORAL EVERY 8 HOURS PRN
Status: DISCONTINUED | OUTPATIENT
Start: 2021-11-11 | End: 2021-11-13 | Stop reason: HOSPADM

## 2021-11-11 RX ORDER — SODIUM CHLORIDE 0.9 % (FLUSH) 0.9 %
5-40 SYRINGE (ML) INJECTION PRN
Status: DISCONTINUED | OUTPATIENT
Start: 2021-11-11 | End: 2021-11-13 | Stop reason: HOSPADM

## 2021-11-11 RX ORDER — VIT C/B6/B5/MAGNESIUM/HERB 173 50-5-6-5MG
500 CAPSULE ORAL DAILY
Status: DISCONTINUED | OUTPATIENT
Start: 2021-11-12 | End: 2021-11-12 | Stop reason: CLARIF

## 2021-11-11 RX ORDER — IPRATROPIUM BROMIDE AND ALBUTEROL SULFATE 2.5; .5 MG/3ML; MG/3ML
1 SOLUTION RESPIRATORY (INHALATION) ONCE
Status: COMPLETED | OUTPATIENT
Start: 2021-11-11 | End: 2021-11-11

## 2021-11-11 RX ORDER — SODIUM CHLORIDE 9 MG/ML
25 INJECTION, SOLUTION INTRAVENOUS PRN
Status: DISCONTINUED | OUTPATIENT
Start: 2021-11-11 | End: 2021-11-13 | Stop reason: HOSPADM

## 2021-11-11 RX ORDER — BUDESONIDE AND FORMOTEROL FUMARATE DIHYDRATE 160; 4.5 UG/1; UG/1
1 AEROSOL RESPIRATORY (INHALATION) DAILY
Status: DISCONTINUED | OUTPATIENT
Start: 2021-11-12 | End: 2021-11-13 | Stop reason: HOSPADM

## 2021-11-11 RX ORDER — SODIUM CHLORIDE 9 MG/ML
INJECTION, SOLUTION INTRAVENOUS CONTINUOUS
Status: ACTIVE | OUTPATIENT
Start: 2021-11-11 | End: 2021-11-12

## 2021-11-11 RX ORDER — ACETAMINOPHEN 650 MG/1
650 SUPPOSITORY RECTAL EVERY 6 HOURS PRN
Status: DISCONTINUED | OUTPATIENT
Start: 2021-11-11 | End: 2021-11-13 | Stop reason: HOSPADM

## 2021-11-11 RX ORDER — IPRATROPIUM BROMIDE AND ALBUTEROL SULFATE 2.5; .5 MG/3ML; MG/3ML
1 SOLUTION RESPIRATORY (INHALATION)
Status: DISCONTINUED | OUTPATIENT
Start: 2021-11-12 | End: 2021-11-12

## 2021-11-11 RX ORDER — SODIUM CHLORIDE 0.9 % (FLUSH) 0.9 %
5-40 SYRINGE (ML) INJECTION EVERY 12 HOURS SCHEDULED
Status: DISCONTINUED | OUTPATIENT
Start: 2021-11-12 | End: 2021-11-13 | Stop reason: HOSPADM

## 2021-11-11 RX ORDER — OXYCODONE HYDROCHLORIDE 5 MG/1
5 TABLET ORAL EVERY 6 HOURS PRN
Status: DISCONTINUED | OUTPATIENT
Start: 2021-11-11 | End: 2021-11-13 | Stop reason: HOSPADM

## 2021-11-11 RX ORDER — PREDNISONE 20 MG/1
40 TABLET ORAL DAILY
Status: DISCONTINUED | OUTPATIENT
Start: 2021-11-13 | End: 2021-11-13 | Stop reason: HOSPADM

## 2021-11-11 RX ORDER — TRAZODONE HYDROCHLORIDE 100 MG/1
100 TABLET ORAL NIGHTLY
Status: DISCONTINUED | OUTPATIENT
Start: 2021-11-12 | End: 2021-11-13 | Stop reason: HOSPADM

## 2021-11-11 RX ORDER — AZITHROMYCIN 250 MG/1
500 TABLET, FILM COATED ORAL EVERY 24 HOURS
Status: DISCONTINUED | OUTPATIENT
Start: 2021-11-12 | End: 2021-11-13 | Stop reason: HOSPADM

## 2021-11-11 RX ORDER — ALBUTEROL SULFATE 2.5 MG/3ML
2.5 SOLUTION RESPIRATORY (INHALATION)
Status: DISCONTINUED | OUTPATIENT
Start: 2021-11-11 | End: 2021-11-12

## 2021-11-11 RX ORDER — NICOTINE 21 MG/24HR
1 PATCH, TRANSDERMAL 24 HOURS TRANSDERMAL DAILY
Status: DISCONTINUED | OUTPATIENT
Start: 2021-11-12 | End: 2021-11-13 | Stop reason: HOSPADM

## 2021-11-11 RX ORDER — DEXAMETHASONE SODIUM PHOSPHATE 10 MG/ML
10 INJECTION, SOLUTION INTRAMUSCULAR; INTRAVENOUS ONCE
Status: COMPLETED | OUTPATIENT
Start: 2021-11-11 | End: 2021-11-11

## 2021-11-11 RX ORDER — METHYLPREDNISOLONE SODIUM SUCCINATE 40 MG/ML
40 INJECTION, POWDER, LYOPHILIZED, FOR SOLUTION INTRAMUSCULAR; INTRAVENOUS EVERY 6 HOURS
Status: DISPENSED | OUTPATIENT
Start: 2021-11-11 | End: 2021-11-13

## 2021-11-11 RX ORDER — ONDANSETRON 2 MG/ML
4 INJECTION INTRAMUSCULAR; INTRAVENOUS EVERY 6 HOURS PRN
Status: DISCONTINUED | OUTPATIENT
Start: 2021-11-11 | End: 2021-11-13 | Stop reason: HOSPADM

## 2021-11-11 RX ORDER — ALBUTEROL SULFATE 90 UG/1
1 AEROSOL, METERED RESPIRATORY (INHALATION) EVERY 4 HOURS PRN
Status: DISCONTINUED | OUTPATIENT
Start: 2021-11-11 | End: 2021-11-12

## 2021-11-11 RX ORDER — ACETAMINOPHEN 325 MG/1
650 TABLET ORAL EVERY 6 HOURS PRN
Status: DISCONTINUED | OUTPATIENT
Start: 2021-11-11 | End: 2021-11-13 | Stop reason: HOSPADM

## 2021-11-11 RX ORDER — POLYETHYLENE GLYCOL 3350 17 G/17G
17 POWDER, FOR SOLUTION ORAL DAILY PRN
Status: DISCONTINUED | OUTPATIENT
Start: 2021-11-11 | End: 2021-11-13 | Stop reason: HOSPADM

## 2021-11-11 RX ADMIN — DEXAMETHASONE SODIUM PHOSPHATE 10 MG: 10 INJECTION, SOLUTION INTRAMUSCULAR; INTRAVENOUS at 20:17

## 2021-11-11 RX ADMIN — SODIUM CHLORIDE: 9 INJECTION, SOLUTION INTRAVENOUS at 23:14

## 2021-11-11 RX ADMIN — CEFTRIAXONE SODIUM 1000 MG: 1 INJECTION, POWDER, FOR SOLUTION INTRAMUSCULAR; INTRAVENOUS at 22:23

## 2021-11-11 RX ADMIN — IPRATROPIUM BROMIDE AND ALBUTEROL SULFATE 1 AMPULE: .5; 3 SOLUTION RESPIRATORY (INHALATION) at 21:08

## 2021-11-11 RX ADMIN — AZITHROMYCIN 500 MG: 500 INJECTION, POWDER, LYOPHILIZED, FOR SOLUTION INTRAVENOUS at 23:14

## 2021-11-11 ASSESSMENT — ENCOUNTER SYMPTOMS
SHORTNESS OF BREATH: 1
COUGH: 1
SORE THROAT: 0
EYES NEGATIVE: 1
GASTROINTESTINAL NEGATIVE: 1
WHEEZING: 1

## 2021-11-11 ASSESSMENT — PAIN DESCRIPTION - LOCATION: LOCATION: CHEST

## 2021-11-11 ASSESSMENT — PAIN SCALES - GENERAL: PAINLEVEL_OUTOF10: 7

## 2021-11-12 LAB
ADENOVIRUS DETECTION BY PCR: NOT DETECTED
ANION GAP SERPL CALCULATED.3IONS-SCNC: 5 MMOL/L (ref 4–16)
BASOPHILS ABSOLUTE: 0 K/CU MM
BASOPHILS RELATIVE PERCENT: 0.1 % (ref 0–1)
BORDETELLA PARAPERTUSSIS BY PCR: NOT DETECTED
BORDETELLA PERTUSSIS PCR: NOT DETECTED
BUN BLDV-MCNC: 12 MG/DL (ref 6–23)
CALCIUM SERPL-MCNC: 8.7 MG/DL (ref 8.3–10.6)
CHLAMYDOPHILA PNEUMONIA PCR: NOT DETECTED
CHLORIDE BLD-SCNC: 95 MMOL/L (ref 99–110)
CO2: 34 MMOL/L (ref 21–32)
CORONAVIRUS 229E PCR: NOT DETECTED
CORONAVIRUS HKU1 PCR: NOT DETECTED
CORONAVIRUS NL63 PCR: NOT DETECTED
CORONAVIRUS OC43 PCR: NOT DETECTED
CREAT SERPL-MCNC: 0.5 MG/DL (ref 0.9–1.3)
DIFFERENTIAL TYPE: ABNORMAL
EKG ATRIAL RATE: 94 BPM
EKG DIAGNOSIS: NORMAL
EKG P AXIS: 72 DEGREES
EKG P-R INTERVAL: 136 MS
EKG Q-T INTERVAL: 356 MS
EKG QRS DURATION: 94 MS
EKG QTC CALCULATION (BAZETT): 445 MS
EKG R AXIS: 79 DEGREES
EKG T AXIS: 70 DEGREES
EKG VENTRICULAR RATE: 94 BPM
EOSINOPHILS ABSOLUTE: 0 K/CU MM
EOSINOPHILS RELATIVE PERCENT: 0 % (ref 0–3)
GFR AFRICAN AMERICAN: >60 ML/MIN/1.73M2
GFR NON-AFRICAN AMERICAN: >60 ML/MIN/1.73M2
GLUCOSE BLD-MCNC: 257 MG/DL (ref 70–99)
HCT VFR BLD CALC: 38.5 % (ref 42–52)
HEMOGLOBIN: 12.2 GM/DL (ref 13.5–18)
HIGH SENSITIVE C-REACTIVE PROTEIN: 209.7 MG/L
HUMAN METAPNEUMOVIRUS PCR: NOT DETECTED
IMMATURE NEUTROPHIL %: 0.5 % (ref 0–0.43)
INFLUENZA A BY PCR: NOT DETECTED
INFLUENZA A H1 (2009) PCR: NOT DETECTED
INFLUENZA A H1 PANDEMIC PCR: NOT DETECTED
INFLUENZA A H3 PCR: NOT DETECTED
INFLUENZA B BY PCR: NOT DETECTED
LEGIONELLA URINARY AG: NEGATIVE
LYMPHOCYTES ABSOLUTE: 0.2 K/CU MM
LYMPHOCYTES RELATIVE PERCENT: 1.8 % (ref 24–44)
MCH RBC QN AUTO: 30.6 PG (ref 27–31)
MCHC RBC AUTO-ENTMCNC: 31.7 % (ref 32–36)
MCV RBC AUTO: 96.5 FL (ref 78–100)
MONOCYTES ABSOLUTE: 0.1 K/CU MM
MONOCYTES RELATIVE PERCENT: 1.1 % (ref 0–4)
MYCOPLASMA PNEUMONIAE PCR: NOT DETECTED
PARAINFLUENZA 1 PCR: NOT DETECTED
PARAINFLUENZA 2 PCR: NOT DETECTED
PARAINFLUENZA 3 PCR: NOT DETECTED
PARAINFLUENZA 4 PCR: NOT DETECTED
PDW BLD-RTO: 12.8 % (ref 11.7–14.9)
PLATELET # BLD: 309 K/CU MM (ref 140–440)
PMV BLD AUTO: 9.1 FL (ref 7.5–11.1)
POTASSIUM SERPL-SCNC: 4.3 MMOL/L (ref 3.5–5.1)
PROCALCITONIN: 0.23
RBC # BLD: 3.99 M/CU MM (ref 4.6–6.2)
RHINOVIRUS ENTEROVIRUS PCR: NOT DETECTED
RSV PCR: NOT DETECTED
SARS-COV-2: NOT DETECTED
SEGMENTED NEUTROPHILS ABSOLUTE COUNT: 12.7 K/CU MM
SEGMENTED NEUTROPHILS RELATIVE PERCENT: 96.5 % (ref 36–66)
SODIUM BLD-SCNC: 134 MMOL/L (ref 135–145)
STREP PNEUMONIAE ANTIGEN: NORMAL
TOTAL IMMATURE NEUTOROPHIL: 0.06 K/CU MM
WBC # BLD: 13.1 K/CU MM (ref 4–10.5)

## 2021-11-12 PROCEDURE — 84145 PROCALCITONIN (PCT): CPT

## 2021-11-12 PROCEDURE — 6370000000 HC RX 637 (ALT 250 FOR IP): Performed by: NURSE PRACTITIONER

## 2021-11-12 PROCEDURE — 36415 COLL VENOUS BLD VENIPUNCTURE: CPT

## 2021-11-12 PROCEDURE — 6360000002 HC RX W HCPCS: Performed by: NURSE PRACTITIONER

## 2021-11-12 PROCEDURE — 2140000000 HC CCU INTERMEDIATE R&B

## 2021-11-12 PROCEDURE — 2580000003 HC RX 258: Performed by: NURSE PRACTITIONER

## 2021-11-12 PROCEDURE — 80048 BASIC METABOLIC PNL TOTAL CA: CPT

## 2021-11-12 PROCEDURE — 2580000003 HC RX 258: Performed by: INTERNAL MEDICINE

## 2021-11-12 PROCEDURE — 93010 ELECTROCARDIOGRAM REPORT: CPT | Performed by: INTERNAL MEDICINE

## 2021-11-12 PROCEDURE — 6370000000 HC RX 637 (ALT 250 FOR IP): Performed by: INTERNAL MEDICINE

## 2021-11-12 PROCEDURE — 94761 N-INVAS EAR/PLS OXIMETRY MLT: CPT

## 2021-11-12 PROCEDURE — 94640 AIRWAY INHALATION TREATMENT: CPT

## 2021-11-12 PROCEDURE — 85025 COMPLETE CBC W/AUTO DIFF WBC: CPT

## 2021-11-12 PROCEDURE — 2700000000 HC OXYGEN THERAPY PER DAY

## 2021-11-12 RX ORDER — LORAZEPAM 1 MG/1
2 TABLET ORAL
Status: DISCONTINUED | OUTPATIENT
Start: 2021-11-12 | End: 2021-11-13 | Stop reason: HOSPADM

## 2021-11-12 RX ORDER — LORAZEPAM 2 MG/ML
1 INJECTION INTRAMUSCULAR
Status: DISCONTINUED | OUTPATIENT
Start: 2021-11-12 | End: 2021-11-13 | Stop reason: HOSPADM

## 2021-11-12 RX ORDER — IPRATROPIUM BROMIDE AND ALBUTEROL SULFATE 2.5; .5 MG/3ML; MG/3ML
1 SOLUTION RESPIRATORY (INHALATION)
Status: DISCONTINUED | OUTPATIENT
Start: 2021-11-12 | End: 2021-11-13 | Stop reason: HOSPADM

## 2021-11-12 RX ORDER — GAUZE BANDAGE 2" X 2"
100 BANDAGE TOPICAL DAILY
Status: DISCONTINUED | OUTPATIENT
Start: 2021-11-12 | End: 2021-11-13 | Stop reason: HOSPADM

## 2021-11-12 RX ORDER — SODIUM CHLORIDE 9 MG/ML
500 INJECTION, SOLUTION INTRAVENOUS PRN
Status: DISCONTINUED | OUTPATIENT
Start: 2021-11-12 | End: 2021-11-13 | Stop reason: HOSPADM

## 2021-11-12 RX ORDER — LORAZEPAM 2 MG/ML
2 INJECTION INTRAMUSCULAR
Status: DISCONTINUED | OUTPATIENT
Start: 2021-11-12 | End: 2021-11-13 | Stop reason: HOSPADM

## 2021-11-12 RX ORDER — LORAZEPAM 2 MG/ML
4 INJECTION INTRAMUSCULAR
Status: DISCONTINUED | OUTPATIENT
Start: 2021-11-12 | End: 2021-11-13 | Stop reason: HOSPADM

## 2021-11-12 RX ORDER — LORAZEPAM 1 MG/1
1 TABLET ORAL
Status: DISCONTINUED | OUTPATIENT
Start: 2021-11-12 | End: 2021-11-13 | Stop reason: HOSPADM

## 2021-11-12 RX ORDER — LORAZEPAM 1 MG/1
3 TABLET ORAL
Status: DISCONTINUED | OUTPATIENT
Start: 2021-11-12 | End: 2021-11-13 | Stop reason: HOSPADM

## 2021-11-12 RX ORDER — IPRATROPIUM BROMIDE AND ALBUTEROL SULFATE 2.5; .5 MG/3ML; MG/3ML
1 SOLUTION RESPIRATORY (INHALATION) EVERY 4 HOURS PRN
Status: DISCONTINUED | OUTPATIENT
Start: 2021-11-12 | End: 2021-11-12

## 2021-11-12 RX ORDER — LORAZEPAM 1 MG/1
4 TABLET ORAL
Status: DISCONTINUED | OUTPATIENT
Start: 2021-11-12 | End: 2021-11-13 | Stop reason: HOSPADM

## 2021-11-12 RX ORDER — SODIUM CHLORIDE 0.9 % (FLUSH) 0.9 %
5-40 SYRINGE (ML) INJECTION PRN
Status: DISCONTINUED | OUTPATIENT
Start: 2021-11-12 | End: 2021-11-13 | Stop reason: HOSPADM

## 2021-11-12 RX ORDER — LORAZEPAM 2 MG/ML
3 INJECTION INTRAMUSCULAR
Status: DISCONTINUED | OUTPATIENT
Start: 2021-11-12 | End: 2021-11-13 | Stop reason: HOSPADM

## 2021-11-12 RX ORDER — SODIUM CHLORIDE 0.9 % (FLUSH) 0.9 %
5-40 SYRINGE (ML) INJECTION EVERY 12 HOURS SCHEDULED
Status: DISCONTINUED | OUTPATIENT
Start: 2021-11-12 | End: 2021-11-13 | Stop reason: HOSPADM

## 2021-11-12 RX ADMIN — IPRATROPIUM BROMIDE AND ALBUTEROL SULFATE 1 AMPULE: .5; 3 SOLUTION RESPIRATORY (INHALATION) at 05:40

## 2021-11-12 RX ADMIN — IPRATROPIUM BROMIDE AND ALBUTEROL SULFATE 1 AMPULE: 2.5; .5 SOLUTION RESPIRATORY (INHALATION) at 18:50

## 2021-11-12 RX ADMIN — BUDESONIDE AND FORMOTEROL FUMARATE DIHYDRATE 1 PUFF: 160; 4.5 AEROSOL RESPIRATORY (INHALATION) at 09:10

## 2021-11-12 RX ADMIN — CEFTRIAXONE SODIUM 1000 MG: 1 INJECTION, POWDER, FOR SOLUTION INTRAMUSCULAR; INTRAVENOUS at 21:12

## 2021-11-12 RX ADMIN — IPRATROPIUM BROMIDE AND ALBUTEROL SULFATE 1 AMPULE: .5; 3 SOLUTION RESPIRATORY (INHALATION) at 13:30

## 2021-11-12 RX ADMIN — THIAMINE HCL TAB 100 MG 100 MG: 100 TAB at 13:26

## 2021-11-12 RX ADMIN — Medication 10 ML: at 08:59

## 2021-11-12 RX ADMIN — TRAZODONE HYDROCHLORIDE 100 MG: 100 TABLET ORAL at 01:14

## 2021-11-12 RX ADMIN — METHYLPREDNISOLONE SODIUM SUCCINATE 40 MG: 40 INJECTION, POWDER, FOR SOLUTION INTRAMUSCULAR; INTRAVENOUS at 13:27

## 2021-11-12 RX ADMIN — OXYCODONE 5 MG: 5 TABLET ORAL at 08:58

## 2021-11-12 RX ADMIN — METHYLPREDNISOLONE SODIUM SUCCINATE 40 MG: 40 INJECTION, POWDER, FOR SOLUTION INTRAMUSCULAR; INTRAVENOUS at 06:32

## 2021-11-12 RX ADMIN — METHYLPREDNISOLONE SODIUM SUCCINATE 40 MG: 40 INJECTION, POWDER, FOR SOLUTION INTRAMUSCULAR; INTRAVENOUS at 01:14

## 2021-11-12 RX ADMIN — OXYCODONE 5 MG: 5 TABLET ORAL at 14:51

## 2021-11-12 RX ADMIN — SODIUM CHLORIDE 25 ML: 9 INJECTION, SOLUTION INTRAVENOUS at 21:12

## 2021-11-12 RX ADMIN — IPRATROPIUM BROMIDE AND ALBUTEROL SULFATE 1 AMPULE: .5; 3 SOLUTION RESPIRATORY (INHALATION) at 09:10

## 2021-11-12 RX ADMIN — OXYCODONE 5 MG: 5 TABLET ORAL at 21:10

## 2021-11-12 RX ADMIN — Medication 10 ML: at 13:27

## 2021-11-12 RX ADMIN — AZITHROMYCIN DIHYDRATE 500 MG: 250 TABLET, FILM COATED ORAL at 21:10

## 2021-11-12 RX ADMIN — ALBUTEROL SULFATE 1 PUFF: 108 INHALANT RESPIRATORY (INHALATION) at 00:49

## 2021-11-12 RX ADMIN — OXYCODONE 5 MG: 5 TABLET ORAL at 01:14

## 2021-11-12 RX ADMIN — MULTIPLE VITAMINS W/ MINERALS TAB 1 TABLET: TAB at 08:58

## 2021-11-12 RX ADMIN — METHYLPREDNISOLONE SODIUM SUCCINATE 40 MG: 40 INJECTION, POWDER, FOR SOLUTION INTRAMUSCULAR; INTRAVENOUS at 17:58

## 2021-11-12 ASSESSMENT — PAIN DESCRIPTION - FREQUENCY
FREQUENCY: CONTINUOUS

## 2021-11-12 ASSESSMENT — PAIN SCALES - GENERAL
PAINLEVEL_OUTOF10: 6
PAINLEVEL_OUTOF10: 8
PAINLEVEL_OUTOF10: 0
PAINLEVEL_OUTOF10: 6
PAINLEVEL_OUTOF10: 7
PAINLEVEL_OUTOF10: 8
PAINLEVEL_OUTOF10: 0
PAINLEVEL_OUTOF10: 7
PAINLEVEL_OUTOF10: 6
PAINLEVEL_OUTOF10: 0
PAINLEVEL_OUTOF10: 7

## 2021-11-12 ASSESSMENT — PAIN DESCRIPTION - PAIN TYPE
TYPE: CHRONIC PAIN

## 2021-11-12 ASSESSMENT — PAIN DESCRIPTION - PROGRESSION
CLINICAL_PROGRESSION: GRADUALLY IMPROVING
CLINICAL_PROGRESSION: GRADUALLY WORSENING
CLINICAL_PROGRESSION: GRADUALLY WORSENING
CLINICAL_PROGRESSION: GRADUALLY IMPROVING

## 2021-11-12 ASSESSMENT — PAIN DESCRIPTION - ORIENTATION
ORIENTATION: MID

## 2021-11-12 ASSESSMENT — PAIN DESCRIPTION - LOCATION
LOCATION: NECK

## 2021-11-12 ASSESSMENT — PAIN DESCRIPTION - ONSET
ONSET: ON-GOING

## 2021-11-12 ASSESSMENT — PAIN DESCRIPTION - DESCRIPTORS
DESCRIPTORS: ACHING;CONSTANT

## 2021-11-12 NOTE — PROGRESS NOTES
Comprehensive Nutrition Assessment    Type and Reason for Visit:  Initial, Positive Nutrition Screen    Nutrition Recommendations/Plan: Start high calorie supplement 3 times daily    Nutrition Assessment:  Pt admitted with Ex of COPD, pneumonia, hx of ETOH abuse with thiamine started, started oral supplement due to BMI 19.93, Unable to obtain hx due to SOB    Malnutrition Assessment:  Malnutrition Status:  Severe malnutrition    Context:  Acute Illness     Findings of the 6 clinical characteristics of malnutrition:  Energy Intake:  Unable to assess  Weight Loss:  No significant weight loss     Body Fat Loss:  7 - Moderate body fat loss Orbital   Muscle Mass Loss:  7 - Moderate muscle mass loss Temples (temporalis)  Fluid Accumulation:  Unable to assess     Strength:  Not Performed    Estimated Daily Nutrient Needs:  Energy (kcal):  5935-3862; Weight Used for Energy Requirements:  Ideal     Protein (g):  65-77; Weight Used for Protein Requirements:  Ideal (1-1.2)        Fluid (ml/day):  3032-4128; Method Used for Fluid Requirements:  ml/Kg (30-35)      Nutrition Related Findings:  labs reviewed with glucose 257 on steroids      Wounds:  None       Current Nutrition Therapies:    ADULT DIET;  Regular  ADULT ORAL NUTRITION SUPPLEMENT; Breakfast, Lunch, Dinner; Standard High Calorie/High Protein Oral Supplement    Anthropometric Measures:  · Height: 5' 6\" (167.6 cm)  · Current Body Weight: 104 lb (47.2 kg)   · Admission Body Weight: 104 lb (47.2 kg)    · Usual Body Weight: 111 lb (50.3 kg) (Feb 2021 prior encounter)     · Ideal Body Weight: 142 lbs; % Ideal Body Weight 73.2 %   · BMI: 16.8  · Adjusted Body Weight:  ; No Adjustment   · BMI Categories:    underweight     Nutrition Diagnosis:   · Predicted inadequate energy intake, In context of acute illness or injury, Increased nutrient needs, Severe malnutrition, Underweight related to acute injury/trauma, impaired respiratory function as evidenced by BMI, severe loss of subcutaneous fat, severe muscle loss      Nutrition Interventions:   Food and/or Nutrient Delivery:  Continue Current Diet, Start Oral Nutrition Supplement  Nutrition Education/Counseling:  Education not indicated   Coordination of Nutrition Care:  Continue to monitor while inpatient    Goals:  Oral intakes of meals and supplement will be at least 75% during his stay       Nutrition Monitoring and Evaluation:   Behavioral-Environmental Outcomes:  None Identified   Food/Nutrient Intake Outcomes:  Food and Nutrient Intake, Supplement Intake  Physical Signs/Symptoms Outcomes:  Biochemical Data, Weight, Meal Time Behavior     Discharge Planning:    Continue Oral Nutrition Supplement     Electronically signed by Mandie Saldana RD, LD on 11/12/21 at 1:11 PM EST    Contact: [phone]

## 2021-11-12 NOTE — ED TRIAGE NOTES
Pt states that he hasn't been able to catch his breath since 1230am today. He also has sharp chest pain that is constant. Hurts worse when he breathes in. Wears 2L home oxygen.

## 2021-11-12 NOTE — PROGRESS NOTES
Hospitalist Progress Note      Name:  Brittni Lopez /Age/Sex: 1955  (61 y.o. male)   MRN & CSN:  [id number] & [id number] Admission Date/Time: 2021  7:46 PM   Location:   PCP: Zhang Kent MD         Hospital Day: 2    Assessment and Plan:     COPD Exacerbation  -At baseline on 2 L nasal cannula currently on 3  -Continue Symbicort and rescue inhaler as needed  -We will continue steroid therapy  -Anticipate hopeful discharge tomorrow        Pneumonia  -We will continue ceftriaxone and azithromycin  -Trend pro-Jesus and CRP  -If trending down and patient is doing clinically well tomorrow we will consider discharge     Nicotine abuse   -Pack per day smoker  -Nicotine patch 14 mg/24hrs     Cervical radiculopathy   -S/P cervical discectomy and fusion with plate and screw. On 16   -Patient is going to pain clinic Dr. Juana Morales.  [facility] pain clinic    -On oxycodone 5mg every 6hrs     Alcohol Abuse  -Is a daily drinker  -Started on thiamine  -CIWA protocol     Adjustment Insomnia  -Patient on 100 mg Trazodone  nightly    Diet ADULT DIET; Regular   DVT Prophylaxis [] Lovenox, []  Heparin, [] SCDs, [] Ambulation   GI Prophylaxis [] PPI,  [] H2 Blocker,  [] Carafate,  [] Diet/Tube Feeds   Code Status Full Code   Disposition Patient requires continued admission due to    MDM [] Low, [] Moderate,[]  High  Patient's risk as above due to      History of Present Illness:     Patient resting comfortably this morning on 3 L nasal cannula; states his breathing is significantly improved compared to yesterday; no chest pain or fevers or chills overnight    Objective: Intake/Output Summary (Last 24 hours) at 2021 1036  Last data filed at 2021 0859  Gross per 24 hour   Intake 161.68 ml   Output --   Net 161.68 ml      Vitals:   Vitals:    21 0910   BP:    Pulse:    Resp:    Temp:    SpO2: 94%     Physical Exam:   GEN Awake male, sitting upright in bed in no apparent distress. Appears given age. EYES Pupils are equally round. No scleral erythema, discharge, or conjunctivitis. NECK Supple, no apparent thyromegaly or masses. RESP Expiratory wheezing bilaterally on 3L NC  CARDIO/VASC S1/S2 auscultated. Regular rate without appreciable murmurs, rubs, or gallops. GI Abdomen is soft without significant tenderness  HEME/LYMPHNo petechiae or ecchymoses. MSK No gross joint deformities. SKIN Normal coloration, warm, dry. NEURO Cranial nerves appear grossly intact, normal speech  PSYCH Awake, alert, oriented x 4. Affect appropriate.     Medications:   Medications:    therapeutic multivitamin-minerals  1 tablet Oral Daily    budesonide-formoterol  1 puff Inhalation Daily    traZODone  100 mg Oral Nightly    sodium chloride flush  5-40 mL IntraVENous 2 times per day    enoxaparin  40 mg SubCUTAneous Daily    nicotine  1 patch TransDERmal Daily    methylPREDNISolone  40 mg IntraVENous Q6H    Followed by   Anne Nielsen ON 11/13/2021] predniSONE  40 mg Oral Daily    cefTRIAXone (ROCEPHIN) IV  1,000 mg IntraVENous Q24H    And    azithromycin  500 mg Oral Q24H    influenza virus vaccine  0.5 mL IntraMUSCular Prior to discharge      Infusions:    sodium chloride       PRN Meds: ipratropium-albuterol, 1 ampule, Q4H PRN  oxyCODONE, 5 mg, Q6H PRN  sodium chloride flush, 5-40 mL, PRN  sodium chloride, 25 mL, PRN  ondansetron, 4 mg, Q8H PRN   Or  ondansetron, 4 mg, Q6H PRN  polyethylene glycol, 17 g, Daily PRN  acetaminophen, 650 mg, Q6H PRN   Or  acetaminophen, 650 mg, Q6H PRN  ondansetron, 4 mg, Q8H PRN   Or  ondansetron, 4 mg, Q6H PRN        Electronically signed by Venu Almonte MD on 11/12/2021 at 10:36 AM

## 2021-11-12 NOTE — ED PROVIDER NOTES
The history is provided by the patient. Shortness of Breath  Severity:  Severe  Onset quality:  Gradual  Duration:  2 days  Timing:  Constant  Progression:  Worsening  Chronicity:  Recurrent  Context: URI    Context comment:  He has been trying to avoid coming to ER but he just cant get his breathing to \"calm ' down  Relieved by:  Nothing  Worsened by:  Deep breathing, activity, coughing, movement, exertion, stress and weather changes  Ineffective treatments:  Rest, oxygen and inhaler  Associated symptoms: cough, diaphoresis, fever and wheezing    Associated symptoms: no chest pain and no sore throat        Review of Systems   Constitutional: Positive for diaphoresis and fever. HENT: Negative. Negative for sore throat. Eyes: Negative. Respiratory: Positive for cough, shortness of breath and wheezing. Cardiovascular: Negative. Negative for chest pain. Gastrointestinal: Negative. Genitourinary: Negative. Musculoskeletal: Negative. Skin: Negative. Neurological: Negative. All other systems reviewed and are negative. Family History   Problem Relation Age of Onset   Camille Maxwell Cancer Mother         lung ca    Heart Disease Father     Heart Attack Father     Diabetes Father     Colon Cancer Neg Hx      Social History     Socioeconomic History    Marital status: Single     Spouse name: Not on file    Number of children: Not on file    Years of education: Not on file    Highest education level: Not on file   Occupational History    Not on file   Tobacco Use    Smoking status: Current Every Day Smoker     Packs/day: 1.50     Years: 50.00     Pack years: 75.00     Types: Cigarettes    Smokeless tobacco: Never Used    Tobacco comment: smokes 1-2 ppd   Vaping Use    Vaping Use: Former    Substances: Occasionally   Substance and Sexual Activity    Alcohol use: Yes     Alcohol/week: 6.0 standard drinks     Types: 6 Cans of beer per week     Comment: 3 beers daily    Drug use:  Yes Types: Marijuana (Weed)     Comment: 1-2 times per week marijuana    Sexual activity: Not Currently   Other Topics Concern    Not on file   Social History Narrative    Not on file     Social Determinants of Health     Financial Resource Strain:     Difficulty of Paying Living Expenses: Not on file   Food Insecurity:     Worried About Running Out of Food in the Last Year: Not on file    Dorene of Food in the Last Year: Not on file   Transportation Needs:     Lack of Transportation (Medical): Not on file    Lack of Transportation (Non-Medical): Not on file   Physical Activity:     Days of Exercise per Week: Not on file    Minutes of Exercise per Session: Not on file   Stress:     Feeling of Stress : Not on file   Social Connections:     Frequency of Communication with Friends and Family: Not on file    Frequency of Social Gatherings with Friends and Family: Not on file    Attends Roman [employer] Services: Not on file    Active Member of [address] [place] [address] or Organizations: Not on file    Attends Club or Organization Meetings: Not on file    Marital Status: Not on file   Intimate Partner Violence:     Fear of Current or Ex-Partner: Not on file    Emotionally Abused: Not on file    Physically Abused: Not on file    Sexually Abused: Not on file   Housing Stability:     Unable to Pay for Housing in the Last Year: Not on file    Number of [place] in the Last Year: Not on file    Unstable Housing in the Last Year: Not on file     Past Surgical History:   Procedure Laterality Date    CERVICAL SPINE SURGERY  2015    \"have plate in neck done at [address] [place] [address]      9- Dr Dawn Suggs.  f/u 3 years    COLONOSCOPY  10/04/2018    COLONOSCOPY N/A 10/4/2018    COLONOSCOPY POLYPECTOMY SNARE/COLD BIOPSY performed by Kia Ross MD at [facility] in 5 years   [facility] POLYPECTOMY  10/04/2018    2 Polyps Removed    SIGMOIDOSCOPY      UPPER GASTROINTESTINAL ENDOSCOPY  10/04/2018    with Biopsy    UPPER GASTROINTESTINAL ENDOSCOPY N/A 10/4/2018    EGD BIOPSY performed by Kimberly Fried MD at [facility] ENDOSCOPY     Past Medical History:   Diagnosis Date    Arthritis     \"in my back and neck\"    Bacterial pneumonia 6/10/2016    Cervical abnormality     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 On 7/22/16. Dr Lexus Arriaga at [facility] 1-    Dr. Viola Veliz COPD (chronic obstructive pulmonary disease) ([facility] [address] 5/19/2014    PFTs showed severe COPD in 5/14- follows with Dr Davis Reasons COPD exacerbation [facility]) 6/10/2016    Diverticulosis 9-    Dupuytren's contracture 5/26/2017    Right palm    Elevated PSA 12/21/2018 12/2018 : PSA is 6    H/O colonoscopy 9-18-14    Dr. Viola Veliz H/O echocardiogram 08/17/2016    EF 55-60%. Abnormal diastolic function. Mild TR. Borderline right atrium and RV dilatation.  Hiatal hernia     History of alcohol abuse     \"average 6 pk beer per day\"    History of drug abuse ([facility] [address]     does use marijuana\"last used cocaine over 10 yrs ago\" per pt on 10/3/2018    Internal hemorrhoids 9-    Intervertebral disc protrusion 5/19/2014    Cervical region on MRI    Lingular pneumonia 4/3/2018    Lymphocytic colitis 9-18-14    Dr. Osei Wolf, will treat if symptomatic    On home oxygen therapy     2l/nc at night only    Pneumonia of left lower lobe due to infectious organism 11/27/2018    Sepsis due to pneumonia ([facility] [address] 6/10/2016    Spinal stenosis in cervical region 5/19/2014    MRI showed  Disc protrusion. Severe spinal stenosis at C3-C4    Spinal stenosis of cervical region 3/14/2016    S/P cervical discectomy and fusion with plate and screw. On 7/22/16. Dr Lexus Arriaga at [address] [facility] abuse disorder 5/19/2014    Wears glasses     to read     No Known Allergies  Prior to Admission medications    Medication Sig Start Date End Date Taking?  Authorizing Provider ipratropium-albuterol (DUONEB) 0.5-2.5 (3) MG/3ML SOLN nebulizer solution INHALE THE CONTENTS OF 1 VIAL VIA NEBULIZER FOUR TIMES DAILY 9/27/21   Lanie Lainez MD   traZODone (DESYREL) 100 MG tablet TAKE 1 TABLET BY MOUTH NIGHTLY 9/8/21   Lanie Lainez MD   SYMBICORT 160-4.5 MCG/ACT AERO INHALE 2 PUFFS TWICE DAILY 8/31/21   Amanda Green MD   predniSONE (DELTASONE) 5 MG tablet TAKE 1 TABLET EVERY DAY 8/31/21   Amanda Green MD   Turmeric 500 MG CAPS Take 1 capsule by mouth daily    Historical Provider, MD   Cholecalciferol (VITAMIN D3) 1.25 MG (18777 UT) CAPS Take by mouth daily     Historical Provider, MD   albuterol sulfate  (90 Base) MCG/ACT inhaler INHALE 2 PUFFS EVERY 6 HOURS AS NEEDED FOR WHEEZING 11/18/20   Lanie Lainez MD   oxyCODONE (ROXICODONE) 5 MG immediate release tablet Take 5 mg by mouth every 6 hours as needed for Pain. Historical Provider, MD   OXYGEN Inhale 2 L into the lungs continuous     Historical Provider, MD   Multiple Vitamins-Minerals (MULTIVITAMIN ADULTS PO) Take by mouth daily     Historical Provider, MD       BP 97/65   Pulse 92   Temp 97.7 °F (36.5 °C)   Resp 27   SpO2 92%     Physical Exam  Vitals and nursing note reviewed. Constitutional:       General: He is not in acute distress. Appearance: He is ill-appearing. HENT:      Head: Normocephalic. Mouth/Throat:      Pharynx: Oropharynx is clear. Eyes:      Pupils: Pupils are equal, round, and reactive to light. Cardiovascular:      Rate and Rhythm: Tachycardia present. Pulmonary:      Effort: Tachypnea present. No accessory muscle usage or respiratory distress. Breath sounds: Decreased breath sounds and wheezing present. Musculoskeletal:      Right lower leg: No tenderness. Left lower leg: No tenderness. Skin:     Capillary Refill: Capillary refill takes 2 to 3 seconds. Coloration: Skin is pale. Neurological:      General: No focal deficit present.       Mental Status: He is oriented to person, place, and time. MDM:    Labs Reviewed   CBC WITH AUTO DIFFERENTIAL - Abnormal; Notable for the following components:       Result Value    WBC 26.0 (*)     RBC 4.43 (*)     MCH 31.6 (*)     Segs Relative 87.4 (*)     Lymphocytes % 5.1 (*)     Monocytes % 6.8 (*)     Immature Neutrophil % 0.5 (*)     All other components within normal limits   BASIC METABOLIC PANEL - Abnormal; Notable for the following components:    Chloride 94 (*)     CO2 34 (*)     CREATININE 0.5 (*)     Glucose 112 (*)     All other components within normal limits   CULTURE, BLOOD 1   CULTURE, BLOOD 2   COVID-19, RAPID   STREP PNEUMONIAE ANTIGEN   LEGIONELLA ANTIGEN, URINE   RESPIRATORY PANEL, MOLECULAR, WITH COVID-19   TROPONIN   LACTIC ACID, PLASMA   C-REACTIVE PROTEIN       XR CHEST PORTABLE   Final Result   Right upper lobe pneumonia. EKG shows no acute ST or T wave changes ICRBBB    RUL pneumonia is COPD patient with acute exacerbation. He was given duonebs and steroids in the ER. In addition, rocephin and zithromax was given for his CAP. He is oxygen dependant and is at current baseline oxygen requirement. His comorbid conditions and end stage COPD I have elected for him to be admitted  My typical dicussion, presentation, and considerations for this patients' chief complaint, diagnosis, differential diagnosis, medications, medication use,  medication safety and medication interactions have been explained and outlined to this patient for this patient encounter. I have stressed need for follow up and reexamination for this encounter and I have contacted hospitalist for admission       Final Impression    1. COPD with acute exacerbation ([facility] [address]    2.  Pneumonia of right upper lobe due to infectious organism              John Ordaz DO  11/11/21 7988

## 2021-11-12 NOTE — PROGRESS NOTES
Home medications verified with patient.   Alert and oriented, was able to verify when medications were last taken and dosages

## 2021-11-12 NOTE — PLAN OF CARE
Patient speaks with Dr. Damrais Rocha, this RN and  about discharge plan.   Problem: Discharge Planning:  Goal: Participates in care planning  Description: Participates in care planning  11/12/2021 1401 by Zackary Granger RN  Outcome: Ongoing  11/12/2021 0010 by Eleanor Delgado RN  Outcome: Ongoing

## 2021-11-12 NOTE — PLAN OF CARE
Problem: Discharge Planning:  Goal: Discharged to appropriate level of care  Description: Discharged to appropriate level of care  Outcome: Ongoing  Goal: Participates in care planning  Description: Participates in care planning  Outcome: Ongoing     Problem: Gas Exchange - Impaired:  Goal: Levels of oxygenation will improve  Description: Levels of oxygenation will improve  Outcome: Ongoing     Problem: Airway Clearance - Ineffective:  Goal: Clear lung sounds  Description: Clear lung sounds  Outcome: Ongoing  Goal: Ability to maintain a clear airway will improve  Description: Ability to maintain a clear airway will improve  Outcome: Ongoing     Problem: Fluid Volume - Deficit:  Goal: Achieves intake and output within specified parameters  Description: Achieves intake and output within specified parameters  Outcome: Ongoing     Problem: Hyperthermia:  Goal: Ability to maintain a body temperature in the normal range will improve  Description: Ability to maintain a body temperature in the normal range will improve  Outcome: Ongoing     Problem: Tobacco Use:  Goal: Will participate in inpatient tobacco-use cessation counseling  Description: Will participate in inpatient tobacco-use cessation counseling  Outcome: Ongoing

## 2021-11-12 NOTE — CARE COORDINATION
.Chart reviewed. The patient from home alone and plans to return at discharge. The patient states that the primary support system is a friend Inessa Cervantes. The patient has a PCP and insurance and can drive to doctor's appointments without issues. The patient can afford and take his medications as prescribed. He states he has a cane he can use as needed but does not usually use any DME. The patient is independent in ADL's. The patient is on oxygen 2 liters all the time. The patient plans to discharge back home.

## 2021-11-12 NOTE — H&P
History and Physical      Name:  Matti Gusman /Age/Sex: 1955  (61 y.o. male)   MRN & CSN:  [id number] & [id number] Admission Date/Time: 2021    Location:   PCP: Malena Dominguez MD         History of present illness     Chief Complaint:  Information was provided by patient . He stated having  Shortness of Breath for the past 4 day. Getting worse today hence he had to come to ER. Matti Gusman is a 61 y.o.  male  With medical history that include COPD, nicotine abuse, depression, alcohol abuse , cervical radiculopathy  And weight loss. who presents with complaint of shortness of breath, fatigue, fever  and cough. According to patient these started 4 days ago. He tried home management which did not help. Patient is on 2L of oxygen at home. This has to be increased to 4L in ER to bring his oxygen saturation in the 90's. Initial evaluation at the ER, indicated that patient also has right upper lobe Pneumonia. He is been admitted for further evaluation and managemnt of patient Pneumonia and COPD exacerbation. Assessment and Plan:     # COPD Exacerbation    Cough, shortness of breath, fatigue and wheezing     Oxygen supplement, breathing treatment (albuterol/Duoneb)    Steroid, home medication    GI and DVT prophylaxis      # Pneumonia    Cough, fever. Fatigue,    leucocytosis WBC26.0 High K/CU MM   Chest X-ray Impression:  Right upper lobe pneumonia    Culture, Strep, pneumo, legionella antigen,CRP   Rocephin/Azithro, fluid, ,     # Nicotine abuse      1 [place] a day smoker    Encouraged to quit, education provided   Nicotine patch 14 mg/24hrs    # cervical radiculopathy   S/P cervical discectomy and fusion with plate and screw. On 16   Patient is going to pain clinic Dr. Nicole Franco. [facility] pain clinic    on oxycodone 5mg every 6hrs    # Alcohol Abuse   patient daily drinker    endorsed 2 beers today.     Encouraged to decrease  Alcohol intake     # Adjustment Insomnia     Patient on 100 mg Trazodone  nightly      Medications:   Medications:    cefTRIAXone (ROCEPHIN) IV  1,000 mg IntraVENous Once    azithromycin  500 mg IntraVENous Once      Infusions:    sodium chloride       PRN Meds:      Current Facility-Administered Medications:     cefTRIAXone (ROCEPHIN) 1000 mg IVPB in 50 mL D5W minibag, 1,000 mg, IntraVENous, Once, ADRIANA Rivera, DO    azithromycin (ZITHROMAX) 500 mg in dextrose 5 % 250 mL IVPB, 500 mg, IntraVENous, Once, ADRIANA Rivera, DO    0.9 % sodium chloride infusion, , IntraVENous, Continuous, Gene Sebastian, APRN - CNP    Current Outpatient Medications:     ipratropium-albuterol (DUONEB) 0.5-2.5 (3) MG/3ML SOLN nebulizer solution, INHALE THE CONTENTS OF 1 VIAL VIA NEBULIZER FOUR TIMES DAILY, Disp: 1080 mL, Rfl: 1    traZODone (DESYREL) 100 MG tablet, TAKE 1 TABLET BY MOUTH NIGHTLY, Disp: 30 tablet, Rfl: 3    SYMBICORT 160-4.5 MCG/ACT AERO, INHALE 2 PUFFS TWICE DAILY, Disp: 3 Inhaler, Rfl: 3    predniSONE (DELTASONE) 5 MG tablet, TAKE 1 TABLET EVERY DAY, Disp: 90 tablet, Rfl: 3    Turmeric 500 MG CAPS, Take 1 capsule by mouth daily, Disp: , Rfl:     Cholecalciferol (VITAMIN D3) 1.25 MG (58840 UT) CAPS, Take by mouth daily , Disp: , Rfl:     albuterol sulfate  (90 Base) MCG/ACT inhaler, INHALE 2 PUFFS EVERY 6 HOURS AS NEEDED FOR WHEEZING, Disp: 54 g, Rfl: 5    oxyCODONE (ROXICODONE) 5 MG immediate release tablet, Take 5 mg by mouth every 6 hours as needed for Pain., Disp: , Rfl:     OXYGEN, Inhale 2 L into the lungs continuous , Disp: , Rfl:     Multiple Vitamins-Minerals (MULTIVITAMIN ADULTS PO), Take by mouth daily , Disp: , Rfl:      Review of Systems     GENERAL:  Endorsed  fever, chills, denies  night sweats, or changes in weight. EYES:  Denies recent visual changes. ENT:  Denies ear pain, hearing loss or tinnitus  RESP:  Positive for  cough, dyspnea,  And  wheezing.   CV:  Denies any chest pain with exertion or at rest, palpitations, syncope, GI:  Denies any dysphagia, nausea, vomiting, abdominal pain, heartburn, changes in bowel habit,   MUSCULOSKELETAL:  Denies any joint swelling, joint pain, or loss of range of motion.   NEURO:  Denies any headaches, tremors, dizziness, vertigo, memory loss, confusion,   PSYCH:  Endorsed  sleeping problems,   HEME/LYMPHATIC/IMMUNO:  Denies , bruising,   ENDO:  Denies any heat or cold intolerance,    Objective:     No intake or output data in the 24 hours ending 11/11/21 2211     Vitals:   Vitals:    11/11/21 2130   BP: 116/77   Pulse: 94   Resp: (!) 31   Temp:    SpO2: 96%       Recent Results (from the past 24 hour(s))   CBC Auto Differential    Collection Time: 11/11/21  8:15 PM   Result Value Ref Range    WBC 26.0 (H) 4.0 - 10.5 K/CU MM    RBC 4.43 (L) 4.6 - 6.2 M/CU MM    Hemoglobin 14.0 13.5 - 18.0 GM/DL    Hematocrit 42.9 42 - 52 %    MCV 96.8 78 - 100 FL    MCH 31.6 (H) 27 - 31 PG    MCHC 32.6 32.0 - 36.0 %    RDW 12.8 11.7 - 14.9 %    Platelets 977 701 - 354 K/CU MM    MPV 8.8 7.5 - 11.1 FL    Differential Type AUTOMATED DIFFERENTIAL     Segs Relative 87.4 (H) 36 - 66 %    Lymphocytes % 5.1 (L) 24 - 44 %    Monocytes % 6.8 (H) 0 - 4 %    Eosinophils % 0.0 0 - 3 %    Basophils % 0.2 0 - 1 %    Segs Absolute 22.7 K/CU MM    Lymphocytes Absolute 1.3 K/CU MM    Monocytes Absolute 1.8 K/CU MM    Eosinophils Absolute 0.0 K/CU MM    Basophils Absolute 0.1 K/CU MM    Immature Neutrophil % 0.5 (H) 0 - 0.43 %    Total Immature Neutrophil 0.14 K/CU MM   Basic Metabolic Panel    Collection Time: 11/11/21  8:15 PM   Result Value Ref Range    Sodium 136 135 - 145 MMOL/L    Potassium 4.0 3.5 - 5.1 MMOL/L    Chloride 94 (L) 99 - 110 mMol/L    CO2 34 (H) 21 - 32 MMOL/L    Anion Gap 8 4 - 16    BUN 8 6 - 23 MG/DL    CREATININE 0.5 (L) 0.9 - 1.3 MG/DL    Glucose 112 (H) 70 - 99 MG/DL    Calcium 9.1 8.3 - 10.6 MG/DL    GFR Non-African American >60 >60 mL/min/1.73m2    GFR African American >60 >60 mL/min/1.73m2   Troponin Collection Time: 11/11/21  8:15 PM   Result Value Ref Range    Troponin T <0.010 <0.01 NG/ML       Physical Exam:     GEN: Awake male, sitting upright in Bed in no  Acute  distress. Appears given age. EYES: Pupils are equally round. No scleral erythema, discharge, or conjunctivitis. HENT: Mucous membranes are moist. External ears and nose appear normal.   NECK: Supple, no apparent thyromegaly or masses. RESP:  Symmetric chest movement while on 4L oxygen  No  Acute Respiratory Distress. CARDIO/VASC: S1 S2,  Regular rate and rhythm  No lower extremity edema   GI: Abdomen non distended non tender active bowl sound  Rectal exam deferred. :  Banuelos catheter is not present. MSK: No gross joint deformities. SKIN: Normal coloration, warm, dry. NEURO: Cranial nerves appear grossly intact, normal speech, no lateralizing weakness. PSYCH: Awake, alert, oriented x 4. Affect appropriate. 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 On 7/22/16. Dr Marnie Watson at [address] [place] [address] polyps 8-    Dr. Guzmán Males COPD (chronic obstructive pulmonary disease) ([facility] [address] 5/19/2014    PFTs showed severe COPD in 5/14- follows with Dr Roxie Milton COPD exacerbation [facility]) 6/10/2016    Diverticulosis 9-    Dupuytren's contracture 5/26/2017    Right palm    Elevated PSA 12/21/2018 12/2018 : PSA is 6    H/O colonoscopy 9-18-14    Dr. Guzmán Males H/O echocardiogram 08/17/2016    EF 55-60%. Abnormal diastolic function. Mild TR. Borderline right atrium and RV dilatation.      Hiatal hernia     History of alcohol abuse     \"average 6 pk beer per day\"    History of drug abuse ([facility] [address]     does use marijuana\"last used cocaine over 10 yrs ago\" per pt on 10/3/2018   Wendy Bahena Internal hemorrhoids 9-    Intervertebral disc protrusion 5/19/2014    Cervical region on MRI    Lingular pneumonia 4/3/2018    Lymphocytic colitis 9-18-14    Dr. Chava Loera, will treat if symptomatic    On home oxygen therapy     2l/nc at night only    Pneumonia of left lower lobe due to infectious organism 11/27/2018    Sepsis due to pneumonia ([facility] [address] 6/10/2016    Spinal stenosis in cervical region 5/19/2014    MRI showed  Disc protrusion. Severe spinal stenosis at C3-C4    Spinal stenosis of cervical region 3/14/2016    S/P cervical discectomy and fusion with plate and screw. On 7/22/16. Dr Lisy Gong at [address] abuse disorder 5/19/2014    Wears glasses     to read     PSHX:  has a past surgical history that includes Sigmoidoscopy; Dental surgery (1974); Cervical spine surgery (2015); Colonoscopy; Upper gastrointestinal endoscopy (10/04/2018); Colonoscopy (10/04/2018); polypectomy (10/04/2018); Upper gastrointestinal endoscopy (N/A, 10/4/2018); and Colonoscopy (N/A, 10/4/2018). Allergies: No Known Allergies    FAM HX: family history includes Cancer in his mother; Diabetes in his father; Heart Attack in his father; Heart Disease in his father. Soc HX:   Social History     Socioeconomic History    Marital status: Single     Spouse name: Not on file    Number of children: Not on file    Years of education: Not on file    Highest education level: Not on file   Occupational History    Not on file   Tobacco Use    Smoking status: Current Every Day Smoker     Packs/day: 1.50     Years: 50.00     Pack years: 75.00     Types: Cigarettes    Smokeless tobacco: Never Used    Tobacco comment: smokes 1-2 ppd   Vaping Use    Vaping Use: Former    Substances: Occasionally   Substance and Sexual Activity    Alcohol use:  Yes     Alcohol/week: 6.0 standard drinks     Types: 6 Cans of beer per week     Comment: 3 beers daily    Drug use: Yes     Types: Marijuana Elisabeth Guillermo     Comment: 1-2 times per week marijuana  Sexual activity: Not Currently   Other Topics Concern    Not on file   Social History Narrative    Not on file     Social Determinants of Health     Financial Resource Strain:     Difficulty of Paying Living Expenses: Not on file   Food Insecurity:     Worried About Running Out of Food in the Last Year: Not on file    Dorene of Food in the Last Year: Not on file   Transportation Needs:     Lack of Transportation (Medical): Not on file    Lack of Transportation (Non-Medical):  Not on file   Physical Activity:     Days of Exercise per Week: Not on file    Minutes of Exercise per Session: Not on file   Stress:     Feeling of Stress : Not on file   Social Connections:     Frequency of Communication with Friends and Family: Not on file    Frequency of Social Gatherings with Friends and Family: Not on file    Attends Sabianism Services: Not on file    Active Member of [address] [place] [address] [employer] or Organizations: Not on file    Attends Club or Organization Meetings: Not on file    Marital Status: Not on file   Intimate Partner Violence:     Fear of Current or Ex-Partner: Not on file    Emotionally Abused: Not on file    Physically Abused: Not on file    Sexually Abused: Not on file   Housing Stability:     Unable to Pay for Housing in the Last Year: Not on file    Number of [place] in the Last Year: Not on file    Unstable Housing in the Last Year: Not on file       Electronically signed by HEMALATHA Bar CNP on 11/11/2021 at 10:11 PM

## 2021-11-13 VITALS
HEART RATE: 77 BPM | OXYGEN SATURATION: 97 % | RESPIRATION RATE: 16 BRPM | DIASTOLIC BLOOD PRESSURE: 52 MMHG | BODY MASS INDEX: 16.86 KG/M2 | SYSTOLIC BLOOD PRESSURE: 95 MMHG | TEMPERATURE: 96.8 F | HEIGHT: 66 IN | WEIGHT: 104.9 LBS

## 2021-11-13 PROBLEM — J44.1 COPD EXACERBATION (HCC): Status: RESOLVED | Noted: 2021-11-11 | Resolved: 2021-11-13

## 2021-11-13 LAB
ANION GAP SERPL CALCULATED.3IONS-SCNC: 9 MMOL/L (ref 4–16)
BASOPHILS ABSOLUTE: 0 K/CU MM
BASOPHILS RELATIVE PERCENT: 0.1 % (ref 0–1)
BUN BLDV-MCNC: 10 MG/DL (ref 6–23)
CALCIUM SERPL-MCNC: 9 MG/DL (ref 8.3–10.6)
CHLORIDE BLD-SCNC: 99 MMOL/L (ref 99–110)
CO2: 30 MMOL/L (ref 21–32)
CREAT SERPL-MCNC: 0.5 MG/DL (ref 0.9–1.3)
DIFFERENTIAL TYPE: ABNORMAL
EOSINOPHILS ABSOLUTE: 0 K/CU MM
EOSINOPHILS RELATIVE PERCENT: 0 % (ref 0–3)
GFR AFRICAN AMERICAN: >60 ML/MIN/1.73M2
GFR NON-AFRICAN AMERICAN: >60 ML/MIN/1.73M2
GLUCOSE BLD-MCNC: 181 MG/DL (ref 70–99)
HCT VFR BLD CALC: 37.5 % (ref 42–52)
HEMOGLOBIN: 12 GM/DL (ref 13.5–18)
HIGH SENSITIVE C-REACTIVE PROTEIN: 95.3 MG/L
IMMATURE NEUTROPHIL %: 0.9 % (ref 0–0.43)
LYMPHOCYTES ABSOLUTE: 0.5 K/CU MM
LYMPHOCYTES RELATIVE PERCENT: 2.8 % (ref 24–44)
MCH RBC QN AUTO: 31.1 PG (ref 27–31)
MCHC RBC AUTO-ENTMCNC: 32 % (ref 32–36)
MCV RBC AUTO: 97.2 FL (ref 78–100)
MONOCYTES ABSOLUTE: 0.8 K/CU MM
MONOCYTES RELATIVE PERCENT: 4.6 % (ref 0–4)
PDW BLD-RTO: 12.8 % (ref 11.7–14.9)
PLATELET # BLD: 318 K/CU MM (ref 140–440)
PMV BLD AUTO: 9.4 FL (ref 7.5–11.1)
POTASSIUM SERPL-SCNC: 4.6 MMOL/L (ref 3.5–5.1)
PROCALCITONIN: 0.14
RBC # BLD: 3.86 M/CU MM (ref 4.6–6.2)
SEGMENTED NEUTROPHILS ABSOLUTE COUNT: 15.2 K/CU MM
SEGMENTED NEUTROPHILS RELATIVE PERCENT: 91.6 % (ref 36–66)
SODIUM BLD-SCNC: 138 MMOL/L (ref 135–145)
TOTAL IMMATURE NEUTOROPHIL: 0.15 K/CU MM
WBC # BLD: 16.6 K/CU MM (ref 4–10.5)

## 2021-11-13 PROCEDURE — 6370000000 HC RX 637 (ALT 250 FOR IP): Performed by: INTERNAL MEDICINE

## 2021-11-13 PROCEDURE — 6370000000 HC RX 637 (ALT 250 FOR IP): Performed by: NURSE PRACTITIONER

## 2021-11-13 PROCEDURE — 2700000000 HC OXYGEN THERAPY PER DAY

## 2021-11-13 PROCEDURE — 85025 COMPLETE CBC W/AUTO DIFF WBC: CPT

## 2021-11-13 PROCEDURE — 86141 C-REACTIVE PROTEIN HS: CPT

## 2021-11-13 PROCEDURE — 84145 PROCALCITONIN (PCT): CPT

## 2021-11-13 PROCEDURE — 6360000002 HC RX W HCPCS: Performed by: NURSE PRACTITIONER

## 2021-11-13 PROCEDURE — 94640 AIRWAY INHALATION TREATMENT: CPT

## 2021-11-13 PROCEDURE — 90686 IIV4 VACC NO PRSV 0.5 ML IM: CPT | Performed by: NURSE PRACTITIONER

## 2021-11-13 PROCEDURE — 94761 N-INVAS EAR/PLS OXIMETRY MLT: CPT

## 2021-11-13 PROCEDURE — 80048 BASIC METABOLIC PNL TOTAL CA: CPT

## 2021-11-13 PROCEDURE — 36415 COLL VENOUS BLD VENIPUNCTURE: CPT

## 2021-11-13 PROCEDURE — 2580000003 HC RX 258: Performed by: INTERNAL MEDICINE

## 2021-11-13 PROCEDURE — G0008 ADMIN INFLUENZA VIRUS VAC: HCPCS | Performed by: NURSE PRACTITIONER

## 2021-11-13 RX ORDER — PREDNISONE 20 MG/1
TABLET ORAL
Qty: 20 TABLET | Refills: 0 | Status: SHIPPED | OUTPATIENT
Start: 2021-11-13 | End: 2021-11-24

## 2021-11-13 RX ORDER — LEVOFLOXACIN 750 MG/1
750 TABLET ORAL DAILY
Qty: 7 TABLET | Refills: 0 | Status: SHIPPED | OUTPATIENT
Start: 2021-11-13 | End: 2021-11-18

## 2021-11-13 RX ORDER — PREDNISONE 1 MG/1
5 TABLET ORAL DAILY
Qty: 90 TABLET | Refills: 3
Start: 2021-11-24 | End: 2022-06-27

## 2021-11-13 RX ADMIN — OXYCODONE 5 MG: 5 TABLET ORAL at 03:03

## 2021-11-13 RX ADMIN — INFLUENZA A VIRUS A/VICTORIA/2570/2019 IVR-215 (H1N1) ANTIGEN (PROPIOLACTONE INACTIVATED), INFLUENZA A VIRUS A/CAMBODIA/E0826360/2020 IVR-224 (H3N2) ANTIGEN (PROPIOLACTONE INACTIVATED), INFLUENZA B VIRUS B/VICTORIA/705/2018 BVR-11 ANTIGEN (PROPIOLACTONE INACTIVATED), INFLUENZA B VIRUS B/PHUKET/3073/2013 BVR-1B ANTIGEN (PROPIOLACTONE INACTIVATED) 0.5 ML: 15; 15; 15; 15 INJECTION, SUSPENSION INTRAMUSCULAR at 09:35

## 2021-11-13 RX ADMIN — PREDNISONE 40 MG: 20 TABLET ORAL at 09:06

## 2021-11-13 RX ADMIN — MULTIPLE VITAMINS W/ MINERALS TAB 1 TABLET: TAB at 09:05

## 2021-11-13 RX ADMIN — THIAMINE HCL TAB 100 MG 100 MG: 100 TAB at 09:06

## 2021-11-13 RX ADMIN — IPRATROPIUM BROMIDE AND ALBUTEROL SULFATE 1 AMPULE: 2.5; .5 SOLUTION RESPIRATORY (INHALATION) at 04:20

## 2021-11-13 RX ADMIN — OXYCODONE 5 MG: 5 TABLET ORAL at 09:04

## 2021-11-13 RX ADMIN — BUDESONIDE AND FORMOTEROL FUMARATE DIHYDRATE 1 PUFF: 160; 4.5 AEROSOL RESPIRATORY (INHALATION) at 07:34

## 2021-11-13 RX ADMIN — Medication 10 ML: at 09:06

## 2021-11-13 RX ADMIN — IPRATROPIUM BROMIDE AND ALBUTEROL SULFATE 1 AMPULE: 2.5; .5 SOLUTION RESPIRATORY (INHALATION) at 07:36

## 2021-11-13 ASSESSMENT — PAIN DESCRIPTION - PROGRESSION
CLINICAL_PROGRESSION: GRADUALLY IMPROVING
CLINICAL_PROGRESSION: GRADUALLY WORSENING
CLINICAL_PROGRESSION: GRADUALLY IMPROVING

## 2021-11-13 ASSESSMENT — PAIN DESCRIPTION - ONSET: ONSET: ON-GOING

## 2021-11-13 ASSESSMENT — PAIN DESCRIPTION - PAIN TYPE: TYPE: CHRONIC PAIN

## 2021-11-13 ASSESSMENT — PAIN SCALES - GENERAL
PAINLEVEL_OUTOF10: 6
PAINLEVEL_OUTOF10: 7
PAINLEVEL_OUTOF10: 5
PAINLEVEL_OUTOF10: 5

## 2021-11-13 ASSESSMENT — PAIN DESCRIPTION - ORIENTATION: ORIENTATION: MID

## 2021-11-13 ASSESSMENT — PAIN DESCRIPTION - LOCATION: LOCATION: NECK

## 2021-11-13 ASSESSMENT — PAIN DESCRIPTION - FREQUENCY: FREQUENCY: CONTINUOUS

## 2021-11-13 ASSESSMENT — PAIN - FUNCTIONAL ASSESSMENT: PAIN_FUNCTIONAL_ASSESSMENT: PREVENTS OR INTERFERES SOME ACTIVE ACTIVITIES AND ADLS

## 2021-11-13 ASSESSMENT — PAIN DESCRIPTION - DESCRIPTORS: DESCRIPTORS: ACHING;CONSTANT

## 2021-11-13 NOTE — PLAN OF CARE
Problem: Discharge Planning:  Goal: Discharged to appropriate level of care  Description: Discharged to appropriate level of care  11/13/2021 0057 by Chris Cheng LPN  Outcome: Ongoing  11/12/2021 1401 by Henry Valenzuela RN  Outcome: Ongoing  Goal: Participates in care planning  Description: Participates in care planning  11/13/2021 0057 by Chris Cheng LPN  Outcome: Ongoing  11/12/2021 1401 by Henry Valenzuela RN  Outcome: Ongoing     Problem: Gas Exchange - Impaired:  Goal: Levels of oxygenation will improve  Description: Levels of oxygenation will improve  11/13/2021 0057 by Chris Cheng LPN  Outcome: Ongoing  11/12/2021 1401 by Henry Valenzuela RN  Outcome: Ongoing     Problem: Airway Clearance - Ineffective:  Goal: Clear lung sounds  Description: Clear lung sounds  11/13/2021 0057 by Chris Cheng LPN  Outcome: Ongoing  11/12/2021 1401 by Henry Valenzuela RN  Outcome: Ongoing  Goal: Ability to maintain a clear airway will improve  Description: Ability to maintain a clear airway will improve  11/13/2021 0057 by Chris Cheng LPN  Outcome: Ongoing  11/12/2021 1401 by Henry Valenzuela RN  Outcome: Ongoing     Problem: Fluid Volume - Deficit:  Goal: Achieves intake and output within specified parameters  Description: Achieves intake and output within specified parameters  11/13/2021 0057 by Chris Cheng LPN  Outcome: Ongoing  11/12/2021 1401 by Henry Valenzuela RN  Outcome: Ongoing     Problem: Hyperthermia:  Goal: Ability to maintain a body temperature in the normal range will improve  Description: Ability to maintain a body temperature in the normal range will improve  11/13/2021 0057 by Chris Cheng LPN  Outcome: Ongoing  11/12/2021 1401 by Henry Valenzuela RN  Outcome: Ongoing     Problem: Tobacco Use:  Goal: Will participate in inpatient tobacco-use cessation counseling  Description: Will participate in inpatient tobacco-use cessation counseling  11/13/2021 0057 by Chris Cheng LPN  Outcome: Ongoing  11/12/2021 1401 by Joann Miller RN  Outcome: Ongoing

## 2021-11-13 NOTE — DISCHARGE SUMMARY
Discharge Summary    Name:  Darshan Jackson /Age/Sex: 1955  (61 y.o. male)   MRN & CSN:  [id number] & [id number] Admission Date/Time: 2021  7:46 PM   Attending:  Kimo Torres MD Discharging Physician: Kimo Torres MD     Hospital Course:   53-year-old male that presented with acute on chronic hypoxemic respiratory failure secondary to COPD exacerbation underlying bacterial pneumonia. Patient was started on IV antibiotics as well as scheduled breathing treatments and steroid therapy and progressed very well. Patient was only hospitalized for 2 days and on the second day patient was back at his baseline oxygen levels and doing very well. Patient stated that he was ready to go home. Patient was discharged in stable condition with a prednisone taper as well as continued antibiotic therapy. It appears the patient is on chronic 5 mg of prednisone daily and per discharge instructions is to go on a steroid taper and then continue to 5 mg of prednisone daily afterwards with close follow-up with his PCP. COPD Exacerbation  -On baseline 2 L nasal cannula at home  -Discharged with prednisone taper afterwards can continue his daily prednisone  -Follow-up with his PCP who is also his lung doctor     Pneumonia  -Pro-Jesus and CRP trended down as well as his oxygen demand  -Discharged with continue oral antibiotic therapy     Nicotine abuse   -Encourage cessation with PCP     Cervical radiculopathy   -S/P cervical discectomy and fusion with plate and screw. On 16   -Patient is going to pain clinic Dr. Rosa Pizarro.  [facility] pain clinic    -On oxycodone 5mg every 6hrs, continue outpatient pain regimen     Alcohol Abuse  -Encourage cessation as outpatient with PCP     Adjustment Insomnia  -Patient on 100 mg Trazodone  nightly      The patient expressed appropriate understanding of and agreement with the discharge recommendations, medications, and plan.      Consults this admission:  None    Discharge 100 MG tablet  Commonly known as: DESYREL  TAKE 1 TABLET BY MOUTH NIGHTLY     turmeric 500 MG Caps     Vitamin D3 1.25 MG (34226 UT) Caps           Where to Get Your Medications      These medications were sent to [facility] [address]  [address] [id number]    Phone: [phone]   · levoFLOXacin 750 MG tablet  · predniSONE 20 MG tablet     Information about where to get these medications is not yet available    Ask your nurse or doctor about these medications  · predniSONE 5 MG tablet         Objective Findings at Discharge:   BP (!) 95/52   Pulse 77   Temp 96.8 °F (36 °C)   Resp 16   Ht 5' 6\" (1.676 m)   Wt 104 lb 14.4 oz (47.6 kg)   SpO2 97%   BMI 16.93 kg/m²      Physical Exam:        GEN    Awake male, sitting upright in bed in no apparent distress. Appears given age. EYES   Pupils are equally round. No scleral erythema, discharge, or conjunctivitis. NECK  Supple, no apparent thyromegaly or masses. RESP  Mild expiratory wheezing on 2L NC  CARDIO/VASC           S1/S2 auscultated. Regular rate without appreciable murmurs, rubs, or gallops. GI        Abdomen is soft without significant tenderness  HEME/LYMPHNo petechiae or ecchymoses. MSK    No gross joint deformities. SKIN    Normal coloration, warm, dry. NEURO           Cranial nerves appear grossly intact, normal speech  PSYCH            Awake, alert, oriented x 4. Affect appropriate. BMP/CBC  Recent Labs     11/11/21 2015 11/12/21  0700 11/13/21  0600    134* 138   K 4.0 4.3 4.6   CL 94* 95* 99   CO2 34* 34* 30   BUN 8 12 10   CREATININE 0.5* 0.5* 0.5*   WBC 26.0* 13.1* 16.6*   HCT 42.9 38.5* 37.5*    309 318       IMAGING:  FINDINGS:   The cardiomediastinal silhouette is normal in size and contour.    Atherosclerotic calcifications of the aortic arch.  Right upper lobe airspace   disease.  No pleural effusion or pneumothorax.  No evidence of acute osseous abnormality.        Discharge Time of 35 minutes    Electronically signed by Venu Almonte MD on 11/13/2021 at 10:05 AM

## 2021-11-17 LAB
CULTURE: NORMAL
Lab: NORMAL
SPECIMEN: NORMAL

## 2021-11-18 ENCOUNTER — OFFICE VISIT (OUTPATIENT)
Dept: INTERNAL MEDICINE CLINIC | Age: 66
End: 2021-11-18
Payer: MEDICARE

## 2021-11-18 VITALS
BODY MASS INDEX: 16.4 KG/M2 | SYSTOLIC BLOOD PRESSURE: 110 MMHG | HEART RATE: 80 BPM | DIASTOLIC BLOOD PRESSURE: 60 MMHG | TEMPERATURE: 98.2 F | OXYGEN SATURATION: 98 % | WEIGHT: 101.6 LBS | RESPIRATION RATE: 20 BRPM

## 2021-11-18 DIAGNOSIS — J18.9 PNEUMONIA OF RIGHT UPPER LOBE DUE TO INFECTIOUS ORGANISM: Primary | ICD-10-CM

## 2021-11-18 LAB
CULTURE: ABNORMAL
CULTURE: ABNORMAL
Lab: ABNORMAL
SPECIMEN: ABNORMAL

## 2021-11-18 PROCEDURE — G8419 CALC BMI OUT NRM PARAM NOF/U: HCPCS | Performed by: INTERNAL MEDICINE

## 2021-11-18 PROCEDURE — G8482 FLU IMMUNIZE ORDER/ADMIN: HCPCS | Performed by: INTERNAL MEDICINE

## 2021-11-18 PROCEDURE — 4004F PT TOBACCO SCREEN RCVD TLK: CPT | Performed by: INTERNAL MEDICINE

## 2021-11-18 PROCEDURE — 3017F COLORECTAL CA SCREEN DOC REV: CPT | Performed by: INTERNAL MEDICINE

## 2021-11-18 PROCEDURE — G8427 DOCREV CUR MEDS BY ELIG CLIN: HCPCS | Performed by: INTERNAL MEDICINE

## 2021-11-18 PROCEDURE — 99214 OFFICE O/P EST MOD 30 MIN: CPT | Performed by: INTERNAL MEDICINE

## 2021-11-18 PROCEDURE — 1111F DSCHRG MED/CURRENT MED MERGE: CPT | Performed by: INTERNAL MEDICINE

## 2021-11-18 ASSESSMENT — PATIENT HEALTH QUESTIONNAIRE - PHQ9
SUM OF ALL RESPONSES TO PHQ9 QUESTIONS 1 & 2: 1
SUM OF ALL RESPONSES TO PHQ QUESTIONS 1-9: 1
1. LITTLE INTEREST OR PLEASURE IN DOING THINGS: 0
SUM OF ALL RESPONSES TO PHQ QUESTIONS 1-9: 1
2. FEELING DOWN, DEPRESSED OR HOPELESS: 1
SUM OF ALL RESPONSES TO PHQ QUESTIONS 1-9: 1

## 2021-11-18 NOTE — PROGRESS NOTES
Reinier Kaur  Patient's  is 1955  Seen in office on 2021      SUBJECTIVE:  Cassidy crowder 61 y. o.year old male presents today   Chief Complaint   Patient presents with    Follow[facility] from [facility]  [facility]AD     Discharged from [facility] 2021 resp failure secondary to copd    Shortness of Breath     still on oxygen at 2 liters per NC    Other     forces himself to eat    Wheezing    Discuss Medications     booster, for [employer] covid vaccine     Patient was admitted again with exacerbation of the COPD and was treated aggressively. Patient had acute on chronic hypoxic respiratory failure secondary to COPD exacerbation and underlying pneumonia. Patient was treated with IV antibiotics and steroid treatments. Patient improved and was discharged home. Patient was discharged on oxygen 2 L/min. And on prednisone on a tapering dose. Patient denies any chest pain. The shortness of breath has improved. No cough or sputum production. Patient denies any abdominal pain. No nausea, vomiting or diarrhea. Taking medications regularly. No side effects noted. Review of Systems  Review of system normal except as in HPI  OBJECTIVE: /60   Pulse 80   Temp 98.2 °F (36.8 °C)   Resp 20   Wt 101 lb 9.6 oz (46.1 kg)   SpO2 98% Comment: oxygen at 2 liters per NC  BMI 16.40 kg/m²     Wt Readings from Last 3 Encounters:   21 101 lb 9.6 oz (46.1 kg)   21 104 lb 14.4 oz (47.6 kg)   21 111 lb (50.3 kg)      Patient was seen taking COVID-19 precautions. Face mask, gloves were used. Patient also wore facemask. GENERAL: - Alert, oriented, pleasant, in no apparent distress. HEENT: - Conjunctiva pink, no scleral icterus. ENT clear. NECK: -Supple. No jugular venous distention noted. No masses felt,  CARDIOVASCULAR: - Normal S1 and S2    PULMONARY: - No respiratory distress. Poor air entry bilaterally. Patient is on oxygen.   ABDOMEN: - Soft and non-tender,no masses ororganomegaly. EXTREMITIES: - No cyanosis, clubbing, or significant edema. SKIN: Skin is warm and dry. NEUROLOGICAL: - Cranial nerves II through XII are grossly intact. IMPRESSION:    Encounter Diagnoses   Name Primary?  Pneumonia of right upper lobe due to infectious organism Yes       ASSESSMENT/PLAN:  excer copd : improving   Pneumonia : better . But not a whole better  Appetite is poor  Insomnia. Could be from steroids. Patient on trazodone. On O2 2L/m     Chest x ray RUL infiltrate. Will recheck chest x-ray    Advised patient to quit smoking    COPD. Continue inhalers and nebulizer treatments    Weight loss is stable    History of alcohol abuse. Advised patient not to drink any alcohol. Chronic respiratory failure. Patient is on oxygen    Depression table patient is on trazodone. Orders Placed This Encounter   Procedures    XR CHEST (2 VW)         Mediations reviewed with the patient. Continue current medications. Appropriate prescriptions are addressed. After visit summeryprovided. Follow up as directed sooner if needed. Questions answered and patient verbalizes understanding. Call for any problems, questions, or concerns. No Known Allergies  Current Outpatient Medications   Medication Sig Dispense Refill    predniSONE (DELTASONE) 20 MG tablet Take 2 tablets by mouth daily for 2 days, THEN 1.5 tablets daily for 3 days, THEN 1 tablet daily for 3 days, THEN 0.5 tablets daily for 3 days.  20 tablet 0    [START ON 11/24/2021] predniSONE (DELTASONE) 5 MG tablet Take 1 tablet by mouth daily 90 tablet 3    levoFLOXacin (LEVAQUIN) 750 MG tablet Take 1 tablet by mouth daily for 5 days 7 tablet 0    ipratropium-albuterol (DUONEB) 0.5-2.5 (3) MG/3ML SOLN nebulizer solution INHALE THE CONTENTS OF 1 VIAL VIA NEBULIZER FOUR TIMES DAILY 1080 mL 1    traZODone (DESYREL) 100 MG tablet TAKE 1 TABLET BY MOUTH NIGHTLY (Patient taking differently: Take 100 mg by mouth nightly as needed ) 30 tablet 3    SYMBICORT 160-4.5 MCG/ACT AERO INHALE 2 PUFFS TWICE DAILY 3 Inhaler 3    Turmeric 500 MG CAPS Take 1 capsule by mouth daily      Cholecalciferol (VITAMIN D3) 1.25 MG (73082 UT) CAPS Take 1 capsule by mouth daily       albuterol sulfate  (90 Base) MCG/ACT inhaler INHALE 2 PUFFS EVERY 6 HOURS AS NEEDED FOR WHEEZING 54 g 5    oxyCODONE (ROXICODONE) 5 MG immediate release tablet Take 5 mg by mouth every 6 hours as needed for Pain.  OXYGEN Inhale 2 L into the lungs continuous       Multiple Vitamins-Minerals (MULTIVITAMIN ADULTS PO) Take by mouth daily        No current facility-administered medications for this visit. 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 On 7/22/16. Dr Evaristo Lei at [address] [place] [address] 5-    Dr. Sruthi Paniagua COPD [facility]chronic obstructive [facility]) [facility] 5/19/2014    PFTs showed severe COPD in 5/14- follows with Dr Justino Shepard COPD exacerbation [facility]) 6/10/2016    Diverticulosis 9-    Dupuytren's contracture 5/26/2017    Right palm    Elevated PSA 12/21/2018 12/2018 : PSA is 6    H/O colonoscopy 9-18-14    Dr. Sruthi Paniagua H/O echocardiogram 08/17/2016    EF 55-60%. Abnormal diastolic function. Mild TR. Borderline right atrium and RV dilatation.      Hiatal hernia     History of alcohol abuse     \"average 6 pk beer per day\"    History of drug abuse ([facility] [address]     does use marijuana\"last used cocaine over 10 yrs ago\" per pt on 10/3/2018    Internal hemorrhoids 9-    Intervertebral disc protrusion 5/19/2014    Cervical region on MRI    Lingular pneumonia 4/3/2018    Lymphocytic colitis 9-18-14    Dr. Suzan Fitzpatrick, will treat if symptomatic    On home oxygen therapy     2l/nc at night only    Pneumonia of left lower lobe due to infectious organism 11/27/2018    Sepsis due to pneumonia ([address] 6/10/2016    Spinal stenosis in cervical region 5/19/2014    MRI showed  Disc protrusion. Severe spinal stenosis at C3-C4    Spinal stenosis of cervical region 3/14/2016    S/P cervical discectomy and fusion with plate and screw. On 7/22/16. Dr Sonya Hampton [facility] [address] [facility] [place] [facility] 5/19/2014    Wears glasses     to read     Past Surgical History:   Procedure Laterality Date    CERVICAL SPINE SURGERY  2015    \"have plate in neck done at [address]      9- Dr Lizzy Goldsmith. f/u 3 years    COLONOSCOPY  10/04/2018    COLONOSCOPY N/A 10/4/2018    COLONOSCOPY POLYPECTOMY SNARE/COLD BIOPSY performed by Roxie Bautista MD at [facility] in 5 years   [facility] POLYPECTOMY  10/04/2018    2 Polyps Removed    SIGMOIDOSCOPY      UPPER GASTROINTESTINAL ENDOSCOPY  10/04/2018    with Biopsy    UPPER GASTROINTESTINAL ENDOSCOPY N/A 10/4/2018    EGD BIOPSY performed by Roxie Bautista MD at [facility] ENDOSCOPY     Social History     Tobacco Use    Smoking status: Current Every Day Smoker     Packs/day: 1.50     Years: 50.00     Pack years: 75.00     Types: Cigarettes    Smokeless tobacco: Never Used    Tobacco comment: smokes 1-2 ppd   Substance Use Topics    Alcohol use:  Yes     Alcohol/week: 6.0 standard drinks     Types: 6 Cans of beer per week     Comment: 3 beers daily       LAB REVIEW:  CBC:   Lab Results   Component Value Date    WBC 16.6 11/13/2021    HGB 12.0 11/13/2021    HCT 37.5 11/13/2021     11/13/2021     Lipids:   Lab Results   Component Value Date    HDL 90 03/09/2021    LDLDIRECT 88 03/09/2021    TRIGLYCFAST 46 03/09/2021    CHOLFAST 186 03/09/2021     Renal:   Lab Results   Component Value Date    BUN 10 11/13/2021    CREATININE 0.5 11/13/2021     11/13/2021    K 4.6 11/13/2021    ALT 15 09/14/2021    AST 20 09/14/2021    GLUCOSE 181 11/13/2021    GLUF 123 09/14/2021     PT/INR: No results found for: INR  A1C:   Lab Results   Component Value Date    LABA1C 5.1 08/07/2020           Crystal Duque MD, 11/18/2021 , 11:57 AM

## 2021-11-26 ENCOUNTER — HOSPITAL ENCOUNTER (OUTPATIENT)
Age: 66
Discharge: HOME | End: 2021-11-26
Payer: MEDICARE

## 2021-11-26 ENCOUNTER — HOSPITAL ENCOUNTER (OUTPATIENT)
Dept: GENERAL RADIOLOGY | Age: 66
Discharge: HOME | End: 2021-11-26
Payer: MEDICARE

## 2021-11-26 DIAGNOSIS — J18.9 PNEUMONIA OF RIGHT UPPER LOBE DUE TO INFECTIOUS ORGANISM: ICD-10-CM

## 2021-11-26 PROCEDURE — 71046 X-RAY EXAM CHEST 2 VIEWS: CPT

## 2021-11-29 ENCOUNTER — OFFICE VISIT (OUTPATIENT)
Dept: INTERNAL MEDICINE CLINIC | Age: 66
End: 2021-11-29
Payer: MEDICARE

## 2021-11-29 VITALS
RESPIRATION RATE: 20 BRPM | DIASTOLIC BLOOD PRESSURE: 70 MMHG | TEMPERATURE: 98.4 F | HEART RATE: 100 BPM | BODY MASS INDEX: 17.27 KG/M2 | WEIGHT: 107 LBS | SYSTOLIC BLOOD PRESSURE: 128 MMHG | OXYGEN SATURATION: 95 %

## 2021-11-29 DIAGNOSIS — J18.9 PNEUMONIA OF RIGHT UPPER LOBE DUE TO INFECTIOUS ORGANISM: Primary | ICD-10-CM

## 2021-11-29 DIAGNOSIS — J44.9 COPD, SEVERE (HCC): ICD-10-CM

## 2021-11-29 PROCEDURE — G8482 FLU IMMUNIZE ORDER/ADMIN: HCPCS | Performed by: INTERNAL MEDICINE

## 2021-11-29 PROCEDURE — G8926 SPIRO NO PERF OR DOC: HCPCS | Performed by: INTERNAL MEDICINE

## 2021-11-29 PROCEDURE — G8427 DOCREV CUR MEDS BY ELIG CLIN: HCPCS | Performed by: INTERNAL MEDICINE

## 2021-11-29 PROCEDURE — 3017F COLORECTAL CA SCREEN DOC REV: CPT | Performed by: INTERNAL MEDICINE

## 2021-11-29 PROCEDURE — G8419 CALC BMI OUT NRM PARAM NOF/U: HCPCS | Performed by: INTERNAL MEDICINE

## 2021-11-29 PROCEDURE — 4004F PT TOBACCO SCREEN RCVD TLK: CPT | Performed by: INTERNAL MEDICINE

## 2021-11-29 PROCEDURE — 3023F SPIROM DOC REV: CPT | Performed by: INTERNAL MEDICINE

## 2021-11-29 PROCEDURE — 1111F DSCHRG MED/CURRENT MED MERGE: CPT | Performed by: INTERNAL MEDICINE

## 2021-11-29 PROCEDURE — 99213 OFFICE O/P EST LOW 20 MIN: CPT | Performed by: INTERNAL MEDICINE

## 2021-11-29 NOTE — PROGRESS NOTES
Branden Wong  Patient's  is 1955  Seen in office on 2021      SUBJECTIVE:  Karina crowder 61 y. o.year old male presents today   Chief Complaint   Patient presents with    Follow-up     still having difficulty breathing     Patient has severe COPD and had pneumonia which was treated in the hospital and was discharged. Patient had a follow-up chest x-ray which showed clearance of the pneumonia  Patient still feels short of breath but he is still smoking half a pack per day  He is on oxygen  No chest pain. No abdominal pain. No nausea vomiting or diarrhea  Patient is here for follow-up of pneumonia  Taking medications regularly. No side effects noted. Review of Systems    OBJECTIVE: /70   Pulse 100   Temp 98.4 °F (36.9 °C) (Oral)   Resp 20   Wt 107 lb (48.5 kg)   SpO2 95% Comment: oxygen at 2 liters per NC  BMI 17.27 kg/m²     Wt Readings from Last 3 Encounters:   21 107 lb (48.5 kg)   21 101 lb 9.6 oz (46.1 kg)   21 104 lb 14.4 oz (47.6 kg)        Patient was seen taking COVID-19 precautions. Face mask, gloves were used. Patient also wore facemask. GENERAL: - Alert, oriented, pleasant, in no apparent distress. Patient is on oxygen  HEENT: - Conjunctiva pink, no scleral icterus. ENT clear. NECK: -Supple. No jugular venous distention noted. No masses felt,  CARDIOVASCULAR: - Normal S1 and S2    PULMONARY: - No respiratory distress. No wheezes or rales. Poor air entry bilaterally  ABDOMEN: - Soft and non-tender,no masses  ororganomegaly. EXTREMITIES: - No cyanosis, clubbing, or significant edema. SKIN: Skin is warm and dry. NEUROLOGICAL: - Cranial nerves II through XII are grossly intact. IMPRESSION:    Encounter Diagnoses   Name Primary?  Pneumonia of right upper lobe due to infectious organism Yes    COPD, severe ([address]        ASSESSMENT/PLAN:    .  Advised patient to quit smoking. Patient has gone to [facility] that he is already couple of times.   He has quit for a few weeks and then restarted smoking  . Pneumonia has cleared  . Severe COPD continue inhalers and oxygen    Return to office in 2 months    Mediations reviewed with the patient. Continue current medications. Appropriate prescriptions are addressed. After visit summeryprovided. Follow up as directed sooner if needed. Questions answered and patient verbalizes understanding. Call for any problems, questions, or concerns. No Known Allergies  Current Outpatient Medications   Medication Sig Dispense Refill    predniSONE (DELTASONE) 5 MG tablet Take 1 tablet by mouth daily 90 tablet 3    ipratropium-albuterol (DUONEB) 0.5-2.5 (3) MG/3ML SOLN nebulizer solution INHALE THE CONTENTS OF 1 VIAL VIA NEBULIZER FOUR TIMES DAILY 1080 mL 1    traZODone (DESYREL) 100 MG tablet TAKE 1 TABLET BY MOUTH NIGHTLY (Patient taking differently: Take 100 mg by mouth nightly as needed ) 30 tablet 3    SYMBICORT 160-4.5 MCG/ACT AERO INHALE 2 PUFFS TWICE DAILY 3 Inhaler 3    Turmeric 500 MG CAPS Take 1 capsule by mouth daily      Cholecalciferol (VITAMIN D3) 1.25 MG (88908 UT) CAPS Take 1 capsule by mouth daily       albuterol sulfate  (90 Base) MCG/ACT inhaler INHALE 2 PUFFS EVERY 6 HOURS AS NEEDED FOR WHEEZING 54 g 5    oxyCODONE (ROXICODONE) 5 MG immediate release tablet Take 5 mg by mouth every 6 hours as needed for Pain.  OXYGEN Inhale 2 L into the lungs continuous       Multiple Vitamins-Minerals (MULTIVITAMIN ADULTS PO) Take by mouth daily        No current facility-administered medications for this visit. 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 On 7/22/16.  Dr Hope Falcon at [address] 4-    Dr. Jayant Miller COPD (chronic obstructive pulmonary disease) ([facility] 5/19/2014    PFTs showed severe COPD in 5/14- follows with Dr Romero Kerr COPD exacerbation [facility]) 6/10/2016    Diverticulosis 4-    Dupuytren's contracture 5/26/2017    Right palm    Elevated PSA 12/21/2018 12/2018 : PSA is 6    H/O colonoscopy 9-18-14    Dr. Martinez Organ H/O echocardiogram 08/17/2016    EF 55-60%. Abnormal diastolic function. Mild TR. Borderline right atrium and RV dilatation.  Hiatal hernia     History of alcohol abuse     \"average 6 pk beer per day\"    History of drug abuse ([facility]     does use marijuana\"last used cocaine over 10 yrs ago\" per pt on 10/3/2018    Internal hemorrhoids 9-    Intervertebral disc protrusion 5/19/2014    Cervical region on MRI    Lingular pneumonia 4/3/2018    Lymphocytic colitis 9-18-14    Dr. Dilip Perla, will treat if symptomatic    On home oxygen therapy     2l/nc at night only    Pneumonia of left lower lobe due to infectious organism 11/27/2018    Sepsis due to pneumonia ([facility] 6/10/2016    Spinal stenosis in cervical region 5/19/2014    MRI showed  Disc protrusion. Severe spinal stenosis at C3-C4    Spinal stenosis of cervical region 3/14/2016    S/P cervical discectomy and fusion with plate and screw. On 7/22/16. [facility]    Wears glasses     to read     Past Surgical History:   Procedure Laterality Date    CERVICAL SPINE SURGERY  2015    \"have plate in neck done at [address] [facility]      9- Dr Donavan Perla.  f/u 3 years    COLONOSCOPY  10/04/2018    COLONOSCOPY N/A 10/4/2018    COLONOSCOPY POLYPECTOMY SNARE/COLD BIOPSY performed by [facility] A Lucero MD at [facility] in 5 years   [facility] POLYPECTOMY  10/04/2018    2 Polyps Removed    SIGMOIDOSCOPY      UPPER GASTROINTESTINAL ENDOSCOPY  10/04/2018    with Biopsy    UPPER GASTROINTESTINAL ENDOSCOPY N/A 10/4/2018    EGD BIOPSY performed by Maria A Lucero MD at [facility] History Tobacco Use    Smoking status: Current Every Day Smoker     Packs/day: 1.50     Years: 50.00     Pack years: 75.00     Types: Cigarettes    Smokeless tobacco: Never Used    Tobacco comment: smokes 1-2 ppd   Substance Use Topics    Alcohol use:  Yes     Alcohol/week: 6.0 standard drinks     Types: 6 Cans of beer per week     Comment: 3 beers daily       LAB REVIEW:  CBC:   Lab Results   Component Value Date    WBC 16.6 11/13/2021    HGB 12.0 11/13/2021    HCT 37.5 11/13/2021     11/13/2021     Lipids:   Lab Results   Component Value Date    HDL 90 03/09/2021    LDLDIRECT 88 03/09/2021    TRIGLYCFAST 46 03/09/2021    CHOLFAST 186 03/09/2021     Renal:   Lab Results   Component Value Date    BUN 10 11/13/2021    CREATININE 0.5 11/13/2021     11/13/2021    K 4.6 11/13/2021    ALT 15 09/14/2021    AST 20 09/14/2021    GLUCOSE 181 11/13/2021    GLUF 123 09/14/2021     PT/INR: No results found for: INR  A1C:   Lab Results   Component Value Date    LABA1C 5.1 08/07/2020           Jennifer Perdomo MD, 11/29/2021 , 3:44 PM

## 2021-11-30 RX ORDER — ALBUTEROL SULFATE 90 UG/1
AEROSOL, METERED RESPIRATORY (INHALATION)
Qty: 54 G | Refills: 5 | Status: SHIPPED | OUTPATIENT
Start: 2021-11-30

## 2021-12-03 ENCOUNTER — VIRTUAL VISIT (OUTPATIENT)
Dept: INTERNAL MEDICINE CLINIC | Age: 66
End: 2021-12-03
Payer: MEDICARE

## 2021-12-03 DIAGNOSIS — Z00.00 ROUTINE GENERAL MEDICAL EXAMINATION AT A HEALTH CARE FACILITY: Primary | ICD-10-CM

## 2021-12-03 PROCEDURE — G8482 FLU IMMUNIZE ORDER/ADMIN: HCPCS | Performed by: INTERNAL MEDICINE

## 2021-12-03 PROCEDURE — 3017F COLORECTAL CA SCREEN DOC REV: CPT | Performed by: INTERNAL MEDICINE

## 2021-12-03 PROCEDURE — G0439 PPPS, SUBSEQ VISIT: HCPCS | Performed by: INTERNAL MEDICINE

## 2021-12-03 SDOH — ECONOMIC STABILITY: FOOD INSECURITY: WITHIN THE PAST 12 MONTHS, THE FOOD YOU BOUGHT JUST DIDN'T LAST AND YOU DIDN'T HAVE MONEY TO GET MORE.: NEVER TRUE

## 2021-12-03 SDOH — ECONOMIC STABILITY: FOOD INSECURITY: WITHIN THE PAST 12 MONTHS, YOU WORRIED THAT YOUR FOOD WOULD RUN OUT BEFORE YOU GOT MONEY TO BUY MORE.: NEVER TRUE

## 2021-12-03 ASSESSMENT — LIFESTYLE VARIABLES
HOW OFTEN DO YOU HAVE SIX OR MORE DRINKS ON ONE OCCASION: 0
HOW OFTEN DURING THE LAST YEAR HAVE YOU BEEN UNABLE TO REMEMBER WHAT HAPPENED THE NIGHT BEFORE BECAUSE YOU HAD BEEN DRINKING: 0
HOW OFTEN DURING THE LAST YEAR HAVE YOU NEEDED AN ALCOHOLIC DRINK FIRST THING IN THE MORNING TO GET YOURSELF GOING AFTER A NIGHT OF HEAVY DRINKING: 0
AUDIT-C TOTAL SCORE: 4
HOW OFTEN DURING THE LAST YEAR HAVE YOU HAD A FEELING OF GUILT OR REMORSE AFTER DRINKING: 0
HAS A RELATIVE, FRIEND, DOCTOR, OR ANOTHER HEALTH PROFESSIONAL EXPRESSED CONCERN ABOUT YOUR DRINKING OR SUGGESTED YOU CUT DOWN: 0
HOW OFTEN DO YOU HAVE A DRINK CONTAINING ALCOHOL: 4
HOW OFTEN DURING THE LAST YEAR HAVE YOU FAILED TO DO WHAT WAS NORMALLY EXPECTED FROM YOU BECAUSE OF DRINKING: 0
HOW MANY STANDARD DRINKS CONTAINING ALCOHOL DO YOU HAVE ON A TYPICAL DAY: 0
AUDIT TOTAL SCORE: 4
HAVE YOU OR SOMEONE ELSE BEEN INJURED AS A RESULT OF YOUR DRINKING: 0
HOW OFTEN DURING THE LAST YEAR HAVE YOU FOUND THAT YOU WERE NOT ABLE TO STOP DRINKING ONCE YOU HAD STARTED: 0

## 2021-12-03 ASSESSMENT — PATIENT HEALTH QUESTIONNAIRE - PHQ9
1. LITTLE INTEREST OR PLEASURE IN DOING THINGS: 0
SUM OF ALL RESPONSES TO PHQ QUESTIONS 1-9: 1
SUM OF ALL RESPONSES TO PHQ QUESTIONS 1-9: 1
1. LITTLE INTEREST OR PLEASURE IN DOING THINGS: 0
SUM OF ALL RESPONSES TO PHQ QUESTIONS 1-9: 0
2. FEELING DOWN, DEPRESSED OR HOPELESS: 1
SUM OF ALL RESPONSES TO PHQ QUESTIONS 1-9: 1
SUM OF ALL RESPONSES TO PHQ QUESTIONS 1-9: 0
SUM OF ALL RESPONSES TO PHQ9 QUESTIONS 1 & 2: 1
SUM OF ALL RESPONSES TO PHQ QUESTIONS 1-9: 0

## 2021-12-03 ASSESSMENT — SOCIAL DETERMINANTS OF HEALTH (SDOH): HOW HARD IS IT FOR YOU TO PAY FOR THE VERY BASICS LIKE FOOD, HOUSING, MEDICAL CARE, AND HEATING?: NOT HARD AT ALL

## 2021-12-03 NOTE — PATIENT INSTRUCTIONS
Personalized Preventive Plan for Ponciano Heimlich - 12/3/2021  Medicare offers a range of preventive health benefits. Some of the tests and screenings are paid in full while other may be subject to a deductible, co-insurance, and/or copay. Some of these benefits include a comprehensive review of your medical history including lifestyle, illnesses that may run in your family, and various assessments and screenings as appropriate. After reviewing your medical record and screening and assessments performed today your provider may have ordered immunizations, labs, imaging, and/or referrals for you. A list of these orders (if applicable) as well as your Preventive Care list are included within your After Visit Summary for your review. Other Preventive Recommendations:    · A preventive eye exam performed by an eye specialist is recommended every 1-2 years to screen for glaucoma; cataracts, macular degeneration, and other eye disorders. · A preventive dental visit is recommended every 6 months. · Try to get at least 150 minutes of exercise per week or 10,000 steps per day on a pedometer . · Order or download the FREE \"Exercise & Physical Activity: Your Everyday Guide\" from [employer]. Call 3-346.551.8149 or search [employer] online. · You need 6988-9400 mg of calcium and 3625-4891 IU of vitamin D per day. It is possible to meet your calcium requirement with diet alone, but a vitamin D supplement is usually necessary to meet this goal.  · When exposed to the sun, use a sunscreen that protects against both UVA and UVB radiation with an SPF of 30 or greater. Reapply every 2 to 3 hours or after sweating, drying off with a towel, or swimming. · Always wear a seat belt when traveling in a car. Always wear a helmet when riding a bicycle or motorcycle.

## 2022-02-08 ENCOUNTER — OFFICE VISIT (OUTPATIENT)
Dept: INTERNAL MEDICINE CLINIC | Age: 67
End: 2022-02-08
Payer: MEDICARE

## 2022-02-08 VITALS
DIASTOLIC BLOOD PRESSURE: 70 MMHG | OXYGEN SATURATION: 94 % | TEMPERATURE: 97.3 F | HEART RATE: 86 BPM | WEIGHT: 111.14 LBS | SYSTOLIC BLOOD PRESSURE: 118 MMHG | RESPIRATION RATE: 16 BRPM | BODY MASS INDEX: 17.94 KG/M2

## 2022-02-08 DIAGNOSIS — M72.0 DUPUYTREN'S CONTRACTURE: ICD-10-CM

## 2022-02-08 DIAGNOSIS — F51.02 ADJUSTMENT INSOMNIA: ICD-10-CM

## 2022-02-08 DIAGNOSIS — Z72.0 TOBACCO ABUSE DISORDER: ICD-10-CM

## 2022-02-08 DIAGNOSIS — K52.832 LYMPHOCYTIC COLITIS: ICD-10-CM

## 2022-02-08 DIAGNOSIS — J96.10 CHRONIC RESPIRATORY FAILURE, UNSPECIFIED WHETHER WITH HYPOXIA OR HYPERCAPNIA (HCC): ICD-10-CM

## 2022-02-08 DIAGNOSIS — M54.12 CERVICAL RADICULOPATHY: ICD-10-CM

## 2022-02-08 DIAGNOSIS — J44.9 COPD, SEVERE (HCC): ICD-10-CM

## 2022-02-08 DIAGNOSIS — R63.4 WEIGHT LOSS: ICD-10-CM

## 2022-02-08 DIAGNOSIS — F10.10 ALCOHOL ABUSE: ICD-10-CM

## 2022-02-08 DIAGNOSIS — F32.1 CURRENT MODERATE EPISODE OF MAJOR DEPRESSIVE DISORDER WITHOUT PRIOR EPISODE (HCC): ICD-10-CM

## 2022-02-08 PROBLEM — M54.50 ACUTE MIDLINE LOW BACK PAIN WITHOUT SCIATICA: Status: RESOLVED | Noted: 2019-05-01 | Resolved: 2022-02-08

## 2022-02-08 PROBLEM — J18.9 PNEUMONIA: Status: RESOLVED | Noted: 2021-11-11 | Resolved: 2022-02-08

## 2022-02-08 PROCEDURE — G8482 FLU IMMUNIZE ORDER/ADMIN: HCPCS | Performed by: INTERNAL MEDICINE

## 2022-02-08 PROCEDURE — 99214 OFFICE O/P EST MOD 30 MIN: CPT | Performed by: INTERNAL MEDICINE

## 2022-02-08 PROCEDURE — G8419 CALC BMI OUT NRM PARAM NOF/U: HCPCS | Performed by: INTERNAL MEDICINE

## 2022-02-08 PROCEDURE — 1123F ACP DISCUSS/DSCN MKR DOCD: CPT | Performed by: INTERNAL MEDICINE

## 2022-02-08 PROCEDURE — 4040F PNEUMOC VAC/ADMIN/RCVD: CPT | Performed by: INTERNAL MEDICINE

## 2022-02-08 PROCEDURE — 3023F SPIROM DOC REV: CPT | Performed by: INTERNAL MEDICINE

## 2022-02-08 PROCEDURE — 4004F PT TOBACCO SCREEN RCVD TLK: CPT | Performed by: INTERNAL MEDICINE

## 2022-02-08 PROCEDURE — 3017F COLORECTAL CA SCREEN DOC REV: CPT | Performed by: INTERNAL MEDICINE

## 2022-02-08 PROCEDURE — G8427 DOCREV CUR MEDS BY ELIG CLIN: HCPCS | Performed by: INTERNAL MEDICINE

## 2022-02-08 ASSESSMENT — PATIENT HEALTH QUESTIONNAIRE - PHQ9
SUM OF ALL RESPONSES TO PHQ QUESTIONS 1-9: 0
SUM OF ALL RESPONSES TO PHQ9 QUESTIONS 1 & 2: 0
SUM OF ALL RESPONSES TO PHQ QUESTIONS 1-9: 0
SUM OF ALL RESPONSES TO PHQ QUESTIONS 1-9: 0
1. LITTLE INTEREST OR PLEASURE IN DOING THINGS: 0
2. FEELING DOWN, DEPRESSED OR HOPELESS: 0
SUM OF ALL RESPONSES TO PHQ QUESTIONS 1-9: 0

## 2022-02-08 ASSESSMENT — ENCOUNTER SYMPTOMS
COUGH: 0
EYES NEGATIVE: 1
GASTROINTESTINAL NEGATIVE: 1
SHORTNESS OF BREATH: 1
WHEEZING: 0

## 2022-02-08 NOTE — ASSESSMENT & PLAN NOTE
PFTs showed severe COPD in 5/14  Pt uses Duoneb neb treatment and prn ventolin and symbicort   Uses 2L/ O2 24/7  Pulmonologist Dr. Mehreen Olivier.  Did not see him for > 6 months   He will call and make appt

## 2022-02-08 NOTE — ASSESSMENT & PLAN NOTE
Trazodone 100 mg daily  and counseling by hospice after his girlfriend  . Pt stopped going to counseling when it was switched to telephone.   Pt states he is not feeling depressed and does not take trazodone on daily basis

## 2022-02-08 NOTE — ASSESSMENT & PLAN NOTE
S/P cervical discectomy and fusion with plate and screw. On 7/22/16.  Dr Chelsea Dominguez at [facility]  [facility]  Pt is on oxycodone 5 mg q 6 hours

## 2022-02-08 NOTE — ASSESSMENT & PLAN NOTE
Chest x ray : COPD  Labs normal  Colonoscopy : 9/2014  CT chest in 3/18 and 4/18 : COPD and pneumonia : no mass  CT abdomen  In 4/2018: NAD  Weight has improved

## 2022-02-08 NOTE — PROGRESS NOTES
Marty Montes  Patient's  is 1955  Seen in office on 2022      SUBJECTIVE:  Cheri Hobbs vel 74 y. o.year old male presents today   Chief Complaint   Patient presents with    Follow-up   pt is here for f/u of COPD chronic pain. Depression. , insomnia. Has chronic neck pain and is on narcotic for pain through pain clinic  Lower back pain has improved   Pt states he does not feel depressed now  COPD stable. Using medications  Unable to quit smoking. Cooks for himself and lives alone. Taking medications regularly. No side effects noted. Review of Systems   Constitutional: Negative. Negative for chills, diaphoresis and fever. HENT: Negative. Eyes: Negative. Negative for visual disturbance. Respiratory: Positive for shortness of breath. Negative for cough and wheezing. Cardiovascular: Negative for chest pain and leg swelling. Gastrointestinal: Negative. Endocrine: Negative. Genitourinary: Negative. Musculoskeletal: Negative. Skin: Negative. Neurological: Negative. Hematological: Negative. Psychiatric/Behavioral: Negative. OBJECTIVE: /70   Pulse 86   Temp 97.3 °F (36.3 °C) (Temporal)   Resp 16 Comment: oxygen at 2 liters per NC  Wt 111 lb 2.2 oz (50.4 kg)   SpO2 94%   BMI 17.94 kg/m²     Wt Readings from Last 3 Encounters:   22 111 lb 2.2 oz (50.4 kg)   21 107 lb (48.5 kg)   21 101 lb 9.6 oz (46.1 kg)        Patient was seen taking COVID-19 precautions. Face mask, gloves were used. Patient also wore facemask. GENERAL: - Alert, oriented, pleasant, in no apparent distress. HEENT: - Conjunctiva pink, no scleral icterus. ENT clear. NECK: -Supple. No jugular venous distention noted. No masses felt,  CARDIOVASCULAR: - Normal S1 and S2    PULMONARY: - No respiratory distress. No wheezes or rales. ABDOMEN: - Soft and non-tender,no masses  ororganomegaly. EXTREMITIES: - No cyanosis, clubbing, or significant edema.   SKIN: Skin is warm and dry. NEUROLOGICAL: - Cranial nerves II through XII are grossly intact. IMPRESSION:    Encounter Diagnoses   Name Primary?  COPD, severe ([address]     Chronic respiratory failure, unspecified whether with hypoxia or hypercapnia (HCC)     Current moderate episode of major depressive disorder without prior episode (HCC)     Adjustment insomnia     Tobacco abuse disorder     Cervical radiculopathy     Dupuytren's contracture     Weight loss     Lymphocytic colitis     Alcohol abuse        ASSESSMENT/PLAN:    COPD, severe (HCC)   PFTs showed severe COPD in   Pt uses Duoneb neb treatment and prn ventolin and symbicort   On prednisone 5 mg daily . Uses 2L/ O2   Pulmonologist Dr. Karie Rodriguez. Did not see him for > 6 months   He will call and make appt     Chronic respiratory failure ([facility] [address]    Due to severe COPD. Patient is on oxygen 2L/m and nebulizer treatments    Current moderate episode of major depressive disorder without prior episode (HCC)    Trazodone 100 mg daily  and counseling by hospice after his girlfriend  . Pt stopped going to counseling when it was switched to telephone. Pt states he is not feeling depressed and does not take trazodone on daily basis     Adjustment insomnia    Patient is on trazodone , takes prn and is helping     Tobacco abuse disorder    : pt states he decreased smoking to 1/2 ppd  Wants to quit on his own. Cervical radiculopathy    S/P cervical discectomy and fusion with plate and screw. On 16. Dr Angus Frazier at [facility]  [facility]  Pt is on oxycodone 5 mg q 6 hours    Dupuytren's contracture mild both hands        Weight loss    Chest x ray : COPD  Labs normal  Colonoscopy : 2014  CT chest in 3/18 and  : COPD and pneumonia : no mass  CT abdomen  In 2018: NAD  Weight has improved       Alcohol abuse    Pt has decreased drinking from 6 pack a day to 6 pack a week    Return to office in 3 months.       Mediations reviewed with the patient. Continue current medications. Appropriate prescriptions are addressed. After visit summeryprovided. Follow up as directed sooner if needed. Questions answered and patient verbalizes understanding. Call for any problems, questions, or concerns. No Known Allergies  Current Outpatient Medications   Medication Sig Dispense Refill    albuterol sulfate  (90 Base) MCG/ACT inhaler INHALE 2 PUFFS EVERY 6 HOURS AS NEEDED FOR WHEEZING 54 g 5    predniSONE (DELTASONE) 5 MG tablet Take 1 tablet by mouth daily 90 tablet 3    ipratropium-albuterol (DUONEB) 0.5-2.5 (3) MG/3ML SOLN nebulizer solution INHALE THE CONTENTS OF 1 VIAL VIA NEBULIZER FOUR TIMES DAILY 1080 mL 1    traZODone (DESYREL) 100 MG tablet TAKE 1 TABLET BY MOUTH NIGHTLY (Patient taking differently: Take 100 mg by mouth nightly as needed ) 30 tablet 3    SYMBICORT 160-4.5 MCG/ACT AERO INHALE 2 PUFFS TWICE DAILY 3 Inhaler 3    Turmeric 500 MG CAPS Take 1 capsule by mouth daily      Cholecalciferol (VITAMIN D3) 1.25 MG (09299 UT) CAPS Take 1 capsule by mouth daily       oxyCODONE (ROXICODONE) 5 MG immediate release tablet Take 5 mg by mouth every 6 hours as needed for Pain.  OXYGEN Inhale 2 L into the lungs continuous       Multiple Vitamins-Minerals (MULTIVITAMIN ADULTS PO) Take by mouth daily        No current facility-administered medications for this visit. 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 On 7/22/16.  [place]vonne [place] [address] [place]    Dr. Daniel Andrade COPD (chronic obstructive pulmonary disease) ([facility] [address] 5/19/2014    PFTs showed severe COPD in 5/14- follows with Dr Irizarry Feeling COPD exacerbation [facility]) 6/10/2016    Diverticulosis 7-    Susie's contracture 5/26/2017    Right palm    Elevated PSA 12/21/2018 12/2018 : PSA is 6    H/O colonoscopy 9-18-14    Dr. Oscar Greenwood H/O echocardiogram 08/17/2016    EF 55-60%. Abnormal diastolic function. Mild TR. Borderline right atrium and RV dilatation.  Hiatal hernia     History of alcohol abuse     \"average 6 pk beer per day\"    History of drug abuse ([facility] [address]     does use marijuana\"last used cocaine over 10 yrs ago\" per pt on 10/3/2018    Internal hemorrhoids 9-    Intervertebral disc protrusion 5/19/2014    Cervical region on MRI    Lingular pneumonia 4/3/2018    Lymphocytic colitis 9-18-14    Dr. Nick Schuster, will treat if symptomatic    On home oxygen therapy     2l/nc at night only    Pneumonia of left lower lobe due to infectious organism 11/27/2018    Sepsis due to pneumonia ([facility] [address] 6/10/2016    Spinal stenosis in cervical region 5/19/2014    MRI showed  Disc protrusion. Severe spinal stenosis at C3-C4    Spinal stenosis of cervical region 3/14/2016    S/P cervical discectomy and fusion with plate and screw. On 7/22/16. Dr Alycia Adler at [address] [employer] abuse disorder 5/19/2014    Wears glasses     to read     Past Surgical History:   Procedure Laterality Date    CERVICAL SPINE SURGERY  2015    \"have plate in neck done at [address] [facility]      9- Dr Leonard Schutser.  f/u 3 years    COLONOSCOPY  10/04/2018    COLONOSCOPY N/A 10/4/2018    COLONOSCOPY POLYPECTOMY SNARE/COLD BIOPSY performed by Dorian Henderson MD at [facility] in 5 years   [facility] POLYPECTOMY  10/04/2018    2 Polyps Removed    SIGMOIDOSCOPY      UPPER GASTROINTESTINAL ENDOSCOPY  10/04/2018    with Biopsy    UPPER GASTROINTESTINAL ENDOSCOPY N/A 10/4/2018    EGD BIOPSY performed by Dorian Henderson MD at [facility] ENDOSCOPY     Social History     Tobacco Use    Smoking status: Current Every Day Smoker     Packs/day: 0.50     Years: 64.00     Pack years: 32.00     Types: Cigarettes     Start date: Mark Baptiste Smokeless tobacco: Never Used   Substance Use Topics    Alcohol use:  Yes     Alcohol/week: 6.0 standard drinks     Types: 6 Cans of beer per week     Comment: 3 beers daily       LAB REVIEW:  CBC:   Lab Results   Component Value Date    WBC 16.6 11/13/2021    HGB 12.0 11/13/2021    HCT 37.5 11/13/2021     11/13/2021     Lipids:   Lab Results   Component Value Date    HDL 90 03/09/2021    LDLDIRECT 88 03/09/2021    TRIGLYCFAST 46 03/09/2021    CHOLFAST 186 03/09/2021     Renal:   Lab Results   Component Value Date    BUN 10 11/13/2021    CREATININE 0.5 11/13/2021     11/13/2021    K 4.6 11/13/2021    ALT 15 09/14/2021    AST 20 09/14/2021    GLUCOSE 181 11/13/2021    GLUF 123 09/14/2021     PT/INR: No results found for: INR  A1C:   Lab Results   Component Value Date    LABA1C 5.1 08/07/2020           Marcela Apley, MD, 2/8/2022 , 8:12 AM

## 2022-03-10 RX ORDER — IPRATROPIUM BROMIDE AND ALBUTEROL SULFATE 2.5; .5 MG/3ML; MG/3ML
SOLUTION RESPIRATORY (INHALATION)
Qty: 1080 ML | Refills: 1 | Status: SHIPPED | OUTPATIENT
Start: 2022-03-10 | End: 2022-08-17

## 2022-04-06 ENCOUNTER — HOSPITAL ENCOUNTER (OUTPATIENT)
Dept: GENERAL RADIOLOGY | Age: 67
Discharge: HOME | End: 2022-04-06
Payer: MEDICARE

## 2022-04-06 ENCOUNTER — HOSPITAL ENCOUNTER (OUTPATIENT)
Age: 67
Discharge: HOME | End: 2022-04-06
Payer: MEDICARE

## 2022-04-06 DIAGNOSIS — M96.1 POSTLAMINECTOMY SYNDROME, CERVICAL REGION: ICD-10-CM

## 2022-04-06 DIAGNOSIS — M47.12 CERVICAL ARTHRITIS WITH MYELOPATHY: ICD-10-CM

## 2022-04-06 DIAGNOSIS — M54.12 BRACHIAL NEURITIS: ICD-10-CM

## 2022-04-06 DIAGNOSIS — M25.511 RIGHT SHOULDER PAIN, UNSPECIFIED CHRONICITY: ICD-10-CM

## 2022-04-06 DIAGNOSIS — M50.30 ANNULAR TEAR OF CERVICAL DISC: ICD-10-CM

## 2022-04-06 PROCEDURE — 73030 X-RAY EXAM OF SHOULDER: CPT

## 2022-04-06 PROCEDURE — 72050 X-RAY EXAM NECK SPINE 4/5VWS: CPT

## 2022-05-23 ENCOUNTER — APPOINTMENT (OUTPATIENT)
Dept: GENERAL RADIOLOGY | Age: 67
End: 2022-05-23
Payer: MEDICARE

## 2022-05-23 ENCOUNTER — HOSPITAL ENCOUNTER (EMERGENCY)
Age: 67
Discharge: HOME | End: 2022-05-23
Attending: EMERGENCY MEDICINE
Payer: MEDICARE

## 2022-05-23 VITALS
TEMPERATURE: 98.4 F | BODY MASS INDEX: 18.48 KG/M2 | DIASTOLIC BLOOD PRESSURE: 70 MMHG | HEIGHT: 66 IN | WEIGHT: 115 LBS | SYSTOLIC BLOOD PRESSURE: 101 MMHG | RESPIRATION RATE: 19 BRPM | OXYGEN SATURATION: 98 % | HEART RATE: 91 BPM

## 2022-05-23 DIAGNOSIS — J44.9 CHRONIC OBSTRUCTIVE PULMONARY DISEASE, UNSPECIFIED COPD TYPE (HCC): Primary | ICD-10-CM

## 2022-05-23 DIAGNOSIS — J18.9 COMMUNITY ACQUIRED PNEUMONIA OF RIGHT LOWER LOBE OF LUNG: ICD-10-CM

## 2022-05-23 LAB
ANION GAP SERPL CALCULATED.3IONS-SCNC: 9 MMOL/L (ref 4–16)
BASE EXCESS MIXED: 3.8 (ref 0–1.2)
BASE EXCESS: ABNORMAL (ref 0–3.3)
BASOPHILS ABSOLUTE: 0.1 K/CU MM
BASOPHILS RELATIVE PERCENT: 0.2 % (ref 0–1)
BUN BLDV-MCNC: 7 MG/DL (ref 6–23)
CALCIUM SERPL-MCNC: 9.5 MG/DL (ref 8.3–10.6)
CHLORIDE BLD-SCNC: 96 MMOL/L (ref 99–110)
CO2 CONTENT: 35.4 MMOL/L (ref 19–24)
CO2: 34 MMOL/L (ref 21–32)
CREAT SERPL-MCNC: 0.6 MG/DL (ref 0.9–1.3)
DIFFERENTIAL TYPE: ABNORMAL
EOSINOPHILS ABSOLUTE: 0 K/CU MM
EOSINOPHILS RELATIVE PERCENT: 0.1 % (ref 0–3)
GFR AFRICAN AMERICAN: >60 ML/MIN/1.73M2
GFR NON-AFRICAN AMERICAN: >60 ML/MIN/1.73M2
GLUCOSE BLD-MCNC: 119 MG/DL (ref 70–99)
HCO3 VENOUS: 33.3 MMOL/L (ref 19–25)
HCT VFR BLD CALC: 47.5 % (ref 42–52)
HEMOGLOBIN: 15 GM/DL (ref 13.5–18)
IMMATURE NEUTROPHIL %: 0.5 % (ref 0–0.43)
LYMPHOCYTES ABSOLUTE: 1 K/CU MM
LYMPHOCYTES RELATIVE PERCENT: 3.6 % (ref 24–44)
MCH RBC QN AUTO: 31.1 PG (ref 27–31)
MCHC RBC AUTO-ENTMCNC: 31.6 % (ref 32–36)
MCV RBC AUTO: 98.3 FL (ref 78–100)
MONOCYTES ABSOLUTE: 1.9 K/CU MM
MONOCYTES RELATIVE PERCENT: 6.7 % (ref 0–4)
O2 SAT, VEN: 64.6 % (ref 50–70)
PCO2, VEN: 68.1 MMHG (ref 38–52)
PDW BLD-RTO: 14.1 % (ref 11.7–14.9)
PH VENOUS: 7.3 (ref 7.32–7.42)
PLATELET # BLD: 239 K/CU MM (ref 140–440)
PMV BLD AUTO: 9.2 FL (ref 7.5–11.1)
PO2, VEN: 38.5 MMHG (ref 28–48)
POTASSIUM SERPL-SCNC: 4.5 MMOL/L (ref 3.5–5.1)
RBC # BLD: 4.83 M/CU MM (ref 4.6–6.2)
SEGMENTED NEUTROPHILS ABSOLUTE COUNT: 24.6 K/CU MM
SEGMENTED NEUTROPHILS RELATIVE PERCENT: 88.9 % (ref 36–66)
SODIUM BLD-SCNC: 139 MMOL/L (ref 135–145)
SOURCE, BLOOD GAS: ABNORMAL
TOTAL IMMATURE NEUTOROPHIL: 0.15 K/CU MM
TROPONIN T: <0.01 NG/ML
WBC # BLD: 27.7 K/CU MM (ref 4–10.5)

## 2022-05-23 PROCEDURE — 94640 AIRWAY INHALATION TREATMENT: CPT

## 2022-05-23 PROCEDURE — 93005 ELECTROCARDIOGRAM TRACING: CPT | Performed by: EMERGENCY MEDICINE

## 2022-05-23 PROCEDURE — 6360000002 HC RX W HCPCS: Performed by: EMERGENCY MEDICINE

## 2022-05-23 PROCEDURE — 96366 THER/PROPH/DIAG IV INF ADDON: CPT

## 2022-05-23 PROCEDURE — 80048 BASIC METABOLIC PNL TOTAL CA: CPT

## 2022-05-23 PROCEDURE — 82805 BLOOD GASES W/O2 SATURATION: CPT

## 2022-05-23 PROCEDURE — 85025 COMPLETE CBC W/AUTO DIFF WBC: CPT

## 2022-05-23 PROCEDURE — 99285 EMERGENCY DEPT VISIT HI MDM: CPT

## 2022-05-23 PROCEDURE — 71045 X-RAY EXAM CHEST 1 VIEW: CPT

## 2022-05-23 PROCEDURE — 84484 ASSAY OF TROPONIN QUANT: CPT

## 2022-05-23 PROCEDURE — 6370000000 HC RX 637 (ALT 250 FOR IP): Performed by: EMERGENCY MEDICINE

## 2022-05-23 PROCEDURE — 96365 THER/PROPH/DIAG IV INF INIT: CPT

## 2022-05-23 PROCEDURE — 96375 TX/PRO/DX INJ NEW DRUG ADDON: CPT

## 2022-05-23 RX ORDER — LEVOFLOXACIN 5 MG/ML
750 INJECTION, SOLUTION INTRAVENOUS EVERY 24 HOURS
Status: DISCONTINUED | OUTPATIENT
Start: 2022-05-23 | End: 2022-05-23 | Stop reason: HOSPADM

## 2022-05-23 RX ORDER — LEVOFLOXACIN 750 MG/1
750 TABLET ORAL DAILY
Qty: 5 TABLET | Refills: 0 | Status: SHIPPED | OUTPATIENT
Start: 2022-05-23 | End: 2022-05-23 | Stop reason: SDUPTHER

## 2022-05-23 RX ORDER — KETOROLAC TROMETHAMINE 15 MG/ML
15 INJECTION, SOLUTION INTRAMUSCULAR; INTRAVENOUS ONCE
Status: COMPLETED | OUTPATIENT
Start: 2022-05-23 | End: 2022-05-23

## 2022-05-23 RX ORDER — LEVOFLOXACIN 750 MG/1
750 TABLET ORAL DAILY
Qty: 5 TABLET | Refills: 0 | Status: SHIPPED | OUTPATIENT
Start: 2022-05-23 | End: 2022-05-28

## 2022-05-23 RX ORDER — DEXAMETHASONE SODIUM PHOSPHATE 10 MG/ML
10 INJECTION, SOLUTION INTRAMUSCULAR; INTRAVENOUS ONCE
Status: COMPLETED | OUTPATIENT
Start: 2022-05-23 | End: 2022-05-23

## 2022-05-23 RX ORDER — IPRATROPIUM BROMIDE AND ALBUTEROL SULFATE 2.5; .5 MG/3ML; MG/3ML
1 SOLUTION RESPIRATORY (INHALATION) ONCE
Status: COMPLETED | OUTPATIENT
Start: 2022-05-23 | End: 2022-05-23

## 2022-05-23 RX ADMIN — DEXAMETHASONE SODIUM PHOSPHATE 10 MG: 10 INJECTION INTRAMUSCULAR; INTRAVENOUS at 14:16

## 2022-05-23 RX ADMIN — KETOROLAC TROMETHAMINE 15 MG: 15 INJECTION, SOLUTION INTRAMUSCULAR; INTRAVENOUS at 14:20

## 2022-05-23 RX ADMIN — LEVOFLOXACIN 750 MG: 750 INJECTION, SOLUTION INTRAVENOUS at 14:27

## 2022-05-23 RX ADMIN — IPRATROPIUM BROMIDE AND ALBUTEROL SULFATE 1 AMPULE: .5; 3 SOLUTION RESPIRATORY (INHALATION) at 13:45

## 2022-05-23 ASSESSMENT — PAIN - FUNCTIONAL ASSESSMENT: PAIN_FUNCTIONAL_ASSESSMENT: 0-10

## 2022-05-23 ASSESSMENT — ENCOUNTER SYMPTOMS
WHEEZING: 1
COUGH: 1
SHORTNESS OF BREATH: 1
GASTROINTESTINAL NEGATIVE: 1

## 2022-05-23 ASSESSMENT — PAIN DESCRIPTION - DESCRIPTORS: DESCRIPTORS: SHARP

## 2022-05-23 ASSESSMENT — PAIN DESCRIPTION - LOCATION: LOCATION: CHEST

## 2022-05-23 ASSESSMENT — PAIN DESCRIPTION - ORIENTATION: ORIENTATION: MID

## 2022-05-23 ASSESSMENT — PAIN DESCRIPTION - ONSET: ONSET: ON-GOING

## 2022-05-23 ASSESSMENT — PAIN SCALES - GENERAL
PAINLEVEL_OUTOF10: 8
PAINLEVEL_OUTOF10: 9

## 2022-05-23 ASSESSMENT — PAIN DESCRIPTION - FREQUENCY: FREQUENCY: CONTINUOUS

## 2022-05-23 ASSESSMENT — PAIN DESCRIPTION - PAIN TYPE: TYPE: ACUTE PAIN

## 2022-05-23 NOTE — ED TRIAGE NOTES
Pt from home with CP that started last night, SOB and cough. Hx of COPD pt thinks having exacerbation. Wears 2L normal at home.

## 2022-05-23 NOTE — ED PROVIDER NOTES
Triage Chief Complaint:   Chest Pain (Started last night, while at rest. ) and Shortness of Breath    [place]:  Chelsea Alaniz is a 74 y.o. male that presents to the ED complain of shortness of breath and sharp chest pain on the right side. Patient is an active smoker known COPD on supplemental oxygen history of alcohol abuse presents ED with pain mainly in his chest that began while coughing. He is a breathing treatment hour prior to arrival thinks he needs another 1. He has had problems with chronic respiratory failure recently saw pulmonary medicine in mid April and he has been relatively stable recent admit just prior for respiratory failure. No exertional pain. It is pleuritic with cough. 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 On 7/22/16. Dr Jackson Lyon at [address] [place] [address] 0-    Dr. Demond Vallejo COPD (chronic obstructive pulmonary disease) ([facility] [address] 5/19/2014    PFTs showed severe COPD in 5/14- follows with Dr Pascual Avelar COPD exacerbation [facility]) 6/10/2016    Diverticulosis 9-    Dupuytren's contracture 5/26/2017    Right palm    Elevated PSA 12/21/2018 12/2018 : PSA is 6    H/O colonoscopy 9-18-14    Dr. Demond Vallejo H/O echocardiogram 08/17/2016    EF 55-60%. Abnormal diastolic function. Mild TR. Borderline right atrium and RV dilatation.      Hiatal hernia     History of alcohol abuse     \"average 6 pk beer per day\"    History of drug abuse ([facility] [address]     does use marijuana\"last used cocaine over 10 yrs ago\" per pt on 10/3/2018    Internal hemorrhoids 9-    Intervertebral disc protrusion 5/19/2014    Cervical region on MRI    Lingular pneumonia 4/3/2018    Lymphocytic colitis 9-18-14    Dr. Chandni Renee, will treat if symptomatic    On home oxygen therapy     2l/nc at night only    Pneumonia 11/11/2021    Pneumonia of left lower lobe due to infectious organism 11/27/2018    Sepsis due to pneumonia ([address] 6/10/2016    Spinal stenosis in cervical region 5/19/2014    MRI showed  Disc protrusion. Severe spinal stenosis at C3-C4    Spinal stenosis of cervical region 3/14/2016    S/P cervical discectomy and fusion with plate and screw. [facility] 7[facility]22/16. [facility] 5/19/[facility]    Wears glasses     to read     Past Surgical History:   Procedure Laterality Date    CERVICAL SPINE SURGERY  2015    \"have plate in neck done at [address] [facility]      9- Dr Fany Pichardo. f/u 3 years    COLONOSCOPY  10/04/2018    COLONOSCOPY N/A 10/4/2018    COLONOSCOPY POLYPECTOMY SNARE/COLD BIOPSY performed by Lynnette Dakin, MD at [facility] in 5 years   [facility]  10/04/2018    2 Polyps Removed    SIGMOIDOSCOPY      UPPER GASTROINTESTINAL ENDOSCOPY  10/04/2018    with Biopsy    UPPER GASTROINTESTINAL ENDOSCOPY N/A 10/4/2018    EGD BIOPSY performed by Lynnette Dakin, MD at [facility] ENDOSCOPY     Family History   Problem Relation Age of Onset    Cancer Mother         lung ca    Heart Disease Father     Heart Attack Father     Diabetes Father     Other Brother         hep C    Colon Cancer Neg Hx      Social History     Socioeconomic History    Marital status: Single     Spouse name: Not on file    Number of children: Not on file    Years of education: Not on file    Highest education level: Not on file   Occupational History    Not on file   Tobacco Use    Smoking status: Current Every Day Smoker     Packs/day: 0.50     Years: 64.00     Pack years: 32.00     Types: Cigarettes     Start date: 1970    Smokeless tobacco: Never Used   Vaping Use    Vaping Use: Former    Substances: Occasionally   Substance and Sexual Activity    Alcohol use:  Yes     Alcohol/week: 6.0 standard drinks     Types: 6 Cans of beer per week     Comment: 3 beers daily    Drug use: Yes     Types: Marijuana Lovena Mems)     Comment: 1-2 times per week marijuana    Sexual activity: Not Currently   Other Topics Concern    Not on file   Social History Narrative    Not on file     Social Determinants of Health     Financial Resource Strain: Low Risk     Difficulty of Paying Living Expenses: Not hard at all   Food Insecurity: No Food Insecurity    Worried About Running Out of Food in the Last Year: Never true    Dorene of Food in the Last Year: Never true   Transportation Needs:     Lack of Transportation (Medical): Not on file    Lack of Transportation (Non-Medical): Not on file   Physical Activity:     Days of Exercise per Week: Not on file    Minutes of Exercise per Session: Not on file   Stress:     Feeling of Stress : Not on file   Social Connections:     Frequency of Communication with Friends and Family: Not on file    Frequency of Social Gatherings with Friends and Family: Not on file    Attends Faith Services: Not on file    Active Member of [address] [place] [address] or Organizations: Not on file    Attends Club or Organization Meetings: Not on file    Marital Status: Not on file   Intimate Partner Violence:     Fear of Current or Ex-Partner: Not on file    Emotionally Abused: Not on file    Physically Abused: Not on file    Sexually Abused: Not on file   Housing Stability:     Unable to Pay for Housing in the Last Year: Not on file    Number of [place] in the Last Year: Not on file    Unstable Housing in the Last Year: Not on file     No current facility-administered medications for this encounter.      Current Outpatient Medications   Medication Sig Dispense Refill    levoFLOXacin (LEVAQUIN) 750 MG tablet Take 1 tablet by mouth daily for 5 days 5 tablet 0    ipratropium-albuterol (DUONEB) 0.5-2.5 (3) MG/3ML SOLN nebulizer solution INHALE THE CONTENTS OF 1 VIAL VIA NEBULIZER FOUR TIMES DAILY 1080 mL 1    albuterol sulfate  (90 Base) MCG/ACT inhaler INHALE 2 PUFFS EVERY 6 HOURS AS NEEDED FOR WHEEZING 54 g 5    predniSONE (DELTASONE) 5 MG tablet Take 1 tablet by mouth daily 90 tablet 3    traZODone (DESYREL) 100 MG tablet TAKE 1 TABLET BY MOUTH NIGHTLY (Patient taking differently: Take 100 mg by mouth nightly as needed ) 30 tablet 3    SYMBICORT 160-4.5 MCG/ACT AERO INHALE 2 PUFFS TWICE DAILY 3 Inhaler 3    Turmeric 500 MG CAPS Take 1 capsule by mouth daily      Cholecalciferol (VITAMIN D3) 1.25 MG (73999 UT) CAPS Take 1 capsule by mouth daily       oxyCODONE (ROXICODONE) 5 MG immediate release tablet Take 5 mg by mouth every 6 hours as needed for Pain.  OXYGEN Inhale 2 L into the lungs continuous       Multiple Vitamins-Minerals (MULTIVITAMIN ADULTS PO) Take by mouth daily        No Known Allergies      ROS:    Review of Systems   Constitutional: Positive for activity change. Respiratory: Positive for cough, shortness of breath and wheezing. Cardiovascular: Positive for chest pain. Gastrointestinal: Negative. All other systems reviewed and are negative. Nursing Notes Reviewed    Physical Exam:      ED Triage Vitals [05/23/22 1317]   Enc Vitals Group      /86      Pulse 105      Resp 20      Temp 98.4 °F (36.9 °C)      Temp Source Oral      SpO2 94 %      Weight 115 lb (52.2 kg)      Height 5' 6\" (1.676 m)      Head Circumference       Peak Flow       Pain Score       Pain Loc       Pain Edu? Excl. in [address]? Physical Exam  Vitals and nursing note reviewed. Constitutional:       Appearance: He is well-developed and underweight. He is ill-appearing. HENT:      Head: Normocephalic and atraumatic. Right Ear: External ear normal.      Left Ear: External ear normal.      Mouth/Throat:      Mouth: Mucous membranes are moist.   Eyes:      General: No scleral icterus. Right eye: No discharge. Left eye: No discharge.       Conjunctiva/sclera: Conjunctivae normal.      Pupils: Pupils are equal, round, and reactive to light. Neck:      Thyroid: No thyromegaly. Vascular: No JVD. Trachea: No tracheal deviation. Cardiovascular:      Rate and Rhythm: Normal rate and regular rhythm. Heart sounds: Normal heart sounds. No murmur heard. No friction rub. No gallop. Pulmonary:      Effort: Pulmonary effort is normal. No respiratory distress. Breath sounds: No stridor. Examination of the right-middle field reveals decreased breath sounds. Examination of the left-middle field reveals decreased breath sounds. Examination of the right-lower field reveals decreased breath sounds. Examination of the left-lower field reveals decreased breath sounds. Decreased breath sounds and wheezing present. No rales. Chest:      Chest wall: No tenderness. Abdominal:      General: Bowel sounds are normal. There is no distension. Palpations: Abdomen is soft. There is no mass. Tenderness: There is no abdominal tenderness. There is no guarding or rebound. Hernia: No hernia is present. Musculoskeletal:         General: No signs of injury. Normal range of motion. Cervical back: Normal range of motion and neck supple. Right lower leg: No edema. Left lower leg: No edema. Lymphadenopathy:      Cervical: No cervical adenopathy. Skin:     General: Skin is warm and dry. Coloration: Skin is not pale. Findings: No erythema or rash. Neurological:      General: No focal deficit present. Mental Status: He is alert and oriented to person, place, and time. Cranial Nerves: No cranial nerve deficit. Sensory: No sensory deficit. Deep Tendon Reflexes: Reflexes are normal and symmetric. Reflexes normal.   Psychiatric:         Mood and Affect: Mood normal.         Speech: Speech normal.         Behavior: Behavior normal.         Thought Content:  Thought content normal.         Judgment: Judgment normal. I have reviewed and interpreted all of the currently available lab results from this visit (ifapplicable):  Results for orders placed or performed during the hospital encounter of 05/23/22   CBC with Auto Differential   Result Value Ref Range    WBC 27.7 (H) 4.0 - 10.5 K/CU MM    RBC 4.83 4.6 - 6.2 M/CU MM    Hemoglobin 15.0 13.5 - 18.0 GM/DL    Hematocrit 47.5 42 - 52 %    MCV 98.3 78 - 100 FL    MCH 31.1 (H) 27 - 31 PG    MCHC 31.6 (L) 32.0 - 36.0 %    RDW 14.1 11.7 - 14.9 %    Platelets 284 243 - 347 K/CU MM    MPV 9.2 7.5 - 11.1 FL    Differential Type AUTOMATED DIFFERENTIAL     Segs Relative 88.9 (H) 36 - 66 %    Lymphocytes % 3.6 (L) 24 - 44 %    Monocytes % 6.7 (H) 0 - 4 %    Eosinophils % 0.1 0 - 3 %    Basophils % 0.2 0 - 1 %    Segs Absolute 24.6 K/CU MM    Lymphocytes Absolute 1.0 K/CU MM    Monocytes Absolute 1.9 K/CU MM    Eosinophils Absolute 0.0 K/CU MM    Basophils Absolute 0.1 K/CU MM    Immature Neutrophil % 0.5 (H) 0 - 0.43 %    Total Immature Neutrophil 0.15 K/CU MM   Basic Metabolic Panel w/ Reflex to MG   Result Value Ref Range    Sodium 139 135 - 145 MMOL/L    Potassium 4.5 3.5 - 5.1 MMOL/L    Chloride 96 (L) 99 - 110 mMol/L    CO2 34 (H) 21 - 32 MMOL/L    Anion Gap 9 4 - 16    BUN 7 6 - 23 MG/DL    CREATININE 0.6 (L) 0.9 - 1.3 MG/DL    Glucose 119 (H) 70 - 99 MG/DL    Calcium 9.5 8.3 - 10.6 MG/DL    GFR Non-African American >60 >60 mL/min/1.73m2    GFR African American >60 >60 mL/min/1.73m2   Troponin   Result Value Ref Range    Troponin T <0.010 <0.01 NG/ML   POCT Venous   Result Value Ref Range    pH, Vamsi 7.30 (L) 7.32 - 7.42    pCO2, Vamsi 68.1 (H) 38 - 52 mmHG    pO2, Vamsi 38.5 28 - 48 mmHG    Base Exc, Mixed 3.8 (H) 0 - 1.2    Base Excess HIDE 0 - 3.3    HCO3, Venous 33.3 (H) 19 - 25 MMOL/L    O2 Sat, Vamsi 64.6 50 - 70 %    CO2 Content 35.4 (H) 19 - 24 MMOL/L    Source: Venous    EKG 12 Lead   Result Value Ref Range    Ventricular Rate 97 BPM    Atrial Rate 97 BPM    P-R Interval 148 ms    QRS Duration 92 ms    Q-T Interval 332 ms    QTc Calculation (Bazett) 421 ms    P Axis 89 degrees    R Axis 53 degrees    T Axis 75 degrees    Diagnosis       Normal sinus rhythm  Incomplete right bundle branch block  Borderline ECG  When compared with ECG of 11-NOV-2021 20:06,  No significant change was found        Radiographs (if obtained):  [] The following radiograph wasinterpreted by myself in the absence of a radiologist:   [] Radiologist's Report Reviewed:  XR CHEST PORTABLE   Final Result   1. COPD but no right pneumothorax seen. 2. Increased density involving the right lower lung zone likely representing   developing pneumonia. Follow-up to resolution is recommended. EKG (if obtained): (All EKG's are interpreted by myself in the absence of a cardiologist)      The 12 lead EKG was interpreted by me, and the interpretation is as follows:  normal sinus rhythm, rate = 97. Intervals are not within the normal range. >> IRBBB  QTc is not prolonged. ST elevations are not present. T wave inversions are not present. Non-specific T wave changes are not present. Delta waves, Brugada Syndrome, and Short CT are not present. There is no acute ischemia. Chart review shows recent radiographs:  No results found. MDM:      Patient presented with pleuritic pain he does have an early developing community-acquired pneumonia does not want to stay in the hospital he is comfortable as a trial at outpatient he he will increase his bronchodilator he received 1 dose of Decadron in the ED. Patient fortunately does not for pneumothorax. Understands expectations. Please note that portions of this note may have been completed with a voice recognition/dictation program. Efforts were made to edit the dictations but occasionally words are mis-transcribed.      All pertinent Lab data and radiographic results reviewed with patient at bedside.  The patient and/or the family were informed of the results of any tests/labs/imaging, the treatment plan, and time was allotted to answer questions. See chart for details of medications given during the ED stay.     The likelihood of other entities in the differential is insufficient to justify any further testing for them. This was explained to the patient. The patient was advised that persistent or worsening symptoms would require further evaluation.         Smoking Cessation Counseling    PROCEDURE  The patient was provided smoking cessation counseling as a medical necessity. The session included evaluation  Of the patient's readiness/willingness to quit smoking, including discussion of a plan of action and discussed health benefits of smoking cessation. I counseled the patient about potential withdrawal symptoms and relapse. I counseled the patient about health risks of smoking and provided encouragement in their efforts to quit smoking. Counseling elements included a screening questionnaire, which provided and purpose was explained to the patient as well as smoking cessation program information, which was provided and reviewed with the patient. The necessity for follow-up with a primary care physician was discussed and encouraged. The [address] ([phone]) was provided. Time spent on the smoking cessation counseling was 4 minutes. This does not include time spent in other services. Clinical Impression:  1. Chronic obstructive pulmonary disease, unspecified COPD type ([facility] [address]    2.  Community acquired pneumonia of right lower lobe of lung      Disposition referral (if applicable):  [facility] [address]  [phone]    Go in 1 week  If symptoms worsen    Disposition medications (if applicable):  Discharge Medication List as of 5/23/2022  3:52 PM      START taking these medications    Details   levoFLOXacin (LEVAQUIN) 750 MG tablet Take 1 tablet by mouth daily for 5 days, Disp-5 tablet, R-0Normal Deshawn De Jesus, , FACEP      Comment: Please note this report has been produced using speech recognition software and maycontain errors related to that system including errors in grammar, punctuation, and spelling, as well as words and phrases that may be inappropriate. If there are any questions or concerns please feel free to contact thedictating provider for clarification.         Karine Jefferson,   05/24/22 4988 kaylin Hunt, DO  05/24/22 1223

## 2022-05-24 ENCOUNTER — TELEPHONE (OUTPATIENT)
Dept: INTERNAL MEDICINE CLINIC | Age: 67
End: 2022-05-24

## 2022-05-24 NOTE — TELEPHONE ENCOUNTER
[facility] Trans[facility] Initial Follow Up Call    Outreach made within 2 business days of discharge: Yes    Patient: Nitza Lawler Patient : 1955   MRN: [id number]  Reason for Admission: There are no discharge diagnoses documented for the most recent discharge. Discharge Date: 22       Spoke with: patient    Discharge department/facility: [facility] Interactive Patient Contact:  Was patient able to fill all prescriptions: Yes  Was patient instructed to bring all medications to the follow-up visit: Yes  Is patient taking all medications as directed in the discharge summary?  Yes  Does patient understand their discharge instructions:Yes  Does patient have questions or concerns that need addressed prior to 7-14 day follow up office visit: no    Scheduled appointment with PCP within 7-14 days    Follow Up  Future Appointments   Date Time Provider Sukumar Skinner   2022  2:00 PM Nehemias Garcia MD [facility]   2022  8:00 AM Nehemias Gacria MD [facility]   2022  9:00 AM Bill Smith MD [facility]   2022  8:00 AM SCHEDULE, [facility] AWV LPN [facility] RACHEL Jarvis MA

## 2022-05-25 LAB
EKG ATRIAL RATE: 97 BPM
EKG DIAGNOSIS: NORMAL
EKG P AXIS: 89 DEGREES
EKG P-R INTERVAL: 148 MS
EKG Q-T INTERVAL: 332 MS
EKG QRS DURATION: 92 MS
EKG QTC CALCULATION (BAZETT): 421 MS
EKG R AXIS: 53 DEGREES
EKG T AXIS: 75 DEGREES
EKG VENTRICULAR RATE: 97 BPM

## 2022-05-25 PROCEDURE — 93010 ELECTROCARDIOGRAM REPORT: CPT | Performed by: INTERNAL MEDICINE

## 2022-06-08 ENCOUNTER — TELEPHONE (OUTPATIENT)
Dept: INTERNAL MEDICINE CLINIC | Age: 67
End: 2022-06-08

## 2022-06-09 ENCOUNTER — OFFICE VISIT (OUTPATIENT)
Dept: INTERNAL MEDICINE CLINIC | Age: 67
End: 2022-06-09
Payer: MEDICARE

## 2022-06-09 VITALS
HEART RATE: 72 BPM | TEMPERATURE: 97.2 F | BODY MASS INDEX: 17.88 KG/M2 | DIASTOLIC BLOOD PRESSURE: 60 MMHG | SYSTOLIC BLOOD PRESSURE: 106 MMHG | WEIGHT: 110.8 LBS | RESPIRATION RATE: 16 BRPM

## 2022-06-09 DIAGNOSIS — R52 PAIN MANAGEMENT: ICD-10-CM

## 2022-06-09 DIAGNOSIS — Z12.5 SCREENING PSA (PROSTATE SPECIFIC ANTIGEN): ICD-10-CM

## 2022-06-09 DIAGNOSIS — R63.4 WEIGHT LOSS: ICD-10-CM

## 2022-06-09 DIAGNOSIS — F51.02 ADJUSTMENT INSOMNIA: ICD-10-CM

## 2022-06-09 DIAGNOSIS — M54.12 CERVICAL RADICULOPATHY: ICD-10-CM

## 2022-06-09 DIAGNOSIS — J44.9 COPD, SEVERE (HCC): ICD-10-CM

## 2022-06-09 DIAGNOSIS — J96.10 CHRONIC RESPIRATORY FAILURE, UNSPECIFIED WHETHER WITH HYPOXIA OR HYPERCAPNIA (HCC): ICD-10-CM

## 2022-06-09 DIAGNOSIS — K52.832 LYMPHOCYTIC COLITIS: ICD-10-CM

## 2022-06-09 DIAGNOSIS — Z72.0 TOBACCO ABUSE DISORDER: ICD-10-CM

## 2022-06-09 DIAGNOSIS — R97.20 ELEVATED PSA: ICD-10-CM

## 2022-06-09 DIAGNOSIS — J18.9 PNEUMONIA OF RIGHT LOWER LOBE DUE TO INFECTIOUS ORGANISM: Primary | ICD-10-CM

## 2022-06-09 DIAGNOSIS — M72.0 DUPUYTREN'S CONTRACTURE: ICD-10-CM

## 2022-06-09 DIAGNOSIS — F10.10 ALCOHOL ABUSE: ICD-10-CM

## 2022-06-09 DIAGNOSIS — M62.81 HAND MUSCLE WEAKNESS: ICD-10-CM

## 2022-06-09 DIAGNOSIS — F32.1 CURRENT MODERATE EPISODE OF MAJOR DEPRESSIVE DISORDER WITHOUT PRIOR EPISODE (HCC): ICD-10-CM

## 2022-06-09 PROCEDURE — 3023F SPIROM DOC REV: CPT | Performed by: INTERNAL MEDICINE

## 2022-06-09 PROCEDURE — G8419 CALC BMI OUT NRM PARAM NOF/U: HCPCS | Performed by: INTERNAL MEDICINE

## 2022-06-09 PROCEDURE — 4004F PT TOBACCO SCREEN RCVD TLK: CPT | Performed by: INTERNAL MEDICINE

## 2022-06-09 PROCEDURE — 1123F ACP DISCUSS/DSCN MKR DOCD: CPT | Performed by: INTERNAL MEDICINE

## 2022-06-09 PROCEDURE — 99214 OFFICE O/P EST MOD 30 MIN: CPT | Performed by: INTERNAL MEDICINE

## 2022-06-09 PROCEDURE — G8427 DOCREV CUR MEDS BY ELIG CLIN: HCPCS | Performed by: INTERNAL MEDICINE

## 2022-06-09 PROCEDURE — 3017F COLORECTAL CA SCREEN DOC REV: CPT | Performed by: INTERNAL MEDICINE

## 2022-06-09 NOTE — ASSESSMENT & PLAN NOTE
Last chest x-ray in the ER described as developing right lower lobe pneumonia. Patient is treated with antibiotic Levaquin.   We will recheck chest x-ray

## 2022-06-09 NOTE — ASSESSMENT & PLAN NOTE
S/P cervical discectomy and fusion with plate and screw. On 7/22/16.  Dr Jorge A Rocha at [facility]  [facility]  Pt is on oxycodone 5 mg q 6 hours

## 2022-06-09 NOTE — ASSESSMENT & PLAN NOTE
patient has weight loss for some time off and on had had investigation for that. Since he developed pneumonia again he lost another 5 pounds.   We will check T4 and TSH

## 2022-06-09 NOTE — ASSESSMENT & PLAN NOTE
patient has history of depression that is controlled. He takes trazodone only for sleep as needed.   Denies any suicidal ideation

## 2022-06-09 NOTE — PROGRESS NOTES
Demian Pate  Patient's  is 1955  Seen in office on 2022      SUBJECTIVE:  Juan Mckeon vel 74 y. o.year old male presents today   Chief Complaint   Patient presents with    Follow-up     was seen in ED 2 weeks ago    Wheezing     expiratory    Fatigue     Pt is here for f/u of COPD , ED visit, chronic pain  Pt states he went to ER on 22 for cough, congestion and SOB>   Chest x ray showed copd, increased density RLL ? Developing pneumonia  Pt was given Levaquin and continued on inhalers and nebulizer treatments  Pt states he is feeling better now. Still has some mild cough. Has COPD and is using all the inhalers and neb at home he is on O2 24/7 : 2L/M  Seen Dr Rodriguez Records in 2022 was stable then. Patient is still smoking about half a pack per day. He is unable to quit smoking. He quit only once when he had the neck surgery for about a week. He does not want to go to the [facility] and does not want any help for that. Patient states he is doing on his own    Patient has lost another 5 pounds. Patient states he was sick and he was not eating well. He has chronic pain and is taking oxycodone 3 times a day for pain control. Taking medications regularly. No side effects noted. Review of Systems  Review of system normal except as in HPI  OBJECTIVE: /60   Pulse 72   Temp 97.2 °F (36.2 °C)   Resp 16   Wt 110 lb 12.8 oz (50.3 kg)   BMI 17.88 kg/m²     Wt Readings from Last 3 Encounters:   22 110 lb 12.8 oz (50.3 kg)   22 115 lb (52.2 kg)   22 115 lb (52.2 kg)        Patient was seen taking COVID-19 precautions. Face mask, gloves were used. Patient also wore facemask. GENERAL: - Alert, oriented, pleasant, in no apparent distress. HEENT: - Conjunctiva pink, no scleral icterus. ENT clear. NECK: -Supple. No jugular venous distention noted. No masses felt,  CARDIOVASCULAR: - Normal S1 and S2    PULMONARY: - No respiratory distress. No wheezes or rales.   Poor air entry bilaterally  ABDOMEN: - Soft and non-tender,no masses  ororganomegaly. EXTREMITIES: - No cyanosis, clubbing, or significant edema. SKIN: Skin is warm and dry. NEUROLOGICAL: - Cranial nerves II through XII are grossly intact. IMPRESSION:    Encounter Diagnoses   Name Primary?  Pneumonia of right lower lobe due to infectious organism Yes    COPD, severe (HCC)     Chronic respiratory failure, unspecified whether with hypoxia or hypercapnia (HCC)     Current moderate episode of major depressive disorder without prior episode ([facility])     Adjustment insomnia     Tobacco abuse disorder     Cervical radiculopathy     Weight loss     Alcohol abuse     Pain management        ASSESSMENT/PLAN:    COPD, severe ([facility] [address]   . Patient has severe COPD. Taking DuoNeb and Symbicort. He is on oxygen 2 L/min    Chronic respiratory failure (HCC)    Due to severe COPD. Patient is on oxygen 2L/m and nebulizer treatments    Current moderate episode of major depressive disorder without prior episode [facility])    patient has history of depression that is controlled. He takes trazodone only for sleep as needed. Denies any suicidal ideation    Adjustment insomnia    takes trazodone occasionally to help to sleep    Tobacco abuse disorder    patient still smoking about half a pack per day he wants to quit on his own. Cervical radiculopathy    S/P cervical discectomy and fusion with plate and screw. On 7/22/16. Dr Twan Green at [facility]  [facility]  Pt is on oxycodone 5 mg q 6 hours    Dupuytren's contracture       Weight loss    patient has weight loss for some time off and on had had investigation for that. Since he developed pneumonia again he lost another 5 pounds. We will check T4 and TSH      Elevated PSA   . Last PSA was normal in March 2021. We will check PSA again    Alcohol abuse    patient still drinks alcohol about 6 pack/day.     Hand muscle weakness stable       Pain management    Patient is going to pain clinic Dr. Brittany Stephens. [facility] pain clinic  Dx pain in the cervical spine. Had surgery for neck in the past     Pneumonia of right lower lobe due to infectious organism    Last chest x-ray in the ER described as developing right lower lobe pneumonia. Patient is treated with antibiotic Levaquin. We will recheck chest x-ray    To office in 3-month    Orders Placed This Encounter   Procedures    XR CHEST (2 VW)    T4, Free    TSH    PSA Screening         Mediations reviewed with the patient. Continue current medications. Appropriate prescriptions are addressed. After visit summeryprovided. Follow up as directed sooner if needed. Questions answered and patient verbalizes understanding. Call for any problems, questions, or concerns. No Known Allergies  Current Outpatient Medications   Medication Sig Dispense Refill    ipratropium-albuterol (DUONEB) 0.5-2.5 (3) MG/3ML SOLN nebulizer solution INHALE THE CONTENTS OF 1 VIAL VIA NEBULIZER FOUR TIMES DAILY 1080 mL 1    albuterol sulfate  (90 Base) MCG/ACT inhaler INHALE 2 PUFFS EVERY 6 HOURS AS NEEDED FOR WHEEZING 54 g 5    predniSONE (DELTASONE) 5 MG tablet Take 1 tablet by mouth daily 90 tablet 3    traZODone (DESYREL) 100 MG tablet TAKE 1 TABLET BY MOUTH NIGHTLY (Patient taking differently: Take 100 mg by mouth nightly as needed ) 30 tablet 3    SYMBICORT 160-4.5 MCG/ACT AERO INHALE 2 PUFFS TWICE DAILY 3 Inhaler 3    Turmeric 500 MG CAPS Take 1 capsule by mouth daily      Cholecalciferol (VITAMIN D3) 1.25 MG (09918 UT) CAPS Take 1 capsule by mouth daily       oxyCODONE (ROXICODONE) 5 MG immediate release tablet Take 5 mg by mouth every 6 hours as needed for Pain.  OXYGEN Inhale 2 L into the lungs continuous       Multiple Vitamins-Minerals (MULTIVITAMIN ADULTS PO) Take by mouth daily        No current facility-administered medications for this visit.      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 On 7/22/16. Dr Domingo Samayoa at [address] [facility] polyps 5-    Dr. Shelbie Calloway COPD (chronic obstructive pulmonary disease) ([facility] [address] 5/19/2014    PFTs showed severe COPD in 5/14- follows with Dr Lamont Colunga COPD exacerbation [facility]) 6/10/2016    Diverticulosis 9-    Dupuytren's contracture 5/26/2017    Right palm    Elevated PSA 12/21/2018 12/2018 : PSA is 6    H/O colonoscopy 9-18-14    Dr. Shelbie Calloway H/O echocardiogram 08/17/2016    EF 55-60%. Abnormal diastolic function. Mild TR. Borderline right atrium and RV dilatation.  Hiatal hernia     History of alcohol abuse     \"average 6 pk beer per day\"    History of drug abuse ([facility] [address]     does use marijuana\"last used cocaine over 10 yrs ago\" per pt on 10/3/2018    Internal hemorrhoids 9-    Intervertebral disc protrusion 5/19/2014    Cervical region on MRI    Lingular pneumonia 4/3/2018    Lymphocytic colitis 9-18-14    Dr. Constantine Pichardo, will treat if symptomatic    On home oxygen therapy     2l/nc at night only    Pneumonia 11/11/2021    Pneumonia of left lower lobe due to infectious organism 11/27/2018    Sepsis due to pneumonia ([facility] [address] 6/10/2016    Spinal stenosis in cervical region 5/19/2014    MRI showed  Disc protrusion. Severe spinal stenosis at C3-C4    Spinal stenosis of cervical region 3/14/2016    S/P cervical discectomy and fusion with plate and screw. On 7/22/16. Dr Domingo Samayoa at [address] abuse disorder 5/19/2014    Wears glasses     to read     Past Surgical History:   Procedure Laterality Date    CERVICAL SPINE SURGERY  2015    \"have plate in neck done at [address] [facility]      9- Dr Fany Pichardo.  f/u 3 years    COLONOSCOPY  10/04/2018    COLONOSCOPY N/A 10/4/2018    COLONOSCOPY POLYPECTOMY SNARE/COLD BIOPSY performed by Noemi Denny MD at [facility] in 5 years   [address] POLYPECTOMY  10/04/2018    2 Polyps Removed    SIGMOIDOSCOPY      UPPER GASTROINTESTINAL ENDOSCOPY  10/04/2018    with Biopsy    UPPER GASTROINTESTINAL ENDOSCOPY N/A 10/4/2018    EGD BIOPSY performed by Noemi Denny MD at [facility] ENDOSCOPY     Social History     Tobacco Use    Smoking status: Current Every Day Smoker     Packs/day: 0.50     Years: 64.00     Pack years: 32.00     Types: Cigarettes     Start date: 1970    Smokeless tobacco: Never Used   Substance Use Topics    Alcohol use:  Yes     Alcohol/week: 6.0 standard drinks     Types: 6 Cans of beer per week     Comment: 3 beers daily       LAB REVIEW:  CBC:   Lab Results   Component Value Date    WBC 27.7 05/23/2022    HGB 15.0 05/23/2022    HCT 47.5 05/23/2022     05/23/2022     Lipids:   Lab Results   Component Value Date    HDL 90 03/09/2021    LDLDIRECT 88 03/09/2021    TRIGLYCFAST 46 03/09/2021    CHOLFAST 186 03/09/2021     Renal:   Lab Results   Component Value Date    BUN 7 05/23/2022    CREATININE 0.6 05/23/2022     05/23/2022    K 4.5 05/23/2022    ALT 15 09/14/2021    AST 20 09/14/2021    GLUCOSE 119 05/23/2022    GLUF 123 09/14/2021     PT/INR: No results found for: INR  A1C:   Lab Results   Component Value Date    LABA1C 5.1 08/07/2020           Cortez Knox MD, 6/9/2022 , 10:29 AM

## 2022-06-27 RX ORDER — TRAZODONE HYDROCHLORIDE 50 MG/1
50 TABLET ORAL NIGHTLY
Qty: 30 TABLET | Refills: 1 | Status: SHIPPED | OUTPATIENT
Start: 2022-06-27 | End: 2022-07-27 | Stop reason: SDUPTHER

## 2022-07-27 RX ORDER — TRAZODONE HYDROCHLORIDE 50 MG/1
50 TABLET ORAL NIGHTLY
Qty: 90 TABLET | Refills: 1 | Status: SHIPPED | OUTPATIENT
Start: 2022-07-27 | End: 2022-10-25

## 2022-08-17 RX ORDER — IPRATROPIUM BROMIDE AND ALBUTEROL SULFATE 2.5; .5 MG/3ML; MG/3ML
SOLUTION RESPIRATORY (INHALATION)
Qty: 1080 ML | Refills: 1 | Status: SHIPPED | OUTPATIENT
Start: 2022-08-17

## 2022-11-12 ENCOUNTER — APPOINTMENT (OUTPATIENT)
Dept: GENERAL RADIOLOGY | Age: 67
DRG: 871 | End: 2022-11-12
Payer: MEDICARE

## 2022-11-12 ENCOUNTER — HOSPITAL ENCOUNTER (INPATIENT)
Age: 67
LOS: 2 days | Discharge: HOME | DRG: 871 | End: 2022-11-14
Attending: EMERGENCY MEDICINE | Admitting: INTERNAL MEDICINE
Payer: MEDICARE

## 2022-11-12 DIAGNOSIS — L02.419 CELLULITIS AND ABSCESS OF LEG: ICD-10-CM

## 2022-11-12 DIAGNOSIS — D72.829 LEUKOCYTOSIS, UNSPECIFIED TYPE: ICD-10-CM

## 2022-11-12 DIAGNOSIS — A28.1 CAT-SCRATCH DISEASE: Primary | ICD-10-CM

## 2022-11-12 DIAGNOSIS — J44.1 ACUTE EXACERBATION OF CHRONIC OBSTRUCTIVE PULMONARY DISEASE (COPD) (HCC): ICD-10-CM

## 2022-11-12 DIAGNOSIS — L03.119 CELLULITIS AND ABSCESS OF LEG: ICD-10-CM

## 2022-11-12 LAB
ALBUMIN SERPL-MCNC: 3.6 GM/DL (ref 3.4–5)
ALP BLD-CCNC: 69 IU/L (ref 40–129)
ALT SERPL-CCNC: 12 U/L (ref 10–40)
ANION GAP SERPL CALCULATED.3IONS-SCNC: 3 MMOL/L (ref 4–16)
AST SERPL-CCNC: 13 IU/L (ref 15–37)
BASOPHILS ABSOLUTE: 0.1 K/CU MM
BASOPHILS RELATIVE PERCENT: 0.2 % (ref 0–1)
BILIRUB SERPL-MCNC: 0.5 MG/DL (ref 0–1)
BUN BLDV-MCNC: 7 MG/DL (ref 6–23)
CALCIUM SERPL-MCNC: 9.1 MG/DL (ref 8.3–10.6)
CHLORIDE BLD-SCNC: 94 MMOL/L (ref 99–110)
CO2: 40 MMOL/L (ref 21–32)
CREAT SERPL-MCNC: 0.4 MG/DL (ref 0.9–1.3)
DIFFERENTIAL TYPE: ABNORMAL
EKG ATRIAL RATE: 90 BPM
EKG DIAGNOSIS: NORMAL
EKG P AXIS: 81 DEGREES
EKG P-R INTERVAL: 150 MS
EKG Q-T INTERVAL: 354 MS
EKG QRS DURATION: 90 MS
EKG QTC CALCULATION (BAZETT): 433 MS
EKG R AXIS: 45 DEGREES
EKG T AXIS: 72 DEGREES
EKG VENTRICULAR RATE: 90 BPM
EOSINOPHILS ABSOLUTE: 0 K/CU MM
EOSINOPHILS RELATIVE PERCENT: 0 % (ref 0–3)
GFR SERPL CREATININE-BSD FRML MDRD: >60 ML/MIN/1.73M2
GLUCOSE BLD-MCNC: 127 MG/DL (ref 70–99)
HCT VFR BLD CALC: 40.6 % (ref 42–52)
HEMOGLOBIN: 12.5 GM/DL (ref 13.5–18)
IMMATURE NEUTROPHIL %: 1 % (ref 0–0.43)
LACTIC ACID, SEPSIS: 0.9 MMOL/L (ref 0.5–1.9)
LYMPHOCYTES ABSOLUTE: 0.7 K/CU MM
LYMPHOCYTES RELATIVE PERCENT: 2.5 % (ref 24–44)
MCH RBC QN AUTO: 30.2 PG (ref 27–31)
MCHC RBC AUTO-ENTMCNC: 30.8 % (ref 32–36)
MCV RBC AUTO: 98.1 FL (ref 78–100)
MONOCYTES ABSOLUTE: 1.1 K/CU MM
MONOCYTES RELATIVE PERCENT: 4.3 % (ref 0–4)
PDW BLD-RTO: 14.4 % (ref 11.7–14.9)
PLATELET # BLD: 351 K/CU MM (ref 140–440)
PMV BLD AUTO: 9.1 FL (ref 7.5–11.1)
POTASSIUM SERPL-SCNC: 4.3 MMOL/L (ref 3.5–5.1)
RBC # BLD: 4.14 M/CU MM (ref 4.6–6.2)
SEGMENTED NEUTROPHILS ABSOLUTE COUNT: 23.9 K/CU MM
SEGMENTED NEUTROPHILS RELATIVE PERCENT: 92 % (ref 36–66)
SODIUM BLD-SCNC: 137 MMOL/L (ref 135–145)
TOTAL IMMATURE NEUTOROPHIL: 0.26 K/CU MM
TOTAL PROTEIN: 6 GM/DL (ref 6.4–8.2)
WBC # BLD: 26 K/CU MM (ref 4–10.5)

## 2022-11-12 PROCEDURE — 6360000002 HC RX W HCPCS: Performed by: NURSE PRACTITIONER

## 2022-11-12 PROCEDURE — 6370000000 HC RX 637 (ALT 250 FOR IP)

## 2022-11-12 PROCEDURE — 1200000000 HC SEMI PRIVATE

## 2022-11-12 PROCEDURE — 83605 ASSAY OF LACTIC ACID: CPT

## 2022-11-12 PROCEDURE — 96375 TX/PRO/DX INJ NEW DRUG ADDON: CPT

## 2022-11-12 PROCEDURE — 99285 EMERGENCY DEPT VISIT HI MDM: CPT

## 2022-11-12 PROCEDURE — 6370000000 HC RX 637 (ALT 250 FOR IP): Performed by: NURSE PRACTITIONER

## 2022-11-12 PROCEDURE — 6370000000 HC RX 637 (ALT 250 FOR IP): Performed by: EMERGENCY MEDICINE

## 2022-11-12 PROCEDURE — 94640 AIRWAY INHALATION TREATMENT: CPT

## 2022-11-12 PROCEDURE — 96365 THER/PROPH/DIAG IV INF INIT: CPT

## 2022-11-12 PROCEDURE — 80053 COMPREHEN METABOLIC PANEL: CPT

## 2022-11-12 PROCEDURE — 94761 N-INVAS EAR/PLS OXIMETRY MLT: CPT

## 2022-11-12 PROCEDURE — 93010 ELECTROCARDIOGRAM REPORT: CPT | Performed by: INTERNAL MEDICINE

## 2022-11-12 PROCEDURE — 6360000002 HC RX W HCPCS: Performed by: EMERGENCY MEDICINE

## 2022-11-12 PROCEDURE — 85025 COMPLETE CBC W/AUTO DIFF WBC: CPT

## 2022-11-12 PROCEDURE — 93005 ELECTROCARDIOGRAM TRACING: CPT | Performed by: EMERGENCY MEDICINE

## 2022-11-12 PROCEDURE — 2700000000 HC OXYGEN THERAPY PER DAY

## 2022-11-12 PROCEDURE — 2580000003 HC RX 258: Performed by: NURSE PRACTITIONER

## 2022-11-12 PROCEDURE — 71045 X-RAY EXAM CHEST 1 VIEW: CPT

## 2022-11-12 PROCEDURE — 2580000003 HC RX 258: Performed by: EMERGENCY MEDICINE

## 2022-11-12 PROCEDURE — 87040 BLOOD CULTURE FOR BACTERIA: CPT

## 2022-11-12 RX ORDER — NICOTINE 21 MG/24HR
1 PATCH, TRANSDERMAL 24 HOURS TRANSDERMAL DAILY
Status: DISCONTINUED | OUTPATIENT
Start: 2022-11-12 | End: 2022-11-14 | Stop reason: HOSPADM

## 2022-11-12 RX ORDER — GAUZE BANDAGE 2" X 2"
100 BANDAGE TOPICAL DAILY
Status: DISCONTINUED | OUTPATIENT
Start: 2022-11-12 | End: 2022-11-14 | Stop reason: HOSPADM

## 2022-11-12 RX ORDER — AZITHROMYCIN 250 MG/1
500 TABLET, FILM COATED ORAL ONCE
Status: COMPLETED | OUTPATIENT
Start: 2022-11-12 | End: 2022-11-12

## 2022-11-12 RX ORDER — METHYLPREDNISOLONE SODIUM SUCCINATE 125 MG/2ML
125 INJECTION, POWDER, LYOPHILIZED, FOR SOLUTION INTRAMUSCULAR; INTRAVENOUS ONCE
Status: COMPLETED | OUTPATIENT
Start: 2022-11-12 | End: 2022-11-12

## 2022-11-12 RX ORDER — IPRATROPIUM BROMIDE AND ALBUTEROL SULFATE 2.5; .5 MG/3ML; MG/3ML
1 SOLUTION RESPIRATORY (INHALATION) ONCE
Status: COMPLETED | OUTPATIENT
Start: 2022-11-12 | End: 2022-11-12

## 2022-11-12 RX ORDER — SODIUM CHLORIDE 0.9 % (FLUSH) 0.9 %
5-40 SYRINGE (ML) INJECTION PRN
Status: DISCONTINUED | OUTPATIENT
Start: 2022-11-12 | End: 2022-11-14 | Stop reason: HOSPADM

## 2022-11-12 RX ORDER — LORAZEPAM 2 MG/ML
3 INJECTION INTRAMUSCULAR
Status: DISCONTINUED | OUTPATIENT
Start: 2022-11-12 | End: 2022-11-14 | Stop reason: HOSPADM

## 2022-11-12 RX ORDER — POLYETHYLENE GLYCOL 3350 17 G/17G
17 POWDER, FOR SOLUTION ORAL DAILY PRN
Status: DISCONTINUED | OUTPATIENT
Start: 2022-11-12 | End: 2022-11-14 | Stop reason: HOSPADM

## 2022-11-12 RX ORDER — LORAZEPAM 1 MG/1
1 TABLET ORAL
Status: DISCONTINUED | OUTPATIENT
Start: 2022-11-12 | End: 2022-11-14 | Stop reason: HOSPADM

## 2022-11-12 RX ORDER — LORAZEPAM 2 MG/ML
4 INJECTION INTRAMUSCULAR
Status: DISCONTINUED | OUTPATIENT
Start: 2022-11-12 | End: 2022-11-14 | Stop reason: HOSPADM

## 2022-11-12 RX ORDER — ONDANSETRON 2 MG/ML
4 INJECTION INTRAMUSCULAR; INTRAVENOUS EVERY 6 HOURS PRN
Status: DISCONTINUED | OUTPATIENT
Start: 2022-11-12 | End: 2022-11-14 | Stop reason: HOSPADM

## 2022-11-12 RX ORDER — CHOLECALCIFEROL (VITAMIN D3) 1250 MCG
1 CAPSULE ORAL DAILY
Status: DISCONTINUED | OUTPATIENT
Start: 2022-11-12 | End: 2022-11-12 | Stop reason: DRUGHIGH

## 2022-11-12 RX ORDER — PREDNISONE 20 MG/1
40 TABLET ORAL DAILY
Status: DISCONTINUED | OUTPATIENT
Start: 2022-11-15 | End: 2022-11-14 | Stop reason: HOSPADM

## 2022-11-12 RX ORDER — TRAZODONE HYDROCHLORIDE 100 MG/1
100 TABLET ORAL NIGHTLY PRN
Status: DISCONTINUED | OUTPATIENT
Start: 2022-11-12 | End: 2022-11-12

## 2022-11-12 RX ORDER — LORAZEPAM 2 MG/ML
2 INJECTION INTRAMUSCULAR
Status: DISCONTINUED | OUTPATIENT
Start: 2022-11-12 | End: 2022-11-14 | Stop reason: HOSPADM

## 2022-11-12 RX ORDER — IPRATROPIUM BROMIDE AND ALBUTEROL SULFATE 2.5; .5 MG/3ML; MG/3ML
1 SOLUTION RESPIRATORY (INHALATION)
Status: DISCONTINUED | OUTPATIENT
Start: 2022-11-12 | End: 2022-11-14 | Stop reason: HOSPADM

## 2022-11-12 RX ORDER — ACETAMINOPHEN 650 MG/1
650 SUPPOSITORY RECTAL EVERY 6 HOURS PRN
Status: DISCONTINUED | OUTPATIENT
Start: 2022-11-12 | End: 2022-11-14 | Stop reason: HOSPADM

## 2022-11-12 RX ORDER — LORAZEPAM 1 MG/1
4 TABLET ORAL
Status: DISCONTINUED | OUTPATIENT
Start: 2022-11-12 | End: 2022-11-14 | Stop reason: HOSPADM

## 2022-11-12 RX ORDER — SODIUM CHLORIDE 0.9 % (FLUSH) 0.9 %
5-40 SYRINGE (ML) INJECTION EVERY 12 HOURS SCHEDULED
Status: DISCONTINUED | OUTPATIENT
Start: 2022-11-12 | End: 2022-11-14 | Stop reason: HOSPADM

## 2022-11-12 RX ORDER — ONDANSETRON 4 MG/1
4 TABLET, ORALLY DISINTEGRATING ORAL EVERY 8 HOURS PRN
Status: DISCONTINUED | OUTPATIENT
Start: 2022-11-12 | End: 2022-11-14 | Stop reason: HOSPADM

## 2022-11-12 RX ORDER — M-VIT,TX,IRON,MINS/CALC/FOLIC 27MG-0.4MG
1 TABLET ORAL DAILY
Status: DISCONTINUED | OUTPATIENT
Start: 2022-11-12 | End: 2022-11-14 | Stop reason: HOSPADM

## 2022-11-12 RX ORDER — LORAZEPAM 2 MG/ML
1 INJECTION INTRAMUSCULAR
Status: DISCONTINUED | OUTPATIENT
Start: 2022-11-12 | End: 2022-11-14 | Stop reason: HOSPADM

## 2022-11-12 RX ORDER — ERGOCALCIFEROL 1.25 MG/1
50000 CAPSULE ORAL WEEKLY
Status: DISCONTINUED | OUTPATIENT
Start: 2022-11-19 | End: 2022-11-14 | Stop reason: HOSPADM

## 2022-11-12 RX ORDER — LORAZEPAM 1 MG/1
2 TABLET ORAL
Status: DISCONTINUED | OUTPATIENT
Start: 2022-11-12 | End: 2022-11-14 | Stop reason: HOSPADM

## 2022-11-12 RX ORDER — ACETAMINOPHEN 325 MG/1
650 TABLET ORAL EVERY 6 HOURS PRN
Status: DISCONTINUED | OUTPATIENT
Start: 2022-11-12 | End: 2022-11-14 | Stop reason: HOSPADM

## 2022-11-12 RX ORDER — OXYCODONE HYDROCHLORIDE 5 MG/1
5 TABLET ORAL EVERY 6 HOURS
Status: DISCONTINUED | OUTPATIENT
Start: 2022-11-12 | End: 2022-11-14 | Stop reason: HOSPADM

## 2022-11-12 RX ORDER — SODIUM CHLORIDE 9 MG/ML
INJECTION, SOLUTION INTRAVENOUS PRN
Status: DISCONTINUED | OUTPATIENT
Start: 2022-11-12 | End: 2022-11-14 | Stop reason: SDUPTHER

## 2022-11-12 RX ORDER — IPRATROPIUM BROMIDE AND ALBUTEROL SULFATE 2.5; .5 MG/3ML; MG/3ML
SOLUTION RESPIRATORY (INHALATION)
Status: COMPLETED
Start: 2022-11-12 | End: 2022-11-12

## 2022-11-12 RX ORDER — SODIUM CHLORIDE 9 MG/ML
INJECTION, SOLUTION INTRAVENOUS PRN
Status: DISCONTINUED | OUTPATIENT
Start: 2022-11-12 | End: 2022-11-14 | Stop reason: HOSPADM

## 2022-11-12 RX ORDER — LORAZEPAM 1 MG/1
3 TABLET ORAL
Status: DISCONTINUED | OUTPATIENT
Start: 2022-11-12 | End: 2022-11-14 | Stop reason: HOSPADM

## 2022-11-12 RX ORDER — METHYLPREDNISOLONE SODIUM SUCCINATE 40 MG/ML
40 INJECTION, POWDER, LYOPHILIZED, FOR SOLUTION INTRAMUSCULAR; INTRAVENOUS EVERY 6 HOURS
Status: COMPLETED | OUTPATIENT
Start: 2022-11-12 | End: 2022-11-14

## 2022-11-12 RX ORDER — ENOXAPARIN SODIUM 100 MG/ML
30 INJECTION SUBCUTANEOUS DAILY
Status: DISCONTINUED | OUTPATIENT
Start: 2022-11-12 | End: 2022-11-14 | Stop reason: HOSPADM

## 2022-11-12 RX ADMIN — CEFEPIME 2000 MG: 2 INJECTION, POWDER, FOR SOLUTION INTRAVENOUS at 21:20

## 2022-11-12 RX ADMIN — AZITHROMYCIN MONOHYDRATE 500 MG: 250 TABLET ORAL at 14:22

## 2022-11-12 RX ADMIN — OXYCODONE 5 MG: 5 TABLET ORAL at 16:11

## 2022-11-12 RX ADMIN — SODIUM CHLORIDE, PRESERVATIVE FREE 10 ML: 5 INJECTION INTRAVENOUS at 21:17

## 2022-11-12 RX ADMIN — AZITHROMYCIN MONOHYDRATE 500 MG: 500 INJECTION, POWDER, LYOPHILIZED, FOR SOLUTION INTRAVENOUS at 16:51

## 2022-11-12 RX ADMIN — MOMETASONE FUROATE AND FORMOTEROL FUMARATE DIHYDRATE 2 PUFF: 200; 5 AEROSOL RESPIRATORY (INHALATION) at 21:25

## 2022-11-12 RX ADMIN — IPRATROPIUM BROMIDE AND ALBUTEROL SULFATE 3 ML: .5; 3 SOLUTION RESPIRATORY (INHALATION) at 12:42

## 2022-11-12 RX ADMIN — IPRATROPIUM BROMIDE AND ALBUTEROL SULFATE 1 AMPULE: .5; 3 SOLUTION RESPIRATORY (INHALATION) at 15:49

## 2022-11-12 RX ADMIN — IPRATROPIUM BROMIDE AND ALBUTEROL SULFATE 1 AMPULE: .5; 3 SOLUTION RESPIRATORY (INHALATION) at 13:00

## 2022-11-12 RX ADMIN — METHYLPREDNISOLONE SODIUM SUCCINATE 40 MG: 40 INJECTION, POWDER, FOR SOLUTION INTRAMUSCULAR; INTRAVENOUS at 21:15

## 2022-11-12 RX ADMIN — IPRATROPIUM BROMIDE AND ALBUTEROL SULFATE 1 AMPULE: .5; 3 SOLUTION RESPIRATORY (INHALATION) at 21:24

## 2022-11-12 RX ADMIN — SODIUM CHLORIDE: 9 INJECTION, SOLUTION INTRAVENOUS at 21:20

## 2022-11-12 RX ADMIN — AMPICILLIN SODIUM AND SULBACTAM SODIUM 3000 MG: 2; 1 INJECTION, POWDER, FOR SOLUTION INTRAMUSCULAR; INTRAVENOUS at 13:24

## 2022-11-12 RX ADMIN — METHYLPREDNISOLONE SODIUM SUCCINATE 125 MG: 125 INJECTION, POWDER, FOR SOLUTION INTRAMUSCULAR; INTRAVENOUS at 13:12

## 2022-11-12 RX ADMIN — SODIUM CHLORIDE: 9 INJECTION, SOLUTION INTRAVENOUS at 16:50

## 2022-11-12 RX ADMIN — METHYLPREDNISOLONE SODIUM SUCCINATE 40 MG: 40 INJECTION, POWDER, FOR SOLUTION INTRAMUSCULAR; INTRAVENOUS at 16:49

## 2022-11-12 RX ADMIN — OXYCODONE 5 MG: 5 TABLET ORAL at 21:15

## 2022-11-12 RX ADMIN — SODIUM CHLORIDE, PRESERVATIVE FREE 10 ML: 5 INJECTION INTRAVENOUS at 16:49

## 2022-11-12 ASSESSMENT — ENCOUNTER SYMPTOMS
ABDOMINAL DISTENTION: 0
VOICE CHANGE: 0
RHINORRHEA: 0
ANAL BLEEDING: 0
WHEEZING: 1
STRIDOR: 0
COUGH: 1
COLOR CHANGE: 1
SINUS PRESSURE: 0
NAUSEA: 0
ABDOMINAL PAIN: 0
SORE THROAT: 0
EYE PAIN: 0
TROUBLE SWALLOWING: 0
CONSTIPATION: 0
BACK PAIN: 0
VOMITING: 0
EYE ITCHING: 0
CHEST TIGHTNESS: 0
FACIAL SWELLING: 0
EYE REDNESS: 0
EYE DISCHARGE: 0
SHORTNESS OF BREATH: 1
PHOTOPHOBIA: 0
DIARRHEA: 0
BLOOD IN STOOL: 0

## 2022-11-12 ASSESSMENT — PAIN SCALES - GENERAL
PAINLEVEL_OUTOF10: 9
PAINLEVEL_OUTOF10: 0
PAINLEVEL_OUTOF10: 5

## 2022-11-12 ASSESSMENT — PAIN DESCRIPTION - LOCATION
LOCATION: FOOT;LEG
LOCATION: LEG;NECK

## 2022-11-12 ASSESSMENT — PAIN DESCRIPTION - ORIENTATION
ORIENTATION: RIGHT
ORIENTATION: RIGHT

## 2022-11-12 ASSESSMENT — PAIN DESCRIPTION - DESCRIPTORS
DESCRIPTORS: ACHING
DESCRIPTORS: TENDER;THROBBING;DISCOMFORT

## 2022-11-12 ASSESSMENT — PAIN DESCRIPTION - FREQUENCY
FREQUENCY: CONTINUOUS
FREQUENCY: INTERMITTENT

## 2022-11-12 ASSESSMENT — PAIN DESCRIPTION - PAIN TYPE
TYPE: ACUTE PAIN
TYPE: CHRONIC PAIN;ACUTE PAIN

## 2022-11-12 ASSESSMENT — PAIN DESCRIPTION - ONSET
ONSET: ON-GOING
ONSET: ON-GOING

## 2022-11-12 ASSESSMENT — PAIN - FUNCTIONAL ASSESSMENT
PAIN_FUNCTIONAL_ASSESSMENT: ACTIVITIES ARE NOT PREVENTED
PAIN_FUNCTIONAL_ASSESSMENT: PREVENTS OR INTERFERES SOME ACTIVE ACTIVITIES AND ADLS

## 2022-11-12 NOTE — ED PROVIDER NOTES
Kathryn Boswell is a 74year old male who presents to the ED with concern about his right lower leg. He states that he was playing with his cat a few days ago when she scratched him with her claws in the area of the right ankle. He says he's had this in the pat but has never gotten swollen or red before. Also, he has COPD and is on home oxygen 2 liters nasal canula and he presents with increased SOB. No recent changes in his medications. He has not recently been on steroids or antibiotics other than the 5 mg Prednisone that he takes daily. He denies fever. /79   Pulse 94   Temp 98.3 °F (36.8 °C) (Oral)   Resp 27   Wt 110 lb (49.9 kg)   SpO2 97%   BMI 17.75 kg/m²     I have reviewed the following from the nursing documentation:      Prior to Admission medications    Medication Sig Start Date End Date Taking? Authorizing Provider   ipratropium-albuterol (DUONEB) 0.5-2.5 (3) MG/3ML SOLN nebulizer solution INHALE THE CONTENTS OF 1 VIAL VIA NEBULIZER FOUR TIMES DAILY 8/17/22   Chencho Nguyen MD   traZODone (DESYREL) 50 MG tablet Take 1 tablet by mouth nightly 7/27/22 10/25/22  Chencho Nguyen MD   predniSONE (DELTASONE) 5 MG tablet TAKE 1 TABLET EVERY DAY 6/27/22   Moses Cobb MD   SYMBICORT 160-4.5 MCG/ACT AERO INHALE 2 PUFFS TWICE DAILY 6/27/22   Moses Cobb MD   albuterol sulfate  (90 Base) MCG/ACT inhaler INHALE 2 PUFFS EVERY 6 HOURS AS NEEDED FOR WHEEZING 11/30/21   Chencho Nguyen MD   traZODone (DESYREL) 100 MG tablet TAKE 1 TABLET BY MOUTH NIGHTLY  Patient taking differently: Take 100 mg by mouth nightly as needed 9/8/21   Chencho Nguyen MD   Turmeric 500 MG CAPS Take 1 capsule by mouth daily    Historical Provider, MD   Cholecalciferol (VITAMIN D3) 1.25 MG (48049 UT) CAPS Take 1 capsule by mouth daily     Historical Provider, MD   oxyCODONE (ROXICODONE) 5 MG immediate release tablet Take 5 mg by mouth every 6 hours as needed for Pain.     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 On 7/22/16. Dr Jonas Rinne at [facility] 2-    Dr. Vilma Villegas    COPD (chronic obstructive pulmonary disease) ([facility] [address] 5/19/2014    PFTs showed severe COPD in 5/14- follows with Dr Kong Benjamin    COPD exacerbation [facility]) 6/10/2016    Diverticulosis 9-    Dupuytren's contracture 5/26/2017    Right palm    Elevated PSA 12/21/2018 12/2018 : PSA is 6    H/O colonoscopy 9-18-14    Dr. Vilma Villegas    H/O echocardiogram 08/17/2016    EF 55-60%. Abnormal diastolic function. Mild TR. Borderline right atrium and RV dilatation. Hiatal hernia     History of alcohol abuse     \"average 6 pk beer per day\"    History of drug abuse ([facility] [address]     does use marijuana\"last used cocaine over 10 yrs ago\" per pt on 10/3/2018    Internal hemorrhoids 9-    Intervertebral disc protrusion 5/19/2014    Cervical region on MRI    Lingular pneumonia 4/3/2018    Lymphocytic colitis 9-18-14    Dr. Vilma Villegas, will treat if symptomatic    On home oxygen therapy     2l/nc at night only    Pneumonia 11/11/2021    Pneumonia of left lower lobe due to infectious organism 11/27/2018    Sepsis due to pneumonia ([facility] [address] 6/10/2016    Spinal stenosis in cervical region 5/19/2014    MRI showed  Disc protrusion. Severe spinal stenosis at C3-C4    Spinal stenosis of cervical region 3/14/2016    S/P cervical discectomy and fusion with plate and screw. On 7/22/16.  Dr Jonas Rinne at [address] [place] [address] abuse disorder 5/19/2014    Wears glasses     to read        Surgical History:   Past Surgical History:   Procedure Laterality Date    CERVICAL SPINE SURGERY  2015    \"have plate in neck done at [address]      9- Dr .Margery Romberg. f/u 3 years    COLONOSCOPY  10/04/2018    COLONOSCOPY N/A 10/4/2018    COLONOSCOPY POLYPECTOMY SNARE/COLD BIOPSY performed by Daya Castorena MD at [facility] in 5 years    [address]    POLYPECTOMY  10/04/2018    2 Polyps Removed    SIGMOIDOSCOPY      UPPER GASTROINTESTINAL ENDOSCOPY  10/04/2018    with Biopsy    UPPER GASTROINTESTINAL ENDOSCOPY N/A 10/4/2018    EGD BIOPSY performed by Daya Castorena MD at [facility] ENDOSCOPY        Family History:    Family History   Problem Relation Age of Onset    Cancer Mother         lung ca    Heart Disease Father     Heart Attack Father     Diabetes Father     Other Brother         hep C    Colon Cancer Neg Hx        Social History     Socioeconomic History    Marital status: Single     Spouse name: Not on file    Number of children: Not on file    Years of education: Not on file    Highest education level: Not on file   Occupational History    Not on file   Tobacco Use    Smoking status: Every Day     Packs/day: 0.50     Years: 64.00     Pack years: 32.00     Types: Cigarettes     Start date: 1970    Smokeless tobacco: Never   Vaping Use    Vaping Use: Former    Substances: Occasionally   Substance and Sexual Activity    Alcohol use:  Yes     Alcohol/week: 6.0 standard drinks     Types: 6 Cans of beer per week     Comment: 3 beers daily    Drug use: Yes     Types: Marijuana Seabron Barban)     Comment: 1-2 times per week marijuana    Sexual activity: Not Currently   Other Topics Concern    Not on file   Social History Narrative    Not on file     Social Determinants of Health     Financial Resource Strain: Low Risk     Difficulty of Paying Living Expenses: Not hard at all   Food Insecurity: No Food Insecurity    Worried About Running Out of Food in the Last Year: Never true    Ran Out of Food in the Last Year: Never true Transportation Needs: Not on file   Physical Activity: Not on file   Stress: Not on file   Social Connections: Not on file   Intimate Partner Violence: Not on file   Housing Stability: Not on file         Review of Systems   Constitutional:  Negative for activity change, appetite change, chills, diaphoresis, fatigue and fever. HENT: Negative. Negative for congestion, dental problem, ear pain, facial swelling, rhinorrhea, sinus pressure, sneezing, sore throat, tinnitus, trouble swallowing and voice change. Eyes:  Negative for photophobia, pain, discharge, redness, itching and visual disturbance. Respiratory:  Positive for cough, shortness of breath and wheezing. Negative for chest tightness and stridor. Cardiovascular:  Negative for chest pain, palpitations and leg swelling. Gastrointestinal:  Negative for abdominal distention, abdominal pain, anal bleeding, blood in stool, constipation, diarrhea, nausea and vomiting. Genitourinary:  Negative for difficulty urinating, dysuria, frequency, hematuria, penile discharge, testicular pain and urgency. Musculoskeletal:  Negative for back pain, joint swelling, neck pain and neck stiffness. Skin:  Positive for color change and wound. Negative for rash. Right lower leg and ankle   Neurological:  Negative for dizziness, syncope, facial asymmetry, speech difficulty, weakness, numbness and headaches. Hematological:  Does not bruise/bleed easily. Psychiatric/Behavioral:  Negative for agitation, confusion, hallucinations, self-injury, sleep disturbance and suicidal ideas. The patient is not nervous/anxious. All other systems reviewed and are negative. Physical Exam  Vitals and nursing note reviewed. Constitutional:       General: He is not in acute distress. Appearance: He is well-developed. HENT:      Head: Normocephalic and atraumatic.       Right Ear: External ear normal.      Left Ear: External ear normal.      Nose: Nose normal. Mouth/Throat:      Pharynx: No oropharyngeal exudate. Eyes:      General: No scleral icterus. Right eye: No discharge. Left eye: No discharge. Conjunctiva/sclera: Conjunctivae normal.      Pupils: Pupils are equal, round, and reactive to light. Neck:      Vascular: No JVD. Trachea: No tracheal deviation. Cardiovascular:      Rate and Rhythm: Normal rate and regular rhythm. Heart sounds: Normal heart sounds. No murmur heard. No friction rub. No gallop. Pulmonary:      Effort: No respiratory distress. Breath sounds: Wheezing present. No rales. Comments: Mildly tachyneic  Abdominal:      General: Bowel sounds are normal. There is no distension. Palpations: Abdomen is soft. There is no mass. Tenderness: There is no abdominal tenderness. There is no guarding or rebound. Musculoskeletal:         General: No tenderness. Normal range of motion. Cervical back: Normal range of motion and neck supple. Lymphadenopathy:      Cervical: No cervical adenopathy. Skin:     General: Skin is warm and dry. Capillary Refill: Capillary refill takes less than 2 seconds. Coloration: Skin is not pale. Findings: Erythema present. No rash. Comments: Soft tissue swelling and erythema, warm to touch right lower leg and ankle. Small open wound with eschar anterior ankle that the patient states is the site of \"cat scratch\". Neurological:      General: No focal deficit present. Mental Status: He is alert and oriented to person, place, and time. Cranial Nerves: No cranial nerve deficit. Motor: No abnormal muscle tone. Coordination: Coordination normal.      Deep Tendon Reflexes: Reflexes are normal and symmetric. Reflexes normal.   Psychiatric:         Behavior: Behavior normal.         Thought Content:  Thought content normal.         Judgment: Judgment normal.        Procedures     MDM    Results for orders placed or performed during the hospital encounter of 11/12/22   CBC with Auto Differential   Result Value Ref Range    WBC 26.0 (H) 4.0 - 10.5 K/CU MM    RBC 4.14 (L) 4.6 - 6.2 M/CU MM    Hemoglobin 12.5 (L) 13.5 - 18.0 GM/DL    Hematocrit 40.6 (L) 42 - 52 %    MCV 98.1 78 - 100 FL    MCH 30.2 27 - 31 PG    MCHC 30.8 (L) 32.0 - 36.0 %    RDW 14.4 11.7 - 14.9 %    Platelets 750 341 - 452 K/CU MM    MPV 9.1 7.5 - 11.1 FL    Differential Type AUTOMATED DIFFERENTIAL     Segs Relative 92.0 (H) 36 - 66 %    Lymphocytes % 2.5 (L) 24 - 44 %    Monocytes % 4.3 (H) 0 - 4 %    Eosinophils % 0.0 0 - 3 %    Basophils % 0.2 0 - 1 %    Segs Absolute 23.9 K/CU MM    Lymphocytes Absolute 0.7 K/CU MM    Monocytes Absolute 1.1 K/CU MM    Eosinophils Absolute 0.0 K/CU MM    Basophils Absolute 0.1 K/CU MM    Immature Neutrophil % 1.0 (H) 0 - 0.43 %    Total Immature Neutrophil 0.26 K/CU MM   Comprehensive Metabolic Panel w/ Reflex to MG   Result Value Ref Range    Sodium 137 135 - 145 MMOL/L    Potassium 4.3 3.5 - 5.1 MMOL/L    Chloride 94 (L) 99 - 110 mMol/L    CO2 40 (H) 21 - 32 MMOL/L    BUN 7 6 - 23 MG/DL    Creatinine 0.4 (L) 0.9 - 1.3 MG/DL    Est, Glom Filt Rate >60 >60 mL/min/1.73m2    Glucose 127 (H) 70 - 99 MG/DL    Calcium 9.1 8.3 - 10.6 MG/DL    Albumin 3.6 3.4 - 5.0 GM/DL    Total Protein 6.0 (L) 6.4 - 8.2 GM/DL    Total Bilirubin 0.5 0.0 - 1.0 MG/DL    ALT 12 10 - 40 U/L    AST 13 (L) 15 - 37 IU/L    Alkaline Phosphatase 69 40 - 129 IU/L    Anion Gap 3 (L) 4 - 16       I spoke with Dr. Martha Paige SIN, [facility] 5. We thoroughly discussed the history, physical exam, laboratory and imaging studies, as well as, emergency department course. Based upon that discussion, we've decided to admit Juan Ford for further observation and evaluation of [place] Check A Josh's cellulitis RLE and acute exacerbation of COPD.   As I have deemed necessary from their history, physical, and studies, I have considered and evaluated Juan Ford for the following diagnoses: FINAL IMPRESSION  1. Cellulitis and abscess of leg    2. Cat-scratch disease    3. Leukocytosis, unspecified type    4. Acute exacerbation of chronic obstructive pulmonary disease (COPD) ([facility])        Vitals:  Blood pressure (!) 92/49, pulse 93, temperature 98.3 °F (36.8 °C), temperature source Oral, resp. rate 25, weight 110 lb (49.9 kg), SpO2 98 %. Radiology  XR CHEST PORTABLE    Result Date: 11/12/2022  Bandlike opacity right midlung could represent subsegmental atelectasis or infection        EKG Interpretation. The Ekg interpreted by me in the absence of a cardiologist shows. normal sinus rhythm with a rate of 90  Axis is   Normal  QTc is  within an acceptable range  Intervals and Durations are unremarkable. No specific ST-T wave changes appreciated. No evidence of acute ischemia. No old EKGs available for comparison.         Adrian Carmona MD  11/12/22 [id number]

## 2022-11-12 NOTE — PROGRESS NOTES
4 Eyes Skin Assessment     NAME:  Sarah Yung OF BIRTH:  1955  MEDICAL RECORD NUMBER:  [id number]    The patient is being assess for  Admission    I agree that 2 RN's have performed a thorough Head to Toe Skin Assessment on the patient. ALL assessment sites listed below have been assessed. Areas assessed by both nurses:    Head, Face, Ears, Shoulders, Back, Chest, Arms, Elbows, Hands, Sacrum. Buttock, Coccyx, Ischium, and Legs. Feet and Heels  Patient has cellulitis noted to the right lower extremity with warmth and redness noted from the toes to the knee. Patient has a scab noted to the right inner foot where he says his cat clawed him. Patient has a scratch noted to the left outer foot, and patient states it wasn't from his cat. No other wounds noted. Does the Patient have a Wound? Yes wound(s) were present on assessment.  LDA wound assessment was Initiated and completed        Ellis Prevention initiated:  No   Wound Care Orders initiated:  NA    Pressure Injury (Stage 3,4, Unstageable, DTI, NWPT, and Complex wounds) if present place referral/consult order under [de-identified] No    New and Established Ostomies if present place consult order under : NA      Nurse 1 eSignature: Electronically signed by Jennifer Gill RN on 11/12/22 at 5:38 PM EST    **SHARE this note so that the co-signing nurse is able to place an eSignature**    Nurse 2 eSignature: Electronically signed by Viral Vo RN on 11/12/22 at 5:43 PM EST

## 2022-11-13 LAB
ALBUMIN SERPL-MCNC: 3.2 GM/DL (ref 3.4–5)
ALP BLD-CCNC: 59 IU/L (ref 40–129)
ALT SERPL-CCNC: 10 U/L (ref 10–40)
ANION GAP SERPL CALCULATED.3IONS-SCNC: 6 MMOL/L (ref 4–16)
AST SERPL-CCNC: 11 IU/L (ref 15–37)
BANDED NEUTROPHILS ABSOLUTE COUNT: 1.94 K/CU MM
BANDED NEUTROPHILS RELATIVE PERCENT: 8 % (ref 5–11)
BILIRUB SERPL-MCNC: 0.3 MG/DL (ref 0–1)
BILIRUBIN DIRECT: 0.2 MG/DL (ref 0–0.3)
BILIRUBIN, INDIRECT: 0.1 MG/DL (ref 0–0.7)
BUN BLDV-MCNC: 8 MG/DL (ref 6–23)
CALCIUM SERPL-MCNC: 8.6 MG/DL (ref 8.3–10.6)
CHLORIDE BLD-SCNC: 96 MMOL/L (ref 99–110)
CO2: 36 MMOL/L (ref 21–32)
CREAT SERPL-MCNC: 0.4 MG/DL (ref 0.9–1.3)
DIFFERENTIAL TYPE: ABNORMAL
ESTIMATED AVERAGE GLUCOSE: 108 MG/DL
GFR SERPL CREATININE-BSD FRML MDRD: >60 ML/MIN/1.73M2
GLUCOSE BLD-MCNC: 183 MG/DL (ref 70–99)
HBA1C MFR BLD: 5.4 % (ref 4.2–6.3)
HCT VFR BLD CALC: 34.6 % (ref 42–52)
HEMOGLOBIN: 11.2 GM/DL (ref 13.5–18)
LACTATE: 1.6 MMOL/L (ref 0.4–2)
MCH RBC QN AUTO: 30.6 PG (ref 27–31)
MCHC RBC AUTO-ENTMCNC: 32.4 % (ref 32–36)
MCV RBC AUTO: 94.5 FL (ref 78–100)
METAMYELOCYTES ABSOLUTE COUNT: 0.24 K/CU MM
METAMYELOCYTES PERCENT: 1 %
MONOCYTES ABSOLUTE: 0.2 K/CU MM
MONOCYTES RELATIVE PERCENT: 1 % (ref 0–4)
OVALOCYTES: ABNORMAL
PDW BLD-RTO: 14.1 % (ref 11.7–14.9)
PHOSPHORUS: 3 MG/DL (ref 2.5–4.9)
PLATELET # BLD: 289 K/CU MM (ref 140–440)
PMV BLD AUTO: 9.3 FL (ref 7.5–11.1)
POTASSIUM SERPL-SCNC: 4.3 MMOL/L (ref 3.5–5.1)
RBC # BLD: 3.66 M/CU MM (ref 4.6–6.2)
SEGMENTED NEUTROPHILS ABSOLUTE COUNT: 21.9 K/CU MM
SEGMENTED NEUTROPHILS RELATIVE PERCENT: 90 % (ref 36–66)
SODIUM BLD-SCNC: 138 MMOL/L (ref 135–145)
TARGET CELLS: ABNORMAL
TOTAL PROTEIN: 5.2 GM/DL (ref 6.4–8.2)
VITAMIN B-12: 498.3 PG/ML (ref 211–911)
WBC # BLD: 24.3 K/CU MM (ref 4–10.5)

## 2022-11-13 PROCEDURE — 83605 ASSAY OF LACTIC ACID: CPT

## 2022-11-13 PROCEDURE — 82607 VITAMIN B-12: CPT

## 2022-11-13 PROCEDURE — 82248 BILIRUBIN DIRECT: CPT

## 2022-11-13 PROCEDURE — 2580000003 HC RX 258: Performed by: NURSE PRACTITIONER

## 2022-11-13 PROCEDURE — 6370000000 HC RX 637 (ALT 250 FOR IP): Performed by: NURSE PRACTITIONER

## 2022-11-13 PROCEDURE — 80053 COMPREHEN METABOLIC PANEL: CPT

## 2022-11-13 PROCEDURE — 2700000000 HC OXYGEN THERAPY PER DAY

## 2022-11-13 PROCEDURE — 85007 BL SMEAR W/DIFF WBC COUNT: CPT

## 2022-11-13 PROCEDURE — 94640 AIRWAY INHALATION TREATMENT: CPT

## 2022-11-13 PROCEDURE — 6360000002 HC RX W HCPCS: Performed by: NURSE PRACTITIONER

## 2022-11-13 PROCEDURE — 83036 HEMOGLOBIN GLYCOSYLATED A1C: CPT

## 2022-11-13 PROCEDURE — 1200000000 HC SEMI PRIVATE

## 2022-11-13 PROCEDURE — 2500000003 HC RX 250 WO HCPCS: Performed by: NURSE PRACTITIONER

## 2022-11-13 PROCEDURE — 85027 COMPLETE CBC AUTOMATED: CPT

## 2022-11-13 PROCEDURE — 84100 ASSAY OF PHOSPHORUS: CPT

## 2022-11-13 RX ORDER — ALBUTEROL SULFATE 90 UG/1
2 AEROSOL, METERED RESPIRATORY (INHALATION) EVERY 4 HOURS PRN
Status: DISCONTINUED | OUTPATIENT
Start: 2022-11-13 | End: 2022-11-14 | Stop reason: HOSPADM

## 2022-11-13 RX ORDER — GUAIFENESIN/DEXTROMETHORPHAN 100-10MG/5
5 SYRUP ORAL ONCE
Status: COMPLETED | OUTPATIENT
Start: 2022-11-13 | End: 2022-11-13

## 2022-11-13 RX ORDER — GUAIFENESIN 100 MG/5ML
200 SYRUP ORAL EVERY 4 HOURS PRN
Status: DISCONTINUED | OUTPATIENT
Start: 2022-11-13 | End: 2022-11-13

## 2022-11-13 RX ORDER — BENZONATATE 100 MG/1
100 CAPSULE ORAL 3 TIMES DAILY PRN
Status: DISCONTINUED | OUTPATIENT
Start: 2022-11-13 | End: 2022-11-14 | Stop reason: HOSPADM

## 2022-11-13 RX ADMIN — IPRATROPIUM BROMIDE AND ALBUTEROL SULFATE 1 AMPULE: .5; 3 SOLUTION RESPIRATORY (INHALATION) at 07:18

## 2022-11-13 RX ADMIN — IPRATROPIUM BROMIDE AND ALBUTEROL SULFATE 1 AMPULE: .5; 3 SOLUTION RESPIRATORY (INHALATION) at 13:18

## 2022-11-13 RX ADMIN — METHYLPREDNISOLONE SODIUM SUCCINATE 40 MG: 40 INJECTION, POWDER, FOR SOLUTION INTRAMUSCULAR; INTRAVENOUS at 15:49

## 2022-11-13 RX ADMIN — METHYLPREDNISOLONE SODIUM SUCCINATE 40 MG: 40 INJECTION, POWDER, FOR SOLUTION INTRAMUSCULAR; INTRAVENOUS at 07:50

## 2022-11-13 RX ADMIN — OXYCODONE 5 MG: 5 TABLET ORAL at 04:34

## 2022-11-13 RX ADMIN — CEFEPIME 2000 MG: 2 INJECTION, POWDER, FOR SOLUTION INTRAVENOUS at 21:41

## 2022-11-13 RX ADMIN — ALBUTEROL SULFATE 2 PUFF: 108 INHALANT RESPIRATORY (INHALATION) at 14:57

## 2022-11-13 RX ADMIN — CEFEPIME 2000 MG: 2 INJECTION, POWDER, FOR SOLUTION INTRAVENOUS at 14:07

## 2022-11-13 RX ADMIN — CEFEPIME 2000 MG: 2 INJECTION, POWDER, FOR SOLUTION INTRAVENOUS at 04:37

## 2022-11-13 RX ADMIN — METHYLPREDNISOLONE SODIUM SUCCINATE 40 MG: 40 INJECTION, POWDER, FOR SOLUTION INTRAMUSCULAR; INTRAVENOUS at 21:39

## 2022-11-13 RX ADMIN — SODIUM CHLORIDE, PRESERVATIVE FREE 10 ML: 5 INJECTION INTRAVENOUS at 15:49

## 2022-11-13 RX ADMIN — MOMETASONE FUROATE AND FORMOTEROL FUMARATE DIHYDRATE 2 PUFF: 200; 5 AEROSOL RESPIRATORY (INHALATION) at 19:17

## 2022-11-13 RX ADMIN — OXYCODONE 5 MG: 5 TABLET ORAL at 16:32

## 2022-11-13 RX ADMIN — MOMETASONE FUROATE AND FORMOTEROL FUMARATE DIHYDRATE 2 PUFF: 200; 5 AEROSOL RESPIRATORY (INHALATION) at 07:18

## 2022-11-13 RX ADMIN — THIAMINE HCL TAB 100 MG 100 MG: 100 TAB at 07:50

## 2022-11-13 RX ADMIN — SODIUM CHLORIDE, PRESERVATIVE FREE 10 ML: 5 INJECTION INTRAVENOUS at 07:51

## 2022-11-13 RX ADMIN — OXYCODONE 5 MG: 5 TABLET ORAL at 10:21

## 2022-11-13 RX ADMIN — GUAIFENESIN 200 MG: 200 SOLUTION ORAL at 05:22

## 2022-11-13 RX ADMIN — METHYLPREDNISOLONE SODIUM SUCCINATE 40 MG: 40 INJECTION, POWDER, FOR SOLUTION INTRAMUSCULAR; INTRAVENOUS at 02:35

## 2022-11-13 RX ADMIN — AZITHROMYCIN MONOHYDRATE 500 MG: 500 INJECTION, POWDER, LYOPHILIZED, FOR SOLUTION INTRAVENOUS at 15:51

## 2022-11-13 RX ADMIN — MULTIPLE VITAMINS W/ MINERALS TAB 1 TABLET: TAB at 07:50

## 2022-11-13 RX ADMIN — SODIUM CHLORIDE: 9 INJECTION, SOLUTION INTRAVENOUS at 21:40

## 2022-11-13 RX ADMIN — SODIUM CHLORIDE: 9 INJECTION, SOLUTION INTRAVENOUS at 04:37

## 2022-11-13 RX ADMIN — GUAIFENESIN SYRUP AND DEXTROMETHORPHAN 5 ML: 100; 10 SYRUP ORAL at 22:31

## 2022-11-13 RX ADMIN — IPRATROPIUM BROMIDE AND ALBUTEROL SULFATE 1 AMPULE: .5; 3 SOLUTION RESPIRATORY (INHALATION) at 18:48

## 2022-11-13 RX ADMIN — SODIUM CHLORIDE: 9 INJECTION, SOLUTION INTRAVENOUS at 14:06

## 2022-11-13 RX ADMIN — IPRATROPIUM BROMIDE AND ALBUTEROL SULFATE 1 AMPULE: .5; 3 SOLUTION RESPIRATORY (INHALATION) at 04:11

## 2022-11-13 RX ADMIN — OXYCODONE 5 MG: 5 TABLET ORAL at 21:39

## 2022-11-13 ASSESSMENT — PAIN DESCRIPTION - FREQUENCY
FREQUENCY: INTERMITTENT

## 2022-11-13 ASSESSMENT — PAIN DESCRIPTION - PAIN TYPE
TYPE: CHRONIC PAIN;ACUTE PAIN

## 2022-11-13 ASSESSMENT — PAIN - FUNCTIONAL ASSESSMENT
PAIN_FUNCTIONAL_ASSESSMENT: ACTIVITIES ARE NOT PREVENTED

## 2022-11-13 ASSESSMENT — PAIN SCALES - GENERAL
PAINLEVEL_OUTOF10: 0
PAINLEVEL_OUTOF10: 2
PAINLEVEL_OUTOF10: 5
PAINLEVEL_OUTOF10: 5
PAINLEVEL_OUTOF10: 3
PAINLEVEL_OUTOF10: 3
PAINLEVEL_OUTOF10: 0

## 2022-11-13 ASSESSMENT — PAIN DESCRIPTION - ORIENTATION
ORIENTATION: RIGHT

## 2022-11-13 ASSESSMENT — PAIN DESCRIPTION - LOCATION
LOCATION: LEG;BACK
LOCATION: LEG;NECK

## 2022-11-13 ASSESSMENT — PAIN DESCRIPTION - ONSET
ONSET: ON-GOING

## 2022-11-13 ASSESSMENT — PAIN DESCRIPTION - DESCRIPTORS
DESCRIPTORS: ACHING;TENDER;THROBBING
DESCRIPTORS: ACHING;TENDER
DESCRIPTORS: ACHING
DESCRIPTORS: ACHING;TENDER

## 2022-11-13 NOTE — PROGRESS NOTES
V2.0  [facility] Hospital[facility] Progress Note      Name:  Eual Canavan /Age/Sex: 1955  (74 y.o. male)   MRN & CSN:  [id number] & [id number] Encounter Date/Time: 2022 11:46 AM EST    Location:   PCP: Marycarmen Vegas MD       Hospital Day: 2    Assessment and Plan:   Eual Canavan is a 74 y.o. male who presents with COPD exacerbation ([address]    Chief Complaint: Animal Bite (Cat scratch rt lower leg red and swollen. Pt has bilateral leg swelling )     Right lower extremity cellulitis secondary to cat scratch continue IV Zithromax as well as cefepime. No open wound no wheezing of the extremity unable to get wound culture. Blood cultures  pending  Right lower extremity has improved       SIRS, present on admission now resolved        SIRS- SIRS criteria has been met by >=2 of the following:         Temp >30 C (100.4F) or <36C (96.8F), HR >90, RR >20 or PaCO2 <32 mmHg, WBC >12,000/mm^3 or <4,000/mm^3, or >10% bands  EKG reviewed: SR  CXR reviewed: See below  blood cultures x 2 pending  serial lactate 1.6        3. COPD exacerbation, patient has been started on steroids, DuoNebs continue home home MDIs     4. Ongoing alcohol abuse, patient has been placed on CIWA protocol normally drinks approximately 6+ beers a day     5. Ongoing marijuana use     6. Tobacco abuse, smoking cessation discussed, nicotine patch ordered     7. Chronically elevated CO2 most likely secondary to COPD  Baseline is 35-40     8. Leukocytosis, might be secondary to cellulitis however appears that patient's baseline white count is between 13.1 and 26.0. He is chronically on steroids     9. Anemia, hemoglobin is 12.5, this appears to be chronic baseline is between 12.2 and 15. Will monitor     10. Acute on chronic respiratory failure, patient was requiring 3 L nasal cannula he is now on 2 L nasal cannula which is his baseline. q1    Diet ADULT DIET;  Regular   DVT Prophylaxis [x] Lovenox, []  Heparin, [] SCDs, [] Ambulation, [] Eliquis, [] Xarelto  [] Coumadin   Code Status Full Code   Disposition From: Home  Expected Disposition: 48 to 72 hours  Estimated Date of Discharge: Home  Patient requires continued admission due to cellulitis   Surrogate Decision Maker/ COLLEEN Dior     Subjective:   Chief complaint: Cat scratch        Patient seen and examined today he is doing well he has no complaints. He states his leg is feeling better, upon assessment it does look better. Continue current treatment no chest pain no shortness of breath no nausea no vomiting no diarrhea. Review of Systems:      10 point review of systems complete is negative as stated above      Objective: Intake/Output Summary (Last 24 hours) at 11/13/2022 1146  Last data filed at 11/13/2022 0918  Gross per 24 hour   Intake 600 ml   Output --   Net 600 ml        Vitals:   Vitals:    11/13/22 1051   BP:    Pulse:    Resp: 16   Temp:    SpO2:        Physical Exam:     General: NAD  Eyes: EOMI  ENT: neck supple  Cardiovascular: Regular rate. Respiratory: Clear to auscultation  Gastrointestinal: Soft, non tender  Genitourinary: no suprapubic tenderness  Musculoskeletal:  edema right lower extremity greatly improved overnight erythema is less, still with some mild warmth decrease in area of erythema  Skin: warm, dry  Neuro: Alert. And oriented x4  Psych: Mood appropriate.      Medications:   Medications:    sodium chloride flush  5-40 mL IntraVENous 2 times per day    enoxaparin  30 mg SubCUTAneous Daily    ipratropium-albuterol  1 ampule Inhalation Q4H WA    methylPREDNISolone  40 mg IntraVENous Q6H    Followed by    Razia Huynh ON 11/15/2022] predniSONE  40 mg Oral Daily    azithromycin  500 mg IntraVENous Q24H    cefepime  2,000 mg IntraVENous Q8H    sodium chloride flush  5-40 mL IntraVENous 2 times per day    thiamine  100 mg Oral Daily    nicotine  1 patch TransDERmal Daily    therapeutic multivitamin-minerals  1 tablet Oral Daily    oxyCODONE  5 mg Oral Q6H mometasone-formoterol  2 puff Inhalation BID    [START ON 11/19/2022] vitamin D  50,000 Units Oral Weekly      Infusions:    sodium chloride 5 mL/hr at 11/13/22 0437    sodium chloride 5 mL/hr at 11/12/22 2120     PRN Meds: benzonatate, 100 mg, TID PRN  sodium chloride flush, 5-40 mL, PRN  sodium chloride, , PRN  ondansetron, 4 mg, Q8H PRN   Or  ondansetron, 4 mg, Q6H PRN  polyethylene glycol, 17 g, Daily PRN  acetaminophen, 650 mg, Q6H PRN   Or  acetaminophen, 650 mg, Q6H PRN  sodium chloride flush, 5-40 mL, PRN  sodium chloride, , PRN  LORazepam, 1 mg, Q1H PRN   Or  LORazepam, 1 mg, Q1H PRN   Or  LORazepam, 2 mg, Q1H PRN   Or  LORazepam, 2 mg, Q1H PRN   Or  LORazepam, 3 mg, Q1H PRN   Or  LORazepam, 3 mg, Q1H PRN   Or  LORazepam, 4 mg, Q1H PRN   Or  LORazepam, 4 mg, Q1H PRN        Labs      Recent Results (from the past 24 hour(s))   CBC with Auto Differential    Collection Time: 11/12/22  1:08 PM   Result Value Ref Range    WBC 26.0 (H) 4.0 - 10.5 K/CU MM    RBC 4.14 (L) 4.6 - 6.2 M/CU MM    Hemoglobin 12.5 (L) 13.5 - 18.0 GM/DL    Hematocrit 40.6 (L) 42 - 52 %    MCV 98.1 78 - 100 FL    MCH 30.2 27 - 31 PG    MCHC 30.8 (L) 32.0 - 36.0 %    RDW 14.4 11.7 - 14.9 %    Platelets 922 790 - 052 K/CU MM    MPV 9.1 7.5 - 11.1 FL    Differential Type AUTOMATED DIFFERENTIAL     Segs Relative 92.0 (H) 36 - 66 %    Lymphocytes % 2.5 (L) 24 - 44 %    Monocytes % 4.3 (H) 0 - 4 %    Eosinophils % 0.0 0 - 3 %    Basophils % 0.2 0 - 1 %    Segs Absolute 23.9 K/CU MM    Lymphocytes Absolute 0.7 K/CU MM    Monocytes Absolute 1.1 K/CU MM    Eosinophils Absolute 0.0 K/CU MM    Basophils Absolute 0.1 K/CU MM    Immature Neutrophil % 1.0 (H) 0 - 0.43 %    Total Immature Neutrophil 0.26 K/CU MM   Comprehensive Metabolic Panel w/ Reflex to MG    Collection Time: 11/12/22  1:08 PM   Result Value Ref Range    Sodium 137 135 - 145 MMOL/L    Potassium 4.3 3.5 - 5.1 MMOL/L    Chloride 94 (L) 99 - 110 mMol/L    CO2 40 (H) 21 - 32 MMOL/L    BUN 7 6 - 23 MG/DL    Creatinine 0.4 (L) 0.9 - 1.3 MG/DL    Est, Glom Filt Rate >60 >60 mL/min/1.73m2    Glucose 127 (H) 70 - 99 MG/DL    Calcium 9.1 8.3 - 10.6 MG/DL    Albumin 3.6 3.4 - 5.0 GM/DL    Total Protein 6.0 (L) 6.4 - 8.2 GM/DL    Total Bilirubin 0.5 0.0 - 1.0 MG/DL    ALT 12 10 - 40 U/L    AST 13 (L) 15 - 37 IU/L    Alkaline Phosphatase 69 40 - 129 IU/L    Anion Gap 3 (L) 4 - 16   EKG 12 Lead    Collection Time: 11/12/22  2:50 PM   Result Value Ref Range    Ventricular Rate 90 BPM    Atrial Rate 90 BPM    P-R Interval 150 ms    QRS Duration 90 ms    Q-T Interval 354 ms    QTc Calculation (Bazett) 433 ms    P Axis 81 degrees    R Axis 45 degrees    T Axis 72 degrees    Diagnosis       Normal sinus rhythm  Normal ECG  When compared with ECG of 23-MAY-2022 13:20,  No significant change was found  Confirmed by NORMAN Hernandez ([id number]) on 11/12/2022 7:43:07 PM     Lactate, Sepsis    Collection Time: 11/12/22  5:00 PM   Result Value Ref Range    Lactic Acid, Sepsis 0.9 0.5 - 1.9 mMOL/L   Basic Metabolic Panel w/ Reflex to MG    Collection Time: 11/13/22  4:45 AM   Result Value Ref Range    Sodium 138 135 - 145 MMOL/L    Potassium 4.3 3.5 - 5.1 MMOL/L    Chloride 96 (L) 99 - 110 mMol/L    CO2 36 (H) 21 - 32 MMOL/L    Anion Gap 6 4 - 16    BUN 8 6 - 23 MG/DL    Creatinine 0.4 (L) 0.9 - 1.3 MG/DL    Est, Glom Filt Rate >60 >60 mL/min/1.73m2    Glucose 183 (H) 70 - 99 MG/DL    Calcium 8.6 8.3 - 10.6 MG/DL   Lactic Acid    Collection Time: 11/13/22  4:45 AM   Result Value Ref Range    Lactate 1.6 0.4 - 2.0 mMOL/L   CBC with Auto Differential    Collection Time: 11/13/22  4:45 AM   Result Value Ref Range    WBC 24.3 (H) 4.0 - 10.5 K/CU MM    RBC 3.66 (L) 4.6 - 6.2 M/CU MM    Hemoglobin 11.2 (L) 13.5 - 18.0 GM/DL    Hematocrit 34.6 (L) 42 - 52 %    MCV 94.5 78 - 100 FL    MCH 30.6 27 - 31 PG    MCHC 32.4 32.0 - 36.0 %    RDW 14.1 11.7 - 14.9 %    Platelets 191 065 - 724 K/CU MM    MPV 9.3 7.5 - 11.1 FL

## 2022-11-13 NOTE — PLAN OF CARE
Problem: Discharge Planning  Goal: Discharge to home or other facility with appropriate resources  11/13/2022 1101 by Diane Henry RN  Outcome: Progressing  11/12/2022 2203 by Humberto Valiente RN  Outcome: Progressing     Problem: Safety - Adult  Goal: Free from fall injury  11/13/2022 1101 by Diane Henry RN  Outcome: Progressing  11/12/2022 2203 by Humberto Valiente RN  Outcome: Progressing     Problem: Pain  Goal: Verbalizes/displays adequate comfort level or baseline comfort level  11/13/2022 1101 by Diane Henry RN  Outcome: Progressing  11/12/2022 2203 by Humberto Valiente RN  Outcome: Progressing

## 2022-11-14 ENCOUNTER — PARAMEDICINE (OUTPATIENT)
Dept: OTHER | Age: 67
End: 2022-11-14

## 2022-11-14 VITALS
HEART RATE: 91 BPM | BODY MASS INDEX: 17.68 KG/M2 | WEIGHT: 110 LBS | HEIGHT: 66 IN | RESPIRATION RATE: 28 BRPM | TEMPERATURE: 97.3 F | DIASTOLIC BLOOD PRESSURE: 80 MMHG | OXYGEN SATURATION: 98 % | SYSTOLIC BLOOD PRESSURE: 140 MMHG

## 2022-11-14 LAB
ANION GAP SERPL CALCULATED.3IONS-SCNC: 7 MMOL/L (ref 4–16)
BUN BLDV-MCNC: 8 MG/DL (ref 6–23)
CALCIUM SERPL-MCNC: 8.6 MG/DL (ref 8.3–10.6)
CHLORIDE BLD-SCNC: 98 MMOL/L (ref 99–110)
CO2: 35 MMOL/L (ref 21–32)
CREAT SERPL-MCNC: 0.3 MG/DL (ref 0.9–1.3)
GFR SERPL CREATININE-BSD FRML MDRD: >60 ML/MIN/1.73M2
GLUCOSE BLD-MCNC: 143 MG/DL (ref 70–99)
POTASSIUM SERPL-SCNC: 4.1 MMOL/L (ref 3.5–5.1)
SODIUM BLD-SCNC: 140 MMOL/L (ref 135–145)

## 2022-11-14 PROCEDURE — 94640 AIRWAY INHALATION TREATMENT: CPT

## 2022-11-14 PROCEDURE — 80048 BASIC METABOLIC PNL TOTAL CA: CPT

## 2022-11-14 PROCEDURE — 6360000002 HC RX W HCPCS: Performed by: NURSE PRACTITIONER

## 2022-11-14 PROCEDURE — 2580000003 HC RX 258: Performed by: NURSE PRACTITIONER

## 2022-11-14 PROCEDURE — 2700000000 HC OXYGEN THERAPY PER DAY

## 2022-11-14 PROCEDURE — 6370000000 HC RX 637 (ALT 250 FOR IP): Performed by: NURSE PRACTITIONER

## 2022-11-14 PROCEDURE — 94761 N-INVAS EAR/PLS OXIMETRY MLT: CPT

## 2022-11-14 RX ORDER — DOXYCYCLINE HYCLATE 100 MG
100 TABLET ORAL 2 TIMES DAILY
Qty: 14 TABLET | Refills: 0 | Status: SHIPPED | OUTPATIENT
Start: 2022-11-14 | End: 2022-11-21

## 2022-11-14 RX ORDER — AZITHROMYCIN 500 MG/1
500 TABLET, FILM COATED ORAL DAILY
Qty: 3 TABLET | Refills: 0 | Status: SHIPPED | OUTPATIENT
Start: 2022-11-14 | End: 2022-11-17

## 2022-11-14 RX ORDER — BENZONATATE 100 MG/1
100 CAPSULE ORAL 3 TIMES DAILY PRN
Qty: 21 CAPSULE | Refills: 0 | Status: SHIPPED | OUTPATIENT
Start: 2022-11-14 | End: 2022-11-21

## 2022-11-14 RX ADMIN — METHYLPREDNISOLONE SODIUM SUCCINATE 40 MG: 40 INJECTION, POWDER, FOR SOLUTION INTRAMUSCULAR; INTRAVENOUS at 03:08

## 2022-11-14 RX ADMIN — MULTIPLE VITAMINS W/ MINERALS TAB 1 TABLET: TAB at 08:16

## 2022-11-14 RX ADMIN — MOMETASONE FUROATE AND FORMOTEROL FUMARATE DIHYDRATE 2 PUFF: 200; 5 AEROSOL RESPIRATORY (INHALATION) at 07:15

## 2022-11-14 RX ADMIN — IPRATROPIUM BROMIDE AND ALBUTEROL SULFATE 1 AMPULE: .5; 3 SOLUTION RESPIRATORY (INHALATION) at 01:26

## 2022-11-14 RX ADMIN — METHYLPREDNISOLONE SODIUM SUCCINATE 40 MG: 40 INJECTION, POWDER, FOR SOLUTION INTRAMUSCULAR; INTRAVENOUS at 08:16

## 2022-11-14 RX ADMIN — OXYCODONE 5 MG: 5 TABLET ORAL at 04:50

## 2022-11-14 RX ADMIN — CEFEPIME 2000 MG: 2 INJECTION, POWDER, FOR SOLUTION INTRAVENOUS at 04:54

## 2022-11-14 RX ADMIN — SODIUM CHLORIDE: 9 INJECTION, SOLUTION INTRAVENOUS at 04:53

## 2022-11-14 RX ADMIN — IPRATROPIUM BROMIDE AND ALBUTEROL SULFATE 1 AMPULE: .5; 3 SOLUTION RESPIRATORY (INHALATION) at 05:42

## 2022-11-14 RX ADMIN — SODIUM CHLORIDE, PRESERVATIVE FREE 10 ML: 5 INJECTION INTRAVENOUS at 08:17

## 2022-11-14 ASSESSMENT — PAIN DESCRIPTION - ORIENTATION: ORIENTATION: RIGHT

## 2022-11-14 ASSESSMENT — PAIN DESCRIPTION - DESCRIPTORS: DESCRIPTORS: ACHING

## 2022-11-14 ASSESSMENT — PAIN SCALES - GENERAL
PAINLEVEL_OUTOF10: 0
PAINLEVEL_OUTOF10: 6

## 2022-11-14 ASSESSMENT — PAIN DESCRIPTION - PAIN TYPE: TYPE: ACUTE PAIN

## 2022-11-14 ASSESSMENT — PAIN DESCRIPTION - FREQUENCY: FREQUENCY: INTERMITTENT

## 2022-11-14 ASSESSMENT — PAIN DESCRIPTION - LOCATION: LOCATION: LEG

## 2022-11-14 ASSESSMENT — PAIN - FUNCTIONAL ASSESSMENT: PAIN_FUNCTIONAL_ASSESSMENT: ACTIVITIES ARE NOT PREVENTED

## 2022-11-14 ASSESSMENT — PAIN DESCRIPTION - ONSET: ONSET: ON-GOING

## 2022-11-14 NOTE — CARE COORDINATION
This CM is familiar with the patient from previous admissions, last seen by this CM 2/11/21. CM met with the patient for discharge planning, patient sitting up in bed resting quietly. Patient lives alone in a single story home (2 steps to enter home and has a basement), has insurance with Rx coverage & PCP, stated he is independent with ADLs, is still able to drive, and receives disability income. Patient states he has a cane at home but only uses as needed, uses a nebulizer machine & inhaler at home, and wears oxygen 24 hours/day (2 l/nc). Patient does not feel that [address] is needed at this time. Patient did express interest in other community resources that may be available, especially regarding help with housekeeping chores. CM spoke with the patient about possible services through the [facility] waiver program if he would qualify and also the community paramedicine program.  Patient stated that he is interested in both. CM will make referral to the community paramedicine program.  Patient plans to return home upon discharge and is unable to identify any other needs at this time. CM available if needs arise.

## 2022-11-14 NOTE — DISCHARGE SUMMARY
V2.0  Discharge Summary    Name:  Rufino Mercer /Age/Sex: 1955 (89 y.o. male)   Admit Date: 2022  Discharge Date: 22    MRN & CSN:  [id number] & [id number] Encounter Date and Time 22 9:23 AM EST    Attending:  Cindy Leon MD Discharging Provider: HEMALATHA Cage NP       Hospital Course:     Brief HPI:  diverticulosis, hiatal hernia, alcohol abuse, tobacco abuse, marijuana abuse, hemorrhoids, lymphocytic colitis and chronic respiratory failure who presents with right lower extremity swelling and redness and shortness of breath. Mr. Karla Lo presented to the ER today with concerns about his right lower leg. He states that he was playing with his cat a few days ago and the cat scratched him with her claw and area of the right ankle. He says he has had this in the past but is never gotten swollen and red before. Also he has COPD and is on home oxygen of 2 L nasal cannula and he does complain of some increased shortness of breath. He he is chronically on 5 mg of prednisone daily. He has not had any fevers, chills, and has not been feeling unwell. No chest pain no abdominal pain no nausea no vomiting no diarrhea. Patient seen and examined today he is doing well his right lower extremity has improved greatly. Patient is requesting to be discharged home. He will be discharged with Zithromax and doxycycline. He is aware to follow-up with his PCP to ensure resolution of his right lower extremity cellulitis secondary to cat scratch. He will resume all current home medications. Brief Problem Based Course:   Chief Complaint: Animal Bite (Cat scratch rt lower leg red and swollen. Pt has bilateral leg swelling )     Right lower extremity cellulitis secondary to cat scratch Right lower extremity has improved  Home on p.o. doxycycline  Home on p.o.  Zithromax     SIRS, present on admission now resolved            3.  COPD exacerbation, continue home dose of steroids, CONTINUE taking these medications      albuterol sulfate  (90 Base) MCG/ACT inhaler  Commonly known as: PROVENTIL;VENTOLIN;PROAIR  INHALE 2 PUFFS EVERY 6 HOURS AS NEEDED FOR WHEEZING     ipratropium-albuterol 0.5-2.5 (3) MG/3ML Soln nebulizer solution  Commonly known as: DUONEB  INHALE THE CONTENTS OF 1 VIAL VIA NEBULIZER FOUR TIMES DAILY     MULTIVITAMIN ADULTS PO     oxyCODONE 5 MG immediate release tablet  Commonly known as: ROXICODONE     OXYGEN     predniSONE 5 MG tablet  Commonly known as: DELTASONE  TAKE 1 TABLET EVERY DAY     Symbicort 160-4.5 MCG/ACT Aero  Generic drug: budesonide-formoterol  INHALE 2 PUFFS TWICE DAILY     turmeric 500 MG Caps     Vitamin D3 1.25 MG (78079 UT) Caps            STOP taking these medications      traZODone 50 MG tablet  Commonly known as: DESYREL            ASK your doctor about these medications      traZODone 100 MG tablet  Commonly known as: DESYREL  TAKE 1 TABLET BY MOUTH NIGHTLY               Where to Get Your Medications        These medications were sent to The [address] [address]      Phone: [phone]   azithromycin 500 MG tablet  benzonatate 100 MG capsule  doxycycline hyclate 100 MG tablet        Objective Findings at Discharge:   BP (!) 140/80   Pulse 86   Temp 97.3 °F (36.3 °C) (Oral)   Resp 28   Ht 5' 6\" (1.676 m)   Wt 110 lb (49.9 kg)   SpO2 98%   BMI 17.75 kg/m²       Physical Exam:   General: NAD  Eyes: EOMI  ENT: neck supple  Cardiovascular: Regular rate. Respiratory: scattered wheezes on 2 L NC baseline  Gastrointestinal: Soft, non tender  Genitourinary: no suprapubic tenderness  Musculoskeletal: No edema  Skin: warm, dry mild edema right lower extremity greatly improved overnight erythema mild  Neuro: Alert and oriented  Psych: Mood appropriate.          Labs and Imaging   XR CHEST PORTABLE    Result Date: 11/12/2022  EXAMINATION: ONE XRAY VIEW OF THE CHEST 11/12/2022 1:41 pm COMPARISON: 05/23/2022 HISTORY: ORDERING SYSTEM PROVIDED HISTORY: sob TECHNOLOGIST PROVIDED HISTORY: Reason for exam:->sob FINDINGS: Cardiomediastinal silhouette stable. Bandlike opacity right midlung. No pneumothorax. No pleural effusion. Bandlike opacity right midlung could represent subsegmental atelectasis or infection       CBC:   Recent Labs     11/12/22  1308 11/13/22  0445   WBC 26.0* 24.3*   HGB 12.5* 11.2*    289     BMP:    Recent Labs     11/12/22  1308 11/13/22  0445 11/14/22  0620    138 140   K 4.3 4.3 4.1   CL 94* 96* 98*   CO2 40* 36* 35*   BUN 7 8 8   CREATININE 0.4* 0.4* 0.3*   GLUCOSE 127* 183* 143*     Hepatic:   Recent Labs     11/12/22  1308 11/13/22  0445   AST 13* 11*   ALT 12 10   BILITOT 0.5 0.3   ALKPHOS 69 59     Lipids:   Lab Results   Component Value Date/Time    CHOL 210 01/29/2015 07:50 AM    HDL 90 03/09/2021 10:10 AM    TRIG 61 01/29/2015 07:50 AM     Hemoglobin A1C:   Lab Results   Component Value Date/Time    LABA1C 5.4 11/13/2022 04:45 AM     Troponin:   Lab Results   Component Value Date/Time    TROPONINT <0.010 05/23/2022 01:39 PM    TROPONINT <0.010 11/11/2021 08:15 PM    TROPONINT <0.010 02/10/2021 06:25 PM       UA:  Lab Results   Component Value Date/Time    NITRU NEGATIVE 10/08/2020 07:15 PM    COLORU YELLOW 10/08/2020 07:15 PM    WBCUA 1 10/08/2020 07:15 PM    RBCUA NO CELLS SEEN 10/08/2020 07:15 PM    BACTERIA NEGATIVE 10/08/2020 07:15 PM    CLARITYU CLEAR 10/08/2020 07:15 PM    SPECGRAV 1.015 10/08/2020 07:15 PM    LEUKOCYTESUR NEGATIVE 10/08/2020 07:15 PM    UROBILINOGEN 0.2 10/08/2020 07:15 PM    BILIRUBINUR NEGATIVE 10/08/2020 07:15 PM    BLOODU NEGATIVE 10/08/2020 07:15 PM    KETUA NEGATIVE 10/08/2020 07:15 PM       Organism:   Lab Results   Component Value Date/Time    ORG ST 11/27/2018 03:00 AM       Time Spent Discharging patient 35 minutes.   Discharge plan discussed with my supervising physician  Darryl.     Electronically signed by HEMALATHA Geiger NP on 11/14/2022 at 9:23 AM

## 2022-11-14 NOTE — CARE COORDINATION
Leah Pryor was evaluated today and a DME order was entered for a wheeled walker because he requires this to successfully complete daily living tasks of eating, bathing, toileting, personal cares, ambulating, grooming, hygiene, dressing upper body, dressing lower body, meal preparation, and taking own medications. A wheeled walker is necessary due to the patient's unsteady gait, upper body weakness, and inability to  an ambulation device; and he can ambulate only by pushing a walker instead of a lesser assistive device such as a cane, crutch, or standard walker. The need for this equipment was discussed with the patient and he understands and is in agreement. 10:10 AM  LAURA received a call from [facility] stating that the patient has now stated that he feels he would benefit from a walker at home. 10:20 AM  LAURA spoke with the patient on the room phone who confirmed that he feels he will need a walker for use at home. 10:33 AM  LAURA faxed the DME order for a wheeled-walker to [address] [place] [phone] to initiate the referral.  CM will follow. 10:50 AM  LAURA met with the patient to deliver his wheeled walker. CM explained [facility]'s invoice, obtained the patient's signature, and left the patient's copy with him.

## 2022-11-14 NOTE — PROGRESS NOTES
Patient was referred to the [facility] Program by CM. Patient would like to apply for [employer] services. I met with the patient in his room prior to discharge. Patient stated that his laundry is downstairs and he is unable to do stairs. Also needs help with housecleaning. States he had a medical alarm prior but states [employer] stopped paying for it. Would like to have another one. Will make the referral to [employer] and follow up with patient next week for a home visit. Will continue to follow.

## 2022-11-14 NOTE — PLAN OF CARE
Problem: Discharge Planning  Goal: Discharge to home or other facility with appropriate resources  11/14/2022 0937 by Deyvi Pete RN  Outcome: Completed  11/13/2022 2042 by Kari Gruber RN  Outcome: Progressing     Problem: Safety - Adult  Goal: Free from fall injury  11/14/2022 0937 by Deyvi Pete RN  Outcome: Completed  11/13/2022 2042 by Kari Gruber RN  Outcome: Progressing     Problem: Pain  Goal: Verbalizes/displays adequate comfort level or baseline comfort level  11/14/2022 0937 by Deyvi Pete RN  Outcome: Completed  11/13/2022 2042 by Kari Gruber RN  Outcome: Progressing     Problem: Skin/Tissue Integrity  Goal: Absence of new skin breakdown  Description: 1. Monitor for areas of redness and/or skin breakdown  2. Assess vascular access sites hourly  3. Every 4-6 hours minimum:  Change oxygen saturation probe site  4. Every 4-6 hours:  If on nasal continuous positive airway pressure, respiratory therapy assess nares and determine need for appliance change or resting period.   11/14/2022 0809 by Deyvi Pete RN  Outcome: Completed  11/13/2022 2042 by Kari Gruber RN  Outcome: Progressing     Problem: Chronic Conditions and Co-morbidities  Goal: Patient's chronic conditions and co-morbidity symptoms are monitored and maintained or improved  11/14/2022 0937 by Deyvi Pete RN  Outcome: Completed  11/13/2022 2042 by Kari Gruber RN  Outcome: Progressing

## 2022-11-15 ENCOUNTER — TELEPHONE (OUTPATIENT)
Dept: OTHER | Age: 67
End: 2022-11-15

## 2022-11-15 NOTE — TELEPHONE ENCOUNTER
Made a call to [address] [place] [address] to make a referral for Vin Reynoso to [facility]. Tried yesterday and today. No answer. I did leave a message today.

## 2022-11-15 NOTE — TELEPHONE ENCOUNTER
[employer] returned my call. Referral made. They will contact [facility] for an assessment.

## 2022-11-17 LAB
CULTURE: NORMAL
CULTURE: NORMAL
Lab: NORMAL
Lab: NORMAL
SPECIMEN: NORMAL
SPECIMEN: NORMAL

## 2022-11-23 ENCOUNTER — TELEPHONE (OUTPATIENT)
Dept: OTHER | Age: 67
End: 2022-11-23

## 2022-11-23 NOTE — TELEPHONE ENCOUNTER
Reached out to Darcy Hopper to see if [employer] called and got his assessment scheduled. No answer. Left message.

## 2022-11-29 ENCOUNTER — HOSPITAL ENCOUNTER (EMERGENCY)
Age: 67
Discharge: HOME | End: 2022-11-29
Attending: EMERGENCY MEDICINE
Payer: MEDICARE

## 2022-11-29 ENCOUNTER — APPOINTMENT (OUTPATIENT)
Dept: ULTRASOUND IMAGING | Age: 67
End: 2022-11-29
Payer: MEDICARE

## 2022-11-29 VITALS
RESPIRATION RATE: 18 BRPM | OXYGEN SATURATION: 96 % | WEIGHT: 110 LBS | SYSTOLIC BLOOD PRESSURE: 114 MMHG | HEART RATE: 94 BPM | TEMPERATURE: 97.9 F | DIASTOLIC BLOOD PRESSURE: 75 MMHG | BODY MASS INDEX: 17.75 KG/M2

## 2022-11-29 DIAGNOSIS — L03.115 CELLULITIS OF RIGHT LOWER EXTREMITY: Primary | ICD-10-CM

## 2022-11-29 PROCEDURE — 94640 AIRWAY INHALATION TREATMENT: CPT

## 2022-11-29 PROCEDURE — 6370000000 HC RX 637 (ALT 250 FOR IP): Performed by: EMERGENCY MEDICINE

## 2022-11-29 PROCEDURE — 99284 EMERGENCY DEPT VISIT MOD MDM: CPT

## 2022-11-29 PROCEDURE — 93971 EXTREMITY STUDY: CPT

## 2022-11-29 RX ORDER — CEFADROXIL 500 MG/1
500 CAPSULE ORAL 2 TIMES DAILY
Qty: 14 CAPSULE | Refills: 0 | Status: SHIPPED | OUTPATIENT
Start: 2022-11-29 | End: 2022-12-06

## 2022-11-29 RX ORDER — CEPHALEXIN 500 MG/1
1000 CAPSULE ORAL ONCE
Status: COMPLETED | OUTPATIENT
Start: 2022-11-29 | End: 2022-11-29

## 2022-11-29 RX ORDER — IPRATROPIUM BROMIDE AND ALBUTEROL SULFATE 2.5; .5 MG/3ML; MG/3ML
1 SOLUTION RESPIRATORY (INHALATION) ONCE
Status: COMPLETED | OUTPATIENT
Start: 2022-11-29 | End: 2022-11-29

## 2022-11-29 RX ADMIN — IPRATROPIUM BROMIDE AND ALBUTEROL SULFATE 1 AMPULE: .5; 3 SOLUTION RESPIRATORY (INHALATION) at 14:34

## 2022-11-29 RX ADMIN — CEPHALEXIN 1000 MG: 500 CAPSULE ORAL at 15:10

## 2022-11-29 ASSESSMENT — ENCOUNTER SYMPTOMS
SHORTNESS OF BREATH: 0
COUGH: 1

## 2022-11-29 ASSESSMENT — PAIN DESCRIPTION - ORIENTATION: ORIENTATION: RIGHT

## 2022-11-29 ASSESSMENT — PAIN - FUNCTIONAL ASSESSMENT: PAIN_FUNCTIONAL_ASSESSMENT: 0-10

## 2022-11-29 ASSESSMENT — PAIN DESCRIPTION - LOCATION: LOCATION: LEG

## 2022-11-29 ASSESSMENT — PAIN SCALES - GENERAL: PAINLEVEL_OUTOF10: 8

## 2022-11-29 NOTE — ED TRIAGE NOTES
Pt brought by wheel chair into ED bed 1. Pt c/o of swelling that has returned on R lower leg from cat scratch. Pt is on 2L home oxygen upon arrival. Pt is resting comfortably during triage.

## 2022-11-29 NOTE — ED NOTES
Pt ambulated with steady gait. Pt verbalized understanding of discharge. No questions.       Clarence Justin  11/29/22 0889

## 2022-11-29 NOTE — ED PROVIDER NOTES
Triage Chief Complaint:   Leg Swelling (R lower leg swelling from cat scratch about 2 weeks ago )    [place]:  Nik Jenkins is a 74 y.o. male that presents to the ED complaining of increasing swelling and redness of his right leg. Patient is recently seen at a cat scratch to his leg and is complaining of increasing swelling over the weekend male +8 no fever no chills. The patient has numerous chronic health problems is chronic pain takes 105 pain pills every month. He still smokes cigarettes despite being on oxygen uses marijuana daily and drinks over 6 beers a day. 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 On 7/22/16. Dr Dejuan Soto at [facility] 2-    Dr. Eliane Rangel    COPD (chronic obstructive pulmonary disease) ([facility] [address] 5/19/2014    PFTs showed severe COPD in 5/14- follows with Dr Pasco Dancer    COPD exacerbation [facility]) 6/10/2016    Diverticulosis 9-    Dupuytren's contracture 5/26/2017    Right palm    Elevated PSA 12/21/2018 12/2018 : PSA is 6    H/O colonoscopy 9-18-14    Dr. Eliane Rangel    H/O echocardiogram 08/17/2016    EF 55-60%. Abnormal diastolic function. Mild TR. Borderline right atrium and RV dilatation.      Hiatal hernia     History of alcohol abuse     \"average 6 pk beer per day\"    History of drug abuse ([facility] [address]     does use marijuana\"last used cocaine over 10 yrs ago\" per pt on 10/3/2018    Internal hemorrhoids 9-    Intervertebral disc protrusion 5/19/2014    Cervical region on MRI    Lingular pneumonia 4/3/2018    Lymphocytic colitis 9-18-14    Dr. Eliane Rangel, will treat if symptomatic    On home oxygen therapy     2l/nc at night only    Pneumonia 11/11/2021    Pneumonia of left lower lobe due to infectious organism 11/27/2018    Sepsis due to pneumonia ([facility] [address] 6/10/2016    Spinal stenosis in cervical region 5/19/2014    MRI showed  Disc protrusion. Severe spinal stenosis at C3-C4    Spinal stenosis of cervical region 3/14/2016    S/P cervical discectomy and fusion with plate and screw. On 7/22/16. Dr Miguel Angel Durand at [address] abuse disorder 5/19/2014    Wears glasses     to read     Past Surgical History:   Procedure Laterality Date    CERVICAL SPINE SURGERY  2015    \"have plate in neck done at [facility]      9- Dr Conrad Cervantes. f/u 3 years    COLONOSCOPY  10/04/2018    COLONOSCOPY N/A 10/4/2018    COLONOSCOPY POLYPECTOMY SNARE/COLD BIOPSY performed by Kat Cabrera MD at [facility] in 5 years    [facility]    POLYPECTOMY  10/04/2018    2 Polyps Removed    SIGMOIDOSCOPY      UPPER GASTROINTESTINAL ENDOSCOPY  10/04/2018    with Biopsy    UPPER GASTROINTESTINAL ENDOSCOPY N/A 10/4/2018    EGD BIOPSY performed by Kat Cabrera MD at [facility] ENDOSCOPY     Family History   Problem Relation Age of Onset    Cancer Mother         lung ca    Heart Disease Father     Heart Attack Father     Diabetes Father     Other Brother         hep C    Colon Cancer Neg Hx      Social History     Socioeconomic History    Marital status: Single     Spouse name: Not on file    Number of children: Not on file    Years of education: Not on file    Highest education level: Not on file   Occupational History    Not on file   Tobacco Use    Smoking status: Every Day     Packs/day: 1.50     Years: 64.00     Pack years: 96.00     Types: Cigarettes     Start date: 1970    Smokeless tobacco: Never   Vaping Use    Vaping Use: Former    Substances: Occasionally   Substance and Sexual Activity    Alcohol use:  Yes     Alcohol/week: 6.0 standard drinks     Types: 6 Cans of beer per week     Comment: 3 beers daily    Drug use: Yes     Types: Marijuana [facility])     Comment: 1-2 times per week marijuana    Sexual activity: Not Currently   Other Topics Concern    Not on file Social History Narrative    Not on file     Social Determinants of Health     Financial Resource Strain: Not on file   Food Insecurity: Not on file   Transportation Needs: Not on file   Physical Activity: Not on file   Stress: Not on file   Social Connections: Not on file   Intimate Partner Violence: Not on file   Housing Stability: Not on file     No current facility-administered medications for this encounter. Current Outpatient Medications   Medication Sig Dispense Refill    cefadroxil (DURICEF) 500 MG capsule Take 1 capsule by mouth 2 times daily for 7 days 14 capsule 0    ipratropium-albuterol (DUONEB) 0.5-2.5 (3) MG/3ML SOLN nebulizer solution INHALE THE CONTENTS OF 1 VIAL VIA NEBULIZER FOUR TIMES DAILY (Patient not taking: Reported on 11/29/2022) 1080 mL 1    predniSONE (DELTASONE) 5 MG tablet TAKE 1 TABLET EVERY DAY 90 tablet 3    SYMBICORT 160-4.5 MCG/ACT AERO INHALE 2 PUFFS TWICE DAILY 3 each 3    albuterol sulfate  (90 Base) MCG/ACT inhaler INHALE 2 PUFFS EVERY 6 HOURS AS NEEDED FOR WHEEZING (Patient not taking: Reported on 11/29/2022) 54 g 5    traZODone (DESYREL) 100 MG tablet TAKE 1 TABLET BY MOUTH NIGHTLY (Patient taking differently: Take 100 mg by mouth nightly as needed) 30 tablet 3    Turmeric 500 MG CAPS Take 1 capsule by mouth daily      Cholecalciferol (VITAMIN D3) 1.25 MG (09751 UT) CAPS Take 1 capsule by mouth daily       oxyCODONE (ROXICODONE) 5 MG immediate release tablet Take 5 mg by mouth every 6 hours as needed for Pain. OXYGEN Inhale 2 L into the lungs continuous       Multiple Vitamins-Minerals (MULTIVITAMIN ADULTS PO) Take by mouth daily        No Known Allergies      ROS:    Review of Systems   Respiratory:  Positive for cough. Negative for shortness of breath. Skin:  Positive for wound. All other systems reviewed and are negative.     Nursing Notes Reviewed    Physical Exam:    /75   Pulse 94   Temp 97.9 °F (36.6 °C) (Oral)   Resp 18   Wt 110 lb (49.9 kg)   SpO2 96%   BMI 17.75 kg/m²      ED Triage Vitals   Enc Vitals Group      BP       Pulse       Resp       Temp       Temp src       SpO2       Weight       Height       Head Circumference       Peak Flow       Pain Score       Pain Loc       Pain Edu? Excl. in [address]? Physical Exam  Vitals and nursing note reviewed. Exam conducted with a chaperone present. Constitutional:       Appearance: He is well-developed. He is ill-appearing. HENT:      Head: Normocephalic and atraumatic. Right Ear: External ear normal.      Left Ear: External ear normal.      Mouth/Throat:      Mouth: Mucous membranes are moist.   Eyes:      General: No scleral icterus. Right eye: No discharge. Left eye: No discharge. Conjunctiva/sclera: Conjunctivae normal.      Pupils: Pupils are equal, round, and reactive to light. Neck:      Thyroid: No thyromegaly. Vascular: No JVD. Trachea: No tracheal deviation. Cardiovascular:      Rate and Rhythm: Normal rate and regular rhythm. Heart sounds: Normal heart sounds. No murmur heard. No friction rub. No gallop. Pulmonary:      Effort: Pulmonary effort is normal. No respiratory distress. Breath sounds: No stridor. Wheezing and rhonchi present. No rales. Chest:      Chest wall: No tenderness. Abdominal:      General: Bowel sounds are normal. There is no distension. Palpations: Abdomen is soft. There is no mass. Tenderness: There is no abdominal tenderness. There is no guarding or rebound. Hernia: No hernia is present. Musculoskeletal:         General: Swelling present. No tenderness or deformity. Normal range of motion. Cervical back: Normal range of motion and neck supple. Comments: Right lower extremity-see picture attached. Patient has a superficial sore in the medial aspect of lower leg diffuse mild erythema no crepitus. No no bullae.   Compartments are soft pulses are normal   Lymphadenopathy: Cervical: No cervical adenopathy. Skin:     General: Skin is warm and dry. Coloration: Skin is not pale. Findings: No erythema or rash. Neurological:      General: No focal deficit present. Mental Status: He is alert and oriented to person, place, and time. Cranial Nerves: No cranial nerve deficit. Sensory: No sensory deficit. Deep Tendon Reflexes: Reflexes are normal and symmetric. Reflexes normal.   Psychiatric:         Speech: Speech normal.         Behavior: Behavior normal.         Thought Content: Thought content normal.         Judgment: Judgment normal.       I have reviewed and interpreted all of the currently available lab results from this visit (ifapplicable):  No results found for this visit on 11/29/22. Radiographs (if obtained):  [] The following radiograph wasinterpreted by myself in the absence of a radiologist:   [] Radiologist's Report Reviewed:  VL DUP LOWER EXTREMITY VENOUS RIGHT   Final Result   No evidence of DVT in the right lower extremity. EKG (if obtained): (All EKG's are interpreted by myself in the absence of a cardiologist)    Chart review shows recent radiographs:  XR CHEST PORTABLE    Result Date: 11/12/2022  EXAMINATION: ONE XRAY VIEW OF THE CHEST 11/12/2022 1:41 pm COMPARISON: 05/23/2022 HISTORY: ORDERING SYSTEM PROVIDED HISTORY: sob TECHNOLOGIST PROVIDED HISTORY: Reason for exam:->sob FINDINGS: Cardiomediastinal silhouette stable. Bandlike opacity right midlung. No pneumothorax. No pleural effusion. Bandlike opacity right midlung could represent subsegmental atelectasis or infection       MDM:      Patient present back to the ED with similar symptoms concerning for developing cellulitis to his leg. Pictures taken as above. He will be started back on ED staff strep coverage. No concern for DVT. Old medical records were reviewed patient is medically stable no concerning findings warranting hospitalization.   Patient agrees to follow-up up with a referral physician in 24 to 48 hours    Please note that portions of this note may have been completed with a voice recognition/dictation program. Efforts were made to edit the dictations but occasionally words are mis-transcribed. All pertinent Lab data and radiographic results reviewed with patient at bedside. The patient and/or the family were informed of the results of any tests/labs/imaging, the treatment plan, and time was allotted to answer questions. See chart for details of medications given during the ED stay. The likelihood of other entities in the differential is insufficient to justify any further testing for them. This was explained to the patient. The patient was advised that persistent or worsening symptoms would require further evaluation. Clinical Impression:  1. Cellulitis of right lower extremity      Disposition referral (if applicable):  Mar Burris MD  [facility]  [phone]    Go in 1 week  If symptoms worsen    Mar Burris MD  [address] [place] [address]  [phone]    Schedule an appointment as soon as possible for a visit in 2 days  If symptoms worsen  Disposition medications (if applicable):  Discharge Medication List as of 11/29/2022  3:11 PM        START taking these medications    Details   cefadroxil (DURICEF) 500 MG capsule Take 1 capsule by mouth 2 times daily for 7 days, Disp-14 capsule, R-0Normal                 Deshawn De Jesus DO, FACEP      Comment: Please note this report has been produced using speech recognition software and maycontain errors related to that system including errors in grammar, punctuation, and spelling, as well as words and phrases that may be inappropriate. If there are any questions or concerns please feel free to contact thedictating provider for clarification.         Beatriz De Souza DO  12/05/22 9314

## 2022-12-09 ENCOUNTER — TELEPHONE (OUTPATIENT)
Dept: INTERNAL MEDICINE CLINIC | Age: 67
End: 2022-12-09

## 2022-12-14 DIAGNOSIS — J44.9 COPD, SEVERE (HCC): ICD-10-CM

## 2022-12-15 RX ORDER — ALBUTEROL SULFATE 90 UG/1
AEROSOL, METERED RESPIRATORY (INHALATION)
Qty: 54 G | Refills: 5 | Status: SHIPPED | OUTPATIENT
Start: 2022-12-15

## 2023-01-05 ENCOUNTER — TELEPHONE (OUTPATIENT)
Dept: OTHER | Age: 68
End: 2023-01-05

## 2023-01-05 NOTE — TELEPHONE ENCOUNTER
Reached out to Karina Haq to see if he received his assessment for [employer] and if he needed anything. No answer. Left message.

## 2023-01-10 ENCOUNTER — TELEPHONE (OUTPATIENT)
Dept: OTHER | Age: 68
End: 2023-01-10

## 2023-01-10 NOTE — TELEPHONE ENCOUNTER
Made a referral to [employer] back on 11/15/22. Have been trying to reach the patient to see if he had his assessment done. Also to see if he had any needs. Have made three attempts to contact the patient. No return calls. At this time I will remove Lalitha Barrientos from the program. Should another referral be made or he returns a call I will open another file.

## 2023-02-21 ENCOUNTER — TELEPHONE (OUTPATIENT)
Dept: INTERNAL MEDICINE CLINIC | Age: 68
End: 2023-02-21

## 2023-03-01 ENCOUNTER — OFFICE VISIT (OUTPATIENT)
Dept: INTERNAL MEDICINE CLINIC | Age: 68
End: 2023-03-01

## 2023-03-01 VITALS
BODY MASS INDEX: 17.46 KG/M2 | WEIGHT: 104.8 LBS | DIASTOLIC BLOOD PRESSURE: 60 MMHG | HEIGHT: 65 IN | HEART RATE: 86 BPM | RESPIRATION RATE: 16 BRPM | OXYGEN SATURATION: 93 % | SYSTOLIC BLOOD PRESSURE: 102 MMHG | TEMPERATURE: 97.2 F

## 2023-03-01 DIAGNOSIS — F51.02 ADJUSTMENT INSOMNIA: ICD-10-CM

## 2023-03-01 DIAGNOSIS — R52 PAIN MANAGEMENT: ICD-10-CM

## 2023-03-01 DIAGNOSIS — M54.12 CERVICAL RADICULOPATHY: ICD-10-CM

## 2023-03-01 DIAGNOSIS — J96.10 CHRONIC RESPIRATORY FAILURE, UNSPECIFIED WHETHER WITH HYPOXIA OR HYPERCAPNIA (HCC): ICD-10-CM

## 2023-03-01 DIAGNOSIS — J44.9 COPD, SEVERE (HCC): Primary | ICD-10-CM

## 2023-03-01 DIAGNOSIS — F10.10 ALCOHOL ABUSE: ICD-10-CM

## 2023-03-01 DIAGNOSIS — F32.1 CURRENT MODERATE EPISODE OF MAJOR DEPRESSIVE DISORDER WITHOUT PRIOR EPISODE (HCC): ICD-10-CM

## 2023-03-01 DIAGNOSIS — R63.4 WEIGHT LOSS: ICD-10-CM

## 2023-03-01 DIAGNOSIS — Z91.81 AT HIGH RISK FOR FALLS: ICD-10-CM

## 2023-03-01 DIAGNOSIS — Z72.0 TOBACCO ABUSE DISORDER: ICD-10-CM

## 2023-03-01 PROBLEM — J18.9 PNEUMONIA OF RIGHT LOWER LOBE DUE TO INFECTIOUS ORGANISM: Status: RESOLVED | Noted: 2022-06-09 | Resolved: 2023-03-01

## 2023-03-01 PROBLEM — J44.1 COPD EXACERBATION (HCC): Status: RESOLVED | Noted: 2022-11-12 | Resolved: 2023-03-01

## 2023-03-01 RX ORDER — MULTIVIT WITH MINERALS/LUTEIN
1000 TABLET ORAL DAILY
COMMUNITY

## 2023-03-01 RX ORDER — MIRTAZAPINE 7.5 MG/1
7.5 TABLET, FILM COATED ORAL NIGHTLY
Qty: 30 TABLET | Refills: 2 | Status: SHIPPED | OUTPATIENT
Start: 2023-03-01

## 2023-03-01 ASSESSMENT — PATIENT HEALTH QUESTIONNAIRE - PHQ9
SUM OF ALL RESPONSES TO PHQ9 QUESTIONS 1 & 2: 0
1. LITTLE INTEREST OR PLEASURE IN DOING THINGS: 0
SUM OF ALL RESPONSES TO PHQ QUESTIONS 1-9: 0
2. FEELING DOWN, DEPRESSED OR HOPELESS: 0

## 2023-03-01 NOTE — PROGRESS NOTES
On the basis of positive falls risk screening, assessment and plan is as follows: home safety tips provided. Cat Kevin  Patient's  is 1955  Seen in office on 3/1/2023      SUBJECTIVE:  Cristal Damon vel 79 y. o.year old male presents today   Chief Complaint   Patient presents with    Follow-up    Insomnia     Trouble getting to sleep and staying asleep     Patient is here for follow-up of COPD, insomnia, anxiety, chronic pain  Patient has severe COPD and has chronic respiratory failure and is on oxygen. He continues to smoke and is unable to quit smoking. He did quit smoking for couple of years ago but he started smoking again. He tried several treatments for that. He also went to [id number] this in the past    He is using inhalers and oxygen and is benefiting from that. He has a depression and insomnia. His appetite is also poor and he has lost weight. Patient states he is not drinking too much alcohol    He is taking oxycodone for pain control  Taking medications regularly. No side effects noted. Review of Systems    OBJECTIVE: /60   Pulse 86   Temp 97.2 °F (36.2 °C) (Temporal)   Resp 16   Ht 5' 5\" (1.651 m)   Wt 104 lb 12.8 oz (47.5 kg)   SpO2 93% Comment: oxygen at 2 liters per nc  BMI 17.44 kg/m²     Wt Readings from Last 3 Encounters:   23 104 lb 12.8 oz (47.5 kg)   22 110 lb (49.9 kg)   22 110 lb (49.9 kg)      GENERAL: - Alert, oriented, pleasant, in no apparent distress. HEENT: - Conjunctiva pink, no scleral icterus. ENT clear. NECK: -Supple. No jugular venous distention noted. No masses felt,  CARDIOVASCULAR: - Normal S1 and S2    PULMONARY: - No respiratory distress. No wheezes or rales. ABDOMEN: - Soft and non-tender,no masses  ororganomegaly. EXTREMITIES: - No cyanosis, clubbing, or significant edema. SKIN: Skin is warm and dry. NEUROLOGICAL: - Cranial nerves II through XII are grossly intact. IMPRESSION:    Encounter Diagnoses   Name Primary? COPD, severe ([facility] [address] Yes    At high risk for falls     Chronic respiratory failure, unspecified whether with hypoxia or hypercapnia (HCC)     Current moderate episode of major depressive disorder without prior episode (HCC)     Adjustment insomnia     Tobacco abuse disorder     Cervical radiculopathy     Alcohol abuse     Pain management     Weight loss        ASSESSMENT/PLAN:    1. COPD, severe ([address]  Overview:  PFTs showed severe COPD in   Pt uses Duoneb neb treatment and prn ventolin and symbicort   Uses 2L/ O2   Pulmonologist Dr. Sariah Albrecht Smoking Cessation Program  2. At high risk for falls. Advised patient about risk for the fall and fall precautions    3. Chronic respiratory failure, unspecified whether with hypoxia or hypercapnia (HCC)  Overview:  Due to severe COPD. Patient is on oxygen 2L/m and nebulizer treatments    4. Current moderate episode of major depressive disorder without prior episode (HCC)  Overview:  Trazodone 100 mg daily  and counseling by hospice after his girlfriend  . Pt stopped going to counseling when it was switched to telephone. Pt states he is not feeling depressed and does not take trazodone on daily basis. Trazodone is discontinued    5. Adjustment insomnia  Overview:  Patient is on trazodone , takes prn and is helping   Not taking any med  Start on remeron 7.5 mg at hs        Orders:  -     mirtazapine (REMERON) 7.5 MG tablet; Take 1 tablet by mouth nightly, Disp-30 tablet, R-2Normal  6. Tobacco abuse disorder  Overview:  Patient is a smoker. Counseled to quit smoking. Various options given. Help number 1-800 QUIT NOW  given to patient. Long discussion to quit smoking. Has appt with [facility]. Orders:  -     [facility] Smoking Cessation Program  7. Cervical radiculopathy  Overview:  S/P cervical discectomy and fusion with plate and screw. On 16.  Dr Sebastian Montoya at [facility]  [facility]  Pt is on oxycodone 5 mg q 6 hours  8. Alcohol abuse  Overview:  Pt has decreased drinking from 6 pack a day to 6 pack a week       Orders:  -     Comprehensive Metabolic Panel; Future  9. Pain management  Overview:  Patient is going to pain clinic Dr. Leandro Maciel. [facility] pain clinic  Dx pain in the cervical spine. Had surgery for neck in the past .  Patient is on oxycodone  10. Weight loss  Overview:  Chest x ray : COPD  Labs normal  Colonoscopy : 9/2014  CT chest in 3/18 and 4/18 : COPD and pneumonia : no mass  CT abdomen  In 4/2018: NAD  Weight has improved   Orders:  -     mirtazapine (REMERON) 7.5 MG tablet; Take 1 tablet by mouth nightly, Disp-30 tablet, R-2Normal  -     Comprehensive Metabolic Panel; Future  -     CBC with Auto Differential; Future  -     TSH; Future  -     T4, Free; Future    Return to office in 6 weeks  Mediations reviewed with the patient. Continue current medications. Appropriate prescriptions are addressed. After visit summeryprovided. Follow up as directed sooner if needed. Questions answered and patient verbalizes understanding. Call for any problems, questions, or concerns.        No Known Allergies  Current Outpatient Medications   Medication Sig Dispense Refill    Ascorbic Acid (VITAMIN C) 1000 MG tablet Take 1,000 mg by mouth daily      albuterol sulfate HFA (PROVENTIL;VENTOLIN;PROAIR) 108 (90 Base) MCG/ACT inhaler INHALE 2 PUFFS EVERY 6 HOURS AS NEEDED FOR WHEEZING 54 g 5    ipratropium-albuterol (DUONEB) 0.5-2.5 (3) MG/3ML SOLN nebulizer solution INHALE THE CONTENTS OF 1 VIAL VIA NEBULIZER FOUR TIMES DAILY 1080 mL 1    predniSONE (DELTASONE) 5 MG tablet TAKE 1 TABLET EVERY DAY 90 tablet 3    SYMBICORT 160-4.5 MCG/ACT AERO INHALE 2 PUFFS TWICE DAILY 3 each 3    Turmeric 500 MG CAPS Take 1 capsule by mouth daily      Cholecalciferol (VITAMIN D3) 1.25 MG (89349 UT) CAPS Take 1 capsule by mouth daily       oxyCODONE (ROXICODONE) 5 MG immediate release tablet Take 5 mg by mouth every 6 hours as needed for Pain. OXYGEN Inhale 2 L into the lungs continuous       Multiple Vitamins-Minerals (MULTIVITAMIN ADULTS PO) Take by mouth daily       traZODone (DESYREL) 100 MG tablet TAKE 1 TABLET BY MOUTH NIGHTLY (Patient not taking: Reported on 3/1/2023) 30 tablet 3     No current facility-administered medications for this visit. 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 On 7/22/16. Dr Raji Moise at [facility] 6-    Dr. Zita Goldsmith    COPD (chronic obstructive pulmonary disease) ([facility] [address] 5/19/2014    PFTs showed severe COPD in 5/14- follows with Dr Jazzmine Burrell    COPD exacerbation [facility]) 6/10/2016    Diverticulosis 9-    Dupuytren's contracture 5/26/2017    Right palm    Elevated PSA 12/21/2018 12/2018 : PSA is 6    H/O colonoscopy 9-18-14    Dr. Zita Goldsmith    H/O echocardiogram 08/17/2016    EF 55-60%. Abnormal diastolic function. Mild TR. Borderline right atrium and RV dilatation. Hiatal hernia     History of alcohol abuse     \"average 6 pk beer per day\"    History of drug abuse ([facility] [address]     does use marijuana\"last used cocaine over 10 yrs ago\" per pt on 10/3/2018    Internal hemorrhoids 9-    Intervertebral disc protrusion 5/19/2014    Cervical region on MRI    Lingular pneumonia 4/3/2018    Lymphocytic colitis 9-18-14    Dr. Zita Goldsmith, will treat if symptomatic    On home oxygen therapy     2l/nc at night only    Pneumonia 11/11/2021    Pneumonia of left lower lobe due to infectious organism 11/27/2018    Sepsis due to pneumonia ([facility] [address] 6/10/2016    Spinal stenosis in cervical region 5/19/2014    MRI showed  Disc protrusion. Severe spinal stenosis at C3-C4    Spinal stenosis of cervical region 3/14/2016    S/P cervical discectomy and fusion with plate and screw. On 7/22/16. Dr Becerra at [facility]    Tobacco abuse disorder 5/19/2014    Wears glasses     to read     Past Surgical History:   Procedure Laterality Date    CERVICAL SPINE SURGERY  2015    \"have plate in neck done at [facility]    COLONOSCOPY      9- Dr Sawant. f/u 3 years    COLONOSCOPY  10/04/2018    COLONOSCOPY N/A 10/4/2018    COLONOSCOPY POLYPECTOMY SNARE/COLD BIOPSY performed by Jayesh Zavala MD at [facility] ENDOSCOPY   recheck in 5 years    DENTAL SURGERY  1974    POLYPECTOMY  10/04/2018    2 Polyps Removed    SIGMOIDOSCOPY      UPPER GASTROINTESTINAL ENDOSCOPY  10/04/2018    with Biopsy    UPPER GASTROINTESTINAL ENDOSCOPY N/A 10/4/2018    EGD BIOPSY performed by Jayesh Zavala MD at [facility] ENDOSCOPY     Social History     Tobacco Use    Smoking status: Every Day     Packs/day: 1.50     Years: 64.00     Pack years: 96.00     Types: Cigarettes     Start date: 1970    Smokeless tobacco: Never   Substance Use Topics    Alcohol use: Yes     Alcohol/week: 6.0 standard drinks     Types: 6 Cans of beer per week     Comment: 3 beers daily       LAB REVIEW:  CBC:   Lab Results   Component Value Date/Time    WBC 24.3 11/13/2022 04:45 AM    HGB 11.2 11/13/2022 04:45 AM    HCT 34.6 11/13/2022 04:45 AM     11/13/2022 04:45 AM     Lipids:   Lab Results   Component Value Date    HDL 90 03/09/2021    LDLDIRECT 88 03/09/2021    TRIGLYCFAST 46 03/09/2021    CHOLFAST 186 03/09/2021     Renal:   Lab Results   Component Value Date/Time    BUN 8 11/14/2022 06:20 AM    CREATININE 0.3 11/14/2022 06:20 AM     11/14/2022 06:20 AM    K 4.1 11/14/2022 06:20 AM    ALT 10 11/13/2022 04:45 AM    AST 11 11/13/2022 04:45 AM    GLUCOSE 143 11/14/2022 06:20 AM    GLUF 123 09/14/2021 09:30 AM     PT/INR: No results found for: INR  A1C:   Lab Results   Component Value Date    LABA1C 5.4 11/13/2022           Giovanni Beckwith MD, 3/1/2023 , 4:00 PM

## 2023-03-24 ENCOUNTER — APPOINTMENT (OUTPATIENT)
Dept: GENERAL RADIOLOGY | Age: 68
End: 2023-03-24
Payer: MEDICARE

## 2023-03-24 ENCOUNTER — HOSPITAL ENCOUNTER (EMERGENCY)
Age: 68
Discharge: HOME | End: 2023-03-24
Attending: STUDENT IN AN ORGANIZED HEALTH CARE EDUCATION/TRAINING PROGRAM
Payer: MEDICARE

## 2023-03-24 VITALS
HEART RATE: 91 BPM | DIASTOLIC BLOOD PRESSURE: 62 MMHG | TEMPERATURE: 98.9 F | SYSTOLIC BLOOD PRESSURE: 98 MMHG | RESPIRATION RATE: 16 BRPM | OXYGEN SATURATION: 98 %

## 2023-03-24 DIAGNOSIS — L03.115 CELLULITIS OF RIGHT LOWER EXTREMITY: Primary | ICD-10-CM

## 2023-03-24 LAB
ANION GAP SERPL CALCULATED.3IONS-SCNC: 6 MMOL/L (ref 4–16)
BASOPHILS ABSOLUTE: 0.1 K/CU MM
BASOPHILS RELATIVE PERCENT: 0.2 % (ref 0–1)
BUN SERPL-MCNC: 8 MG/DL (ref 6–23)
CALCIUM SERPL-MCNC: 9 MG/DL (ref 8.3–10.6)
CHLORIDE BLD-SCNC: 95 MMOL/L (ref 99–110)
CO2: 37 MMOL/L (ref 21–32)
CREAT SERPL-MCNC: 0.4 MG/DL (ref 0.9–1.3)
DIFFERENTIAL TYPE: ABNORMAL
EOSINOPHILS ABSOLUTE: 0 K/CU MM
EOSINOPHILS RELATIVE PERCENT: 0 % (ref 0–3)
GFR SERPL CREATININE-BSD FRML MDRD: >60 ML/MIN/1.73M2
GLUCOSE SERPL-MCNC: 117 MG/DL (ref 70–99)
HCT VFR BLD CALC: 40.7 % (ref 42–52)
HEMOGLOBIN: 12.7 GM/DL (ref 13.5–18)
IMMATURE NEUTROPHIL %: 0.5 % (ref 0–0.43)
LYMPHOCYTES ABSOLUTE: 0.7 K/CU MM
LYMPHOCYTES RELATIVE PERCENT: 3.4 % (ref 24–44)
MCH RBC QN AUTO: 30.3 PG (ref 27–31)
MCHC RBC AUTO-ENTMCNC: 31.2 % (ref 32–36)
MCV RBC AUTO: 97.1 FL (ref 78–100)
MONOCYTES ABSOLUTE: 1 K/CU MM
MONOCYTES RELATIVE PERCENT: 4.4 % (ref 0–4)
PDW BLD-RTO: 13.7 % (ref 11.7–14.9)
PLATELET # BLD: 226 K/CU MM (ref 140–440)
PMV BLD AUTO: 8.9 FL (ref 7.5–11.1)
POTASSIUM SERPL-SCNC: 4.4 MMOL/L (ref 3.5–5.1)
RBC # BLD: 4.19 M/CU MM (ref 4.6–6.2)
SEGMENTED NEUTROPHILS ABSOLUTE COUNT: 19.9 K/CU MM
SEGMENTED NEUTROPHILS RELATIVE PERCENT: 91.5 % (ref 36–66)
SODIUM BLD-SCNC: 138 MMOL/L (ref 135–145)
TOTAL IMMATURE NEUTOROPHIL: 0.11 K/CU MM
WBC # BLD: 21.8 K/CU MM (ref 4–10.5)

## 2023-03-24 PROCEDURE — 73630 X-RAY EXAM OF FOOT: CPT

## 2023-03-24 PROCEDURE — 6370000000 HC RX 637 (ALT 250 FOR IP): Performed by: STUDENT IN AN ORGANIZED HEALTH CARE EDUCATION/TRAINING PROGRAM

## 2023-03-24 PROCEDURE — 99284 EMERGENCY DEPT VISIT MOD MDM: CPT

## 2023-03-24 PROCEDURE — 80048 BASIC METABOLIC PNL TOTAL CA: CPT

## 2023-03-24 PROCEDURE — 85025 COMPLETE CBC W/AUTO DIFF WBC: CPT

## 2023-03-24 PROCEDURE — 73590 X-RAY EXAM OF LOWER LEG: CPT

## 2023-03-24 RX ORDER — CEPHALEXIN 500 MG/1
500 CAPSULE ORAL ONCE
Status: COMPLETED | OUTPATIENT
Start: 2023-03-24 | End: 2023-03-24

## 2023-03-24 RX ORDER — HYDROCODONE BITARTRATE AND ACETAMINOPHEN 5; 325 MG/1; MG/1
1 TABLET ORAL EVERY 6 HOURS PRN
Status: DISCONTINUED | OUTPATIENT
Start: 2023-03-24 | End: 2023-03-24 | Stop reason: HOSPADM

## 2023-03-24 RX ORDER — TRAMADOL HYDROCHLORIDE 50 MG/1
50 TABLET ORAL EVERY 8 HOURS PRN
Qty: 15 TABLET | Refills: 0 | Status: SHIPPED | OUTPATIENT
Start: 2023-03-24 | End: 2023-03-24 | Stop reason: SDUPTHER

## 2023-03-24 RX ORDER — CEPHALEXIN 500 MG/1
500 CAPSULE ORAL 4 TIMES DAILY
Qty: 28 CAPSULE | Refills: 0 | Status: SHIPPED | OUTPATIENT
Start: 2023-03-24 | End: 2023-03-31

## 2023-03-24 RX ORDER — CEPHALEXIN 500 MG/1
500 CAPSULE ORAL 4 TIMES DAILY
Qty: 28 CAPSULE | Refills: 0 | Status: SHIPPED | OUTPATIENT
Start: 2023-03-24 | End: 2023-03-24 | Stop reason: SDUPTHER

## 2023-03-24 RX ORDER — TRAMADOL HYDROCHLORIDE 50 MG/1
50 TABLET ORAL EVERY 8 HOURS PRN
Qty: 15 TABLET | Refills: 0 | Status: ON HOLD | OUTPATIENT
Start: 2023-03-24 | End: 2023-03-27 | Stop reason: ALTCHOICE

## 2023-03-24 RX ADMIN — CEPHALEXIN 500 MG: 500 CAPSULE ORAL at 16:52

## 2023-03-24 RX ADMIN — HYDROCODONE BITARTRATE AND ACETAMINOPHEN 1 TABLET: 5; 325 TABLET ORAL at 16:52

## 2023-03-24 ASSESSMENT — PAIN DESCRIPTION - LOCATION: LOCATION: LEG

## 2023-03-24 ASSESSMENT — PAIN SCALES - GENERAL: PAINLEVEL_OUTOF10: 7

## 2023-03-24 ASSESSMENT — PAIN DESCRIPTION - ORIENTATION: ORIENTATION: RIGHT;LOWER

## 2023-03-24 NOTE — ED PROVIDER NOTES
Emergency Department Encounter    Patient: Corin Carter  MRN: [id number]  : 1955  Date of Evaluation: 3/24/2023  ED Provider:  Mony Bennett DO    Triage Chief Complaint:   Wound Infection (Pts cat scratched him on right upper ankle area yesterday. Pt has been admitted in ICU for this before. Pts RLE is red and swollen. )    [place]:  Corin Carter is a 79 y.o. male that presents to the ED with a history of redness of the right lower extremity for the past 3 days. Patient's also endorses pain to the right lower extremity. Patient admitted to triage nurse that he has a cat that may have scratched him in the right lower extremity. No history of fever, lightheadedness, shortness of breath, chest pain, abdominal pain, or trauma to the lower extremities. 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 On 16. Dr Lisy Gong at [facility]-    Dr. Chava Loera    COPD (chronic obstructive pulmonary disease) ([facility] [address] 2014    PFTs showed severe COPD in - follows with Dr Sergo Jade    COPD exacerbation [facility]) 6/10/2016    Diverticulosis 2014    Dupuytren's contracture 2017    Right palm    Elevated PSA 2018 : PSA is 6    H/O colonoscopy 14    Dr. Chava Loera    H/O echocardiogram 2016    EF 55-60%. Abnormal diastolic function. Mild TR. Borderline right atrium and RV dilatation.      Hiatal hernia     History of alcohol abuse     \"average 6 pk beer per day\"    History of drug abuse ([facility] [address]     does use marijuana\"last used cocaine over 10 yrs ago\" per pt on 10/3/2018    Internal hemorrhoids 2014 significant for right lower extremity erythema with tenderness and mild swelling and warmth    Significant differentials considered at this time include   -Cellulitis, necrotizing fasciitis, osteomyelitis, DVT    Previous notes reviewed:  -None    EKG       Laboratory evaluations include[de-identified]  Considered: CBC, BMP  Done: Same  Significant results: elevated white count of 21.8      Radiology include:  Considered x-ray of the right foot and right tib-fib  Done: Same  Significant results: Unremarkable for any osseous injury/infection. Positive mild soft tissue swelling with possible cellulitis. Medications:     Medications   HYDROcodone-acetaminophen (NORCO) 5-325 MG per tablet 1 tablet (1 tablet Oral Given 3/24/23 1652)   cephALEXin (KEFLEX) capsule 500 mg (500 mg Oral Given 3/24/23 1652)       Procedures:    Procedures    Consults:  -None    Social Determinants affecting management or disposition:    -None    Reevaluation  No new complaints    Disposition  Considered discharge    Final disposition: Discharge      30-year-old male presents to the ED with a history of right lower extremity swelling with redness and pain. Physical examination significant for erythema and tenderness. Laboratory evaluation is significant for elevated white count of 21.8. Radiologic evaluation is significant for soft tissue swelling with possible cellulitis    Patient was given antibiotics and pain medication and discharged with same. Patient advised to follow-up with primary care soon as possible    Patient given opportunity to ask questions and all questions were answered and appropriate detail. Patient also given return instructions to the ED in the event of any worsening of his symptoms including fever, shortness of breath, dizziness lightheadedness, worsening erythema or tenderness or any other symptoms of concern. The patient verbalized his understanding and agreement with the plan. Clinical Impression:  1.

## 2023-03-24 NOTE — DISCHARGE INSTRUCTIONS
Follow-up with primary care as soon as possible  Take antibiotics as prescribed  Take pain medication as needed

## 2023-03-27 ENCOUNTER — APPOINTMENT (OUTPATIENT)
Dept: CT IMAGING | Age: 68
DRG: 193 | End: 2023-03-27
Payer: MEDICARE

## 2023-03-27 ENCOUNTER — APPOINTMENT (OUTPATIENT)
Dept: GENERAL RADIOLOGY | Age: 68
DRG: 193 | End: 2023-03-27
Payer: MEDICARE

## 2023-03-27 ENCOUNTER — HOSPITAL ENCOUNTER (INPATIENT)
Age: 68
LOS: 3 days | Discharge: ANOTHER HOSPITAL | DRG: 193 | End: 2023-03-30
Attending: EMERGENCY MEDICINE | Admitting: INTERNAL MEDICINE
Payer: MEDICARE

## 2023-03-27 DIAGNOSIS — L03.115 CELLULITIS OF RIGHT LOWER EXTREMITY: ICD-10-CM

## 2023-03-27 DIAGNOSIS — R09.02 HYPOXIA: ICD-10-CM

## 2023-03-27 DIAGNOSIS — J44.1 COPD EXACERBATION (HCC): Primary | ICD-10-CM

## 2023-03-27 DIAGNOSIS — J18.9 COMMUNITY ACQUIRED PNEUMONIA OF RIGHT LOWER LOBE OF LUNG: ICD-10-CM

## 2023-03-27 PROBLEM — Z72.0 NICOTINE ABUSE: Status: ACTIVE | Noted: 2023-03-27

## 2023-03-27 PROBLEM — J96.21 ACUTE ON CHRONIC RESPIRATORY FAILURE WITH HYPOXIA (HCC): Status: ACTIVE | Noted: 2023-03-27

## 2023-03-27 LAB
ALBUMIN SERPL-MCNC: 3.6 GM/DL (ref 3.4–5)
ALP BLD-CCNC: 87 IU/L (ref 40–129)
ALT SERPL-CCNC: 31 U/L (ref 10–40)
ANION GAP SERPL CALCULATED.3IONS-SCNC: 8 MMOL/L (ref 4–16)
AST SERPL-CCNC: 44 IU/L (ref 15–37)
B PARAP IS1001 DNA NPH QL NAA+NON-PROBE: NOT DETECTED
B PERT.PT PRMT NPH QL NAA+NON-PROBE: NOT DETECTED
BASE EXCESS MIXED: 9.5 (ref 0–1.2)
BASE EXCESS: ABNORMAL (ref 0–3.3)
BASOPHILS ABSOLUTE: 0 K/CU MM
BASOPHILS RELATIVE PERCENT: 0.1 % (ref 0–1)
BILIRUB SERPL-MCNC: 0.4 MG/DL (ref 0–1)
BILIRUBIN DIRECT: 0.2 MG/DL (ref 0–0.3)
BILIRUBIN, INDIRECT: 0.2 MG/DL (ref 0–0.7)
BUN SERPL-MCNC: 12 MG/DL (ref 6–23)
C PNEUM DNA NPH QL NAA+NON-PROBE: NOT DETECTED
CALCIUM SERPL-MCNC: 9 MG/DL (ref 8.3–10.6)
CHLORIDE BLD-SCNC: 92 MMOL/L (ref 99–110)
CO2 CONTENT: 40.6 MMOL/L (ref 19–24)
CO2: 36 MMOL/L (ref 21–32)
CREAT SERPL-MCNC: 0.4 MG/DL (ref 0.9–1.3)
D DIMER: 877 NG/ML(DDU)
DIFFERENTIAL TYPE: ABNORMAL
EOSINOPHILS ABSOLUTE: 0 K/CU MM
EOSINOPHILS RELATIVE PERCENT: 0.1 % (ref 0–3)
FLUAV H1 2009 PAN RNA NPH NAA+NON-PROBE: NOT DETECTED
FLUAV H1 RNA NPH QL NAA+NON-PROBE: NOT DETECTED
FLUAV H3 RNA NPH QL NAA+NON-PROBE: NOT DETECTED
FLUAV RNA NPH QL NAA+NON-PROBE: NOT DETECTED
FLUBV RNA NPH QL NAA+NON-PROBE: NOT DETECTED
GFR SERPL CREATININE-BSD FRML MDRD: >60 ML/MIN/1.73M2
GLUCOSE SERPL-MCNC: 120 MG/DL (ref 70–99)
HADV DNA NPH QL NAA+NON-PROBE: NOT DETECTED
HCO3 VENOUS: 38.4 MMOL/L (ref 19–25)
HCOV 229E RNA NPH QL NAA+NON-PROBE: NOT DETECTED
HCOV HKU1 RNA NPH QL NAA+NON-PROBE: NOT DETECTED
HCOV NL63 RNA NPH QL NAA+NON-PROBE: NOT DETECTED
HCOV OC43 RNA NPH QL NAA+NON-PROBE: NOT DETECTED
HCT VFR BLD CALC: 37.7 % (ref 42–52)
HEMOGLOBIN: 12 GM/DL (ref 13.5–18)
HMPV RNA NPH QL NAA+NON-PROBE: NOT DETECTED
HPIV1 RNA NPH QL NAA+NON-PROBE: NOT DETECTED
HPIV2 RNA NPH QL NAA+NON-PROBE: NOT DETECTED
HPIV3 RNA NPH QL NAA+NON-PROBE: NOT DETECTED
HPIV4 RNA NPH QL NAA+NON-PROBE: NOT DETECTED
IMMATURE NEUTROPHIL %: 0.5 % (ref 0–0.43)
LYMPHOCYTES ABSOLUTE: 0.5 K/CU MM
LYMPHOCYTES RELATIVE PERCENT: 2.7 % (ref 24–44)
M PNEUMO DNA NPH QL NAA+NON-PROBE: NOT DETECTED
MCH RBC QN AUTO: 30 PG (ref 27–31)
MCHC RBC AUTO-ENTMCNC: 31.8 % (ref 32–36)
MCV RBC AUTO: 94.3 FL (ref 78–100)
MONOCYTES ABSOLUTE: 1.4 K/CU MM
MONOCYTES RELATIVE PERCENT: 7.6 % (ref 0–4)
O2 SAT, VEN: 94.4 % (ref 50–70)
PCO2, VEN: 70.9 MMHG (ref 38–52)
PDW BLD-RTO: 13.3 % (ref 11.7–14.9)
PH VENOUS: 7.34 (ref 7.32–7.42)
PLATELET # BLD: 236 K/CU MM (ref 140–440)
PMV BLD AUTO: 9.6 FL (ref 7.5–11.1)
PO2, VEN: 80.5 MMHG (ref 28–48)
POTASSIUM SERPL-SCNC: 4.8 MMOL/L (ref 3.5–5.1)
RBC # BLD: 4 M/CU MM (ref 4.6–6.2)
RSV RNA NPH QL NAA+NON-PROBE: NOT DETECTED
RV+EV RNA NPH QL NAA+NON-PROBE: NOT DETECTED
SARS-COV-2 RDRP RESP QL NAA+PROBE: NOT DETECTED
SARS-COV-2 RNA NPH QL NAA+NON-PROBE: NOT DETECTED
SEGMENTED NEUTROPHILS ABSOLUTE COUNT: 16.8 K/CU MM
SEGMENTED NEUTROPHILS RELATIVE PERCENT: 89 % (ref 36–66)
SODIUM BLD-SCNC: 136 MMOL/L (ref 135–145)
SOURCE, BLOOD GAS: ABNORMAL
TOTAL IMMATURE NEUTOROPHIL: 0.1 K/CU MM
TOTAL PROTEIN: 5.9 GM/DL (ref 6.4–8.2)
TROPONIN T: <0.01 NG/ML
WBC # BLD: 18.8 K/CU MM (ref 4–10.5)

## 2023-03-27 PROCEDURE — 6370000000 HC RX 637 (ALT 250 FOR IP): Performed by: EMERGENCY MEDICINE

## 2023-03-27 PROCEDURE — 71045 X-RAY EXAM CHEST 1 VIEW: CPT

## 2023-03-27 PROCEDURE — 96375 TX/PRO/DX INJ NEW DRUG ADDON: CPT

## 2023-03-27 PROCEDURE — 6360000002 HC RX W HCPCS: Performed by: EMERGENCY MEDICINE

## 2023-03-27 PROCEDURE — 93005 ELECTROCARDIOGRAM TRACING: CPT | Performed by: EMERGENCY MEDICINE

## 2023-03-27 PROCEDURE — 80053 COMPREHEN METABOLIC PANEL: CPT

## 2023-03-27 PROCEDURE — 87635 SARS-COV-2 COVID-19 AMP PRB: CPT

## 2023-03-27 PROCEDURE — 0202U NFCT DS 22 TRGT SARS-COV-2: CPT

## 2023-03-27 PROCEDURE — 84145 PROCALCITONIN (PCT): CPT

## 2023-03-27 PROCEDURE — 71275 CT ANGIOGRAPHY CHEST: CPT

## 2023-03-27 PROCEDURE — 83605 ASSAY OF LACTIC ACID: CPT

## 2023-03-27 PROCEDURE — 96367 TX/PROPH/DG ADDL SEQ IV INF: CPT

## 2023-03-27 PROCEDURE — 1200000000 HC SEMI PRIVATE

## 2023-03-27 PROCEDURE — 84484 ASSAY OF TROPONIN QUANT: CPT

## 2023-03-27 PROCEDURE — 6370000000 HC RX 637 (ALT 250 FOR IP): Performed by: NURSE PRACTITIONER

## 2023-03-27 PROCEDURE — 96365 THER/PROPH/DIAG IV INF INIT: CPT

## 2023-03-27 PROCEDURE — 6360000002 HC RX W HCPCS: Performed by: NURSE PRACTITIONER

## 2023-03-27 PROCEDURE — 87899 AGENT NOS ASSAY W/OPTIC: CPT

## 2023-03-27 PROCEDURE — 2580000003 HC RX 258: Performed by: EMERGENCY MEDICINE

## 2023-03-27 PROCEDURE — 82248 BILIRUBIN DIRECT: CPT

## 2023-03-27 PROCEDURE — 80076 HEPATIC FUNCTION PANEL: CPT

## 2023-03-27 PROCEDURE — 85025 COMPLETE CBC W/AUTO DIFF WBC: CPT

## 2023-03-27 PROCEDURE — 6360000004 HC RX CONTRAST MEDICATION: Performed by: EMERGENCY MEDICINE

## 2023-03-27 PROCEDURE — 2580000003 HC RX 258: Performed by: NURSE PRACTITIONER

## 2023-03-27 PROCEDURE — 85379 FIBRIN DEGRADATION QUANT: CPT

## 2023-03-27 PROCEDURE — 87449 NOS EACH ORGANISM AG IA: CPT

## 2023-03-27 PROCEDURE — 99285 EMERGENCY DEPT VISIT HI MDM: CPT

## 2023-03-27 PROCEDURE — 86140 C-REACTIVE PROTEIN: CPT

## 2023-03-27 PROCEDURE — 94640 AIRWAY INHALATION TREATMENT: CPT

## 2023-03-27 RX ORDER — SODIUM CHLORIDE 9 MG/ML
INJECTION, SOLUTION INTRAVENOUS PRN
Status: DISCONTINUED | OUTPATIENT
Start: 2023-03-27 | End: 2023-03-28

## 2023-03-27 RX ORDER — POLYETHYLENE GLYCOL 3350 17 G/17G
17 POWDER, FOR SOLUTION ORAL DAILY PRN
Status: DISCONTINUED | OUTPATIENT
Start: 2023-03-27 | End: 2023-03-30 | Stop reason: HOSPADM

## 2023-03-27 RX ORDER — NICOTINE 21 MG/24HR
1 PATCH, TRANSDERMAL 24 HOURS TRANSDERMAL DAILY
Status: DISCONTINUED | OUTPATIENT
Start: 2023-03-28 | End: 2023-03-30 | Stop reason: HOSPADM

## 2023-03-27 RX ORDER — THIAMINE HYDROCHLORIDE 100 MG/ML
200 INJECTION, SOLUTION INTRAMUSCULAR; INTRAVENOUS ONCE
Status: COMPLETED | OUTPATIENT
Start: 2023-03-27 | End: 2023-03-27

## 2023-03-27 RX ORDER — IPRATROPIUM BROMIDE AND ALBUTEROL SULFATE 2.5; .5 MG/3ML; MG/3ML
SOLUTION RESPIRATORY (INHALATION)
Status: DISCONTINUED
Start: 2023-03-27 | End: 2023-03-28

## 2023-03-27 RX ORDER — BUDESONIDE AND FORMOTEROL FUMARATE DIHYDRATE 160; 4.5 UG/1; UG/1
2 AEROSOL RESPIRATORY (INHALATION) DAILY
Status: DISCONTINUED | OUTPATIENT
Start: 2023-03-28 | End: 2023-03-27 | Stop reason: CLARIF

## 2023-03-27 RX ORDER — ENOXAPARIN SODIUM 100 MG/ML
30 INJECTION SUBCUTANEOUS DAILY
Status: DISCONTINUED | OUTPATIENT
Start: 2023-03-28 | End: 2023-03-30 | Stop reason: HOSPADM

## 2023-03-27 RX ORDER — OXYCODONE HYDROCHLORIDE 5 MG/1
5 TABLET ORAL EVERY 6 HOURS PRN
Status: DISCONTINUED | OUTPATIENT
Start: 2023-03-27 | End: 2023-03-28

## 2023-03-27 RX ORDER — ACETAMINOPHEN 650 MG/1
650 SUPPOSITORY RECTAL EVERY 6 HOURS PRN
Status: DISCONTINUED | OUTPATIENT
Start: 2023-03-27 | End: 2023-03-28

## 2023-03-27 RX ORDER — IPRATROPIUM BROMIDE AND ALBUTEROL SULFATE 2.5; .5 MG/3ML; MG/3ML
3 SOLUTION RESPIRATORY (INHALATION) ONCE
Status: COMPLETED | OUTPATIENT
Start: 2023-03-27 | End: 2023-03-27

## 2023-03-27 RX ORDER — ONDANSETRON 4 MG/1
4 TABLET, ORALLY DISINTEGRATING ORAL EVERY 8 HOURS PRN
Status: DISCONTINUED | OUTPATIENT
Start: 2023-03-27 | End: 2023-03-30 | Stop reason: HOSPADM

## 2023-03-27 RX ORDER — SODIUM CHLORIDE 9 MG/ML
INJECTION, SOLUTION INTRAVENOUS CONTINUOUS
Status: DISPENSED | OUTPATIENT
Start: 2023-03-27 | End: 2023-03-28

## 2023-03-27 RX ORDER — NICOTINE 21 MG/24HR
1 PATCH, TRANSDERMAL 24 HOURS TRANSDERMAL ONCE
Status: COMPLETED | OUTPATIENT
Start: 2023-03-27 | End: 2023-03-28

## 2023-03-27 RX ORDER — ONDANSETRON 2 MG/ML
4 INJECTION INTRAMUSCULAR; INTRAVENOUS EVERY 6 HOURS PRN
Status: DISCONTINUED | OUTPATIENT
Start: 2023-03-27 | End: 2023-03-30 | Stop reason: HOSPADM

## 2023-03-27 RX ORDER — SODIUM CHLORIDE 0.9 % (FLUSH) 0.9 %
5-40 SYRINGE (ML) INJECTION EVERY 12 HOURS SCHEDULED
Status: DISCONTINUED | OUTPATIENT
Start: 2023-03-27 | End: 2023-03-28

## 2023-03-27 RX ORDER — SODIUM CHLORIDE 0.9 % (FLUSH) 0.9 %
5-40 SYRINGE (ML) INJECTION PRN
Status: DISCONTINUED | OUTPATIENT
Start: 2023-03-27 | End: 2023-03-28

## 2023-03-27 RX ORDER — ACETAMINOPHEN 325 MG/1
650 TABLET ORAL EVERY 6 HOURS PRN
Status: DISCONTINUED | OUTPATIENT
Start: 2023-03-27 | End: 2023-03-28

## 2023-03-27 RX ORDER — AZITHROMYCIN 250 MG/1
500 TABLET, FILM COATED ORAL EVERY 24 HOURS
Status: DISCONTINUED | OUTPATIENT
Start: 2023-03-28 | End: 2023-03-29

## 2023-03-27 RX ORDER — ALBUTEROL SULFATE 90 UG/1
2 AEROSOL, METERED RESPIRATORY (INHALATION) EVERY 4 HOURS PRN
Status: DISCONTINUED | OUTPATIENT
Start: 2023-03-27 | End: 2023-03-30 | Stop reason: HOSPADM

## 2023-03-27 RX ORDER — METHYLPREDNISOLONE SODIUM SUCCINATE 125 MG/2ML
125 INJECTION, POWDER, LYOPHILIZED, FOR SOLUTION INTRAMUSCULAR; INTRAVENOUS ONCE
Status: COMPLETED | OUTPATIENT
Start: 2023-03-27 | End: 2023-03-27

## 2023-03-27 RX ORDER — IPRATROPIUM BROMIDE AND ALBUTEROL SULFATE 2.5; .5 MG/3ML; MG/3ML
1 SOLUTION RESPIRATORY (INHALATION)
Status: DISCONTINUED | OUTPATIENT
Start: 2023-03-28 | End: 2023-03-30 | Stop reason: HOSPADM

## 2023-03-27 RX ADMIN — AZITHROMYCIN DIHYDRATE 500 MG: 500 INJECTION, POWDER, LYOPHILIZED, FOR SOLUTION INTRAVENOUS at 17:59

## 2023-03-27 RX ADMIN — SODIUM CHLORIDE 25 ML/HR: 9 INJECTION, SOLUTION INTRAVENOUS at 23:12

## 2023-03-27 RX ADMIN — CEFTRIAXONE SODIUM 1000 MG: 1 INJECTION, POWDER, FOR SOLUTION INTRAMUSCULAR; INTRAVENOUS at 23:12

## 2023-03-27 RX ADMIN — AMPICILLIN SODIUM AND SULBACTAM SODIUM 3000 MG: 2; 1 INJECTION, POWDER, FOR SOLUTION INTRAMUSCULAR; INTRAVENOUS at 16:56

## 2023-03-27 RX ADMIN — MOMETASONE FUROATE AND FORMOTEROL FUMARATE DIHYDRATE 2 PUFF: 200; 5 AEROSOL RESPIRATORY (INHALATION) at 21:26

## 2023-03-27 RX ADMIN — SODIUM CHLORIDE, PRESERVATIVE FREE 10 ML: 5 INJECTION INTRAVENOUS at 22:00

## 2023-03-27 RX ADMIN — IPRATROPIUM BROMIDE AND ALBUTEROL SULFATE 1 AMPULE: 2.5; .5 SOLUTION RESPIRATORY (INHALATION) at 21:25

## 2023-03-27 RX ADMIN — IPRATROPIUM BROMIDE AND ALBUTEROL SULFATE 3 AMPULE: .5; 3 SOLUTION RESPIRATORY (INHALATION) at 16:39

## 2023-03-27 RX ADMIN — METHYLPREDNISOLONE SODIUM SUCCINATE 125 MG: 125 INJECTION, POWDER, FOR SOLUTION INTRAMUSCULAR; INTRAVENOUS at 16:46

## 2023-03-27 RX ADMIN — THIAMINE HYDROCHLORIDE 200 MG: 100 INJECTION, SOLUTION INTRAMUSCULAR; INTRAVENOUS at 16:48

## 2023-03-27 RX ADMIN — OXYCODONE HYDROCHLORIDE 5 MG: 5 TABLET ORAL at 22:00

## 2023-03-27 RX ADMIN — IOPAMIDOL 75 ML: 755 INJECTION, SOLUTION INTRAVENOUS at 17:40

## 2023-03-27 RX ADMIN — SODIUM CHLORIDE 100 ML/HR: 9 INJECTION, SOLUTION INTRAVENOUS at 22:05

## 2023-03-27 ASSESSMENT — PAIN - FUNCTIONAL ASSESSMENT: PAIN_FUNCTIONAL_ASSESSMENT: ACTIVITIES ARE NOT PREVENTED

## 2023-03-27 ASSESSMENT — LIFESTYLE VARIABLES
HOW MANY STANDARD DRINKS CONTAINING ALCOHOL DO YOU HAVE ON A TYPICAL DAY: 5 OR 6
HOW OFTEN DO YOU HAVE A DRINK CONTAINING ALCOHOL: 4 OR MORE TIMES A WEEK

## 2023-03-27 ASSESSMENT — PAIN SCALES - GENERAL
PAINLEVEL_OUTOF10: 1
PAINLEVEL_OUTOF10: 4
PAINLEVEL_OUTOF10: 0
PAINLEVEL_OUTOF10: 8
PAINLEVEL_OUTOF10: 7

## 2023-03-27 ASSESSMENT — ENCOUNTER SYMPTOMS
GASTROINTESTINAL NEGATIVE: 1
COUGH: 1
SHORTNESS OF BREATH: 1
WHEEZING: 1

## 2023-03-27 ASSESSMENT — PAIN DESCRIPTION - FREQUENCY: FREQUENCY: CONTINUOUS

## 2023-03-27 ASSESSMENT — PAIN DESCRIPTION - ONSET: ONSET: ON-GOING

## 2023-03-27 ASSESSMENT — PAIN DESCRIPTION - DESCRIPTORS: DESCRIPTORS: ACHING

## 2023-03-27 ASSESSMENT — PAIN DESCRIPTION - ORIENTATION: ORIENTATION: MID;POSTERIOR

## 2023-03-27 ASSESSMENT — PAIN DESCRIPTION - PAIN TYPE: TYPE: CHRONIC PAIN

## 2023-03-27 ASSESSMENT — PAIN DESCRIPTION - LOCATION: LOCATION: NECK

## 2023-03-27 NOTE — ED PROVIDER NOTES
38. 4 (H) 19 - 25 MMOL/L    O2 Sat, Vamsi 94.4 (H) 50 - 70 %    CO2 Content 40.6 (H) 19 - 24 MMOL/L    Source: Venous    EKG 12 Lead   Result Value Ref Range    Ventricular Rate 108 BPM    Atrial Rate 108 BPM    P-R Interval 146 ms    QRS Duration 92 ms    Q-T Interval 314 ms    QTc Calculation (Bazett) 420 ms    P Axis 80 degrees    R Axis 9 degrees    T Axis 71 degrees    Diagnosis       Sinus tachycardia  Incomplete right bundle branch block  Borderline ECG  When compared with ECG of 12-NOV-2022 14:50,  No significant change was found        Radiographs (if obtained):  [] The following radiograph wasinterpreted by myself in the absence of a radiologist:   [] Radiologist's Report Reviewed:  CTA PULMONARY W CONTRAST   Final Result   1. No pulmonary emboli. 2. Right lung infiltrates, concerning for pneumonia with thickened secretions   most pronounced in the right lower lobe bronchi which could be related to   mucus plugging versus aspiration. 3. Spiculated noncalcified pulmonary nodule in the right upper lobe measuring   1.7 x 1.4 cm, image 366, of uncertain etiology. While this could be related   to underlying infection, malignancy cannot be excluded. Please see the   follow-up imaging recommendations below. RECOMMENDATIONS:   Fleischner Society guidelines for follow-up and management of incidentally   detected pulmonary nodules:      Single Solid Nodule:      Nodule size greater than 8 mm         In a low-risk patient, consider CT at 3 months, PET/CT, or tissue sampling. In a high-risk patient, consider CT at 3 months, PET/CT, or tissue sampling.      - Low risk patients include individuals with minimal or absent history of   smoking and other known risk factors. - High risk patients include individuals with a history or smoking or known   risk factors. Radiology 2017 http://pubs. rsna.org/doi/full/10.1148/radiol. 0004481132         XR CHEST PORTABLE   Final Result   Diffuse right lung

## 2023-03-27 NOTE — H&P
V2.0  History and Physical      Name:  Levi Rojas /Age/Sex: 1955  (79 y.o. male)   MRN & CSN:  [id number] & [id number] Encounter Date/Time: 3/27/2023 7:44 PM EDT   Location:   PCP: Emmanuelle Otto MD       Hospital Day: 1    Assessment and Plan:   Levi Rojas is a 79 y.o. male with a pmh of COPD, depression. Tobacco abuse, alcohol abuse. Insomnia and cervical radiculopathy who presents with  Shortness of breath. Likely related to COPD exacerbation and Pneumonia    Hospital Problems             Last Modified POA    * (Principal) Pneumonia 3/27/2023 Yes    COPD exacerbation ([address] 3/27/2023 Yes    Acute on chronic respiratory failure with hypoxia ([address] 3/27/2023 Yes    Nicotine abuse 3/27/2023 Yes    Pneumonia of right lung due to infectious organism, unspecified part of lung 3/27/2023 Yes     #  COPD with acute  exacerbation    Fatigue, Shortness of breath, cough, wheezing    Requiring supplemental oxygen. (6L N/C)    Breathing treatment albuterol/Duoneb, steroid    Wean oxygen as tolerated to base line (2L)    Use of spirometer encouraged    Follows with pulmonologist Dr. Angie Case    GI and DVT prophylaxis     #  Acute on Chronic respiratory failure   Shortness of breath  Patient oxygen saturation in ED was 78% on room air. On 2L oxygen at home, requiring 6L oxygen for saturation above 91%   Blood gas PH 7.34, pC02 70.9 (ven0 P02 80.5 (lor) Hc03 38.4 (lor)   Wean oxygen as tolerated to base line. #  SIRS    Criteria: , RR 28, WBC 18.8    May be related to COPD exacerbation and Pneumonia    Monitor vitals trend with management of underlining disease condition. # Pneumonia  (Right lower lobe)    Cough, fatigue    Chest X-ray :   Impression   Diffuse right lung pulmonary opacities suggesting pneumonia          CTA pulmonary W/contrast   . No pulmonary emboli.    2. Right lung infiltrates, concerning for pneumonia with thickened secretions   most pronounced in the right lower lobe bronchi

## 2023-03-28 LAB
ALBUMIN SERPL-MCNC: 3 GM/DL (ref 3.4–5)
ALP BLD-CCNC: 155 IU/L (ref 40–129)
ALT SERPL-CCNC: 36 U/L (ref 10–40)
ANION GAP SERPL CALCULATED.3IONS-SCNC: 5 MMOL/L (ref 4–16)
AST SERPL-CCNC: 46 IU/L (ref 15–37)
BASOPHILS ABSOLUTE: 0 K/CU MM
BASOPHILS RELATIVE PERCENT: 0 % (ref 0–1)
BILIRUB SERPL-MCNC: 0.2 MG/DL (ref 0–1)
BUN SERPL-MCNC: 12 MG/DL (ref 6–23)
CALCIUM SERPL-MCNC: 8.6 MG/DL (ref 8.3–10.6)
CHLORIDE BLD-SCNC: 96 MMOL/L (ref 99–110)
CO2: 36 MMOL/L (ref 21–32)
CREAT SERPL-MCNC: 0.3 MG/DL (ref 0.9–1.3)
CRP SERPL HS-MCNC: 345.8 MG/L
DIFFERENTIAL TYPE: ABNORMAL
EKG ATRIAL RATE: 108 BPM
EKG DIAGNOSIS: NORMAL
EKG P AXIS: 80 DEGREES
EKG P-R INTERVAL: 146 MS
EKG Q-T INTERVAL: 314 MS
EKG QRS DURATION: 92 MS
EKG QTC CALCULATION (BAZETT): 420 MS
EKG R AXIS: 9 DEGREES
EKG T AXIS: 71 DEGREES
EKG VENTRICULAR RATE: 108 BPM
EOSINOPHILS ABSOLUTE: 0 K/CU MM
EOSINOPHILS RELATIVE PERCENT: 0 % (ref 0–3)
GFR SERPL CREATININE-BSD FRML MDRD: >60 ML/MIN/1.73M2
GLUCOSE SERPL-MCNC: 138 MG/DL (ref 70–99)
HCT VFR BLD CALC: 33.1 % (ref 42–52)
HEMOGLOBIN: 10.5 GM/DL (ref 13.5–18)
IMMATURE NEUTROPHIL %: 0.6 % (ref 0–0.43)
LACTATE: 0.5 MMOL/L (ref 0.5–1.9)
LYMPHOCYTES ABSOLUTE: 0.2 K/CU MM
LYMPHOCYTES RELATIVE PERCENT: 2.5 % (ref 24–44)
MCH RBC QN AUTO: 30.4 PG (ref 27–31)
MCHC RBC AUTO-ENTMCNC: 31.7 % (ref 32–36)
MCV RBC AUTO: 95.9 FL (ref 78–100)
MONOCYTES ABSOLUTE: 0.2 K/CU MM
MONOCYTES RELATIVE PERCENT: 2.2 % (ref 0–4)
PDW BLD-RTO: 13.5 % (ref 11.7–14.9)
PLATELET # BLD: 185 K/CU MM (ref 140–440)
PMV BLD AUTO: 9.8 FL (ref 7.5–11.1)
POTASSIUM SERPL-SCNC: 5.1 MMOL/L (ref 3.5–5.1)
PROCALCITONIN SERPL-MCNC: 1.35 NG/ML
PROCALCITONIN SERPL-MCNC: 1.93 NG/ML
RBC # BLD: 3.45 M/CU MM (ref 4.6–6.2)
SEGMENTED NEUTROPHILS ABSOLUTE COUNT: 6.8 K/CU MM
SEGMENTED NEUTROPHILS RELATIVE PERCENT: 94.7 % (ref 36–66)
SODIUM BLD-SCNC: 137 MMOL/L (ref 135–145)
TOTAL IMMATURE NEUTOROPHIL: 0.04 K/CU MM
TOTAL PROTEIN: 4.9 GM/DL (ref 6.4–8.2)
WBC # BLD: 7.2 K/CU MM (ref 4–10.5)

## 2023-03-28 PROCEDURE — 94761 N-INVAS EAR/PLS OXIMETRY MLT: CPT

## 2023-03-28 PROCEDURE — 1200000000 HC SEMI PRIVATE

## 2023-03-28 PROCEDURE — 6360000002 HC RX W HCPCS: Performed by: NURSE PRACTITIONER

## 2023-03-28 PROCEDURE — 94640 AIRWAY INHALATION TREATMENT: CPT

## 2023-03-28 PROCEDURE — 83605 ASSAY OF LACTIC ACID: CPT

## 2023-03-28 PROCEDURE — 80053 COMPREHEN METABOLIC PANEL: CPT

## 2023-03-28 PROCEDURE — 2580000003 HC RX 258: Performed by: NURSE PRACTITIONER

## 2023-03-28 PROCEDURE — 93010 ELECTROCARDIOGRAM REPORT: CPT | Performed by: INTERNAL MEDICINE

## 2023-03-28 PROCEDURE — 84145 PROCALCITONIN (PCT): CPT

## 2023-03-28 PROCEDURE — 2700000000 HC OXYGEN THERAPY PER DAY

## 2023-03-28 PROCEDURE — 85025 COMPLETE CBC W/AUTO DIFF WBC: CPT

## 2023-03-28 PROCEDURE — 6370000000 HC RX 637 (ALT 250 FOR IP): Performed by: NURSE PRACTITIONER

## 2023-03-28 PROCEDURE — 87040 BLOOD CULTURE FOR BACTERIA: CPT

## 2023-03-28 RX ORDER — SODIUM CHLORIDE 9 MG/ML
INJECTION, SOLUTION INTRAVENOUS CONTINUOUS
Status: DISCONTINUED | OUTPATIENT
Start: 2023-03-28 | End: 2023-03-30 | Stop reason: HOSPADM

## 2023-03-28 RX ORDER — SODIUM CHLORIDE 9 MG/ML
INJECTION, SOLUTION INTRAVENOUS CONTINUOUS
Status: DISCONTINUED | OUTPATIENT
Start: 2023-03-28 | End: 2023-03-28

## 2023-03-28 RX ORDER — SODIUM CHLORIDE 9 MG/ML
INJECTION, SOLUTION INTRAVENOUS PRN
Status: DISCONTINUED | OUTPATIENT
Start: 2023-03-28 | End: 2023-03-30 | Stop reason: HOSPADM

## 2023-03-28 RX ORDER — LORAZEPAM 2 MG/ML
1 INJECTION INTRAMUSCULAR
Status: DISCONTINUED | OUTPATIENT
Start: 2023-03-28 | End: 2023-03-30 | Stop reason: HOSPADM

## 2023-03-28 RX ORDER — ACETAMINOPHEN 325 MG/1
650 TABLET ORAL EVERY 6 HOURS PRN
Status: DISCONTINUED | OUTPATIENT
Start: 2023-03-28 | End: 2023-03-30 | Stop reason: HOSPADM

## 2023-03-28 RX ORDER — LORAZEPAM 1 MG/1
1 TABLET ORAL
Status: DISCONTINUED | OUTPATIENT
Start: 2023-03-28 | End: 2023-03-30 | Stop reason: HOSPADM

## 2023-03-28 RX ORDER — LORAZEPAM 1 MG/1
2 TABLET ORAL
Status: DISCONTINUED | OUTPATIENT
Start: 2023-03-28 | End: 2023-03-30 | Stop reason: HOSPADM

## 2023-03-28 RX ORDER — LORAZEPAM 2 MG/ML
2 INJECTION INTRAMUSCULAR
Status: DISCONTINUED | OUTPATIENT
Start: 2023-03-28 | End: 2023-03-30 | Stop reason: HOSPADM

## 2023-03-28 RX ORDER — GAUZE BANDAGE 2" X 2"
100 BANDAGE TOPICAL DAILY
Status: DISCONTINUED | OUTPATIENT
Start: 2023-03-28 | End: 2023-03-30 | Stop reason: HOSPADM

## 2023-03-28 RX ORDER — LORAZEPAM 2 MG/ML
4 INJECTION INTRAMUSCULAR
Status: DISCONTINUED | OUTPATIENT
Start: 2023-03-28 | End: 2023-03-30 | Stop reason: HOSPADM

## 2023-03-28 RX ORDER — IPRATROPIUM BROMIDE AND ALBUTEROL SULFATE 2.5; .5 MG/3ML; MG/3ML
SOLUTION RESPIRATORY (INHALATION)
Status: DISCONTINUED
Start: 2023-03-28 | End: 2023-03-28

## 2023-03-28 RX ORDER — ACETAMINOPHEN 500 MG
500 TABLET ORAL EVERY 6 HOURS PRN
Status: DISCONTINUED | OUTPATIENT
Start: 2023-03-28 | End: 2023-03-30 | Stop reason: HOSPADM

## 2023-03-28 RX ORDER — ACETAMINOPHEN 650 MG/1
650 SUPPOSITORY RECTAL EVERY 6 HOURS PRN
Status: DISCONTINUED | OUTPATIENT
Start: 2023-03-28 | End: 2023-03-30 | Stop reason: HOSPADM

## 2023-03-28 RX ORDER — LORAZEPAM 2 MG/ML
3 INJECTION INTRAMUSCULAR
Status: DISCONTINUED | OUTPATIENT
Start: 2023-03-28 | End: 2023-03-30 | Stop reason: HOSPADM

## 2023-03-28 RX ORDER — OXYCODONE HYDROCHLORIDE 5 MG/1
5 TABLET ORAL EVERY 6 HOURS PRN
Status: DISCONTINUED | OUTPATIENT
Start: 2023-03-28 | End: 2023-03-30 | Stop reason: HOSPADM

## 2023-03-28 RX ORDER — LORAZEPAM 1 MG/1
3 TABLET ORAL
Status: DISCONTINUED | OUTPATIENT
Start: 2023-03-28 | End: 2023-03-30 | Stop reason: HOSPADM

## 2023-03-28 RX ORDER — SODIUM CHLORIDE 0.9 % (FLUSH) 0.9 %
5-40 SYRINGE (ML) INJECTION PRN
Status: DISCONTINUED | OUTPATIENT
Start: 2023-03-28 | End: 2023-03-30 | Stop reason: HOSPADM

## 2023-03-28 RX ORDER — SODIUM CHLORIDE 0.9 % (FLUSH) 0.9 %
5-40 SYRINGE (ML) INJECTION EVERY 12 HOURS SCHEDULED
Status: DISCONTINUED | OUTPATIENT
Start: 2023-03-28 | End: 2023-03-30 | Stop reason: HOSPADM

## 2023-03-28 RX ORDER — LORAZEPAM 1 MG/1
4 TABLET ORAL
Status: DISCONTINUED | OUTPATIENT
Start: 2023-03-28 | End: 2023-03-30 | Stop reason: HOSPADM

## 2023-03-28 RX ORDER — ACETYLCYSTEINE 100 MG/ML
4 SOLUTION ORAL; RESPIRATORY (INHALATION) EVERY 4 HOURS
Status: DISCONTINUED | OUTPATIENT
Start: 2023-03-28 | End: 2023-03-30

## 2023-03-28 RX ADMIN — CEFTRIAXONE SODIUM: 1 INJECTION, POWDER, FOR SOLUTION INTRAMUSCULAR; INTRAVENOUS at 20:24

## 2023-03-28 RX ADMIN — OXYCODONE HYDROCHLORIDE 5 MG: 5 TABLET ORAL at 17:26

## 2023-03-28 RX ADMIN — IPRATROPIUM BROMIDE AND ALBUTEROL SULFATE 1 AMPULE: 2.5; .5 SOLUTION RESPIRATORY (INHALATION) at 01:55

## 2023-03-28 RX ADMIN — LORAZEPAM 1 MG: 1 TABLET ORAL at 15:24

## 2023-03-28 RX ADMIN — IPRATROPIUM BROMIDE AND ALBUTEROL SULFATE 1 AMPULE: 2.5; .5 SOLUTION RESPIRATORY (INHALATION) at 07:23

## 2023-03-28 RX ADMIN — IPRATROPIUM BROMIDE AND ALBUTEROL SULFATE 1 AMPULE: 2.5; .5 SOLUTION RESPIRATORY (INHALATION) at 13:35

## 2023-03-28 RX ADMIN — AZITHROMYCIN MONOHYDRATE 500 MG: 250 TABLET ORAL at 17:22

## 2023-03-28 RX ADMIN — SODIUM CHLORIDE: 9 INJECTION, SOLUTION INTRAVENOUS at 17:17

## 2023-03-28 RX ADMIN — IPRATROPIUM BROMIDE AND ALBUTEROL SULFATE 1 AMPULE: 2.5; .5 SOLUTION RESPIRATORY (INHALATION) at 20:45

## 2023-03-28 RX ADMIN — SODIUM CHLORIDE 125 ML/HR: 9 INJECTION, SOLUTION INTRAVENOUS at 04:21

## 2023-03-28 RX ADMIN — THIAMINE HCL TAB 100 MG 100 MG: 100 TAB at 09:36

## 2023-03-28 RX ADMIN — OXYCODONE HYDROCHLORIDE 5 MG: 5 TABLET ORAL at 09:36

## 2023-03-28 RX ADMIN — LORAZEPAM 1 MG: 1 TABLET ORAL at 22:42

## 2023-03-28 RX ADMIN — IPRATROPIUM BROMIDE AND ALBUTEROL SULFATE 1 AMPULE: 2.5; .5 SOLUTION RESPIRATORY (INHALATION) at 10:20

## 2023-03-28 RX ADMIN — LORAZEPAM 1 MG: 1 TABLET ORAL at 02:04

## 2023-03-28 RX ADMIN — MOMETASONE FUROATE AND FORMOTEROL FUMARATE DIHYDRATE 2 PUFF: 200; 5 AEROSOL RESPIRATORY (INHALATION) at 07:24

## 2023-03-28 RX ADMIN — SODIUM CHLORIDE: 9 INJECTION, SOLUTION INTRAVENOUS at 09:40

## 2023-03-28 RX ADMIN — SODIUM CHLORIDE, PRESERVATIVE FREE 10 ML: 5 INJECTION INTRAVENOUS at 09:38

## 2023-03-28 ASSESSMENT — PAIN SCALES - GENERAL
PAINLEVEL_OUTOF10: 7
PAINLEVEL_OUTOF10: 5
PAINLEVEL_OUTOF10: 5
PAINLEVEL_OUTOF10: 7

## 2023-03-28 ASSESSMENT — PAIN DESCRIPTION - DESCRIPTORS
DESCRIPTORS: ACHING;TIGHTNESS
DESCRIPTORS: ACHING

## 2023-03-28 ASSESSMENT — PAIN DESCRIPTION - LOCATION
LOCATION: BACK;NECK
LOCATION: NECK

## 2023-03-28 ASSESSMENT — PAIN DESCRIPTION - ORIENTATION: ORIENTATION: LEFT;POSTERIOR

## 2023-03-28 NOTE — PLAN OF CARE
Problem: Discharge Planning  Goal: Discharge to home or other facility with appropriate resources  3/28/2023 0947 by Aleksandra Kendall RN  Outcome: Progressing  3/27/2023 2154 by Lennie Gomez RN  Outcome: Progressing     Problem: Pain  Goal: Verbalizes/displays adequate comfort level or baseline comfort level  3/28/2023 0947 by Aleksandra Kendall RN  Outcome: Progressing  3/27/2023 2154 by Lennie Gomez RN  Outcome: Progressing     Problem: Safety - Adult  Goal: Free from fall injury  3/28/2023 0947 by Aleksandra Kendall RN  Outcome: Progressing  3/27/2023 2154 by Lennie Gomez RN  Outcome: Progressing     Problem: Chronic Conditions and Co-morbidities  Goal: Patient's chronic conditions and co-morbidity symptoms are monitored and maintained or improved  3/28/2023 0947 by Aleksandra Kendall RN  Outcome: Progressing  3/27/2023 2154 by Lennie Gomez RN  Outcome: Progressing     Problem: Respiratory - Adult  Goal: Achieves optimal ventilation and oxygenation  3/28/2023 0947 by Aleksandra Kendall RN  Outcome: Progressing  3/27/2023 2154 by Lennie Gomez RN  Outcome: Progressing

## 2023-03-28 NOTE — PLAN OF CARE
Problem: Discharge Planning  Goal: Discharge to home or other facility with appropriate resources  Outcome: Progressing     Problem: Pain  Goal: Verbalizes/displays adequate comfort level or baseline comfort level  Outcome: Progressing     Problem: Safety - Adult  Goal: Free from fall injury  Outcome: Progressing     Problem: Chronic Conditions and Co-morbidities  Goal: Patient's chronic conditions and co-morbidity symptoms are monitored and maintained or improved  Outcome: Progressing     Problem: Respiratory - Adult  Goal: Achieves optimal ventilation and oxygenation  Outcome: Progressing

## 2023-03-28 NOTE — CARE COORDINATION
Transition of Care is related to the following treatment goals of Hypoxia [R09.02]  COPD exacerbation ([facility] [address] [J44.1]  Cellulitis of right lower extremity [A57.717]  Pneumonia of right lung due to infectious organism, unspecified part of lung [J18.9]  Community acquired pneumonia of right lower lobe of lung [L54.9]    IF APPLICABLE: The Patient and/or patient representative Taras Brown and his family were provided with a choice of provider and agrees with the discharge plan. Freedom of choice list with basic dialogue that supports the patient's individualized plan of care/goals and shares the quality data associated with the providers was provided to: Patient   Patient Representative Name:       The Patient and/or Patient Representative Agree with the Discharge Plan?  Yes    Neville Zapien RN  Case Management Department  Ph:  Fax:

## 2023-03-29 LAB
ALBUMIN SERPL-MCNC: 3.3 GM/DL (ref 3.4–5)
ALP BLD-CCNC: 167 IU/L (ref 40–129)
ALT SERPL-CCNC: 47 U/L (ref 10–40)
ANION GAP SERPL CALCULATED.3IONS-SCNC: 7 MMOL/L (ref 4–16)
AST SERPL-CCNC: 35 IU/L (ref 15–37)
BASOPHILS ABSOLUTE: 0 K/CU MM
BASOPHILS RELATIVE PERCENT: 0.1 % (ref 0–1)
BILIRUB SERPL-MCNC: 0.2 MG/DL (ref 0–1)
BUN SERPL-MCNC: 10 MG/DL (ref 6–23)
CALCIUM SERPL-MCNC: 9.3 MG/DL (ref 8.3–10.6)
CHLORIDE BLD-SCNC: 100 MMOL/L (ref 99–110)
CO2: 36 MMOL/L (ref 21–32)
CREAT SERPL-MCNC: 0.4 MG/DL (ref 0.9–1.3)
DIFFERENTIAL TYPE: ABNORMAL
EOSINOPHILS ABSOLUTE: 0 K/CU MM
EOSINOPHILS RELATIVE PERCENT: 0 % (ref 0–3)
GFR SERPL CREATININE-BSD FRML MDRD: >60 ML/MIN/1.73M2
GLUCOSE SERPL-MCNC: 118 MG/DL (ref 70–99)
HCT VFR BLD CALC: 40.4 % (ref 42–52)
HEMOGLOBIN: 11.9 GM/DL (ref 13.5–18)
IMMATURE NEUTROPHIL %: 0.5 % (ref 0–0.43)
L PNEUMO AG UR QL IA: NEGATIVE
LYMPHOCYTES ABSOLUTE: 0.9 K/CU MM
LYMPHOCYTES RELATIVE PERCENT: 7.5 % (ref 24–44)
MCH RBC QN AUTO: 29.8 PG (ref 27–31)
MCHC RBC AUTO-ENTMCNC: 29.5 % (ref 32–36)
MCV RBC AUTO: 101.3 FL (ref 78–100)
MONOCYTES ABSOLUTE: 1.1 K/CU MM
MONOCYTES RELATIVE PERCENT: 8.9 % (ref 0–4)
PDW BLD-RTO: 13.8 % (ref 11.7–14.9)
PLATELET # BLD: 268 K/CU MM (ref 140–440)
PMV BLD AUTO: 9.4 FL (ref 7.5–11.1)
POTASSIUM SERPL-SCNC: 4.7 MMOL/L (ref 3.5–5.1)
PROCALCITONIN SERPL-MCNC: 0.73 NG/ML
RBC # BLD: 3.99 M/CU MM (ref 4.6–6.2)
S PNEUM AG CSF QL: NORMAL
SEGMENTED NEUTROPHILS ABSOLUTE COUNT: 10.3 K/CU MM
SEGMENTED NEUTROPHILS RELATIVE PERCENT: 83 % (ref 36–66)
SODIUM BLD-SCNC: 143 MMOL/L (ref 135–145)
TOTAL IMMATURE NEUTOROPHIL: 0.06 K/CU MM
TOTAL PROTEIN: 5.5 GM/DL (ref 6.4–8.2)
WBC # BLD: 12.4 K/CU MM (ref 4–10.5)

## 2023-03-29 PROCEDURE — 1200000000 HC SEMI PRIVATE

## 2023-03-29 PROCEDURE — 2700000000 HC OXYGEN THERAPY PER DAY

## 2023-03-29 PROCEDURE — 94640 AIRWAY INHALATION TREATMENT: CPT

## 2023-03-29 PROCEDURE — 80053 COMPREHEN METABOLIC PANEL: CPT

## 2023-03-29 PROCEDURE — 85025 COMPLETE CBC W/AUTO DIFF WBC: CPT

## 2023-03-29 PROCEDURE — 6370000000 HC RX 637 (ALT 250 FOR IP): Performed by: NURSE PRACTITIONER

## 2023-03-29 PROCEDURE — 6360000002 HC RX W HCPCS: Performed by: NURSE PRACTITIONER

## 2023-03-29 PROCEDURE — 2580000003 HC RX 258: Performed by: NURSE PRACTITIONER

## 2023-03-29 PROCEDURE — 84145 PROCALCITONIN (PCT): CPT

## 2023-03-29 PROCEDURE — 94761 N-INVAS EAR/PLS OXIMETRY MLT: CPT

## 2023-03-29 RX ORDER — PHENOBARBITAL SODIUM 65 MG/ML
65 INJECTION INTRAMUSCULAR EVERY 8 HOURS SCHEDULED
Status: COMPLETED | OUTPATIENT
Start: 2023-03-29 | End: 2023-03-29

## 2023-03-29 RX ORDER — AZITHROMYCIN 250 MG/1
500 TABLET, FILM COATED ORAL EVERY 24 HOURS
Status: DISCONTINUED | OUTPATIENT
Start: 2023-03-29 | End: 2023-03-30 | Stop reason: HOSPADM

## 2023-03-29 RX ADMIN — IPRATROPIUM BROMIDE AND ALBUTEROL SULFATE 1 AMPULE: 2.5; .5 SOLUTION RESPIRATORY (INHALATION) at 05:30

## 2023-03-29 RX ADMIN — IPRATROPIUM BROMIDE AND ALBUTEROL SULFATE 1 AMPULE: 2.5; .5 SOLUTION RESPIRATORY (INHALATION) at 07:25

## 2023-03-29 RX ADMIN — SODIUM CHLORIDE: 9 INJECTION, SOLUTION INTRAVENOUS at 09:29

## 2023-03-29 RX ADMIN — LORAZEPAM 2 MG: 2 INJECTION INTRAMUSCULAR at 07:55

## 2023-03-29 RX ADMIN — PHENOBARBITAL SODIUM 65 MG: 65 INJECTION INTRAMUSCULAR; INTRAVENOUS at 12:14

## 2023-03-29 RX ADMIN — MOMETASONE FUROATE AND FORMOTEROL FUMARATE DIHYDRATE 2 PUFF: 200; 5 AEROSOL RESPIRATORY (INHALATION) at 07:25

## 2023-03-29 RX ADMIN — IPRATROPIUM BROMIDE AND ALBUTEROL SULFATE 1 AMPULE: 2.5; .5 SOLUTION RESPIRATORY (INHALATION) at 22:23

## 2023-03-29 RX ADMIN — SODIUM CHLORIDE: 9 INJECTION, SOLUTION INTRAVENOUS at 17:10

## 2023-03-29 RX ADMIN — IPRATROPIUM BROMIDE AND ALBUTEROL SULFATE 1 AMPULE: 2.5; .5 SOLUTION RESPIRATORY (INHALATION) at 11:15

## 2023-03-29 RX ADMIN — CEFTRIAXONE SODIUM: 1 INJECTION, POWDER, FOR SOLUTION INTRAMUSCULAR; INTRAVENOUS at 20:11

## 2023-03-29 RX ADMIN — ENOXAPARIN SODIUM 30 MG: 100 INJECTION SUBCUTANEOUS at 09:32

## 2023-03-29 RX ADMIN — OXYCODONE HYDROCHLORIDE 5 MG: 5 TABLET ORAL at 00:05

## 2023-03-29 RX ADMIN — LORAZEPAM 3 MG: 2 INJECTION INTRAMUSCULAR at 09:20

## 2023-03-29 RX ADMIN — SODIUM CHLORIDE, PRESERVATIVE FREE 10 ML: 5 INJECTION INTRAVENOUS at 09:21

## 2023-03-29 RX ADMIN — IPRATROPIUM BROMIDE AND ALBUTEROL SULFATE 1 AMPULE: 2.5; .5 SOLUTION RESPIRATORY (INHALATION) at 15:30

## 2023-03-29 RX ADMIN — IPRATROPIUM BROMIDE AND ALBUTEROL SULFATE 1 AMPULE: 2.5; .5 SOLUTION RESPIRATORY (INHALATION) at 00:23

## 2023-03-29 RX ADMIN — LORAZEPAM 2 MG: 1 TABLET ORAL at 05:10

## 2023-03-29 RX ADMIN — PHENOBARBITAL SODIUM 65 MG: 65 INJECTION INTRAMUSCULAR; INTRAVENOUS at 21:42

## 2023-03-29 ASSESSMENT — PAIN - FUNCTIONAL ASSESSMENT: PAIN_FUNCTIONAL_ASSESSMENT: ACTIVITIES ARE NOT PREVENTED

## 2023-03-29 ASSESSMENT — PAIN DESCRIPTION - FREQUENCY: FREQUENCY: CONTINUOUS

## 2023-03-29 ASSESSMENT — PAIN SCALES - GENERAL
PAINLEVEL_OUTOF10: 7
PAINLEVEL_OUTOF10: 0

## 2023-03-29 ASSESSMENT — PAIN DESCRIPTION - PAIN TYPE: TYPE: CHRONIC PAIN

## 2023-03-29 ASSESSMENT — PAIN DESCRIPTION - LOCATION: LOCATION: NECK

## 2023-03-29 ASSESSMENT — PAIN DESCRIPTION - DESCRIPTORS: DESCRIPTORS: ACHING

## 2023-03-29 ASSESSMENT — PAIN DESCRIPTION - ORIENTATION: ORIENTATION: MID

## 2023-03-29 ASSESSMENT — PAIN DESCRIPTION - ONSET: ONSET: ON-GOING

## 2023-03-30 ENCOUNTER — APPOINTMENT (OUTPATIENT)
Dept: GENERAL RADIOLOGY | Age: 68
DRG: 208 | End: 2023-03-30
Attending: STUDENT IN AN ORGANIZED HEALTH CARE EDUCATION/TRAINING PROGRAM
Payer: MEDICARE

## 2023-03-30 ENCOUNTER — APPOINTMENT (OUTPATIENT)
Dept: CT IMAGING | Age: 68
DRG: 208 | End: 2023-03-30
Attending: STUDENT IN AN ORGANIZED HEALTH CARE EDUCATION/TRAINING PROGRAM
Payer: MEDICARE

## 2023-03-30 ENCOUNTER — HOSPITAL ENCOUNTER (INPATIENT)
Age: 68
LOS: 1 days | Discharge: ANOTHER HOSPITAL | DRG: 208 | End: 2023-03-30
Attending: STUDENT IN AN ORGANIZED HEALTH CARE EDUCATION/TRAINING PROGRAM | Admitting: STUDENT IN AN ORGANIZED HEALTH CARE EDUCATION/TRAINING PROGRAM
Payer: MEDICARE

## 2023-03-30 ENCOUNTER — APPOINTMENT (OUTPATIENT)
Dept: GENERAL RADIOLOGY | Age: 68
DRG: 193 | End: 2023-03-30
Payer: MEDICARE

## 2023-03-30 VITALS
SYSTOLIC BLOOD PRESSURE: 105 MMHG | HEART RATE: 101 BPM | RESPIRATION RATE: 23 BRPM | TEMPERATURE: 98.1 F | DIASTOLIC BLOOD PRESSURE: 91 MMHG | WEIGHT: 101 LBS | HEIGHT: 65 IN | OXYGEN SATURATION: 98 % | BODY MASS INDEX: 16.83 KG/M2

## 2023-03-30 VITALS
HEART RATE: 72 BPM | WEIGHT: 101 LBS | OXYGEN SATURATION: 99 % | HEIGHT: 65 IN | DIASTOLIC BLOOD PRESSURE: 72 MMHG | SYSTOLIC BLOOD PRESSURE: 126 MMHG | TEMPERATURE: 100 F | RESPIRATION RATE: 12 BRPM | BODY MASS INDEX: 16.83 KG/M2

## 2023-03-30 DIAGNOSIS — Z01.89 ENCOUNTER FOR IMAGING TO SCREEN FOR METAL PRIOR TO MAGNETIC RESONANCE IMAGING (MRI): ICD-10-CM

## 2023-03-30 PROBLEM — J96.91 RESPIRATORY FAILURE, UNSPECIFIED WITH HYPOXIA (HCC): Status: ACTIVE | Noted: 2023-03-30

## 2023-03-30 PROBLEM — J96.00 ACUTE RESPIRATORY FAILURE (HCC): Status: ACTIVE | Noted: 2023-03-30

## 2023-03-30 LAB
ALBUMIN SERPL-MCNC: 3.1 GM/DL (ref 3.4–5)
ALP BLD-CCNC: 144 IU/L (ref 40–129)
ALT SERPL-CCNC: 39 U/L (ref 10–40)
AMMONIA: 35 UMOL/L (ref 16–60)
AMMONIA: 95 UMOL/L (ref 16–60)
ANION GAP SERPL CALCULATED.3IONS-SCNC: 3 MMOL/L (ref 4–16)
ANION GAP SERPL CALCULATED.3IONS-SCNC: 4 MMOL/L (ref 4–16)
ANISOCYTOSIS: ABNORMAL
AST SERPL-CCNC: 21 IU/L (ref 15–37)
BANDED NEUTROPHILS ABSOLUTE COUNT: 0.76 K/CU MM
BANDED NEUTROPHILS ABSOLUTE COUNT: 1.05 K/CU MM
BANDED NEUTROPHILS RELATIVE PERCENT: 6 % (ref 5–11)
BANDED NEUTROPHILS RELATIVE PERCENT: 9 % (ref 5–11)
BASE EXCESS MIXED: 17.7 (ref 0–1.2)
BASE EXCESS MIXED: 23 (ref 0–1.2)
BASE EXCESS MIXED: 23.3 (ref 0–1.2)
BASE EXCESS: ABNORMAL (ref 0–3.3)
BILIRUB SERPL-MCNC: 0.2 MG/DL (ref 0–1)
BUN SERPL-MCNC: 11 MG/DL (ref 6–23)
BUN SERPL-MCNC: 14 MG/DL (ref 6–23)
CALCIUM SERPL-MCNC: 8.2 MG/DL (ref 8.3–10.6)
CALCIUM SERPL-MCNC: 9 MG/DL (ref 8.3–10.6)
CARBON MONOXIDE, BLOOD: 1.6 % (ref 0–5)
CARBON MONOXIDE, BLOOD: 1.8 % (ref 0–5)
CHLORIDE BLD-SCNC: 102 MMOL/L (ref 99–110)
CHLORIDE BLD-SCNC: 95 MMOL/L (ref 99–110)
CO2 CONTENT: 47.5 MMOL/L (ref 19–24)
CO2 CONTENT: 53.2 MMOL/L (ref 19–24)
CO2 CONTENT: >50 MMOL/L (ref 19–24)
CO2: 43 MMOL/L (ref 21–32)
CO2: 43 MMOL/L (ref 21–32)
COMMENT: ABNORMAL
COMMENT: ABNORMAL
CREAT SERPL-MCNC: 0.3 MG/DL (ref 0.9–1.3)
CREAT SERPL-MCNC: 0.4 MG/DL (ref 0.9–1.3)
DIFFERENTIAL TYPE: ABNORMAL
DIFFERENTIAL TYPE: ABNORMAL
EOSINOPHILS ABSOLUTE: 0.1 K/CU MM
EOSINOPHILS RELATIVE PERCENT: 1 % (ref 0–3)
GFR SERPL CREATININE-BSD FRML MDRD: >60 ML/MIN/1.73M2
GFR SERPL CREATININE-BSD FRML MDRD: >60 ML/MIN/1.73M2
GLUCOSE BLD-MCNC: 106 MG/DL (ref 70–99)
GLUCOSE SERPL-MCNC: 104 MG/DL (ref 70–99)
GLUCOSE SERPL-MCNC: 111 MG/DL (ref 70–99)
HCO3 ARTERIAL: 46.2 MMOL/L (ref 18–23)
HCO3 ARTERIAL: 51.1 MMOL/L (ref 18–23)
HCO3 VENOUS: 53.2 MMOL/L (ref 19–25)
HCT VFR BLD CALC: 37.6 % (ref 42–52)
HCT VFR BLD CALC: 42.9 % (ref 42–52)
HEMOGLOBIN: 11.1 GM/DL (ref 13.5–18)
HEMOGLOBIN: 12 GM/DL (ref 13.5–18)
LACTATE: 1.5 MMOL/L (ref 0.5–1.9)
LV EF: 58 %
LVEF MODALITY: NORMAL
LYMPHOCYTES ABSOLUTE: 0.5 K/CU MM
LYMPHOCYTES ABSOLUTE: 1 K/CU MM
LYMPHOCYTES RELATIVE PERCENT: 4 % (ref 24–44)
LYMPHOCYTES RELATIVE PERCENT: 8 % (ref 24–44)
MAGNESIUM: 1.7 MG/DL (ref 1.8–2.4)
MCH RBC QN AUTO: 30 PG (ref 27–31)
MCH RBC QN AUTO: 31.4 PG (ref 27–31)
MCHC RBC AUTO-ENTMCNC: 28 % (ref 32–36)
MCHC RBC AUTO-ENTMCNC: 29.5 % (ref 32–36)
MCV RBC AUTO: 106.2 FL (ref 78–100)
MCV RBC AUTO: 107.3 FL (ref 78–100)
METAMYELOCYTES ABSOLUTE COUNT: 0.12 K/CU MM
METAMYELOCYTES ABSOLUTE COUNT: 0.25 K/CU MM
METAMYELOCYTES PERCENT: 1 %
METAMYELOCYTES PERCENT: 2 %
METHEMOGLOBIN ARTERIAL: 1.2 %
METHEMOGLOBIN ARTERIAL: 1.4 %
MONOCYTES ABSOLUTE: 0.6 K/CU MM
MONOCYTES ABSOLUTE: 0.8 K/CU MM
MONOCYTES RELATIVE PERCENT: 5 % (ref 0–4)
MONOCYTES RELATIVE PERCENT: 7 % (ref 0–4)
MYELOCYTE PERCENT: 3 %
MYELOCYTES ABSOLUTE COUNT: 0.35 K/CU MM
O2 SAT, VEN: 95 % (ref 50–70)
O2 SATURATION: 91.7 % (ref 96–97)
O2 SATURATION: 95.9 % (ref 96–97)
PCO2 ARTERIAL: 41 MMHG (ref 32–45)
PCO2 ARTERIAL: 67 MMHG (ref 32–45)
PCO2, VEN: 138.2 MMHG (ref 38–52)
PDW BLD-RTO: 13.4 % (ref 11.7–14.9)
PDW BLD-RTO: 13.8 % (ref 11.7–14.9)
PH BLOOD: 7.49 (ref 7.34–7.45)
PH BLOOD: 7.66 (ref 7.34–7.45)
PH VENOUS: 7.19 (ref 7.32–7.42)
PHOSPHORUS: 1.5 MG/DL (ref 2.5–4.9)
PLATELET # BLD: 228 K/CU MM (ref 140–440)
PLATELET # BLD: 259 K/CU MM (ref 140–440)
PMV BLD AUTO: 9.3 FL (ref 7.5–11.1)
PMV BLD AUTO: 9.6 FL (ref 7.5–11.1)
PO2 ARTERIAL: 260 MMHG (ref 75–100)
PO2 ARTERIAL: 50 MMHG (ref 75–100)
PO2, VEN: 103.8 MMHG (ref 28–48)
POLYCHROMASIA: ABNORMAL
POTASSIUM SERPL-SCNC: 4.4 MMOL/L (ref 3.5–5.1)
POTASSIUM SERPL-SCNC: 5.3 MMOL/L (ref 3.5–5.1)
PROCALCITONIN SERPL-MCNC: 0.34 NG/ML
PROCALCITONIN SERPL-MCNC: 0.38 NG/ML
RBC # BLD: 3.54 M/CU MM (ref 4.6–6.2)
RBC # BLD: 4 M/CU MM (ref 4.6–6.2)
SEGMENTED NEUTROPHILS ABSOLUTE COUNT: 10 K/CU MM
SEGMENTED NEUTROPHILS ABSOLUTE COUNT: 8.8 K/CU MM
SEGMENTED NEUTROPHILS RELATIVE PERCENT: 75 % (ref 36–66)
SEGMENTED NEUTROPHILS RELATIVE PERCENT: 79 % (ref 36–66)
SODIUM BLD-SCNC: 142 MMOL/L (ref 135–145)
SODIUM BLD-SCNC: 148 MMOL/L (ref 135–145)
SOURCE, BLOOD GAS: ABNORMAL
STOMATOCYTES: ABNORMAL
TARGET CELLS: ABNORMAL
TOTAL PROTEIN: 5.2 GM/DL (ref 6.4–8.2)
WBC # BLD: 11.7 K/CU MM (ref 4–10.5)
WBC # BLD: 12.6 K/CU MM (ref 4–10.5)

## 2023-03-30 PROCEDURE — 2580000003 HC RX 258: Performed by: STUDENT IN AN ORGANIZED HEALTH CARE EDUCATION/TRAINING PROGRAM

## 2023-03-30 PROCEDURE — 87205 SMEAR GRAM STAIN: CPT

## 2023-03-30 PROCEDURE — 82140 ASSAY OF AMMONIA: CPT

## 2023-03-30 PROCEDURE — 94002 VENT MGMT INPAT INIT DAY: CPT

## 2023-03-30 PROCEDURE — A4216 STERILE WATER/SALINE, 10 ML: HCPCS | Performed by: NURSE PRACTITIONER

## 2023-03-30 PROCEDURE — 80048 BASIC METABOLIC PNL TOTAL CA: CPT

## 2023-03-30 PROCEDURE — 2580000003 HC RX 258: Performed by: NURSE PRACTITIONER

## 2023-03-30 PROCEDURE — 83735 ASSAY OF MAGNESIUM: CPT

## 2023-03-30 PROCEDURE — 6360000002 HC RX W HCPCS: Performed by: NURSE PRACTITIONER

## 2023-03-30 PROCEDURE — 82803 BLOOD GASES ANY COMBINATION: CPT

## 2023-03-30 PROCEDURE — 5A1935Z RESPIRATORY VENTILATION, LESS THAN 24 CONSECUTIVE HOURS: ICD-10-PCS | Performed by: STUDENT IN AN ORGANIZED HEALTH CARE EDUCATION/TRAINING PROGRAM

## 2023-03-30 PROCEDURE — 85027 COMPLETE CBC AUTOMATED: CPT

## 2023-03-30 PROCEDURE — 70450 CT HEAD/BRAIN W/O DYE: CPT

## 2023-03-30 PROCEDURE — 83605 ASSAY OF LACTIC ACID: CPT

## 2023-03-30 PROCEDURE — 51702 INSERT TEMP BLADDER CATH: CPT

## 2023-03-30 PROCEDURE — 84145 PROCALCITONIN (PCT): CPT

## 2023-03-30 PROCEDURE — 89220 SPUTUM SPECIMEN COLLECTION: CPT

## 2023-03-30 PROCEDURE — 2000000000 HC ICU R&B

## 2023-03-30 PROCEDURE — 82962 GLUCOSE BLOOD TEST: CPT

## 2023-03-30 PROCEDURE — 76937 US GUIDE VASCULAR ACCESS: CPT

## 2023-03-30 PROCEDURE — 2700000000 HC OXYGEN THERAPY PER DAY

## 2023-03-30 PROCEDURE — 2500000003 HC RX 250 WO HCPCS: Performed by: NURSE PRACTITIONER

## 2023-03-30 PROCEDURE — 2500000003 HC RX 250 WO HCPCS

## 2023-03-30 PROCEDURE — 71045 X-RAY EXAM CHEST 1 VIEW: CPT

## 2023-03-30 PROCEDURE — 6360000002 HC RX W HCPCS

## 2023-03-30 PROCEDURE — 6360000002 HC RX W HCPCS: Performed by: STUDENT IN AN ORGANIZED HEALTH CARE EDUCATION/TRAINING PROGRAM

## 2023-03-30 PROCEDURE — 84100 ASSAY OF PHOSPHORUS: CPT

## 2023-03-30 PROCEDURE — 85007 BL SMEAR W/DIFF WBC COUNT: CPT

## 2023-03-30 PROCEDURE — 36569 INSJ PICC 5 YR+ W/O IMAGING: CPT

## 2023-03-30 PROCEDURE — 73590 X-RAY EXAM OF LOWER LEG: CPT

## 2023-03-30 PROCEDURE — C1751 CATH, INF, PER/CENT/MIDLINE: HCPCS

## 2023-03-30 PROCEDURE — 02HV33Z INSERTION OF INFUSION DEVICE INTO SUPERIOR VENA CAVA, PERCUTANEOUS APPROACH: ICD-10-PCS | Performed by: STUDENT IN AN ORGANIZED HEALTH CARE EDUCATION/TRAINING PROGRAM

## 2023-03-30 PROCEDURE — 80053 COMPREHEN METABOLIC PANEL: CPT

## 2023-03-30 PROCEDURE — 2500000003 HC RX 250 WO HCPCS: Performed by: EMERGENCY MEDICINE

## 2023-03-30 PROCEDURE — 36600 WITHDRAWAL OF ARTERIAL BLOOD: CPT

## 2023-03-30 PROCEDURE — 87070 CULTURE OTHR SPECIMN AEROBIC: CPT

## 2023-03-30 PROCEDURE — 31500 INSERT EMERGENCY AIRWAY: CPT

## 2023-03-30 PROCEDURE — 6370000000 HC RX 637 (ALT 250 FOR IP): Performed by: NURSE PRACTITIONER

## 2023-03-30 PROCEDURE — 93306 TTE W/DOPPLER COMPLETE: CPT

## 2023-03-30 PROCEDURE — 73060 X-RAY EXAM OF HUMERUS: CPT

## 2023-03-30 PROCEDURE — 73090 X-RAY EXAM OF FOREARM: CPT

## 2023-03-30 PROCEDURE — 87040 BLOOD CULTURE FOR BACTERIA: CPT

## 2023-03-30 RX ORDER — SODIUM CHLORIDE 9 MG/ML
INJECTION, SOLUTION INTRAVENOUS CONTINUOUS
Status: DISCONTINUED | OUTPATIENT
Start: 2023-03-30 | End: 2023-03-30 | Stop reason: HOSPADM

## 2023-03-30 RX ORDER — MAGNESIUM SULFATE IN WATER 40 MG/ML
2000 INJECTION, SOLUTION INTRAVENOUS ONCE
Status: COMPLETED | OUTPATIENT
Start: 2023-03-30 | End: 2023-03-30

## 2023-03-30 RX ORDER — POLYETHYLENE GLYCOL 3350 17 G/17G
17 POWDER, FOR SOLUTION ORAL DAILY PRN
Status: DISCONTINUED | OUTPATIENT
Start: 2023-03-30 | End: 2023-03-30 | Stop reason: HOSPADM

## 2023-03-30 RX ORDER — LIDOCAINE HYDROCHLORIDE 10 MG/ML
5 INJECTION, SOLUTION EPIDURAL; INFILTRATION; INTRACAUDAL; PERINEURAL ONCE
Status: DISCONTINUED | OUTPATIENT
Start: 2023-03-30 | End: 2023-03-30 | Stop reason: HOSPADM

## 2023-03-30 RX ORDER — MIDAZOLAM HYDROCHLORIDE 1 MG/ML
INJECTION INTRAMUSCULAR; INTRAVENOUS
Status: COMPLETED
Start: 2023-03-30 | End: 2023-03-30

## 2023-03-30 RX ORDER — SODIUM CHLORIDE 0.9 % (FLUSH) 0.9 %
5-40 SYRINGE (ML) INJECTION EVERY 12 HOURS SCHEDULED
Status: DISCONTINUED | OUTPATIENT
Start: 2023-03-30 | End: 2023-03-30 | Stop reason: HOSPADM

## 2023-03-30 RX ORDER — FENTANYL CITRATE 50 UG/ML
50 INJECTION, SOLUTION INTRAMUSCULAR; INTRAVENOUS
Status: DISCONTINUED | OUTPATIENT
Start: 2023-03-30 | End: 2023-03-30 | Stop reason: HOSPADM

## 2023-03-30 RX ORDER — ONDANSETRON 2 MG/ML
4 INJECTION INTRAMUSCULAR; INTRAVENOUS EVERY 6 HOURS PRN
Status: DISCONTINUED | OUTPATIENT
Start: 2023-03-30 | End: 2023-03-30 | Stop reason: HOSPADM

## 2023-03-30 RX ORDER — SODIUM CHLORIDE 9 MG/ML
INJECTION, SOLUTION INTRAVENOUS PRN
Status: DISCONTINUED | OUTPATIENT
Start: 2023-03-30 | End: 2023-03-30 | Stop reason: HOSPADM

## 2023-03-30 RX ORDER — ACETAMINOPHEN 325 MG/1
650 TABLET ORAL EVERY 6 HOURS PRN
Status: DISCONTINUED | OUTPATIENT
Start: 2023-03-30 | End: 2023-03-30 | Stop reason: HOSPADM

## 2023-03-30 RX ORDER — MIDAZOLAM HYDROCHLORIDE 2 MG/2ML
2 INJECTION, SOLUTION INTRAMUSCULAR; INTRAVENOUS
Status: DISCONTINUED | OUTPATIENT
Start: 2023-03-30 | End: 2023-03-30 | Stop reason: HOSPADM

## 2023-03-30 RX ORDER — NOREPINEPHRINE BIT/0.9 % NACL 16MG/250ML
INFUSION BOTTLE (ML) INTRAVENOUS
Status: COMPLETED
Start: 2023-03-30 | End: 2023-03-30

## 2023-03-30 RX ORDER — 0.9 % SODIUM CHLORIDE 0.9 %
1000 INTRAVENOUS SOLUTION INTRAVENOUS ONCE
Status: COMPLETED | OUTPATIENT
Start: 2023-03-30 | End: 2023-03-30

## 2023-03-30 RX ORDER — PROPOFOL 10 MG/ML
5-50 INJECTION, EMULSION INTRAVENOUS CONTINUOUS
Status: DISCONTINUED | OUTPATIENT
Start: 2023-03-30 | End: 2023-03-30 | Stop reason: HOSPADM

## 2023-03-30 RX ORDER — ONDANSETRON 4 MG/1
4 TABLET, ORALLY DISINTEGRATING ORAL EVERY 8 HOURS PRN
Status: DISCONTINUED | OUTPATIENT
Start: 2023-03-30 | End: 2023-03-30 | Stop reason: HOSPADM

## 2023-03-30 RX ORDER — MIDAZOLAM HYDROCHLORIDE 1 MG/ML
1-10 INJECTION, SOLUTION INTRAVENOUS CONTINUOUS
Status: DISCONTINUED | OUTPATIENT
Start: 2023-03-30 | End: 2023-03-30 | Stop reason: CLARIF

## 2023-03-30 RX ORDER — ACETYLCYSTEINE 100 MG/ML
4 SOLUTION ORAL; RESPIRATORY (INHALATION)
Status: DISCONTINUED | OUTPATIENT
Start: 2023-03-30 | End: 2023-03-30 | Stop reason: HOSPADM

## 2023-03-30 RX ORDER — ETOMIDATE 2 MG/ML
20 INJECTION INTRAVENOUS ONCE
Status: COMPLETED | OUTPATIENT
Start: 2023-03-30 | End: 2023-03-30

## 2023-03-30 RX ORDER — ENOXAPARIN SODIUM 100 MG/ML
30 INJECTION SUBCUTANEOUS DAILY
Status: DISCONTINUED | OUTPATIENT
Start: 2023-03-30 | End: 2023-03-30 | Stop reason: HOSPADM

## 2023-03-30 RX ORDER — NOREPINEPHRINE BIT/0.9 % NACL 16MG/250ML
1-50 INFUSION BOTTLE (ML) INTRAVENOUS CONTINUOUS
Status: DISCONTINUED | OUTPATIENT
Start: 2023-03-30 | End: 2023-03-30 | Stop reason: HOSPADM

## 2023-03-30 RX ORDER — SODIUM CHLORIDE 0.9 % (FLUSH) 0.9 %
5-40 SYRINGE (ML) INJECTION PRN
Status: DISCONTINUED | OUTPATIENT
Start: 2023-03-30 | End: 2023-03-30 | Stop reason: HOSPADM

## 2023-03-30 RX ORDER — PROPOFOL 10 MG/ML
5-80 INJECTION, EMULSION INTRAVENOUS CONTINUOUS
Status: DISCONTINUED | OUTPATIENT
Start: 2023-03-30 | End: 2023-03-30 | Stop reason: HOSPADM

## 2023-03-30 RX ORDER — ROCURONIUM BROMIDE 10 MG/ML
70 INJECTION, SOLUTION INTRAVENOUS ONCE
Status: COMPLETED | OUTPATIENT
Start: 2023-03-30 | End: 2023-03-30

## 2023-03-30 RX ORDER — CHLORHEXIDINE GLUCONATE 0.12 MG/ML
15 RINSE ORAL 2 TIMES DAILY
Status: DISCONTINUED | OUTPATIENT
Start: 2023-03-30 | End: 2023-03-30 | Stop reason: HOSPADM

## 2023-03-30 RX ADMIN — PROPOFOL 20 MCG/KG/MIN: 10 INJECTION, EMULSION INTRAVENOUS at 05:55

## 2023-03-30 RX ADMIN — PROPOFOL 40 MCG/KG/MIN: 10 INJECTION, EMULSION INTRAVENOUS at 13:32

## 2023-03-30 RX ADMIN — MIDAZOLAM HYDROCHLORIDE 2 MG: 1 INJECTION, SOLUTION INTRAMUSCULAR; INTRAVENOUS at 09:58

## 2023-03-30 RX ADMIN — MIDAZOLAM 2 MG/HR: 5 INJECTION INTRAMUSCULAR; INTRAVENOUS at 15:28

## 2023-03-30 RX ADMIN — ROCURONIUM BROMIDE 70 MG: 10 INJECTION, SOLUTION INTRAVENOUS at 05:44

## 2023-03-30 RX ADMIN — ETOMIDATE 20 MG: 2 INJECTION INTRAVENOUS at 05:44

## 2023-03-30 RX ADMIN — ACETAZOLAMIDE 250 MG: 500 INJECTION, POWDER, LYOPHILIZED, FOR SOLUTION INTRAVENOUS at 15:29

## 2023-03-30 RX ADMIN — FENTANYL CITRATE 50 MCG: 50 INJECTION, SOLUTION INTRAMUSCULAR; INTRAVENOUS at 11:27

## 2023-03-30 RX ADMIN — MIDAZOLAM HYDROCHLORIDE 2 MG: 1 INJECTION, SOLUTION INTRAMUSCULAR; INTRAVENOUS at 11:09

## 2023-03-30 RX ADMIN — CHLORHEXIDINE GLUCONATE 0.12% ORAL RINSE 15 ML: 1.2 LIQUID ORAL at 10:11

## 2023-03-30 RX ADMIN — Medication 2 MCG/MIN: at 09:40

## 2023-03-30 RX ADMIN — SODIUM CHLORIDE: 9 INJECTION, SOLUTION INTRAVENOUS at 01:14

## 2023-03-30 RX ADMIN — PROPOFOL 10 MCG/KG/MIN: 10 INJECTION, EMULSION INTRAVENOUS at 09:58

## 2023-03-30 RX ADMIN — SODIUM CHLORIDE, PRESERVATIVE FREE 10 ML: 5 INJECTION INTRAVENOUS at 10:15

## 2023-03-30 RX ADMIN — SODIUM CHLORIDE 1000 ML: 9 INJECTION, SOLUTION INTRAVENOUS at 14:43

## 2023-03-30 RX ADMIN — FENTANYL CITRATE 50 MCG: 50 INJECTION, SOLUTION INTRAMUSCULAR; INTRAVENOUS at 10:16

## 2023-03-30 RX ADMIN — SODIUM CHLORIDE, PRESERVATIVE FREE 10 ML: 5 INJECTION INTRAVENOUS at 10:16

## 2023-03-30 RX ADMIN — LORAZEPAM 2 MG: 2 INJECTION INTRAMUSCULAR at 07:16

## 2023-03-30 RX ADMIN — MIDAZOLAM HYDROCHLORIDE 2 MG: 1 INJECTION, SOLUTION INTRAMUSCULAR; INTRAVENOUS at 13:31

## 2023-03-30 RX ADMIN — ENOXAPARIN SODIUM 30 MG: 100 INJECTION SUBCUTANEOUS at 10:11

## 2023-03-30 RX ADMIN — SODIUM CHLORIDE: 9 INJECTION, SOLUTION INTRAVENOUS at 10:02

## 2023-03-30 RX ADMIN — ACETAMINOPHEN 650 MG: 325 TABLET ORAL at 13:36

## 2023-03-30 RX ADMIN — VANCOMYCIN HYDROCHLORIDE 1250 MG: 1.25 INJECTION, POWDER, LYOPHILIZED, FOR SOLUTION INTRAVENOUS at 15:56

## 2023-03-30 RX ADMIN — SODIUM PHOSPHATE, MONOBASIC, MONOHYDRATE AND SODIUM PHOSPHATE, DIBASIC, ANHYDROUS 30 MMOL: 276; 142 INJECTION, SOLUTION INTRAVENOUS at 14:07

## 2023-03-30 RX ADMIN — PROPOFOL 45 MCG/KG/MIN: 10 INJECTION, EMULSION INTRAVENOUS at 16:26

## 2023-03-30 RX ADMIN — MAGNESIUM SULFATE HEPTAHYDRATE 2000 MG: 2 INJECTION, SOLUTION INTRAVENOUS at 11:22

## 2023-03-30 RX ADMIN — CEFTRIAXONE SODIUM 1000 MG: 1 INJECTION, POWDER, FOR SOLUTION INTRAMUSCULAR; INTRAVENOUS at 10:12

## 2023-03-30 RX ADMIN — FAMOTIDINE 20 MG: 10 INJECTION, SOLUTION INTRAVENOUS at 10:11

## 2023-03-30 ASSESSMENT — PULMONARY FUNCTION TESTS
PIF_VALUE: 38
PIF_VALUE: 40
PIF_VALUE: 29
PIF_VALUE: 35
PIF_VALUE: 35
PIF_VALUE: 31
PIF_VALUE: 40
PIF_VALUE: 29
PIF_VALUE: 34
PIF_VALUE: 39
PIF_VALUE: 30
PIF_VALUE: 30
PIF_VALUE: 33
PIF_VALUE: 32
PIF_VALUE: 36
PIF_VALUE: 31
PIF_VALUE: 30
PIF_VALUE: 41
PIF_VALUE: 8
PIF_VALUE: 34
PIF_VALUE: 30
PIF_VALUE: 31
PIF_VALUE: 38
PIF_VALUE: 30
PIF_VALUE: 30
PIF_VALUE: 33
PIF_VALUE: 32
PIF_VALUE: 32

## 2023-03-30 ASSESSMENT — PAIN SCALES - GENERAL
PAINLEVEL_OUTOF10: 0

## 2023-03-30 NOTE — CONSULTS
PICC Consult complete. Indication for line: Hemodynamically unstable, Levophed, sedation. Patient intubated and sedated, no family available for consent. Consent signed by medical necessity. Time out done @ 1123. PICC inserted in JUAN CARLOS Basilic Vessel without difficulty per protocol. Placement verified via VPSG4 monitoring system. Good blood return and flushes easily on all three ports. Patient tolerated well. Education pamphlets left in chart  Ultrasound photos of vein diameter and doppler signature placed in chart. Please consult IV/PICC team if patient's needs change. PICC OK to use.

## 2023-03-30 NOTE — DISCHARGE INSTR - COC
10y-63y), Juan Spotted (age 10y+), IM, 0.5mL 06/26/2014       Active Problems:  Patient Active Problem List   Diagnosis Code    Tobacco abuse disorder Z72.0    COPD, severe ([facility] [address] J44.9    Cervical radiculopathy M54.12    Dupuytren's contracture M72.0    Weight loss R63.4    Lymphocytic colitis K52.832    Elevated PSA R97.20    Alcohol abuse F10.10    Hand muscle weakness M62.81    Pain management R52    Chronic respiratory failure (HCC) J96.10    Current moderate episode of major depressive disorder without prior episode ([address] F32.1    Adjustment insomnia F51.02    Pneumonia J18.9    COPD exacerbation (HCC) J44.1    Acute on chronic respiratory failure with hypoxia ([facility]) J96.21    Nicotine abuse Z72.0    Pneumonia of right lung due to infectious organism, unspecified part of lung J18.9    Respiratory failure, unspecified with hypoxia ([address] J96.91    Acute respiratory failure ([facility] [address] J96.00       Isolation/Infection:   Isolation            No Isolation          Patient Infection Status       Infection Onset Added Last Indicated Last Indicated By Review Planned Expiration Resolved Resolved By    None active    Resolved    COVID-19 (Rule Out) 03/27/23 03/27/23 03/27/23 Respiratory Panel, Molecular, with COVID-19 (Restricted: peds pts or suitable admitted adults) (Ordered)   03/27/23 Rule-Out Test Resulted    COVID-19 (Rule Out) 03/27/23 03/27/23 03/27/23 COVID-19, Rapid (Ordered)   03/27/23 Rule-Out Test Resulted    COVID-19 (Rule Out) 11/11/21 11/11/21 11/11/21 Respiratory Panel, Molecular, with COVID-19 (Restricted: peds pts or suitable admitted adults) (Ordered)   11/11/21 Rule-Out Test Resulted    COVID-19 (Rule Out) 02/10/21 02/10/21 02/10/21 COVID-19 (Ordered)   02/11/21 Rule-Out Test Resulted            Nurse Assessment:  Last Vital Signs: /72   Pulse 72   Temp 100 °F (37.8 °C) (Rectal)   Resp 12   Ht 5' 5\" (1.651 m)   Wt 101 lb (45.8 kg)   SpO2 99%   BMI 16.81 kg/m²     Last documented pain score (0-10

## 2023-03-30 NOTE — PROGRESS NOTES
We do not have neurosurgery in house at this time, only orthopedic spine. Myelopathy cannot be assessed fully given that patient is sedated with propofol. Recommend neurology consult given concerns of potential seizure activity. Recommend further work-up of basilar invagination at facility with neurosurgery coverage.

## 2023-03-30 NOTE — PROGRESS NOTES
Report called to Susan Saldana RN at [facility] Neuro ICU.     [phone].

## 2023-03-30 NOTE — PROGRESS NOTES
4 Eyes Skin Assessment     NAME:  Brittni Lopez  YOB: 1955  MEDICAL RECORD NUMBER:  [id number]    The patient is being assessed for  Admission    I agree that One RN has performed a thorough Head to Toe Skin Assessment on the patient. ALL assessment sites listed below have been assessed. Areas assessed by both nurses:    Head, Face, Ears, Shoulders, Back, Chest, Arms, Elbows, Hands, Sacrum. Buttock, Coccyx, Ischium, and Legs. Feet and Heels        Does the Patient have a Wound? Yes wound(s) were present on assessment.  LDA wound assessment was Initiated and completed by RN       Ellis Prevention initiated by RN: Yes   Wound Care Orders initiated by RN: Yes    Pressure Injury (Stage 3,4, Unstageable, DTI, NWPT, and Complex wounds) if present, place referral order by RN under : No    New and Established Ostomies, if present place, referral order under : NA      Nurse 1 eSignature: Electronically signed by Lesli Elliott RN on 3/30/23 at 2:08 PM EDT    **SHARE this note so that the co-signing nurse can place an eSignature**    Nurse 2 eSignature: Electronically signed by Negin Rogers RN on 3/30/23 at 2:15 PM EDT

## 2023-03-30 NOTE — PLAN OF CARE
Problem: Discharge Planning  Goal: Discharge to home or other facility with appropriate resources  3/30/2023 0821 by Moises Benitez RN  Outcome: Completed  3/29/2023 2005 by Moises Benitez RN  Outcome: Progressing     Problem: Pain  Goal: Verbalizes/displays adequate comfort level or baseline comfort level  3/30/2023 0821 by Moises Benitez RN  Outcome: Completed  3/29/2023 2005 by Moises Benitez RN  Outcome: Progressing     Problem: Safety - Adult  Goal: Free from fall injury  3/30/2023 0821 by Moises Benitez RN  Outcome: Completed  3/29/2023 2005 by Moises Benitez RN  Outcome: Progressing     Problem: Chronic Conditions and Co-morbidities  Goal: Patient's chronic conditions and co-morbidity symptoms are monitored and maintained or improved  3/30/2023 0821 by Moises Benitez RN  Outcome: Completed  3/29/2023 2005 by Moises Benitez RN  Outcome: Progressing     Problem: Respiratory - Adult  Goal: Achieves optimal ventilation and oxygenation  3/30/2023 0821 by Moises Benitez RN  Outcome: Completed  3/29/2023 2005 by Moises Benitez RN  Outcome: Progressing     Problem: Nutrition Deficit:  Goal: Optimize nutritional status  3/30/2023 0821 by Moises Benitez RN  Outcome: Completed  3/29/2023 2005 by Moises Benitez RN  Outcome: Progressing

## 2023-03-30 NOTE — PROGRESS NOTES
Pt arrived from [address] on ventilator, propofol, and levophed infusion. Pt appeared to have seizure-like activity. [facility] administered 5mg Valium IVP. Pt pupils unequal, round, and reactive to light. Notified Dr. Kaci Sierra and JACINTO Hooks of findings and seizure-like activity. Orders for stat CT head.

## 2023-03-30 NOTE — PROGRESS NOTES
Notified Daya Hernandez, pt emergency contact, of transfer. Transfer report and inter facility transfer paper faxed to [facility] and sent copy with pt. Called CT and radiology to send reports to [facility]. Transfer report given to [facility] and handoff on all infusions. Pt vitals stable and lines remain in place at time of tranfer. [facility] here for pt and pt transferred at this time with all belongings at [phone].

## 2023-03-30 NOTE — PLAN OF CARE
Problem: Discharge Planning  Goal: Discharge to home or other facility with appropriate resources  Outcome: Progressing     Problem: Pain  Goal: Verbalizes/displays adequate comfort level or baseline comfort level  Outcome: Progressing     Problem: Safety - Adult  Goal: Free from fall injury  Outcome: Progressing     Problem: Chronic Conditions and Co-morbidities  Goal: Patient's chronic conditions and co-morbidity symptoms are monitored and maintained or improved  Outcome: Progressing     Problem: Respiratory - Adult  Goal: Achieves optimal ventilation and oxygenation  Outcome: Progressing     Problem: Nutrition Deficit:  Goal: Optimize nutritional status  Outcome: Progressing

## 2023-03-30 NOTE — DISCHARGE SUMMARY
PM     Lactic Acid: No results for input(s): LACTA in the last 72 hours. BNP: No results for input(s): PROBNP in the last 72 hours.   UA:  Lab Results   Component Value Date/Time    NITRU NEGATIVE 10/08/2020 07:15 PM    COLORU YELLOW 10/08/2020 07:15 PM    WBCUA 1 10/08/2020 07:15 PM    RBCUA NO CELLS SEEN 10/08/2020 07:15 PM    BACTERIA NEGATIVE 10/08/2020 07:15 PM    CLARITYU CLEAR 10/08/2020 07:15 PM    SPECGRAV 1.015 10/08/2020 07:15 PM    LEUKOCYTESUR NEGATIVE 10/08/2020 07:15 PM    UROBILINOGEN 0.2 10/08/2020 07:15 PM    BILIRUBINUR NEGATIVE 10/08/2020 07:15 PM    BLOODU NEGATIVE 10/08/2020 07:15 PM    KETUA NEGATIVE 10/08/2020 07:15 PM     Urine Cultures: No results found for: Alejandro Hollow  Blood Cultures: No results found for: BC  No results found for: BLOODCULT2  Organism:   Lab Results   Component Value Date/Time    [facility] 11/27/2018 03:00 AM       Time Spent Discharging patient 38 minutes    Electronically signed by HEMALATHA Santos CNP on 3/30/2023 at 4:44 PM

## 2023-03-30 NOTE — PROGRESS NOTES
S/w Dr. Valiente Code regarding pt STAT MRI orders. Called MRI but no answer at this time. S/w MRI at 1248 and verified they have 2 IV pumps and can accommodate portable vent. MRI screening from needs filled out. Will attempt to call pt emergency contacts but no family listed in emergency contacts. Orders from Dr. Valiente Code for Neurosurgery consult. Consult sent to GINO Espino. Called Ozzy Marrero, pt emergency contact listed. He states he is a friend and only knows of a spinal surgery. He was unable to answer MRI screening form questions.

## 2023-03-30 NOTE — H&P
V2.0  History and Physical      Name:  Bhargavi Scott /Age/Sex: 1955  (79 y.o. male)   MRN & CSN:  [id number] & [id number] Encounter Date/Time: 3/30/2023 4:06 PM EDT   Location:  -A PCP: Ravindra Olmstead MD       Hospital Day: 1    Assessment and Plan:   Bhargavi Scott is a 79 y.o. male who presents with Respiratory failure, unspecified with hypoxia [facility]    Hospital Problems             Last Modified POA    * (Principal) Respiratory failure, unspecified with hypoxia ([facility] [address] 3/30/2023 Yes    Acute respiratory failure ([facility] [address] 3/30/2023 Yes     Acute on chronic hypoxic/hypercapnic respiratory failure secondary to COPD exacerbation  Right lower lobe pleural effusion versus pneumonia  -Patient initially admitted to [facility] for respiratory failure and COPD exacerbation  -Wears 2 L nasal cannula baseline at home  -Had acute decompensation overnight requiring intubation; transfer to [facility]  -Vent day #1; continue vent per protocol  -Currently sedated on propofol; as needed Versed and fentanyl  -Initial ABG with pH 7.19 PCO2 138; will repeat  -Initial chest x-ray postintubation with worsening right basilar consolidation concerning for infection, small right pleural effusion  -Repeat chest x-ray with persistent right basilar opacity suggesting right pleural fluid combined with atelectasis or pneumonia  -Will continue Rocephin    Acute metabolic encephalopathy  -Initial ABG with pH 7.19 PCO2 138; will repeat  -Ammonia 95; will repeat  -Concern for possible seizure-like activity  -EEG pending  -Stat head CT  -Was being treated for alcohol withdrawal at [place], will hold on CIWA scale as patient is intubated and sedated with propofol    Shock  -likely worsened by sedation  -Blood culture, sputum culture pending  -Echo pending  -Levophed for goal MAP greater than 65  -Continue Rocephin    Cellulitis, right lower extremity  -Continue Rocephin    Alcohol abuse  Tobacco abuse  -50-year pack history  -We will assess

## 2023-03-31 NOTE — PROGRESS NOTES
4 Eyes Skin Assessment     The patient is being assess for  Admission    I agree that 2 RN's have performed a thorough Head to Toe Skin Assessment on the patient. ALL assessment sites listed below have been assessed. Areas assessed by both nurses:    [x]   Head, Face, and Ears   [x]   Shoulders, Back, and Chest  [x]   Arms, Elbows, and Hands   [x]   Coccyx, Sacrum, and IschIum  [x]   Legs, Feet, and Heels        Does the Patient have Skin Breakdown?   No         Ellis Prevention initiated:  No   Wound Care Orders initiated:  No      [facility] nurse consulted for Pressure Injury (Stage 3,4, Unstageable, DTI, NWPT, and Complex wounds), New and Established Ostomies:  No      Nurse 1 eSignature: Electronically signed by Erick Vergara LPN on 8/80/11 at 2:51 AM EDT    **SHARE this note so that the co-signing nurse is able to place an eSignature**    Nurse 2 eSignature: Electronically signed by Omid Huang RN on 3/30/23 at 8:17 AM EDT
Comprehensive Nutrition Assessment    Type and Reason for Visit:  Initial (Low BMI)    Nutrition Recommendations/Plan:   Recommend continue with current diet of Regular  Initiate standard high calorie/high protein oral supplement TID daily  Monitor po intakes, wts, labs POC  Please document all po intakes so nutritional status can be properly assessed. Malnutrition Assessment:  Malnutrition Status:  Severe malnutrition (03/28/23 1118)    Context:  Chronic Illness     Findings of the 6 clinical characteristics of malnutrition:  Energy Intake:  Unable to assess  Weight Loss:  Greater than 7.5% over 3 months     Body Fat Loss:  Severe body fat loss Orbital, Buccal region   Muscle Mass Loss:  Severe muscle mass loss Temples (temporalis), Clavicles (pectoralis & deltoids), Hand (interosseous)  Fluid Accumulation:  Unable to assess     Strength:  Not Performed    Nutrition Assessment:    Pt admitted for acute on chronic respiratory failure with hypoxia, COPD Exacerbation, cellulitis of RLE, pneumonia. Hx includes COPD, home O2, chronic pain, alcohol abuse. Pt was sleepy during attempted interview-did report his appetite has been okay. Shook his head yes to po supplements, otherwise, would not finish conversation. Pt was sitting up in bed and was able to visualize areas of severe muscle and fat depletion-pt does meet malnutrition criteria. Recommend to initiate po supplements as well. Will follow at high nutrition risk. Monitor clinical course and need for further interventions. Nutrition Related Findings:    Meds reviewed. Labs include Cl 96 L, Creat 0.3 L, Gluc 138 H, AST 46 H, Alk Phos 155 H. Wound Type: None       Current Nutrition Intake & Therapies:    Average Meal Intake: Unable to assess  Average Supplements Intake: None Ordered  ADULT DIET; Regular    Anthropometric Measures:  Height: 5' 5\" (165.1 cm)  Ideal Body Weight (IBW): 136 lbs (62 kg)    Current Body Weight: 101 lb (45.8 kg), 74.3 % IBW.  Weight
Dr. Juanjo Steele here to assess patient.
I arrived just prior to transport arriving to transport the patient to [facility] ICU. Overnight events noted; patient had acute decompensation of mental status starting very early this morning and progressed when at 5am patient was not responding and ED was emergently called to intubate. Overnight events were discussed by night covering SIN with overnight telemedicine doc. I was able to review patient's CXR just prior to transfer as I arrived at the hospital and the ET tube appeared in place and RLL opacity did seem more prominent (patient had been on antibiotics). Patient was transferred in critical but stable condition to [facility] ICU.
Nursing staff informed me about 0 510 hours that patient had a change in status. I was in patient room with nursing staff assessing patient while his vitals appear stable he was not responding to sternal rub nailbed prick and was not answering to his name. His pupils  while  dilated was also not responding very well. I had to reach out to the ED provider who graciously came to also reassess the patient. We did obtain blood sugar ABG and ammonia as patient was a DT patient  with pneumonia and also cachectic. His ABG did show he was acidotic  and  retaining C02 . Jamilah Varma Decision was made to intubate patient this was done successfully by the ED physician   The access center was called for patient transfer to [facility] with the critical care physician and hospitalist .patient was accepted while awaiting room assignment and transportation. Telehealth provider Dr. Peggy Diaz was updated. Continue to monitor patient as we await transportation to [facility] for continuity of care.
Patient had been accepted at [facility], room 2104. Number to call report is [phone].
Patient incontinent of large amount of urine, while changing the linens the patient was noted to be unresponsive to stimuli with \"guppy\" breathing. Hospitalist notified who came to the unit to assess the patient. The Hospitalist requested the ED Doctor and resp tech to also assess the patient.
Patient stated he drinks 4-6 Natural light beers a day along with sips of fireball that equals about a shot a day. He stated his last drink was on 03/27/2023 at 0400. Notified Gene CASEY for [address] orders.
Radiology in the room getting CXR, patient noted to be having a seizure.  Given Ativan 2mg IV per verbal order of Romina NP.
Report called to [facility], all questions answered. MICU here to transfer patient.
The patient was sedated with Etomidate and Hany then Dr Deven Guy intubated the patient without difficulty. Banuelos catheter and NG tube were also inserted per this RN. Second 20 gauge IV also inserted.
Update Dr Kaye Vasquez and Makenna pt. Still with jerking movementsB/P  now 113/63.  Max Propofol
V2.0  [facility] Hospitalist Progress Note      Name:  Branden Wong /Age/Sex: 1955  (79 y.o. male)   MRN & CSN:  [id number] & [id number] Encounter Date/Time: 3/28/2023 3:17 PM EDT    Location:   PCP: Demond Sullivan MD       Hospital Day: 2    Assessment and Plan:   Branden Wong is a 79 y.o. male with  who presents with Pneumonia      Plan:  Cellulitis right lower extremity, continue ceftriaxone  COPD exacerbation most likely secondary to pneumonia patient is currently on base line oxygen of 2 L. Continue with duo nebs, add Mucomyst  CTA chest reviewed please see results as listed below. Acute on chronic respiratory failure with hypoxia, patient has been weaned to 2 L nasal cannula which was his baseline he was previously requiring 6 L oxygen to achieve an O2 saturation of 91%. Pneumonia, continue with IV ceftriaxone and Zithromax  Procalcitonin is elevated at 1.93 will monitor  Urine strep urine Legionella pending  5. SIRS, present on admission now resolved  6. Alcohol abuse, patient has been placed on CIWA protocol no signs of DTs at this time. 7.  Tobacco abuse, nicotine patch ordered, cessation discussed  8. Cervical radiculopathy, continue current home pain medications he is seen at the pain clinic. 9.  Lung nodule noted on chest x-ray, patient needs to follow-up with his pulmonologist Dr. Uli Cates outpatient. 10.  Transaminitis most likely secondary to alcohol abuse  11. Anemia, hemoglobin is 10.5, most likely delusional as on 3/27/2023 hemoglobin was 12. We will continue to monitor. Diet ADULT DIET;  Regular  ADULT ORAL NUTRITION SUPPLEMENT; Breakfast, Lunch, Dinner; Standard High Calorie/High Protein Oral Supplement   DVT Prophylaxis [x] Lovenox, []  Heparin, [] SCDs, [] Ambulation,  [] Eliquis, [] Xarelto  [] Coumadin   Code Status Full Code   Disposition From: Home  Expected Disposition: Home  Estimated Date of Discharge: 24 to 48 hours  Patient requires continued admission due
http://pubs. rsna.org/doi/full/10.1148/radiol. [email or web address]       Electronically signed by HEMALATHA Brennan NP on 3/29/2023 at 11:25 AM

## 2023-04-01 LAB
CULTURE: NORMAL
GRAM SMEAR: NORMAL
Lab: NORMAL
SPECIMEN: NORMAL

## 2023-04-02 LAB
CULTURE: NORMAL
CULTURE: NORMAL
Lab: NORMAL
Lab: NORMAL
SPECIMEN: NORMAL
SPECIMEN: NORMAL

## 2023-04-04 LAB
CULTURE: NORMAL
Lab: NORMAL
SPECIMEN: NORMAL

## 2023-04-07 ENCOUNTER — APPOINTMENT (OUTPATIENT)
Dept: GENERAL RADIOLOGY | Age: 68
End: 2023-04-07
Payer: MEDICARE

## 2023-04-07 ENCOUNTER — HOSPITAL ENCOUNTER (EMERGENCY)
Age: 68
Discharge: HOME | End: 2023-04-07
Attending: EMERGENCY MEDICINE
Payer: MEDICARE

## 2023-04-07 VITALS
HEART RATE: 82 BPM | SYSTOLIC BLOOD PRESSURE: 148 MMHG | BODY MASS INDEX: 16.64 KG/M2 | OXYGEN SATURATION: 95 % | WEIGHT: 100 LBS | TEMPERATURE: 98.2 F | DIASTOLIC BLOOD PRESSURE: 96 MMHG | RESPIRATION RATE: 22 BRPM

## 2023-04-07 DIAGNOSIS — R06.00 DYSPNEA AND RESPIRATORY ABNORMALITIES: ICD-10-CM

## 2023-04-07 DIAGNOSIS — J44.1 COPD WITH ACUTE EXACERBATION (HCC): Primary | ICD-10-CM

## 2023-04-07 DIAGNOSIS — R09.02 HYPOXIA: ICD-10-CM

## 2023-04-07 DIAGNOSIS — R06.89 DYSPNEA AND RESPIRATORY ABNORMALITIES: ICD-10-CM

## 2023-04-07 LAB
ANION GAP SERPL CALCULATED.3IONS-SCNC: 4 MMOL/L (ref 4–16)
ANISOCYTOSIS: ABNORMAL
BANDED NEUTROPHILS ABSOLUTE COUNT: 0.85 K/CU MM
BANDED NEUTROPHILS RELATIVE PERCENT: 5 % (ref 5–11)
BASE EXCESS MIXED: 14.4 (ref 0–1.2)
BASE EXCESS: ABNORMAL (ref 0–3.3)
BASOPHILS ABSOLUTE: 0.2 K/CU MM
BASOPHILS RELATIVE PERCENT: 1 % (ref 0–1)
BUN SERPL-MCNC: 13 MG/DL (ref 6–23)
CALCIUM SERPL-MCNC: 9.3 MG/DL (ref 8.3–10.6)
CHLORIDE BLD-SCNC: 99 MMOL/L (ref 99–110)
CO2 CONTENT: 44.9 MMOL/L (ref 19–24)
CO2: 41 MMOL/L (ref 21–32)
CREAT SERPL-MCNC: 0.4 MG/DL (ref 0.9–1.3)
DIFFERENTIAL TYPE: ABNORMAL
EKG ATRIAL RATE: 91 BPM
EKG DIAGNOSIS: NORMAL
EKG P AXIS: 90 DEGREES
EKG P-R INTERVAL: 120 MS
EKG Q-T INTERVAL: 338 MS
EKG QRS DURATION: 88 MS
EKG QTC CALCULATION (BAZETT): 415 MS
EKG R AXIS: 82 DEGREES
EKG T AXIS: 77 DEGREES
EKG VENTRICULAR RATE: 91 BPM
EOSINOPHILS ABSOLUTE: 0.2 K/CU MM
EOSINOPHILS RELATIVE PERCENT: 1 % (ref 0–3)
GFR SERPL CREATININE-BSD FRML MDRD: >60 ML/MIN/1.73M2
GLUCOSE SERPL-MCNC: 98 MG/DL (ref 70–99)
HCO3 VENOUS: 42.6 MMOL/L (ref 19–25)
HCT VFR BLD CALC: 33.5 % (ref 42–52)
HEMOGLOBIN: 10.2 GM/DL (ref 13.5–18)
LYMPHOCYTES ABSOLUTE: 0.5 K/CU MM
LYMPHOCYTES RELATIVE PERCENT: 3 % (ref 24–44)
MCH RBC QN AUTO: 29.7 PG (ref 27–31)
MCHC RBC AUTO-ENTMCNC: 30.4 % (ref 32–36)
MCV RBC AUTO: 97.7 FL (ref 78–100)
MICROCYTES: ABNORMAL
MONOCYTES ABSOLUTE: 1.7 K/CU MM
MONOCYTES RELATIVE PERCENT: 10 % (ref 0–4)
O2 SAT, VEN: 88.4 % (ref 50–70)
PCO2, VEN: 74.3 MMHG (ref 38–52)
PDW BLD-RTO: 14 % (ref 11.7–14.9)
PH VENOUS: 7.37 (ref 7.32–7.42)
PLATELET # BLD: 407 K/CU MM (ref 140–440)
PLT MORPHOLOGY: ABNORMAL
PMV BLD AUTO: 9.4 FL (ref 7.5–11.1)
PO2, VEN: 60.6 MMHG (ref 28–48)
POTASSIUM SERPL-SCNC: 4.2 MMOL/L (ref 3.5–5.1)
PRO-BNP: 251.3 PG/ML
RBC # BLD: 3.43 M/CU MM (ref 4.6–6.2)
SEGMENTED NEUTROPHILS ABSOLUTE COUNT: 13.5 K/CU MM
SEGMENTED NEUTROPHILS RELATIVE PERCENT: 80 % (ref 36–66)
SODIUM BLD-SCNC: 144 MMOL/L (ref 135–145)
SOURCE, BLOOD GAS: ABNORMAL
STOMATOCYTES: ABNORMAL
TARGET CELLS: ABNORMAL
TROPONIN T: <0.01 NG/ML
WBC # BLD: 17 K/CU MM (ref 4–10.5)

## 2023-04-07 PROCEDURE — 94640 AIRWAY INHALATION TREATMENT: CPT

## 2023-04-07 PROCEDURE — 93010 ELECTROCARDIOGRAM REPORT: CPT | Performed by: INTERNAL MEDICINE

## 2023-04-07 PROCEDURE — 84484 ASSAY OF TROPONIN QUANT: CPT

## 2023-04-07 PROCEDURE — 85027 COMPLETE CBC AUTOMATED: CPT

## 2023-04-07 PROCEDURE — 6360000002 HC RX W HCPCS: Performed by: EMERGENCY MEDICINE

## 2023-04-07 PROCEDURE — 6370000000 HC RX 637 (ALT 250 FOR IP): Performed by: EMERGENCY MEDICINE

## 2023-04-07 PROCEDURE — 80048 BASIC METABOLIC PNL TOTAL CA: CPT

## 2023-04-07 PROCEDURE — 85007 BL SMEAR W/DIFF WBC COUNT: CPT

## 2023-04-07 PROCEDURE — 83880 ASSAY OF NATRIURETIC PEPTIDE: CPT

## 2023-04-07 PROCEDURE — 99285 EMERGENCY DEPT VISIT HI MDM: CPT

## 2023-04-07 PROCEDURE — 93005 ELECTROCARDIOGRAM TRACING: CPT | Performed by: EMERGENCY MEDICINE

## 2023-04-07 PROCEDURE — 71045 X-RAY EXAM CHEST 1 VIEW: CPT

## 2023-04-07 RX ORDER — METHYLPREDNISOLONE SODIUM SUCCINATE 125 MG/2ML
60 INJECTION, POWDER, LYOPHILIZED, FOR SOLUTION INTRAMUSCULAR; INTRAVENOUS ONCE
Status: COMPLETED | OUTPATIENT
Start: 2023-04-07 | End: 2023-04-07

## 2023-04-07 RX ORDER — OXYCODONE HYDROCHLORIDE AND ACETAMINOPHEN 5; 325 MG/1; MG/1
1 TABLET ORAL ONCE
Status: COMPLETED | OUTPATIENT
Start: 2023-04-07 | End: 2023-04-07

## 2023-04-07 RX ORDER — IPRATROPIUM BROMIDE AND ALBUTEROL SULFATE 2.5; .5 MG/3ML; MG/3ML
1 SOLUTION RESPIRATORY (INHALATION) ONCE
Status: COMPLETED | OUTPATIENT
Start: 2023-04-07 | End: 2023-04-07

## 2023-04-07 RX ADMIN — METHYLPREDNISOLONE SODIUM SUCCINATE 60 MG: 125 INJECTION, POWDER, FOR SOLUTION INTRAMUSCULAR; INTRAVENOUS at 02:13

## 2023-04-07 RX ADMIN — OXYCODONE AND ACETAMINOPHEN 1 TABLET: 5; 325 TABLET ORAL at 02:12

## 2023-04-07 RX ADMIN — IPRATROPIUM BROMIDE AND ALBUTEROL SULFATE 1 AMPULE: .5; 3 SOLUTION RESPIRATORY (INHALATION) at 02:06

## 2023-04-07 ASSESSMENT — ENCOUNTER SYMPTOMS
SHORTNESS OF BREATH: 1
GASTROINTESTINAL NEGATIVE: 1
WHEEZING: 1
EYES NEGATIVE: 1

## 2023-04-07 ASSESSMENT — PAIN DESCRIPTION - LOCATION: LOCATION: CHEST;BACK;NECK

## 2023-04-07 ASSESSMENT — PAIN SCALES - GENERAL: PAINLEVEL_OUTOF10: 10

## 2023-04-07 NOTE — DISCHARGE INSTRUCTIONS
Return to your 2 liters use of oxygen and begin to use all your medications as prescribed by [facility] discharge planning

## 2023-04-07 NOTE — ED NOTES
Call placed to [facility] and rehab, gave report to the nurse assuming care of this patient.       Mary Meza RN  04/07/23 5651

## 2023-05-01 RX ORDER — IPRATROPIUM BROMIDE AND ALBUTEROL SULFATE 2.5; .5 MG/3ML; MG/3ML
SOLUTION RESPIRATORY (INHALATION)
Qty: 120 EACH | Refills: 5 | Status: SHIPPED | OUTPATIENT
Start: 2023-05-01

## 2023-05-08 ENCOUNTER — TELEPHONE (OUTPATIENT)
Dept: INTERNAL MEDICINE CLINIC | Age: 68
End: 2023-05-08

## 2023-05-08 ENCOUNTER — OFFICE VISIT (OUTPATIENT)
Dept: INTERNAL MEDICINE CLINIC | Age: 68
End: 2023-05-08
Payer: MEDICARE

## 2023-05-08 VITALS
RESPIRATION RATE: 20 BRPM | OXYGEN SATURATION: 93 % | SYSTOLIC BLOOD PRESSURE: 130 MMHG | HEART RATE: 88 BPM | TEMPERATURE: 97 F | DIASTOLIC BLOOD PRESSURE: 70 MMHG | WEIGHT: 100.8 LBS | BODY MASS INDEX: 16.77 KG/M2

## 2023-05-08 DIAGNOSIS — Z72.0 TOBACCO ABUSE DISORDER: ICD-10-CM

## 2023-05-08 DIAGNOSIS — R52 PAIN MANAGEMENT: ICD-10-CM

## 2023-05-08 DIAGNOSIS — J44.1 COPD EXACERBATION (HCC): ICD-10-CM

## 2023-05-08 DIAGNOSIS — M54.12 CERVICAL RADICULOPATHY: ICD-10-CM

## 2023-05-08 DIAGNOSIS — J44.9 COPD, SEVERE (HCC): ICD-10-CM

## 2023-05-08 DIAGNOSIS — R91.1 LUNG NODULE: ICD-10-CM

## 2023-05-08 DIAGNOSIS — J96.10 CHRONIC RESPIRATORY FAILURE, UNSPECIFIED WHETHER WITH HYPOXIA OR HYPERCAPNIA (HCC): ICD-10-CM

## 2023-05-08 DIAGNOSIS — J96.21 ACUTE ON CHRONIC RESPIRATORY FAILURE WITH HYPOXIA (HCC): Primary | ICD-10-CM

## 2023-05-08 DIAGNOSIS — F51.02 ADJUSTMENT INSOMNIA: ICD-10-CM

## 2023-05-08 DIAGNOSIS — F10.10 ALCOHOL ABUSE: ICD-10-CM

## 2023-05-08 PROBLEM — K52.832 LYMPHOCYTIC COLITIS: Status: RESOLVED | Noted: 2018-04-02 | Resolved: 2023-05-08

## 2023-05-08 PROCEDURE — 3023F SPIROM DOC REV: CPT | Performed by: INTERNAL MEDICINE

## 2023-05-08 PROCEDURE — 1124F ACP DISCUSS-NO DSCNMKR DOCD: CPT | Performed by: INTERNAL MEDICINE

## 2023-05-08 PROCEDURE — 4004F PT TOBACCO SCREEN RCVD TLK: CPT | Performed by: INTERNAL MEDICINE

## 2023-05-08 PROCEDURE — G8419 CALC BMI OUT NRM PARAM NOF/U: HCPCS | Performed by: INTERNAL MEDICINE

## 2023-05-08 PROCEDURE — 3017F COLORECTAL CA SCREEN DOC REV: CPT | Performed by: INTERNAL MEDICINE

## 2023-05-08 PROCEDURE — G8427 DOCREV CUR MEDS BY ELIG CLIN: HCPCS | Performed by: INTERNAL MEDICINE

## 2023-05-08 PROCEDURE — 99214 OFFICE O/P EST MOD 30 MIN: CPT | Performed by: INTERNAL MEDICINE

## 2023-05-08 RX ORDER — OXYCODONE HYDROCHLORIDE 10 MG/1
10 TABLET ORAL EVERY 6 HOURS PRN
COMMUNITY
Start: 2023-04-20

## 2023-05-08 ASSESSMENT — PATIENT HEALTH QUESTIONNAIRE - PHQ9
1. LITTLE INTEREST OR PLEASURE IN DOING THINGS: 0
2. FEELING DOWN, DEPRESSED OR HOPELESS: 1
SUM OF ALL RESPONSES TO PHQ QUESTIONS 1-9: 1
SUM OF ALL RESPONSES TO PHQ9 QUESTIONS 1 & 2: 1
SUM OF ALL RESPONSES TO PHQ QUESTIONS 1-9: 1

## 2023-05-08 NOTE — PROGRESS NOTES
months        Mediations reviewed with the patient. Continue current medications. Appropriate prescriptions are addressed. After visit summeryprovided. Follow up as directed sooner if needed. Questions answered and patient verbalizes understanding. Call for any problems, questions, or concerns. No Known Allergies  Current Outpatient Medications   Medication Sig Dispense Refill    ipratropium-albuterol (DUONEB) 0.5-2.5 (3) MG/3ML SOLN nebulizer solution INHALE THE CONTENTS OF 1 VIAL VIA NEBULIZER FOUR TIMES DAILY 120 each 5    Ascorbic Acid (VITAMIN C) 1000 MG tablet Take 1 tablet by mouth daily      albuterol sulfate HFA (PROVENTIL;VENTOLIN;PROAIR) 108 (90 Base) MCG/ACT inhaler INHALE 2 PUFFS EVERY 6 HOURS AS NEEDED FOR WHEEZING 54 g 5    predniSONE (DELTASONE) 5 MG tablet TAKE 1 TABLET EVERY DAY 90 tablet 3    SYMBICORT 160-4.5 MCG/ACT AERO INHALE 2 PUFFS TWICE DAILY 3 each 3    Turmeric 500 MG CAPS Take 1 capsule by mouth daily      Cholecalciferol (VITAMIN D3) 1.25 MG (41030 UT) CAPS Take 1 capsule by mouth daily      Multiple Vitamins-Minerals (MULTIVITAMIN ADULTS PO) Take by mouth daily       oxyCODONE HCl (OXY-IR) 10 MG immediate release tablet Take 1 tablet by mouth every 6 hours as needed. mirtazapine (REMERON) 7.5 MG tablet Take 1 tablet by mouth nightly (Patient not taking: Reported on 5/8/2023) 30 tablet 2     No current facility-administered medications for this visit. 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 On 7/22/16.  Dr Norman Aguilar at [facility] 8-    Dr. Cesario Khan    [facility] 5/19/2014    PFTs showed severe COPD in 5/14- follows with Dr Madelaine Beaulieu    COPD exacerbation [facility]) 6/10/2016    Diverticulosis

## 2023-05-08 NOTE — TELEPHONE ENCOUNTER
Moi Ardon with [facility] would like to know if a verbal order for a Community Resource visit with [facility] could be given.     Moi Ardon can be contacted at phone [phone]

## 2023-05-08 NOTE — TELEPHONE ENCOUNTER
Amy Darling with [facility] notified that Dr Chandelr Johnson said \"OK \" for  and order for [facility] Resource visit. Verbal understanding returned.

## 2023-05-24 ENCOUNTER — HOSPITAL ENCOUNTER (EMERGENCY)
Age: 68
Discharge: ANOTHER HOSPITAL | End: 2023-05-24
Attending: EMERGENCY MEDICINE
Payer: MEDICARE

## 2023-05-24 ENCOUNTER — APPOINTMENT (OUTPATIENT)
Dept: GENERAL RADIOLOGY | Age: 68
End: 2023-05-24
Payer: MEDICARE

## 2023-05-24 ENCOUNTER — HOSPITAL ENCOUNTER (INPATIENT)
Age: 68
LOS: 9 days | Discharge: NURSING, REHAB OR LONG-TERM CARE | DRG: 480 | End: 2023-06-02
Attending: INTERNAL MEDICINE
Payer: MEDICARE

## 2023-05-24 ENCOUNTER — APPOINTMENT (OUTPATIENT)
Dept: CT IMAGING | Age: 68
End: 2023-05-24
Payer: MEDICARE

## 2023-05-24 VITALS
HEART RATE: 82 BPM | WEIGHT: 100 LBS | BODY MASS INDEX: 16.07 KG/M2 | SYSTOLIC BLOOD PRESSURE: 108 MMHG | RESPIRATION RATE: 22 BRPM | OXYGEN SATURATION: 100 % | TEMPERATURE: 98.8 F | DIASTOLIC BLOOD PRESSURE: 51 MMHG | HEIGHT: 66 IN

## 2023-05-24 DIAGNOSIS — S72.001A HIP FRACTURE REQUIRING OPERATIVE REPAIR, RIGHT, CLOSED, INITIAL ENCOUNTER (HCC): Primary | ICD-10-CM
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
## 2023-05-24 DIAGNOSIS — S51.011A LACERATION OF RIGHT ELBOW, INITIAL ENCOUNTER: ICD-10-CM

## 2023-05-24 DIAGNOSIS — J44.9 CHRONIC OBSTRUCTIVE PULMONARY DISEASE, UNSPECIFIED COPD TYPE (HCC): ICD-10-CM

## 2023-05-24 DIAGNOSIS — S50.01XA CONTUSION OF RIGHT ELBOW, INITIAL ENCOUNTER: ICD-10-CM

## 2023-05-24 DIAGNOSIS — S72.001A CLOSED FRACTURE OF RIGHT HIP, INITIAL ENCOUNTER (HCC): Primary | ICD-10-CM

## 2023-05-24 LAB
ALBUMIN SERPL-MCNC: 3.8 GM/DL (ref 3.4–5)
ALP BLD-CCNC: 57 IU/L (ref 40–129)
ALT SERPL-CCNC: 24 U/L (ref 10–40)
ANION GAP SERPL CALCULATED.3IONS-SCNC: 5 MMOL/L (ref 4–16)
AST SERPL-CCNC: 21 IU/L (ref 15–37)
BASOPHILS ABSOLUTE: 0 K/CU MM
BASOPHILS RELATIVE PERCENT: 0.3 % (ref 0–1)
BILIRUB SERPL-MCNC: 0.3 MG/DL (ref 0–1)
BUN SERPL-MCNC: 9 MG/DL (ref 6–23)
CALCIUM SERPL-MCNC: 9 MG/DL (ref 8.3–10.6)
CHLORIDE BLD-SCNC: 85 MMOL/L (ref 99–110)
CO2: 43 MMOL/L (ref 21–32)
CREAT SERPL-MCNC: 0.3 MG/DL (ref 0.9–1.3)
DIFFERENTIAL TYPE: ABNORMAL
EOSINOPHILS ABSOLUTE: 0 K/CU MM
EOSINOPHILS RELATIVE PERCENT: 0 % (ref 0–3)
GFR SERPL CREATININE-BSD FRML MDRD: >60 ML/MIN/1.73M2
GLUCOSE BLD-MCNC: 92 MG/DL (ref 70–99)
GLUCOSE SERPL-MCNC: 100 MG/DL (ref 70–99)
HCT VFR BLD CALC: 38.4 % (ref 42–52)
HEMOGLOBIN: 11.7 GM/DL (ref 13.5–18)
IMMATURE NEUTROPHIL %: 0.4 % (ref 0–0.43)
LYMPHOCYTES ABSOLUTE: 0.5 K/CU MM
LYMPHOCYTES RELATIVE PERCENT: 3.7 % (ref 24–44)
MCH RBC QN AUTO: 29.8 PG (ref 27–31)
MCHC RBC AUTO-ENTMCNC: 30.5 % (ref 32–36)
MCV RBC AUTO: 98 FL (ref 78–100)
MONOCYTES ABSOLUTE: 0.9 K/CU MM
MONOCYTES RELATIVE PERCENT: 7.2 % (ref 0–4)
PDW BLD-RTO: 13.7 % (ref 11.7–14.9)
PLATELET # BLD: 212 K/CU MM (ref 140–440)
PMV BLD AUTO: 9.8 FL (ref 7.5–11.1)
POTASSIUM SERPL-SCNC: 4.2 MMOL/L (ref 3.5–5.1)
RBC # BLD: 3.92 M/CU MM (ref 4.6–6.2)
SEGMENTED NEUTROPHILS ABSOLUTE COUNT: 11.6 K/CU MM
SEGMENTED NEUTROPHILS RELATIVE PERCENT: 88.4 % (ref 36–66)
SODIUM BLD-SCNC: 133 MMOL/L (ref 135–145)
TOTAL IMMATURE NEUTOROPHIL: 0.05 K/CU MM
TOTAL PROTEIN: 6.1 GM/DL (ref 6.4–8.2)
TROPONIN T: <0.01 NG/ML
WBC # BLD: 13.1 K/CU MM (ref 4–10.5)

## 2023-05-24 PROCEDURE — 99285 EMERGENCY DEPT VISIT HI MDM: CPT

## 2023-05-24 PROCEDURE — 85025 COMPLETE CBC W/AUTO DIFF WBC: CPT

## 2023-05-24 PROCEDURE — 2700000000 HC OXYGEN THERAPY PER DAY

## 2023-05-24 PROCEDURE — 80053 COMPREHEN METABOLIC PANEL: CPT

## 2023-05-24 PROCEDURE — 94761 N-INVAS EAR/PLS OXIMETRY MLT: CPT

## 2023-05-24 PROCEDURE — 2060000000 HC ICU INTERMEDIATE R&B

## 2023-05-24 PROCEDURE — 93005 ELECTROCARDIOGRAM TRACING: CPT | Performed by: STUDENT IN AN ORGANIZED HEALTH CARE EDUCATION/TRAINING PROGRAM

## 2023-05-24 PROCEDURE — 73502 X-RAY EXAM HIP UNI 2-3 VIEWS: CPT

## 2023-05-24 PROCEDURE — 94640 AIRWAY INHALATION TREATMENT: CPT

## 2023-05-24 PROCEDURE — 73700 CT LOWER EXTREMITY W/O DYE: CPT

## 2023-05-24 PROCEDURE — 94664 DEMO&/EVAL PT USE INHALER: CPT

## 2023-05-24 PROCEDURE — 71045 X-RAY EXAM CHEST 1 VIEW: CPT

## 2023-05-24 PROCEDURE — 96375 TX/PRO/DX INJ NEW DRUG ADDON: CPT

## 2023-05-24 PROCEDURE — 96374 THER/PROPH/DIAG INJ IV PUSH: CPT

## 2023-05-24 PROCEDURE — 2580000003 HC RX 258: Performed by: STUDENT IN AN ORGANIZED HEALTH CARE EDUCATION/TRAINING PROGRAM

## 2023-05-24 PROCEDURE — 6370000000 HC RX 637 (ALT 250 FOR IP): Performed by: STUDENT IN AN ORGANIZED HEALTH CARE EDUCATION/TRAINING PROGRAM

## 2023-05-24 PROCEDURE — 84484 ASSAY OF TROPONIN QUANT: CPT

## 2023-05-24 PROCEDURE — 73080 X-RAY EXAM OF ELBOW: CPT

## 2023-05-24 PROCEDURE — HZ2ZZZZ DETOXIFICATION SERVICES FOR SUBSTANCE ABUSE TREATMENT: ICD-10-PCS | Performed by: STUDENT IN AN ORGANIZED HEALTH CARE EDUCATION/TRAINING PROGRAM

## 2023-05-24 PROCEDURE — 6370000000 HC RX 637 (ALT 250 FOR IP): Performed by: EMERGENCY MEDICINE

## 2023-05-24 PROCEDURE — 93005 ELECTROCARDIOGRAM TRACING: CPT | Performed by: EMERGENCY MEDICINE

## 2023-05-24 PROCEDURE — 6360000002 HC RX W HCPCS: Performed by: EMERGENCY MEDICINE

## 2023-05-24 RX ORDER — LANOLIN ALCOHOL/MO/W.PET/CERES
100 CREAM (GRAM) TOPICAL DAILY
Status: DISCONTINUED | OUTPATIENT
Start: 2023-05-25 | End: 2023-06-02 | Stop reason: HOSPADM

## 2023-05-24 RX ORDER — ALBUTEROL SULFATE 90 UG/1
2 AEROSOL, METERED RESPIRATORY (INHALATION) EVERY 4 HOURS
Status: DISCONTINUED | OUTPATIENT
Start: 2023-05-25 | End: 2023-05-26

## 2023-05-24 RX ORDER — IPRATROPIUM BROMIDE AND ALBUTEROL SULFATE 2.5; .5 MG/3ML; MG/3ML
1 SOLUTION RESPIRATORY (INHALATION) EVERY 4 HOURS
Status: DISCONTINUED | OUTPATIENT
Start: 2023-05-24 | End: 2023-05-25

## 2023-05-24 RX ORDER — MORPHINE SULFATE 4 MG/ML
4 INJECTION, SOLUTION INTRAMUSCULAR; INTRAVENOUS EVERY 30 MIN PRN
Status: DISCONTINUED | OUTPATIENT
Start: 2023-05-24 | End: 2023-05-24 | Stop reason: HOSPADM

## 2023-05-24 RX ORDER — SODIUM CHLORIDE 0.9 % (FLUSH) 0.9 %
5-40 SYRINGE (ML) INJECTION EVERY 12 HOURS SCHEDULED
Status: DISCONTINUED | OUTPATIENT
Start: 2023-05-24 | End: 2023-06-02 | Stop reason: HOSPADM

## 2023-05-24 RX ORDER — SODIUM CHLORIDE 0.9 % (FLUSH) 0.9 %
5-40 SYRINGE (ML) INJECTION PRN
Status: DISCONTINUED | OUTPATIENT
Start: 2023-05-24 | End: 2023-06-02 | Stop reason: HOSPADM

## 2023-05-24 RX ORDER — ACETAMINOPHEN 325 MG/1
650 TABLET ORAL EVERY 6 HOURS PRN
Status: DISCONTINUED | OUTPATIENT
Start: 2023-05-24 | End: 2023-05-25 | Stop reason: SDUPTHER

## 2023-05-24 RX ORDER — ONDANSETRON 2 MG/ML
4 INJECTION INTRAMUSCULAR; INTRAVENOUS EVERY 6 HOURS PRN
Status: DISCONTINUED | OUTPATIENT
Start: 2023-05-24 | End: 2023-06-02 | Stop reason: HOSPADM

## 2023-05-24 RX ORDER — SODIUM CHLORIDE 9 MG/ML
INJECTION, SOLUTION INTRAVENOUS PRN
Status: DISCONTINUED | OUTPATIENT
Start: 2023-05-24 | End: 2023-05-25 | Stop reason: SDUPTHER

## 2023-05-24 RX ORDER — OXYCODONE HYDROCHLORIDE 10 MG/1
10 TABLET ORAL EVERY 4 HOURS PRN
Status: DISCONTINUED | OUTPATIENT
Start: 2023-05-24 | End: 2023-05-25 | Stop reason: SDUPTHER

## 2023-05-24 RX ORDER — OXYCODONE HYDROCHLORIDE 5 MG/1
5 TABLET ORAL EVERY 4 HOURS PRN
Status: DISCONTINUED | OUTPATIENT
Start: 2023-05-24 | End: 2023-05-25 | Stop reason: SDUPTHER

## 2023-05-24 RX ORDER — POLYETHYLENE GLYCOL 3350 17 G/17G
17 POWDER, FOR SOLUTION ORAL DAILY PRN
Status: DISCONTINUED | OUTPATIENT
Start: 2023-05-24 | End: 2023-06-02 | Stop reason: HOSPADM

## 2023-05-24 RX ORDER — MAGNESIUM SULFATE IN WATER 40 MG/ML
2000 INJECTION, SOLUTION INTRAVENOUS PRN
Status: DISCONTINUED | OUTPATIENT
Start: 2023-05-24 | End: 2023-06-02 | Stop reason: HOSPADM

## 2023-05-24 RX ORDER — ACETAMINOPHEN 650 MG/1
650 SUPPOSITORY RECTAL EVERY 6 HOURS PRN
Status: DISCONTINUED | OUTPATIENT
Start: 2023-05-24 | End: 2023-06-02 | Stop reason: HOSPADM

## 2023-05-24 RX ORDER — IPRATROPIUM BROMIDE AND ALBUTEROL SULFATE 2.5; .5 MG/3ML; MG/3ML
1 SOLUTION RESPIRATORY (INHALATION) ONCE
Status: COMPLETED | OUTPATIENT
Start: 2023-05-24 | End: 2023-05-24

## 2023-05-24 RX ORDER — NICOTINE 21 MG/24HR
1 PATCH, TRANSDERMAL 24 HOURS TRANSDERMAL DAILY PRN
Status: DISCONTINUED | OUTPATIENT
Start: 2023-05-24 | End: 2023-06-02 | Stop reason: HOSPADM

## 2023-05-24 RX ORDER — ONDANSETRON 4 MG/1
4 TABLET, ORALLY DISINTEGRATING ORAL EVERY 8 HOURS PRN
Status: DISCONTINUED | OUTPATIENT
Start: 2023-05-24 | End: 2023-06-02 | Stop reason: HOSPADM

## 2023-05-24 RX ORDER — M-VIT,TX,IRON,MINS/CALC/FOLIC 27MG-0.4MG
1 TABLET ORAL DAILY
Status: DISCONTINUED | OUTPATIENT
Start: 2023-05-25 | End: 2023-06-02 | Stop reason: HOSPADM

## 2023-05-24 RX ORDER — ALBUTEROL SULFATE 90 UG/1
1 AEROSOL, METERED RESPIRATORY (INHALATION) EVERY 6 HOURS PRN
Status: DISCONTINUED | OUTPATIENT
Start: 2023-05-24 | End: 2023-05-24

## 2023-05-24 RX ORDER — BUDESONIDE AND FORMOTEROL FUMARATE DIHYDRATE 160; 4.5 UG/1; UG/1
2 AEROSOL RESPIRATORY (INHALATION) 2 TIMES DAILY
Status: DISCONTINUED | OUTPATIENT
Start: 2023-05-24 | End: 2023-06-02 | Stop reason: HOSPADM

## 2023-05-24 RX ORDER — POTASSIUM CHLORIDE 7.45 MG/ML
10 INJECTION INTRAVENOUS PRN
Status: DISCONTINUED | OUTPATIENT
Start: 2023-05-24 | End: 2023-06-02 | Stop reason: HOSPADM

## 2023-05-24 RX ORDER — ONDANSETRON 2 MG/ML
4 INJECTION INTRAMUSCULAR; INTRAVENOUS EVERY 30 MIN PRN
Status: DISCONTINUED | OUTPATIENT
Start: 2023-05-24 | End: 2023-05-24 | Stop reason: HOSPADM

## 2023-05-24 RX ADMIN — SODIUM CHLORIDE, PRESERVATIVE FREE 10 ML: 5 INJECTION INTRAVENOUS at 22:16

## 2023-05-24 RX ADMIN — ONDANSETRON 4 MG: 2 INJECTION INTRAMUSCULAR; INTRAVENOUS at 14:45

## 2023-05-24 RX ADMIN — IPRATROPIUM BROMIDE AND ALBUTEROL SULFATE 1 AMPULE: .5; 3 SOLUTION RESPIRATORY (INHALATION) at 14:38

## 2023-05-24 RX ADMIN — IPRATROPIUM BROMIDE AND ALBUTEROL SULFATE 1 AMPULE: 2.5; .5 SOLUTION RESPIRATORY (INHALATION) at 20:20

## 2023-05-24 RX ADMIN — MORPHINE SULFATE 4 MG: 4 INJECTION, SOLUTION INTRAMUSCULAR; INTRAVENOUS at 14:44

## 2023-05-24 ASSESSMENT — PAIN DESCRIPTION - DESCRIPTORS
DESCRIPTORS: ACHING;DISCOMFORT
DESCRIPTORS: ACHING;DISCOMFORT

## 2023-05-24 ASSESSMENT — PAIN SCALES - GENERAL
PAINLEVEL_OUTOF10: 10
PAINLEVEL_OUTOF10: 9

## 2023-05-24 ASSESSMENT — PAIN DESCRIPTION - PAIN TYPE: TYPE: ACUTE PAIN

## 2023-05-24 ASSESSMENT — PAIN DESCRIPTION - ORIENTATION
ORIENTATION: RIGHT
ORIENTATION: RIGHT

## 2023-05-24 ASSESSMENT — PAIN DESCRIPTION - LOCATION
LOCATION: HIP
LOCATION: ARM;HIP

## 2023-05-24 ASSESSMENT — PAIN - FUNCTIONAL ASSESSMENT
PAIN_FUNCTIONAL_ASSESSMENT: PREVENTS OR INTERFERES WITH MANY ACTIVE NOT PASSIVE ACTIVITIES
PAIN_FUNCTIONAL_ASSESSMENT: 0-10

## 2023-05-24 ASSESSMENT — PAIN DESCRIPTION - FREQUENCY: FREQUENCY: CONTINUOUS

## 2023-05-24 NOTE — ED PROVIDER NOTES
Emergency Department Encounter  Location: [place] [facility] [address] [place] [address]    Patient: Stefaon Reyna  MRN: [id number]  : 1955  Date of evaluation: 2023  ED Provider: Yassine Anaya DO, FACEP    Chief Complaint:    Fall (C/o falling at 4:00 am , c/o pains to rt elbow and rt hip. Denies hitting head)    [place]:  Stefano Reyna is a 79 y.o. male that presents to the emergency department  by squad with plaints of falling about 4 AM.  He was unable to get up. He states he was sleeping on the couch got up. And states \"I fell over\". He landed on his right hip and right elbow. Is complaining of pain to his right hip and right elbow. He was not able to get up secondary to the pain that he is having. He presents to the emergency department squad with complaints of right hip pain right elbow pain. Patient takes 2 L of oxygen on an ongoing basis. He is requesting a breathing treatment. ROS - see HPI, below listed is current ROS at time of my eval:  At least 10 systems reviewed and otherwise acutely negative except as in the [address]   General:  No fevers, no chills, no weakness  Eyes:  No recent vison changes, no discharge  ENT:  No sore throat, no nasal congestion, no hearing changes  Cardiovascular:  No chest pain, no palpitations  Respiratory: Positive for shortness of breath, no cough, no wheezing  Gastrointestinal:  No pain, no nausea, no vomiting, no diarrhea  Musculoskeletal:  No muscle pain, no joint pain  Skin:  No rash, no pruritis, no easy bruising  Neurologic:  No speech problems, no headache, no extremity numbness, no extremity tingling, no extremity weakness  Psychiatric:  No anxiety  Genitourinary:  No dysuria, no hematuria  Endocrine:  No unexpected weight gain, no unexpected weight loss  Extremities:  no edema, positive for right hip pain and right elbow pain    Past Medical History:   Diagnosis Date    Arthritis     \"in my back and neck\"    Bacterial pneumonia 6/10/2016    Cervical

## 2023-05-25 ENCOUNTER — ANESTHESIA EVENT (OUTPATIENT)
Dept: OPERATING ROOM | Age: 68
End: 2023-05-25
Payer: MEDICARE

## 2023-05-25 ENCOUNTER — ANESTHESIA (OUTPATIENT)
Dept: OPERATING ROOM | Age: 68
End: 2023-05-25
Payer: MEDICARE

## 2023-05-25 ENCOUNTER — APPOINTMENT (OUTPATIENT)
Dept: GENERAL RADIOLOGY | Age: 68
DRG: 480 | End: 2023-05-25
Attending: INTERNAL MEDICINE
Payer: MEDICARE

## 2023-05-25 LAB
ANION GAP SERPL CALCULATED.3IONS-SCNC: 9 MMOL/L (ref 4–16)
BASOPHILS ABSOLUTE: 0 K/CU MM
BASOPHILS RELATIVE PERCENT: 0.3 % (ref 0–1)
BUN SERPL-MCNC: 11 MG/DL (ref 6–23)
CALCIUM SERPL-MCNC: 8.2 MG/DL (ref 8.3–10.6)
CHLORIDE BLD-SCNC: 89 MMOL/L (ref 99–110)
CO2: 38 MMOL/L (ref 21–32)
CREAT SERPL-MCNC: 0.4 MG/DL (ref 0.9–1.3)
DIFFERENTIAL TYPE: ABNORMAL
EKG ATRIAL RATE: 89 BPM
EKG ATRIAL RATE: 97 BPM
EKG DIAGNOSIS: NORMAL
EKG DIAGNOSIS: NORMAL
EKG P AXIS: 80 DEGREES
EKG P AXIS: 86 DEGREES
EKG P-R INTERVAL: 152 MS
EKG P-R INTERVAL: 160 MS
EKG Q-T INTERVAL: 344 MS
EKG Q-T INTERVAL: 354 MS
EKG QRS DURATION: 92 MS
EKG QRS DURATION: 94 MS
EKG QTC CALCULATION (BAZETT): 430 MS
EKG QTC CALCULATION (BAZETT): 436 MS
EKG R AXIS: -44 DEGREES
EKG R AXIS: -82 DEGREES
EKG T AXIS: 79 DEGREES
EKG T AXIS: 79 DEGREES
EKG VENTRICULAR RATE: 89 BPM
EKG VENTRICULAR RATE: 97 BPM
EOSINOPHILS ABSOLUTE: 0 K/CU MM
EOSINOPHILS RELATIVE PERCENT: 0.1 % (ref 0–3)
FOLATE SERPL-MCNC: >20 NG/ML (ref 3.1–17.5)
GFR SERPL CREATININE-BSD FRML MDRD: >60 ML/MIN/1.73M2
GLUCOSE BLD-MCNC: 121 MG/DL (ref 70–99)
GLUCOSE BLD-MCNC: 59 MG/DL (ref 70–99)
GLUCOSE SERPL-MCNC: 57 MG/DL (ref 70–99)
HCT VFR BLD CALC: 39.1 % (ref 42–52)
HEMOGLOBIN: 12 GM/DL (ref 13.5–18)
IMMATURE NEUTROPHIL %: 0.2 % (ref 0–0.43)
INR BLD: 0.89 INDEX
LYMPHOCYTES ABSOLUTE: 0.7 K/CU MM
LYMPHOCYTES RELATIVE PERCENT: 7.4 % (ref 24–44)
MCH RBC QN AUTO: 29.6 PG (ref 27–31)
MCHC RBC AUTO-ENTMCNC: 30.7 % (ref 32–36)
MCV RBC AUTO: 96.3 FL (ref 78–100)
MONOCYTES ABSOLUTE: 1.1 K/CU MM
MONOCYTES RELATIVE PERCENT: 11.3 % (ref 0–4)
NUCLEATED RBC %: 0 %
PDW BLD-RTO: 13.4 % (ref 11.7–14.9)
PLATELET # BLD: 182 K/CU MM (ref 140–440)
PMV BLD AUTO: 9.7 FL (ref 7.5–11.1)
POTASSIUM SERPL-SCNC: 5.4 MMOL/L (ref 3.5–5.1)
PROTHROMBIN TIME: 11.3 SECONDS (ref 11.7–14.5)
RBC # BLD: 4.06 M/CU MM (ref 4.6–6.2)
SARS-COV-2 RDRP RESP QL NAA+PROBE: NOT DETECTED
SEGMENTED NEUTROPHILS ABSOLUTE COUNT: 7.6 K/CU MM
SEGMENTED NEUTROPHILS RELATIVE PERCENT: 80.7 % (ref 36–66)
SODIUM BLD-SCNC: 136 MMOL/L (ref 135–145)
SOURCE: NORMAL
TOTAL IMMATURE NEUTOROPHIL: 0.02 K/CU MM
TOTAL NUCLEATED RBC: 0 K/CU MM
TSH SERPL DL<=0.005 MIU/L-ACNC: 0.72 UIU/ML (ref 0.27–4.2)
VITAMIN B-12: 587.1 PG/ML (ref 211–911)
WBC # BLD: 9.4 K/CU MM (ref 4–10.5)

## 2023-05-25 PROCEDURE — 3700000001 HC ADD 15 MINUTES (ANESTHESIA): Performed by: STUDENT IN AN ORGANIZED HEALTH CARE EDUCATION/TRAINING PROGRAM

## 2023-05-25 PROCEDURE — 94761 N-INVAS EAR/PLS OXIMETRY MLT: CPT

## 2023-05-25 PROCEDURE — 6370000000 HC RX 637 (ALT 250 FOR IP): Performed by: STUDENT IN AN ORGANIZED HEALTH CARE EDUCATION/TRAINING PROGRAM

## 2023-05-25 PROCEDURE — 2500000003 HC RX 250 WO HCPCS: Performed by: ANESTHESIOLOGY

## 2023-05-25 PROCEDURE — 6360000002 HC RX W HCPCS: Performed by: STUDENT IN AN ORGANIZED HEALTH CARE EDUCATION/TRAINING PROGRAM

## 2023-05-25 PROCEDURE — 27235 TREAT THIGH FRACTURE: CPT | Performed by: STUDENT IN AN ORGANIZED HEALTH CARE EDUCATION/TRAINING PROGRAM

## 2023-05-25 PROCEDURE — 36415 COLL VENOUS BLD VENIPUNCTURE: CPT

## 2023-05-25 PROCEDURE — 3600000014 HC SURGERY LEVEL 4 ADDTL 15MIN: Performed by: STUDENT IN AN ORGANIZED HEALTH CARE EDUCATION/TRAINING PROGRAM

## 2023-05-25 PROCEDURE — C1713 ANCHOR/SCREW BN/BN,TIS/BN: HCPCS | Performed by: STUDENT IN AN ORGANIZED HEALTH CARE EDUCATION/TRAINING PROGRAM

## 2023-05-25 PROCEDURE — 87635 SARS-COV-2 COVID-19 AMP PRB: CPT

## 2023-05-25 PROCEDURE — 2700000000 HC OXYGEN THERAPY PER DAY

## 2023-05-25 PROCEDURE — 2580000003 HC RX 258: Performed by: STUDENT IN AN ORGANIZED HEALTH CARE EDUCATION/TRAINING PROGRAM

## 2023-05-25 PROCEDURE — 94640 AIRWAY INHALATION TREATMENT: CPT

## 2023-05-25 PROCEDURE — 82746 ASSAY OF FOLIC ACID SERUM: CPT

## 2023-05-25 PROCEDURE — 2720000010 HC SURG SUPPLY STERILE: Performed by: STUDENT IN AN ORGANIZED HEALTH CARE EDUCATION/TRAINING PROGRAM

## 2023-05-25 PROCEDURE — 85610 PROTHROMBIN TIME: CPT

## 2023-05-25 PROCEDURE — 80048 BASIC METABOLIC PNL TOTAL CA: CPT

## 2023-05-25 PROCEDURE — 7100000000 HC PACU RECOVERY - FIRST 15 MIN: Performed by: STUDENT IN AN ORGANIZED HEALTH CARE EDUCATION/TRAINING PROGRAM

## 2023-05-25 PROCEDURE — 2580000003 HC RX 258: Performed by: FAMILY MEDICINE

## 2023-05-25 PROCEDURE — 93010 ELECTROCARDIOGRAM REPORT: CPT | Performed by: INTERNAL MEDICINE

## 2023-05-25 PROCEDURE — 0W993ZZ DRAINAGE OF RIGHT PLEURAL CAVITY, PERCUTANEOUS APPROACH: ICD-10-PCS | Performed by: STUDENT IN AN ORGANIZED HEALTH CARE EDUCATION/TRAINING PROGRAM

## 2023-05-25 PROCEDURE — 85025 COMPLETE CBC W/AUTO DIFF WBC: CPT

## 2023-05-25 PROCEDURE — P9045 ALBUMIN (HUMAN), 5%, 250 ML: HCPCS

## 2023-05-25 PROCEDURE — 2500000003 HC RX 250 WO HCPCS: Performed by: STUDENT IN AN ORGANIZED HEALTH CARE EDUCATION/TRAINING PROGRAM

## 2023-05-25 PROCEDURE — 99211 OFF/OP EST MAY X REQ PHY/QHP: CPT

## 2023-05-25 PROCEDURE — 3600000004 HC SURGERY LEVEL 4 BASE: Performed by: STUDENT IN AN ORGANIZED HEALTH CARE EDUCATION/TRAINING PROGRAM

## 2023-05-25 PROCEDURE — 76000 FLUOROSCOPY <1 HR PHYS/QHP: CPT

## 2023-05-25 PROCEDURE — 6370000000 HC RX 637 (ALT 250 FOR IP): Performed by: INTERNAL MEDICINE

## 2023-05-25 PROCEDURE — 2709999900 HC NON-CHARGEABLE SUPPLY: Performed by: STUDENT IN AN ORGANIZED HEALTH CARE EDUCATION/TRAINING PROGRAM

## 2023-05-25 PROCEDURE — 6360000002 HC RX W HCPCS

## 2023-05-25 PROCEDURE — 82962 GLUCOSE BLOOD TEST: CPT

## 2023-05-25 PROCEDURE — 2060000000 HC ICU INTERMEDIATE R&B

## 2023-05-25 PROCEDURE — 2580000003 HC RX 258

## 2023-05-25 PROCEDURE — 7100000001 HC PACU RECOVERY - ADDTL 15 MIN: Performed by: STUDENT IN AN ORGANIZED HEALTH CARE EDUCATION/TRAINING PROGRAM

## 2023-05-25 PROCEDURE — 84443 ASSAY THYROID STIM HORMONE: CPT

## 2023-05-25 PROCEDURE — 2500000003 HC RX 250 WO HCPCS

## 2023-05-25 PROCEDURE — 82607 VITAMIN B-12: CPT

## 2023-05-25 PROCEDURE — 3700000000 HC ANESTHESIA ATTENDED CARE: Performed by: STUDENT IN AN ORGANIZED HEALTH CARE EDUCATION/TRAINING PROGRAM

## 2023-05-25 DEVICE — IMPLANTABLE DEVICE: Type: IMPLANTABLE DEVICE | Site: HIP | Status: FUNCTIONAL

## 2023-05-25 DEVICE — SCREW BNE L100MM L16MM OD75MM ODSEC82MM STD BIODUR CANC ST: Type: IMPLANTABLE DEVICE | Site: HIP | Status: FUNCTIONAL

## 2023-05-25 RX ORDER — ASPIRIN 325 MG
325 TABLET, DELAYED RELEASE (ENTERIC COATED) ORAL 2 TIMES DAILY
Status: DISCONTINUED | OUTPATIENT
Start: 2023-05-25 | End: 2023-06-02 | Stop reason: HOSPADM

## 2023-05-25 RX ORDER — ROCURONIUM BROMIDE 10 MG/ML
INJECTION, SOLUTION INTRAVENOUS PRN
Status: DISCONTINUED | OUTPATIENT
Start: 2023-05-25 | End: 2023-05-25 | Stop reason: SDUPTHER

## 2023-05-25 RX ORDER — MIDAZOLAM HYDROCHLORIDE 1 MG/ML
INJECTION INTRAMUSCULAR; INTRAVENOUS PRN
Status: DISCONTINUED | OUTPATIENT
Start: 2023-05-25 | End: 2023-05-25 | Stop reason: SDUPTHER

## 2023-05-25 RX ORDER — SODIUM CHLORIDE 9 MG/ML
INJECTION, SOLUTION INTRAVENOUS PRN
Status: DISCONTINUED | OUTPATIENT
Start: 2023-05-25 | End: 2023-06-02 | Stop reason: HOSPADM

## 2023-05-25 RX ORDER — SODIUM CHLORIDE 0.9 % (FLUSH) 0.9 %
5-40 SYRINGE (ML) INJECTION EVERY 12 HOURS SCHEDULED
Status: DISCONTINUED | OUTPATIENT
Start: 2023-05-25 | End: 2023-06-02 | Stop reason: HOSPADM

## 2023-05-25 RX ORDER — FLUMAZENIL 0.1 MG/ML
0.2 INJECTION INTRAVENOUS EVERY 5 MIN PRN
Status: DISCONTINUED | OUTPATIENT
Start: 2023-05-25 | End: 2023-06-02 | Stop reason: HOSPADM

## 2023-05-25 RX ORDER — SODIUM CHLORIDE, SODIUM LACTATE, POTASSIUM CHLORIDE, CALCIUM CHLORIDE 600; 310; 30; 20 MG/100ML; MG/100ML; MG/100ML; MG/100ML
INJECTION, SOLUTION INTRAVENOUS CONTINUOUS
Status: DISCONTINUED | OUTPATIENT
Start: 2023-05-25 | End: 2023-05-25 | Stop reason: HOSPADM

## 2023-05-25 RX ORDER — SODIUM CHLORIDE 9 MG/ML
INJECTION, SOLUTION INTRAVENOUS PRN
Status: DISCONTINUED | OUTPATIENT
Start: 2023-05-25 | End: 2023-05-25 | Stop reason: HOSPADM

## 2023-05-25 RX ORDER — OXYCODONE HYDROCHLORIDE 10 MG/1
10 TABLET ORAL EVERY 4 HOURS PRN
Status: DISCONTINUED | OUTPATIENT
Start: 2023-05-25 | End: 2023-06-02 | Stop reason: HOSPADM

## 2023-05-25 RX ORDER — FLUMAZENIL 0.1 MG/ML
INJECTION INTRAVENOUS
Status: COMPLETED
Start: 2023-05-25 | End: 2023-05-25

## 2023-05-25 RX ORDER — FLUMAZENIL 0.1 MG/ML
0.2 INJECTION INTRAVENOUS EVERY 5 MIN PRN
Status: DISCONTINUED | OUTPATIENT
Start: 2023-05-25 | End: 2023-05-25

## 2023-05-25 RX ORDER — MEPERIDINE HYDROCHLORIDE 25 MG/ML
12.5 INJECTION INTRAMUSCULAR; INTRAVENOUS; SUBCUTANEOUS EVERY 5 MIN PRN
Status: DISCONTINUED | OUTPATIENT
Start: 2023-05-25 | End: 2023-05-25

## 2023-05-25 RX ORDER — BUPIVACAINE HYDROCHLORIDE 7.5 MG/ML
INJECTION, SOLUTION INTRASPINAL
Status: COMPLETED | OUTPATIENT
Start: 2023-05-25 | End: 2023-05-25

## 2023-05-25 RX ORDER — SODIUM CHLORIDE 0.9 % (FLUSH) 0.9 %
5-40 SYRINGE (ML) INJECTION PRN
Status: DISCONTINUED | OUTPATIENT
Start: 2023-05-25 | End: 2023-05-25

## 2023-05-25 RX ORDER — SODIUM CHLORIDE, SODIUM LACTATE, POTASSIUM CHLORIDE, CALCIUM CHLORIDE 600; 310; 30; 20 MG/100ML; MG/100ML; MG/100ML; MG/100ML
INJECTION, SOLUTION INTRAVENOUS CONTINUOUS
Status: DISCONTINUED | OUTPATIENT
Start: 2023-05-25 | End: 2023-05-28

## 2023-05-25 RX ORDER — SODIUM CHLORIDE 0.9 % (FLUSH) 0.9 %
5-40 SYRINGE (ML) INJECTION PRN
Status: DISCONTINUED | OUTPATIENT
Start: 2023-05-25 | End: 2023-06-02 | Stop reason: HOSPADM

## 2023-05-25 RX ORDER — IPRATROPIUM BROMIDE AND ALBUTEROL SULFATE 2.5; .5 MG/3ML; MG/3ML
1 SOLUTION RESPIRATORY (INHALATION)
Status: DISCONTINUED | OUTPATIENT
Start: 2023-05-25 | End: 2023-05-25

## 2023-05-25 RX ORDER — SODIUM CHLORIDE 9 MG/ML
INJECTION, SOLUTION INTRAVENOUS PRN
Status: DISCONTINUED | OUTPATIENT
Start: 2023-05-25 | End: 2023-05-25

## 2023-05-25 RX ORDER — PROPOFOL 10 MG/ML
INJECTION, EMULSION INTRAVENOUS PRN
Status: DISCONTINUED | OUTPATIENT
Start: 2023-05-25 | End: 2023-05-25 | Stop reason: SDUPTHER

## 2023-05-25 RX ORDER — OXYCODONE HYDROCHLORIDE 5 MG/1
5 TABLET ORAL
Status: DISCONTINUED | OUTPATIENT
Start: 2023-05-25 | End: 2023-05-25

## 2023-05-25 RX ORDER — ALBUMIN, HUMAN INJ 5% 5 %
SOLUTION INTRAVENOUS
Status: COMPLETED
Start: 2023-05-25 | End: 2023-05-25

## 2023-05-25 RX ORDER — SODIUM CHLORIDE 0.9 % (FLUSH) 0.9 %
5-40 SYRINGE (ML) INJECTION EVERY 12 HOURS SCHEDULED
Status: DISCONTINUED | OUTPATIENT
Start: 2023-05-25 | End: 2023-05-25

## 2023-05-25 RX ORDER — PHENYLEPHRINE HYDROCHLORIDE 10 MG/ML
INJECTION INTRAVENOUS
Status: DISPENSED
Start: 2023-05-25 | End: 2023-05-26

## 2023-05-25 RX ORDER — OXYCODONE HYDROCHLORIDE 10 MG/1
5 TABLET ORAL EVERY 4 HOURS PRN
Status: DISCONTINUED | OUTPATIENT
Start: 2023-05-25 | End: 2023-06-02 | Stop reason: HOSPADM

## 2023-05-25 RX ORDER — ENOXAPARIN SODIUM 100 MG/ML
30 INJECTION SUBCUTANEOUS DAILY
Status: DISCONTINUED | OUTPATIENT
Start: 2023-05-26 | End: 2023-05-27

## 2023-05-25 RX ORDER — SODIUM CHLORIDE 0.9 % (FLUSH) 0.9 %
5-40 SYRINGE (ML) INJECTION EVERY 12 HOURS SCHEDULED
Status: DISCONTINUED | OUTPATIENT
Start: 2023-05-25 | End: 2023-05-25 | Stop reason: HOSPADM

## 2023-05-25 RX ORDER — DEXTROSE MONOHYDRATE 100 MG/ML
INJECTION, SOLUTION INTRAVENOUS CONTINUOUS PRN
Status: DISCONTINUED | OUTPATIENT
Start: 2023-05-25 | End: 2023-06-02 | Stop reason: HOSPADM

## 2023-05-25 RX ORDER — PHENYLEPHRINE HCL IN 0.9% NACL 1 MG/10 ML
SYRINGE (ML) INTRAVENOUS PRN
Status: DISCONTINUED | OUTPATIENT
Start: 2023-05-25 | End: 2023-05-25 | Stop reason: SDUPTHER

## 2023-05-25 RX ORDER — ENOXAPARIN SODIUM 100 MG/ML
40 INJECTION SUBCUTANEOUS DAILY
Status: DISCONTINUED | OUTPATIENT
Start: 2023-05-26 | End: 2023-05-25 | Stop reason: SDUPTHER

## 2023-05-25 RX ORDER — GLUCAGON 1 MG/ML
1 KIT INJECTION PRN
Status: DISCONTINUED | OUTPATIENT
Start: 2023-05-25 | End: 2023-06-02 | Stop reason: HOSPADM

## 2023-05-25 RX ORDER — EPHEDRINE SULFATE 50 MG/ML
INJECTION INTRAVENOUS PRN
Status: DISCONTINUED | OUTPATIENT
Start: 2023-05-25 | End: 2023-05-25 | Stop reason: SDUPTHER

## 2023-05-25 RX ORDER — ACETAMINOPHEN 325 MG/1
650 TABLET ORAL EVERY 4 HOURS PRN
Status: DISCONTINUED | OUTPATIENT
Start: 2023-05-25 | End: 2023-06-02 | Stop reason: HOSPADM

## 2023-05-25 RX ORDER — OXYCODONE HYDROCHLORIDE 5 MG/1
10 TABLET ORAL PRN
Status: DISCONTINUED | OUTPATIENT
Start: 2023-05-25 | End: 2023-05-25

## 2023-05-25 RX ORDER — ALBUMIN, HUMAN INJ 5% 5 %
12.5 SOLUTION INTRAVENOUS ONCE
Status: COMPLETED | OUTPATIENT
Start: 2023-05-25 | End: 2023-05-25

## 2023-05-25 RX ORDER — SODIUM CHLORIDE 0.9 % (FLUSH) 0.9 %
5-40 SYRINGE (ML) INJECTION PRN
Status: DISCONTINUED | OUTPATIENT
Start: 2023-05-25 | End: 2023-05-25 | Stop reason: HOSPADM

## 2023-05-25 RX ORDER — BUPIVACAINE HYDROCHLORIDE 2.5 MG/ML
INJECTION, SOLUTION EPIDURAL; INFILTRATION; INTRACAUDAL
Status: COMPLETED | OUTPATIENT
Start: 2023-05-25 | End: 2023-05-25

## 2023-05-25 RX ORDER — ACETAMINOPHEN 500 MG
1000 TABLET ORAL ONCE
Status: COMPLETED | OUTPATIENT
Start: 2023-05-25 | End: 2023-05-25

## 2023-05-25 RX ADMIN — BUDESONIDE AND FORMOTEROL FUMARATE DIHYDRATE 2 PUFF: 160; 4.5 AEROSOL RESPIRATORY (INHALATION) at 20:03

## 2023-05-25 RX ADMIN — IPRATROPIUM BROMIDE 2 PUFF: 17 AEROSOL, METERED RESPIRATORY (INHALATION) at 00:25

## 2023-05-25 RX ADMIN — SODIUM CHLORIDE, PRESERVATIVE FREE 10 ML: 5 INJECTION INTRAVENOUS at 09:07

## 2023-05-25 RX ADMIN — PROPOFOL 200 MG: 10 INJECTION, EMULSION INTRAVENOUS at 11:18

## 2023-05-25 RX ADMIN — ALBUTEROL SULFATE 2 PUFF: 90 AEROSOL, METERED RESPIRATORY (INHALATION) at 00:24

## 2023-05-25 RX ADMIN — IPRATROPIUM BROMIDE 2 PUFF: 17 AEROSOL, METERED RESPIRATORY (INHALATION) at 04:29

## 2023-05-25 RX ADMIN — EPHEDRINE SULFATE 30 MG: 50 INJECTION INTRAVENOUS at 12:08

## 2023-05-25 RX ADMIN — IPRATROPIUM BROMIDE 2 PUFF: 17 AEROSOL, METERED RESPIRATORY (INHALATION) at 23:26

## 2023-05-25 RX ADMIN — PROPOFOL 100 MG: 10 INJECTION, EMULSION INTRAVENOUS at 10:53

## 2023-05-25 RX ADMIN — ROCURONIUM BROMIDE 50 MG: 10 INJECTION INTRAVENOUS at 11:18

## 2023-05-25 RX ADMIN — SODIUM CHLORIDE, PRESERVATIVE FREE 10 ML: 5 INJECTION INTRAVENOUS at 21:11

## 2023-05-25 RX ADMIN — SUGAMMADEX 200 MG: 100 INJECTION, SOLUTION INTRAVENOUS at 11:47

## 2023-05-25 RX ADMIN — FLUMAZENIL 0.2 MG: 0.1 INJECTION, SOLUTION INTRAVENOUS at 13:08

## 2023-05-25 RX ADMIN — ALBUMIN (HUMAN) 12.5 G: 12.5 INJECTION, SOLUTION INTRAVENOUS at 14:12

## 2023-05-25 RX ADMIN — MIDAZOLAM 4 MG: 1 INJECTION INTRAMUSCULAR; INTRAVENOUS at 10:45

## 2023-05-25 RX ADMIN — BUDESONIDE AND FORMOTEROL FUMARATE DIHYDRATE 2 PUFF: 160; 4.5 AEROSOL RESPIRATORY (INHALATION) at 07:43

## 2023-05-25 RX ADMIN — EPHEDRINE SULFATE 20 MG: 50 INJECTION INTRAVENOUS at 12:05

## 2023-05-25 RX ADMIN — IPRATROPIUM BROMIDE 2 PUFF: 17 AEROSOL, METERED RESPIRATORY (INHALATION) at 15:41

## 2023-05-25 RX ADMIN — DEXTROSE MONOHYDRATE 125 ML: 100 INJECTION, SOLUTION INTRAVENOUS at 06:53

## 2023-05-25 RX ADMIN — PHENYLEPHRINE HYDROCHLORIDE 150 MCG/MIN: 10 INJECTION INTRAVENOUS at 11:35

## 2023-05-25 RX ADMIN — IPRATROPIUM BROMIDE 2 PUFF: 17 AEROSOL, METERED RESPIRATORY (INHALATION) at 07:42

## 2023-05-25 RX ADMIN — ALBUTEROL SULFATE 2 PUFF: 90 AEROSOL, METERED RESPIRATORY (INHALATION) at 20:01

## 2023-05-25 RX ADMIN — ALBUMIN, HUMAN INJ 5% 12.5 G: 5 SOLUTION at 14:12

## 2023-05-25 RX ADMIN — SODIUM CHLORIDE, POTASSIUM CHLORIDE, SODIUM LACTATE AND CALCIUM CHLORIDE: 600; 310; 30; 20 INJECTION, SOLUTION INTRAVENOUS at 18:47

## 2023-05-25 RX ADMIN — ALBUTEROL SULFATE 2 PUFF: 90 AEROSOL, METERED RESPIRATORY (INHALATION) at 07:42

## 2023-05-25 RX ADMIN — PROPOFOL 150 MCG/KG/MIN: 10 INJECTION, EMULSION INTRAVENOUS at 10:51

## 2023-05-25 RX ADMIN — OXYCODONE HYDROCHLORIDE 10 MG: 10 TABLET ORAL at 00:33

## 2023-05-25 RX ADMIN — CEFAZOLIN 1000 MG: 1 INJECTION, POWDER, FOR SOLUTION INTRAMUSCULAR; INTRAVENOUS; PARENTERAL at 10:57

## 2023-05-25 RX ADMIN — IPRATROPIUM BROMIDE 2 PUFF: 17 AEROSOL, METERED RESPIRATORY (INHALATION) at 20:02

## 2023-05-25 RX ADMIN — ACETAMINOPHEN 1000 MG: 500 TABLET ORAL at 09:06

## 2023-05-25 RX ADMIN — SODIUM CHLORIDE, POTASSIUM CHLORIDE, SODIUM LACTATE AND CALCIUM CHLORIDE: 600; 310; 30; 20 INJECTION, SOLUTION INTRAVENOUS at 09:13

## 2023-05-25 RX ADMIN — CEFAZOLIN 2000 MG: 2 INJECTION, POWDER, FOR SOLUTION INTRAMUSCULAR; INTRAVENOUS at 18:49

## 2023-05-25 RX ADMIN — Medication 500 MCG: at 11:18

## 2023-05-25 RX ADMIN — ASPIRIN 325 MG: 325 TABLET, COATED ORAL at 21:08

## 2023-05-25 RX ADMIN — ALBUTEROL SULFATE 2 PUFF: 90 AEROSOL, METERED RESPIRATORY (INHALATION) at 04:29

## 2023-05-25 RX ADMIN — BUPIVACAINE HYDROCHLORIDE IN DEXTROSE 15 MG: 7.5 INJECTION, SOLUTION SUBARACHNOID at 10:50

## 2023-05-25 RX ADMIN — FLUMAZENIL 0.2 MG: 0.1 INJECTION, SOLUTION INTRAVENOUS at 13:26

## 2023-05-25 RX ADMIN — SODIUM CHLORIDE, PRESERVATIVE FREE 10 ML: 5 INJECTION INTRAVENOUS at 09:08

## 2023-05-25 RX ADMIN — ALBUTEROL SULFATE 2 PUFF: 90 AEROSOL, METERED RESPIRATORY (INHALATION) at 15:41

## 2023-05-25 RX ADMIN — ALBUTEROL SULFATE 2 PUFF: 90 AEROSOL, METERED RESPIRATORY (INHALATION) at 23:25

## 2023-05-25 RX ADMIN — OXYCODONE HYDROCHLORIDE 10 MG: 10 TABLET ORAL at 18:42

## 2023-05-25 ASSESSMENT — PAIN DESCRIPTION - ORIENTATION
ORIENTATION: RIGHT
ORIENTATION: RIGHT

## 2023-05-25 ASSESSMENT — PAIN DESCRIPTION - FREQUENCY: FREQUENCY: CONTINUOUS

## 2023-05-25 ASSESSMENT — PAIN - FUNCTIONAL ASSESSMENT
PAIN_FUNCTIONAL_ASSESSMENT: 0-10
PAIN_FUNCTIONAL_ASSESSMENT: PREVENTS OR INTERFERES WITH MANY ACTIVE NOT PASSIVE ACTIVITIES
PAIN_FUNCTIONAL_ASSESSMENT: PREVENTS OR INTERFERES SOME ACTIVE ACTIVITIES AND ADLS

## 2023-05-25 ASSESSMENT — PAIN DESCRIPTION - LOCATION
LOCATION: HIP
LOCATION: HIP

## 2023-05-25 ASSESSMENT — ENCOUNTER SYMPTOMS
COLOR CHANGE: 0
VOMITING: 0
NAUSEA: 0
WHEEZING: 0
BACK PAIN: 0
SHORTNESS OF BREATH: 0
COUGH: 0
VOICE CHANGE: 0
SORE THROAT: 0

## 2023-05-25 ASSESSMENT — PAIN DESCRIPTION - DESCRIPTORS
DESCRIPTORS: SHARP;DISCOMFORT
DESCRIPTORS: SHARP;ACHING

## 2023-05-25 ASSESSMENT — PAIN DESCRIPTION - PAIN TYPE: TYPE: SURGICAL PAIN;ACUTE PAIN

## 2023-05-25 ASSESSMENT — LIFESTYLE VARIABLES: SMOKING_STATUS: 1

## 2023-05-25 ASSESSMENT — PAIN SCALES - GENERAL
PAINLEVEL_OUTOF10: 7
PAINLEVEL_OUTOF10: 9
PAINLEVEL_OUTOF10: 2

## 2023-05-25 ASSESSMENT — PAIN DESCRIPTION - ONSET: ONSET: ON-GOING

## 2023-05-25 ASSESSMENT — COPD QUESTIONNAIRES: CAT_SEVERITY: SEVERE

## 2023-05-25 NOTE — ANESTHESIA PROCEDURE NOTES
Spinal Block    End time: 5/25/2023 10:52 AM  Reason for block: primary anesthetic and at surgeon's request  Staffing  Performed: resident/CRNA   Resident/CRNA: HEMALATHA Joyner CRNA  Spinal Block  Patient position: left lateral decubitus  Prep: ChloraPrep  Patient monitoring: cardiac monitor, continuous pulse ox, continuous capnometry, frequent blood pressure checks and oxygen  Approach: midline  Location: L4/L5  Provider prep: mask and sterile gloves  Local infiltration: lidocaine  Needle  Needle type: Pencan   Needle gauge: 22 G  Needle length: 4 in  Assessment  Sensory level: T8  Swirl obtained: Yes  CSF: clear  Attempts: 1  Hemodynamics: stable  Preanesthetic Checklist  Completed: patient identified, IV checked, site marked, risks and benefits discussed, surgical/procedural consents, equipment checked, pre-op evaluation, timeout performed, anesthesia consent given, oxygen available, monitors applied/VS acknowledged, fire risk safety assessment completed and verbalized and blood product R/B/A discussed and consented

## 2023-05-25 NOTE — ANESTHESIA PRE PROCEDURE
Results   Component Value Date/Time     05/25/2023 04:52 AM    K 5.4 05/25/2023 04:52 AM    CL 89 05/25/2023 04:52 AM    CO2 38 05/25/2023 04:52 AM    BUN 11 05/25/2023 04:52 AM    CREATININE 0.4 05/25/2023 04:52 AM    GFRAA >60 05/23/2022 01:39 PM    LABGLOM >60 05/25/2023 04:52 AM    GLUCOSE 57 05/25/2023 04:52 AM    PROT 6.1 05/24/2023 02:30 PM    PROT 5.7 02/21/2012 09:00 AM    CALCIUM 8.2 05/25/2023 04:52 AM    BILITOT 0.3 05/24/2023 02:30 PM    ALKPHOS 57 05/24/2023 02:30 PM    AST 21 05/24/2023 02:30 PM    ALT 24 05/24/2023 02:30 PM       POC Tests:   Recent Labs     05/25/23  0722   POCGLU 121*       Coags:   Lab Results   Component Value Date/Time    PROTIME 11.3 05/25/2023 04:52 AM    INR 0.89 05/25/2023 04:52 AM       HCG (If Applicable): No results found for: PREGTESTUR, PREGSERUM, HCG, HCGQUANT     ABGs:   Lab Results   Component Value Date/Time    PO2ART 50 03/30/2023 12:15 PM    ICK0ULF 41.0 03/30/2023 12:15 PM    SQY0NOM 46.2 03/30/2023 12:15 PM        Type & Screen (If Applicable):  No results found for: LABABO, [address]    Anesthesia Evaluation  Patient summary reviewed  Airway: Mallampati: II  TM distance: >3 FB   Neck ROM: full  Comment: MRI showed  Disc protrusion.  Severe spinal stenosis at C3-C4  Mouth opening: > = 3 FB   Dental:    (+) edentulous      Pulmonary:   (+) pneumonia:  COPD: severe,  decreased breath sounds current smoker                          ROS comment: 3 l o2  cigs and thc    Cardiovascular:  Exercise tolerance: poor (<4 METS),   (+) pulmonary hypertension: severe,       ECG reviewed  Rhythm: regular  Rate: normal  Echocardiogram reviewed         Beta Blocker:  Not on Beta Blocker      ROS comment:   Sinus rhythm with fusion complexes   Left axis deviation   Incomplete right bundle branch block   Abnormal ECG   When compared with ECG of 24-MAY-2023 16:45,   fusion complexes are now present   premature ventricular complexes are no longer present  5/25/23     Summary

## 2023-05-25 NOTE — ANESTHESIA POSTPROCEDURE EVALUATION
Department of Anesthesiology  Postprocedure Note    Patient: Avelino Trent  MRN: [id number]  [place]: 1955  Date of evaluation: 5/25/2023      Procedure Summary     Date: 05/25/23 Room / Location: [address] [place] [address]    Anesthesia Start: 1042 Anesthesia Stop: 1210    Procedure: RIGHT HIP PINNING (Right: Hip) Diagnosis:       Closed right hip fracture, initial encounter ([address]      (Closed right hip fracture, initial encounter ([facility] Eugene Mandujano)    Surgeons: Rachell Dela Cruz DO Responsible Provider: Susan Mcwilliams MD    Anesthesia Type: General, Spinal ASA Status: 4          Anesthesia Type: General, Spinal    Mi Phase I: Mi Score: 8    Mi Phase II:        Anesthesia Post Evaluation    Patient location during evaluation: PACU  Patient participation: complete - patient participated  Level of consciousness: awake and alert  Airway patency: patent  Nausea & Vomiting: no nausea and no vomiting  Complications: no  Cardiovascular status: hemodynamically stable  Respiratory status: acceptable  Hydration status: euvolemic  Comments: Patient very sedate in PACU. Flumazenil 0.5 mg given in divided doses, and patient is arousable, responds appropriately to questions, and is asking appropriate questions. Patient was also somewhat hypotensive, but responded well to an albumin bolus.

## 2023-05-26 LAB
HCT VFR BLD CALC: 38.7 % (ref 42–52)
HEMOGLOBIN: 11.2 GM/DL (ref 13.5–18)

## 2023-05-26 PROCEDURE — 94761 N-INVAS EAR/PLS OXIMETRY MLT: CPT

## 2023-05-26 PROCEDURE — 97530 THERAPEUTIC ACTIVITIES: CPT

## 2023-05-26 PROCEDURE — 85018 HEMOGLOBIN: CPT

## 2023-05-26 PROCEDURE — 97166 OT EVAL MOD COMPLEX 45 MIN: CPT

## 2023-05-26 PROCEDURE — 2580000003 HC RX 258: Performed by: STUDENT IN AN ORGANIZED HEALTH CARE EDUCATION/TRAINING PROGRAM

## 2023-05-26 PROCEDURE — 85014 HEMATOCRIT: CPT

## 2023-05-26 PROCEDURE — 97162 PT EVAL MOD COMPLEX 30 MIN: CPT

## 2023-05-26 PROCEDURE — 6370000000 HC RX 637 (ALT 250 FOR IP): Performed by: STUDENT IN AN ORGANIZED HEALTH CARE EDUCATION/TRAINING PROGRAM

## 2023-05-26 PROCEDURE — 2060000000 HC ICU INTERMEDIATE R&B

## 2023-05-26 PROCEDURE — 6360000002 HC RX W HCPCS: Performed by: STUDENT IN AN ORGANIZED HEALTH CARE EDUCATION/TRAINING PROGRAM

## 2023-05-26 PROCEDURE — 94640 AIRWAY INHALATION TREATMENT: CPT

## 2023-05-26 PROCEDURE — 2700000000 HC OXYGEN THERAPY PER DAY

## 2023-05-26 PROCEDURE — 94664 DEMO&/EVAL PT USE INHALER: CPT

## 2023-05-26 PROCEDURE — 36415 COLL VENOUS BLD VENIPUNCTURE: CPT

## 2023-05-26 RX ORDER — IPRATROPIUM BROMIDE AND ALBUTEROL SULFATE 2.5; .5 MG/3ML; MG/3ML
1 SOLUTION RESPIRATORY (INHALATION)
Status: DISCONTINUED | OUTPATIENT
Start: 2023-05-26 | End: 2023-06-02 | Stop reason: HOSPADM

## 2023-05-26 RX ORDER — IPRATROPIUM BROMIDE AND ALBUTEROL SULFATE 2.5; .5 MG/3ML; MG/3ML
1 SOLUTION RESPIRATORY (INHALATION) EVERY 4 HOURS PRN
Status: DISCONTINUED | OUTPATIENT
Start: 2023-05-26 | End: 2023-05-26

## 2023-05-26 RX ORDER — ALBUTEROL SULFATE 90 UG/1
2 AEROSOL, METERED RESPIRATORY (INHALATION) EVERY 4 HOURS PRN
Status: DISCONTINUED | OUTPATIENT
Start: 2023-05-26 | End: 2023-06-02 | Stop reason: HOSPADM

## 2023-05-26 RX ORDER — PREDNISONE 10 MG/1
40 TABLET ORAL DAILY
Status: COMPLETED | OUTPATIENT
Start: 2023-05-26 | End: 2023-05-30

## 2023-05-26 RX ADMIN — SODIUM CHLORIDE, POTASSIUM CHLORIDE, SODIUM LACTATE AND CALCIUM CHLORIDE: 600; 310; 30; 20 INJECTION, SOLUTION INTRAVENOUS at 03:02

## 2023-05-26 RX ADMIN — IPRATROPIUM BROMIDE AND ALBUTEROL SULFATE 1 AMPULE: .5; 3 SOLUTION RESPIRATORY (INHALATION) at 21:19

## 2023-05-26 RX ADMIN — ASPIRIN 325 MG: 325 TABLET, COATED ORAL at 21:40

## 2023-05-26 RX ADMIN — Medication 100 MG: at 08:40

## 2023-05-26 RX ADMIN — IPRATROPIUM BROMIDE AND ALBUTEROL SULFATE 1 AMPULE: .5; 3 SOLUTION RESPIRATORY (INHALATION) at 08:27

## 2023-05-26 RX ADMIN — ASPIRIN 325 MG: 325 TABLET, COATED ORAL at 08:40

## 2023-05-26 RX ADMIN — MULTIPLE VITAMINS W/ MINERALS TAB 1 TABLET: TAB at 08:41

## 2023-05-26 RX ADMIN — SODIUM CHLORIDE, PRESERVATIVE FREE 10 ML: 5 INJECTION INTRAVENOUS at 08:42

## 2023-05-26 RX ADMIN — OXYCODONE HYDROCHLORIDE 10 MG: 10 TABLET ORAL at 05:00

## 2023-05-26 RX ADMIN — SODIUM CHLORIDE, POTASSIUM CHLORIDE, SODIUM LACTATE AND CALCIUM CHLORIDE: 600; 310; 30; 20 INJECTION, SOLUTION INTRAVENOUS at 22:36

## 2023-05-26 RX ADMIN — PREDNISONE 40 MG: 10 TABLET ORAL at 12:51

## 2023-05-26 RX ADMIN — OXYCODONE HYDROCHLORIDE 10 MG: 10 TABLET ORAL at 17:21

## 2023-05-26 RX ADMIN — SODIUM CHLORIDE, POTASSIUM CHLORIDE, SODIUM LACTATE AND CALCIUM CHLORIDE: 600; 310; 30; 20 INJECTION, SOLUTION INTRAVENOUS at 12:55

## 2023-05-26 RX ADMIN — SODIUM CHLORIDE, PRESERVATIVE FREE 10 ML: 5 INJECTION INTRAVENOUS at 08:41

## 2023-05-26 RX ADMIN — IPRATROPIUM BROMIDE AND ALBUTEROL SULFATE 1 AMPULE: .5; 2.5 SOLUTION RESPIRATORY (INHALATION) at 01:11

## 2023-05-26 RX ADMIN — BUDESONIDE AND FORMOTEROL FUMARATE DIHYDRATE 2 PUFF: 160; 4.5 AEROSOL RESPIRATORY (INHALATION) at 08:26

## 2023-05-26 RX ADMIN — OXYCODONE HYDROCHLORIDE 10 MG: 10 TABLET ORAL at 23:13

## 2023-05-26 RX ADMIN — SODIUM CHLORIDE, PRESERVATIVE FREE 10 ML: 5 INJECTION INTRAVENOUS at 21:41

## 2023-05-26 RX ADMIN — IPRATROPIUM BROMIDE AND ALBUTEROL SULFATE 1 AMPULE: .5; 3 SOLUTION RESPIRATORY (INHALATION) at 15:38

## 2023-05-26 RX ADMIN — CEFAZOLIN 2000 MG: 2 INJECTION, POWDER, FOR SOLUTION INTRAMUSCULAR; INTRAVENOUS at 03:04

## 2023-05-26 RX ADMIN — ENOXAPARIN SODIUM 30 MG: 100 INJECTION SUBCUTANEOUS at 08:41

## 2023-05-26 RX ADMIN — OXYCODONE HYDROCHLORIDE 10 MG: 10 TABLET ORAL at 12:51

## 2023-05-26 RX ADMIN — IPRATROPIUM BROMIDE AND ALBUTEROL SULFATE 1 AMPULE: .5; 3 SOLUTION RESPIRATORY (INHALATION) at 11:54

## 2023-05-26 RX ADMIN — BUDESONIDE AND FORMOTEROL FUMARATE DIHYDRATE 2 PUFF: 160; 4.5 AEROSOL RESPIRATORY (INHALATION) at 21:09

## 2023-05-26 RX ADMIN — AZITHROMYCIN MONOHYDRATE 500 MG: 500 INJECTION, POWDER, LYOPHILIZED, FOR SOLUTION INTRAVENOUS at 12:59

## 2023-05-26 ASSESSMENT — PAIN DESCRIPTION - PAIN TYPE
TYPE: SURGICAL PAIN
TYPE: SURGICAL PAIN

## 2023-05-26 ASSESSMENT — PAIN SCALES - GENERAL
PAINLEVEL_OUTOF10: 10
PAINLEVEL_OUTOF10: 0
PAINLEVEL_OUTOF10: 8
PAINLEVEL_OUTOF10: 8
PAINLEVEL_OUTOF10: 10

## 2023-05-26 ASSESSMENT — PAIN DESCRIPTION - DESCRIPTORS
DESCRIPTORS: ACHING

## 2023-05-26 ASSESSMENT — PAIN DESCRIPTION - ONSET
ONSET: ON-GOING
ONSET: ON-GOING

## 2023-05-26 ASSESSMENT — PAIN DESCRIPTION - FREQUENCY
FREQUENCY: CONTINUOUS
FREQUENCY: CONTINUOUS

## 2023-05-26 ASSESSMENT — PAIN - FUNCTIONAL ASSESSMENT
PAIN_FUNCTIONAL_ASSESSMENT: PREVENTS OR INTERFERES SOME ACTIVE ACTIVITIES AND ADLS
PAIN_FUNCTIONAL_ASSESSMENT: PREVENTS OR INTERFERES SOME ACTIVE ACTIVITIES AND ADLS

## 2023-05-26 ASSESSMENT — PAIN DESCRIPTION - ORIENTATION
ORIENTATION: RIGHT

## 2023-05-26 ASSESSMENT — PAIN DESCRIPTION - LOCATION
LOCATION: HIP

## 2023-05-27 LAB
ANION GAP SERPL CALCULATED.3IONS-SCNC: 1 MMOL/L (ref 4–16)
BUN SERPL-MCNC: 7 MG/DL (ref 6–23)
CALCIUM SERPL-MCNC: 8 MG/DL (ref 8.3–10.6)
CHLORIDE BLD-SCNC: 94 MMOL/L (ref 99–110)
CO2: 43 MMOL/L (ref 21–32)
CREAT SERPL-MCNC: 0.2 MG/DL (ref 0.9–1.3)
GFR SERPL CREATININE-BSD FRML MDRD: >60 ML/MIN/1.73M2
GLUCOSE SERPL-MCNC: 172 MG/DL (ref 70–99)
POTASSIUM SERPL-SCNC: 4.7 MMOL/L (ref 3.5–5.1)
REASON FOR REJECTION: NORMAL
REJECTED TEST: NORMAL
SODIUM BLD-SCNC: 138 MMOL/L (ref 135–145)

## 2023-05-27 PROCEDURE — 6370000000 HC RX 637 (ALT 250 FOR IP): Performed by: NURSE PRACTITIONER

## 2023-05-27 PROCEDURE — 94761 N-INVAS EAR/PLS OXIMETRY MLT: CPT

## 2023-05-27 PROCEDURE — 84145 PROCALCITONIN (PCT): CPT

## 2023-05-27 PROCEDURE — 2580000003 HC RX 258: Performed by: STUDENT IN AN ORGANIZED HEALTH CARE EDUCATION/TRAINING PROGRAM

## 2023-05-27 PROCEDURE — 6370000000 HC RX 637 (ALT 250 FOR IP): Performed by: STUDENT IN AN ORGANIZED HEALTH CARE EDUCATION/TRAINING PROGRAM

## 2023-05-27 PROCEDURE — 94640 AIRWAY INHALATION TREATMENT: CPT

## 2023-05-27 PROCEDURE — 36415 COLL VENOUS BLD VENIPUNCTURE: CPT

## 2023-05-27 PROCEDURE — 2700000000 HC OXYGEN THERAPY PER DAY

## 2023-05-27 PROCEDURE — 80048 BASIC METABOLIC PNL TOTAL CA: CPT

## 2023-05-27 PROCEDURE — 2060000000 HC ICU INTERMEDIATE R&B

## 2023-05-27 PROCEDURE — 97110 THERAPEUTIC EXERCISES: CPT

## 2023-05-27 PROCEDURE — 6360000002 HC RX W HCPCS: Performed by: STUDENT IN AN ORGANIZED HEALTH CARE EDUCATION/TRAINING PROGRAM

## 2023-05-27 RX ORDER — HYDROXYZINE HYDROCHLORIDE 10 MG/1
10 TABLET, FILM COATED ORAL 3 TIMES DAILY PRN
Status: DISCONTINUED | OUTPATIENT
Start: 2023-05-27 | End: 2023-06-02 | Stop reason: HOSPADM

## 2023-05-27 RX ADMIN — OXYCODONE HYDROCHLORIDE 10 MG: 10 TABLET ORAL at 20:04

## 2023-05-27 RX ADMIN — AZITHROMYCIN MONOHYDRATE 500 MG: 500 INJECTION, POWDER, LYOPHILIZED, FOR SOLUTION INTRAVENOUS at 14:29

## 2023-05-27 RX ADMIN — SODIUM CHLORIDE, POTASSIUM CHLORIDE, SODIUM LACTATE AND CALCIUM CHLORIDE: 600; 310; 30; 20 INJECTION, SOLUTION INTRAVENOUS at 14:31

## 2023-05-27 RX ADMIN — IPRATROPIUM BROMIDE AND ALBUTEROL SULFATE 1 AMPULE: .5; 3 SOLUTION RESPIRATORY (INHALATION) at 11:43

## 2023-05-27 RX ADMIN — HYDROXYZINE HYDROCHLORIDE 10 MG: 10 TABLET, FILM COATED ORAL at 21:59

## 2023-05-27 RX ADMIN — PREDNISONE 40 MG: 10 TABLET ORAL at 09:05

## 2023-05-27 RX ADMIN — BUDESONIDE AND FORMOTEROL FUMARATE DIHYDRATE 2 PUFF: 160; 4.5 AEROSOL RESPIRATORY (INHALATION) at 07:34

## 2023-05-27 RX ADMIN — ASPIRIN 325 MG: 325 TABLET, COATED ORAL at 20:04

## 2023-05-27 RX ADMIN — ASPIRIN 325 MG: 325 TABLET, COATED ORAL at 09:06

## 2023-05-27 RX ADMIN — IPRATROPIUM BROMIDE AND ALBUTEROL SULFATE 1 AMPULE: .5; 3 SOLUTION RESPIRATORY (INHALATION) at 21:07

## 2023-05-27 RX ADMIN — SODIUM CHLORIDE, POTASSIUM CHLORIDE, SODIUM LACTATE AND CALCIUM CHLORIDE: 600; 310; 30; 20 INJECTION, SOLUTION INTRAVENOUS at 07:04

## 2023-05-27 RX ADMIN — MULTIPLE VITAMINS W/ MINERALS TAB 1 TABLET: TAB at 09:06

## 2023-05-27 RX ADMIN — IPRATROPIUM BROMIDE AND ALBUTEROL SULFATE 1 AMPULE: .5; 3 SOLUTION RESPIRATORY (INHALATION) at 07:33

## 2023-05-27 RX ADMIN — IPRATROPIUM BROMIDE AND ALBUTEROL SULFATE 1 AMPULE: .5; 3 SOLUTION RESPIRATORY (INHALATION) at 15:27

## 2023-05-27 RX ADMIN — Medication 100 MG: at 09:05

## 2023-05-27 RX ADMIN — OXYCODONE HYDROCHLORIDE 10 MG: 10 TABLET ORAL at 08:56

## 2023-05-27 RX ADMIN — OXYCODONE HYDROCHLORIDE 10 MG: 10 TABLET ORAL at 15:45

## 2023-05-27 RX ADMIN — ENOXAPARIN SODIUM 30 MG: 100 INJECTION SUBCUTANEOUS at 09:01

## 2023-05-27 RX ADMIN — BUDESONIDE AND FORMOTEROL FUMARATE DIHYDRATE 2 PUFF: 160; 4.5 AEROSOL RESPIRATORY (INHALATION) at 21:09

## 2023-05-27 RX ADMIN — IPRATROPIUM BROMIDE AND ALBUTEROL SULFATE 1 AMPULE: .5; 3 SOLUTION RESPIRATORY (INHALATION) at 03:04

## 2023-05-27 ASSESSMENT — PAIN DESCRIPTION - DESCRIPTORS
DESCRIPTORS: DULL
DESCRIPTORS: ACHING;DISCOMFORT
DESCRIPTORS: ACHING
DESCRIPTORS: ACHING;DISCOMFORT

## 2023-05-27 ASSESSMENT — PAIN DESCRIPTION - LOCATION
LOCATION: HIP;NECK

## 2023-05-27 ASSESSMENT — PAIN DESCRIPTION - ONSET: ONSET: ON-GOING

## 2023-05-27 ASSESSMENT — PAIN DESCRIPTION - ORIENTATION
ORIENTATION: RIGHT

## 2023-05-27 ASSESSMENT — PAIN SCALES - GENERAL
PAINLEVEL_OUTOF10: 8
PAINLEVEL_OUTOF10: 3

## 2023-05-27 ASSESSMENT — PAIN DESCRIPTION - PAIN TYPE: TYPE: SURGICAL PAIN

## 2023-05-27 ASSESSMENT — PAIN DESCRIPTION - FREQUENCY: FREQUENCY: CONTINUOUS

## 2023-05-28 ENCOUNTER — APPOINTMENT (OUTPATIENT)
Dept: GENERAL RADIOLOGY | Age: 68
DRG: 480 | End: 2023-05-28
Attending: INTERNAL MEDICINE
Payer: MEDICARE

## 2023-05-28 LAB
BASOPHILS ABSOLUTE: 0 K/CU MM
BASOPHILS RELATIVE PERCENT: 0.1 % (ref 0–1)
DIFFERENTIAL TYPE: ABNORMAL
EOSINOPHILS ABSOLUTE: 0 K/CU MM
EOSINOPHILS RELATIVE PERCENT: 0 % (ref 0–3)
HCT VFR BLD CALC: 40.7 % (ref 42–52)
HEMOGLOBIN: 11.6 GM/DL (ref 13.5–18)
IMMATURE NEUTROPHIL %: 0.4 % (ref 0–0.43)
LYMPHOCYTES ABSOLUTE: 0.3 K/CU MM
LYMPHOCYTES RELATIVE PERCENT: 2.2 % (ref 24–44)
MCH RBC QN AUTO: 28.9 PG (ref 27–31)
MCHC RBC AUTO-ENTMCNC: 28.5 % (ref 32–36)
MCV RBC AUTO: 101.5 FL (ref 78–100)
MONOCYTES ABSOLUTE: 0.6 K/CU MM
MONOCYTES RELATIVE PERCENT: 4.5 % (ref 0–4)
NUCLEATED RBC %: 0 %
PDW BLD-RTO: 13.6 % (ref 11.7–14.9)
PLATELET # BLD: 217 K/CU MM (ref 140–440)
PMV BLD AUTO: 9.8 FL (ref 7.5–11.1)
RBC # BLD: 4.01 M/CU MM (ref 4.6–6.2)
SEGMENTED NEUTROPHILS ABSOLUTE COUNT: 11.4 K/CU MM
SEGMENTED NEUTROPHILS RELATIVE PERCENT: 92.8 % (ref 36–66)
TOTAL IMMATURE NEUTOROPHIL: 0.05 K/CU MM
TOTAL NUCLEATED RBC: 0 K/CU MM
WBC # BLD: 12.3 K/CU MM (ref 4–10.5)

## 2023-05-28 PROCEDURE — 36415 COLL VENOUS BLD VENIPUNCTURE: CPT

## 2023-05-28 PROCEDURE — 6370000000 HC RX 637 (ALT 250 FOR IP): Performed by: STUDENT IN AN ORGANIZED HEALTH CARE EDUCATION/TRAINING PROGRAM

## 2023-05-28 PROCEDURE — 94660 CPAP INITIATION&MGMT: CPT

## 2023-05-28 PROCEDURE — 71045 X-RAY EXAM CHEST 1 VIEW: CPT

## 2023-05-28 PROCEDURE — 94640 AIRWAY INHALATION TREATMENT: CPT

## 2023-05-28 PROCEDURE — 97530 THERAPEUTIC ACTIVITIES: CPT

## 2023-05-28 PROCEDURE — 2060000000 HC ICU INTERMEDIATE R&B

## 2023-05-28 PROCEDURE — 82805 BLOOD GASES W/O2 SATURATION: CPT

## 2023-05-28 PROCEDURE — 85025 COMPLETE CBC W/AUTO DIFF WBC: CPT

## 2023-05-28 PROCEDURE — 97110 THERAPEUTIC EXERCISES: CPT

## 2023-05-28 PROCEDURE — 2580000003 HC RX 258: Performed by: STUDENT IN AN ORGANIZED HEALTH CARE EDUCATION/TRAINING PROGRAM

## 2023-05-28 PROCEDURE — 2700000000 HC OXYGEN THERAPY PER DAY

## 2023-05-28 PROCEDURE — 6370000000 HC RX 637 (ALT 250 FOR IP): Performed by: NURSE PRACTITIONER

## 2023-05-28 PROCEDURE — 6360000002 HC RX W HCPCS: Performed by: STUDENT IN AN ORGANIZED HEALTH CARE EDUCATION/TRAINING PROGRAM

## 2023-05-28 PROCEDURE — 94761 N-INVAS EAR/PLS OXIMETRY MLT: CPT

## 2023-05-28 RX ORDER — FUROSEMIDE 10 MG/ML
40 INJECTION INTRAMUSCULAR; INTRAVENOUS ONCE
Status: COMPLETED | OUTPATIENT
Start: 2023-05-28 | End: 2023-05-28

## 2023-05-28 RX ORDER — CHLORDIAZEPOXIDE HYDROCHLORIDE 25 MG/1
25 CAPSULE, GELATIN COATED ORAL 3 TIMES DAILY
Status: DISPENSED | OUTPATIENT
Start: 2023-05-28 | End: 2023-05-30

## 2023-05-28 RX ADMIN — OXYCODONE HYDROCHLORIDE 10 MG: 10 TABLET ORAL at 14:34

## 2023-05-28 RX ADMIN — BUDESONIDE AND FORMOTEROL FUMARATE DIHYDRATE 2 PUFF: 160; 4.5 AEROSOL RESPIRATORY (INHALATION) at 07:50

## 2023-05-28 RX ADMIN — IPRATROPIUM BROMIDE AND ALBUTEROL SULFATE 1 AMPULE: .5; 3 SOLUTION RESPIRATORY (INHALATION) at 15:33

## 2023-05-28 RX ADMIN — IPRATROPIUM BROMIDE AND ALBUTEROL SULFATE 1 AMPULE: .5; 3 SOLUTION RESPIRATORY (INHALATION) at 11:35

## 2023-05-28 RX ADMIN — ASPIRIN 325 MG: 325 TABLET, COATED ORAL at 21:25

## 2023-05-28 RX ADMIN — PREDNISONE 40 MG: 10 TABLET ORAL at 07:55

## 2023-05-28 RX ADMIN — CHLORDIAZEPOXIDE HYDROCHLORIDE 25 MG: 25 CAPSULE ORAL at 21:25

## 2023-05-28 RX ADMIN — MULTIPLE VITAMINS W/ MINERALS TAB 1 TABLET: TAB at 07:55

## 2023-05-28 RX ADMIN — FUROSEMIDE 40 MG: 10 INJECTION, SOLUTION INTRAMUSCULAR; INTRAVENOUS at 14:33

## 2023-05-28 RX ADMIN — HYDROXYZINE HYDROCHLORIDE 10 MG: 10 TABLET, FILM COATED ORAL at 08:03

## 2023-05-28 RX ADMIN — Medication 100 MG: at 07:55

## 2023-05-28 RX ADMIN — IPRATROPIUM BROMIDE AND ALBUTEROL SULFATE 1 AMPULE: .5; 3 SOLUTION RESPIRATORY (INHALATION) at 01:46

## 2023-05-28 RX ADMIN — SODIUM CHLORIDE, PRESERVATIVE FREE 10 ML: 5 INJECTION INTRAVENOUS at 23:45

## 2023-05-28 RX ADMIN — IPRATROPIUM BROMIDE AND ALBUTEROL SULFATE 1 AMPULE: .5; 3 SOLUTION RESPIRATORY (INHALATION) at 23:55

## 2023-05-28 RX ADMIN — BUDESONIDE AND FORMOTEROL FUMARATE DIHYDRATE 2 PUFF: 160; 4.5 AEROSOL RESPIRATORY (INHALATION) at 23:55

## 2023-05-28 RX ADMIN — OXYCODONE HYDROCHLORIDE 10 MG: 10 TABLET ORAL at 23:44

## 2023-05-28 RX ADMIN — SODIUM CHLORIDE, PRESERVATIVE FREE 10 ML: 5 INJECTION INTRAVENOUS at 21:26

## 2023-05-28 RX ADMIN — SODIUM CHLORIDE, PRESERVATIVE FREE 10 ML: 5 INJECTION INTRAVENOUS at 21:27

## 2023-05-28 RX ADMIN — AZITHROMYCIN MONOHYDRATE 500 MG: 500 INJECTION, POWDER, LYOPHILIZED, FOR SOLUTION INTRAVENOUS at 13:45

## 2023-05-28 RX ADMIN — SODIUM CHLORIDE, POTASSIUM CHLORIDE, SODIUM LACTATE AND CALCIUM CHLORIDE: 600; 310; 30; 20 INJECTION, SOLUTION INTRAVENOUS at 07:54

## 2023-05-28 RX ADMIN — IPRATROPIUM BROMIDE AND ALBUTEROL SULFATE 1 AMPULE: .5; 3 SOLUTION RESPIRATORY (INHALATION) at 07:49

## 2023-05-28 RX ADMIN — ASPIRIN 325 MG: 325 TABLET, COATED ORAL at 07:55

## 2023-05-28 RX ADMIN — OXYCODONE HYDROCHLORIDE 10 MG: 10 TABLET ORAL at 05:40

## 2023-05-28 RX ADMIN — ACETAMINOPHEN 650 MG: 325 TABLET ORAL at 12:37

## 2023-05-28 ASSESSMENT — PAIN SCALES - GENERAL
PAINLEVEL_OUTOF10: 7
PAINLEVEL_OUTOF10: 6
PAINLEVEL_OUTOF10: 8
PAINLEVEL_OUTOF10: 6
PAINLEVEL_OUTOF10: 7
PAINLEVEL_OUTOF10: 7

## 2023-05-28 ASSESSMENT — PAIN DESCRIPTION - ORIENTATION
ORIENTATION: RIGHT

## 2023-05-28 ASSESSMENT — PAIN - FUNCTIONAL ASSESSMENT: PAIN_FUNCTIONAL_ASSESSMENT: ACTIVITIES ARE NOT PREVENTED

## 2023-05-28 ASSESSMENT — PAIN DESCRIPTION - LOCATION
LOCATION: LEG;HIP
LOCATION: LEG
LOCATION: HIP
LOCATION: NECK;HIP
LOCATION: HIP

## 2023-05-28 ASSESSMENT — PAIN DESCRIPTION - DESCRIPTORS
DESCRIPTORS: ACHING
DESCRIPTORS: ACHING;SHOOTING;SORE
DESCRIPTORS: SHARP;SHOOTING
DESCRIPTORS: ACHING;SHARP

## 2023-05-28 ASSESSMENT — PAIN DESCRIPTION - PAIN TYPE: TYPE: SURGICAL PAIN

## 2023-05-29 LAB — PROCALCITONIN SERPL-MCNC: 0.11 NG/ML

## 2023-05-29 PROCEDURE — 94761 N-INVAS EAR/PLS OXIMETRY MLT: CPT

## 2023-05-29 PROCEDURE — 94640 AIRWAY INHALATION TREATMENT: CPT

## 2023-05-29 PROCEDURE — 2700000000 HC OXYGEN THERAPY PER DAY

## 2023-05-29 PROCEDURE — 6370000000 HC RX 637 (ALT 250 FOR IP): Performed by: STUDENT IN AN ORGANIZED HEALTH CARE EDUCATION/TRAINING PROGRAM

## 2023-05-29 PROCEDURE — 94660 CPAP INITIATION&MGMT: CPT

## 2023-05-29 PROCEDURE — 6360000002 HC RX W HCPCS: Performed by: STUDENT IN AN ORGANIZED HEALTH CARE EDUCATION/TRAINING PROGRAM

## 2023-05-29 PROCEDURE — 2580000003 HC RX 258: Performed by: STUDENT IN AN ORGANIZED HEALTH CARE EDUCATION/TRAINING PROGRAM

## 2023-05-29 PROCEDURE — 2060000000 HC ICU INTERMEDIATE R&B

## 2023-05-29 RX ORDER — FUROSEMIDE 10 MG/ML
40 INJECTION INTRAMUSCULAR; INTRAVENOUS ONCE
Status: COMPLETED | OUTPATIENT
Start: 2023-05-29 | End: 2023-05-29

## 2023-05-29 RX ADMIN — BUDESONIDE AND FORMOTEROL FUMARATE DIHYDRATE 2 PUFF: 160; 4.5 AEROSOL RESPIRATORY (INHALATION) at 20:20

## 2023-05-29 RX ADMIN — IPRATROPIUM BROMIDE AND ALBUTEROL SULFATE 1 AMPULE: .5; 3 SOLUTION RESPIRATORY (INHALATION) at 14:48

## 2023-05-29 RX ADMIN — IPRATROPIUM BROMIDE AND ALBUTEROL SULFATE 1 AMPULE: .5; 3 SOLUTION RESPIRATORY (INHALATION) at 20:19

## 2023-05-29 RX ADMIN — PREDNISONE 40 MG: 10 TABLET ORAL at 08:14

## 2023-05-29 RX ADMIN — MULTIPLE VITAMINS W/ MINERALS TAB 1 TABLET: TAB at 08:14

## 2023-05-29 RX ADMIN — CHLORDIAZEPOXIDE HYDROCHLORIDE 25 MG: 25 CAPSULE ORAL at 14:24

## 2023-05-29 RX ADMIN — FUROSEMIDE 40 MG: 10 INJECTION, SOLUTION INTRAMUSCULAR; INTRAVENOUS at 08:14

## 2023-05-29 RX ADMIN — ASPIRIN 325 MG: 325 TABLET, COATED ORAL at 08:14

## 2023-05-29 RX ADMIN — IPRATROPIUM BROMIDE AND ALBUTEROL SULFATE 1 AMPULE: .5; 3 SOLUTION RESPIRATORY (INHALATION) at 05:00

## 2023-05-29 RX ADMIN — CHLORDIAZEPOXIDE HYDROCHLORIDE 25 MG: 25 CAPSULE ORAL at 08:14

## 2023-05-29 RX ADMIN — ASPIRIN 325 MG: 325 TABLET, COATED ORAL at 21:24

## 2023-05-29 RX ADMIN — SODIUM CHLORIDE, PRESERVATIVE FREE 10 ML: 5 INJECTION INTRAVENOUS at 21:24

## 2023-05-29 RX ADMIN — OXYCODONE HYDROCHLORIDE 10 MG: 10 TABLET ORAL at 14:28

## 2023-05-29 RX ADMIN — Medication 100 MG: at 08:14

## 2023-05-29 RX ADMIN — BUDESONIDE AND FORMOTEROL FUMARATE DIHYDRATE 2 PUFF: 160; 4.5 AEROSOL RESPIRATORY (INHALATION) at 07:39

## 2023-05-29 RX ADMIN — IPRATROPIUM BROMIDE AND ALBUTEROL SULFATE 1 AMPULE: .5; 3 SOLUTION RESPIRATORY (INHALATION) at 07:39

## 2023-05-29 RX ADMIN — SODIUM CHLORIDE, PRESERVATIVE FREE 10 ML: 5 INJECTION INTRAVENOUS at 08:14

## 2023-05-29 RX ADMIN — OXYCODONE HYDROCHLORIDE 10 MG: 10 TABLET ORAL at 08:14

## 2023-05-29 RX ADMIN — CHLORDIAZEPOXIDE HYDROCHLORIDE 25 MG: 25 CAPSULE ORAL at 21:24

## 2023-05-29 RX ADMIN — IPRATROPIUM BROMIDE AND ALBUTEROL SULFATE 1 AMPULE: .5; 3 SOLUTION RESPIRATORY (INHALATION) at 11:32

## 2023-05-29 ASSESSMENT — PAIN DESCRIPTION - FREQUENCY
FREQUENCY: CONTINUOUS
FREQUENCY: CONTINUOUS

## 2023-05-29 ASSESSMENT — PAIN DESCRIPTION - DESCRIPTORS
DESCRIPTORS: ACHING
DESCRIPTORS: ACHING

## 2023-05-29 ASSESSMENT — PAIN SCALES - GENERAL
PAINLEVEL_OUTOF10: 0
PAINLEVEL_OUTOF10: 7
PAINLEVEL_OUTOF10: 3
PAINLEVEL_OUTOF10: 9

## 2023-05-29 ASSESSMENT — PAIN DESCRIPTION - LOCATION
LOCATION: HIP
LOCATION: HIP

## 2023-05-29 ASSESSMENT — PAIN DESCRIPTION - ONSET
ONSET: ON-GOING
ONSET: ON-GOING

## 2023-05-29 ASSESSMENT — PAIN DESCRIPTION - PAIN TYPE
TYPE: SURGICAL PAIN
TYPE: SURGICAL PAIN

## 2023-05-29 ASSESSMENT — PAIN DESCRIPTION - DIRECTION
RADIATING_TOWARDS: LEG
RADIATING_TOWARDS: LEG

## 2023-05-29 ASSESSMENT — PAIN DESCRIPTION - ORIENTATION
ORIENTATION: RIGHT
ORIENTATION: RIGHT

## 2023-05-29 NOTE — PLAN OF CARE
Problem: Discharge Planning  Goal: Discharge to home or other facility with appropriate resources  5/29/2023 1010 by Anila Cage RN  Outcome: Progressing  5/29/2023 0032 by Juan David Lei RN  Outcome: Progressing     Problem: Safety - Adult  Goal: Free from fall injury  5/29/2023 1010 by Anila Cage RN  Outcome: Progressing  5/29/2023 0032 by Juan David Lei RN  Outcome: Progressing     Problem: Skin/Tissue Integrity  Goal: Absence of new skin breakdown  Description: 1. Monitor for areas of redness and/or skin breakdown  2. Assess vascular access sites hourly  3. Every 4-6 hours minimum:  Change oxygen saturation probe site  4. Every 4-6 hours:  If on nasal continuous positive airway pressure, respiratory therapy assess nares and determine need for appliance change or resting period.   5/29/2023 1010 by Anila Cage RN  Outcome: Progressing  5/29/2023 0032 by Juan David Lei RN  Outcome: Progressing     Problem: ABCDS Injury Assessment  Goal: Absence of physical injury  5/29/2023 1010 by Anila Cage RN  Outcome: Progressing  5/29/2023 0032 by Juan David Lei RN  Outcome: Progressing     Problem: Pain  Goal: Verbalizes/displays adequate comfort level or baseline comfort level  5/29/2023 1010 by Anila Cage RN  Outcome: Progressing  5/29/2023 0032 by Juan David Lei RN  Outcome: Progressing     Problem: Nutrition Deficit:  Goal: Optimize nutritional status  5/29/2023 1010 by Anila Cage RN  Outcome: Progressing  5/29/2023 0032 by Juan David Lei RN  Outcome: Progressing     Problem: Respiratory - Adult  Goal: Achieves optimal ventilation and oxygenation  Outcome: Progressing     Problem: Musculoskeletal - Adult  Goal: Return mobility to safest level of function  Outcome: Progressing  Goal: Maintain proper alignment of affected body part  Outcome: Progressing  Goal: Return ADL status to a safe level of

## 2023-05-29 NOTE — PLAN OF CARE
Problem: Discharge Planning  Goal: Discharge to home or other facility with appropriate resources  5/29/2023 0032 by Que Mack RN  Outcome: Progressing  5/28/2023 1540 by Rosalind Donovan RN  Outcome: Progressing     Problem: Safety - Adult  Goal: Free from fall injury  5/29/2023 0032 by Que Mack RN  Outcome: Progressing  5/28/2023 1540 by Rosalind Donovan RN  Outcome: Progressing     Problem: Skin/Tissue Integrity  Goal: Absence of new skin breakdown  Description: 1. Monitor for areas of redness and/or skin breakdown  2. Assess vascular access sites hourly  3. Every 4-6 hours minimum:  Change oxygen saturation probe site  4. Every 4-6 hours:  If on nasal continuous positive airway pressure, respiratory therapy assess nares and determine need for appliance change or resting period.   5/29/2023 0032 by Que Mack RN  Outcome: Progressing  5/28/2023 1540 by Rosalind Donovan RN  Outcome: Progressing     Problem: ABCDS Injury Assessment  Goal: Absence of physical injury  5/29/2023 0032 by Que Mack RN  Outcome: Progressing  5/28/2023 1540 by Rosalind Donovan RN  Outcome: Progressing     Problem: Pain  Goal: Verbalizes/displays adequate comfort level or baseline comfort level  5/29/2023 0032 by Que Mack RN  Outcome: Progressing  5/28/2023 1540 by Rosalind Donovan RN  Outcome: Progressing     Problem: Nutrition Deficit:  Goal: Optimize nutritional status  5/29/2023 0032 by Que Mack RN  Outcome: Progressing  5/28/2023 1540 by Rosalind Donovan RN  Outcome: Progressing

## 2023-05-30 LAB
BASE EXCESS MIXED: 25.4 (ref 0–1.2)
COMMENT: ABNORMAL
HCO3 VENOUS: 59.1 MMOL/L (ref 19–25)
O2 SAT, VEN: 94.2 % (ref 50–70)
PCO2, VEN: 123 MMHG (ref 38–52)
PH VENOUS: 7.29 (ref 7.32–7.42)
PO2, VEN: 233 MMHG (ref 28–48)

## 2023-05-30 PROCEDURE — 97110 THERAPEUTIC EXERCISES: CPT

## 2023-05-30 PROCEDURE — 6370000000 HC RX 637 (ALT 250 FOR IP): Performed by: STUDENT IN AN ORGANIZED HEALTH CARE EDUCATION/TRAINING PROGRAM

## 2023-05-30 PROCEDURE — 2060000000 HC ICU INTERMEDIATE R&B

## 2023-05-30 PROCEDURE — 94640 AIRWAY INHALATION TREATMENT: CPT

## 2023-05-30 PROCEDURE — 94660 CPAP INITIATION&MGMT: CPT

## 2023-05-30 PROCEDURE — 2580000003 HC RX 258: Performed by: STUDENT IN AN ORGANIZED HEALTH CARE EDUCATION/TRAINING PROGRAM

## 2023-05-30 PROCEDURE — 2700000000 HC OXYGEN THERAPY PER DAY

## 2023-05-30 PROCEDURE — 94761 N-INVAS EAR/PLS OXIMETRY MLT: CPT

## 2023-05-30 PROCEDURE — 97530 THERAPEUTIC ACTIVITIES: CPT

## 2023-05-30 PROCEDURE — 97116 GAIT TRAINING THERAPY: CPT

## 2023-05-30 PROCEDURE — 2500000003 HC RX 250 WO HCPCS: Performed by: STUDENT IN AN ORGANIZED HEALTH CARE EDUCATION/TRAINING PROGRAM

## 2023-05-30 PROCEDURE — 82805 BLOOD GASES W/O2 SATURATION: CPT

## 2023-05-30 RX ORDER — GUAIFENESIN 200 MG/10ML
200 LIQUID ORAL EVERY 4 HOURS PRN
Status: DISCONTINUED | OUTPATIENT
Start: 2023-05-30 | End: 2023-06-02 | Stop reason: HOSPADM

## 2023-05-30 RX ORDER — CHLORDIAZEPOXIDE HYDROCHLORIDE 5 MG/1
10 CAPSULE, GELATIN COATED ORAL 3 TIMES DAILY
Status: COMPLETED | OUTPATIENT
Start: 2023-05-30 | End: 2023-05-31

## 2023-05-30 RX ADMIN — IPRATROPIUM BROMIDE AND ALBUTEROL SULFATE 1 AMPULE: .5; 3 SOLUTION RESPIRATORY (INHALATION) at 20:03

## 2023-05-30 RX ADMIN — OXYCODONE HYDROCHLORIDE 10 MG: 10 TABLET ORAL at 13:38

## 2023-05-30 RX ADMIN — BUDESONIDE AND FORMOTEROL FUMARATE DIHYDRATE 2 PUFF: 160; 4.5 AEROSOL RESPIRATORY (INHALATION) at 07:55

## 2023-05-30 RX ADMIN — SODIUM CHLORIDE, PRESERVATIVE FREE 10 ML: 5 INJECTION INTRAVENOUS at 08:43

## 2023-05-30 RX ADMIN — BUDESONIDE AND FORMOTEROL FUMARATE DIHYDRATE 2 PUFF: 160; 4.5 AEROSOL RESPIRATORY (INHALATION) at 20:04

## 2023-05-30 RX ADMIN — ASPIRIN 325 MG: 325 TABLET, COATED ORAL at 20:01

## 2023-05-30 RX ADMIN — OXYCODONE HYDROCHLORIDE 10 MG: 10 TABLET ORAL at 20:01

## 2023-05-30 RX ADMIN — OXYCODONE HYDROCHLORIDE 10 MG: 10 TABLET ORAL at 08:47

## 2023-05-30 RX ADMIN — PREDNISONE 40 MG: 10 TABLET ORAL at 08:42

## 2023-05-30 RX ADMIN — GUAIFENESIN 200 MG: 100 SOLUTION ORAL at 10:35

## 2023-05-30 RX ADMIN — IPRATROPIUM BROMIDE AND ALBUTEROL SULFATE 1 AMPULE: .5; 3 SOLUTION RESPIRATORY (INHALATION) at 11:22

## 2023-05-30 RX ADMIN — IPRATROPIUM BROMIDE AND ALBUTEROL SULFATE 1 AMPULE: .5; 3 SOLUTION RESPIRATORY (INHALATION) at 07:41

## 2023-05-30 RX ADMIN — Medication 100 MG: at 08:42

## 2023-05-30 RX ADMIN — IPRATROPIUM BROMIDE AND ALBUTEROL SULFATE 1 AMPULE: .5; 3 SOLUTION RESPIRATORY (INHALATION) at 15:24

## 2023-05-30 RX ADMIN — ASPIRIN 325 MG: 325 TABLET, COATED ORAL at 08:29

## 2023-05-30 RX ADMIN — MULTIPLE VITAMINS W/ MINERALS TAB 1 TABLET: TAB at 08:42

## 2023-05-30 RX ADMIN — CHLORDIAZEPOXIDE HYDROCHLORIDE 10 MG: 5 CAPSULE ORAL at 20:01

## 2023-05-30 RX ADMIN — CHLORDIAZEPOXIDE HYDROCHLORIDE 10 MG: 5 CAPSULE ORAL at 13:39

## 2023-05-30 RX ADMIN — CHLORDIAZEPOXIDE HYDROCHLORIDE 10 MG: 5 CAPSULE ORAL at 08:42

## 2023-05-30 ASSESSMENT — PAIN DESCRIPTION - DESCRIPTORS
DESCRIPTORS: ACHING
DESCRIPTORS: ACHING

## 2023-05-30 ASSESSMENT — PAIN DESCRIPTION - PAIN TYPE
TYPE: SURGICAL PAIN
TYPE: SURGICAL PAIN

## 2023-05-30 ASSESSMENT — PAIN DESCRIPTION - ONSET
ONSET: ON-GOING
ONSET: ON-GOING

## 2023-05-30 ASSESSMENT — PAIN DESCRIPTION - ORIENTATION
ORIENTATION: RIGHT
ORIENTATION: RIGHT

## 2023-05-30 ASSESSMENT — PAIN SCALES - GENERAL
PAINLEVEL_OUTOF10: 5
PAINLEVEL_OUTOF10: 7
PAINLEVEL_OUTOF10: 8
PAINLEVEL_OUTOF10: 5
PAINLEVEL_OUTOF10: 7

## 2023-05-30 ASSESSMENT — PAIN DESCRIPTION - LOCATION
LOCATION: HIP
LOCATION: HIP

## 2023-05-30 ASSESSMENT — PAIN DESCRIPTION - FREQUENCY
FREQUENCY: CONTINUOUS
FREQUENCY: CONTINUOUS

## 2023-05-30 ASSESSMENT — PAIN DESCRIPTION - DIRECTION
RADIATING_TOWARDS: LEG
RADIATING_TOWARDS: LEG

## 2023-05-30 NOTE — CARE COORDINATION
CM received a call from Lj with [facility]. They are able to accept pt when medically stable, pending pre-cert approval.  78:54 WB for OT to see pt for pre-cert which will be started in the morning.

## 2023-05-31 ENCOUNTER — APPOINTMENT (OUTPATIENT)
Dept: GENERAL RADIOLOGY | Age: 68
DRG: 480 | End: 2023-05-31
Attending: INTERNAL MEDICINE
Payer: MEDICARE

## 2023-05-31 LAB
BASE EXCESS MIXED: 29.6 (ref 0–1.2)
COMMENT: ABNORMAL
HCO3 VENOUS: 61.3 MMOL/L (ref 19–25)
O2 SAT, VEN: 92.3 % (ref 50–70)
PCO2, VEN: 99 MMHG (ref 38–52)
PH VENOUS: 7.4 (ref 7.32–7.42)
PO2, VEN: 81 MMHG (ref 28–48)

## 2023-05-31 PROCEDURE — 97110 THERAPEUTIC EXERCISES: CPT

## 2023-05-31 PROCEDURE — 6370000000 HC RX 637 (ALT 250 FOR IP): Performed by: STUDENT IN AN ORGANIZED HEALTH CARE EDUCATION/TRAINING PROGRAM

## 2023-05-31 PROCEDURE — 94640 AIRWAY INHALATION TREATMENT: CPT

## 2023-05-31 PROCEDURE — 2700000000 HC OXYGEN THERAPY PER DAY

## 2023-05-31 PROCEDURE — 94761 N-INVAS EAR/PLS OXIMETRY MLT: CPT

## 2023-05-31 PROCEDURE — 97530 THERAPEUTIC ACTIVITIES: CPT

## 2023-05-31 PROCEDURE — 6360000002 HC RX W HCPCS: Performed by: STUDENT IN AN ORGANIZED HEALTH CARE EDUCATION/TRAINING PROGRAM

## 2023-05-31 PROCEDURE — 2580000003 HC RX 258: Performed by: STUDENT IN AN ORGANIZED HEALTH CARE EDUCATION/TRAINING PROGRAM

## 2023-05-31 PROCEDURE — 36415 COLL VENOUS BLD VENIPUNCTURE: CPT

## 2023-05-31 PROCEDURE — 82805 BLOOD GASES W/O2 SATURATION: CPT

## 2023-05-31 PROCEDURE — 71045 X-RAY EXAM CHEST 1 VIEW: CPT

## 2023-05-31 PROCEDURE — 2060000000 HC ICU INTERMEDIATE R&B

## 2023-05-31 RX ORDER — METHYLPREDNISOLONE SODIUM SUCCINATE 125 MG/2ML
40 INJECTION, POWDER, LYOPHILIZED, FOR SOLUTION INTRAMUSCULAR; INTRAVENOUS DAILY
Status: DISCONTINUED | OUTPATIENT
Start: 2023-05-31 | End: 2023-06-02 | Stop reason: HOSPADM

## 2023-05-31 RX ORDER — PREDNISONE 10 MG/1
20 TABLET ORAL DAILY
Status: DISCONTINUED | OUTPATIENT
Start: 2023-05-31 | End: 2023-05-31

## 2023-05-31 RX ADMIN — MULTIPLE VITAMINS W/ MINERALS TAB 1 TABLET: TAB at 08:53

## 2023-05-31 RX ADMIN — OXYCODONE HYDROCHLORIDE 10 MG: 10 TABLET ORAL at 03:33

## 2023-05-31 RX ADMIN — Medication 100 MG: at 08:53

## 2023-05-31 RX ADMIN — IPRATROPIUM BROMIDE AND ALBUTEROL SULFATE 1 AMPULE: .5; 3 SOLUTION RESPIRATORY (INHALATION) at 11:42

## 2023-05-31 RX ADMIN — BUDESONIDE AND FORMOTEROL FUMARATE DIHYDRATE 2 PUFF: 160; 4.5 AEROSOL RESPIRATORY (INHALATION) at 23:00

## 2023-05-31 RX ADMIN — METHYLPREDNISOLONE SODIUM SUCCINATE 40 MG: 125 INJECTION, POWDER, FOR SOLUTION INTRAMUSCULAR; INTRAVENOUS at 16:04

## 2023-05-31 RX ADMIN — OXYCODONE HYDROCHLORIDE 10 MG: 10 TABLET ORAL at 16:03

## 2023-05-31 RX ADMIN — BUDESONIDE AND FORMOTEROL FUMARATE DIHYDRATE 2 PUFF: 160; 4.5 AEROSOL RESPIRATORY (INHALATION) at 08:09

## 2023-05-31 RX ADMIN — ASPIRIN 325 MG: 325 TABLET, COATED ORAL at 20:08

## 2023-05-31 RX ADMIN — IPRATROPIUM BROMIDE AND ALBUTEROL SULFATE 1 AMPULE: .5; 3 SOLUTION RESPIRATORY (INHALATION) at 03:21

## 2023-05-31 RX ADMIN — ASPIRIN 325 MG: 325 TABLET, COATED ORAL at 08:53

## 2023-05-31 RX ADMIN — CHLORDIAZEPOXIDE HYDROCHLORIDE 10 MG: 5 CAPSULE ORAL at 20:08

## 2023-05-31 RX ADMIN — IPRATROPIUM BROMIDE AND ALBUTEROL SULFATE 1 AMPULE: .5; 3 SOLUTION RESPIRATORY (INHALATION) at 15:50

## 2023-05-31 RX ADMIN — CHLORDIAZEPOXIDE HYDROCHLORIDE 10 MG: 5 CAPSULE ORAL at 16:03

## 2023-05-31 RX ADMIN — IPRATROPIUM BROMIDE AND ALBUTEROL SULFATE 1 AMPULE: .5; 3 SOLUTION RESPIRATORY (INHALATION) at 08:09

## 2023-05-31 RX ADMIN — OXYCODONE HYDROCHLORIDE 10 MG: 10 TABLET ORAL at 23:32

## 2023-05-31 RX ADMIN — IPRATROPIUM BROMIDE AND ALBUTEROL SULFATE 1 AMPULE: .5; 3 SOLUTION RESPIRATORY (INHALATION) at 23:00

## 2023-05-31 RX ADMIN — CHLORDIAZEPOXIDE HYDROCHLORIDE 10 MG: 5 CAPSULE ORAL at 08:53

## 2023-05-31 RX ADMIN — SODIUM CHLORIDE, PRESERVATIVE FREE 10 ML: 5 INJECTION INTRAVENOUS at 08:54

## 2023-05-31 ASSESSMENT — PAIN SCALES - GENERAL
PAINLEVEL_OUTOF10: 8
PAINLEVEL_OUTOF10: 7
PAINLEVEL_OUTOF10: 9
PAINLEVEL_OUTOF10: 0

## 2023-05-31 ASSESSMENT — PAIN DESCRIPTION - LOCATION: LOCATION: HIP

## 2023-05-31 ASSESSMENT — PAIN - FUNCTIONAL ASSESSMENT: PAIN_FUNCTIONAL_ASSESSMENT: ACTIVITIES ARE NOT PREVENTED

## 2023-05-31 ASSESSMENT — PAIN SCALES - WONG BAKER: WONGBAKER_NUMERICALRESPONSE: 0

## 2023-05-31 ASSESSMENT — PAIN DESCRIPTION - DESCRIPTORS: DESCRIPTORS: ACHING

## 2023-05-31 ASSESSMENT — PAIN DESCRIPTION - ORIENTATION: ORIENTATION: RIGHT

## 2023-05-31 NOTE — PLAN OF CARE
Problem: Discharge Planning  Goal: Discharge to home or other facility with appropriate resources  Outcome: Progressing     Problem: Safety - Adult  Goal: Free from fall injury  Outcome: Progressing     Problem: Skin/Tissue Integrity  Goal: Absence of new skin breakdown  Description: 1. Monitor for areas of redness and/or skin breakdown  2. Assess vascular access sites hourly  3. Every 4-6 hours minimum:  Change oxygen saturation probe site  4. Every 4-6 hours:  If on nasal continuous positive airway pressure, respiratory therapy assess nares and determine need for appliance change or resting period.   Outcome: Progressing     Problem: ABCDS Injury Assessment  Goal: Absence of physical injury  Outcome: Progressing     Problem: Pain  Goal: Verbalizes/displays adequate comfort level or baseline comfort level  Outcome: Progressing     Problem: Nutrition Deficit:  Goal: Optimize nutritional status  Outcome: Progressing     Problem: Respiratory - Adult  Goal: Achieves optimal ventilation and oxygenation  Outcome: Progressing     Problem: Musculoskeletal - Adult  Goal: Return mobility to safest level of function  Outcome: Progressing  Goal: Maintain proper alignment of affected body part  Outcome: Progressing  Goal: Return ADL status to a safe level of function  Outcome: Progressing

## 2023-05-31 NOTE — CARE COORDINATION
Approved for [facility] from 05- thru 06-. Plan Auth I.D. # [id number]. [id number]. Next medical review date 06-.

## 2023-05-31 NOTE — CARE COORDINATION
Patient seen and evaluated at bedside. He was drowsy but woke to voice and was oriented. Discussed about need of BiPAP overnight. Patient voiced agreement. Nursing team placed patient on BiPAP on prior setting. To recheck VBG in the morning.        Electronically signed by Carlie Harrison MD on 5/31/23 at 12:33 AM EDT

## 2023-06-01 ENCOUNTER — APPOINTMENT (OUTPATIENT)
Dept: GENERAL RADIOLOGY | Age: 68
DRG: 480 | End: 2023-06-01
Attending: INTERNAL MEDICINE
Payer: MEDICARE

## 2023-06-01 ENCOUNTER — APPOINTMENT (OUTPATIENT)
Dept: INTERVENTIONAL RADIOLOGY/VASCULAR | Age: 68
DRG: 480 | End: 2023-06-01
Attending: INTERNAL MEDICINE
Payer: MEDICARE

## 2023-06-01 LAB
APTT: 28.1 SECONDS (ref 25.1–37.1)
BASE EXCESS MIXED: 24.8 (ref 0–1.2)
CARBON MONOXIDE, BLOOD: 1.6 % (ref 0–5)
CO2 CONTENT: 57.8 MMOL/L (ref 19–24)
COMMENT: ABNORMAL
FLUID TYPE: NORMAL INDEX
GLUCOSE, FLUID: 119 MG/DL
HCO3 ARTERIAL: 55.1 MMOL/L (ref 18–23)
INR BLD: 0.87 INDEX
LACTATE DEHYDROGENASE, FLUID: 98 IU/L
LYMPHOCYTES, BODY FLUID: 16 %
MESOTHELIAL FLUID: 0 /100 WBC
METHEMOGLOBIN ARTERIAL: 0.7 %
MONOCYTE, FLUID: 19 %
NEUTROPHIL, FLUID: 65 %
O2 SATURATION: 97 % (ref 96–97)
PCO2 ARTERIAL: 87 MMHG (ref 32–45)
PH BLOOD: 7.41 (ref 7.34–7.45)
PO2 ARTERIAL: 195 MMHG (ref 75–100)
PROCALCITONIN SERPL-MCNC: 0.08 NG/ML
PROTEIN FLUID: 1.8 G/DL
PROTHROMBIN TIME: 11 SECONDS (ref 11.7–14.5)
RBC FLUID: 50 /CU MM
WBC FLUID: 360 /CU MM

## 2023-06-01 PROCEDURE — 97116 GAIT TRAINING THERAPY: CPT

## 2023-06-01 PROCEDURE — 82945 GLUCOSE OTHER FLUID: CPT

## 2023-06-01 PROCEDURE — 94640 AIRWAY INHALATION TREATMENT: CPT

## 2023-06-01 PROCEDURE — 85610 PROTHROMBIN TIME: CPT

## 2023-06-01 PROCEDURE — 85730 THROMBOPLASTIN TIME PARTIAL: CPT

## 2023-06-01 PROCEDURE — 71045 X-RAY EXAM CHEST 1 VIEW: CPT

## 2023-06-01 PROCEDURE — 84157 ASSAY OF PROTEIN OTHER: CPT

## 2023-06-01 PROCEDURE — 84145 PROCALCITONIN (PCT): CPT

## 2023-06-01 PROCEDURE — 83615 LACTATE (LD) (LDH) ENZYME: CPT

## 2023-06-01 PROCEDURE — 88341 IMHCHEM/IMCYTCHM EA ADD ANTB: CPT | Performed by: PATHOLOGY

## 2023-06-01 PROCEDURE — 32555 ASPIRATE PLEURA W/ IMAGING: CPT

## 2023-06-01 PROCEDURE — 94664 DEMO&/EVAL PT USE INHALER: CPT

## 2023-06-01 PROCEDURE — 88108 CYTOPATH CONCENTRATE TECH: CPT | Performed by: PATHOLOGY

## 2023-06-01 PROCEDURE — 36415 COLL VENOUS BLD VENIPUNCTURE: CPT

## 2023-06-01 PROCEDURE — 6370000000 HC RX 637 (ALT 250 FOR IP): Performed by: STUDENT IN AN ORGANIZED HEALTH CARE EDUCATION/TRAINING PROGRAM

## 2023-06-01 PROCEDURE — 94660 CPAP INITIATION&MGMT: CPT

## 2023-06-01 PROCEDURE — 36600 WITHDRAWAL OF ARTERIAL BLOOD: CPT

## 2023-06-01 PROCEDURE — 2580000003 HC RX 258: Performed by: STUDENT IN AN ORGANIZED HEALTH CARE EDUCATION/TRAINING PROGRAM

## 2023-06-01 PROCEDURE — 88305 TISSUE EXAM BY PATHOLOGIST: CPT | Performed by: PATHOLOGY

## 2023-06-01 PROCEDURE — 2060000000 HC ICU INTERMEDIATE R&B

## 2023-06-01 PROCEDURE — 0QS634Z REPOSITION RIGHT UPPER FEMUR WITH INTERNAL FIXATION DEVICE, PERCUTANEOUS APPROACH: ICD-10-PCS | Performed by: INTERNAL MEDICINE

## 2023-06-01 PROCEDURE — 82803 BLOOD GASES ANY COMBINATION: CPT

## 2023-06-01 PROCEDURE — 6360000002 HC RX W HCPCS: Performed by: STUDENT IN AN ORGANIZED HEALTH CARE EDUCATION/TRAINING PROGRAM

## 2023-06-01 PROCEDURE — 97110 THERAPEUTIC EXERCISES: CPT

## 2023-06-01 PROCEDURE — 97530 THERAPEUTIC ACTIVITIES: CPT

## 2023-06-01 PROCEDURE — 88342 IMHCHEM/IMCYTCHM 1ST ANTB: CPT | Performed by: PATHOLOGY

## 2023-06-01 PROCEDURE — C1729 CATH, DRAINAGE: HCPCS

## 2023-06-01 PROCEDURE — 2700000000 HC OXYGEN THERAPY PER DAY

## 2023-06-01 PROCEDURE — 94761 N-INVAS EAR/PLS OXIMETRY MLT: CPT

## 2023-06-01 PROCEDURE — 89051 BODY FLUID CELL COUNT: CPT

## 2023-06-01 RX ADMIN — OXYCODONE HYDROCHLORIDE 10 MG: 10 TABLET ORAL at 08:18

## 2023-06-01 RX ADMIN — SODIUM CHLORIDE, PRESERVATIVE FREE 10 ML: 5 INJECTION INTRAVENOUS at 21:00

## 2023-06-01 RX ADMIN — Medication 100 MG: at 08:18

## 2023-06-01 RX ADMIN — SODIUM CHLORIDE, PRESERVATIVE FREE 10 ML: 5 INJECTION INTRAVENOUS at 08:18

## 2023-06-01 RX ADMIN — IPRATROPIUM BROMIDE AND ALBUTEROL SULFATE 1 AMPULE: .5; 3 SOLUTION RESPIRATORY (INHALATION) at 11:33

## 2023-06-01 RX ADMIN — IPRATROPIUM BROMIDE AND ALBUTEROL SULFATE 1 AMPULE: .5; 3 SOLUTION RESPIRATORY (INHALATION) at 20:20

## 2023-06-01 RX ADMIN — OXYCODONE HYDROCHLORIDE 10 MG: 10 TABLET ORAL at 21:11

## 2023-06-01 RX ADMIN — METHYLPREDNISOLONE SODIUM SUCCINATE 40 MG: 125 INJECTION, POWDER, FOR SOLUTION INTRAMUSCULAR; INTRAVENOUS at 08:18

## 2023-06-01 RX ADMIN — ASPIRIN 325 MG: 325 TABLET, COATED ORAL at 08:18

## 2023-06-01 RX ADMIN — BUDESONIDE AND FORMOTEROL FUMARATE DIHYDRATE 2 PUFF: 160; 4.5 AEROSOL RESPIRATORY (INHALATION) at 20:28

## 2023-06-01 RX ADMIN — BUDESONIDE AND FORMOTEROL FUMARATE DIHYDRATE 2 PUFF: 160; 4.5 AEROSOL RESPIRATORY (INHALATION) at 08:08

## 2023-06-01 RX ADMIN — IPRATROPIUM BROMIDE AND ALBUTEROL SULFATE 1 AMPULE: .5; 3 SOLUTION RESPIRATORY (INHALATION) at 16:50

## 2023-06-01 RX ADMIN — IPRATROPIUM BROMIDE AND ALBUTEROL SULFATE 1 AMPULE: .5; 3 SOLUTION RESPIRATORY (INHALATION) at 08:02

## 2023-06-01 RX ADMIN — ASPIRIN 325 MG: 325 TABLET, COATED ORAL at 21:11

## 2023-06-01 RX ADMIN — MULTIPLE VITAMINS W/ MINERALS TAB 1 TABLET: TAB at 08:18

## 2023-06-01 ASSESSMENT — PAIN SCALES - GENERAL
PAINLEVEL_OUTOF10: 9
PAINLEVEL_OUTOF10: 9

## 2023-06-01 ASSESSMENT — PAIN DESCRIPTION - LOCATION
LOCATION: HIP
LOCATION: HIP

## 2023-06-01 ASSESSMENT — PAIN - FUNCTIONAL ASSESSMENT: PAIN_FUNCTIONAL_ASSESSMENT: ACTIVITIES ARE NOT PREVENTED

## 2023-06-01 ASSESSMENT — PAIN DESCRIPTION - DESCRIPTORS: DESCRIPTORS: SORE

## 2023-06-01 ASSESSMENT — PAIN DESCRIPTION - ORIENTATION
ORIENTATION: RIGHT
ORIENTATION: RIGHT

## 2023-06-01 ASSESSMENT — PAIN SCALES - WONG BAKER: WONGBAKER_NUMERICALRESPONSE: 2

## 2023-06-01 NOTE — OR NURSING
PROCEDURE PERFORMED: Right Thoracentesis     PRIMARY INDICATION FOR PROCEDURE: Pleural Effusion     INFORMED CONSENT:  Obtained prior to procedure. Consent placed in chart. PT TRANSPORTED FROM:   2024                         TO THE IR ROOM:    Small                     ARRIVED TO ROOM: [facility]    ASSESSMENT: Patient arrived for IR procedure, patient alert and oriented, on 4L HFNC, VSS. DR. Serena Lindsay Arrived @ [address] [place] [address] Ne:               Pt positioned for procedure, warm blankets given. Pt on Cardiac Monitor. Pt prepped and draped in a sterile fashion with chlorhexadine. TIME OUT:  0918    PROCEDURE STARTED: 0918    PAIN/LOCAL ANESTHESIA/SEDATION MANAGEMENT:           Local: Lidocaine 1% given by Dr. Serena Lindsay  @ 1097              INTRAOPERATIVE:           ACCESS TIME: 0920          US/FLUORO: 2          CATHETER USED: One- Step           ACCESS [id number]    STERILE DRESSINGS: Gauze and Tega derm    SPECIMENS: 750 mL Clear yellow    EBL:  <1 cc    FOLLOW- UP X-RAY: Stat Ordered     PROCEDURE ENDED: 8072    COMPLICATIONS/ OUTCOME: Patient tolerated procedure well, vss, alert and oriented.         LEFT THE ROOM: 7557    REPORT GIVEN TO: Stephy Marquez bedside RN at 8557

## 2023-06-01 NOTE — CARE COORDINATION
LAURA sent a PS to Dr Norma Yun to see if pt will be discharged today. Pt has a pneumotharax and will not discharge today. CM notified the facility.

## 2023-06-01 NOTE — CONSULTS
Subjective:   CHIEF COMPLAINT / HPI:  79year old male with confusion,hypoxemia and sob. He came with right femoral neck fracture after a fall and had surgery done. 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 On 7/22/16. Dr Kenrick Garcia at [facility] 6-    Dr. Duong Carrillo    COPD (chronic obstructive pulmonary disease) ([address] 5/19/2014    PFTs showed severe COPD in 5/14- follows with Dr Polly Fields    COPD exacerbation [facility]) 6/10/2016    Diverticulosis 9-    Dupuytren's contracture 5/26/2017    Right palm    Elevated PSA 12/21/2018 12/2018 : PSA is 6    H/O colonoscopy 9-18-14    Dr. Duong Carrillo    H/O echocardiogram 08/17/2016    EF 55-60%. Abnormal diastolic function. Mild TR. Borderline right atrium and RV dilatation. Hiatal hernia     History of alcohol abuse     \"average 6 pk beer per day\"    History of drug abuse ([address]     does use marijuana\"last used cocaine over 10 yrs ago\" per pt on 10/3/2018    Internal hemorrhoids 9-    Intervertebral disc protrusion 5/19/2014    Cervical region on MRI    Lingular pneumonia 4/3/2018    Lymphocytic colitis 9-18-14    Dr. Duong Carrillo, will treat if symptomatic    On home oxygen therapy     2l/nc at night only    Pneumonia 11/11/2021    Pneumonia of left lower lobe due to infectious organism 11/27/2018    Sepsis due to pneumonia ([address] 6/10/2016    Spinal stenosis in cervical region 5/19/2014    MRI showed  Disc protrusion. Severe spinal stenosis at C3-C4    Spinal stenosis of cervical region 3/14/2016    S/P cervical discectomy and fusion with plate and screw. On 7/22/16.  Dr Kenrick Garcia at [address] [place] [address] abuse disorder 5/19/2014    Wears glasses     to read       Past Surgical

## 2023-06-02 ENCOUNTER — APPOINTMENT (OUTPATIENT)
Dept: GENERAL RADIOLOGY | Age: 68
DRG: 480 | End: 2023-06-02
Attending: INTERNAL MEDICINE
Payer: MEDICARE

## 2023-06-02 VITALS
BODY MASS INDEX: 17.54 KG/M2 | DIASTOLIC BLOOD PRESSURE: 71 MMHG | TEMPERATURE: 99.1 F | SYSTOLIC BLOOD PRESSURE: 105 MMHG | HEART RATE: 95 BPM | HEIGHT: 66 IN | RESPIRATION RATE: 24 BRPM | WEIGHT: 109.13 LBS | OXYGEN SATURATION: 96 %

## 2023-06-02 LAB
ANION GAP SERPL CALCULATED.3IONS-SCNC: 4 MMOL/L (ref 4–16)
BASOPHILS ABSOLUTE: 0 K/CU MM
BASOPHILS RELATIVE PERCENT: 0 % (ref 0–1)
BUN SERPL-MCNC: 15 MG/DL (ref 6–23)
CALCIUM SERPL-MCNC: 8.3 MG/DL (ref 8.3–10.6)
CHLORIDE BLD-SCNC: 94 MMOL/L (ref 99–110)
CO2: 41 MMOL/L (ref 21–32)
CREAT SERPL-MCNC: 0.4 MG/DL (ref 0.9–1.3)
DIFFERENTIAL TYPE: ABNORMAL
EOSINOPHILS ABSOLUTE: 0 K/CU MM
EOSINOPHILS RELATIVE PERCENT: 0 % (ref 0–3)
GFR SERPL CREATININE-BSD FRML MDRD: >60 ML/MIN/1.73M2
GLUCOSE SERPL-MCNC: 154 MG/DL (ref 70–99)
HCT VFR BLD CALC: 32.4 % (ref 42–52)
HEMOGLOBIN: 9.7 GM/DL (ref 13.5–18)
IMMATURE NEUTROPHIL %: 0.3 % (ref 0–0.43)
LYMPHOCYTES ABSOLUTE: 0.2 K/CU MM
LYMPHOCYTES RELATIVE PERCENT: 2.6 % (ref 24–44)
MCH RBC QN AUTO: 29.2 PG (ref 27–31)
MCHC RBC AUTO-ENTMCNC: 29.9 % (ref 32–36)
MCV RBC AUTO: 97.6 FL (ref 78–100)
MONOCYTES ABSOLUTE: 0.6 K/CU MM
MONOCYTES RELATIVE PERCENT: 7 % (ref 0–4)
NUCLEATED RBC %: 0 %
PDW BLD-RTO: 13.9 % (ref 11.7–14.9)
PLATELET # BLD: 245 K/CU MM (ref 140–440)
PMV BLD AUTO: 9.9 FL (ref 7.5–11.1)
POTASSIUM SERPL-SCNC: 4.7 MMOL/L (ref 3.5–5.1)
RBC # BLD: 3.32 M/CU MM (ref 4.6–6.2)
SARS-COV-2 RDRP RESP QL NAA+PROBE: NOT DETECTED
SEGMENTED NEUTROPHILS ABSOLUTE COUNT: 8.2 K/CU MM
SEGMENTED NEUTROPHILS RELATIVE PERCENT: 90.1 % (ref 36–66)
SODIUM BLD-SCNC: 139 MMOL/L (ref 135–145)
SOURCE: NORMAL
TOTAL IMMATURE NEUTOROPHIL: 0.03 K/CU MM
TOTAL NUCLEATED RBC: 0 K/CU MM
WBC # BLD: 9.1 K/CU MM (ref 4–10.5)

## 2023-06-02 PROCEDURE — 80048 BASIC METABOLIC PNL TOTAL CA: CPT

## 2023-06-02 PROCEDURE — 2700000000 HC OXYGEN THERAPY PER DAY

## 2023-06-02 PROCEDURE — 94660 CPAP INITIATION&MGMT: CPT

## 2023-06-02 PROCEDURE — 6370000000 HC RX 637 (ALT 250 FOR IP): Performed by: STUDENT IN AN ORGANIZED HEALTH CARE EDUCATION/TRAINING PROGRAM

## 2023-06-02 PROCEDURE — 6360000002 HC RX W HCPCS: Performed by: STUDENT IN AN ORGANIZED HEALTH CARE EDUCATION/TRAINING PROGRAM

## 2023-06-02 PROCEDURE — 36415 COLL VENOUS BLD VENIPUNCTURE: CPT

## 2023-06-02 PROCEDURE — 71045 X-RAY EXAM CHEST 1 VIEW: CPT

## 2023-06-02 PROCEDURE — 94640 AIRWAY INHALATION TREATMENT: CPT

## 2023-06-02 PROCEDURE — 87635 SARS-COV-2 COVID-19 AMP PRB: CPT

## 2023-06-02 PROCEDURE — 85025 COMPLETE CBC W/AUTO DIFF WBC: CPT

## 2023-06-02 PROCEDURE — 2580000003 HC RX 258: Performed by: STUDENT IN AN ORGANIZED HEALTH CARE EDUCATION/TRAINING PROGRAM

## 2023-06-02 PROCEDURE — 99211 OFF/OP EST MAY X REQ PHY/QHP: CPT

## 2023-06-02 PROCEDURE — 94761 N-INVAS EAR/PLS OXIMETRY MLT: CPT

## 2023-06-02 RX ORDER — ASPIRIN 325 MG
325 TABLET, DELAYED RELEASE (ENTERIC COATED) ORAL 2 TIMES DAILY
Qty: 30 TABLET | Refills: 3 | Status: SHIPPED | OUTPATIENT
Start: 2023-06-02

## 2023-06-02 RX ORDER — GUAIFENESIN 200 MG/10ML
200 LIQUID ORAL EVERY 4 HOURS PRN
Qty: 30 ML | Refills: 0 | Status: SHIPPED | OUTPATIENT
Start: 2023-06-02 | End: 2023-06-12

## 2023-06-02 RX ORDER — LANOLIN ALCOHOL/MO/W.PET/CERES
100 CREAM (GRAM) TOPICAL DAILY
Qty: 30 TABLET | Refills: 3 | Status: SHIPPED | OUTPATIENT
Start: 2023-06-03

## 2023-06-02 RX ORDER — OXYCODONE HYDROCHLORIDE 5 MG/1
5 TABLET ORAL EVERY 4 HOURS PRN
Qty: 12 TABLET | Refills: 0 | Status: SHIPPED | OUTPATIENT
Start: 2023-06-02 | End: 2023-06-05

## 2023-06-02 RX ADMIN — SODIUM CHLORIDE, PRESERVATIVE FREE 10 ML: 5 INJECTION INTRAVENOUS at 08:34

## 2023-06-02 RX ADMIN — ASPIRIN 325 MG: 325 TABLET, COATED ORAL at 08:33

## 2023-06-02 RX ADMIN — IPRATROPIUM BROMIDE AND ALBUTEROL SULFATE 1 AMPULE: .5; 3 SOLUTION RESPIRATORY (INHALATION) at 15:21

## 2023-06-02 RX ADMIN — BUDESONIDE AND FORMOTEROL FUMARATE DIHYDRATE 2 PUFF: 160; 4.5 AEROSOL RESPIRATORY (INHALATION) at 07:23

## 2023-06-02 RX ADMIN — OXYCODONE HYDROCHLORIDE 10 MG: 10 TABLET ORAL at 00:55

## 2023-06-02 RX ADMIN — Medication 100 MG: at 08:33

## 2023-06-02 RX ADMIN — OXYCODONE HYDROCHLORIDE 10 MG: 10 TABLET ORAL at 15:43

## 2023-06-02 RX ADMIN — MULTIPLE VITAMINS W/ MINERALS TAB 1 TABLET: TAB at 08:33

## 2023-06-02 RX ADMIN — IPRATROPIUM BROMIDE AND ALBUTEROL SULFATE 1 AMPULE: .5; 3 SOLUTION RESPIRATORY (INHALATION) at 07:23

## 2023-06-02 RX ADMIN — IPRATROPIUM BROMIDE AND ALBUTEROL SULFATE 1 AMPULE: .5; 3 SOLUTION RESPIRATORY (INHALATION) at 11:09

## 2023-06-02 RX ADMIN — OXYCODONE HYDROCHLORIDE 5 MG: 10 TABLET ORAL at 08:33

## 2023-06-02 RX ADMIN — METHYLPREDNISOLONE SODIUM SUCCINATE 40 MG: 125 INJECTION, POWDER, FOR SOLUTION INTRAMUSCULAR; INTRAVENOUS at 08:33

## 2023-06-02 ASSESSMENT — PAIN SCALES - GENERAL
PAINLEVEL_OUTOF10: 6
PAINLEVEL_OUTOF10: 9

## 2023-06-02 ASSESSMENT — PAIN DESCRIPTION - LOCATION
LOCATION: HIP
LOCATION: HIP

## 2023-06-02 ASSESSMENT — PAIN DESCRIPTION - ORIENTATION
ORIENTATION: RIGHT
ORIENTATION: RIGHT

## 2023-06-02 NOTE — PROGRESS NOTES
05/31/23 2258   NIV Type   NIV Started/Stopped On   Equipment Type v60   Mode Bilevel   Mask Type Full face mask   Mask Size Large   Bonnet size Large   Settings/Measurements   PIP Observed 20 cm H20   IPAP 20 cmH20   CPAP/EPAP 10 cmH2O   Vt (Measured) 334 mL   Rate Ordered 12   Respirations 26   FiO2  50 %   I Time/ I Time % 1.05 s   Minute Volume (L/min) 7.5 Liters   Mask Leak (lpm) 46 lpm   Comfort Level Fair   Using Accessory Muscles No   SpO2 98   Patient's Home Machine No   Alarm Settings   Alarms On Y   Low Pressure (cmH2O) 5 cmH2O   High Pressure (cmH2O) 30 cmH2O   Delay Alarm 20 sec(s)   Apnea (secs) 20 secs   RR Low (bpm) 12   RR High (bpm) 40 br/min   Oxygen Therapy/Pulse Ox   Pulse 100   SpO2 95 %
Called IR and notified Estela Ruffin that the patient's Chest X-Ray shows a small to moderate right pneumothorax status post-thoracentesis.
Comprehensive Nutrition Assessment    Type and Reason for Visit:  Reassess    Nutrition Recommendations/Plan:   Continue current diet and supplements     Malnutrition Assessment:  Malnutrition Status:  Insufficient data (05/25/23 1113)    Context:  Chronic Illness       Nutrition Assessment:    Pt is now consuming 100% of his meals. Pt is at low risk at this time. Nutrition Related Findings:    +lactated ringers, thiamine, MVI; glucose  Wound Type:  (traumatic wound to elbow + hip fx)       Current Nutrition Intake & Therapies:    Average Meal Intake: %  Average Supplements Intake: Unable to assess  ADULT DIET; Regular; Low Fat/Low Chol/High Fiber/SANIA  ADULT ORAL NUTRITION SUPPLEMENT; Breakfast, Lunch; Standard High Calorie/High Protein Oral Supplement    Anthropometric Measures:  Height: 5' 6\" (167.6 cm)  Ideal Body Weight (IBW): 142 lbs (65 kg)    Admission Body Weight: 100 lb (45.4 kg) (stated)  Current Body Weight: 97 lb 3.6 oz (44.1 kg),   IBW.  Weight Source: Bed Scale  Current BMI (kg/m2): 15.7  Usual Body Weight: 110 lb (49.9 kg) (11/29/22, stated)  % Weight Change (Calculated): -11.6  Weight Adjustment For: No Adjustment                 BMI Categories: Underweight (BMI less than 22) age over 72    Estimated Daily Nutrient Needs:  Energy Requirements Based On: Kcal/kg  Weight Used for Energy Requirements: Current  Energy (kcal/day): 3815-1063 (30-35kcal/kg CBW)  Weight Used for Protein Requirements: Ideal  Protein (g/day): 77-90 (1.2-1.4g/kg IBW)  Method Used for Fluid Requirements: 1 ml/kcal  Fluid (ml/day): 1500    Nutrition Diagnosis:   Inadequate oral intake related to increase demand for energy/nutrients as evidenced by NPO or clear liquid status due to medical condition, BMI (surgery)    Nutrition Interventions:   Food and/or Nutrient Delivery: Continue Current Diet, Continue Oral Nutrition Supplement  Nutrition Education/Counseling: Education not indicated  Coordination of Nutrition
Contacted Dr. Marques Alberto regarding change in pt condition, received order for stat Venous blood gas. Updated assigned nurse and change nurse.
Notified Dr. Kyung Gould that chest xray shows slight decrease in right pneumothorax
Occupational Therapy Treatment Note  Name: Timi Ortiz MRN: [id number] :   1955   Date:  2023   Admission Date: 2023 Room:  -A     Primary Problem:    [place]:  There were no encounter diagnoses. Patient  has a past medical history of Arthritis, Bacterial pneumonia, Cervical abnormality, Cervical radiculopathy, Colon polyps, COPD (chronic obstructive pulmonary disease) ([facility] [address] COPD exacerbation ([facility] [address] Diverticulosis, Dupuytren's contracture, Elevated PSA, H/O colonoscopy, H/O echocardiogram, Hiatal hernia, History of alcohol abuse, History of drug abuse ([facility] [address] Internal hemorrhoids, Intervertebral disc protrusion, Lingular pneumonia, Lymphocytic colitis, On home oxygen therapy, Pneumonia, Pneumonia of left lower lobe due to infectious organism, Sepsis due to pneumonia [facility]), Spinal stenosis in cervical region, Spinal stenosis of cervical region, Tobacco abuse disorder, and Wears glasses. Patient  has a past surgical history that includes Sigmoidoscopy; Dental surgery (); Cervical spine surgery (); Colonoscopy; Upper gastrointestinal endoscopy (10/04/2018); Colonoscopy (10/04/2018); polypectomy (10/04/2018); Upper gastrointestinal endoscopy (N/A, 10/4/2018); Colonoscopy (N/A, 10/4/2018); and hip surgery (Right, 2023). Communication with other providers:  consulted with nursing and respiratory therapy for pt respiratory needs- removal of bipap and transitioned to NC 4L O2    Subjective:  Patient states:  Lazarus Duhamel me the walker\"  Pain: denies  Restrictions: general precautions, fall risk, PWB (50%) RLE    Objective:    Observation:  pt was semi fowlers in bed upon arrival, agreeable to session. Bipap transitioned to 4L O2 NC, tele  Objective Measures:  /65 with pt seated EOB- complaint of dizziness. HR inc to 110s and O2 ranging from 90-84%    Treatment, including education:    Therapeutic Activity Training   Therapeutic activity training was instructed today.   Cues were
Patient Room #: 2024/2024-A  Patient Name: Oly Cash NIV Evaluation / Orders  1. Notify Pulmonary Diagnostics of order to evaluate for home NIV. 2. Perform the following tests at any point before discharge. [x] Perform an Overnight Oximetry while the patient is on at least                  2 lpm of oxygen. The saturation level must be <  88% for > 5                   cumulative minutes to qualify. [x] Arterial puncture (ABG) for blood gas analysis done while awake. Qualifying result is a PaCO2 >   52 mmHg    3. [x]  I have documented in a progress note that SP is not the primary cause of hypercapnia in this patient. CPAP will not effectively treat this patient hypercapnia. BIPA Therapy will benefit and more effectively treat the hypercapnia. Results Documentation    (Attach a copy of Reports)  1. ABG Results       Date __06/01/2023___  PaCO2 __87___ mmHg on _6__ lpm oxygen    2. Oximetry Level _________% for ___________ minutes on ________ lpm oxygen    3. [] Patient Qualifies      [] Patient Does NOT qualify      Complete order below:  Diagnosis COPD___           Length of Need: [] Lifetime  Home NIV () at:    Inspiratory Setting __________ cm H2O         Expiratory Setting _________ cm H2O    Therapist Signature: __________________________________Date: _____________    Physician Signature:   Electronically Signed in EMR_______  Date: _____________    Physician Printed Name: Palmer Frankel, MD  Provider ID: [id number]  NPI:  [id number]
Per Dr Julissa Gonzalez, no evidence of residual pneumo on chest X-Ray. No need for chest tube at this time. Notified Juancho Mcmillan RN on [facility]. IR to complete consult at this time.
Physical Therapy  1420 Attempted to see pt for therapy, pt states he is ready to get back onto the bi-pap. Offered therapy of ex or trans. Pt stated he really needed the bi-pap. Called nursing  and RT. Will cont. Lolita Figueredo. Enio PTA    [phone], nursing answered pt's call light and he wanted PT. Name: Joann Kim MRN: [id number] :   1955   Date:  2023   Admission Date: 2023 Room:  -A   Restrictions/Precautions:        fall risk,  Communication with other providers:  Epi Gore RN states pt is ok to see for therapy  Subjective:  Patient states:  he is interested in doing bed ex since sitting and standing didn't go well this am  Pain:   Location, Type, Intensity (0/10 to 10/10):  8/10 R hip  Objective:    Observation:  pt was in the bed, just off of bi-pap and onto NC  Treatment, including education/measures:  Supine Exercises: pt needed rest between sets but did ex bilat  Ankle pumps x 10  Quad sets x 10  Glute sets x 10  Heel slides x 10  Hip abd x 10  SAQ's x 10  Pillow squeezes x 10  Therapeutic Exercise:  Therapeutic exercises were instructed today. Cues were given for technique, safety, recruitment, and rationale. Cues were verbal and/or tactile. Trans pt to [facility] with depend A of 2, pt repositioned with pillows  Safety  Patient left safely in the bed, with call light/phone in reach with alarm applied.  Assessment / Impression:     Patient's tolerance of treatment:  good, had good breathing technique with ex   Adverse Reaction: none  Significant change in status and impact:  none  Barriers to improvement:  sob, weakness, endurance  Plan for Next Session:    Will cont to work towards pt's goals per his tolerance  Time in:  1455  Time out:  1518  Timed treatment minutes:  23  Total treatment time:  23  Previously filed items:  Social/Functional History  Lives With: Alone  Type of Home: House  Home Layout: One level  Home Access: Stairs to enter with rails  Entrance Stairs - Number of Steps:
Physical Therapy  Att. Per nurse ok to tx if pt not on BiPAP. Respiratory in room to apple BiPAP and pt declined tx session requesting BiPAP. Will try back if schedule allows.
Physical Therapy  Name: Dahlia Lagunas MRN: [id number] :   1955   Date:  2023   Admission Date: 2023 Room:  -A   Restrictions/Precautions:         PWB 50% RLE, fall risk. Communication with other providers:  Mya BARR states pt is ok to see for therapy  Subjective:  Patient states:  he will do some ex   Pain:   Location, Type, Intensity (0/10 to 10/10):  7/10 R hip  Objective:    Observation:  pt was in the bed with his gown off his shoulders d/t being hot  Treatment, including education/measures:  Supine Exercises: pt needed 2 rest breaks for sob with activity and increased HR to 113  Ankle pumps x 10  Quad sets x 10  Glute sets x 10  Heel slides x 10  Hip abd x 10  Therapeutic Exercise:  Therapeutic exercises were instructed today. Cues were given for technique, safety, recruitment, and rationale. Cues were verbal and/or tactile. Vital Signs  HR: 93, B/P 118/79, O2 95% on 6L of NC O2  Transfers with line management of tele, pt needs rest breaks for pain and sob  Supine to sit :SBA   Scooting :SBA with time d/t sob and pain  Sit to stand :attempted to give min A of 1 and pt insisted on doing it himself with CGA  Stand to sit :CGA and VC's for safety  Gait:  Pt amb with RW for 3 ft with CGA  Pt needed VC's for 50% WB on the R LE, unsure of compliance with WB'ing  Gait Comments: with VC's' and TC's for B LE placement, walker placement and sequence throughout ambulation; with VC's and TC's to maintain upright posture in order to avoid COM shifting outside of MINE; with VC's for PLB throughout ambulation  Safety  Patient left safely in the chair, with call light/phone in reach No alarm applied d/t one in room is missing pieces Gait belt was used for transfers and gait.   Assessment / Impression:     Patient's tolerance of treatment:  good   Adverse Reaction: none  Significant change in status and impact:  none  Barriers to improvement:  sob with activity, difficulty maintaining 50% WB'ing
Physical Therapy  Name: Matt Freed MRN: [id number] :   1955   Date:  2023   Admission Date: 2023 Room:  -A   Restrictions/Precautions:        fall risk, PWB of  50% on R LE  Communication with other providers:  Suzanne Moseley RN states pt is ok to see for therapy, just returned from IR, co-treat with OT d/t limited endurance  Subjective:  Patient states:  he will get up in the chair  Pain:   Location, Type, Intensity (0/10 to 10/10):  8/10 R hip/thigh  Objective:    Observation:  pt was resting in the bed   Treatment, including education/measures:   Exercises: Ankle pumps x 30  LAQ's x 15  Marching x 15   Therapeutic Exercise:  Therapeutic exercises were instructed today. Cues were given for technique, safety, recruitment, and rationale. Cues were verbal and/or tactile. Transfers with line management of tele, O2  Rolling: SBA with extra time and bed features  Supine to sit :SBA with extra time and bed features  Scooting :SBA with extra time and bed features  Sit to stand :CGA and vc's for hand placements. Pt prefers no assist for standing  Stand to sit :CGA with VC's for hand placements  Gait:  Pt amb with RW for 4 steps with good use of arms for 50% weight bearing on the R LE with CGA  Gait Comments: with VC's' and TC's for B LE placement, walker placement and sequence throughout ambulation; with VC's and TC's to maintain upright posture in order to avoid COM shifting outside of MINE; with VC's for PLB throughout ambulation. Safety  Patient left safely in the chair, with call light/phone in reach with alarm applied. Gait belt was used for transfers and gait.   Assessment / Impression:     Patient's tolerance of treatment:  good, requires extra time for sob with activity   Adverse Reaction: none  Significant change in status and impact:  pt complying better with 50% WB  Barriers to improvement:  sob, endurance, weakness, pain  Plan for Next Session:    Will cont to work towards pt's goals per
Pt being d/c to SNF. CESAR completed and AVS printed out and placed in packet for transport. PIV removed per policy. Report called to Holden Vázquez RN at [facility] and callback number provided for any additional questions.
Pt identified as a candidate for COPD Gold assessment. Patient does not have Pulmonary Function testing on record. When patient is discharged and able to complete PFT testing as an out-patient, they would benefit from baseline PFT testing and COPD Gold could be assessed if patient were to have readmission at a later date.
[place]2.0  [facility] [place]      Name:  Too Serrano /Age/Sex: 1955  (79 y.o. male)   MRN & CSN:  [id number] & [id number] Encounter Date/Time: 2023 3:07 PM EDT    Location:  -A PCP: Arielle Ronquillo MD       Hospital Day: 8    Assessment and Plan:   Too Serrano is a 79 y.o. male with pmh of severe COPD and PEM who presents with Hip fracture requiring operative repair, right, closed, initial encounter ([facility] [address]      Acute closed right femoral neck fracture  - Endorsed having fall at home morning  while sleeping on a couch.   - BMI 15.7. Imaging showing acute right femoral neck fracture. -s/p R hip pinning POD 6,  pain well controlled  - DEXA scan as outpatient     Severe COPD with chronic hypoxemic respiratory failure in mild exacerbation   - PFTs showing severe obstruction in . On 2.5-3.5 L O2 NC at home. - CXR with mild fluid overload R > L on  on 5 L o2 via NC, Continue Symbicort and DuoNebs. S/p prednisone 40 mg for 5 days s/p azithromycin for 3 days for mild copd exacerbation  -Overnight patient became hypercapneic was slightly confused last evening, was placed on BIPAP with improvement in mentation, remains on 5 L O2 via NC in AM repeat CXR, consult pulmonology and start IV solu medrol  -Receive lasix x 2 on  and      Alcohol abuse  - Hx of drinking at least 2 beers daily. Unable to answer if he has history of withdrawals or seizures. Last drink on morning . social work on board for [facility]  -Start librium taper on 10 mg TID for now      Severe protein calorie malnutrition  - Baseline Alb 3.0-3.2, BMI 15.7.  - Will likely benefit from LTC.  - Add ensure with every meals, has lost 15 lbs in last month    Diet ADULT DIET;  Regular; Low Fat/Low Chol/High Fiber/SANIA  ADULT ORAL NUTRITION SUPPLEMENT; Breakfast, Lunch; Standard High Calorie/High Protein Oral Supplement   DVT Prophylaxis [] Lovenox, []  Heparin, [] SCDs, [] Ambulation,  [] Eliquis, [] Xarelto
V2.0  [facility] [place]      Name:  Joann Kim /Age/Sex: 1955  (79 y.o. male)   MRN & CSN:  [id number] & [id number] Encounter Date/Time: 2023 3:07 PM EDT    Location:  -A PCP: Chandler Johnson MD       Hospital Day: 8    Assessment and Plan:   Joann Kim is a 79 y.o. male with pmh of severe COPD and PEM who presents with Hip fracture requiring operative repair, right, closed, initial encounter ([facility] [address]      Acute closed right femoral neck fracture  - Endorsed having fall at home morning  while sleeping on a couch.   - BMI 15.7. Imaging showing acute right femoral neck fracture. -s/p R hip pinning POD 5, pain control as per ortho  - DEXA scan as outpatient     Severe COPD with chronic hypoxemic respiratory failure in mild exacerbation   - PFTs showing severe obstruction in . On 2.5-3.5 L O2 NC at home.  - Repeat CXR with mild fluid overload R > L. on 5 L o2 via NC,Continue Symbicort and DuoNebs. prednisone 40 mg PO daily for 5 days, s/p azithromycin for 3 days for mild copd exacerbation   -Fluid overload 2/2 IV fluid resucitation post op, was given iv lasix 40 x 1 yesterday repeat dose today, made about 2.4 L urine in last 24 hours     Alcohol abuse  - Hx of drinking at least 2 beers daily. Unable to answer if he has history of withdrawals or seizures. Last drink on morning . social work [facility] board [facility]  -Start librium taper on 10 mg TID for now      Severe protein calorie malnutrition  - Baseline Alb 3.0-3.2, BMI 15.7.  - Will likely benefit from LTC.  - Add ensure with every meals, has lost 15 lbs in last month    Diet ADULT DIET;  Regular; Low Fat/Low Chol/High Fiber/SANIA  ADULT ORAL NUTRITION SUPPLEMENT; Breakfast, Lunch; Standard High Calorie/High Protein Oral Supplement   DVT Prophylaxis [] Lovenox, []  Heparin, [] SCDs, [] Ambulation,  [] Eliquis, [] Xarelto  [] Coumadin    Code Status Full Code   Disposition From: Home  Expected Disposition: TBD may
V2.[place]  [facility] [place]      Name:  Peace Woodson /Age/Sex: 1955  (79 y.o. male)   MRN & CSN:  [id number] & [id number] Encounter Date/Time: 2023 3:07 PM EDT    Location:  -A PCP: Taylor Good MD       Hospital Day: 9    Assessment and Plan:   Peace Woodson is a 79 y.o. male with pmh of severe COPD and PEM who presents with Hip fracture requiring operative repair, right, closed, initial encounter ([facility] [address]      Acute closed right femoral neck fracture  - Endorsed having fall at home morning  while sleeping on a couch.   - BMI 15.7. Imaging showing acute right femoral neck fracture. -s/p R hip pinning POD 7,  pain well controlled  - DEXA scan as outpatient     Severe COPD with chronic hypoxemic respiratory failure in mild exacerbation   Rt pleural effusion - s/p thoracentesis   Pneumothorax - Small to moderate right-sided pneumothorax status post right thoracentesis  - PFTs showing severe obstruction in . On 2.5-3.5 L O2 NC at home. - CXR with mild fluid overload R > L on  on 5 L o2 via NC, Continue Symbicort and DuoNebs. S/p prednisone 40 mg for 5 days s/p azithromycin for 3 days for mild copd exacerbation  -Overnight patient became hypercapneic was slightly confused last evening , was placed on BIPAP with improvement in mentation, remains on 5 L O2 via NC in AM, consult pulmonology and start IV solu medrol  -Receive lasix x 2 on  and   -CXR  with Small to moderate right-sided pneumothorax status post right thoracentesis. No evidence for tension.  -thoracentesis  with 750 mL Clear yellow fluid, studies sent  -IR consulted for chest tube, appreciate help, NPO      Alcohol abuse  - Hx of drinking at least 2 beers daily. Unable to answer if he has history of withdrawals or seizures. Last drink on morning .    social work on board for [employer] rehab  -Start librium taper on 10 mg TID for now      Severe protein calorie malnutrition  - Baseline Alb
V2.0  [facility] [place]      Name:  Trip Hudson /Age/Sex: 1955  (79 y.o. male)   MRN & CSN:  [id number] & [id number] Encounter Date/Time: 2023 3:07 PM EDT    Location:  -A PCP: Jennifer Boss MD       Hospital Day: 8    Assessment and Plan:   Trip Hudson is a 79 y.o. male with pmh of severe COPD and PEM who presents with Hip fracture requiring operative repair, right, closed, initial encounter ([facility] [address]      Acute closed right femoral neck fracture  - Endorsed having fall at home morning  while sleeping on a couch.   - BMI 15.7. Imaging showing acute right femoral neck fracture. -s/p R hip pinning POD 5, pain control as per ortho  - DEXA scan as outpatient     Severe COPD with chronic hypoxemic respiratory failure in mild exacerbation   - PFTs showing severe obstruction in . On 2.5-3.5 L O2 NC at home.  - Repeat CXR with mild fluid overload R > L. on 5 L o2 via NC,Continue Symbicort and DuoNebs. prednisone 40 mg PO daily for 5 days, s/p azithromycin for 3 days for mild copd exacerbation   -Fluid overload 2/2 IV fluid resucitation post op, was given iv lasix 40 x 1 yesterday repeat dose today, made about 2.4 L urine in last 24 hours     Alcohol abuse  - Hx of drinking at least 2 beers daily. Unable to answer if he has history of withdrawals or seizures. Last drink on morning . social work [facility]  -Start librium taper on 10 mg TID for now      Severe protein calorie malnutrition  - Baseline Alb 3.0-3.2, BMI 15.7.  - Will likely benefit from LTC.  - Add ensure with every meals, has lost 15 lbs in last month    Diet ADULT DIET;  Regular; Low Fat/Low Chol/High Fiber/SANIA  ADULT ORAL NUTRITION SUPPLEMENT; Breakfast, Lunch; Standard High Calorie/High Protein Oral Supplement   DVT Prophylaxis [] Lovenox, []  Heparin, [] SCDs, [] Ambulation,  [] Eliquis, [] Xarelto  [] Coumadin    Code Status Full Code   Disposition From: Home  Expected Disposition: TBD may
V2.0  [facility] [place] Hospitalist Progress Note      Name:  Johnson Pina /Age/Sex: 1955  (79 y.o. male)   MRN & CSN:  [id number] & [id number] Encounter Date/Time: 2023 3:07 PM EDT    Location:  -A PCP: Lizzeth Cid MD       Hospital Day: 6    Assessment and Plan:   Johnson Pina is a 79 y.o. male with pmh of severe COPD and PEM who presents with Hip fracture requiring operative repair, right, closed, initial encounter ([facility] [address]      Acute closed right femoral neck fracture  - Endorsed having fall at home morning  while sleeping on a couch.   - BMI 15.7. Imaging showing acute right femoral neck fracture. -s/p R hip pinning POD 4, pain control as per ortho  - DEXA scan as outpatient     Severe COPD with chronic hypoxemic respiratory failure in mild exacerbation   - PFTs showing severe obstruction in . On 2 L NC at home.  - Repeat CXR with mild fluid overload R > L. on 5 L o2 via NC, increased work of breathing ordered VBG , BIPAP and goal 88-92%. Continue Symbicort and DuoNebs. prednisone 40 mg PO daily for 5 days, s/p azithromycin for 3 days for mild copd exacerbation   -Fluid overload 2/2 IV fluid resucitation post op, was given iv lasix 40 x 1 yesterday repeat dose today, made about 4 L uirne in last 24 hours     Alcohol abuse  - Hx of drinking at least 2 beers daily. Unable to answer if he has history of withdrawals or seizures. Last drink on morning .  - continue CIWA protocol, social work on board for [facility] rehab  -Start librium taper       Severe protein calorie malnutrition  - Baseline Alb 3.0-3.2, BMI 15.7.  - Will likely benefit from LTC.  - Add ensure with every meals, has lost 15 lbs in last month    Diet ADULT DIET;  Regular; Low Fat/Low Chol/High Fiber/SANIA  ADULT ORAL NUTRITION SUPPLEMENT; Breakfast, Lunch; Standard High Calorie/High Protein Oral Supplement   DVT Prophylaxis [] Lovenox, []  Heparin, [] SCDs, [] Ambulation,  [] Eliquis, [] Xarelto  [] Coumadin    Code Status Full Code
pulmonary      SUBJECTIVE:  he feels better     OBJECTIVE    VITALS:  /71   Pulse 96   Temp 98.5 °F (36.9 °C) (Oral)   Resp 20   Ht 5' 6\" (1.676 m)   Wt 109 lb 2 oz (49.5 kg)   SpO2 95%   BMI 17.61 kg/m²   HEAD AND FACE EXAM:  No throat injection, no active exudate,no thrush  NECK EXAM;No JVD, no masses, symmetrical  CHEST EXAM; Expansion equal and symmetrical, no masses  LUNG EXAM; Good breath sounds bilaterally.  There are expiratory wheezes both lungs, there are crackles at both lung bases  CARDIOVASCULAR EXAM: Positive S1 and S2, no S3 or S4, no clicks ,no murmurs  RIGHT AND LEFT LOWER EXTRIMITY EXAM: No edema, no swelling, no inflamation  CNS EXAM: Alert and oriented X3          LABS   Lab Results   Component Value Date    WBC 12.3 (H) 05/28/2023    HGB 11.6 (L) 05/28/2023    HCT 40.7 (L) 05/28/2023    .5 (H) 05/28/2023     05/28/2023     Lab Results   Component Value Date    CREATININE 0.2 (L) 05/27/2023    BUN 7 05/27/2023     05/27/2023    K 4.7 05/27/2023    CL 94 (L) 05/27/2023    CO2 43 (H) 05/27/2023     Lab Results   Component Value Date    INR 0.87 06/01/2023    PROTIME 11.0 (L) 06/01/2023          Lab Results   Component Value Date/Time    PHOS 1.5 03/30/2023 09:50 AM    PHOS 3.0 11/13/2022 04:45 AM    PHOS 3.3 11/27/2018 06:00 AM        Recent Labs     06/01/23  0745   PH 7.41   PO2ART 195*   LHJ7DDY 87.0*   O2SAT 97.0         Wt Readings from Last 3 Encounters:   05/29/23 109 lb 2 oz (49.5 kg)   05/24/23 100 lb (45.4 kg)   05/08/23 100 lb 12.8 oz (45.7 kg)               ASSESMENT  Ac on ch resp failure  Ac copd  Mod right pl effusion,s/p 750 ml removed  Right ptx as per radiology        PLAN  D/w pt  Cpm  Home bipap eval  He already has o2 at home  Right ptx as per radiology    6/2/2023  Ozzie Burns MD, M.D.
Patient's tolerance of treatment: Well  Adverse Reaction: None  Significant change in status and impact: Improved from initial evaluation  Barriers to improvement: Activity tolerance, pain       Plan for Next Session:    Cont OT POC    Time in:  1313  Time out:  1340  Timed treatment minutes:  15  Total treatment time:  27      Electronically signed by:     CECILIA Vela,   6/1/2023, 1:36 PM

## 2023-06-02 NOTE — CARE COORDINATION
Pt is being discharged to:    [facility]     Call report to [phone]     Complete & sign the Bhavesh Ba then fax to [phone]    Place AVS & any scripts in the envelope that is in the soft chart.   This is to go with the pt to the facility    [address] [place] [address],  at 16:15   [phone]  After 5 pm & weekends - [phone]    Notify family

## 2023-06-02 NOTE — CONSULTS
[address] [address] Continence Nurse  Consult Note       Matt Freed  AGE: 79 y.o. GENDER: male  : 1955  TODAY'S DATE:  2023    Subjective:     Reason for CWOCN Evaluation and Assessment: wound reassessment      Matt Freed is a 79 y.o. 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 On 16. Dr Chantell Wilkinson at [facility] 2-    Dr. Rod Michaels    COPD (chronic obstructive pulmonary disease) ([facility] [address] 2014    PFTs showed severe COPD in - follows with Dr Kim Richards    COPD exacerbation [facility]) 6/10/2016    Diverticulosis 2014    Dupuytren's contracture 2017    Right palm    Elevated PSA 2018 : PSA is 6    H/O colonoscopy 14    Dr. Rod Michaels    H/O echocardiogram 2016    EF 55-60%. Abnormal diastolic function. Mild TR. Borderline right atrium and RV dilatation. Hiatal hernia     History of alcohol abuse     \"average 6 pk beer per day\"    History of drug abuse ([facility] [address]     does use marijuana\"last used cocaine over 10 yrs ago\" per pt on 10/3/2018    Internal hemorrhoids 2014    Intervertebral disc protrusion 2014    Cervical region on MRI    Lingular pneumonia 4/3/2018    Lymphocytic colitis 14    Dr. Rod Michaels, will treat if symptomatic    On home oxygen therapy     2l/nc at night only    Pneumonia 2021    Pneumonia of left lower lobe due to infectious organism 2018    Sepsis due to pneumonia ([facility] [address] 6/10/2016    Spinal stenosis in cervical region 2014    MRI showed  Disc protrusion.  Severe spinal

## 2023-06-02 NOTE — DISCHARGE SUMMARY
V2.0  Discharge Summary    Name:  Shayy Rubio /Age/Sex: 1955 (11 y.o. male)   Admit Date: 2023  Discharge Date: 23    MRN & CSN:  [id number] & [id number] Encounter Date and Time 23 1:34 PM EDT    Attending:  Tor Magana MD Discharging Provider: Tor Magana MD       Hospital Course:     Brief HPI: Patient is a 80-year-old male with a PMHx of severe COPD (on 2L NC) and severe protein calorie malnutrition who presented to the ED with fall at home morning  while sleeping on a couch. Hx is very limited as patient unable to provide significant amount of details, family unable to be reached over the phone. Hx of drinking at least 2 beers daily. Unable to answer if he has history of withdrawals or seizures. Last drink on morning . Brief Problem Based Course:   Shayy Rubio is a 79 y.o. male with pmh of severe COPD and PEM who presents with Hip fracture requiring operative repair, right, closed, initial encounter ([facility] [address]        Acute closed right femoral neck fracture  - Endorsed having fall at home morning  while sleeping on a couch.   - BMI 15.7. Imaging showing acute right femoral neck fracture. -s/p R hip pinning POD 8,  pain well controlled  - DEXA scan as outpatient     Severe COPD with chronic hypoxemic respiratory failure in mild exacerbation - acute component resolved  Rt pleural effusion - s/p thoracentesis , s/p 750 ml removed  Concern for pneumothorax on CXR  post thora - Small to moderate right-sided pneumothorax status post right thoracentesis, although per IR it is NOT a pneumo and does not need a chest tube it is the lung slowly re-expanding, misread as pneumo  - PFTs showing severe obstruction in . On 2.5-3.5 L O2 NC at home. - CXR with mild fluid overload R > L on  on 5 L o2 via NC, Continue Symbicort and DuoNebs.  S/p prednisone 40 mg for 5 days s/p azithromycin for 3 days for mild copd exacerbation  -Overnight patient became hypercapneic was

## 2023-06-02 NOTE — PLAN OF CARE
Problem: Discharge Planning  Goal: Discharge to home or other facility with appropriate resources  Outcome: Completed     Problem: Safety - Adult  Goal: Free from fall injury  Outcome: Completed     Problem: Skin/Tissue Integrity  Goal: Absence of new skin breakdown  Description: 1. Monitor for areas of redness and/or skin breakdown  2. Assess vascular access sites hourly  3. Every 4-6 hours minimum:  Change oxygen saturation probe site  4. Every 4-6 hours:  If on nasal continuous positive airway pressure, respiratory therapy assess nares and determine need for appliance change or resting period.   Outcome: Completed     Problem: ABCDS Injury Assessment  Goal: Absence of physical injury  Outcome: Completed     Problem: Pain  Goal: Verbalizes/displays adequate comfort level or baseline comfort level  Outcome: Completed     Problem: Nutrition Deficit:  Goal: Optimize nutritional status  Outcome: Completed     Problem: Respiratory - Adult  Goal: Achieves optimal ventilation and oxygenation  Outcome: Completed     Problem: Musculoskeletal - Adult  Goal: Return mobility to safest level of function  Outcome: Completed  Goal: Maintain proper alignment of affected body part  Outcome: Completed  Goal: Return ADL status to a safe level of function  Outcome: Completed

## 2023-06-09 ENCOUNTER — TELEPHONE (OUTPATIENT)
Dept: ORTHOPEDIC SURGERY | Age: 68
End: 2023-06-09

## 2023-06-27 ENCOUNTER — TELEPHONE (OUTPATIENT)
Dept: INTERNAL MEDICINE CLINIC | Age: 68
End: 2023-06-27

## 2023-06-27 NOTE — TELEPHONE ENCOUNTER
Aparna from [facility] called in stating that they would like to add a  to his care team. Please advise.  Thank you [phone]

## 2023-07-02 ENCOUNTER — APPOINTMENT (OUTPATIENT)
Dept: GENERAL RADIOLOGY | Age: 68
DRG: 208 | End: 2023-07-02
Payer: MEDICARE

## 2023-07-02 ENCOUNTER — APPOINTMENT (OUTPATIENT)
Dept: CT IMAGING | Age: 68
DRG: 208 | End: 2023-07-02
Payer: MEDICARE

## 2023-07-02 ENCOUNTER — HOSPITAL ENCOUNTER (INPATIENT)
Age: 68
LOS: 9 days | Discharge: NURSING, REHAB OR LONG-TERM CARE | DRG: 208 | End: 2023-07-11
Attending: EMERGENCY MEDICINE | Admitting: STUDENT IN AN ORGANIZED HEALTH CARE EDUCATION/TRAINING PROGRAM
Payer: MEDICARE

## 2023-07-02 DIAGNOSIS — D64.9 CHRONIC ANEMIA: ICD-10-CM

## 2023-07-02 DIAGNOSIS — J96.02 ACUTE RESPIRATORY FAILURE WITH HYPOXIA AND HYPERCAPNIA (HCC): Primary | ICD-10-CM

## 2023-07-02 DIAGNOSIS — E87.5 HYPERKALEMIA: ICD-10-CM

## 2023-07-02 DIAGNOSIS — G89.29 CHRONIC BACK PAIN, UNSPECIFIED BACK LOCATION, UNSPECIFIED BACK PAIN LATERALITY: ICD-10-CM

## 2023-07-02 DIAGNOSIS — D72.829 LEUKOCYTOSIS, UNSPECIFIED TYPE: ICD-10-CM

## 2023-07-02 DIAGNOSIS — J96.01 ACUTE RESPIRATORY FAILURE WITH HYPOXIA AND HYPERCAPNIA (HCC): Primary | ICD-10-CM

## 2023-07-02 DIAGNOSIS — M54.9 CHRONIC BACK PAIN, UNSPECIFIED BACK LOCATION, UNSPECIFIED BACK PAIN LATERALITY: ICD-10-CM

## 2023-07-02 PROBLEM — Z78.9 ADMITTED WITH PNEUMONIA: Status: ACTIVE | Noted: 2023-07-02

## 2023-07-02 LAB
ALBUMIN SERPL-MCNC: 3.4 GM/DL (ref 3.4–5)
ALCOHOL SCREEN SERUM: <0.01 %WT/VOL
ALP BLD-CCNC: 95 IU/L (ref 40–128)
ALT SERPL-CCNC: 42 U/L (ref 10–40)
ANION GAP SERPL CALCULATED.3IONS-SCNC: 12 MMOL/L (ref 4–16)
ANION GAP SERPL CALCULATED.3IONS-SCNC: 6 MMOL/L (ref 4–16)
AST SERPL-CCNC: 44 IU/L (ref 15–37)
B PARAP IS1001 DNA NPH QL NAA+NON-PROBE: NOT DETECTED
B PERT.PT PRMT NPH QL NAA+NON-PROBE: NOT DETECTED
BACTERIA: NEGATIVE /HPF
BASE EXCESS MIXED: 17.4 (ref 0–1.2)
BASE EXCESS MIXED: 18.9 (ref 0–1.2)
BASE EXCESS MIXED: 20.7 (ref 0–1.2)
BASE EXCESS MIXED: 20.9 (ref 0–1.2)
BASOPHILS ABSOLUTE: 0 K/CU MM
BASOPHILS RELATIVE PERCENT: 0.1 % (ref 0–1)
BILIRUB SERPL-MCNC: 0.3 MG/DL (ref 0–1)
BILIRUBIN URINE: NEGATIVE
BLOOD, URINE: NEGATIVE
BUN SERPL-MCNC: 18 MG/DL (ref 6–23)
BUN SERPL-MCNC: 20 MG/DL (ref 6–23)
C PNEUM DNA NPH QL NAA+NON-PROBE: NOT DETECTED
CALCIUM SERPL-MCNC: 7.8 MG/DL (ref 8.3–10.6)
CALCIUM SERPL-MCNC: 8.9 MG/DL (ref 8.3–10.6)
CARBON MONOXIDE, BLOOD: 2.4 % (ref 0–5)
CARBON MONOXIDE, BLOOD: 3.2 % (ref 0–5)
CARBON MONOXIDE, BLOOD: 3.8 % (ref 0–5)
CHLORIDE BLD-SCNC: 89 MMOL/L (ref 99–110)
CHLORIDE BLD-SCNC: 98 MMOL/L (ref 99–110)
CLARITY: CLEAR
CO2 CONTENT: 49 MMOL/L (ref 19–24)
CO2 CONTENT: 55.9 MMOL/L (ref 19–24)
CO2 CONTENT: 58.8 MMOL/L (ref 19–24)
CO2: 35 MMOL/L (ref 21–32)
CO2: 38 MMOL/L (ref 21–32)
COLOR: YELLOW
COMMENT: ABNORMAL
CREAT SERPL-MCNC: 0.5 MG/DL (ref 0.9–1.3)
CREAT SERPL-MCNC: 0.6 MG/DL (ref 0.9–1.3)
DIFFERENTIAL TYPE: ABNORMAL
EOSINOPHILS ABSOLUTE: 0 K/CU MM
EOSINOPHILS RELATIVE PERCENT: 0.1 % (ref 0–3)
FLUAV H1 2009 PAN RNA NPH NAA+NON-PROBE: NOT DETECTED
FLUAV H1 RNA NPH QL NAA+NON-PROBE: NOT DETECTED
FLUAV H3 RNA NPH QL NAA+NON-PROBE: NOT DETECTED
FLUAV RNA NPH QL NAA+NON-PROBE: NOT DETECTED
FLUBV RNA NPH QL NAA+NON-PROBE: NOT DETECTED
GFR SERPL CREATININE-BSD FRML MDRD: >60 ML/MIN/1.73M2
GFR SERPL CREATININE-BSD FRML MDRD: >60 ML/MIN/1.73M2
GLUCOSE BLD-MCNC: 137 MG/DL
GLUCOSE BLD-MCNC: 137 MG/DL (ref 70–99)
GLUCOSE SERPL-MCNC: 134 MG/DL (ref 70–99)
GLUCOSE SERPL-MCNC: 67 MG/DL (ref 70–99)
GLUCOSE, URINE: NEGATIVE MG/DL
HADV DNA NPH QL NAA+NON-PROBE: NOT DETECTED
HCO3 ARTERIAL: 46.6 MMOL/L (ref 18–23)
HCO3 ARTERIAL: 52.2 MMOL/L (ref 18–23)
HCO3 ARTERIAL: 54.9 MMOL/L (ref 18–23)
HCO3 VENOUS: 50.1 MMOL/L (ref 19–25)
HCOV 229E RNA NPH QL NAA+NON-PROBE: NOT DETECTED
HCOV HKU1 RNA NPH QL NAA+NON-PROBE: NOT DETECTED
HCOV NL63 RNA NPH QL NAA+NON-PROBE: NOT DETECTED
HCOV OC43 RNA NPH QL NAA+NON-PROBE: NOT DETECTED
HCT VFR BLD CALC: 37.9 % (ref 42–52)
HEMOGLOBIN: 10.7 GM/DL (ref 13.5–18)
HMPV RNA NPH QL NAA+NON-PROBE: NOT DETECTED
HPIV1 RNA NPH QL NAA+NON-PROBE: NOT DETECTED
HPIV2 RNA NPH QL NAA+NON-PROBE: NOT DETECTED
HPIV3 RNA NPH QL NAA+NON-PROBE: NOT DETECTED
HPIV4 RNA NPH QL NAA+NON-PROBE: NOT DETECTED
HYALINE CASTS: 10 /LPF
IMMATURE NEUTROPHIL %: 0.5 % (ref 0–0.43)
KETONES, URINE: NEGATIVE MG/DL
LACTATE: 2.1 MMOL/L (ref 0.5–1.9)
LACTIC ACID, SEPSIS: 4.1 MMOL/L (ref 0.5–1.9)
LEUKOCYTE ESTERASE, URINE: NEGATIVE
LYMPHOCYTES ABSOLUTE: 0.5 K/CU MM
LYMPHOCYTES RELATIVE PERCENT: 2.9 % (ref 24–44)
M PNEUMO DNA NPH QL NAA+NON-PROBE: NOT DETECTED
MAGNESIUM: 1.5 MG/DL (ref 1.8–2.4)
MAGNESIUM: 2.1 MG/DL (ref 1.8–2.4)
MCH RBC QN AUTO: 29.2 PG (ref 27–31)
MCHC RBC AUTO-ENTMCNC: 28.2 % (ref 32–36)
MCV RBC AUTO: 103.3 FL (ref 78–100)
METHEMOGLOBIN ARTERIAL: 0.6 %
METHEMOGLOBIN ARTERIAL: 0.9 %
METHEMOGLOBIN ARTERIAL: 1.2 %
MONOCYTES ABSOLUTE: 1.4 K/CU MM
MONOCYTES RELATIVE PERCENT: 8.7 % (ref 0–4)
MUCUS: NORMAL HPF
NITRITE URINE, QUANTITATIVE: NEGATIVE
NUCLEATED RBC %: 0 %
O2 SAT, VEN: 91.3 % (ref 50–70)
O2 SATURATION: 54 % (ref 96–97)
O2 SATURATION: 88.9 % (ref 96–97)
O2 SATURATION: 94.8 % (ref 96–97)
PCO2 ARTERIAL: 119 MMHG (ref 32–45)
PCO2 ARTERIAL: 128 MMHG (ref 32–45)
PCO2 ARTERIAL: 77 MMHG (ref 32–45)
PCO2, VEN: 79 MMHG (ref 38–52)
PDW BLD-RTO: 14.3 % (ref 11.7–14.9)
PH BLOOD: 7.24 (ref 7.34–7.45)
PH BLOOD: 7.25 (ref 7.34–7.45)
PH BLOOD: 7.39 (ref 7.34–7.45)
PH VENOUS: 7.41 (ref 7.32–7.42)
PH, URINE: 6
PHOSPHORUS: 1.9 MG/DL (ref 2.5–4.9)
PLATELET # BLD: 242 K/CU MM (ref 140–440)
PMV BLD AUTO: 9.4 FL (ref 7.5–11.1)
PO2 ARTERIAL: 39 MMHG (ref 75–100)
PO2 ARTERIAL: 70 MMHG (ref 75–100)
PO2 ARTERIAL: 93 MMHG (ref 75–100)
PO2, VEN: 86 MMHG (ref 28–48)
POTASSIUM SERPL-SCNC: 4.9 MMOL/L (ref 3.5–5.1)
POTASSIUM SERPL-SCNC: 5.3 MMOL/L (ref 3.5–5.1)
PROCALCITONIN SERPL-MCNC: 0.12 NG/ML
PROTEIN UA: 100 MG/DL
RBC # BLD: 3.67 M/CU MM (ref 4.6–6.2)
RBC URINE: 1 /HPF
RSV RNA NPH QL NAA+NON-PROBE: NOT DETECTED
RV+EV RNA NPH QL NAA+NON-PROBE: NOT DETECTED
SARS-COV-2 RNA NPH QL NAA+NON-PROBE: NOT DETECTED
SEGMENTED NEUTROPHILS ABSOLUTE COUNT: 14.3 K/CU MM
SEGMENTED NEUTROPHILS RELATIVE PERCENT: 87.7 % (ref 36–66)
SODIUM BLD-SCNC: 136 MMOL/L (ref 135–145)
SODIUM BLD-SCNC: 142 MMOL/L (ref 135–145)
SPECIFIC GRAVITY UA: 1.02
SQUAMOUS EPITHELIAL: <1 /HPF
T4 FREE SERPL-MCNC: 0.99 NG/DL (ref 0.9–1.8)
TOTAL CK: 178 IU/L (ref 38–174)
TOTAL IMMATURE NEUTOROPHIL: 0.08 K/CU MM
TOTAL NUCLEATED RBC: 0 K/CU MM
TOTAL PROTEIN: 5.9 GM/DL (ref 6.4–8.2)
TRICHOMONAS: NORMAL /HPF
TROPONIN T: 0.05 NG/ML
TROPONIN T: 0.05 NG/ML
TSH SERPL DL<=0.005 MIU/L-ACNC: 1.42 UIU/ML (ref 0.27–4.2)
UROBILINOGEN, URINE: 1 MG/DL
WBC # BLD: 16.3 K/CU MM (ref 4–10.5)
WBC UA: 2 /HPF

## 2023-07-02 PROCEDURE — 94640 AIRWAY INHALATION TREATMENT: CPT

## 2023-07-02 PROCEDURE — 82962 GLUCOSE BLOOD TEST: CPT

## 2023-07-02 PROCEDURE — 80053 COMPREHEN METABOLIC PANEL: CPT

## 2023-07-02 PROCEDURE — 84439 ASSAY OF FREE THYROXINE: CPT

## 2023-07-02 PROCEDURE — 84443 ASSAY THYROID STIM HORMONE: CPT

## 2023-07-02 PROCEDURE — G0480 DRUG TEST DEF 1-7 CLASSES: HCPCS

## 2023-07-02 PROCEDURE — 6370000000 HC RX 637 (ALT 250 FOR IP): Performed by: STUDENT IN AN ORGANIZED HEALTH CARE EDUCATION/TRAINING PROGRAM

## 2023-07-02 PROCEDURE — 2000000000 HC ICU R&B

## 2023-07-02 PROCEDURE — 84484 ASSAY OF TROPONIN QUANT: CPT

## 2023-07-02 PROCEDURE — 85025 COMPLETE CBC W/AUTO DIFF WBC: CPT

## 2023-07-02 PROCEDURE — 99285 EMERGENCY DEPT VISIT HI MDM: CPT

## 2023-07-02 PROCEDURE — 84145 PROCALCITONIN (PCT): CPT

## 2023-07-02 PROCEDURE — 51702 INSERT TEMP BLADDER CATH: CPT

## 2023-07-02 PROCEDURE — 87086 URINE CULTURE/COLONY COUNT: CPT

## 2023-07-02 PROCEDURE — 87899 AGENT NOS ASSAY W/OPTIC: CPT

## 2023-07-02 PROCEDURE — 5A1945Z RESPIRATORY VENTILATION, 24-96 CONSECUTIVE HOURS: ICD-10-PCS | Performed by: STUDENT IN AN ORGANIZED HEALTH CARE EDUCATION/TRAINING PROGRAM

## 2023-07-02 PROCEDURE — 81001 URINALYSIS AUTO W/SCOPE: CPT

## 2023-07-02 PROCEDURE — 94002 VENT MGMT INPAT INIT DAY: CPT

## 2023-07-02 PROCEDURE — 86738 MYCOPLASMA ANTIBODY: CPT

## 2023-07-02 PROCEDURE — 94664 DEMO&/EVAL PT USE INHALER: CPT

## 2023-07-02 PROCEDURE — 80048 BASIC METABOLIC PNL TOTAL CA: CPT

## 2023-07-02 PROCEDURE — 71045 X-RAY EXAM CHEST 1 VIEW: CPT

## 2023-07-02 PROCEDURE — 0202U NFCT DS 22 TRGT SARS-COV-2: CPT

## 2023-07-02 PROCEDURE — 36600 WITHDRAWAL OF ARTERIAL BLOOD: CPT

## 2023-07-02 PROCEDURE — 83605 ASSAY OF LACTIC ACID: CPT

## 2023-07-02 PROCEDURE — 72125 CT NECK SPINE W/O DYE: CPT

## 2023-07-02 PROCEDURE — 93005 ELECTROCARDIOGRAM TRACING: CPT | Performed by: EMERGENCY MEDICINE

## 2023-07-02 PROCEDURE — 2500000003 HC RX 250 WO HCPCS: Performed by: NURSE PRACTITIONER

## 2023-07-02 PROCEDURE — 6360000002 HC RX W HCPCS: Performed by: EMERGENCY MEDICINE

## 2023-07-02 PROCEDURE — 2580000003 HC RX 258: Performed by: NURSE PRACTITIONER

## 2023-07-02 PROCEDURE — 31500 INSERT EMERGENCY AIRWAY: CPT

## 2023-07-02 PROCEDURE — 6370000000 HC RX 637 (ALT 250 FOR IP): Performed by: EMERGENCY MEDICINE

## 2023-07-02 PROCEDURE — 87040 BLOOD CULTURE FOR BACTERIA: CPT

## 2023-07-02 PROCEDURE — 2500000003 HC RX 250 WO HCPCS: Performed by: STUDENT IN AN ORGANIZED HEALTH CARE EDUCATION/TRAINING PROGRAM

## 2023-07-02 PROCEDURE — 84100 ASSAY OF PHOSPHORUS: CPT

## 2023-07-02 PROCEDURE — 96365 THER/PROPH/DIAG IV INF INIT: CPT

## 2023-07-02 PROCEDURE — 2700000000 HC OXYGEN THERAPY PER DAY

## 2023-07-02 PROCEDURE — 2500000003 HC RX 250 WO HCPCS

## 2023-07-02 PROCEDURE — 70450 CT HEAD/BRAIN W/O DYE: CPT

## 2023-07-02 PROCEDURE — 87449 NOS EACH ORGANISM AG IA: CPT

## 2023-07-02 PROCEDURE — 82550 ASSAY OF CK (CPK): CPT

## 2023-07-02 PROCEDURE — 2580000003 HC RX 258: Performed by: EMERGENCY MEDICINE

## 2023-07-02 PROCEDURE — 6360000002 HC RX W HCPCS: Performed by: NURSE PRACTITIONER

## 2023-07-02 PROCEDURE — 2580000003 HC RX 258: Performed by: STUDENT IN AN ORGANIZED HEALTH CARE EDUCATION/TRAINING PROGRAM

## 2023-07-02 PROCEDURE — 6370000000 HC RX 637 (ALT 250 FOR IP): Performed by: NURSE PRACTITIONER

## 2023-07-02 PROCEDURE — 82803 BLOOD GASES ANY COMBINATION: CPT

## 2023-07-02 PROCEDURE — 83735 ASSAY OF MAGNESIUM: CPT

## 2023-07-02 PROCEDURE — 74018 RADEX ABDOMEN 1 VIEW: CPT

## 2023-07-02 PROCEDURE — 6360000002 HC RX W HCPCS: Performed by: STUDENT IN AN ORGANIZED HEALTH CARE EDUCATION/TRAINING PROGRAM

## 2023-07-02 PROCEDURE — 96361 HYDRATE IV INFUSION ADD-ON: CPT

## 2023-07-02 PROCEDURE — 82805 BLOOD GASES W/O2 SATURATION: CPT

## 2023-07-02 RX ORDER — SODIUM CHLORIDE 9 MG/ML
INJECTION, SOLUTION INTRAVENOUS CONTINUOUS
Status: DISCONTINUED | OUTPATIENT
Start: 2023-07-02 | End: 2023-07-02

## 2023-07-02 RX ORDER — FOLIC ACID 1 MG/1
1 TABLET ORAL DAILY
Status: DISCONTINUED | OUTPATIENT
Start: 2023-07-02 | End: 2023-07-11 | Stop reason: HOSPADM

## 2023-07-02 RX ORDER — THIAMINE HYDROCHLORIDE 100 MG/ML
250 INJECTION, SOLUTION INTRAMUSCULAR; INTRAVENOUS 2 TIMES DAILY
Status: DISCONTINUED | OUTPATIENT
Start: 2023-07-05 | End: 2023-07-02

## 2023-07-02 RX ORDER — SODIUM CHLORIDE 0.9 % (FLUSH) 0.9 %
10 SYRINGE (ML) INJECTION EVERY 12 HOURS SCHEDULED
Status: DISCONTINUED | OUTPATIENT
Start: 2023-07-02 | End: 2023-07-11 | Stop reason: HOSPADM

## 2023-07-02 RX ORDER — LANOLIN ALCOHOL/MO/W.PET/CERES
100 CREAM (GRAM) TOPICAL DAILY
Status: DISCONTINUED | OUTPATIENT
Start: 2023-07-08 | End: 2023-07-02

## 2023-07-02 RX ORDER — ONDANSETRON 2 MG/ML
4 INJECTION INTRAMUSCULAR; INTRAVENOUS EVERY 6 HOURS PRN
Status: DISCONTINUED | OUTPATIENT
Start: 2023-07-02 | End: 2023-07-11 | Stop reason: HOSPADM

## 2023-07-02 RX ORDER — LANOLIN ALCOHOL/MO/W.PET/CERES
100 CREAM (GRAM) TOPICAL DAILY
Status: DISCONTINUED | OUTPATIENT
Start: 2023-07-08 | End: 2023-07-07

## 2023-07-02 RX ORDER — POTASSIUM CHLORIDE 20 MEQ/1
40 TABLET, EXTENDED RELEASE ORAL PRN
Status: DISCONTINUED | OUTPATIENT
Start: 2023-07-02 | End: 2023-07-06

## 2023-07-02 RX ORDER — IPRATROPIUM BROMIDE AND ALBUTEROL SULFATE 2.5; .5 MG/3ML; MG/3ML
1 SOLUTION RESPIRATORY (INHALATION) ONCE
Status: COMPLETED | OUTPATIENT
Start: 2023-07-02 | End: 2023-07-02

## 2023-07-02 RX ORDER — PROPOFOL 10 MG/ML
5-50 INJECTION, EMULSION INTRAVENOUS CONTINUOUS
Status: DISCONTINUED | OUTPATIENT
Start: 2023-07-02 | End: 2023-07-04

## 2023-07-02 RX ORDER — FENTANYL CITRATE 50 UG/ML
50 INJECTION, SOLUTION INTRAMUSCULAR; INTRAVENOUS
Status: DISCONTINUED | OUTPATIENT
Start: 2023-07-02 | End: 2023-07-03

## 2023-07-02 RX ORDER — CHLORHEXIDINE GLUCONATE 0.12 MG/ML
15 RINSE ORAL 2 TIMES DAILY
Status: DISCONTINUED | OUTPATIENT
Start: 2023-07-02 | End: 2023-07-03

## 2023-07-02 RX ORDER — SODIUM CHLORIDE 0.9 % (FLUSH) 0.9 %
10 SYRINGE (ML) INJECTION PRN
Status: DISCONTINUED | OUTPATIENT
Start: 2023-07-02 | End: 2023-07-11 | Stop reason: HOSPADM

## 2023-07-02 RX ORDER — 0.9 % SODIUM CHLORIDE 0.9 %
1000 INTRAVENOUS SOLUTION INTRAVENOUS ONCE
Status: COMPLETED | OUTPATIENT
Start: 2023-07-02 | End: 2023-07-02

## 2023-07-02 RX ORDER — SODIUM CHLORIDE 9 MG/ML
INJECTION, SOLUTION INTRAVENOUS PRN
Status: DISCONTINUED | OUTPATIENT
Start: 2023-07-02 | End: 2023-07-11 | Stop reason: HOSPADM

## 2023-07-02 RX ORDER — BUDESONIDE AND FORMOTEROL FUMARATE DIHYDRATE 160; 4.5 UG/1; UG/1
2 AEROSOL RESPIRATORY (INHALATION) 2 TIMES DAILY
Status: DISCONTINUED | OUTPATIENT
Start: 2023-07-02 | End: 2023-07-11 | Stop reason: HOSPADM

## 2023-07-02 RX ORDER — ACETAMINOPHEN 325 MG/1
650 TABLET ORAL EVERY 6 HOURS PRN
Status: DISCONTINUED | OUTPATIENT
Start: 2023-07-02 | End: 2023-07-04

## 2023-07-02 RX ORDER — POTASSIUM CHLORIDE 7.45 MG/ML
10 INJECTION INTRAVENOUS PRN
Status: DISCONTINUED | OUTPATIENT
Start: 2023-07-02 | End: 2023-07-06

## 2023-07-02 RX ORDER — IPRATROPIUM BROMIDE AND ALBUTEROL SULFATE 2.5; .5 MG/3ML; MG/3ML
1 SOLUTION RESPIRATORY (INHALATION)
Status: DISCONTINUED | OUTPATIENT
Start: 2023-07-02 | End: 2023-07-02

## 2023-07-02 RX ORDER — ENOXAPARIN SODIUM 100 MG/ML
40 INJECTION SUBCUTANEOUS DAILY
Status: DISCONTINUED | OUTPATIENT
Start: 2023-07-02 | End: 2023-07-11 | Stop reason: HOSPADM

## 2023-07-02 RX ORDER — POLYETHYLENE GLYCOL 3350 17 G/17G
17 POWDER, FOR SOLUTION ORAL DAILY PRN
Status: DISCONTINUED | OUTPATIENT
Start: 2023-07-02 | End: 2023-07-11 | Stop reason: HOSPADM

## 2023-07-02 RX ORDER — ONDANSETRON 4 MG/1
4 TABLET, ORALLY DISINTEGRATING ORAL EVERY 8 HOURS PRN
Status: DISCONTINUED | OUTPATIENT
Start: 2023-07-02 | End: 2023-07-11 | Stop reason: HOSPADM

## 2023-07-02 RX ORDER — ASPIRIN 325 MG
325 TABLET ORAL DAILY
Status: DISCONTINUED | OUTPATIENT
Start: 2023-07-02 | End: 2023-07-05

## 2023-07-02 RX ORDER — ASPIRIN 325 MG
15 TABLET ORAL DAILY
Status: DISCONTINUED | OUTPATIENT
Start: 2023-07-03 | End: 2023-07-04

## 2023-07-02 RX ORDER — MAGNESIUM SULFATE IN WATER 40 MG/ML
2000 INJECTION, SOLUTION INTRAVENOUS PRN
Status: DISCONTINUED | OUTPATIENT
Start: 2023-07-02 | End: 2023-07-06

## 2023-07-02 RX ORDER — SODIUM CHLORIDE 9 MG/ML
INJECTION, SOLUTION INTRAVENOUS CONTINUOUS
Status: DISCONTINUED | OUTPATIENT
Start: 2023-07-02 | End: 2023-07-02 | Stop reason: SDUPTHER

## 2023-07-02 RX ORDER — IPRATROPIUM BROMIDE AND ALBUTEROL SULFATE 2.5; .5 MG/3ML; MG/3ML
1 SOLUTION RESPIRATORY (INHALATION) EVERY 4 HOURS
Status: DISCONTINUED | OUTPATIENT
Start: 2023-07-02 | End: 2023-07-05

## 2023-07-02 RX ORDER — MIDAZOLAM HYDROCHLORIDE 2 MG/2ML
2 INJECTION, SOLUTION INTRAMUSCULAR; INTRAVENOUS ONCE
Status: COMPLETED | OUTPATIENT
Start: 2023-07-02 | End: 2023-07-02

## 2023-07-02 RX ORDER — MAGNESIUM SULFATE 4 G/50ML
4000 INJECTION INTRAVENOUS ONCE
Status: COMPLETED | OUTPATIENT
Start: 2023-07-03 | End: 2023-07-03

## 2023-07-02 RX ORDER — FAMOTIDINE 10 MG/ML
20 INJECTION, SOLUTION INTRAVENOUS 2 TIMES DAILY
Status: DISCONTINUED | OUTPATIENT
Start: 2023-07-02 | End: 2023-07-03

## 2023-07-02 RX ORDER — THIAMINE HYDROCHLORIDE 100 MG/ML
500 INJECTION, SOLUTION INTRAMUSCULAR; INTRAVENOUS EVERY 8 HOURS SCHEDULED
Status: DISCONTINUED | OUTPATIENT
Start: 2023-07-02 | End: 2023-07-02

## 2023-07-02 RX ADMIN — IPRATROPIUM BROMIDE AND ALBUTEROL SULFATE 1 DOSE: .5; 2.5 SOLUTION RESPIRATORY (INHALATION) at 17:32

## 2023-07-02 RX ADMIN — METHYLPREDNISOLONE SODIUM SUCCINATE 125 MG: 125 INJECTION, POWDER, FOR SOLUTION INTRAMUSCULAR; INTRAVENOUS at 21:09

## 2023-07-02 RX ADMIN — SODIUM CHLORIDE: 9 INJECTION, SOLUTION INTRAVENOUS at 18:58

## 2023-07-02 RX ADMIN — AZITHROMYCIN MONOHYDRATE 500 MG: 500 INJECTION, POWDER, LYOPHILIZED, FOR SOLUTION INTRAVENOUS at 23:30

## 2023-07-02 RX ADMIN — PROPOFOL 20 MCG/KG/MIN: 10 INJECTION, EMULSION INTRAVENOUS at 18:49

## 2023-07-02 RX ADMIN — SODIUM CHLORIDE 1000 ML: 9 INJECTION, SOLUTION INTRAVENOUS at 16:41

## 2023-07-02 RX ADMIN — ENOXAPARIN SODIUM 40 MG: 100 INJECTION SUBCUTANEOUS at 22:39

## 2023-07-02 RX ADMIN — CHLORHEXIDINE GLUCONATE 0.12% ORAL RINSE 15 ML: 1.2 LIQUID ORAL at 22:42

## 2023-07-02 RX ADMIN — ASPIRIN 325 MG: 325 TABLET ORAL at 22:39

## 2023-07-02 RX ADMIN — SODIUM CHLORIDE 1000 ML: 9 INJECTION, SOLUTION INTRAVENOUS at 15:15

## 2023-07-02 RX ADMIN — IPRATROPIUM BROMIDE AND ALBUTEROL SULFATE 1 DOSE: 2.5; .5 SOLUTION RESPIRATORY (INHALATION) at 22:33

## 2023-07-02 RX ADMIN — FOLIC ACID 1 MG: 1 TABLET ORAL at 22:39

## 2023-07-02 RX ADMIN — MIDAZOLAM 2 MG: 1 INJECTION INTRAMUSCULAR; INTRAVENOUS at 19:40

## 2023-07-02 RX ADMIN — IPRATROPIUM BROMIDE AND ALBUTEROL SULFATE 1 DOSE: 2.5; .5 SOLUTION RESPIRATORY (INHALATION) at 21:29

## 2023-07-02 RX ADMIN — VANCOMYCIN HYDROCHLORIDE 1000 MG: 1 INJECTION, POWDER, LYOPHILIZED, FOR SOLUTION INTRAVENOUS at 18:22

## 2023-07-02 RX ADMIN — PIPERACILLIN AND TAZOBACTAM 3375 MG: 3; .375 INJECTION, POWDER, LYOPHILIZED, FOR SOLUTION INTRAVENOUS at 18:17

## 2023-07-02 RX ADMIN — FAMOTIDINE 20 MG: 10 INJECTION, SOLUTION INTRAVENOUS at 22:38

## 2023-07-02 RX ADMIN — SODIUM CHLORIDE, PRESERVATIVE FREE 10 ML: 5 INJECTION INTRAVENOUS at 22:49

## 2023-07-02 RX ADMIN — THIAMINE HYDROCHLORIDE 500 MG: 100 INJECTION, SOLUTION INTRAMUSCULAR; INTRAVENOUS at 22:31

## 2023-07-02 RX ADMIN — SODIUM PHOSPHATE, MONOBASIC, MONOHYDRATE AND SODIUM PHOSPHATE, DIBASIC, ANHYDROUS 15 MMOL: 276; 142 INJECTION, SOLUTION INTRAVENOUS at 23:27

## 2023-07-02 RX ADMIN — BUDESONIDE AND FORMOTEROL FUMARATE DIHYDRATE 2 PUFF: 160; 4.5 AEROSOL RESPIRATORY (INHALATION) at 22:34

## 2023-07-02 ASSESSMENT — PULMONARY FUNCTION TESTS
PIF_VALUE: 54
PIF_VALUE: 37
PIF_VALUE: 43
PIF_VALUE: 50

## 2023-07-02 ASSESSMENT — PAIN - FUNCTIONAL ASSESSMENT: PAIN_FUNCTIONAL_ASSESSMENT: NONE - DENIES PAIN

## 2023-07-03 LAB
ALBUMIN SERPL-MCNC: 2.8 GM/DL (ref 3.4–5)
ALP BLD-CCNC: 125 IU/L (ref 40–129)
ALT SERPL-CCNC: 35 U/L (ref 10–40)
ANION GAP SERPL CALCULATED.3IONS-SCNC: 17 MMOL/L (ref 4–16)
AST SERPL-CCNC: 39 IU/L (ref 15–37)
BASOPHILS ABSOLUTE: 0 K/CU MM
BASOPHILS RELATIVE PERCENT: 0.1 % (ref 0–1)
BILIRUB SERPL-MCNC: 0.2 MG/DL (ref 0–1)
BILIRUBIN DIRECT: 0.2 MG/DL (ref 0–0.3)
BILIRUBIN, INDIRECT: 0 MG/DL (ref 0–0.7)
BUN SERPL-MCNC: 19 MG/DL (ref 6–23)
CALCIUM SERPL-MCNC: 7.5 MG/DL (ref 8.3–10.6)
CHLORIDE BLD-SCNC: 95 MMOL/L (ref 99–110)
CO2: 31 MMOL/L (ref 21–32)
CREAT SERPL-MCNC: 0.4 MG/DL (ref 0.9–1.3)
DIFFERENTIAL TYPE: ABNORMAL
EKG ATRIAL RATE: 113 BPM
EKG DIAGNOSIS: NORMAL
EKG P AXIS: 85 DEGREES
EKG P-R INTERVAL: 150 MS
EKG Q-T INTERVAL: 304 MS
EKG QRS DURATION: 104 MS
EKG QTC CALCULATION (BAZETT): 416 MS
EKG R AXIS: 211 DEGREES
EKG T AXIS: 71 DEGREES
EKG VENTRICULAR RATE: 113 BPM
EOSINOPHILS ABSOLUTE: 0 K/CU MM
EOSINOPHILS RELATIVE PERCENT: 0 % (ref 0–3)
GFR SERPL CREATININE-BSD FRML MDRD: >60 ML/MIN/1.73M2
GLUCOSE SERPL-MCNC: 88 MG/DL (ref 70–99)
HCT VFR BLD CALC: 33.6 % (ref 42–52)
HEMOGLOBIN: 9.9 GM/DL (ref 13.5–18)
IMMATURE NEUTROPHIL %: 0.2 % (ref 0–0.43)
L PNEUMO AG UR QL IA: NEGATIVE
LACTATE: 1.5 MMOL/L (ref 0.5–1.9)
LACTATE: 2.3 MMOL/L (ref 0.5–1.9)
LYMPHOCYTES ABSOLUTE: 0.2 K/CU MM
LYMPHOCYTES RELATIVE PERCENT: 1.9 % (ref 24–44)
MAGNESIUM: 4.5 MG/DL (ref 1.8–2.4)
MCH RBC QN AUTO: 28.9 PG (ref 27–31)
MCHC RBC AUTO-ENTMCNC: 29.5 % (ref 32–36)
MCV RBC AUTO: 98.2 FL (ref 78–100)
MONOCYTES ABSOLUTE: 0.1 K/CU MM
MONOCYTES RELATIVE PERCENT: 0.9 % (ref 0–4)
MRSA, DNA, NASAL: NEGATIVE
NUCLEATED RBC %: 0 %
PDW BLD-RTO: 15.1 % (ref 11.7–14.9)
PHOSPHORUS: 14 MG/DL (ref 2.5–4.9)
PHOSPHORUS: 3.3 MG/DL (ref 2.5–4.9)
PLATELET # BLD: 193 K/CU MM (ref 140–440)
PMV BLD AUTO: 10.1 FL (ref 7.5–11.1)
POTASSIUM SERPL-SCNC: 4.7 MMOL/L (ref 3.5–5.1)
RBC # BLD: 3.42 M/CU MM (ref 4.6–6.2)
S PNEUM AG CSF QL: NORMAL
SEGMENTED NEUTROPHILS ABSOLUTE COUNT: 9.2 K/CU MM
SEGMENTED NEUTROPHILS RELATIVE PERCENT: 96.9 % (ref 36–66)
SODIUM BLD-SCNC: 143 MMOL/L (ref 135–145)
SPECIMEN DESCRIPTION: NORMAL
TOTAL CK: 96 IU/L (ref 38–174)
TOTAL IMMATURE NEUTOROPHIL: 0.02 K/CU MM
TOTAL NUCLEATED RBC: 0 K/CU MM
TOTAL PROTEIN: 4.6 GM/DL (ref 6.4–8.2)
TROPONIN T: 0.03 NG/ML
TROPONIN T: 0.04 NG/ML
WBC # BLD: 9.5 K/CU MM (ref 4–10.5)

## 2023-07-03 PROCEDURE — 6360000002 HC RX W HCPCS: Performed by: STUDENT IN AN ORGANIZED HEALTH CARE EDUCATION/TRAINING PROGRAM

## 2023-07-03 PROCEDURE — 84100 ASSAY OF PHOSPHORUS: CPT

## 2023-07-03 PROCEDURE — 2500000003 HC RX 250 WO HCPCS: Performed by: NURSE PRACTITIONER

## 2023-07-03 PROCEDURE — 82248 BILIRUBIN DIRECT: CPT

## 2023-07-03 PROCEDURE — 87641 MR-STAPH DNA AMP PROBE: CPT

## 2023-07-03 PROCEDURE — 94640 AIRWAY INHALATION TREATMENT: CPT

## 2023-07-03 PROCEDURE — 94761 N-INVAS EAR/PLS OXIMETRY MLT: CPT

## 2023-07-03 PROCEDURE — 93010 ELECTROCARDIOGRAM REPORT: CPT | Performed by: INTERNAL MEDICINE

## 2023-07-03 PROCEDURE — 6370000000 HC RX 637 (ALT 250 FOR IP): Performed by: STUDENT IN AN ORGANIZED HEALTH CARE EDUCATION/TRAINING PROGRAM

## 2023-07-03 PROCEDURE — 82550 ASSAY OF CK (CPK): CPT

## 2023-07-03 PROCEDURE — 87077 CULTURE AEROBIC IDENTIFY: CPT

## 2023-07-03 PROCEDURE — 76937 US GUIDE VASCULAR ACCESS: CPT

## 2023-07-03 PROCEDURE — 83605 ASSAY OF LACTIC ACID: CPT

## 2023-07-03 PROCEDURE — 6370000000 HC RX 637 (ALT 250 FOR IP): Performed by: NURSE PRACTITIONER

## 2023-07-03 PROCEDURE — 87070 CULTURE OTHR SPECIMN AEROBIC: CPT

## 2023-07-03 PROCEDURE — 36410 VNPNXR 3YR/> PHY/QHP DX/THER: CPT

## 2023-07-03 PROCEDURE — 2709999900 HC NON-CHARGEABLE SUPPLY

## 2023-07-03 PROCEDURE — 85025 COMPLETE CBC W/AUTO DIFF WBC: CPT

## 2023-07-03 PROCEDURE — 87205 SMEAR GRAM STAIN: CPT

## 2023-07-03 PROCEDURE — 02HV33Z INSERTION OF INFUSION DEVICE INTO SUPERIOR VENA CAVA, PERCUTANEOUS APPROACH: ICD-10-PCS | Performed by: STUDENT IN AN ORGANIZED HEALTH CARE EDUCATION/TRAINING PROGRAM

## 2023-07-03 PROCEDURE — 87081 CULTURE SCREEN ONLY: CPT

## 2023-07-03 PROCEDURE — 89220 SPUTUM SPECIMEN COLLECTION: CPT

## 2023-07-03 PROCEDURE — 2700000000 HC OXYGEN THERAPY PER DAY

## 2023-07-03 PROCEDURE — 87186 SC STD MICRODIL/AGAR DIL: CPT

## 2023-07-03 PROCEDURE — 83735 ASSAY OF MAGNESIUM: CPT

## 2023-07-03 PROCEDURE — 80053 COMPREHEN METABOLIC PANEL: CPT

## 2023-07-03 PROCEDURE — 84484 ASSAY OF TROPONIN QUANT: CPT

## 2023-07-03 PROCEDURE — 94003 VENT MGMT INPAT SUBQ DAY: CPT

## 2023-07-03 PROCEDURE — 2580000003 HC RX 258: Performed by: STUDENT IN AN ORGANIZED HEALTH CARE EDUCATION/TRAINING PROGRAM

## 2023-07-03 PROCEDURE — 6360000002 HC RX W HCPCS: Performed by: EMERGENCY MEDICINE

## 2023-07-03 PROCEDURE — 2000000000 HC ICU R&B

## 2023-07-03 PROCEDURE — 2580000003 HC RX 258: Performed by: NURSE PRACTITIONER

## 2023-07-03 PROCEDURE — 6360000002 HC RX W HCPCS: Performed by: NURSE PRACTITIONER

## 2023-07-03 RX ORDER — SODIUM CHLORIDE, SODIUM LACTATE, POTASSIUM CHLORIDE, AND CALCIUM CHLORIDE .6; .31; .03; .02 G/100ML; G/100ML; G/100ML; G/100ML
1000 INJECTION, SOLUTION INTRAVENOUS ONCE
Status: COMPLETED | OUTPATIENT
Start: 2023-07-03 | End: 2023-07-03

## 2023-07-03 RX ORDER — FENTANYL CITRATE 50 UG/ML
50 INJECTION, SOLUTION INTRAMUSCULAR; INTRAVENOUS
Status: DISCONTINUED | OUTPATIENT
Start: 2023-07-03 | End: 2023-07-04

## 2023-07-03 RX ORDER — DEXMEDETOMIDINE HYDROCHLORIDE 4 UG/ML
.1-1.5 INJECTION, SOLUTION INTRAVENOUS CONTINUOUS
Status: DISCONTINUED | OUTPATIENT
Start: 2023-07-03 | End: 2023-07-04

## 2023-07-03 RX ADMIN — METHYLPREDNISOLONE SODIUM SUCCINATE 60 MG: 125 INJECTION, POWDER, FOR SOLUTION INTRAMUSCULAR; INTRAVENOUS at 00:48

## 2023-07-03 RX ADMIN — THIAMINE HYDROCHLORIDE 500 MG: 100 INJECTION, SOLUTION INTRAMUSCULAR; INTRAVENOUS at 14:30

## 2023-07-03 RX ADMIN — SODIUM CHLORIDE, PRESERVATIVE FREE 10 ML: 5 INJECTION INTRAVENOUS at 04:45

## 2023-07-03 RX ADMIN — METHYLPREDNISOLONE SODIUM SUCCINATE 60 MG: 125 INJECTION, POWDER, FOR SOLUTION INTRAMUSCULAR; INTRAVENOUS at 08:45

## 2023-07-03 RX ADMIN — SODIUM CHLORIDE, PRESERVATIVE FREE 10 ML: 5 INJECTION INTRAVENOUS at 20:42

## 2023-07-03 RX ADMIN — METHYLPREDNISOLONE SODIUM SUCCINATE 60 MG: 125 INJECTION, POWDER, FOR SOLUTION INTRAMUSCULAR; INTRAVENOUS at 20:43

## 2023-07-03 RX ADMIN — ENOXAPARIN SODIUM 40 MG: 100 INJECTION SUBCUTANEOUS at 08:55

## 2023-07-03 RX ADMIN — METHYLPREDNISOLONE SODIUM SUCCINATE 60 MG: 125 INJECTION, POWDER, FOR SOLUTION INTRAMUSCULAR; INTRAVENOUS at 14:25

## 2023-07-03 RX ADMIN — SODIUM CHLORIDE, PRESERVATIVE FREE 10 ML: 5 INJECTION INTRAVENOUS at 08:46

## 2023-07-03 RX ADMIN — PROPOFOL 35 MCG/KG/MIN: 10 INJECTION, EMULSION INTRAVENOUS at 07:22

## 2023-07-03 RX ADMIN — IPRATROPIUM BROMIDE AND ALBUTEROL SULFATE 1 DOSE: 2.5; .5 SOLUTION RESPIRATORY (INHALATION) at 08:41

## 2023-07-03 RX ADMIN — IPRATROPIUM BROMIDE AND ALBUTEROL SULFATE 1 DOSE: 2.5; .5 SOLUTION RESPIRATORY (INHALATION) at 03:24

## 2023-07-03 RX ADMIN — IPRATROPIUM BROMIDE AND ALBUTEROL SULFATE 1 DOSE: 2.5; .5 SOLUTION RESPIRATORY (INHALATION) at 12:05

## 2023-07-03 RX ADMIN — IPRATROPIUM BROMIDE AND ALBUTEROL SULFATE 1 DOSE: 2.5; .5 SOLUTION RESPIRATORY (INHALATION) at 16:59

## 2023-07-03 RX ADMIN — FOLIC ACID 1 MG: 1 TABLET ORAL at 09:03

## 2023-07-03 RX ADMIN — SODIUM CHLORIDE, POTASSIUM CHLORIDE, SODIUM LACTATE AND CALCIUM CHLORIDE 1000 ML: 600; 310; 30; 20 INJECTION, SOLUTION INTRAVENOUS at 06:17

## 2023-07-03 RX ADMIN — FAMOTIDINE 20 MG: 10 INJECTION, SOLUTION INTRAVENOUS at 08:56

## 2023-07-03 RX ADMIN — FENTANYL CITRATE 50 MCG: 50 INJECTION, SOLUTION INTRAMUSCULAR; INTRAVENOUS at 20:34

## 2023-07-03 RX ADMIN — Medication 15 ML: at 09:03

## 2023-07-03 RX ADMIN — CEFTRIAXONE SODIUM 1000 MG: 1 INJECTION, POWDER, FOR SOLUTION INTRAMUSCULAR; INTRAVENOUS at 21:17

## 2023-07-03 RX ADMIN — ASPIRIN 325 MG: 325 TABLET ORAL at 09:02

## 2023-07-03 RX ADMIN — PROPOFOL 35 MCG/KG/MIN: 10 INJECTION, EMULSION INTRAVENOUS at 00:53

## 2023-07-03 RX ADMIN — AZITHROMYCIN MONOHYDRATE 250 MG: 500 INJECTION, POWDER, LYOPHILIZED, FOR SOLUTION INTRAVENOUS at 21:56

## 2023-07-03 RX ADMIN — SODIUM CHLORIDE 25 ML: 9 INJECTION, SOLUTION INTRAVENOUS at 14:29

## 2023-07-03 RX ADMIN — CHLORHEXIDINE GLUCONATE 0.12% ORAL RINSE 15 ML: 1.2 LIQUID ORAL at 08:56

## 2023-07-03 RX ADMIN — BUDESONIDE AND FORMOTEROL FUMARATE DIHYDRATE 2 PUFF: 160; 4.5 AEROSOL RESPIRATORY (INHALATION) at 20:37

## 2023-07-03 RX ADMIN — IPRATROPIUM BROMIDE AND ALBUTEROL SULFATE 1 DOSE: 2.5; .5 SOLUTION RESPIRATORY (INHALATION) at 20:37

## 2023-07-03 RX ADMIN — THIAMINE HYDROCHLORIDE 500 MG: 100 INJECTION, SOLUTION INTRAMUSCULAR; INTRAVENOUS at 06:42

## 2023-07-03 RX ADMIN — FENTANYL CITRATE 50 MCG: 50 INJECTION, SOLUTION INTRAMUSCULAR; INTRAVENOUS at 23:02

## 2023-07-03 RX ADMIN — BUDESONIDE AND FORMOTEROL FUMARATE DIHYDRATE 2 PUFF: 160; 4.5 AEROSOL RESPIRATORY (INHALATION) at 08:43

## 2023-07-03 RX ADMIN — MAGNESIUM SULFATE HEPTAHYDRATE 4000 MG: 80 INJECTION, SOLUTION INTRAVENOUS at 00:48

## 2023-07-03 RX ADMIN — THIAMINE HYDROCHLORIDE 500 MG: 100 INJECTION, SOLUTION INTRAMUSCULAR; INTRAVENOUS at 20:42

## 2023-07-03 RX ADMIN — DEXMEDETOMIDINE HYDROCHLORIDE 0.2 MCG/KG/HR: 400 INJECTION INTRAVENOUS at 10:54

## 2023-07-03 RX ADMIN — ACETAMINOPHEN 650 MG: 325 TABLET ORAL at 03:01

## 2023-07-03 RX ADMIN — SODIUM CHLORIDE, PRESERVATIVE FREE 10 ML: 5 INJECTION INTRAVENOUS at 08:56

## 2023-07-03 ASSESSMENT — PULMONARY FUNCTION TESTS
PIF_VALUE: 26
PIF_VALUE: 29
PIF_VALUE: 30
PIF_VALUE: 30
PIF_VALUE: 31
PIF_VALUE: 27
PIF_VALUE: 29
PIF_VALUE: 26
PIF_VALUE: 26
PIF_VALUE: 25
PIF_VALUE: 27
PIF_VALUE: 26
PIF_VALUE: 25
PIF_VALUE: 30
PIF_VALUE: 26
PIF_VALUE: 26
PIF_VALUE: 28
PIF_VALUE: 27
PIF_VALUE: 26
PIF_VALUE: 27
PIF_VALUE: 26
PIF_VALUE: 31
PIF_VALUE: 32
PIF_VALUE: 25
PIF_VALUE: 27
PIF_VALUE: 26
PIF_VALUE: 26
PIF_VALUE: 28
PIF_VALUE: 36

## 2023-07-04 LAB
ALBUMIN SERPL-MCNC: 2.9 GM/DL (ref 3.4–5)
ALP BLD-CCNC: 93 IU/L (ref 40–129)
ALT SERPL-CCNC: 28 U/L (ref 10–40)
ANION GAP SERPL CALCULATED.3IONS-SCNC: 5 MMOL/L (ref 4–16)
AST SERPL-CCNC: 21 IU/L (ref 15–37)
BASOPHILS ABSOLUTE: 0 K/CU MM
BASOPHILS RELATIVE PERCENT: 0 % (ref 0–1)
BILIRUB SERPL-MCNC: 0.3 MG/DL (ref 0–1)
BILIRUBIN DIRECT: 0.2 MG/DL (ref 0–0.3)
BILIRUBIN, INDIRECT: 0.1 MG/DL (ref 0–0.7)
BUN SERPL-MCNC: 23 MG/DL (ref 6–23)
CALCIUM SERPL-MCNC: 8.2 MG/DL (ref 8.3–10.6)
CHLORIDE BLD-SCNC: 98 MMOL/L (ref 99–110)
CO2: 36 MMOL/L (ref 21–32)
CREAT SERPL-MCNC: 0.4 MG/DL (ref 0.9–1.3)
CULTURE: NORMAL
DIFFERENTIAL TYPE: ABNORMAL
EOSINOPHILS ABSOLUTE: 0 K/CU MM
EOSINOPHILS RELATIVE PERCENT: 0 % (ref 0–3)
GFR SERPL CREATININE-BSD FRML MDRD: >60 ML/MIN/1.73M2
GLUCOSE BLD-MCNC: 151 MG/DL (ref 70–99)
GLUCOSE SERPL-MCNC: 162 MG/DL (ref 70–99)
HCT VFR BLD CALC: 29.9 % (ref 42–52)
HEMOGLOBIN: 9.1 GM/DL (ref 13.5–18)
IMMATURE NEUTROPHIL %: 0.3 % (ref 0–0.43)
LYMPHOCYTES ABSOLUTE: 0.2 K/CU MM
LYMPHOCYTES RELATIVE PERCENT: 2.3 % (ref 24–44)
Lab: NORMAL
MAGNESIUM: 2.1 MG/DL (ref 1.8–2.4)
MCH RBC QN AUTO: 28.5 PG (ref 27–31)
MCHC RBC AUTO-ENTMCNC: 30.4 % (ref 32–36)
MCV RBC AUTO: 93.7 FL (ref 78–100)
MONOCYTES ABSOLUTE: 0.5 K/CU MM
MONOCYTES RELATIVE PERCENT: 5.1 % (ref 0–4)
NUCLEATED RBC %: 0 %
PDW BLD-RTO: 15.9 % (ref 11.7–14.9)
PHOSPHORUS: 2.2 MG/DL (ref 2.5–4.9)
PLATELET # BLD: 199 K/CU MM (ref 140–440)
PMV BLD AUTO: 9.8 FL (ref 7.5–11.1)
POTASSIUM SERPL-SCNC: 4.1 MMOL/L (ref 3.5–5.1)
RBC # BLD: 3.19 M/CU MM (ref 4.6–6.2)
SEGMENTED NEUTROPHILS ABSOLUTE COUNT: 8.1 K/CU MM
SEGMENTED NEUTROPHILS RELATIVE PERCENT: 92.3 % (ref 36–66)
SODIUM BLD-SCNC: 139 MMOL/L (ref 135–145)
SPECIMEN: NORMAL
TOTAL IMMATURE NEUTOROPHIL: 0.03 K/CU MM
TOTAL NUCLEATED RBC: 0 K/CU MM
TOTAL PROTEIN: 4.4 GM/DL (ref 6.4–8.2)
WBC # BLD: 8.8 K/CU MM (ref 4–10.5)

## 2023-07-04 PROCEDURE — 80053 COMPREHEN METABOLIC PANEL: CPT

## 2023-07-04 PROCEDURE — 94761 N-INVAS EAR/PLS OXIMETRY MLT: CPT

## 2023-07-04 PROCEDURE — 2500000003 HC RX 250 WO HCPCS: Performed by: STUDENT IN AN ORGANIZED HEALTH CARE EDUCATION/TRAINING PROGRAM

## 2023-07-04 PROCEDURE — 84100 ASSAY OF PHOSPHORUS: CPT

## 2023-07-04 PROCEDURE — 94640 AIRWAY INHALATION TREATMENT: CPT

## 2023-07-04 PROCEDURE — 6370000000 HC RX 637 (ALT 250 FOR IP): Performed by: STUDENT IN AN ORGANIZED HEALTH CARE EDUCATION/TRAINING PROGRAM

## 2023-07-04 PROCEDURE — 2700000000 HC OXYGEN THERAPY PER DAY

## 2023-07-04 PROCEDURE — 6360000002 HC RX W HCPCS: Performed by: STUDENT IN AN ORGANIZED HEALTH CARE EDUCATION/TRAINING PROGRAM

## 2023-07-04 PROCEDURE — 2000000000 HC ICU R&B

## 2023-07-04 PROCEDURE — 94003 VENT MGMT INPAT SUBQ DAY: CPT

## 2023-07-04 PROCEDURE — 94799 UNLISTED PULMONARY SVC/PX: CPT

## 2023-07-04 PROCEDURE — 94664 DEMO&/EVAL PT USE INHALER: CPT

## 2023-07-04 PROCEDURE — 82962 GLUCOSE BLOOD TEST: CPT

## 2023-07-04 PROCEDURE — 94660 CPAP INITIATION&MGMT: CPT

## 2023-07-04 PROCEDURE — 5A09557 ASSISTANCE WITH RESPIRATORY VENTILATION, GREATER THAN 96 CONSECUTIVE HOURS, CONTINUOUS POSITIVE AIRWAY PRESSURE: ICD-10-PCS | Performed by: STUDENT IN AN ORGANIZED HEALTH CARE EDUCATION/TRAINING PROGRAM

## 2023-07-04 PROCEDURE — 2580000003 HC RX 258: Performed by: NURSE PRACTITIONER

## 2023-07-04 PROCEDURE — 82248 BILIRUBIN DIRECT: CPT

## 2023-07-04 PROCEDURE — 2580000003 HC RX 258: Performed by: STUDENT IN AN ORGANIZED HEALTH CARE EDUCATION/TRAINING PROGRAM

## 2023-07-04 PROCEDURE — 83735 ASSAY OF MAGNESIUM: CPT

## 2023-07-04 PROCEDURE — 89220 SPUTUM SPECIMEN COLLECTION: CPT

## 2023-07-04 PROCEDURE — 85025 COMPLETE CBC W/AUTO DIFF WBC: CPT

## 2023-07-04 PROCEDURE — 6360000002 HC RX W HCPCS: Performed by: NURSE PRACTITIONER

## 2023-07-04 PROCEDURE — 6370000000 HC RX 637 (ALT 250 FOR IP): Performed by: NURSE PRACTITIONER

## 2023-07-04 PROCEDURE — 2500000003 HC RX 250 WO HCPCS: Performed by: NURSE PRACTITIONER

## 2023-07-04 RX ORDER — ALBUTEROL SULFATE 2.5 MG/3ML
2.5 SOLUTION RESPIRATORY (INHALATION) EVERY 6 HOURS PRN
Status: DISCONTINUED | OUTPATIENT
Start: 2023-07-04 | End: 2023-07-11 | Stop reason: HOSPADM

## 2023-07-04 RX ORDER — MULTIVIT-MIN/FERROUS GLUCONATE 9 MG/15 ML
15 LIQUID (ML) ORAL DAILY
Status: DISCONTINUED | OUTPATIENT
Start: 2023-07-04 | End: 2023-07-06

## 2023-07-04 RX ORDER — HYDROCODONE BITARTRATE AND ACETAMINOPHEN 5; 325 MG/1; MG/1
1 TABLET ORAL EVERY 6 HOURS PRN
Status: DISCONTINUED | OUTPATIENT
Start: 2023-07-04 | End: 2023-07-11

## 2023-07-04 RX ORDER — NICOTINE 21 MG/24HR
1 PATCH, TRANSDERMAL 24 HOURS TRANSDERMAL DAILY
Status: DISCONTINUED | OUTPATIENT
Start: 2023-07-04 | End: 2023-07-11 | Stop reason: HOSPADM

## 2023-07-04 RX ADMIN — THIAMINE HYDROCHLORIDE 500 MG: 100 INJECTION, SOLUTION INTRAMUSCULAR; INTRAVENOUS at 05:22

## 2023-07-04 RX ADMIN — THIAMINE HYDROCHLORIDE 500 MG: 100 INJECTION, SOLUTION INTRAMUSCULAR; INTRAVENOUS at 21:30

## 2023-07-04 RX ADMIN — METHYLPREDNISOLONE SODIUM SUCCINATE 60 MG: 125 INJECTION, POWDER, FOR SOLUTION INTRAMUSCULAR; INTRAVENOUS at 08:13

## 2023-07-04 RX ADMIN — FOLIC ACID 1 MG: 1 TABLET ORAL at 11:15

## 2023-07-04 RX ADMIN — BUDESONIDE AND FORMOTEROL FUMARATE DIHYDRATE 2 PUFF: 160; 4.5 AEROSOL RESPIRATORY (INHALATION) at 19:16

## 2023-07-04 RX ADMIN — METHYLPREDNISOLONE SODIUM SUCCINATE 40 MG: 40 INJECTION, POWDER, FOR SOLUTION INTRAMUSCULAR; INTRAVENOUS at 23:51

## 2023-07-04 RX ADMIN — FENTANYL CITRATE 50 MCG: 50 INJECTION, SOLUTION INTRAMUSCULAR; INTRAVENOUS at 04:02

## 2023-07-04 RX ADMIN — CEFTRIAXONE SODIUM 1000 MG: 1 INJECTION, POWDER, FOR SOLUTION INTRAMUSCULAR; INTRAVENOUS at 19:00

## 2023-07-04 RX ADMIN — SODIUM CHLORIDE, PRESERVATIVE FREE 10 ML: 5 INJECTION INTRAVENOUS at 07:59

## 2023-07-04 RX ADMIN — FENTANYL CITRATE 50 MCG: 50 INJECTION, SOLUTION INTRAMUSCULAR; INTRAVENOUS at 02:22

## 2023-07-04 RX ADMIN — IPRATROPIUM BROMIDE AND ALBUTEROL SULFATE 1 DOSE: 2.5; .5 SOLUTION RESPIRATORY (INHALATION) at 11:46

## 2023-07-04 RX ADMIN — ASPIRIN 325 MG: 325 TABLET ORAL at 11:16

## 2023-07-04 RX ADMIN — IPRATROPIUM BROMIDE AND ALBUTEROL SULFATE 1 DOSE: 2.5; .5 SOLUTION RESPIRATORY (INHALATION) at 00:40

## 2023-07-04 RX ADMIN — Medication 15 ML: at 11:15

## 2023-07-04 RX ADMIN — ENOXAPARIN SODIUM 40 MG: 100 INJECTION SUBCUTANEOUS at 08:14

## 2023-07-04 RX ADMIN — IPRATROPIUM BROMIDE AND ALBUTEROL SULFATE 1 DOSE: 2.5; .5 SOLUTION RESPIRATORY (INHALATION) at 19:15

## 2023-07-04 RX ADMIN — METHYLPREDNISOLONE SODIUM SUCCINATE 40 MG: 40 INJECTION, POWDER, FOR SOLUTION INTRAMUSCULAR; INTRAVENOUS at 16:55

## 2023-07-04 RX ADMIN — THIAMINE HYDROCHLORIDE 500 MG: 100 INJECTION, SOLUTION INTRAMUSCULAR; INTRAVENOUS at 13:45

## 2023-07-04 RX ADMIN — IPRATROPIUM BROMIDE AND ALBUTEROL SULFATE 1 DOSE: 2.5; .5 SOLUTION RESPIRATORY (INHALATION) at 22:46

## 2023-07-04 RX ADMIN — METHYLPREDNISOLONE SODIUM SUCCINATE 60 MG: 125 INJECTION, POWDER, FOR SOLUTION INTRAMUSCULAR; INTRAVENOUS at 00:43

## 2023-07-04 RX ADMIN — IPRATROPIUM BROMIDE AND ALBUTEROL SULFATE 1 DOSE: 2.5; .5 SOLUTION RESPIRATORY (INHALATION) at 14:51

## 2023-07-04 RX ADMIN — BUDESONIDE AND FORMOTEROL FUMARATE DIHYDRATE 2 PUFF: 160; 4.5 AEROSOL RESPIRATORY (INHALATION) at 07:25

## 2023-07-04 RX ADMIN — HYDROCODONE BITARTRATE AND ACETAMINOPHEN 1 TABLET: 5; 325 TABLET ORAL at 19:42

## 2023-07-04 RX ADMIN — IPRATROPIUM BROMIDE AND ALBUTEROL SULFATE 1 DOSE: 2.5; .5 SOLUTION RESPIRATORY (INHALATION) at 07:24

## 2023-07-04 RX ADMIN — SODIUM CHLORIDE, PRESERVATIVE FREE 10 ML: 5 INJECTION INTRAVENOUS at 19:44

## 2023-07-04 RX ADMIN — SODIUM PHOSPHATE, MONOBASIC, MONOHYDRATE AND SODIUM PHOSPHATE, DIBASIC, ANHYDROUS 15 MMOL: 276; 142 INJECTION, SOLUTION INTRAVENOUS at 07:59

## 2023-07-04 RX ADMIN — DEXMEDETOMIDINE HYDROCHLORIDE 0.4 MCG/KG/HR: 400 INJECTION INTRAVENOUS at 06:00

## 2023-07-04 RX ADMIN — SODIUM CHLORIDE 5 ML: 9 INJECTION, SOLUTION INTRAVENOUS at 18:59

## 2023-07-04 RX ADMIN — ALBUTEROL SULFATE 2.5 MG: 2.5 SOLUTION RESPIRATORY (INHALATION) at 17:16

## 2023-07-04 RX ADMIN — IPRATROPIUM BROMIDE AND ALBUTEROL SULFATE 1 DOSE: 2.5; .5 SOLUTION RESPIRATORY (INHALATION) at 04:40

## 2023-07-04 ASSESSMENT — PAIN SCALES - GENERAL
PAINLEVEL_OUTOF10: 0
PAINLEVEL_OUTOF10: 9
PAINLEVEL_OUTOF10: 9

## 2023-07-04 ASSESSMENT — PAIN DESCRIPTION - ORIENTATION
ORIENTATION: MID
ORIENTATION: MID

## 2023-07-04 ASSESSMENT — PULMONARY FUNCTION TESTS
PIF_VALUE: 33
PIF_VALUE: 16
PIF_VALUE: 28
PIF_VALUE: 29
PIF_VALUE: 15
PIF_VALUE: 16
PIF_VALUE: 28
PIF_VALUE: 15

## 2023-07-04 ASSESSMENT — PAIN DESCRIPTION - LOCATION
LOCATION: NECK
LOCATION: NECK

## 2023-07-04 ASSESSMENT — PAIN DESCRIPTION - DESCRIPTORS: DESCRIPTORS: ACHING

## 2023-07-04 ASSESSMENT — PAIN SCALES - WONG BAKER: WONGBAKER_NUMERICALRESPONSE: 0

## 2023-07-05 PROBLEM — E43 SEVERE MALNUTRITION (HCC): Status: ACTIVE | Noted: 2023-07-05

## 2023-07-05 LAB
ALBUMIN SERPL-MCNC: 3 GM/DL (ref 3.4–5)
ALP BLD-CCNC: 81 IU/L (ref 40–129)
ALT SERPL-CCNC: 29 U/L (ref 10–40)
ANION GAP SERPL CALCULATED.3IONS-SCNC: 3 MMOL/L (ref 4–16)
ANISOCYTOSIS: ABNORMAL
AST SERPL-CCNC: 21 IU/L (ref 15–37)
BANDED NEUTROPHILS ABSOLUTE COUNT: 0.78 K/CU MM
BANDED NEUTROPHILS RELATIVE PERCENT: 9 % (ref 5–11)
BILIRUB SERPL-MCNC: 0.1 MG/DL (ref 0–1)
BILIRUBIN DIRECT: 0.2 MG/DL (ref 0–0.3)
BILIRUBIN, INDIRECT: ABNORMAL MG/DL (ref 0–0.7)
BUN SERPL-MCNC: 16 MG/DL (ref 6–23)
CALCIUM SERPL-MCNC: 8.1 MG/DL (ref 8.3–10.6)
CHLORIDE BLD-SCNC: 103 MMOL/L (ref 99–110)
CO2: 38 MMOL/L (ref 21–32)
CREAT SERPL-MCNC: 0.3 MG/DL (ref 0.9–1.3)
CULTURE: ABNORMAL
DIFFERENTIAL TYPE: ABNORMAL
GFR SERPL CREATININE-BSD FRML MDRD: >60 ML/MIN/1.73M2
GLUCOSE SERPL-MCNC: 154 MG/DL (ref 70–99)
GRAM SMEAR: ABNORMAL
HCT VFR BLD CALC: 30.9 % (ref 42–52)
HEMOGLOBIN: 9.2 GM/DL (ref 13.5–18)
HYPOCHROMIA: ABNORMAL
LYMPHOCYTES ABSOLUTE: 0.4 K/CU MM
LYMPHOCYTES RELATIVE PERCENT: 5 % (ref 24–44)
Lab: ABNORMAL
MAGNESIUM: 1.9 MG/DL (ref 1.8–2.4)
MCH RBC QN AUTO: 28.8 PG (ref 27–31)
MCHC RBC AUTO-ENTMCNC: 29.8 % (ref 32–36)
MCV RBC AUTO: 96.6 FL (ref 78–100)
MONOCYTES ABSOLUTE: 0.3 K/CU MM
MONOCYTES RELATIVE PERCENT: 3 % (ref 0–4)
PDW BLD-RTO: 16 % (ref 11.7–14.9)
PHOSPHORUS: 2.7 MG/DL (ref 2.5–4.9)
PLATELET # BLD: 191 K/CU MM (ref 140–440)
PMV BLD AUTO: 9.5 FL (ref 7.5–11.1)
POTASSIUM SERPL-SCNC: 4.6 MMOL/L (ref 3.5–5.1)
RBC # BLD: 3.2 M/CU MM (ref 4.6–6.2)
SEGMENTED NEUTROPHILS ABSOLUTE COUNT: 7.2 K/CU MM
SEGMENTED NEUTROPHILS RELATIVE PERCENT: 83 % (ref 36–66)
SODIUM BLD-SCNC: 144 MMOL/L (ref 135–145)
SPECIMEN: ABNORMAL
TOTAL PROTEIN: 4.6 GM/DL (ref 6.4–8.2)
WBC # BLD: 8.7 K/CU MM (ref 4–10.5)

## 2023-07-05 PROCEDURE — 85007 BL SMEAR W/DIFF WBC COUNT: CPT

## 2023-07-05 PROCEDURE — 94761 N-INVAS EAR/PLS OXIMETRY MLT: CPT

## 2023-07-05 PROCEDURE — 82248 BILIRUBIN DIRECT: CPT

## 2023-07-05 PROCEDURE — 83735 ASSAY OF MAGNESIUM: CPT

## 2023-07-05 PROCEDURE — 6360000002 HC RX W HCPCS: Performed by: NURSE PRACTITIONER

## 2023-07-05 PROCEDURE — 80053 COMPREHEN METABOLIC PANEL: CPT

## 2023-07-05 PROCEDURE — 94660 CPAP INITIATION&MGMT: CPT

## 2023-07-05 PROCEDURE — 84100 ASSAY OF PHOSPHORUS: CPT

## 2023-07-05 PROCEDURE — 6370000000 HC RX 637 (ALT 250 FOR IP): Performed by: STUDENT IN AN ORGANIZED HEALTH CARE EDUCATION/TRAINING PROGRAM

## 2023-07-05 PROCEDURE — 2700000000 HC OXYGEN THERAPY PER DAY

## 2023-07-05 PROCEDURE — 2580000003 HC RX 258: Performed by: NURSE PRACTITIONER

## 2023-07-05 PROCEDURE — 2580000003 HC RX 258: Performed by: STUDENT IN AN ORGANIZED HEALTH CARE EDUCATION/TRAINING PROGRAM

## 2023-07-05 PROCEDURE — 2000000000 HC ICU R&B

## 2023-07-05 PROCEDURE — 6360000002 HC RX W HCPCS: Performed by: STUDENT IN AN ORGANIZED HEALTH CARE EDUCATION/TRAINING PROGRAM

## 2023-07-05 PROCEDURE — 85027 COMPLETE CBC AUTOMATED: CPT

## 2023-07-05 PROCEDURE — 6370000000 HC RX 637 (ALT 250 FOR IP): Performed by: NURSE PRACTITIONER

## 2023-07-05 PROCEDURE — 94640 AIRWAY INHALATION TREATMENT: CPT

## 2023-07-05 RX ORDER — AMOXICILLIN AND CLAVULANATE POTASSIUM 875; 125 MG/1; MG/1
1 TABLET, FILM COATED ORAL EVERY 12 HOURS SCHEDULED
Status: DISCONTINUED | OUTPATIENT
Start: 2023-07-05 | End: 2023-07-06

## 2023-07-05 RX ORDER — IPRATROPIUM BROMIDE AND ALBUTEROL SULFATE 2.5; .5 MG/3ML; MG/3ML
1 SOLUTION RESPIRATORY (INHALATION)
Status: DISCONTINUED | OUTPATIENT
Start: 2023-07-05 | End: 2023-07-11 | Stop reason: HOSPADM

## 2023-07-05 RX ORDER — AMLODIPINE BESYLATE 5 MG/1
5 TABLET ORAL DAILY
Status: DISCONTINUED | OUTPATIENT
Start: 2023-07-05 | End: 2023-07-11 | Stop reason: HOSPADM

## 2023-07-05 RX ORDER — ASPIRIN 81 MG/1
81 TABLET, CHEWABLE ORAL DAILY
Status: DISCONTINUED | OUTPATIENT
Start: 2023-07-06 | End: 2023-07-11 | Stop reason: HOSPADM

## 2023-07-05 RX ADMIN — HYDROCODONE BITARTRATE AND ACETAMINOPHEN 1 TABLET: 5; 325 TABLET ORAL at 11:05

## 2023-07-05 RX ADMIN — ENOXAPARIN SODIUM 40 MG: 100 INJECTION SUBCUTANEOUS at 07:48

## 2023-07-05 RX ADMIN — SODIUM CHLORIDE, PRESERVATIVE FREE 10 ML: 5 INJECTION INTRAVENOUS at 07:46

## 2023-07-05 RX ADMIN — IPRATROPIUM BROMIDE AND ALBUTEROL SULFATE 1 DOSE: 2.5; .5 SOLUTION RESPIRATORY (INHALATION) at 11:20

## 2023-07-05 RX ADMIN — THIAMINE HYDROCHLORIDE 250 MG: 100 INJECTION, SOLUTION INTRAMUSCULAR; INTRAVENOUS at 21:40

## 2023-07-05 RX ADMIN — IPRATROPIUM BROMIDE AND ALBUTEROL SULFATE 1 DOSE: 2.5; .5 SOLUTION RESPIRATORY (INHALATION) at 03:39

## 2023-07-05 RX ADMIN — IPRATROPIUM BROMIDE AND ALBUTEROL SULFATE 1 DOSE: 2.5; .5 SOLUTION RESPIRATORY (INHALATION) at 07:27

## 2023-07-05 RX ADMIN — FOLIC ACID 1 MG: 1 TABLET ORAL at 07:47

## 2023-07-05 RX ADMIN — HYDROCODONE BITARTRATE AND ACETAMINOPHEN 1 TABLET: 5; 325 TABLET ORAL at 22:10

## 2023-07-05 RX ADMIN — IPRATROPIUM BROMIDE AND ALBUTEROL SULFATE 1 DOSE: 2.5; .5 SOLUTION RESPIRATORY (INHALATION) at 20:02

## 2023-07-05 RX ADMIN — METHYLPREDNISOLONE SODIUM SUCCINATE 40 MG: 40 INJECTION, POWDER, FOR SOLUTION INTRAMUSCULAR; INTRAVENOUS at 07:46

## 2023-07-05 RX ADMIN — AMOXICILLIN AND CLAVULANATE POTASSIUM 1 TABLET: 875; 125 TABLET, FILM COATED ORAL at 20:55

## 2023-07-05 RX ADMIN — BUDESONIDE AND FORMOTEROL FUMARATE DIHYDRATE 2 PUFF: 160; 4.5 AEROSOL RESPIRATORY (INHALATION) at 20:03

## 2023-07-05 RX ADMIN — Medication 15 ML: at 07:47

## 2023-07-05 RX ADMIN — THIAMINE HYDROCHLORIDE 500 MG: 100 INJECTION, SOLUTION INTRAMUSCULAR; INTRAVENOUS at 13:23

## 2023-07-05 RX ADMIN — BUDESONIDE AND FORMOTEROL FUMARATE DIHYDRATE 2 PUFF: 160; 4.5 AEROSOL RESPIRATORY (INHALATION) at 11:19

## 2023-07-05 RX ADMIN — METHYLPREDNISOLONE SODIUM SUCCINATE 40 MG: 40 INJECTION, POWDER, FOR SOLUTION INTRAMUSCULAR; INTRAVENOUS at 16:45

## 2023-07-05 RX ADMIN — HYDROCODONE BITARTRATE AND ACETAMINOPHEN 1 TABLET: 5; 325 TABLET ORAL at 03:36

## 2023-07-05 RX ADMIN — AMLODIPINE BESYLATE 5 MG: 5 TABLET ORAL at 16:45

## 2023-07-05 RX ADMIN — IPRATROPIUM BROMIDE AND ALBUTEROL SULFATE 1 DOSE: 2.5; .5 SOLUTION RESPIRATORY (INHALATION) at 15:26

## 2023-07-05 RX ADMIN — THIAMINE HYDROCHLORIDE 500 MG: 100 INJECTION, SOLUTION INTRAMUSCULAR; INTRAVENOUS at 05:31

## 2023-07-05 RX ADMIN — ASPIRIN 325 MG: 325 TABLET ORAL at 07:46

## 2023-07-05 RX ADMIN — SODIUM CHLORIDE, PRESERVATIVE FREE 10 ML: 5 INJECTION INTRAVENOUS at 19:57

## 2023-07-05 RX ADMIN — METHYLPREDNISOLONE SODIUM SUCCINATE 40 MG: 40 INJECTION, POWDER, FOR SOLUTION INTRAMUSCULAR; INTRAVENOUS at 23:32

## 2023-07-05 ASSESSMENT — PAIN DESCRIPTION - DESCRIPTORS
DESCRIPTORS: DISCOMFORT
DESCRIPTORS: ACHING
DESCRIPTORS: ACHING

## 2023-07-05 ASSESSMENT — PAIN SCALES - WONG BAKER

## 2023-07-05 ASSESSMENT — PAIN SCALES - GENERAL
PAINLEVEL_OUTOF10: 0
PAINLEVEL_OUTOF10: 7
PAINLEVEL_OUTOF10: 6
PAINLEVEL_OUTOF10: 8
PAINLEVEL_OUTOF10: 0

## 2023-07-05 ASSESSMENT — PAIN DESCRIPTION - LOCATION
LOCATION: NECK

## 2023-07-05 ASSESSMENT — PAIN DESCRIPTION - ORIENTATION
ORIENTATION: MID

## 2023-07-05 ASSESSMENT — PAIN - FUNCTIONAL ASSESSMENT: PAIN_FUNCTIONAL_ASSESSMENT: ACTIVITIES ARE NOT PREVENTED

## 2023-07-06 LAB
ALBUMIN SERPL-MCNC: 3 GM/DL (ref 3.4–5)
ALP BLD-CCNC: 73 IU/L (ref 40–129)
ALT SERPL-CCNC: 27 U/L (ref 10–40)
ANION GAP SERPL CALCULATED.3IONS-SCNC: 3 MMOL/L (ref 4–16)
AST SERPL-CCNC: 15 IU/L (ref 15–37)
BASOPHILS ABSOLUTE: 0 K/CU MM
BASOPHILS RELATIVE PERCENT: 0 % (ref 0–1)
BILIRUB SERPL-MCNC: 0.2 MG/DL (ref 0–1)
BILIRUBIN DIRECT: 0.2 MG/DL (ref 0–0.3)
BILIRUBIN, INDIRECT: 0 MG/DL (ref 0–0.7)
BUN SERPL-MCNC: 12 MG/DL (ref 6–23)
CALCIUM SERPL-MCNC: 8.5 MG/DL (ref 8.3–10.6)
CHLORIDE BLD-SCNC: 97 MMOL/L (ref 99–110)
CO2: 39 MMOL/L (ref 21–32)
CREAT SERPL-MCNC: 0.3 MG/DL (ref 0.9–1.3)
CULTURE: ABNORMAL
CULTURE: ABNORMAL
DIFFERENTIAL TYPE: ABNORMAL
EOSINOPHILS ABSOLUTE: 0 K/CU MM
EOSINOPHILS RELATIVE PERCENT: 0.1 % (ref 0–3)
GFR SERPL CREATININE-BSD FRML MDRD: >60 ML/MIN/1.73M2
GLUCOSE SERPL-MCNC: 131 MG/DL (ref 70–99)
HCT VFR BLD CALC: 30.6 % (ref 42–52)
HEMOGLOBIN: 9.5 GM/DL (ref 13.5–18)
IMMATURE NEUTROPHIL %: 0.6 % (ref 0–0.43)
LYMPHOCYTES ABSOLUTE: 0.2 K/CU MM
LYMPHOCYTES RELATIVE PERCENT: 2.9 % (ref 24–44)
Lab: ABNORMAL
M PNEUMO IGG SER IA-ACNC: 0.09 U/L
M PNEUMO IGM SER IA-ACNC: 0 U/L
MAGNESIUM: 1.8 MG/DL (ref 1.8–2.4)
MCH RBC QN AUTO: 29.9 PG (ref 27–31)
MCHC RBC AUTO-ENTMCNC: 31 % (ref 32–36)
MCV RBC AUTO: 96.2 FL (ref 78–100)
MONOCYTES ABSOLUTE: 0.3 K/CU MM
MONOCYTES RELATIVE PERCENT: 3.8 % (ref 0–4)
NUCLEATED RBC %: 0 %
PDW BLD-RTO: 14.9 % (ref 11.7–14.9)
PHOSPHORUS: 2.4 MG/DL (ref 2.5–4.9)
PLATELET # BLD: 192 K/CU MM (ref 140–440)
PMV BLD AUTO: 9.4 FL (ref 7.5–11.1)
POTASSIUM SERPL-SCNC: 4.6 MMOL/L (ref 3.5–5.1)
RBC # BLD: 3.18 M/CU MM (ref 4.6–6.2)
SEGMENTED NEUTROPHILS ABSOLUTE COUNT: 7.6 K/CU MM
SEGMENTED NEUTROPHILS RELATIVE PERCENT: 92.6 % (ref 36–66)
SODIUM BLD-SCNC: 139 MMOL/L (ref 135–145)
SPECIMEN: ABNORMAL
TOTAL IMMATURE NEUTOROPHIL: 0.05 K/CU MM
TOTAL NUCLEATED RBC: 0 K/CU MM
TOTAL PROTEIN: 4.4 GM/DL (ref 6.4–8.2)
WBC # BLD: 8.2 K/CU MM (ref 4–10.5)

## 2023-07-06 PROCEDURE — 6370000000 HC RX 637 (ALT 250 FOR IP): Performed by: STUDENT IN AN ORGANIZED HEALTH CARE EDUCATION/TRAINING PROGRAM

## 2023-07-06 PROCEDURE — 6360000002 HC RX W HCPCS: Performed by: STUDENT IN AN ORGANIZED HEALTH CARE EDUCATION/TRAINING PROGRAM

## 2023-07-06 PROCEDURE — 94761 N-INVAS EAR/PLS OXIMETRY MLT: CPT

## 2023-07-06 PROCEDURE — 6370000000 HC RX 637 (ALT 250 FOR IP): Performed by: NURSE PRACTITIONER

## 2023-07-06 PROCEDURE — 2580000003 HC RX 258: Performed by: STUDENT IN AN ORGANIZED HEALTH CARE EDUCATION/TRAINING PROGRAM

## 2023-07-06 PROCEDURE — 82248 BILIRUBIN DIRECT: CPT

## 2023-07-06 PROCEDURE — 2500000003 HC RX 250 WO HCPCS: Performed by: STUDENT IN AN ORGANIZED HEALTH CARE EDUCATION/TRAINING PROGRAM

## 2023-07-06 PROCEDURE — 85025 COMPLETE CBC W/AUTO DIFF WBC: CPT

## 2023-07-06 PROCEDURE — 6360000002 HC RX W HCPCS: Performed by: NURSE PRACTITIONER

## 2023-07-06 PROCEDURE — 2580000003 HC RX 258: Performed by: NURSE PRACTITIONER

## 2023-07-06 PROCEDURE — 80053 COMPREHEN METABOLIC PANEL: CPT

## 2023-07-06 PROCEDURE — 84100 ASSAY OF PHOSPHORUS: CPT

## 2023-07-06 PROCEDURE — 94660 CPAP INITIATION&MGMT: CPT

## 2023-07-06 PROCEDURE — 2060000000 HC ICU INTERMEDIATE R&B

## 2023-07-06 PROCEDURE — 94640 AIRWAY INHALATION TREATMENT: CPT

## 2023-07-06 PROCEDURE — 83735 ASSAY OF MAGNESIUM: CPT

## 2023-07-06 PROCEDURE — 2700000000 HC OXYGEN THERAPY PER DAY

## 2023-07-06 RX ORDER — PREDNISONE 10 MG/1
40 TABLET ORAL DAILY
Status: COMPLETED | OUTPATIENT
Start: 2023-07-07 | End: 2023-07-09

## 2023-07-06 RX ORDER — HYDROXYZINE HYDROCHLORIDE 10 MG/1
10 TABLET, FILM COATED ORAL 3 TIMES DAILY PRN
Status: DISCONTINUED | OUTPATIENT
Start: 2023-07-06 | End: 2023-07-11 | Stop reason: HOSPADM

## 2023-07-06 RX ORDER — MULTIVIT-MIN/FERROUS GLUCONATE 12 MG/15ML
15 LIQUID (ML) ORAL DAILY
Status: DISCONTINUED | OUTPATIENT
Start: 2023-07-06 | End: 2023-07-11 | Stop reason: HOSPADM

## 2023-07-06 RX ADMIN — THIAMINE HYDROCHLORIDE 250 MG: 100 INJECTION, SOLUTION INTRAMUSCULAR; INTRAVENOUS at 08:52

## 2023-07-06 RX ADMIN — HYDROXYZINE HYDROCHLORIDE 10 MG: 10 TABLET, FILM COATED ORAL at 21:51

## 2023-07-06 RX ADMIN — Medication 15 ML: at 08:49

## 2023-07-06 RX ADMIN — BUDESONIDE AND FORMOTEROL FUMARATE DIHYDRATE 2 PUFF: 160; 4.5 AEROSOL RESPIRATORY (INHALATION) at 10:57

## 2023-07-06 RX ADMIN — IPRATROPIUM BROMIDE AND ALBUTEROL SULFATE 1 DOSE: 2.5; .5 SOLUTION RESPIRATORY (INHALATION) at 15:19

## 2023-07-06 RX ADMIN — AMOXICILLIN AND CLAVULANATE POTASSIUM 1 TABLET: 875; 125 TABLET, FILM COATED ORAL at 08:46

## 2023-07-06 RX ADMIN — IPRATROPIUM BROMIDE AND ALBUTEROL SULFATE 1 DOSE: 2.5; .5 SOLUTION RESPIRATORY (INHALATION) at 10:55

## 2023-07-06 RX ADMIN — FOLIC ACID 1 MG: 1 TABLET ORAL at 08:46

## 2023-07-06 RX ADMIN — SODIUM PHOSPHATE, MONOBASIC, MONOHYDRATE 10 MMOL: 276; 142 INJECTION, SOLUTION INTRAVENOUS at 14:17

## 2023-07-06 RX ADMIN — IPRATROPIUM BROMIDE AND ALBUTEROL SULFATE 1 DOSE: 2.5; .5 SOLUTION RESPIRATORY (INHALATION) at 20:09

## 2023-07-06 RX ADMIN — HYDROCODONE BITARTRATE AND ACETAMINOPHEN 1 TABLET: 5; 325 TABLET ORAL at 18:54

## 2023-07-06 RX ADMIN — METHYLPREDNISOLONE SODIUM SUCCINATE 40 MG: 40 INJECTION, POWDER, FOR SOLUTION INTRAMUSCULAR; INTRAVENOUS at 08:49

## 2023-07-06 RX ADMIN — SODIUM CHLORIDE, PRESERVATIVE FREE 10 ML: 5 INJECTION INTRAVENOUS at 08:47

## 2023-07-06 RX ADMIN — ASPIRIN 81 MG: 81 TABLET, CHEWABLE ORAL at 08:46

## 2023-07-06 RX ADMIN — AMLODIPINE BESYLATE 5 MG: 5 TABLET ORAL at 08:46

## 2023-07-06 RX ADMIN — BUDESONIDE AND FORMOTEROL FUMARATE DIHYDRATE 2 PUFF: 160; 4.5 AEROSOL RESPIRATORY (INHALATION) at 20:09

## 2023-07-06 RX ADMIN — AMPICILLIN SODIUM AND SULBACTAM SODIUM 3000 MG: 2; 1 INJECTION, POWDER, FOR SOLUTION INTRAMUSCULAR; INTRAVENOUS at 20:29

## 2023-07-06 RX ADMIN — ENOXAPARIN SODIUM 40 MG: 100 INJECTION SUBCUTANEOUS at 08:46

## 2023-07-06 RX ADMIN — THIAMINE HYDROCHLORIDE 250 MG: 100 INJECTION, SOLUTION INTRAMUSCULAR; INTRAVENOUS at 21:04

## 2023-07-06 RX ADMIN — HYDROCODONE BITARTRATE AND ACETAMINOPHEN 1 TABLET: 5; 325 TABLET ORAL at 08:50

## 2023-07-06 RX ADMIN — AMPICILLIN SODIUM AND SULBACTAM SODIUM 3000 MG: 2; 1 INJECTION, POWDER, FOR SOLUTION INTRAMUSCULAR; INTRAVENOUS at 16:05

## 2023-07-06 ASSESSMENT — PAIN SCALES - WONG BAKER
WONGBAKER_NUMERICALRESPONSE: 0

## 2023-07-06 ASSESSMENT — PAIN DESCRIPTION - FREQUENCY
FREQUENCY: INTERMITTENT
FREQUENCY: INTERMITTENT

## 2023-07-06 ASSESSMENT — PAIN SCALES - GENERAL
PAINLEVEL_OUTOF10: 0
PAINLEVEL_OUTOF10: 7
PAINLEVEL_OUTOF10: 7
PAINLEVEL_OUTOF10: 8
PAINLEVEL_OUTOF10: 0
PAINLEVEL_OUTOF10: 3

## 2023-07-06 ASSESSMENT — PAIN DESCRIPTION - ORIENTATION
ORIENTATION: MID
ORIENTATION: MID

## 2023-07-06 ASSESSMENT — PAIN DESCRIPTION - LOCATION
LOCATION: NECK
LOCATION: NECK

## 2023-07-06 ASSESSMENT — PAIN DESCRIPTION - DIRECTION
RADIATING_TOWARDS: MIDDLE
RADIATING_TOWARDS: MIDDLE

## 2023-07-06 ASSESSMENT — PAIN DESCRIPTION - ONSET
ONSET: GRADUAL
ONSET: GRADUAL

## 2023-07-06 ASSESSMENT — PAIN DESCRIPTION - DESCRIPTORS
DESCRIPTORS: ACHING
DESCRIPTORS: ACHING

## 2023-07-06 ASSESSMENT — PAIN DESCRIPTION - PAIN TYPE
TYPE: CHRONIC PAIN
TYPE: CHRONIC PAIN

## 2023-07-07 LAB
ALBUMIN SERPL-MCNC: 3 GM/DL (ref 3.4–5)
ALP BLD-CCNC: 66 IU/L (ref 40–129)
ALT SERPL-CCNC: 26 U/L (ref 10–40)
ANION GAP SERPL CALCULATED.3IONS-SCNC: 1 MMOL/L (ref 4–16)
AST SERPL-CCNC: 19 IU/L (ref 15–37)
BASOPHILS ABSOLUTE: 0 K/CU MM
BASOPHILS RELATIVE PERCENT: 0.2 % (ref 0–1)
BILIRUB SERPL-MCNC: 0.1 MG/DL (ref 0–1)
BILIRUBIN DIRECT: 0.2 MG/DL (ref 0–0.3)
BILIRUBIN, INDIRECT: ABNORMAL MG/DL (ref 0–0.7)
BUN SERPL-MCNC: 13 MG/DL (ref 6–23)
CALCIUM SERPL-MCNC: 8.5 MG/DL (ref 8.3–10.6)
CHLORIDE BLD-SCNC: 96 MMOL/L (ref 99–110)
CO2: 44 MMOL/L (ref 21–32)
CREAT SERPL-MCNC: 0.2 MG/DL (ref 0.9–1.3)
CULTURE: NORMAL
DIFFERENTIAL TYPE: ABNORMAL
EOSINOPHILS ABSOLUTE: 0 K/CU MM
EOSINOPHILS RELATIVE PERCENT: 0 % (ref 0–3)
GFR SERPL CREATININE-BSD FRML MDRD: >60 ML/MIN/1.73M2
GLUCOSE SERPL-MCNC: 115 MG/DL (ref 70–99)
HCT VFR BLD CALC: 33.2 % (ref 42–52)
HEMOGLOBIN: 9.8 GM/DL (ref 13.5–18)
IMMATURE NEUTROPHIL %: 0.2 % (ref 0–0.43)
LYMPHOCYTES ABSOLUTE: 0.7 K/CU MM
LYMPHOCYTES RELATIVE PERCENT: 7.3 % (ref 24–44)
Lab: NORMAL
MAGNESIUM: 2 MG/DL (ref 1.8–2.4)
MCH RBC QN AUTO: 29 PG (ref 27–31)
MCHC RBC AUTO-ENTMCNC: 29.5 % (ref 32–36)
MCV RBC AUTO: 98.2 FL (ref 78–100)
MONOCYTES ABSOLUTE: 1 K/CU MM
MONOCYTES RELATIVE PERCENT: 9.7 % (ref 0–4)
NUCLEATED RBC %: 0 %
PDW BLD-RTO: 14.6 % (ref 11.7–14.9)
PHOSPHORUS: 3.1 MG/DL (ref 2.5–4.9)
PLATELET # BLD: 229 K/CU MM (ref 140–440)
PMV BLD AUTO: 9.3 FL (ref 7.5–11.1)
POTASSIUM SERPL-SCNC: 4.7 MMOL/L (ref 3.5–5.1)
RBC # BLD: 3.38 M/CU MM (ref 4.6–6.2)
SEGMENTED NEUTROPHILS ABSOLUTE COUNT: 8.4 K/CU MM
SEGMENTED NEUTROPHILS RELATIVE PERCENT: 82.6 % (ref 36–66)
SODIUM BLD-SCNC: 141 MMOL/L (ref 135–145)
SPECIMEN: NORMAL
TOTAL IMMATURE NEUTOROPHIL: 0.02 K/CU MM
TOTAL NUCLEATED RBC: 0 K/CU MM
TOTAL PROTEIN: 4.4 GM/DL (ref 6.4–8.2)
WBC # BLD: 10.1 K/CU MM (ref 4–10.5)

## 2023-07-07 PROCEDURE — 94761 N-INVAS EAR/PLS OXIMETRY MLT: CPT

## 2023-07-07 PROCEDURE — 2700000000 HC OXYGEN THERAPY PER DAY

## 2023-07-07 PROCEDURE — 84100 ASSAY OF PHOSPHORUS: CPT

## 2023-07-07 PROCEDURE — 85025 COMPLETE CBC W/AUTO DIFF WBC: CPT

## 2023-07-07 PROCEDURE — 99211 OFF/OP EST MAY X REQ PHY/QHP: CPT

## 2023-07-07 PROCEDURE — 97166 OT EVAL MOD COMPLEX 45 MIN: CPT

## 2023-07-07 PROCEDURE — 6370000000 HC RX 637 (ALT 250 FOR IP): Performed by: STUDENT IN AN ORGANIZED HEALTH CARE EDUCATION/TRAINING PROGRAM

## 2023-07-07 PROCEDURE — 94640 AIRWAY INHALATION TREATMENT: CPT

## 2023-07-07 PROCEDURE — 97112 NEUROMUSCULAR REEDUCATION: CPT

## 2023-07-07 PROCEDURE — 83735 ASSAY OF MAGNESIUM: CPT

## 2023-07-07 PROCEDURE — 80053 COMPREHEN METABOLIC PANEL: CPT

## 2023-07-07 PROCEDURE — 97162 PT EVAL MOD COMPLEX 30 MIN: CPT

## 2023-07-07 PROCEDURE — 94660 CPAP INITIATION&MGMT: CPT

## 2023-07-07 PROCEDURE — 2060000000 HC ICU INTERMEDIATE R&B

## 2023-07-07 PROCEDURE — 97530 THERAPEUTIC ACTIVITIES: CPT

## 2023-07-07 PROCEDURE — 2580000003 HC RX 258: Performed by: NURSE PRACTITIONER

## 2023-07-07 PROCEDURE — 2580000003 HC RX 258: Performed by: STUDENT IN AN ORGANIZED HEALTH CARE EDUCATION/TRAINING PROGRAM

## 2023-07-07 PROCEDURE — 6370000000 HC RX 637 (ALT 250 FOR IP): Performed by: NURSE PRACTITIONER

## 2023-07-07 PROCEDURE — 82248 BILIRUBIN DIRECT: CPT

## 2023-07-07 PROCEDURE — 6360000002 HC RX W HCPCS: Performed by: NURSE PRACTITIONER

## 2023-07-07 PROCEDURE — 2500000003 HC RX 250 WO HCPCS: Performed by: STUDENT IN AN ORGANIZED HEALTH CARE EDUCATION/TRAINING PROGRAM

## 2023-07-07 PROCEDURE — 6360000002 HC RX W HCPCS: Performed by: STUDENT IN AN ORGANIZED HEALTH CARE EDUCATION/TRAINING PROGRAM

## 2023-07-07 RX ORDER — LANOLIN ALCOHOL/MO/W.PET/CERES
100 CREAM (GRAM) TOPICAL DAILY
Status: DISCONTINUED | OUTPATIENT
Start: 2023-07-07 | End: 2023-07-11 | Stop reason: HOSPADM

## 2023-07-07 RX ADMIN — Medication 15 ML: at 09:38

## 2023-07-07 RX ADMIN — ASPIRIN 81 MG: 81 TABLET, CHEWABLE ORAL at 09:35

## 2023-07-07 RX ADMIN — AMPICILLIN SODIUM AND SULBACTAM SODIUM 3000 MG: 2; 1 INJECTION, POWDER, FOR SOLUTION INTRAMUSCULAR; INTRAVENOUS at 09:42

## 2023-07-07 RX ADMIN — Medication 100 MG: at 09:35

## 2023-07-07 RX ADMIN — AMPICILLIN SODIUM AND SULBACTAM SODIUM 3000 MG: 2; 1 INJECTION, POWDER, FOR SOLUTION INTRAMUSCULAR; INTRAVENOUS at 20:39

## 2023-07-07 RX ADMIN — IPRATROPIUM BROMIDE AND ALBUTEROL SULFATE 1 DOSE: 2.5; .5 SOLUTION RESPIRATORY (INHALATION) at 20:13

## 2023-07-07 RX ADMIN — BUDESONIDE AND FORMOTEROL FUMARATE DIHYDRATE 2 PUFF: 160; 4.5 AEROSOL RESPIRATORY (INHALATION) at 20:14

## 2023-07-07 RX ADMIN — SODIUM CHLORIDE, PRESERVATIVE FREE 10 ML: 5 INJECTION INTRAVENOUS at 09:38

## 2023-07-07 RX ADMIN — AMPICILLIN SODIUM AND SULBACTAM SODIUM 3000 MG: 2; 1 INJECTION, POWDER, FOR SOLUTION INTRAMUSCULAR; INTRAVENOUS at 14:20

## 2023-07-07 RX ADMIN — ONDANSETRON 4 MG: 2 INJECTION INTRAMUSCULAR; INTRAVENOUS at 20:37

## 2023-07-07 RX ADMIN — PREDNISONE 40 MG: 10 TABLET ORAL at 09:35

## 2023-07-07 RX ADMIN — BUDESONIDE AND FORMOTEROL FUMARATE DIHYDRATE 2 PUFF: 160; 4.5 AEROSOL RESPIRATORY (INHALATION) at 08:35

## 2023-07-07 RX ADMIN — HYDROCODONE BITARTRATE AND ACETAMINOPHEN 1 TABLET: 5; 325 TABLET ORAL at 03:34

## 2023-07-07 RX ADMIN — IPRATROPIUM BROMIDE AND ALBUTEROL SULFATE 1 DOSE: 2.5; .5 SOLUTION RESPIRATORY (INHALATION) at 08:35

## 2023-07-07 RX ADMIN — FOLIC ACID 1 MG: 1 TABLET ORAL at 09:35

## 2023-07-07 RX ADMIN — ENOXAPARIN SODIUM 40 MG: 100 INJECTION SUBCUTANEOUS at 09:36

## 2023-07-07 RX ADMIN — AMPICILLIN SODIUM AND SULBACTAM SODIUM 3000 MG: 2; 1 INJECTION, POWDER, FOR SOLUTION INTRAMUSCULAR; INTRAVENOUS at 02:30

## 2023-07-07 RX ADMIN — SODIUM CHLORIDE, PRESERVATIVE FREE 10 ML: 5 INJECTION INTRAVENOUS at 20:53

## 2023-07-07 RX ADMIN — HYDROCODONE BITARTRATE AND ACETAMINOPHEN 1 TABLET: 5; 325 TABLET ORAL at 09:35

## 2023-07-07 RX ADMIN — IPRATROPIUM BROMIDE AND ALBUTEROL SULFATE 1 DOSE: 2.5; .5 SOLUTION RESPIRATORY (INHALATION) at 11:56

## 2023-07-07 RX ADMIN — HYDROCODONE BITARTRATE AND ACETAMINOPHEN 1 TABLET: 5; 325 TABLET ORAL at 17:04

## 2023-07-07 RX ADMIN — IPRATROPIUM BROMIDE AND ALBUTEROL SULFATE 1 DOSE: 2.5; .5 SOLUTION RESPIRATORY (INHALATION) at 16:02

## 2023-07-07 ASSESSMENT — ENCOUNTER SYMPTOMS
WHEEZING: 0
VOMITING: 0
VOICE CHANGE: 0
SINUS PAIN: 0
BACK PAIN: 0
COUGH: 1
SHORTNESS OF BREATH: 1
COLOR CHANGE: 0
SINUS PRESSURE: 0
ABDOMINAL PAIN: 0
CHEST TIGHTNESS: 0
NAUSEA: 0
DIARRHEA: 0
TROUBLE SWALLOWING: 0
SORE THROAT: 0
CONSTIPATION: 0

## 2023-07-07 ASSESSMENT — PAIN DESCRIPTION - LOCATION
LOCATION: GENERALIZED;NECK
LOCATION: NECK

## 2023-07-07 ASSESSMENT — PAIN DESCRIPTION - DESCRIPTORS: DESCRIPTORS: ACHING;DISCOMFORT

## 2023-07-07 ASSESSMENT — PAIN SCALES - GENERAL
PAINLEVEL_OUTOF10: 0
PAINLEVEL_OUTOF10: 8
PAINLEVEL_OUTOF10: 7

## 2023-07-08 LAB
ALBUMIN SERPL-MCNC: 3 GM/DL (ref 3.4–5)
ALP BLD-CCNC: 62 IU/L (ref 40–129)
ALT SERPL-CCNC: 25 U/L (ref 10–40)
ANION GAP SERPL CALCULATED.3IONS-SCNC: 0 MMOL/L (ref 4–16)
AST SERPL-CCNC: 14 IU/L (ref 15–37)
BASOPHILS ABSOLUTE: 0 K/CU MM
BASOPHILS RELATIVE PERCENT: 0 % (ref 0–1)
BILIRUB SERPL-MCNC: 0.2 MG/DL (ref 0–1)
BILIRUBIN DIRECT: 0.2 MG/DL (ref 0–0.3)
BILIRUBIN, INDIRECT: 0 MG/DL (ref 0–0.7)
BUN SERPL-MCNC: 13 MG/DL (ref 6–23)
CALCIUM SERPL-MCNC: 8.6 MG/DL (ref 8.3–10.6)
CHLORIDE BLD-SCNC: 92 MMOL/L (ref 99–110)
CO2: 50 MMOL/L (ref 21–32)
CREAT SERPL-MCNC: 0.3 MG/DL (ref 0.9–1.3)
DIFFERENTIAL TYPE: ABNORMAL
EOSINOPHILS ABSOLUTE: 0 K/CU MM
EOSINOPHILS RELATIVE PERCENT: 0 % (ref 0–3)
GFR SERPL CREATININE-BSD FRML MDRD: >60 ML/MIN/1.73M2
GLUCOSE SERPL-MCNC: 99 MG/DL (ref 70–99)
HCT VFR BLD CALC: 33.1 % (ref 42–52)
HEMOGLOBIN: 9.5 GM/DL (ref 13.5–18)
IMMATURE NEUTROPHIL %: 0.2 % (ref 0–0.43)
LYMPHOCYTES ABSOLUTE: 0.7 K/CU MM
LYMPHOCYTES RELATIVE PERCENT: 8.5 % (ref 24–44)
MAGNESIUM: 1.9 MG/DL (ref 1.8–2.4)
MCH RBC QN AUTO: 28.7 PG (ref 27–31)
MCHC RBC AUTO-ENTMCNC: 28.7 % (ref 32–36)
MCV RBC AUTO: 100 FL (ref 78–100)
MONOCYTES ABSOLUTE: 1 K/CU MM
MONOCYTES RELATIVE PERCENT: 12.1 % (ref 0–4)
NUCLEATED RBC %: 0 %
PDW BLD-RTO: 14.6 % (ref 11.7–14.9)
PHOSPHORUS: 3.3 MG/DL (ref 2.5–4.9)
PLATELET # BLD: 197 K/CU MM (ref 140–440)
PMV BLD AUTO: 9.3 FL (ref 7.5–11.1)
POTASSIUM SERPL-SCNC: 4.8 MMOL/L (ref 3.5–5.1)
RBC # BLD: 3.31 M/CU MM (ref 4.6–6.2)
SEGMENTED NEUTROPHILS ABSOLUTE COUNT: 6.8 K/CU MM
SEGMENTED NEUTROPHILS RELATIVE PERCENT: 79.2 % (ref 36–66)
SODIUM BLD-SCNC: 142 MMOL/L (ref 135–145)
TOTAL IMMATURE NEUTOROPHIL: 0.02 K/CU MM
TOTAL NUCLEATED RBC: 0 K/CU MM
TOTAL PROTEIN: 4.3 GM/DL (ref 6.4–8.2)
WBC # BLD: 8.6 K/CU MM (ref 4–10.5)

## 2023-07-08 PROCEDURE — 6370000000 HC RX 637 (ALT 250 FOR IP): Performed by: NURSE PRACTITIONER

## 2023-07-08 PROCEDURE — 82248 BILIRUBIN DIRECT: CPT

## 2023-07-08 PROCEDURE — 2580000003 HC RX 258: Performed by: NURSE PRACTITIONER

## 2023-07-08 PROCEDURE — 6360000002 HC RX W HCPCS: Performed by: STUDENT IN AN ORGANIZED HEALTH CARE EDUCATION/TRAINING PROGRAM

## 2023-07-08 PROCEDURE — 85025 COMPLETE CBC W/AUTO DIFF WBC: CPT

## 2023-07-08 PROCEDURE — 6370000000 HC RX 637 (ALT 250 FOR IP): Performed by: STUDENT IN AN ORGANIZED HEALTH CARE EDUCATION/TRAINING PROGRAM

## 2023-07-08 PROCEDURE — 2580000003 HC RX 258: Performed by: STUDENT IN AN ORGANIZED HEALTH CARE EDUCATION/TRAINING PROGRAM

## 2023-07-08 PROCEDURE — 94640 AIRWAY INHALATION TREATMENT: CPT

## 2023-07-08 PROCEDURE — 80053 COMPREHEN METABOLIC PANEL: CPT

## 2023-07-08 PROCEDURE — 2060000000 HC ICU INTERMEDIATE R&B

## 2023-07-08 PROCEDURE — 84100 ASSAY OF PHOSPHORUS: CPT

## 2023-07-08 PROCEDURE — 6360000002 HC RX W HCPCS: Performed by: NURSE PRACTITIONER

## 2023-07-08 PROCEDURE — 83735 ASSAY OF MAGNESIUM: CPT

## 2023-07-08 PROCEDURE — 94660 CPAP INITIATION&MGMT: CPT

## 2023-07-08 RX ADMIN — IPRATROPIUM BROMIDE AND ALBUTEROL SULFATE 1 DOSE: 2.5; .5 SOLUTION RESPIRATORY (INHALATION) at 19:38

## 2023-07-08 RX ADMIN — FOLIC ACID 1 MG: 1 TABLET ORAL at 08:09

## 2023-07-08 RX ADMIN — BUDESONIDE AND FORMOTEROL FUMARATE DIHYDRATE 2 PUFF: 160; 4.5 AEROSOL RESPIRATORY (INHALATION) at 19:38

## 2023-07-08 RX ADMIN — PREDNISONE 40 MG: 10 TABLET ORAL at 08:09

## 2023-07-08 RX ADMIN — AMPICILLIN SODIUM AND SULBACTAM SODIUM 3000 MG: 2; 1 INJECTION, POWDER, FOR SOLUTION INTRAMUSCULAR; INTRAVENOUS at 14:30

## 2023-07-08 RX ADMIN — IPRATROPIUM BROMIDE AND ALBUTEROL SULFATE 1 DOSE: 2.5; .5 SOLUTION RESPIRATORY (INHALATION) at 15:57

## 2023-07-08 RX ADMIN — Medication 100 MG: at 08:09

## 2023-07-08 RX ADMIN — AMPICILLIN SODIUM AND SULBACTAM SODIUM 3000 MG: 2; 1 INJECTION, POWDER, FOR SOLUTION INTRAMUSCULAR; INTRAVENOUS at 19:59

## 2023-07-08 RX ADMIN — SODIUM CHLORIDE, PRESERVATIVE FREE 10 ML: 5 INJECTION INTRAVENOUS at 20:04

## 2023-07-08 RX ADMIN — AMPICILLIN SODIUM AND SULBACTAM SODIUM 3000 MG: 2; 1 INJECTION, POWDER, FOR SOLUTION INTRAMUSCULAR; INTRAVENOUS at 02:44

## 2023-07-08 RX ADMIN — IPRATROPIUM BROMIDE AND ALBUTEROL SULFATE 1 DOSE: 2.5; .5 SOLUTION RESPIRATORY (INHALATION) at 07:10

## 2023-07-08 RX ADMIN — ENOXAPARIN SODIUM 40 MG: 100 INJECTION SUBCUTANEOUS at 08:09

## 2023-07-08 RX ADMIN — ASPIRIN 81 MG: 81 TABLET, CHEWABLE ORAL at 08:09

## 2023-07-08 RX ADMIN — HYDROCODONE BITARTRATE AND ACETAMINOPHEN 1 TABLET: 5; 325 TABLET ORAL at 22:14

## 2023-07-08 RX ADMIN — ONDANSETRON 4 MG: 4 TABLET, ORALLY DISINTEGRATING ORAL at 02:45

## 2023-07-08 RX ADMIN — SODIUM CHLORIDE: 9 INJECTION, SOLUTION INTRAVENOUS at 14:29

## 2023-07-08 RX ADMIN — HYDROXYZINE HYDROCHLORIDE 10 MG: 10 TABLET, FILM COATED ORAL at 22:14

## 2023-07-08 RX ADMIN — HYDROCODONE BITARTRATE AND ACETAMINOPHEN 1 TABLET: 5; 325 TABLET ORAL at 11:49

## 2023-07-08 RX ADMIN — AMPICILLIN SODIUM AND SULBACTAM SODIUM 3000 MG: 2; 1 INJECTION, POWDER, FOR SOLUTION INTRAMUSCULAR; INTRAVENOUS at 08:12

## 2023-07-08 RX ADMIN — HYDROCODONE BITARTRATE AND ACETAMINOPHEN 1 TABLET: 5; 325 TABLET ORAL at 04:46

## 2023-07-08 RX ADMIN — SODIUM CHLORIDE, PRESERVATIVE FREE 10 ML: 5 INJECTION INTRAVENOUS at 08:09

## 2023-07-08 RX ADMIN — IPRATROPIUM BROMIDE AND ALBUTEROL SULFATE 1 DOSE: 2.5; .5 SOLUTION RESPIRATORY (INHALATION) at 11:49

## 2023-07-08 RX ADMIN — BUDESONIDE AND FORMOTEROL FUMARATE DIHYDRATE 2 PUFF: 160; 4.5 AEROSOL RESPIRATORY (INHALATION) at 07:11

## 2023-07-08 ASSESSMENT — PAIN SCALES - GENERAL
PAINLEVEL_OUTOF10: 4
PAINLEVEL_OUTOF10: 4
PAINLEVEL_OUTOF10: 3
PAINLEVEL_OUTOF10: 8
PAINLEVEL_OUTOF10: 7
PAINLEVEL_OUTOF10: 6
PAINLEVEL_OUTOF10: 7

## 2023-07-08 ASSESSMENT — PAIN DESCRIPTION - DESCRIPTORS
DESCRIPTORS: DISCOMFORT
DESCRIPTORS: HEAVINESS
DESCRIPTORS: ACHING

## 2023-07-08 ASSESSMENT — PAIN DESCRIPTION - ORIENTATION: ORIENTATION: RIGHT;LEFT

## 2023-07-08 ASSESSMENT — PAIN DESCRIPTION - LOCATION
LOCATION: NECK

## 2023-07-09 LAB
ALBUMIN SERPL-MCNC: 2.9 GM/DL (ref 3.4–5)
ALP BLD-CCNC: 56 IU/L (ref 40–129)
ALT SERPL-CCNC: 22 U/L (ref 10–40)
ANION GAP SERPL CALCULATED.3IONS-SCNC: 0 MMOL/L (ref 4–16)
AST SERPL-CCNC: 12 IU/L (ref 15–37)
BASOPHILS ABSOLUTE: 0 K/CU MM
BASOPHILS RELATIVE PERCENT: 0 % (ref 0–1)
BILIRUB SERPL-MCNC: 0.2 MG/DL (ref 0–1)
BILIRUBIN DIRECT: 0.2 MG/DL (ref 0–0.3)
BILIRUBIN, INDIRECT: 0 MG/DL (ref 0–0.7)
BUN SERPL-MCNC: 12 MG/DL (ref 6–23)
CALCIUM SERPL-MCNC: 8.5 MG/DL (ref 8.3–10.6)
CHLORIDE BLD-SCNC: 88 MMOL/L (ref 99–110)
CO2: >50 MMOL/L (ref 21–32)
CREAT SERPL-MCNC: 0.3 MG/DL (ref 0.9–1.3)
DIFFERENTIAL TYPE: ABNORMAL
EOSINOPHILS ABSOLUTE: 0 K/CU MM
EOSINOPHILS RELATIVE PERCENT: 0 % (ref 0–3)
GFR SERPL CREATININE-BSD FRML MDRD: >60 ML/MIN/1.73M2
GLUCOSE SERPL-MCNC: 58 MG/DL (ref 70–99)
HCT VFR BLD CALC: 32.7 % (ref 42–52)
HEMOGLOBIN: 9.3 GM/DL (ref 13.5–18)
IMMATURE NEUTROPHIL %: 0.1 % (ref 0–0.43)
LYMPHOCYTES ABSOLUTE: 0.5 K/CU MM
LYMPHOCYTES RELATIVE PERCENT: 7.2 % (ref 24–44)
MAGNESIUM: 2 MG/DL (ref 1.8–2.4)
MCH RBC QN AUTO: 28.4 PG (ref 27–31)
MCHC RBC AUTO-ENTMCNC: 28.4 % (ref 32–36)
MCV RBC AUTO: 100 FL (ref 78–100)
MONOCYTES ABSOLUTE: 0.9 K/CU MM
MONOCYTES RELATIVE PERCENT: 12.7 % (ref 0–4)
NUCLEATED RBC %: 0 %
PDW BLD-RTO: 14.3 % (ref 11.7–14.9)
PHOSPHORUS: 3 MG/DL (ref 2.5–4.9)
PLATELET # BLD: 236 K/CU MM (ref 140–440)
PMV BLD AUTO: 9.4 FL (ref 7.5–11.1)
POTASSIUM SERPL-SCNC: 4.9 MMOL/L (ref 3.5–5.1)
RBC # BLD: 3.27 M/CU MM (ref 4.6–6.2)
SEGMENTED NEUTROPHILS ABSOLUTE COUNT: 5.8 K/CU MM
SEGMENTED NEUTROPHILS RELATIVE PERCENT: 80 % (ref 36–66)
SODIUM BLD-SCNC: 140 MMOL/L (ref 135–145)
TOTAL IMMATURE NEUTOROPHIL: 0.01 K/CU MM
TOTAL NUCLEATED RBC: 0 K/CU MM
TOTAL PROTEIN: 4.3 GM/DL (ref 6.4–8.2)
WBC # BLD: 7.2 K/CU MM (ref 4–10.5)

## 2023-07-09 PROCEDURE — 94660 CPAP INITIATION&MGMT: CPT

## 2023-07-09 PROCEDURE — 6370000000 HC RX 637 (ALT 250 FOR IP): Performed by: STUDENT IN AN ORGANIZED HEALTH CARE EDUCATION/TRAINING PROGRAM

## 2023-07-09 PROCEDURE — 6370000000 HC RX 637 (ALT 250 FOR IP): Performed by: NURSE PRACTITIONER

## 2023-07-09 PROCEDURE — 2500000003 HC RX 250 WO HCPCS: Performed by: STUDENT IN AN ORGANIZED HEALTH CARE EDUCATION/TRAINING PROGRAM

## 2023-07-09 PROCEDURE — 6360000002 HC RX W HCPCS: Performed by: STUDENT IN AN ORGANIZED HEALTH CARE EDUCATION/TRAINING PROGRAM

## 2023-07-09 PROCEDURE — 2580000003 HC RX 258: Performed by: STUDENT IN AN ORGANIZED HEALTH CARE EDUCATION/TRAINING PROGRAM

## 2023-07-09 PROCEDURE — 80053 COMPREHEN METABOLIC PANEL: CPT

## 2023-07-09 PROCEDURE — 2060000000 HC ICU INTERMEDIATE R&B

## 2023-07-09 PROCEDURE — 83735 ASSAY OF MAGNESIUM: CPT

## 2023-07-09 PROCEDURE — 84100 ASSAY OF PHOSPHORUS: CPT

## 2023-07-09 PROCEDURE — 2580000003 HC RX 258: Performed by: NURSE PRACTITIONER

## 2023-07-09 PROCEDURE — 6360000002 HC RX W HCPCS: Performed by: NURSE PRACTITIONER

## 2023-07-09 PROCEDURE — 2700000000 HC OXYGEN THERAPY PER DAY

## 2023-07-09 PROCEDURE — 85025 COMPLETE CBC W/AUTO DIFF WBC: CPT

## 2023-07-09 PROCEDURE — 94761 N-INVAS EAR/PLS OXIMETRY MLT: CPT

## 2023-07-09 PROCEDURE — 82248 BILIRUBIN DIRECT: CPT

## 2023-07-09 PROCEDURE — 94640 AIRWAY INHALATION TREATMENT: CPT

## 2023-07-09 RX ADMIN — HYDROCODONE BITARTRATE AND ACETAMINOPHEN 1 TABLET: 5; 325 TABLET ORAL at 15:02

## 2023-07-09 RX ADMIN — AMPICILLIN SODIUM AND SULBACTAM SODIUM 3000 MG: 2; 1 INJECTION, POWDER, FOR SOLUTION INTRAMUSCULAR; INTRAVENOUS at 09:10

## 2023-07-09 RX ADMIN — ENOXAPARIN SODIUM 40 MG: 100 INJECTION SUBCUTANEOUS at 09:04

## 2023-07-09 RX ADMIN — IPRATROPIUM BROMIDE AND ALBUTEROL SULFATE 1 DOSE: 2.5; .5 SOLUTION RESPIRATORY (INHALATION) at 19:55

## 2023-07-09 RX ADMIN — IPRATROPIUM BROMIDE AND ALBUTEROL SULFATE 1 DOSE: 2.5; .5 SOLUTION RESPIRATORY (INHALATION) at 07:44

## 2023-07-09 RX ADMIN — BUDESONIDE AND FORMOTEROL FUMARATE DIHYDRATE 2 PUFF: 160; 4.5 AEROSOL RESPIRATORY (INHALATION) at 07:42

## 2023-07-09 RX ADMIN — FOLIC ACID 1 MG: 1 TABLET ORAL at 09:03

## 2023-07-09 RX ADMIN — SODIUM CHLORIDE, PRESERVATIVE FREE 10 ML: 5 INJECTION INTRAVENOUS at 09:04

## 2023-07-09 RX ADMIN — IPRATROPIUM BROMIDE AND ALBUTEROL SULFATE 1 DOSE: 2.5; .5 SOLUTION RESPIRATORY (INHALATION) at 12:10

## 2023-07-09 RX ADMIN — BUDESONIDE AND FORMOTEROL FUMARATE DIHYDRATE 2 PUFF: 160; 4.5 AEROSOL RESPIRATORY (INHALATION) at 20:05

## 2023-07-09 RX ADMIN — Medication 15 ML: at 09:02

## 2023-07-09 RX ADMIN — HYDROXYZINE HYDROCHLORIDE 10 MG: 10 TABLET, FILM COATED ORAL at 21:10

## 2023-07-09 RX ADMIN — AMPICILLIN SODIUM AND SULBACTAM SODIUM 3000 MG: 2; 1 INJECTION, POWDER, FOR SOLUTION INTRAMUSCULAR; INTRAVENOUS at 15:06

## 2023-07-09 RX ADMIN — AMPICILLIN SODIUM AND SULBACTAM SODIUM 3000 MG: 2; 1 INJECTION, POWDER, FOR SOLUTION INTRAMUSCULAR; INTRAVENOUS at 01:23

## 2023-07-09 RX ADMIN — AMPICILLIN SODIUM AND SULBACTAM SODIUM 3000 MG: 2; 1 INJECTION, POWDER, FOR SOLUTION INTRAMUSCULAR; INTRAVENOUS at 19:28

## 2023-07-09 RX ADMIN — PREDNISONE 40 MG: 10 TABLET ORAL at 09:03

## 2023-07-09 RX ADMIN — ASPIRIN 81 MG: 81 TABLET, CHEWABLE ORAL at 09:03

## 2023-07-09 RX ADMIN — SODIUM CHLORIDE, PRESERVATIVE FREE 10 ML: 5 INJECTION INTRAVENOUS at 19:28

## 2023-07-09 RX ADMIN — Medication 100 MG: at 09:03

## 2023-07-09 RX ADMIN — HYDROXYZINE HYDROCHLORIDE 10 MG: 10 TABLET, FILM COATED ORAL at 09:03

## 2023-07-09 RX ADMIN — HYDROCODONE BITARTRATE AND ACETAMINOPHEN 1 TABLET: 5; 325 TABLET ORAL at 06:17

## 2023-07-09 RX ADMIN — IPRATROPIUM BROMIDE AND ALBUTEROL SULFATE 1 DOSE: 2.5; .5 SOLUTION RESPIRATORY (INHALATION) at 15:41

## 2023-07-09 RX ADMIN — HYDROCODONE BITARTRATE AND ACETAMINOPHEN 1 TABLET: 5; 325 TABLET ORAL at 21:10

## 2023-07-09 ASSESSMENT — PAIN - FUNCTIONAL ASSESSMENT
PAIN_FUNCTIONAL_ASSESSMENT: ACTIVITIES ARE NOT PREVENTED
PAIN_FUNCTIONAL_ASSESSMENT: PREVENTS OR INTERFERES SOME ACTIVE ACTIVITIES AND ADLS
PAIN_FUNCTIONAL_ASSESSMENT: ACTIVITIES ARE NOT PREVENTED

## 2023-07-09 ASSESSMENT — PAIN SCALES - GENERAL
PAINLEVEL_OUTOF10: 4
PAINLEVEL_OUTOF10: 7
PAINLEVEL_OUTOF10: 4
PAINLEVEL_OUTOF10: 9
PAINLEVEL_OUTOF10: 4
PAINLEVEL_OUTOF10: 6

## 2023-07-09 ASSESSMENT — PAIN DESCRIPTION - DESCRIPTORS
DESCRIPTORS: DISCOMFORT
DESCRIPTORS: SHARP
DESCRIPTORS: DISCOMFORT

## 2023-07-09 ASSESSMENT — PAIN DESCRIPTION - LOCATION
LOCATION: HIP;NECK
LOCATION: NECK;COCCYX

## 2023-07-09 ASSESSMENT — PAIN DESCRIPTION - ORIENTATION: ORIENTATION: RIGHT

## 2023-07-10 LAB
ANION GAP SERPL CALCULATED.3IONS-SCNC: 2 MMOL/L (ref 4–16)
BASOPHILS ABSOLUTE: 0 K/CU MM
BASOPHILS RELATIVE PERCENT: 0 % (ref 0–1)
BUN SERPL-MCNC: 13 MG/DL (ref 6–23)
CALCIUM SERPL-MCNC: 8.3 MG/DL (ref 8.3–10.6)
CHLORIDE BLD-SCNC: 92 MMOL/L (ref 99–110)
CO2: 46 MMOL/L (ref 21–32)
CREAT SERPL-MCNC: 0.2 MG/DL (ref 0.9–1.3)
CRP SERPL HS-MCNC: 3 MG/L
DIFFERENTIAL TYPE: ABNORMAL
EOSINOPHILS ABSOLUTE: 0 K/CU MM
EOSINOPHILS RELATIVE PERCENT: 0 % (ref 0–3)
GFR SERPL CREATININE-BSD FRML MDRD: >60 ML/MIN/1.73M2
GLUCOSE SERPL-MCNC: 96 MG/DL (ref 70–99)
HCT VFR BLD CALC: 30.8 % (ref 42–52)
HEMOGLOBIN: 9 GM/DL (ref 13.5–18)
IMMATURE NEUTROPHIL %: 0.2 % (ref 0–0.43)
LYMPHOCYTES ABSOLUTE: 0.4 K/CU MM
LYMPHOCYTES RELATIVE PERCENT: 4.3 % (ref 24–44)
MAGNESIUM: 1.9 MG/DL (ref 1.8–2.4)
MCH RBC QN AUTO: 28.7 PG (ref 27–31)
MCHC RBC AUTO-ENTMCNC: 29.2 % (ref 32–36)
MCV RBC AUTO: 98.1 FL (ref 78–100)
MONOCYTES ABSOLUTE: 0.9 K/CU MM
MONOCYTES RELATIVE PERCENT: 9.3 % (ref 0–4)
NUCLEATED RBC %: 0 %
PDW BLD-RTO: 14.6 % (ref 11.7–14.9)
PHOSPHORUS: 2.6 MG/DL (ref 2.5–4.9)
PLATELET # BLD: 254 K/CU MM (ref 140–440)
PMV BLD AUTO: 9.1 FL (ref 7.5–11.1)
POTASSIUM SERPL-SCNC: 4.7 MMOL/L (ref 3.5–5.1)
PROCALCITONIN SERPL-MCNC: 0.1 NG/ML
RBC # BLD: 3.14 M/CU MM (ref 4.6–6.2)
SEGMENTED NEUTROPHILS ABSOLUTE COUNT: 8.3 K/CU MM
SEGMENTED NEUTROPHILS RELATIVE PERCENT: 86.2 % (ref 36–66)
SODIUM BLD-SCNC: 140 MMOL/L (ref 135–145)
TOTAL IMMATURE NEUTOROPHIL: 0.02 K/CU MM
TOTAL NUCLEATED RBC: 0 K/CU MM
WBC # BLD: 9.6 K/CU MM (ref 4–10.5)

## 2023-07-10 PROCEDURE — 84145 PROCALCITONIN (PCT): CPT

## 2023-07-10 PROCEDURE — 6370000000 HC RX 637 (ALT 250 FOR IP): Performed by: NURSE PRACTITIONER

## 2023-07-10 PROCEDURE — 85025 COMPLETE CBC W/AUTO DIFF WBC: CPT

## 2023-07-10 PROCEDURE — 36415 COLL VENOUS BLD VENIPUNCTURE: CPT

## 2023-07-10 PROCEDURE — 2500000003 HC RX 250 WO HCPCS: Performed by: STUDENT IN AN ORGANIZED HEALTH CARE EDUCATION/TRAINING PROGRAM

## 2023-07-10 PROCEDURE — 6370000000 HC RX 637 (ALT 250 FOR IP): Performed by: STUDENT IN AN ORGANIZED HEALTH CARE EDUCATION/TRAINING PROGRAM

## 2023-07-10 PROCEDURE — 97530 THERAPEUTIC ACTIVITIES: CPT

## 2023-07-10 PROCEDURE — 80048 BASIC METABOLIC PNL TOTAL CA: CPT

## 2023-07-10 PROCEDURE — 2060000000 HC ICU INTERMEDIATE R&B

## 2023-07-10 PROCEDURE — 6360000002 HC RX W HCPCS: Performed by: NURSE PRACTITIONER

## 2023-07-10 PROCEDURE — 6360000002 HC RX W HCPCS: Performed by: STUDENT IN AN ORGANIZED HEALTH CARE EDUCATION/TRAINING PROGRAM

## 2023-07-10 PROCEDURE — 94640 AIRWAY INHALATION TREATMENT: CPT

## 2023-07-10 PROCEDURE — 84100 ASSAY OF PHOSPHORUS: CPT

## 2023-07-10 PROCEDURE — 87040 BLOOD CULTURE FOR BACTERIA: CPT

## 2023-07-10 PROCEDURE — 2700000000 HC OXYGEN THERAPY PER DAY

## 2023-07-10 PROCEDURE — 86140 C-REACTIVE PROTEIN: CPT

## 2023-07-10 PROCEDURE — 2580000003 HC RX 258: Performed by: STUDENT IN AN ORGANIZED HEALTH CARE EDUCATION/TRAINING PROGRAM

## 2023-07-10 PROCEDURE — 2580000003 HC RX 258: Performed by: NURSE PRACTITIONER

## 2023-07-10 PROCEDURE — 97535 SELF CARE MNGMENT TRAINING: CPT

## 2023-07-10 PROCEDURE — 83735 ASSAY OF MAGNESIUM: CPT

## 2023-07-10 PROCEDURE — 94761 N-INVAS EAR/PLS OXIMETRY MLT: CPT

## 2023-07-10 RX ORDER — 0.9 % SODIUM CHLORIDE 0.9 %
500 INTRAVENOUS SOLUTION INTRAVENOUS ONCE
Status: COMPLETED | OUTPATIENT
Start: 2023-07-10 | End: 2023-07-10

## 2023-07-10 RX ADMIN — IPRATROPIUM BROMIDE AND ALBUTEROL SULFATE 1 DOSE: 2.5; .5 SOLUTION RESPIRATORY (INHALATION) at 15:58

## 2023-07-10 RX ADMIN — AMPICILLIN SODIUM AND SULBACTAM SODIUM 3000 MG: 2; 1 INJECTION, POWDER, FOR SOLUTION INTRAMUSCULAR; INTRAVENOUS at 20:28

## 2023-07-10 RX ADMIN — HYDROCODONE BITARTRATE AND ACETAMINOPHEN 1 TABLET: 5; 325 TABLET ORAL at 22:43

## 2023-07-10 RX ADMIN — BUDESONIDE AND FORMOTEROL FUMARATE DIHYDRATE 2 PUFF: 160; 4.5 AEROSOL RESPIRATORY (INHALATION) at 19:29

## 2023-07-10 RX ADMIN — SODIUM CHLORIDE, PRESERVATIVE FREE 10 ML: 5 INJECTION INTRAVENOUS at 09:25

## 2023-07-10 RX ADMIN — BUDESONIDE AND FORMOTEROL FUMARATE DIHYDRATE 2 PUFF: 160; 4.5 AEROSOL RESPIRATORY (INHALATION) at 08:14

## 2023-07-10 RX ADMIN — POLYETHYLENE GLYCOL 3350 17 G: 17 POWDER, FOR SOLUTION ORAL at 15:54

## 2023-07-10 RX ADMIN — ASPIRIN 81 MG: 81 TABLET, CHEWABLE ORAL at 09:25

## 2023-07-10 RX ADMIN — AMPICILLIN SODIUM AND SULBACTAM SODIUM 3000 MG: 2; 1 INJECTION, POWDER, FOR SOLUTION INTRAMUSCULAR; INTRAVENOUS at 00:37

## 2023-07-10 RX ADMIN — IPRATROPIUM BROMIDE AND ALBUTEROL SULFATE 1 DOSE: 2.5; .5 SOLUTION RESPIRATORY (INHALATION) at 08:12

## 2023-07-10 RX ADMIN — HYDROCODONE BITARTRATE AND ACETAMINOPHEN 1 TABLET: 5; 325 TABLET ORAL at 15:54

## 2023-07-10 RX ADMIN — AMPICILLIN SODIUM AND SULBACTAM SODIUM 3000 MG: 2; 1 INJECTION, POWDER, FOR SOLUTION INTRAMUSCULAR; INTRAVENOUS at 15:02

## 2023-07-10 RX ADMIN — HYDROCODONE BITARTRATE AND ACETAMINOPHEN 1 TABLET: 5; 325 TABLET ORAL at 02:57

## 2023-07-10 RX ADMIN — FOLIC ACID 1 MG: 1 TABLET ORAL at 09:25

## 2023-07-10 RX ADMIN — HYDROCODONE BITARTRATE AND ACETAMINOPHEN 1 TABLET: 5; 325 TABLET ORAL at 09:24

## 2023-07-10 RX ADMIN — ENOXAPARIN SODIUM 40 MG: 100 INJECTION SUBCUTANEOUS at 09:25

## 2023-07-10 RX ADMIN — ALBUTEROL SULFATE 2.5 MG: 2.5 SOLUTION RESPIRATORY (INHALATION) at 03:06

## 2023-07-10 RX ADMIN — AMPICILLIN SODIUM AND SULBACTAM SODIUM 3000 MG: 2; 1 INJECTION, POWDER, FOR SOLUTION INTRAMUSCULAR; INTRAVENOUS at 09:31

## 2023-07-10 RX ADMIN — SODIUM CHLORIDE 500 ML: 9 INJECTION, SOLUTION INTRAVENOUS at 09:32

## 2023-07-10 RX ADMIN — Medication 100 MG: at 09:25

## 2023-07-10 RX ADMIN — Medication 15 ML: at 09:25

## 2023-07-10 RX ADMIN — SODIUM CHLORIDE, PRESERVATIVE FREE 10 ML: 5 INJECTION INTRAVENOUS at 20:23

## 2023-07-10 RX ADMIN — IPRATROPIUM BROMIDE AND ALBUTEROL SULFATE 1 DOSE: 2.5; .5 SOLUTION RESPIRATORY (INHALATION) at 19:28

## 2023-07-10 ASSESSMENT — PAIN DESCRIPTION - LOCATION
LOCATION: BACK;NECK
LOCATION: NECK;COCCYX
LOCATION: NECK
LOCATION: NECK
LOCATION: BACK;NECK

## 2023-07-10 ASSESSMENT — PAIN DESCRIPTION - ORIENTATION
ORIENTATION: MID
ORIENTATION: RIGHT
ORIENTATION: RIGHT

## 2023-07-10 ASSESSMENT — PAIN - FUNCTIONAL ASSESSMENT
PAIN_FUNCTIONAL_ASSESSMENT: ACTIVITIES ARE NOT PREVENTED
PAIN_FUNCTIONAL_ASSESSMENT: ACTIVITIES ARE NOT PREVENTED
PAIN_FUNCTIONAL_ASSESSMENT: PREVENTS OR INTERFERES SOME ACTIVE ACTIVITIES AND ADLS
PAIN_FUNCTIONAL_ASSESSMENT: ACTIVITIES ARE NOT PREVENTED

## 2023-07-10 ASSESSMENT — PAIN SCALES - GENERAL
PAINLEVEL_OUTOF10: 8
PAINLEVEL_OUTOF10: 8
PAINLEVEL_OUTOF10: 7
PAINLEVEL_OUTOF10: 7
PAINLEVEL_OUTOF10: 4
PAINLEVEL_OUTOF10: 7

## 2023-07-10 ASSESSMENT — PAIN DESCRIPTION - FREQUENCY
FREQUENCY: CONTINUOUS
FREQUENCY: CONTINUOUS

## 2023-07-10 ASSESSMENT — PAIN DESCRIPTION - DESCRIPTORS
DESCRIPTORS: SHARP;STABBING
DESCRIPTORS: DISCOMFORT
DESCRIPTORS: ACHING;DISCOMFORT
DESCRIPTORS: ACHING;DISCOMFORT

## 2023-07-10 ASSESSMENT — PAIN DESCRIPTION - ONSET
ONSET: ON-GOING
ONSET: ON-GOING

## 2023-07-10 ASSESSMENT — PAIN DESCRIPTION - PAIN TYPE
TYPE: CHRONIC PAIN
TYPE: CHRONIC PAIN

## 2023-07-10 NOTE — PLAN OF CARE
Problem: Pain  Goal: Verbalizes/displays adequate comfort level or baseline comfort level  Outcome: Progressing       Problem: Skin/Tissue Integrity  Goal: Absence of new skin breakdown  Description: 1. Monitor for areas of redness and/or skin breakdown  2. Assess vascular access sites hourly  3. Every 4-6 hours minimum:  Change oxygen saturation probe site  4. Every 4-6 hours:  If on nasal continuous positive airway pressure, respiratory therapy assess nares and determine need for appliance change or resting period.   Outcome: Progressing       Problem: Nutrition Deficit:  Goal: Optimize nutritional status  Outcome: Progressing

## 2023-07-10 NOTE — PROGRESS NOTES
Physical Therapy  Name: Wendy Brush MRN: [id number] :   1955   Date:  7/10/2023   Admission Date: 2023 Room:  -A   Restrictions/Precautions:        Fall Risk, General Precautions    Communication with other providers:  Appropriate via chart review. OT Barby Mireles in for [address] d/t patient tolerance with rehab     Subjective:  Patient states:  \"I can't walk\" Patient demo's moderate fatigue during session and attempts to defer rehab. He is willing for EOB activities today  Pain:   Location, Type, Intensity (0/10 to 10/10):  9/10 neck and 8.5/10 hip pain     Objective:    Observation:  Patient is supine upon arrival. BP 87/47 (60) at rest/beginning of session, after exercise 104/72 (82), 95/70 (79) seated, 87/75 after standing     SpO2 95-97% during activities    Treatment, including education/measures:    Transfers with line management of Tele Monitor, BP Cuff, Pulse OX,   Supine to sit :Min A for trunk support during sit pivot. He request for [facility] to be fully elevated before pivoting to EOB  Sit to supine :Min A for BLE into bed   Scooting :Seated scooting SBA, patient requests for hands off assist during this time. Supine scooting SBA with patient able to sequentially move hips to midline and supine up to EOB  Sit to stand :From EOB SBA with patient requesting hands off assist    Standing balance: patient is SBA with BUE assist into walker. Fair- balance with UE assist x 1 during light dynamic activity. He tolerates less than one minute standing d/t fatigue  Stand to sit :SBA into bed    Safety  Patient left safely in the bed, with call light/phone in reach with alarm applied. Gait belt and mask were used for transfers and gait.     Assessment / Impression:     Patient's tolerance of treatment:  Fair   Adverse Reaction: fatigue  Significant change in status and impact:  decrease tolerance with OOB activities  Barriers to improvement:  medical status, strength    Plan for Next Session:    Will cont to

## 2023-07-10 NOTE — PLAN OF CARE
Problem: Discharge Planning  Goal: Discharge to home or other facility with appropriate resources  Outcome: Progressing     Problem: Safety - Medical Restraint  Goal: Remains free of injury from restraints (Restraint for Interference with Medical Device)  Description: INTERVENTIONS:  1. Determine that other, less restrictive measures have been tried or would not be effective before applying the restraint  2. Evaluate the patient's condition at the time of restraint application  3. Inform patient/family regarding the reason for restraint  4. Q2H: Monitor safety, psychosocial status, comfort, nutrition and hydration  Outcome: Progressing     Problem: Safety - Adult  Goal: Free from fall injury  Outcome: Progressing     Problem: Pain  Goal: Verbalizes/displays adequate comfort level or baseline comfort level  7/10/2023 1558 by Kirti Hopper RN  Outcome: Progressing  Flowsheets (Taken 7/10/2023 0734)  Verbalizes/displays adequate comfort level or baseline comfort level:   Encourage patient to monitor pain and request assistance   Assess pain using appropriate pain scale   Administer analgesics based on type and severity of pain and evaluate response   Implement non-pharmacological measures as appropriate and evaluate response   Consider cultural and social influences on pain and pain management   Notify Licensed Independent Practitioner if interventions unsuccessful or patient reports new pain  7/10/2023 0437 by Dennise Baldwin RN  Outcome: Progressing     Problem: Nutrition Deficit:  Goal: Optimize nutritional status  7/10/2023 1558 by Kirti Hopper RN  Outcome: Progressing  7/10/2023 0437 by Dennise Baldwin RN  Outcome: Progressing     Problem: Skin/Tissue Integrity  Goal: Absence of new skin breakdown  Description: 1. Monitor for areas of redness and/or skin breakdown  2. Assess vascular access sites hourly  3. Every 4-6 hours minimum:  Change oxygen saturation probe site  4.   Every 4-6 hours:  If on

## 2023-07-10 NOTE — PROGRESS NOTES
Occupational Therapy    Occupational Therapy Treatment Note    Name: Wendy Brush MRN: [id number] :   1955   Date:  7/10/2023   Admission Date: 2023 Room:  -A     Primary Problem:  Acute respiratory failure with hypoxia and hypercapnia ([address] Diagnoses of Leukocytosis, unspecified type, Hyperkalemia, and Chronic anemia were also pertinent to this visit. Restrictions/Precautions:  Fall risk, General Precautions     Communication with other providers: Co-tx w/ ZEKE Ortega for pt tolerance     Subjective:  Patient states: \"Don't touch me, it throws my balance off\"  Pain:   Location, Type, Intensity (0/10 to 10/10):  9/10 in neck and hips     Objective:    Observation: Pt supine in bed, agreeable to therapy, ZEKE Fabian at bedside    Objective Measures: On 3L O2, tele, reports dizziness during session. BP 87/47 (60) while supine in bed, 104/72 (82) after completing leg pumps, and 95/70 (79) seated EOB    Treatment, including education:  Self Care Training:   Cues were given for safety, sequence, UE/LE placement, visual cues, and balance. Activities performed today included LB dressing tasks    Pt received supine in bed, agreeable to therapy. Pt maxA to don socks while semi-palmer's in bed. Pt transferred to sitting EOB w/ Eduardo for trunk support. Pt scooted to EOB SBA. Pt required maxA to thread feet into depends, was able to pull them from knee height to mid thigh, then required therapist assist to finish donning. Pt stood at EOB SBA for approx 45 seconds before reporting fatigue and returning to sit EOB. Pt required Eduardo to advance BLE into bed. Pt left in bed will bed alarm on, call light within reach.        Assessment / Impression:    Patient's tolerance of treatment: Well  Adverse Reaction: None  Significant change in status and impact: NA  Barriers to improvement: None noted      Plan for Next Session:    Cont per OT POC    Goals:  Pt will complete all aspects of bed mobility for EOB/OOB

## 2023-07-10 NOTE — CARE COORDINATION
CM placed WB for OT/PT to see as pt plans to be discharged to [facility]. LAURA received a call from [facility] with [facility]. [facility] is unable to see information in the chart. Likely due to the chart being marked for merge. Required documentation e-mailed to [facility]. CM asked Shobha Pedraza to start pre-cert as OT/PT notes are available.

## 2023-07-10 NOTE — PROGRESS NOTES
notified: Pt's BP on morning vitals 87/58 MAP 67, prior BP 98/73      Pt had bolus, BP shortly before noon was 96/61 MAP 73, just rechecked and it's 104/72 MaP 83. Will continue to monitor.

## 2023-07-10 NOTE — PROGRESS NOTES
V2.0    [facility] Progress Note      Name:  Courtney Edmonds /Age/Sex: 1955  (79 y.o. male)   MRN & CSN:  [id number] & [id number] Encounter Date/Time: 7/10/2023 9:01 AM EDT   Location:  [facility] PCP: Donis Leonardo MD     [id number] MD       Hospital Day: 9    Assessment and Recommendations   Courtney Edmonds is a 79 y.o. male with pmh of  who presents with Admitted with pneumonia      Plan:   Metabolic Ecephalopathy : likely 2/2 hypercapnia iso COPD exacerbation    Acute Hypoxemic  and Hypercapnic Respiratory Failure:  -2/2 COPD exacerbation  -On Abx, Duonebs, s/p Steroids, extubated   -O2 PRN for target O2 Sat 88-92%    Community Acquired pneumonia:  -Cultures + for Enterbacter Cloacae/ Klebsiella Pneumonia,sensitive to Unasyn  -Pt on Unasyn  -No leukocytosis, Procal 0.115 on , will follow up    Bcx with Micococcus Leuteus :  -possibly skin contaminant, will follow repea Bcx    COPD Exacerbation:  -Prednisone x 5 days  -Duonebs  -ICS    Elevated Trops- Likely T2MI, downtrending, no CP , EKG 7/3 w no acute changes    Malnutrition, severe- Dietician consult    Recent Femur Fracure s/p ORIF    Normocytic Anemia- 2/2 ACD, will monitor      Diet ADULT ORAL NUTRITION SUPPLEMENT; Lunch, Breakfast, Dinner; Standard High Calorie/High Protein Oral Supplement  ADULT DIET; Easy to Chew  ADULT ORAL NUTRITION SUPPLEMENT; Breakfast, Dinner;  Wound Healing Oral Supplement   DVT Prophylaxis [x] Lovenox, []  Heparin, [] SCDs, [] Ambulation,  [] Eliquis, [] Xarelto  [] Coumadin   Code Status Full Code   Disposition From: Home  Expected Disposition: SNF  Estimated Date of Discharge: 1 day  Patient requires continued admission due to pending Bcx//Home O2/placement   Surrogate Decision Maker/ POA       Personally reviewed Lab Studies and Imaging       Subjective:     Chief Complaint: Altered Mental Status    Courtney Edmonds is a 79 y.o. male who presents with Acute Hypoxemic Respiratory failure      Review of Systems:

## 2023-07-11 VITALS
HEART RATE: 96 BPM | WEIGHT: 103.17 LBS | TEMPERATURE: 99.4 F | OXYGEN SATURATION: 99 % | BODY MASS INDEX: 17.19 KG/M2 | HEIGHT: 65 IN | RESPIRATION RATE: 19 BRPM | DIASTOLIC BLOOD PRESSURE: 63 MMHG | SYSTOLIC BLOOD PRESSURE: 88 MMHG

## 2023-07-11 LAB
ANION GAP SERPL CALCULATED.3IONS-SCNC: 3 MMOL/L (ref 4–16)
ANION GAP SERPL CALCULATED.3IONS-SCNC: 4 MMOL/L (ref 4–16)
BASOPHILS ABSOLUTE: 0 K/CU MM
BASOPHILS RELATIVE PERCENT: 0.1 % (ref 0–1)
BUN SERPL-MCNC: 14 MG/DL (ref 6–23)
BUN SERPL-MCNC: 15 MG/DL (ref 6–23)
CALCIUM SERPL-MCNC: 8.1 MG/DL (ref 8.3–10.6)
CALCIUM SERPL-MCNC: 8.4 MG/DL (ref 8.3–10.6)
CHLORIDE BLD-SCNC: 93 MMOL/L (ref 99–110)
CHLORIDE BLD-SCNC: 93 MMOL/L (ref 99–110)
CO2: 40 MMOL/L (ref 21–32)
CO2: 41 MMOL/L (ref 21–32)
CREAT SERPL-MCNC: 0.2 MG/DL (ref 0.9–1.3)
CREAT SERPL-MCNC: 0.3 MG/DL (ref 0.9–1.3)
DIFFERENTIAL TYPE: ABNORMAL
EOSINOPHILS ABSOLUTE: 0 K/CU MM
EOSINOPHILS RELATIVE PERCENT: 0 % (ref 0–3)
GFR SERPL CREATININE-BSD FRML MDRD: >60 ML/MIN/1.73M2
GFR SERPL CREATININE-BSD FRML MDRD: >60 ML/MIN/1.73M2
GLUCOSE SERPL-MCNC: 104 MG/DL (ref 70–99)
GLUCOSE SERPL-MCNC: 111 MG/DL (ref 70–99)
HCT VFR BLD CALC: 31.7 % (ref 42–52)
HEMOGLOBIN: 9.6 GM/DL (ref 13.5–18)
IMMATURE NEUTROPHIL %: 0.4 % (ref 0–0.43)
LYMPHOCYTES ABSOLUTE: 0.6 K/CU MM
LYMPHOCYTES RELATIVE PERCENT: 4.1 % (ref 24–44)
MAGNESIUM: 1.8 MG/DL (ref 1.8–2.4)
MCH RBC QN AUTO: 29.2 PG (ref 27–31)
MCHC RBC AUTO-ENTMCNC: 30.3 % (ref 32–36)
MCV RBC AUTO: 96.4 FL (ref 78–100)
MONOCYTES ABSOLUTE: 1.5 K/CU MM
MONOCYTES RELATIVE PERCENT: 10.4 % (ref 0–4)
NUCLEATED RBC %: 0 %
PDW BLD-RTO: 14.8 % (ref 11.7–14.9)
PHOSPHORUS: 3.9 MG/DL (ref 2.5–4.9)
PLATELET # BLD: 276 K/CU MM (ref 140–440)
PMV BLD AUTO: 8.7 FL (ref 7.5–11.1)
POTASSIUM SERPL-SCNC: 4.6 MMOL/L (ref 3.5–5.1)
POTASSIUM SERPL-SCNC: 5.9 MMOL/L (ref 3.5–5.1)
RBC # BLD: 3.29 M/CU MM (ref 4.6–6.2)
REASON FOR REJECTION: NORMAL
REJECTED TEST: NORMAL
SEGMENTED NEUTROPHILS ABSOLUTE COUNT: 12.1 K/CU MM
SEGMENTED NEUTROPHILS RELATIVE PERCENT: 85 % (ref 36–66)
SODIUM BLD-SCNC: 136 MMOL/L (ref 135–145)
SODIUM BLD-SCNC: 138 MMOL/L (ref 135–145)
TOTAL IMMATURE NEUTOROPHIL: 0.06 K/CU MM
TOTAL NUCLEATED RBC: 0 K/CU MM
WBC # BLD: 14.2 K/CU MM (ref 4–10.5)

## 2023-07-11 PROCEDURE — 6370000000 HC RX 637 (ALT 250 FOR IP): Performed by: STUDENT IN AN ORGANIZED HEALTH CARE EDUCATION/TRAINING PROGRAM

## 2023-07-11 PROCEDURE — 80048 BASIC METABOLIC PNL TOTAL CA: CPT

## 2023-07-11 PROCEDURE — 6370000000 HC RX 637 (ALT 250 FOR IP): Performed by: INTERNAL MEDICINE

## 2023-07-11 PROCEDURE — 6360000002 HC RX W HCPCS: Performed by: NURSE PRACTITIONER

## 2023-07-11 PROCEDURE — 6360000002 HC RX W HCPCS: Performed by: STUDENT IN AN ORGANIZED HEALTH CARE EDUCATION/TRAINING PROGRAM

## 2023-07-11 PROCEDURE — 94761 N-INVAS EAR/PLS OXIMETRY MLT: CPT

## 2023-07-11 PROCEDURE — 85025 COMPLETE CBC W/AUTO DIFF WBC: CPT

## 2023-07-11 PROCEDURE — 84100 ASSAY OF PHOSPHORUS: CPT

## 2023-07-11 PROCEDURE — 94660 CPAP INITIATION&MGMT: CPT

## 2023-07-11 PROCEDURE — 6370000000 HC RX 637 (ALT 250 FOR IP): Performed by: NURSE PRACTITIONER

## 2023-07-11 PROCEDURE — 94640 AIRWAY INHALATION TREATMENT: CPT

## 2023-07-11 PROCEDURE — 2500000003 HC RX 250 WO HCPCS: Performed by: STUDENT IN AN ORGANIZED HEALTH CARE EDUCATION/TRAINING PROGRAM

## 2023-07-11 PROCEDURE — 2580000003 HC RX 258: Performed by: NURSE PRACTITIONER

## 2023-07-11 PROCEDURE — 2580000003 HC RX 258: Performed by: STUDENT IN AN ORGANIZED HEALTH CARE EDUCATION/TRAINING PROGRAM

## 2023-07-11 PROCEDURE — 2700000000 HC OXYGEN THERAPY PER DAY

## 2023-07-11 PROCEDURE — 83735 ASSAY OF MAGNESIUM: CPT

## 2023-07-11 RX ORDER — ASPIRIN 81 MG/1
81 TABLET, CHEWABLE ORAL DAILY
Qty: 30 TABLET | Refills: 3
Start: 2023-07-12

## 2023-07-11 RX ORDER — BUDESONIDE AND FORMOTEROL FUMARATE DIHYDRATE 160; 4.5 UG/1; UG/1
2 AEROSOL RESPIRATORY (INHALATION) 2 TIMES DAILY
Qty: 10.2 G | Refills: 3
Start: 2023-07-11

## 2023-07-11 RX ORDER — OXYCODONE HYDROCHLORIDE 10 MG/1
10 TABLET ORAL EVERY 8 HOURS PRN
Qty: 9 TABLET | Refills: 0 | Status: SHIPPED | OUTPATIENT
Start: 2023-07-11 | End: 2023-07-14

## 2023-07-11 RX ORDER — FOLIC ACID 1 MG/1
1 TABLET ORAL DAILY
Qty: 30 TABLET | Refills: 3
Start: 2023-07-12

## 2023-07-11 RX ORDER — ALBUTEROL SULFATE 2.5 MG/3ML
2.5 SOLUTION RESPIRATORY (INHALATION) EVERY 6 HOURS PRN
Qty: 120 EACH | Refills: 3
Start: 2023-07-11

## 2023-07-11 RX ORDER — OXYCODONE HYDROCHLORIDE 10 MG/1
10 TABLET ORAL EVERY 8 HOURS PRN
Status: DISCONTINUED | OUTPATIENT
Start: 2023-07-11 | End: 2023-07-11 | Stop reason: HOSPADM

## 2023-07-11 RX ORDER — MULTIVIT-MIN/FERROUS GLUCONATE 12 MG/15ML
15 LIQUID (ML) ORAL DAILY
Qty: 30 ML | Refills: 1
Start: 2023-07-12

## 2023-07-11 RX ORDER — IPRATROPIUM BROMIDE AND ALBUTEROL SULFATE 2.5; .5 MG/3ML; MG/3ML
3 SOLUTION RESPIRATORY (INHALATION)
Qty: 360 ML | Refills: 1
Start: 2023-07-11

## 2023-07-11 RX ORDER — LEVOFLOXACIN 750 MG/1
750 TABLET ORAL DAILY
Qty: 4 TABLET | Refills: 0
Start: 2023-07-11 | End: 2023-07-15

## 2023-07-11 RX ORDER — LANOLIN ALCOHOL/MO/W.PET/CERES
100 CREAM (GRAM) TOPICAL DAILY
Qty: 30 TABLET | Refills: 3
Start: 2023-07-12

## 2023-07-11 RX ADMIN — Medication 15 ML: at 10:20

## 2023-07-11 RX ADMIN — AMPICILLIN SODIUM AND SULBACTAM SODIUM 3000 MG: 2; 1 INJECTION, POWDER, FOR SOLUTION INTRAMUSCULAR; INTRAVENOUS at 13:05

## 2023-07-11 RX ADMIN — AMPICILLIN SODIUM AND SULBACTAM SODIUM 3000 MG: 2; 1 INJECTION, POWDER, FOR SOLUTION INTRAMUSCULAR; INTRAVENOUS at 10:39

## 2023-07-11 RX ADMIN — BUDESONIDE AND FORMOTEROL FUMARATE DIHYDRATE 2 PUFF: 160; 4.5 AEROSOL RESPIRATORY (INHALATION) at 08:42

## 2023-07-11 RX ADMIN — IPRATROPIUM BROMIDE AND ALBUTEROL SULFATE 1 DOSE: 2.5; .5 SOLUTION RESPIRATORY (INHALATION) at 11:58

## 2023-07-11 RX ADMIN — ASPIRIN 81 MG: 81 TABLET, CHEWABLE ORAL at 10:14

## 2023-07-11 RX ADMIN — SODIUM CHLORIDE, PRESERVATIVE FREE 10 ML: 5 INJECTION INTRAVENOUS at 10:15

## 2023-07-11 RX ADMIN — FOLIC ACID 1 MG: 1 TABLET ORAL at 10:14

## 2023-07-11 RX ADMIN — HYDROCODONE BITARTRATE AND ACETAMINOPHEN 1 TABLET: 5; 325 TABLET ORAL at 04:35

## 2023-07-11 RX ADMIN — POLYETHYLENE GLYCOL 3350 17 G: 17 POWDER, FOR SOLUTION ORAL at 10:15

## 2023-07-11 RX ADMIN — Medication 100 MG: at 10:14

## 2023-07-11 RX ADMIN — IPRATROPIUM BROMIDE AND ALBUTEROL SULFATE 1 DOSE: 2.5; .5 SOLUTION RESPIRATORY (INHALATION) at 08:41

## 2023-07-11 RX ADMIN — OXYCODONE HYDROCHLORIDE 10 MG: 10 TABLET ORAL at 10:14

## 2023-07-11 RX ADMIN — ENOXAPARIN SODIUM 40 MG: 100 INJECTION SUBCUTANEOUS at 10:14

## 2023-07-11 RX ADMIN — AMPICILLIN SODIUM AND SULBACTAM SODIUM 3000 MG: 2; 1 INJECTION, POWDER, FOR SOLUTION INTRAMUSCULAR; INTRAVENOUS at 02:20

## 2023-07-11 ASSESSMENT — PAIN DESCRIPTION - LOCATION
LOCATION: NECK;BACK
LOCATION: NECK

## 2023-07-11 ASSESSMENT — PAIN SCALES - GENERAL
PAINLEVEL_OUTOF10: 6
PAINLEVEL_OUTOF10: 8
PAINLEVEL_OUTOF10: 8
PAINLEVEL_OUTOF10: 9

## 2023-07-11 ASSESSMENT — PAIN DESCRIPTION - PAIN TYPE: TYPE: CHRONIC PAIN

## 2023-07-11 ASSESSMENT — PAIN - FUNCTIONAL ASSESSMENT: PAIN_FUNCTIONAL_ASSESSMENT: PREVENTS OR INTERFERES SOME ACTIVE ACTIVITIES AND ADLS

## 2023-07-11 ASSESSMENT — PAIN DESCRIPTION - DESCRIPTORS: DESCRIPTORS: SHARP;STABBING

## 2023-07-11 ASSESSMENT — PAIN DESCRIPTION - ONSET: ONSET: ON-GOING

## 2023-07-11 ASSESSMENT — PAIN DESCRIPTION - ORIENTATION: ORIENTATION: MID

## 2023-07-11 ASSESSMENT — PAIN DESCRIPTION - FREQUENCY: FREQUENCY: CONTINUOUS

## 2023-07-11 NOTE — PROGRESS NOTES
AVS faxed to [facility]. Report called to [facility] at [facility]. Personalized Preventive Plan for Sam Aye - 11/10/2020  Medicare offers a range of preventive health benefits. Some of the tests and screenings are paid in full while other may be subject to a deductible, co-insurance, and/or copay. Some of these benefits include a comprehensive review of your medical history including lifestyle, illnesses that may run in your family, and various assessments and screenings as appropriate. After reviewing your medical record and screening and assessments performed today your provider may have ordered immunizations, labs, imaging, and/or referrals for you. A list of these orders (if applicable) as well as your Preventive Care list are included within your After Visit Summary for your review. Other Preventive Recommendations:    · A preventive eye exam performed by an eye specialist is recommended every 1-2 years to screen for glaucoma; cataracts, macular degeneration, and other eye disorders. · A preventive dental visit is recommended every 6 months. · Try to get at least 150 minutes of exercise per week or 10,000 steps per day on a pedometer . · Order or download the FREE \"Exercise & Physical Activity: Your Everyday Guide\" from [employer]. Call 3-454.639.6160 or search [employer] online. · You need 7060-2510 mg of calcium and 7589-9740 IU of vitamin D per day. It is possible to meet your calcium requirement with diet alone, but a vitamin D supplement is usually necessary to meet this goal.  · When exposed to the sun, use a sunscreen that protects against both UVA and UVB radiation with an SPF of 30 or greater. Reapply every 2 to 3 hours or after sweating, drying off with a towel, or swimming. · Always wear a seat belt when traveling in a car. Always wear a helmet when riding a bicycle or motorcycle.

## 2023-07-11 NOTE — PLAN OF CARE
Problem: Discharge Planning  Goal: Discharge to home or other facility with appropriate resources  7/11/2023 1533 by Luiz Prado RN  Outcome: Completed  7/11/2023 1237 by Luiz Prado RN  Outcome: Progressing  Flowsheets (Taken 7/11/2023 0800)  Discharge to home or other facility with appropriate resources:   Identify barriers to discharge with patient and caregiver   Arrange for needed discharge resources and transportation as appropriate   Identify discharge learning needs (meds, wound care, etc)   Refer to discharge planning if patient needs post-hospital services based on physician order or complex needs related to functional status, cognitive ability or social support system     Problem: Safety - Medical Restraint  Goal: Remains free of injury from restraints (Restraint for Interference with Medical Device)  Description: INTERVENTIONS:  1. Determine that other, less restrictive measures have been tried or would not be effective before applying the restraint  2. Evaluate the patient's condition at the time of restraint application  3. Inform patient/family regarding the reason for restraint  4.  Q2H: Monitor safety, psychosocial status, comfort, nutrition and hydration  7/11/2023 1533 by Luiz Prado RN  Outcome: Completed  7/11/2023 1237 by Luiz Prado RN  Outcome: Progressing     Problem: Safety - Adult  Goal: Free from fall injury  7/11/2023 1533 by Luiz Prado RN  Outcome: Completed  7/11/2023 1237 by Luiz Prado RN  Outcome: Progressing     Problem: Pain  Goal: Verbalizes/displays adequate comfort level or baseline comfort level  7/11/2023 1533 by Luiz Prado RN  Outcome: Completed  7/11/2023 1237 by Luiz Prado RN  Outcome: Progressing  Flowsheets (Taken 7/11/2023 0730)  Verbalizes/displays adequate comfort level or baseline comfort level:   Encourage patient to monitor pain and request assistance   Assess pain using appropriate pain scale   Administer

## 2023-07-11 NOTE — PROGRESS NOTES
Dr notified: Last two BP's were 88/63 MAP 73 and 97/69 MAP 78, pt not lightheaded or dizzy, pt has been running soft upper 80's to low 100's SBP

## 2023-07-11 NOTE — PROGRESS NOTES
07/10/23 2326   NIV Type   NIV Started/Stopped On   Equipment Type v60   Mode Bilevel   Mask Type Full face mask   Mask Size Small   Assessment   Pulse 95   Respirations 18   SpO2 99 %   Comfort Level Fair   Using Accessory Muscles No   Mask Compliance Fair   Skin Assessment Clean, dry, & intact   Skin Protection for O2 Device No   Location Nose   Settings/Measurements   PIP Observed 12 cm H20   IPAP 12 cmH20   CPAP/EPAP 5 cmH2O   Vt (Measured) 543 mL   Rate Ordered 16   Insp Rise Time (%) 2 %   FiO2  35 %   I Time/ I Time % 1 s   Minute Volume (L/min) 9.7 Liters   Mask Leak (lpm) 54 lpm   Patient's Home Machine No   Alarm Settings   Alarms On Y   Low Pressure (cmH2O) 3 cmH2O   High Pressure (cmH2O) 25 cmH2O   Delay Alarm 20 sec(s)   Apnea (secs) 20 secs   RR Low (bpm) 14   RR High (bpm) 45 br/min

## 2023-07-11 NOTE — DISCHARGE SUMMARY
Amount: 30 mg     thiamine 100 MG tablet  Take 1 tablet by mouth daily  Start taking on: July 12, 2023               Where to Get Your Medications        You can get these medications from any pharmacy    Bring a paper prescription for each of these medications  oxyCODONE HCl 10 MG immediate release tablet       Information about where to get these medications is not yet available    Ask your nurse or doctor about these medications  albuterol (2.5 MG/3ML) 0.083% nebulizer solution  aspirin 81 MG chewable tablet  budesonide-formoterol 160-4.5 MCG/ACT Aero  folic acid 1 MG tablet  ipratropium 0.5 mg-albuterol 2.5 mg 0.5-2.5 (3) MG/3ML Soln nebulizer solution  levoFLOXacin 750 MG tablet  Multivit/Multimineral Adult Liqd  thiamine 100 MG tablet         Objective Findings at Discharge:       BMP/CBC  Recent Labs     07/09/23  0122 07/10/23  0038 07/11/23  1045 07/11/23  1305    140 136 138   K 4.9 4.7 5.9* 4.6   CL 88* 92* 93* 93*   CO2 >50* 46* 40* 41*   BUN 12 13 15 14   CREATININE 0.3* 0.2* 0.2* 0.3*   WBC 7.2 9.6  --  14.2*   HCT 32.7* 30.8*  --  31.7*    254  --  276       IMAGING:      Additional Information: Patient seen and examined day of discharge.  For more information regarding patient's care please contact [address] [place] [address] [facility] [phone]    Discharge Time of 35 minutes    Electronically signed by Yareli Fields MD on 7/11/2023 at 4:57 PM

## 2023-07-11 NOTE — PLAN OF CARE
Problem: Discharge Planning  Goal: Discharge to home or other facility with appropriate resources  Outcome: Progressing  Flowsheets (Taken 7/11/2023 0800)  Discharge to home or other facility with appropriate resources:   Identify barriers to discharge with patient and caregiver   Arrange for needed discharge resources and transportation as appropriate   Identify discharge learning needs (meds, wound care, etc)   Refer to discharge planning if patient needs post-hospital services based on physician order or complex needs related to functional status, cognitive ability or social support system     Problem: Safety - Medical Restraint  Goal: Remains free of injury from restraints (Restraint for Interference with Medical Device)  Description: INTERVENTIONS:  1. Determine that other, less restrictive measures have been tried or would not be effective before applying the restraint  2. Evaluate the patient's condition at the time of restraint application  3. Inform patient/family regarding the reason for restraint  4.  Q2H: Monitor safety, psychosocial status, comfort, nutrition and hydration  Outcome: Progressing     Problem: Safety - Adult  Goal: Free from fall injury  Outcome: Progressing     Problem: Pain  Goal: Verbalizes/displays adequate comfort level or baseline comfort level  Outcome: Progressing  Flowsheets (Taken 7/11/2023 0730)  Verbalizes/displays adequate comfort level or baseline comfort level:   Encourage patient to monitor pain and request assistance   Assess pain using appropriate pain scale   Administer analgesics based on type and severity of pain and evaluate response   Implement non-pharmacological measures as appropriate and evaluate response   Consider cultural and social influences on pain and pain management   Notify Licensed Independent Practitioner if interventions unsuccessful or patient reports new pain     Problem: Nutrition Deficit:  Goal: Optimize nutritional status  Outcome: Progressing

## 2023-07-11 NOTE — PROGRESS NOTES
Comprehensive Nutrition Assessment    Type and Reason for Visit:  Reassess    Nutrition Recommendations/Plan:   Modify diet to regular per patient preference   Continue current oral nutrition supplements   Monitor weights, po intakes, skin, labs, POC     Malnutrition Assessment:  Malnutrition Status:  Severe malnutrition (07/03/23 1147)    Context:  Chronic Illness       Nutrition Assessment:    Documented po intakes 1-50%, pt reports eating \"well over half\", seems to graze, takes a long time to eat. Noted pt w/ MASD/stage 2 PI, he tried Donta and is willing to drink it, also consuming Ensure TID per his report (dislikes strawberry, will note). Easy to chew diet started per pt preference, but he states he prefers regular diet so he can have bread, is able to choose foods he is able to chew, will modify. Continue to follow at high nutrition risk. Nutrition Related Findings:    +thiamine, MVI, folic acid; labs reviewed Wound Type: Pressure Injury, Stage II, Moisture Associate Skin Damage       Current Nutrition Intake & Therapies:    Average Meal Intake: 1-25%, 26-50% (per flowsheets)  Average Supplements Intake: 51-75%, % (per pt)  ADULT ORAL NUTRITION SUPPLEMENT; Lunch, Breakfast, Dinner; Standard High Calorie/High Protein Oral Supplement  ADULT DIET; Easy to Chew  ADULT ORAL NUTRITION SUPPLEMENT; Breakfast, Dinner; Wound Healing Oral Supplement    Anthropometric Measures:  Height: 5' 5\" (165.1 cm)  Ideal Body Weight (IBW): 136 lbs (62 kg)    Admission Body Weight: 96 lb 12.5 oz (43.9 kg) (bedscale)  Current Body Weight: 103 lb 2.8 oz (46.8 kg),   IBW.  Weight Source: Bed Scale  Current BMI (kg/m2): 17.2  Usual Body Weight:  (?)     Weight Adjustment For: No Adjustment                 BMI Categories: Underweight (BMI less than 22) age over 72    Estimated Daily Nutrient Needs:  Energy Requirements Based On: Kcal/kg  Weight Used for Energy Requirements: Current  Energy (kcal/day): 3006-9659

## 2023-07-11 NOTE — CARE COORDINATION
Discussed in IDR. Plan for discharge today. Kandace Alonso, RN     6908 Noted that precert was received. PS DR Harry Benoit; updated. [facility]; spoke to [facility]. Can be received today. Contacted [facility]; spoke to [facility]. 3 PM  time. Kandace Alonso, MOMO     7385 Packet to 2E; spoke to [employer].  Kandace Alonso RN     COMPLETE CESAR   RN/PHYSICIAN  COPY CESAR - FAX -[phone]  ADD CESAR TO PACKET  CALL REPORT [phone]  CALL FAMILY

## 2023-07-15 LAB
CULTURE: NORMAL
CULTURE: NORMAL
Lab: NORMAL
Lab: NORMAL
SPECIMEN: NORMAL
SPECIMEN: NORMAL

## 2023-07-27 ENCOUNTER — CARE COORDINATION (OUTPATIENT)
Dept: CARE COORDINATION | Age: 68
End: 2023-07-27

## 2023-08-09 ENCOUNTER — OFFICE VISIT (OUTPATIENT)
Dept: ORTHOPEDIC SURGERY | Age: 68
End: 2023-08-09

## 2023-08-09 VITALS — HEART RATE: 92 BPM | OXYGEN SATURATION: 99 % | SYSTOLIC BLOOD PRESSURE: 118 MMHG | DIASTOLIC BLOOD PRESSURE: 76 MMHG

## 2023-08-09 DIAGNOSIS — S72.001A HIP FRACTURE REQUIRING OPERATIVE REPAIR, RIGHT, CLOSED, INITIAL ENCOUNTER (HCC): Primary | ICD-10-CM

## 2023-08-09 PROCEDURE — 99024 POSTOP FOLLOW-UP VISIT: CPT | Performed by: STUDENT IN AN ORGANIZED HEALTH CARE EDUCATION/TRAINING PROGRAM

## 2023-08-09 RX ORDER — ASPIRIN 81 MG/1
81 TABLET, CHEWABLE ORAL DAILY
COMMUNITY

## 2023-08-09 RX ORDER — LANOLIN ALCOHOL/MO/W.PET/CERES
400 CREAM (GRAM) TOPICAL DAILY
COMMUNITY

## 2023-08-09 NOTE — PROGRESS NOTES
Date of surgery: 2023     Procedure:  Right hip percutaneous screw fixation      History:  Marily Saeed is here in follow up regarding their 11-week follow-up appointment for above procedure. This is the patient's first postoperative visit as he is missed all previous visits. Patient is doing well. They have 5/10 pain. They deny chest pain, SOB, calf pain,fever,wound drainage. No other issues. Patient denies any constitutional symptoms. His only area of pain is some the musculature over the lateral hip at the incision but otherwise is doing well and states he continues to continue weightbearing which is always been limited for the patient. Patient states they have been compliant with restrictions. Patient states that he has been ambulating with a wheeled walker but is often utilizing a wheelchair due to his oxygen use    Patient takes aspirin for DVT prophylaxis     Physical:   Patient demonstrates appropriate mood and affect. Right lower extremity exam:  The incision is well-healed and is clean, dry, intact, and nontender with no erythema. They have no edema, the Leg compartments are soft . There are No cords or calf tenderness. No significant calf/ankle edema. They are neurovascularly intact distally. Previous staples removed at nursing home removed without issue    Range of motion of the right hip demonstrates no pain with active or passive range of motion. Range of motion does appear to be full at this time. No mechanical symptoms    No areas of tenderness to palpation    Negative Sterling's    Imagin views of the right hip in a skeletally mature patient demonstrates previous percutaneous screw fixation of the femoral neck fracture which demonstrates no significant displacement compared to intraoperative imaging. Bone formation noted at previous fracture line. No hardware complications or additional osseous abnormalities.   No sign of backing out of screws or intra-articular

## 2023-08-09 NOTE — PROGRESS NOTES
Patient comes in today for an 11 week follow up post right hip pining on 5/25/23. This is his first follow up visit since the surgery. He reports that he staples were taken out at the nursing home. He rates his pain at 5/10 today. He describes the pain as achy. His pain is located on the lateral portion of his right hip. He reports walking about 10 ft several times a day. He denies any new falls or injuries. X-rays were taken today.

## 2023-08-17 ENCOUNTER — CARE COORDINATION (OUTPATIENT)
Dept: CARE COORDINATION | Age: 68
End: 2023-08-17

## 2023-08-18 ENCOUNTER — TELEPHONE (OUTPATIENT)
Dept: INTERNAL MEDICINE CLINIC | Age: 68
End: 2023-08-18

## 2023-08-18 NOTE — TELEPHONE ENCOUNTER
Care Transitions Initial Follow Up Call    Outreach made within 2 business days of discharge: Yes    Patient: Delio Rivera Patient : 1955   MRN: [id number]  Reason for Admission: There are no discharge diagnoses documented for the most recent discharge. Discharge Date: 23       Spoke with: IVIS but no answer and voicemail was full.      Discharge department/facility: [facility]      Follow Up  Future Appointments   Date Time Provider [address] [place] [address]   2023  9:45 AM Marjorie Banks DO [facility]   2023  1:45 PM Tyrese Briones MD [facility] [address], [place]

## 2023-11-07 ENCOUNTER — HOSPITAL ENCOUNTER (INPATIENT)
Age: 68
LOS: 13 days | Discharge: NURSING, REHAB OR LONG-TERM CARE | DRG: 871 | End: 2023-11-20
Attending: STUDENT IN AN ORGANIZED HEALTH CARE EDUCATION/TRAINING PROGRAM | Admitting: STUDENT IN AN ORGANIZED HEALTH CARE EDUCATION/TRAINING PROGRAM
Payer: MEDICARE

## 2023-11-07 ENCOUNTER — HOSPITAL ENCOUNTER (EMERGENCY)
Age: 68
Discharge: ANOTHER HOSPITAL | End: 2023-11-07
Attending: EMERGENCY MEDICINE
Payer: MEDICARE

## 2023-11-07 ENCOUNTER — APPOINTMENT (OUTPATIENT)
Dept: CT IMAGING | Age: 68
DRG: 871 | End: 2023-11-07
Attending: STUDENT IN AN ORGANIZED HEALTH CARE EDUCATION/TRAINING PROGRAM
Payer: MEDICARE

## 2023-11-07 ENCOUNTER — APPOINTMENT (OUTPATIENT)
Dept: CT IMAGING | Age: 68
End: 2023-11-07
Attending: EMERGENCY MEDICINE
Payer: MEDICARE

## 2023-11-07 ENCOUNTER — APPOINTMENT (OUTPATIENT)
Dept: GENERAL RADIOLOGY | Age: 68
End: 2023-11-07
Attending: EMERGENCY MEDICINE
Payer: MEDICARE

## 2023-11-07 VITALS
HEART RATE: 101 BPM | HEIGHT: 66 IN | DIASTOLIC BLOOD PRESSURE: 64 MMHG | RESPIRATION RATE: 18 BRPM | SYSTOLIC BLOOD PRESSURE: 101 MMHG | BODY MASS INDEX: 17.52 KG/M2 | TEMPERATURE: 97.5 F | WEIGHT: 109 LBS | OXYGEN SATURATION: 95 %

## 2023-11-07 DIAGNOSIS — J18.9 COMMUNITY ACQUIRED PNEUMONIA OF RIGHT LOWER LOBE OF LUNG: ICD-10-CM

## 2023-11-07 DIAGNOSIS — L53.9 ERYTHEMA: Primary | ICD-10-CM

## 2023-11-07 DIAGNOSIS — I48.0 PAROXYSMAL ATRIAL FIBRILLATION (HCC): ICD-10-CM

## 2023-11-07 DIAGNOSIS — J96.22 ACUTE ON CHRONIC RESPIRATORY FAILURE WITH HYPERCAPNIA (HCC): Primary | ICD-10-CM

## 2023-11-07 PROBLEM — J96.92 HYPERCAPNIC RESPIRATORY FAILURE (HCC): Status: ACTIVE | Noted: 2023-11-07

## 2023-11-07 LAB
ALBUMIN SERPL-MCNC: 3.4 GM/DL (ref 3.4–5)
ALCOHOL SCREEN SERUM: <0.01 %WT/VOL
ALP BLD-CCNC: 108 IU/L (ref 40–129)
ALT SERPL-CCNC: 13 U/L (ref 10–40)
AMMONIA: 43 UMOL/L (ref 16–60)
AMMONIA: 79 UMOL/L (ref 16–60)
AMPHETAMINES: NEGATIVE
ANION GAP SERPL CALCULATED.3IONS-SCNC: 10 MMOL/L (ref 4–16)
ANION GAP SERPL CALCULATED.3IONS-SCNC: 12 MMOL/L (ref 4–16)
AST SERPL-CCNC: 12 IU/L (ref 15–37)
B PARAP IS1001 DNA NPH QL NAA+NON-PROBE: NOT DETECTED
B PERT.PT PRMT NPH QL NAA+NON-PROBE: NOT DETECTED
BACTERIA: ABNORMAL /HPF
BARBITURATE SCREEN URINE: NEGATIVE
BASE EXCESS MIXED: 11 (ref 0–1.2)
BASE EXCESS MIXED: 16.2 (ref 0–1.2)
BASE EXCESS MIXED: 17 (ref 0–1.2)
BASE EXCESS: ABNORMAL (ref 0–3.3)
BASE EXCESS: ABNORMAL (ref 0–3.3)
BASOPHILS ABSOLUTE: 0.1 K/CU MM
BASOPHILS RELATIVE PERCENT: 0.4 % (ref 0–1)
BENZODIAZEPINE SCREEN, URINE: NEGATIVE
BILIRUB SERPL-MCNC: 0.3 MG/DL (ref 0–1)
BILIRUBIN URINE: NEGATIVE MG/DL
BLOOD, URINE: ABNORMAL
BUN SERPL-MCNC: 18 MG/DL (ref 6–23)
BUN SERPL-MCNC: 20 MG/DL (ref 6–23)
C PNEUM DNA NPH QL NAA+NON-PROBE: NOT DETECTED
CALCIUM SERPL-MCNC: 8.6 MG/DL (ref 8.3–10.6)
CALCIUM SERPL-MCNC: 9.6 MG/DL (ref 8.3–10.6)
CANNABINOID SCREEN URINE: ABNORMAL
CAST TYPE: ABNORMAL /HPF
CHLORIDE BLD-SCNC: 93 MMOL/L (ref 99–110)
CHLORIDE BLD-SCNC: 94 MMOL/L (ref 99–110)
CLARITY: CLEAR
CO2 CONTENT: 39.7 MMOL/L (ref 19–24)
CO2 CONTENT: 49.7 MMOL/L (ref 19–24)
CO2: 36 MMOL/L (ref 21–32)
CO2: 45 MMOL/L (ref 21–32)
COCAINE METABOLITE: NEGATIVE
COLOR: YELLOW
CREAT SERPL-MCNC: 0.3 MG/DL (ref 0.9–1.3)
CREAT SERPL-MCNC: 0.4 MG/DL (ref 0.9–1.3)
CRYSTAL TYPE: ABNORMAL /HPF
DIFFERENTIAL TYPE: ABNORMAL
EKG ATRIAL RATE: 126 BPM
EKG DIAGNOSIS: NORMAL
EKG DIAGNOSIS: NORMAL
EKG P AXIS: 90 DEGREES
EKG P-R INTERVAL: 120 MS
EKG Q-T INTERVAL: 316 MS
EKG Q-T INTERVAL: 340 MS
EKG QRS DURATION: 110 MS
EKG QRS DURATION: 110 MS
EKG QTC CALCULATION (BAZETT): 492 MS
EKG QTC CALCULATION (BAZETT): 523 MS
EKG R AXIS: -81 DEGREES
EKG R AXIS: 91 DEGREES
EKG T AXIS: 76 DEGREES
EKG T AXIS: 79 DEGREES
EKG VENTRICULAR RATE: 126 BPM
EKG VENTRICULAR RATE: 165 BPM
EOSINOPHILS ABSOLUTE: 0 K/CU MM
EOSINOPHILS RELATIVE PERCENT: 0.1 % (ref 0–3)
EPITHELIAL CELLS, UA: 0 /HPF
FENTANYL URINE: NEGATIVE
FLUAV H1 2009 PAN RNA NPH NAA+NON-PROBE: NOT DETECTED
FLUAV H1 RNA NPH QL NAA+NON-PROBE: NOT DETECTED
FLUAV H3 RNA NPH QL NAA+NON-PROBE: NOT DETECTED
FLUAV RNA NPH QL NAA+NON-PROBE: NOT DETECTED
FLUBV RNA NPH QL NAA+NON-PROBE: NOT DETECTED
GFR SERPL CREATININE-BSD FRML MDRD: >60 ML/MIN/1.73M2
GFR SERPL CREATININE-BSD FRML MDRD: >60 ML/MIN/1.73M2
GLUCOSE BLD-MCNC: 136 MG/DL (ref 70–99)
GLUCOSE BLD-MCNC: 258 MG/DL (ref 70–99)
GLUCOSE SERPL-MCNC: 116 MG/DL (ref 70–99)
GLUCOSE SERPL-MCNC: 137 MG/DL (ref 70–99)
GLUCOSE, URINE: NEGATIVE MG/DL
HADV DNA NPH QL NAA+NON-PROBE: NOT DETECTED
HCO3 VENOUS: 38.6 MMOL/L (ref 19–25)
HCO3 VENOUS: 41.6 MMOL/L (ref 19–25)
HCO3 VENOUS: 45.8 MMOL/L (ref 19–25)
HCOV 229E RNA NPH QL NAA+NON-PROBE: NOT DETECTED
HCOV HKU1 RNA NPH QL NAA+NON-PROBE: NOT DETECTED
HCOV NL63 RNA NPH QL NAA+NON-PROBE: NOT DETECTED
HCOV OC43 RNA NPH QL NAA+NON-PROBE: NOT DETECTED
HCT VFR BLD CALC: 44 % (ref 42–52)
HEMOGLOBIN: 12.2 GM/DL (ref 13.5–18)
HMPV RNA NPH QL NAA+NON-PROBE: NOT DETECTED
HPIV1 RNA NPH QL NAA+NON-PROBE: NOT DETECTED
HPIV2 RNA NPH QL NAA+NON-PROBE: NOT DETECTED
HPIV3 RNA NPH QL NAA+NON-PROBE: NOT DETECTED
HPIV4 RNA NPH QL NAA+NON-PROBE: NOT DETECTED
IMMATURE NEUTROPHIL %: 2.3 % (ref 0–0.43)
INR BLD: 0.9 INDEX
KETONES, URINE: 40 MG/DL
LACTATE: 1.9 MMOL/L (ref 0.5–1.9)
LEUKOCYTE ESTERASE, URINE: NEGATIVE
LYMPHOCYTES ABSOLUTE: 1.1 K/CU MM
LYMPHOCYTES RELATIVE PERCENT: 6.9 % (ref 24–44)
M PNEUMO DNA NPH QL NAA+NON-PROBE: NOT DETECTED
MCH RBC QN AUTO: 27.3 PG (ref 27–31)
MCHC RBC AUTO-ENTMCNC: 27.7 % (ref 32–36)
MCV RBC AUTO: 98.4 FL (ref 78–100)
MONOCYTES ABSOLUTE: 1.5 K/CU MM
MONOCYTES RELATIVE PERCENT: 9.9 % (ref 0–4)
NITRITE URINE, QUANTITATIVE: NEGATIVE
O2 SAT, VEN: 75.9 % (ref 50–70)
O2 SAT, VEN: 94.6 % (ref 50–70)
O2 SAT, VEN: 99.3 % (ref 50–70)
OPIATES, URINE: NEGATIVE
OXYCODONE: ABNORMAL
PCO2, VEN: 127 MMHG (ref 38–52)
PCO2, VEN: 33.9 MMHG (ref 38–52)
PCO2, VEN: 51 MMHG (ref 38–52)
PDW BLD-RTO: 13.9 % (ref 11.7–14.9)
PH VENOUS: 7.17 (ref 7.32–7.42)
PH VENOUS: 7.52 (ref 7.32–7.42)
PH VENOUS: 7.67 (ref 7.32–7.42)
PH, URINE: 6.5 (ref 5–8)
PHOSPHORUS: 0.9 MG/DL (ref 2.5–4.9)
PLATELET # BLD: 460 K/CU MM (ref 140–440)
PMV BLD AUTO: 8.9 FL (ref 7.5–11.1)
PO2, VEN: 116.9 MMHG (ref 28–48)
PO2, VEN: 56.2 MMHG (ref 28–48)
PO2, VEN: 87 MMHG (ref 28–48)
POTASSIUM SERPL-SCNC: 5.6 MMOL/L (ref 3.5–5.1)
POTASSIUM SERPL-SCNC: 5.6 MMOL/L (ref 3.5–5.1)
PRO-BNP: 726.4 PG/ML
PROTEIN UA: ABNORMAL MG/DL
PROTHROMBIN TIME: 13 SECONDS (ref 11.7–14.5)
RBC # BLD: 4.47 M/CU MM (ref 4.6–6.2)
RBC URINE: 0 /HPF (ref 0–3)
RSV RNA NPH QL NAA+NON-PROBE: NOT DETECTED
RV+EV RNA NPH QL NAA+NON-PROBE: NOT DETECTED
SARS-COV-2 RNA NPH QL NAA+NON-PROBE: NOT DETECTED
SEGMENTED NEUTROPHILS ABSOLUTE COUNT: 12.3 K/CU MM
SEGMENTED NEUTROPHILS RELATIVE PERCENT: 80.4 % (ref 36–66)
SODIUM BLD-SCNC: 142 MMOL/L (ref 135–145)
SODIUM BLD-SCNC: 148 MMOL/L (ref 135–145)
SOURCE, BLOOD GAS: ABNORMAL
SOURCE, BLOOD GAS: ABNORMAL
SPECIFIC GRAVITY UA: 1.02 (ref 1–1.03)
TOTAL IMMATURE NEUTOROPHIL: 0.35 K/CU MM
TOTAL PROTEIN: 6.7 GM/DL (ref 6.4–8.2)
TROPONIN, HIGH SENSITIVITY: 18 NG/L (ref 0–22)
UROBILINOGEN, URINE: 0.2 MG/DL (ref 0.2–1)
WBC # BLD: 15.3 K/CU MM (ref 4–10.5)
WBC UA: 3 /HPF (ref 0–2)

## 2023-11-07 PROCEDURE — 96368 THER/DIAG CONCURRENT INF: CPT

## 2023-11-07 PROCEDURE — 2580000003 HC RX 258

## 2023-11-07 PROCEDURE — 83880 ASSAY OF NATRIURETIC PEPTIDE: CPT

## 2023-11-07 PROCEDURE — 70450 CT HEAD/BRAIN W/O DYE: CPT

## 2023-11-07 PROCEDURE — 84484 ASSAY OF TROPONIN QUANT: CPT

## 2023-11-07 PROCEDURE — 5A1945Z RESPIRATORY VENTILATION, 24-96 CONSECUTIVE HOURS: ICD-10-PCS | Performed by: STUDENT IN AN ORGANIZED HEALTH CARE EDUCATION/TRAINING PROGRAM

## 2023-11-07 PROCEDURE — 6360000002 HC RX W HCPCS

## 2023-11-07 PROCEDURE — 85610 PROTHROMBIN TIME: CPT

## 2023-11-07 PROCEDURE — 87081 CULTURE SCREEN ONLY: CPT

## 2023-11-07 PROCEDURE — 51702 INSERT TEMP BLADDER CATH: CPT

## 2023-11-07 PROCEDURE — 6370000000 HC RX 637 (ALT 250 FOR IP): Performed by: EMERGENCY MEDICINE

## 2023-11-07 PROCEDURE — 96375 TX/PRO/DX INJ NEW DRUG ADDON: CPT

## 2023-11-07 PROCEDURE — 87641 MR-STAPH DNA AMP PROBE: CPT

## 2023-11-07 PROCEDURE — 2580000003 HC RX 258: Performed by: NURSE PRACTITIONER

## 2023-11-07 PROCEDURE — 87040 BLOOD CULTURE FOR BACTERIA: CPT

## 2023-11-07 PROCEDURE — 99285 EMERGENCY DEPT VISIT HI MDM: CPT

## 2023-11-07 PROCEDURE — 31500 INSERT EMERGENCY AIRWAY: CPT

## 2023-11-07 PROCEDURE — 93010 ELECTROCARDIOGRAM REPORT: CPT | Performed by: INTERNAL MEDICINE

## 2023-11-07 PROCEDURE — 84100 ASSAY OF PHOSPHORUS: CPT

## 2023-11-07 PROCEDURE — 87077 CULTURE AEROBIC IDENTIFY: CPT

## 2023-11-07 PROCEDURE — 96361 HYDRATE IV INFUSION ADD-ON: CPT

## 2023-11-07 PROCEDURE — 6370000000 HC RX 637 (ALT 250 FOR IP)

## 2023-11-07 PROCEDURE — 87899 AGENT NOS ASSAY W/OPTIC: CPT

## 2023-11-07 PROCEDURE — 0BH17EZ INSERTION OF ENDOTRACHEAL AIRWAY INTO TRACHEA, VIA NATURAL OR ARTIFICIAL OPENING: ICD-10-PCS | Performed by: STUDENT IN AN ORGANIZED HEALTH CARE EDUCATION/TRAINING PROGRAM

## 2023-11-07 PROCEDURE — 94761 N-INVAS EAR/PLS OXIMETRY MLT: CPT

## 2023-11-07 PROCEDURE — 87070 CULTURE OTHR SPECIMN AEROBIC: CPT

## 2023-11-07 PROCEDURE — 94640 AIRWAY INHALATION TREATMENT: CPT

## 2023-11-07 PROCEDURE — 81001 URINALYSIS AUTO W/SCOPE: CPT

## 2023-11-07 PROCEDURE — 2580000003 HC RX 258: Performed by: EMERGENCY MEDICINE

## 2023-11-07 PROCEDURE — 96365 THER/PROPH/DIAG IV INF INIT: CPT

## 2023-11-07 PROCEDURE — 94002 VENT MGMT INPAT INIT DAY: CPT

## 2023-11-07 PROCEDURE — 92960 CARDIOVERSION ELECTRIC EXT: CPT

## 2023-11-07 PROCEDURE — 6370000000 HC RX 637 (ALT 250 FOR IP): Performed by: STUDENT IN AN ORGANIZED HEALTH CARE EDUCATION/TRAINING PROGRAM

## 2023-11-07 PROCEDURE — 82805 BLOOD GASES W/O2 SATURATION: CPT

## 2023-11-07 PROCEDURE — 85025 COMPLETE CBC W/AUTO DIFF WBC: CPT

## 2023-11-07 PROCEDURE — 93005 ELECTROCARDIOGRAM TRACING: CPT | Performed by: NURSE PRACTITIONER

## 2023-11-07 PROCEDURE — 2500000003 HC RX 250 WO HCPCS

## 2023-11-07 PROCEDURE — 83605 ASSAY OF LACTIC ACID: CPT

## 2023-11-07 PROCEDURE — 2700000000 HC OXYGEN THERAPY PER DAY

## 2023-11-07 PROCEDURE — 2500000003 HC RX 250 WO HCPCS: Performed by: EMERGENCY MEDICINE

## 2023-11-07 PROCEDURE — 71250 CT THORAX DX C-: CPT

## 2023-11-07 PROCEDURE — 2000000000 HC ICU R&B

## 2023-11-07 PROCEDURE — 87449 NOS EACH ORGANISM AG IA: CPT

## 2023-11-07 PROCEDURE — 6360000002 HC RX W HCPCS: Performed by: EMERGENCY MEDICINE

## 2023-11-07 PROCEDURE — 87186 SC STD MICRODIL/AGAR DIL: CPT

## 2023-11-07 PROCEDURE — 0202U NFCT DS 22 TRGT SARS-COV-2: CPT

## 2023-11-07 PROCEDURE — 6360000002 HC RX W HCPCS: Performed by: NURSE PRACTITIONER

## 2023-11-07 PROCEDURE — 87205 SMEAR GRAM STAIN: CPT

## 2023-11-07 PROCEDURE — G0480 DRUG TEST DEF 1-7 CLASSES: HCPCS

## 2023-11-07 PROCEDURE — 82962 GLUCOSE BLOOD TEST: CPT

## 2023-11-07 PROCEDURE — 2500000003 HC RX 250 WO HCPCS: Performed by: NURSE PRACTITIONER

## 2023-11-07 PROCEDURE — 82140 ASSAY OF AMMONIA: CPT

## 2023-11-07 PROCEDURE — 93005 ELECTROCARDIOGRAM TRACING: CPT | Performed by: EMERGENCY MEDICINE

## 2023-11-07 PROCEDURE — 80048 BASIC METABOLIC PNL TOTAL CA: CPT

## 2023-11-07 PROCEDURE — 71045 X-RAY EXAM CHEST 1 VIEW: CPT

## 2023-11-07 PROCEDURE — 96366 THER/PROPH/DIAG IV INF ADDON: CPT

## 2023-11-07 PROCEDURE — 6370000000 HC RX 637 (ALT 250 FOR IP): Performed by: NURSE PRACTITIONER

## 2023-11-07 PROCEDURE — 80307 DRUG TEST PRSMV CHEM ANLYZR: CPT

## 2023-11-07 PROCEDURE — 80053 COMPREHEN METABOLIC PANEL: CPT

## 2023-11-07 PROCEDURE — 96376 TX/PRO/DX INJ SAME DRUG ADON: CPT

## 2023-11-07 RX ORDER — ALBUTEROL SULFATE 2.5 MG/3ML
2.5 SOLUTION RESPIRATORY (INHALATION)
Status: DISCONTINUED | OUTPATIENT
Start: 2023-11-07 | End: 2023-11-07

## 2023-11-07 RX ORDER — 0.9 % SODIUM CHLORIDE 0.9 %
1000 INTRAVENOUS SOLUTION INTRAVENOUS ONCE
Status: COMPLETED | OUTPATIENT
Start: 2023-11-07 | End: 2023-11-07

## 2023-11-07 RX ORDER — POLYETHYLENE GLYCOL 3350 17 G/17G
17 POWDER, FOR SOLUTION ORAL DAILY PRN
Status: DISCONTINUED | OUTPATIENT
Start: 2023-11-07 | End: 2023-11-20 | Stop reason: HOSPADM

## 2023-11-07 RX ORDER — SODIUM CHLORIDE 9 MG/ML
INJECTION, SOLUTION INTRAVENOUS CONTINUOUS
Status: DISCONTINUED | OUTPATIENT
Start: 2023-11-07 | End: 2023-11-10

## 2023-11-07 RX ORDER — PROPOFOL 10 MG/ML
5-50 INJECTION, EMULSION INTRAVENOUS CONTINUOUS
Status: DISCONTINUED | OUTPATIENT
Start: 2023-11-07 | End: 2023-11-10

## 2023-11-07 RX ORDER — ACETAMINOPHEN 650 MG/1
650 SUPPOSITORY RECTAL EVERY 6 HOURS PRN
Status: DISCONTINUED | OUTPATIENT
Start: 2023-11-07 | End: 2023-11-20 | Stop reason: HOSPADM

## 2023-11-07 RX ORDER — FOLIC ACID 1 MG/1
1 TABLET ORAL DAILY
Status: DISCONTINUED | OUTPATIENT
Start: 2023-11-07 | End: 2023-11-20 | Stop reason: HOSPADM

## 2023-11-07 RX ORDER — ROCURONIUM BROMIDE 10 MG/ML
80 INJECTION, SOLUTION INTRAVENOUS ONCE
Status: COMPLETED | OUTPATIENT
Start: 2023-11-07 | End: 2023-11-07

## 2023-11-07 RX ORDER — ALBUTEROL SULFATE 2.5 MG/3ML
2.5 SOLUTION RESPIRATORY (INHALATION) EVERY 4 HOURS PRN
Status: DISCONTINUED | OUTPATIENT
Start: 2023-11-07 | End: 2023-11-20 | Stop reason: HOSPADM

## 2023-11-07 RX ORDER — SODIUM CHLORIDE 9 MG/ML
INJECTION, SOLUTION INTRAVENOUS PRN
Status: DISCONTINUED | OUTPATIENT
Start: 2023-11-07 | End: 2023-11-20 | Stop reason: HOSPADM

## 2023-11-07 RX ORDER — PHENYLEPHRINE HCL IN 0.9% NACL 1 MG/10 ML
100 SYRINGE (ML) INTRAVENOUS ONCE
Status: COMPLETED | OUTPATIENT
Start: 2023-11-07 | End: 2023-11-07

## 2023-11-07 RX ORDER — ALBUTEROL SULFATE 2.5 MG/3ML
2.5 SOLUTION RESPIRATORY (INHALATION) EVERY 6 HOURS PRN
Status: DISCONTINUED | OUTPATIENT
Start: 2023-11-07 | End: 2023-11-07

## 2023-11-07 RX ORDER — SODIUM CHLORIDE 0.9 % (FLUSH) 0.9 %
5-40 SYRINGE (ML) INJECTION PRN
Status: DISCONTINUED | OUTPATIENT
Start: 2023-11-07 | End: 2023-11-20 | Stop reason: HOSPADM

## 2023-11-07 RX ORDER — ONDANSETRON 4 MG/1
4 TABLET, ORALLY DISINTEGRATING ORAL EVERY 8 HOURS PRN
Status: DISCONTINUED | OUTPATIENT
Start: 2023-11-07 | End: 2023-11-20 | Stop reason: HOSPADM

## 2023-11-07 RX ORDER — ONDANSETRON 2 MG/ML
4 INJECTION INTRAMUSCULAR; INTRAVENOUS EVERY 6 HOURS PRN
Status: DISCONTINUED | OUTPATIENT
Start: 2023-11-07 | End: 2023-11-20 | Stop reason: HOSPADM

## 2023-11-07 RX ORDER — GLUCAGON 1 MG/ML
1 KIT INJECTION PRN
Status: DISCONTINUED | OUTPATIENT
Start: 2023-11-07 | End: 2023-11-20 | Stop reason: HOSPADM

## 2023-11-07 RX ORDER — IPRATROPIUM BROMIDE AND ALBUTEROL SULFATE 2.5; .5 MG/3ML; MG/3ML
1 SOLUTION RESPIRATORY (INHALATION) EVERY 4 HOURS PRN
Status: DISCONTINUED | OUTPATIENT
Start: 2023-11-07 | End: 2023-11-20 | Stop reason: HOSPADM

## 2023-11-07 RX ORDER — DEXTROSE MONOHYDRATE 25 G/50ML
25 INJECTION, SOLUTION INTRAVENOUS PRN
Status: DISCONTINUED | OUTPATIENT
Start: 2023-11-07 | End: 2023-11-07

## 2023-11-07 RX ORDER — M-VIT,TX,IRON,MINS/CALC/FOLIC 27MG-0.4MG
1 TABLET ORAL DAILY
Status: DISCONTINUED | OUTPATIENT
Start: 2023-11-07 | End: 2023-11-20 | Stop reason: HOSPADM

## 2023-11-07 RX ORDER — BUDESONIDE AND FORMOTEROL FUMARATE DIHYDRATE 160; 4.5 UG/1; UG/1
2 AEROSOL RESPIRATORY (INHALATION)
Status: DISCONTINUED | OUTPATIENT
Start: 2023-11-07 | End: 2023-11-20 | Stop reason: HOSPADM

## 2023-11-07 RX ORDER — FENTANYL CITRATE 50 UG/ML
100 INJECTION, SOLUTION INTRAMUSCULAR; INTRAVENOUS ONCE
Status: COMPLETED | OUTPATIENT
Start: 2023-11-07 | End: 2023-11-07

## 2023-11-07 RX ORDER — IPRATROPIUM BROMIDE AND ALBUTEROL SULFATE 2.5; .5 MG/3ML; MG/3ML
1 SOLUTION RESPIRATORY (INHALATION)
Status: DISCONTINUED | OUTPATIENT
Start: 2023-11-07 | End: 2023-11-07

## 2023-11-07 RX ORDER — FAMOTIDINE 10 MG/ML
20 INJECTION, SOLUTION INTRAVENOUS 2 TIMES DAILY
Status: DISCONTINUED | OUTPATIENT
Start: 2023-11-07 | End: 2023-11-15

## 2023-11-07 RX ORDER — CHLORHEXIDINE GLUCONATE ORAL RINSE 1.2 MG/ML
15 SOLUTION DENTAL 2 TIMES DAILY
Status: DISCONTINUED | OUTPATIENT
Start: 2023-11-07 | End: 2023-11-10

## 2023-11-07 RX ORDER — SODIUM CHLORIDE 0.9 % (FLUSH) 0.9 %
5-40 SYRINGE (ML) INJECTION EVERY 12 HOURS SCHEDULED
Status: DISCONTINUED | OUTPATIENT
Start: 2023-11-07 | End: 2023-11-20 | Stop reason: HOSPADM

## 2023-11-07 RX ORDER — ALBUTEROL SULFATE 90 UG/1
2 AEROSOL, METERED RESPIRATORY (INHALATION)
Status: DISCONTINUED | OUTPATIENT
Start: 2023-11-07 | End: 2023-11-20 | Stop reason: HOSPADM

## 2023-11-07 RX ORDER — ASPIRIN 81 MG/1
81 TABLET, CHEWABLE ORAL DAILY
Status: DISCONTINUED | OUTPATIENT
Start: 2023-11-07 | End: 2023-11-20 | Stop reason: HOSPADM

## 2023-11-07 RX ORDER — ACETAMINOPHEN 325 MG/1
650 TABLET ORAL EVERY 6 HOURS PRN
Status: DISCONTINUED | OUTPATIENT
Start: 2023-11-07 | End: 2023-11-20 | Stop reason: HOSPADM

## 2023-11-07 RX ORDER — DEXTROSE MONOHYDRATE 100 MG/ML
INJECTION, SOLUTION INTRAVENOUS CONTINUOUS PRN
Status: DISCONTINUED | OUTPATIENT
Start: 2023-11-07 | End: 2023-11-20 | Stop reason: HOSPADM

## 2023-11-07 RX ORDER — IPRATROPIUM BROMIDE AND ALBUTEROL SULFATE 2.5; .5 MG/3ML; MG/3ML
3 SOLUTION RESPIRATORY (INHALATION) ONCE
Status: COMPLETED | OUTPATIENT
Start: 2023-11-07 | End: 2023-11-07

## 2023-11-07 RX ORDER — SODIUM CHLORIDE, SODIUM LACTATE, POTASSIUM CHLORIDE, AND CALCIUM CHLORIDE .6; .31; .03; .02 G/100ML; G/100ML; G/100ML; G/100ML
1000 INJECTION, SOLUTION INTRAVENOUS ONCE
Status: COMPLETED | OUTPATIENT
Start: 2023-11-07 | End: 2023-11-07

## 2023-11-07 RX ORDER — ENOXAPARIN SODIUM 100 MG/ML
30 INJECTION SUBCUTANEOUS DAILY
Status: DISCONTINUED | OUTPATIENT
Start: 2023-11-08 | End: 2023-11-20 | Stop reason: HOSPADM

## 2023-11-07 RX ORDER — THIAMINE HYDROCHLORIDE 100 MG/ML
200 INJECTION, SOLUTION INTRAMUSCULAR; INTRAVENOUS ONCE
Status: COMPLETED | OUTPATIENT
Start: 2023-11-07 | End: 2023-11-07

## 2023-11-07 RX ORDER — ETOMIDATE 2 MG/ML
10 INJECTION INTRAVENOUS ONCE
Status: COMPLETED | OUTPATIENT
Start: 2023-11-07 | End: 2023-11-07

## 2023-11-07 RX ORDER — DEXTROSE MONOHYDRATE 25 G/50ML
25 INJECTION, SOLUTION INTRAVENOUS ONCE
Status: COMPLETED | OUTPATIENT
Start: 2023-11-07 | End: 2023-11-07

## 2023-11-07 RX ORDER — THIAMINE HYDROCHLORIDE 100 MG/ML
100 INJECTION, SOLUTION INTRAMUSCULAR; INTRAVENOUS DAILY
Status: DISCONTINUED | OUTPATIENT
Start: 2023-11-07 | End: 2023-11-15

## 2023-11-07 RX ORDER — FENTANYL CITRATE 50 UG/ML
25 INJECTION, SOLUTION INTRAMUSCULAR; INTRAVENOUS
Status: DISCONTINUED | OUTPATIENT
Start: 2023-11-07 | End: 2023-11-08

## 2023-11-07 RX ORDER — NOREPINEPHRINE BITARTRATE 0.06 MG/ML
1-100 INJECTION, SOLUTION INTRAVENOUS CONTINUOUS
Status: DISCONTINUED | OUTPATIENT
Start: 2023-11-07 | End: 2023-11-07 | Stop reason: HOSPADM

## 2023-11-07 RX ORDER — FAMOTIDINE 10 MG/ML
20 INJECTION, SOLUTION INTRAVENOUS 2 TIMES DAILY
Status: DISCONTINUED | OUTPATIENT
Start: 2023-11-07 | End: 2023-11-07

## 2023-11-07 RX ORDER — MAGNESIUM SULFATE IN WATER 40 MG/ML
2000 INJECTION, SOLUTION INTRAVENOUS ONCE
Status: COMPLETED | OUTPATIENT
Start: 2023-11-07 | End: 2023-11-07

## 2023-11-07 RX ORDER — MIDAZOLAM HYDROCHLORIDE 2 MG/2ML
1 INJECTION, SOLUTION INTRAMUSCULAR; INTRAVENOUS
Status: DISCONTINUED | OUTPATIENT
Start: 2023-11-07 | End: 2023-11-10

## 2023-11-07 RX ORDER — ALBUTEROL SULFATE 90 UG/1
2 AEROSOL, METERED RESPIRATORY (INHALATION)
Status: DISCONTINUED | OUTPATIENT
Start: 2023-11-07 | End: 2023-11-07

## 2023-11-07 RX ADMIN — Medication 0.1 MG: at 12:30

## 2023-11-07 RX ADMIN — METHYLPREDNISOLONE SODIUM SUCCINATE 125 MG: 125 INJECTION, POWDER, FOR SOLUTION INTRAMUSCULAR; INTRAVENOUS at 13:36

## 2023-11-07 RX ADMIN — BUDESONIDE AND FORMOTEROL FUMARATE DIHYDRATE 2 PUFF: 160; 4.5 AEROSOL RESPIRATORY (INHALATION) at 20:06

## 2023-11-07 RX ADMIN — IPRATROPIUM BROMIDE 2 PUFF: 17 AEROSOL, METERED RESPIRATORY (INHALATION) at 20:02

## 2023-11-07 RX ADMIN — THIAMINE HYDROCHLORIDE 100 MG: 100 INJECTION, SOLUTION INTRAMUSCULAR; INTRAVENOUS at 21:12

## 2023-11-07 RX ADMIN — AZITHROMYCIN MONOHYDRATE 500 MG: 500 INJECTION, POWDER, LYOPHILIZED, FOR SOLUTION INTRAVENOUS at 14:32

## 2023-11-07 RX ADMIN — ETOMIDATE 10 MG: 2 INJECTION, SOLUTION INTRAVENOUS at 12:10

## 2023-11-07 RX ADMIN — FOLIC ACID 1 MG: 1 TABLET ORAL at 21:21

## 2023-11-07 RX ADMIN — CEFTRIAXONE SODIUM 1000 MG: 1 INJECTION, POWDER, FOR SOLUTION INTRAMUSCULAR; INTRAVENOUS at 14:18

## 2023-11-07 RX ADMIN — INSULIN HUMAN 5 UNITS: 100 INJECTION, SOLUTION PARENTERAL at 21:22

## 2023-11-07 RX ADMIN — PROPOFOL 20 MCG/KG/MIN: 10 INJECTION, EMULSION INTRAVENOUS at 19:38

## 2023-11-07 RX ADMIN — FENTANYL CITRATE 50 MCG/HR: 0.05 INJECTION, SOLUTION INTRAMUSCULAR; INTRAVENOUS at 14:18

## 2023-11-07 RX ADMIN — IPRATROPIUM BROMIDE AND ALBUTEROL SULFATE 3 DOSE: .5; 3 SOLUTION RESPIRATORY (INHALATION) at 13:12

## 2023-11-07 RX ADMIN — THIAMINE HYDROCHLORIDE 200 MG: 100 INJECTION, SOLUTION INTRAMUSCULAR; INTRAVENOUS at 14:22

## 2023-11-07 RX ADMIN — SODIUM CHLORIDE, PRESERVATIVE FREE 10 ML: 5 INJECTION INTRAVENOUS at 19:39

## 2023-11-07 RX ADMIN — FENTANYL CITRATE 100 MCG: 50 INJECTION, SOLUTION INTRAMUSCULAR; INTRAVENOUS at 13:36

## 2023-11-07 RX ADMIN — DEXTROSE MONOHYDRATE 25 G: 25 INJECTION, SOLUTION INTRAVENOUS at 21:21

## 2023-11-07 RX ADMIN — ASPIRIN 81 MG 81 MG: 81 TABLET ORAL at 19:39

## 2023-11-07 RX ADMIN — ROCURONIUM BROMIDE 80 MG: 10 INJECTION, SOLUTION INTRAVENOUS at 12:10

## 2023-11-07 RX ADMIN — SODIUM CHLORIDE, POTASSIUM CHLORIDE, SODIUM LACTATE AND CALCIUM CHLORIDE 1000 ML: 600; 310; 30; 20 INJECTION, SOLUTION INTRAVENOUS at 15:16

## 2023-11-07 RX ADMIN — SODIUM CHLORIDE 1000 ML: 9 INJECTION, SOLUTION INTRAVENOUS at 12:29

## 2023-11-07 RX ADMIN — SODIUM CHLORIDE: 9 INJECTION, SOLUTION INTRAVENOUS at 21:35

## 2023-11-07 RX ADMIN — MAGNESIUM SULFATE HEPTAHYDRATE 2000 MG: 40 INJECTION, SOLUTION INTRAVENOUS at 14:29

## 2023-11-07 RX ADMIN — MULTIPLE VITAMINS W/ MINERALS TAB 1 TABLET: TAB at 21:12

## 2023-11-07 RX ADMIN — SODIUM ZIRCONIUM CYCLOSILICATE 5 G: 5 POWDER, FOR SUSPENSION ORAL at 21:13

## 2023-11-07 RX ADMIN — ALBUTEROL SULFATE 2 PUFF: 90 AEROSOL, METERED RESPIRATORY (INHALATION) at 20:00

## 2023-11-07 RX ADMIN — FAMOTIDINE 20 MG: 10 INJECTION, SOLUTION INTRAVENOUS at 19:39

## 2023-11-07 RX ADMIN — CHLORHEXIDINE GLUCONATE 0.12% ORAL RINSE 15 ML: 1.2 LIQUID ORAL at 19:39

## 2023-11-07 ASSESSMENT — PULMONARY FUNCTION TESTS
PIF_VALUE: 20
PIF_VALUE: 26
PIF_VALUE: 21

## 2023-11-07 NOTE — ED NOTES
Pt was placed on zoll pads. Pt 's. Dr Kermit Vizcarra verbalized 100 J synchronized cardioversion for pt. Pt shocked at 100 J synchronized without change to rhythm or rate. Dr Kermit Vizcarra verbalized 200 J synchronized cardioversion for pt. Pt shocked at 200 J synchronized and rhythm changed to sinus tach with HR in 120's at this time.       Jason Wallace RN  11/07/23 1500

## 2023-11-07 NOTE — H&P
V2.0  History and Physical      Name:  Kobe Heredia /Age/Sex: 1955  (79 y.o. male)   MRN & CSN:  [id number] & [id number] Encounter Date/Time: 2023 6:29 PM EST   Location:  -A PCP: Johnson Hester MD       Hospital Day: 1    Assessment and Plan:   Kobe Heredia is a 79 y.o. male with a pmh of COPD, who presents with Hypercapnic respiratory failure [facility]    Hospital Problems             Last Modified POA    * (Principal) Hypercapnic respiratory failure ([address]) 2023 Yes     Acute respiratory failure, present on arrival  Acute encephalopathy, present on admission. Noted to have GCS of 3 on arrival.  Atrial fibrillation,present on admission  Hyperkalemia  Elevated ammonia  COPD, likely in acute exacerbation; present on admission    Neuro: GCS noted to be 3 on arrival to [facility]. Arrived from [facility] to ICU on fentanyl infusion for sedation. Will switch to propofol infusion to keep RASS 0 to -1. As needed fentanyl and Versed ordered as well. Cardio: Notes reviewed. He was noted to be atrial fibrillation and RVR he was shocked with 100 J x 1 then 200 J x 1. He reportedly returned to sinus tachycardia after shock. EKG ordered here to review. Continue telemetry monitoring. Previous echo from 2023 reviewed noted to have LVEF 55 to 60%. Resp: Intubated on arrival to [facility]. Continue full mechanical ventilation at this time. Breathing treatments, pulmonary hygiene ordered. Chest x-ray reviewed. CT chest ordered, pending. GI: NPO. Dietitian consulted for tube feeding. GI prophylaxis: Pepcid. Noted to have elevated ammonia. Repeat pending. We will start lactulose if continues to be elevated. : Normal renal function. Noted to be hyperkalemic. Repeat labs pending at this time. Will treat hyperkalemia if needed. Continue to monitor I&O, monitor electrolytes and replete as needed. Avoid nephrotoxic agents  Heme: Hemoglobin is stable. No signs of active bleeding.   DVT

## 2023-11-07 NOTE — ED PROVIDER NOTES
Triage Chief Complaint:   No chief complaint on file. Hughes:  Courtney Edmonds is a 79 y.o. male that presents to the ED unresponsive by EMS. The patient is 80-year-old male hospitalized back in July apparently recently got nursing home he was found unresponsive by a neighbor medics were called he had a low sat placed on high flow oxygen. When I saw him he was unresponsive he had a GCS of 3 no obvious signs of injury or trauma the patient is a cachectic chronically ill-appearing male. Patient is with accessory muscle use is tachypneic poor breath sounds some crackles at the right anterior lateral lung field. 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 On 7/22/16. Dr Emmanuel Hobbs at [address] [place] [address]    Dr. Myra Brink    COPD (chronic obstructive pulmonary disease) ([address] ) 5/19/2014    PFTs showed severe COPD in 5/14- follows with Dr Joelle De La Fuente    COPD exacerbation [facility]) 6/10/2016    Diverticulosis 9-    Dupuytren's contracture 5/26/2017    Right palm    Elevated PSA 12/21/2018 12/2018 : PSA is 6    H/O colonoscopy 9-18-14    Dr. Myra Brink    H/O echocardiogram 08/17/2016    EF 55-60%. Abnormal diastolic function. Mild TR. Borderline right atrium and RV dilatation.      Hiatal hernia     History of alcohol abuse     \"average 6 pk beer per day\"    History of drug abuse ([address])     does use marijuana\"last used cocaine over 10 yrs ago\" per pt on 10/3/2018    Internal hemorrhoids 9-    Intervertebral disc protrusion 5/19/2014    Cervical region on MRI    Lingular pneumonia 4/3/2018    Lymphocytic colitis 9-18-14    Dr. Myra Brink, will treat if symptomatic    On home oxygen therapy     2l/nc at night only    Pneumonia 11/11/2021    Pneumonia of left lower cjhr=310 , rate = 126. Intervals are not within the normal range. >> RBBB  QTc is  prolonged. ST elevations are not present. T wave inversions are not present. Non-specific T wave changes are not present. Delta waves, Brugada Syndrome, and Short W[place] are not present. There is no acute ischemia. Chart review shows recent radiographs:  No results found. MDM:                    CRITICAL CARE NOTE:  There was a high probability of clinically significant life-threatening deterioration of the patient's condition requiring my urgent intervention due to increased work of breathing, and tachycardia. Order interpretation of labs and imaging, reevaluation was performed to address this. Total critical care time is AT LEAST 65 minutes   This includes vital sign monitoring, pulse oximetry monitoring, telemetry monitoring, clinical response to the IV medications, reviewing the nursing notes, consultation time, dictation/documentation time, and interpretation of the lab work. This time excludes time spent performing procedures and separately billable procedures and family discussion time. Procedure Note - Intubation: The benefits, risks, and alternatives of intubation were discussed with the patient and EMERGENT and EMERGENT consent was obtained (or implied if emergent situation dictated) for the procedure. Pre-oxygenation was administered and the appropriate equipment and staff were made available at the bedside. Donnell Pabon was sedated/paralyzed; please see the chart for the drugs and dosages administered. A Arturo 3laryngoscope blade was used for a grade 0 view and a 7.5mm endotracheal tube was viewed to pass through the cords on the first attempt. The tube was secured at 22cm to the lip. Tracheal position was confirmed using a colorimetric end-tidal CO2 detector, tube condensation and chest auscultation/visualization.  Respiratory therapy is at the bedside and is assisting with ventilatory

## 2023-11-07 NOTE — ED NOTES
Lab called with critical value, potassium 5.6. Dr. Yi Bocanegra notified.      Dannielle Desai., RN  11/07/23 4270

## 2023-11-08 ENCOUNTER — APPOINTMENT (OUTPATIENT)
Dept: GENERAL RADIOLOGY | Age: 68
DRG: 871 | End: 2023-11-08
Attending: STUDENT IN AN ORGANIZED HEALTH CARE EDUCATION/TRAINING PROGRAM
Payer: MEDICARE

## 2023-11-08 LAB
ALBUMIN SERPL-MCNC: 3 GM/DL (ref 3.4–5)
ALP BLD-CCNC: 85 IU/L (ref 40–129)
ALT SERPL-CCNC: 10 U/L (ref 10–40)
ANION GAP SERPL CALCULATED.3IONS-SCNC: 11 MMOL/L (ref 4–16)
AST SERPL-CCNC: 15 IU/L (ref 15–37)
BASOPHILS ABSOLUTE: 0.1 K/CU MM
BASOPHILS RELATIVE PERCENT: 0.3 % (ref 0–1)
BILIRUB SERPL-MCNC: 0.3 MG/DL (ref 0–1)
BILIRUBIN DIRECT: 0.2 MG/DL (ref 0–0.3)
BILIRUBIN, INDIRECT: 0.1 MG/DL (ref 0–0.7)
BUN SERPL-MCNC: 19 MG/DL (ref 6–23)
CALCIUM SERPL-MCNC: 9 MG/DL (ref 8.3–10.6)
CHLORIDE BLD-SCNC: 96 MMOL/L (ref 99–110)
CO2: 37 MMOL/L (ref 21–32)
CREAT SERPL-MCNC: 0.7 MG/DL (ref 0.9–1.3)
DIFFERENTIAL TYPE: ABNORMAL
EKG ATRIAL RATE: 107 BPM
EKG DIAGNOSIS: NORMAL
EKG P AXIS: 76 DEGREES
EKG P-R INTERVAL: 126 MS
EKG Q-T INTERVAL: 354 MS
EKG QRS DURATION: 94 MS
EKG QTC CALCULATION (BAZETT): 472 MS
EKG R AXIS: 35 DEGREES
EKG T AXIS: 76 DEGREES
EKG VENTRICULAR RATE: 107 BPM
EOSINOPHILS ABSOLUTE: 0 K/CU MM
EOSINOPHILS RELATIVE PERCENT: 0.1 % (ref 0–3)
GFR SERPL CREATININE-BSD FRML MDRD: >60 ML/MIN/1.73M2
GLUCOSE BLD-MCNC: 105 MG/DL (ref 70–99)
GLUCOSE BLD-MCNC: 122 MG/DL (ref 70–99)
GLUCOSE BLD-MCNC: 82 MG/DL (ref 70–99)
GLUCOSE BLD-MCNC: 85 MG/DL (ref 70–99)
GLUCOSE SERPL-MCNC: 125 MG/DL (ref 70–99)
HCT VFR BLD CALC: 35.4 % (ref 42–52)
HEMOGLOBIN: 10.4 GM/DL (ref 13.5–18)
IMMATURE NEUTROPHIL %: 0.4 % (ref 0–0.43)
L PNEUMO AG UR QL IA: NEGATIVE
LYMPHOCYTES ABSOLUTE: 0.6 K/CU MM
LYMPHOCYTES RELATIVE PERCENT: 3.3 % (ref 24–44)
MCH RBC QN AUTO: 27.6 PG (ref 27–31)
MCHC RBC AUTO-ENTMCNC: 29.4 % (ref 32–36)
MCV RBC AUTO: 93.9 FL (ref 78–100)
MONOCYTES ABSOLUTE: 1.5 K/CU MM
MONOCYTES RELATIVE PERCENT: 8.2 % (ref 0–4)
MRSA, DNA, NASAL: ABNORMAL
NUCLEATED RBC %: 0 %
PDW BLD-RTO: 14.1 % (ref 11.7–14.9)
PHOSPHORUS: 0.8 MG/DL (ref 2.5–4.9)
PLATELET # BLD: 341 K/CU MM (ref 140–440)
PMV BLD AUTO: 9.3 FL (ref 7.5–11.1)
POTASSIUM SERPL-SCNC: 4.1 MMOL/L (ref 3.5–5.1)
RBC # BLD: 3.77 M/CU MM (ref 4.6–6.2)
S PNEUM AG CSF QL: NORMAL
SEGMENTED NEUTROPHILS ABSOLUTE COUNT: 16.5 K/CU MM
SEGMENTED NEUTROPHILS RELATIVE PERCENT: 87.7 % (ref 36–66)
SODIUM BLD-SCNC: 144 MMOL/L (ref 135–145)
SPECIMEN DESCRIPTION: ABNORMAL
TOTAL IMMATURE NEUTOROPHIL: 0.07 K/CU MM
TOTAL NUCLEATED RBC: 0 K/CU MM
TOTAL PROTEIN: 5.1 GM/DL (ref 6.4–8.2)
WBC # BLD: 18.7 K/CU MM (ref 4–10.5)

## 2023-11-08 PROCEDURE — 2700000000 HC OXYGEN THERAPY PER DAY

## 2023-11-08 PROCEDURE — 93010 ELECTROCARDIOGRAM REPORT: CPT | Performed by: INTERNAL MEDICINE

## 2023-11-08 PROCEDURE — 94003 VENT MGMT INPAT SUBQ DAY: CPT

## 2023-11-08 PROCEDURE — 84100 ASSAY OF PHOSPHORUS: CPT

## 2023-11-08 PROCEDURE — 2580000003 HC RX 258: Performed by: NURSE PRACTITIONER

## 2023-11-08 PROCEDURE — 94640 AIRWAY INHALATION TREATMENT: CPT

## 2023-11-08 PROCEDURE — 6370000000 HC RX 637 (ALT 250 FOR IP)

## 2023-11-08 PROCEDURE — 6360000002 HC RX W HCPCS

## 2023-11-08 PROCEDURE — 80048 BASIC METABOLIC PNL TOTAL CA: CPT

## 2023-11-08 PROCEDURE — 94761 N-INVAS EAR/PLS OXIMETRY MLT: CPT

## 2023-11-08 PROCEDURE — 2500000003 HC RX 250 WO HCPCS

## 2023-11-08 PROCEDURE — 80053 COMPREHEN METABOLIC PANEL: CPT

## 2023-11-08 PROCEDURE — 71045 X-RAY EXAM CHEST 1 VIEW: CPT

## 2023-11-08 PROCEDURE — 85025 COMPLETE CBC W/AUTO DIFF WBC: CPT

## 2023-11-08 PROCEDURE — 6360000002 HC RX W HCPCS: Performed by: STUDENT IN AN ORGANIZED HEALTH CARE EDUCATION/TRAINING PROGRAM

## 2023-11-08 PROCEDURE — 80076 HEPATIC FUNCTION PANEL: CPT

## 2023-11-08 PROCEDURE — 82962 GLUCOSE BLOOD TEST: CPT

## 2023-11-08 PROCEDURE — 82248 BILIRUBIN DIRECT: CPT

## 2023-11-08 PROCEDURE — 2580000003 HC RX 258: Performed by: STUDENT IN AN ORGANIZED HEALTH CARE EDUCATION/TRAINING PROGRAM

## 2023-11-08 PROCEDURE — 2500000003 HC RX 250 WO HCPCS: Performed by: NURSE PRACTITIONER

## 2023-11-08 PROCEDURE — 2000000000 HC ICU R&B

## 2023-11-08 PROCEDURE — 6370000000 HC RX 637 (ALT 250 FOR IP): Performed by: NURSE PRACTITIONER

## 2023-11-08 PROCEDURE — 89220 SPUTUM SPECIMEN COLLECTION: CPT

## 2023-11-08 PROCEDURE — 2580000003 HC RX 258

## 2023-11-08 PROCEDURE — 6360000002 HC RX W HCPCS: Performed by: NURSE PRACTITIONER

## 2023-11-08 RX ORDER — NOREPINEPHRINE BITARTRATE 0.06 MG/ML
1-100 INJECTION, SOLUTION INTRAVENOUS CONTINUOUS
Status: DISCONTINUED | OUTPATIENT
Start: 2023-11-08 | End: 2023-11-08

## 2023-11-08 RX ORDER — FENTANYL CITRATE 50 UG/ML
25 INJECTION, SOLUTION INTRAMUSCULAR; INTRAVENOUS
Status: DISCONTINUED | OUTPATIENT
Start: 2023-11-08 | End: 2023-11-10

## 2023-11-08 RX ADMIN — DIBASIC SODIUM PHOSPHATE, MONOBASIC POTASSIUM PHOSPHATE AND MONOBASIC SODIUM PHOSPHATE 2 TABLET: 852; 155; 130 TABLET ORAL at 12:00

## 2023-11-08 RX ADMIN — CHLORHEXIDINE GLUCONATE 0.12% ORAL RINSE 15 ML: 1.2 LIQUID ORAL at 20:56

## 2023-11-08 RX ADMIN — IPRATROPIUM BROMIDE 2 PUFF: 17 AEROSOL, METERED RESPIRATORY (INHALATION) at 19:33

## 2023-11-08 RX ADMIN — BUDESONIDE AND FORMOTEROL FUMARATE DIHYDRATE 2 PUFF: 160; 4.5 AEROSOL RESPIRATORY (INHALATION) at 08:06

## 2023-11-08 RX ADMIN — PROPOFOL 45 MCG/KG/MIN: 10 INJECTION, EMULSION INTRAVENOUS at 04:40

## 2023-11-08 RX ADMIN — FENTANYL CITRATE 25 MCG: 50 INJECTION INTRAMUSCULAR; INTRAVENOUS at 09:01

## 2023-11-08 RX ADMIN — FAMOTIDINE 20 MG: 10 INJECTION, SOLUTION INTRAVENOUS at 09:46

## 2023-11-08 RX ADMIN — IPRATROPIUM BROMIDE 2 PUFF: 17 AEROSOL, METERED RESPIRATORY (INHALATION) at 15:48

## 2023-11-08 RX ADMIN — FENTANYL CITRATE 25 MCG: 50 INJECTION INTRAMUSCULAR; INTRAVENOUS at 19:06

## 2023-11-08 RX ADMIN — ALBUTEROL SULFATE 2 PUFF: 90 AEROSOL, METERED RESPIRATORY (INHALATION) at 08:05

## 2023-11-08 RX ADMIN — CEFEPIME 1000 MG: 1 INJECTION, POWDER, FOR SOLUTION INTRAMUSCULAR; INTRAVENOUS at 23:16

## 2023-11-08 RX ADMIN — SODIUM PHOSPHATE, MONOBASIC, MONOHYDRATE AND SODIUM PHOSPHATE, DIBASIC, ANHYDROUS 30 MMOL: 142; 276 INJECTION, SOLUTION INTRAVENOUS at 14:15

## 2023-11-08 RX ADMIN — MIDAZOLAM 1 MG: 1 INJECTION INTRAMUSCULAR; INTRAVENOUS at 19:06

## 2023-11-08 RX ADMIN — DIBASIC SODIUM PHOSPHATE, MONOBASIC POTASSIUM PHOSPHATE AND MONOBASIC SODIUM PHOSPHATE 2 TABLET: 852; 155; 130 TABLET ORAL at 18:34

## 2023-11-08 RX ADMIN — FENTANYL CITRATE 25 MCG: 50 INJECTION INTRAMUSCULAR; INTRAVENOUS at 16:18

## 2023-11-08 RX ADMIN — FENTANYL CITRATE 25 MCG: 50 INJECTION INTRAMUSCULAR; INTRAVENOUS at 02:24

## 2023-11-08 RX ADMIN — SODIUM ZIRCONIUM CYCLOSILICATE 5 G: 5 POWDER, FOR SUSPENSION ORAL at 09:51

## 2023-11-08 RX ADMIN — CHLORHEXIDINE GLUCONATE 0.12% ORAL RINSE 15 ML: 1.2 LIQUID ORAL at 09:45

## 2023-11-08 RX ADMIN — THIAMINE HYDROCHLORIDE 100 MG: 100 INJECTION, SOLUTION INTRAMUSCULAR; INTRAVENOUS at 09:46

## 2023-11-08 RX ADMIN — FENTANYL CITRATE 25 MCG: 50 INJECTION INTRAMUSCULAR; INTRAVENOUS at 12:26

## 2023-11-08 RX ADMIN — VANCOMYCIN HYDROCHLORIDE 1000 MG: 1 INJECTION, POWDER, LYOPHILIZED, FOR SOLUTION INTRAVENOUS at 20:56

## 2023-11-08 RX ADMIN — SODIUM CHLORIDE, PRESERVATIVE FREE 10 ML: 5 INJECTION INTRAVENOUS at 20:56

## 2023-11-08 RX ADMIN — PROPOFOL 50 MCG/KG/MIN: 10 INJECTION, EMULSION INTRAVENOUS at 11:51

## 2023-11-08 RX ADMIN — ACETAMINOPHEN 650 MG: 325 TABLET ORAL at 06:40

## 2023-11-08 RX ADMIN — FOLIC ACID 1 MG: 1 TABLET ORAL at 09:50

## 2023-11-08 RX ADMIN — AZITHROMYCIN MONOHYDRATE 500 MG: 500 INJECTION, POWDER, LYOPHILIZED, FOR SOLUTION INTRAVENOUS at 13:32

## 2023-11-08 RX ADMIN — MIDAZOLAM 1 MG: 1 INJECTION INTRAMUSCULAR; INTRAVENOUS at 01:58

## 2023-11-08 RX ADMIN — BUDESONIDE AND FORMOTEROL FUMARATE DIHYDRATE 2 PUFF: 160; 4.5 AEROSOL RESPIRATORY (INHALATION) at 19:34

## 2023-11-08 RX ADMIN — ALBUTEROL SULFATE 2 PUFF: 90 AEROSOL, METERED RESPIRATORY (INHALATION) at 15:48

## 2023-11-08 RX ADMIN — IPRATROPIUM BROMIDE 2 PUFF: 17 AEROSOL, METERED RESPIRATORY (INHALATION) at 11:59

## 2023-11-08 RX ADMIN — SODIUM CHLORIDE: 9 INJECTION, SOLUTION INTRAVENOUS at 17:33

## 2023-11-08 RX ADMIN — FENTANYL CITRATE 25 MCG: 50 INJECTION INTRAMUSCULAR; INTRAVENOUS at 01:08

## 2023-11-08 RX ADMIN — CEFTRIAXONE SODIUM 1000 MG: 1 INJECTION, POWDER, FOR SOLUTION INTRAMUSCULAR; INTRAVENOUS at 10:06

## 2023-11-08 RX ADMIN — ALBUTEROL SULFATE 2 PUFF: 90 AEROSOL, METERED RESPIRATORY (INHALATION) at 11:58

## 2023-11-08 RX ADMIN — IPRATROPIUM BROMIDE 2 PUFF: 17 AEROSOL, METERED RESPIRATORY (INHALATION) at 08:04

## 2023-11-08 RX ADMIN — PROPOFOL 50 MCG/KG/MIN: 10 INJECTION, EMULSION INTRAVENOUS at 17:34

## 2023-11-08 RX ADMIN — SODIUM CHLORIDE, PRESERVATIVE FREE 10 ML: 5 INJECTION INTRAVENOUS at 09:51

## 2023-11-08 RX ADMIN — CEFEPIME 1000 MG: 1 INJECTION, POWDER, FOR SOLUTION INTRAMUSCULAR; INTRAVENOUS at 13:33

## 2023-11-08 RX ADMIN — CEFEPIME 1000 MG: 1 INJECTION, POWDER, FOR SOLUTION INTRAMUSCULAR; INTRAVENOUS at 18:39

## 2023-11-08 RX ADMIN — ACETAZOLAMIDE 250 MG: 500 INJECTION, POWDER, LYOPHILIZED, FOR SOLUTION INTRAVENOUS at 13:44

## 2023-11-08 RX ADMIN — Medication 5 MCG/MIN: at 03:31

## 2023-11-08 RX ADMIN — SODIUM CHLORIDE: 9 INJECTION, SOLUTION INTRAVENOUS at 07:19

## 2023-11-08 RX ADMIN — SODIUM PHOSPHATE, MONOBASIC, MONOHYDRATE AND SODIUM PHOSPHATE, DIBASIC, ANHYDROUS 15 MMOL: 142; 276 INJECTION, SOLUTION INTRAVENOUS at 05:32

## 2023-11-08 RX ADMIN — DIBASIC SODIUM PHOSPHATE, MONOBASIC POTASSIUM PHOSPHATE AND MONOBASIC SODIUM PHOSPHATE 2 TABLET: 852; 155; 130 TABLET ORAL at 20:56

## 2023-11-08 RX ADMIN — ALBUTEROL SULFATE 2 PUFF: 90 AEROSOL, METERED RESPIRATORY (INHALATION) at 19:32

## 2023-11-08 RX ADMIN — ASPIRIN 81 MG 81 MG: 81 TABLET ORAL at 09:45

## 2023-11-08 RX ADMIN — FAMOTIDINE 20 MG: 10 INJECTION, SOLUTION INTRAVENOUS at 20:57

## 2023-11-08 RX ADMIN — ENOXAPARIN SODIUM 30 MG: 100 INJECTION SUBCUTANEOUS at 09:45

## 2023-11-08 RX ADMIN — MULTIPLE VITAMINS W/ MINERALS TAB 1 TABLET: TAB at 09:45

## 2023-11-08 ASSESSMENT — PULMONARY FUNCTION TESTS
PIF_VALUE: 18
PIF_VALUE: 20
PIF_VALUE: 26
PIF_VALUE: 26
PIF_VALUE: 17
PIF_VALUE: 29
PIF_VALUE: 20
PIF_VALUE: 20
PIF_VALUE: 27
PIF_VALUE: 20
PIF_VALUE: 17
PIF_VALUE: 20
PIF_VALUE: 40
PIF_VALUE: 18
PIF_VALUE: 18
PIF_VALUE: 20
PIF_VALUE: 20
PIF_VALUE: 25
PIF_VALUE: 32
PIF_VALUE: 18

## 2023-11-08 ASSESSMENT — PAIN SCALES - WONG BAKER
WONGBAKER_NUMERICALRESPONSE: 0

## 2023-11-08 ASSESSMENT — PAIN SCALES - GENERAL: PAINLEVEL_OUTOF10: 4

## 2023-11-08 NOTE — CARE COORDINATION
CM reviewed chart. Pt intubated at this time. CM reviewed previous CM notes. Previous contact has been [facility] for decision making. CM contacted Frankey Buggy at this time. He stated that he and their other friend Alonzo Sanches are both close friends of the pt and check in on him at home. Charlesmadeline Chiquita stated that the pt was recently discharged from [facility] 4 days ago. The pt is from home alone. Per Frankey Buggy, he has a walker and a wheelchair. CM to follow for post extubation needs. Dishcarge plan unknown at this time. 11/08/23 0896   Service Assessment   Patient Orientation Unable to Assess  (pt intubated)   Cognition Other (see comment)  (pt intubated)   History Provided By Friend;Medical Record   Primary [address] is: Legal Next of [facility] [place]   PCP Verified by CM Yes   Prior Functional Level Independent in ADLs/IADLs   Current Functional Level Assistance with the following:   Can patient return to prior living arrangement Unknown at present   Ability to make needs known: Unable  (sedated, intubated)   Family able to assist with home care needs: No   Would you like for me to discuss the discharge plan with any other family members/significant others, and if so, who?  Yes  (Close Friends - Frankey Buggy and Jude Greenberg listed in pt contacts)   Financial Resources Medicare   Social/Functional History   Lives With Alone   Type of [employer]

## 2023-11-09 LAB
ALBUMIN SERPL-MCNC: 2.5 GM/DL (ref 3.4–5)
ALP BLD-CCNC: 71 IU/L (ref 40–129)
ALT SERPL-CCNC: 10 U/L (ref 10–40)
ANION GAP SERPL CALCULATED.3IONS-SCNC: 10 MMOL/L (ref 4–16)
ANION GAP SERPL CALCULATED.3IONS-SCNC: 5 MMOL/L (ref 4–16)
AST SERPL-CCNC: 19 IU/L (ref 15–37)
BASOPHILS ABSOLUTE: 0 K/CU MM
BASOPHILS RELATIVE PERCENT: 0.1 % (ref 0–1)
BILIRUB SERPL-MCNC: 0.2 MG/DL (ref 0–1)
BILIRUBIN DIRECT: 0.2 MG/DL (ref 0–0.3)
BILIRUBIN, INDIRECT: 0 MG/DL (ref 0–0.7)
BUN SERPL-MCNC: 15 MG/DL (ref 6–23)
BUN SERPL-MCNC: 18 MG/DL (ref 6–23)
CALCIUM SERPL-MCNC: 8.3 MG/DL (ref 8.3–10.6)
CALCIUM SERPL-MCNC: 8.4 MG/DL (ref 8.3–10.6)
CHLORIDE BLD-SCNC: 104 MMOL/L (ref 99–110)
CHLORIDE BLD-SCNC: 110 MMOL/L (ref 99–110)
CO2: 31 MMOL/L (ref 21–32)
CO2: 34 MMOL/L (ref 21–32)
CREAT SERPL-MCNC: 0.3 MG/DL (ref 0.9–1.3)
CREAT SERPL-MCNC: 0.4 MG/DL (ref 0.9–1.3)
DIFFERENTIAL TYPE: ABNORMAL
EOSINOPHILS ABSOLUTE: 0 K/CU MM
EOSINOPHILS RELATIVE PERCENT: 0 % (ref 0–3)
GFR SERPL CREATININE-BSD FRML MDRD: >60 ML/MIN/1.73M2
GFR SERPL CREATININE-BSD FRML MDRD: >60 ML/MIN/1.73M2
GLUCOSE BLD-MCNC: 104 MG/DL (ref 70–99)
GLUCOSE BLD-MCNC: 72 MG/DL (ref 70–99)
GLUCOSE BLD-MCNC: 82 MG/DL (ref 70–99)
GLUCOSE BLD-MCNC: 95 MG/DL (ref 70–99)
GLUCOSE BLD-MCNC: 95 MG/DL (ref 70–99)
GLUCOSE SERPL-MCNC: 91 MG/DL (ref 70–99)
GLUCOSE SERPL-MCNC: 99 MG/DL (ref 70–99)
HCT VFR BLD CALC: 34 % (ref 42–52)
HEMOGLOBIN: 10.2 GM/DL (ref 13.5–18)
IMMATURE NEUTROPHIL %: 0.6 % (ref 0–0.43)
LYMPHOCYTES ABSOLUTE: 0.9 K/CU MM
LYMPHOCYTES RELATIVE PERCENT: 6.3 % (ref 24–44)
MAGNESIUM: 1.8 MG/DL (ref 1.8–2.4)
MAGNESIUM: 1.9 MG/DL (ref 1.8–2.4)
MCH RBC QN AUTO: 27.4 PG (ref 27–31)
MCHC RBC AUTO-ENTMCNC: 30 % (ref 32–36)
MCV RBC AUTO: 91.4 FL (ref 78–100)
MONOCYTES ABSOLUTE: 1 K/CU MM
MONOCYTES RELATIVE PERCENT: 7 % (ref 0–4)
NUCLEATED RBC %: 0 %
PDW BLD-RTO: 14.9 % (ref 11.7–14.9)
PHOSPHORUS: 2.4 MG/DL (ref 2.5–4.9)
PHOSPHORUS: 2.9 MG/DL (ref 2.5–4.9)
PLATELET # BLD: 246 K/CU MM (ref 140–440)
PMV BLD AUTO: 9.4 FL (ref 7.5–11.1)
POTASSIUM SERPL-SCNC: 2.8 MMOL/L (ref 3.5–5.1)
POTASSIUM SERPL-SCNC: 3.7 MMOL/L (ref 3.5–5.1)
RBC # BLD: 3.72 M/CU MM (ref 4.6–6.2)
SEGMENTED NEUTROPHILS ABSOLUTE COUNT: 11.6 K/CU MM
SEGMENTED NEUTROPHILS RELATIVE PERCENT: 86 % (ref 36–66)
SODIUM BLD-SCNC: 145 MMOL/L (ref 135–145)
SODIUM BLD-SCNC: 149 MMOL/L (ref 135–145)
TOTAL IMMATURE NEUTOROPHIL: 0.08 K/CU MM
TOTAL NUCLEATED RBC: 0 K/CU MM
TOTAL PROTEIN: 4.6 GM/DL (ref 6.4–8.2)
WBC # BLD: 13.5 K/CU MM (ref 4–10.5)

## 2023-11-09 PROCEDURE — 80048 BASIC METABOLIC PNL TOTAL CA: CPT

## 2023-11-09 PROCEDURE — 80053 COMPREHEN METABOLIC PANEL: CPT

## 2023-11-09 PROCEDURE — 6360000002 HC RX W HCPCS: Performed by: NURSE PRACTITIONER

## 2023-11-09 PROCEDURE — 89220 SPUTUM SPECIMEN COLLECTION: CPT

## 2023-11-09 PROCEDURE — 6360000002 HC RX W HCPCS

## 2023-11-09 PROCEDURE — 2580000003 HC RX 258: Performed by: NURSE PRACTITIONER

## 2023-11-09 PROCEDURE — 2000000000 HC ICU R&B

## 2023-11-09 PROCEDURE — 2500000003 HC RX 250 WO HCPCS: Performed by: NURSE PRACTITIONER

## 2023-11-09 PROCEDURE — 6360000002 HC RX W HCPCS: Performed by: STUDENT IN AN ORGANIZED HEALTH CARE EDUCATION/TRAINING PROGRAM

## 2023-11-09 PROCEDURE — 2580000003 HC RX 258: Performed by: STUDENT IN AN ORGANIZED HEALTH CARE EDUCATION/TRAINING PROGRAM

## 2023-11-09 PROCEDURE — 94640 AIRWAY INHALATION TREATMENT: CPT

## 2023-11-09 PROCEDURE — 2580000003 HC RX 258

## 2023-11-09 PROCEDURE — 82248 BILIRUBIN DIRECT: CPT

## 2023-11-09 PROCEDURE — 85025 COMPLETE CBC W/AUTO DIFF WBC: CPT

## 2023-11-09 PROCEDURE — 2700000000 HC OXYGEN THERAPY PER DAY

## 2023-11-09 PROCEDURE — 5A09357 ASSISTANCE WITH RESPIRATORY VENTILATION, LESS THAN 24 CONSECUTIVE HOURS, CONTINUOUS POSITIVE AIRWAY PRESSURE: ICD-10-PCS | Performed by: STUDENT IN AN ORGANIZED HEALTH CARE EDUCATION/TRAINING PROGRAM

## 2023-11-09 PROCEDURE — 94003 VENT MGMT INPAT SUBQ DAY: CPT

## 2023-11-09 PROCEDURE — 82962 GLUCOSE BLOOD TEST: CPT

## 2023-11-09 PROCEDURE — 6370000000 HC RX 637 (ALT 250 FOR IP): Performed by: NURSE PRACTITIONER

## 2023-11-09 PROCEDURE — 2500000003 HC RX 250 WO HCPCS

## 2023-11-09 PROCEDURE — 6370000000 HC RX 637 (ALT 250 FOR IP): Performed by: STUDENT IN AN ORGANIZED HEALTH CARE EDUCATION/TRAINING PROGRAM

## 2023-11-09 PROCEDURE — 94761 N-INVAS EAR/PLS OXIMETRY MLT: CPT

## 2023-11-09 PROCEDURE — 6370000000 HC RX 637 (ALT 250 FOR IP)

## 2023-11-09 PROCEDURE — 83735 ASSAY OF MAGNESIUM: CPT

## 2023-11-09 PROCEDURE — 84100 ASSAY OF PHOSPHORUS: CPT

## 2023-11-09 PROCEDURE — 94660 CPAP INITIATION&MGMT: CPT

## 2023-11-09 RX ORDER — CHLORHEXIDINE GLUCONATE 40 MG/ML
SOLUTION TOPICAL DAILY
Status: DISPENSED | OUTPATIENT
Start: 2023-11-09 | End: 2023-11-19

## 2023-11-09 RX ORDER — DEXMEDETOMIDINE HYDROCHLORIDE 4 UG/ML
.1-1.5 INJECTION, SOLUTION INTRAVENOUS CONTINUOUS
Status: DISCONTINUED | OUTPATIENT
Start: 2023-11-09 | End: 2023-11-10

## 2023-11-09 RX ORDER — POTASSIUM CHLORIDE 7.45 MG/ML
10 INJECTION INTRAVENOUS
Status: COMPLETED | OUTPATIENT
Start: 2023-11-09 | End: 2023-11-09

## 2023-11-09 RX ORDER — OXYCODONE HYDROCHLORIDE AND ACETAMINOPHEN 5; 325 MG/1; MG/1
1 TABLET ORAL EVERY 6 HOURS PRN
Status: DISCONTINUED | OUTPATIENT
Start: 2023-11-09 | End: 2023-11-16

## 2023-11-09 RX ADMIN — THIAMINE HYDROCHLORIDE 100 MG: 100 INJECTION, SOLUTION INTRAMUSCULAR; INTRAVENOUS at 09:01

## 2023-11-09 RX ADMIN — BUDESONIDE AND FORMOTEROL FUMARATE DIHYDRATE 2 PUFF: 160; 4.5 AEROSOL RESPIRATORY (INHALATION) at 20:42

## 2023-11-09 RX ADMIN — ALBUTEROL SULFATE 2 PUFF: 90 AEROSOL, METERED RESPIRATORY (INHALATION) at 14:59

## 2023-11-09 RX ADMIN — VANCOMYCIN HYDROCHLORIDE 1000 MG: 1 INJECTION, POWDER, LYOPHILIZED, FOR SOLUTION INTRAVENOUS at 15:36

## 2023-11-09 RX ADMIN — FOLIC ACID 1 MG: 1 TABLET ORAL at 09:01

## 2023-11-09 RX ADMIN — Medication: at 20:07

## 2023-11-09 RX ADMIN — IPRATROPIUM BROMIDE 2 PUFF: 17 AEROSOL, METERED RESPIRATORY (INHALATION) at 11:37

## 2023-11-09 RX ADMIN — CHLORHEXIDINE GLUCONATE 118 ML: 4 LIQUID TOPICAL at 15:37

## 2023-11-09 RX ADMIN — FAMOTIDINE 20 MG: 10 INJECTION, SOLUTION INTRAVENOUS at 20:07

## 2023-11-09 RX ADMIN — MULTIPLE VITAMINS W/ MINERALS TAB 1 TABLET: TAB at 09:01

## 2023-11-09 RX ADMIN — DIBASIC SODIUM PHOSPHATE, MONOBASIC POTASSIUM PHOSPHATE AND MONOBASIC SODIUM PHOSPHATE 2 TABLET: 852; 155; 130 TABLET ORAL at 09:01

## 2023-11-09 RX ADMIN — POTASSIUM CHLORIDE 10 MEQ: 7.46 INJECTION, SOLUTION INTRAVENOUS at 09:47

## 2023-11-09 RX ADMIN — CEFEPIME 1000 MG: 1 INJECTION, POWDER, FOR SOLUTION INTRAMUSCULAR; INTRAVENOUS at 12:05

## 2023-11-09 RX ADMIN — Medication: at 18:42

## 2023-11-09 RX ADMIN — IPRATROPIUM BROMIDE 2 PUFF: 17 AEROSOL, METERED RESPIRATORY (INHALATION) at 20:41

## 2023-11-09 RX ADMIN — IPRATROPIUM BROMIDE 2 PUFF: 17 AEROSOL, METERED RESPIRATORY (INHALATION) at 15:00

## 2023-11-09 RX ADMIN — ASPIRIN 81 MG 81 MG: 81 TABLET ORAL at 09:01

## 2023-11-09 RX ADMIN — POTASSIUM BICARBONATE 40 MEQ: 782 TABLET, EFFERVESCENT ORAL at 12:02

## 2023-11-09 RX ADMIN — SODIUM CHLORIDE, PRESERVATIVE FREE 10 ML: 5 INJECTION INTRAVENOUS at 09:15

## 2023-11-09 RX ADMIN — ALBUTEROL SULFATE 2 PUFF: 90 AEROSOL, METERED RESPIRATORY (INHALATION) at 11:37

## 2023-11-09 RX ADMIN — OXYCODONE AND ACETAMINOPHEN 1 TABLET: 5; 325 TABLET ORAL at 21:31

## 2023-11-09 RX ADMIN — DIBASIC SODIUM PHOSPHATE, MONOBASIC POTASSIUM PHOSPHATE AND MONOBASIC SODIUM PHOSPHATE 2 TABLET: 852; 155; 130 TABLET ORAL at 18:42

## 2023-11-09 RX ADMIN — CHLORHEXIDINE GLUCONATE 0.12% ORAL RINSE 15 ML: 1.2 LIQUID ORAL at 09:15

## 2023-11-09 RX ADMIN — ALBUTEROL SULFATE 2 PUFF: 90 AEROSOL, METERED RESPIRATORY (INHALATION) at 20:40

## 2023-11-09 RX ADMIN — ENOXAPARIN SODIUM 30 MG: 100 INJECTION SUBCUTANEOUS at 09:01

## 2023-11-09 RX ADMIN — CEFEPIME 1000 MG: 1 INJECTION, POWDER, FOR SOLUTION INTRAMUSCULAR; INTRAVENOUS at 23:01

## 2023-11-09 RX ADMIN — PROPOFOL 50 MCG/KG/MIN: 10 INJECTION, EMULSION INTRAVENOUS at 01:00

## 2023-11-09 RX ADMIN — SODIUM CHLORIDE, PRESERVATIVE FREE 10 ML: 5 INJECTION INTRAVENOUS at 20:07

## 2023-11-09 RX ADMIN — SODIUM CHLORIDE: 9 INJECTION, SOLUTION INTRAVENOUS at 03:40

## 2023-11-09 RX ADMIN — POTASSIUM BICARBONATE 40 MEQ: 782 TABLET, EFFERVESCENT ORAL at 09:01

## 2023-11-09 RX ADMIN — SODIUM CHLORIDE: 9 INJECTION, SOLUTION INTRAVENOUS at 23:18

## 2023-11-09 RX ADMIN — DEXMEDETOMIDINE HYDROCHLORIDE 0.2 MCG/KG/HR: 4 INJECTION, SOLUTION INTRAVENOUS at 06:48

## 2023-11-09 RX ADMIN — IPRATROPIUM BROMIDE 2 PUFF: 17 AEROSOL, METERED RESPIRATORY (INHALATION) at 08:04

## 2023-11-09 RX ADMIN — SODIUM CHLORIDE: 9 INJECTION, SOLUTION INTRAVENOUS at 13:38

## 2023-11-09 RX ADMIN — CEFEPIME 1000 MG: 1 INJECTION, POWDER, FOR SOLUTION INTRAMUSCULAR; INTRAVENOUS at 17:15

## 2023-11-09 RX ADMIN — FAMOTIDINE 20 MG: 10 INJECTION, SOLUTION INTRAVENOUS at 09:02

## 2023-11-09 RX ADMIN — BUDESONIDE AND FORMOTEROL FUMARATE DIHYDRATE 2 PUFF: 160; 4.5 AEROSOL RESPIRATORY (INHALATION) at 08:04

## 2023-11-09 RX ADMIN — POTASSIUM CHLORIDE 10 MEQ: 7.46 INJECTION, SOLUTION INTRAVENOUS at 08:54

## 2023-11-09 RX ADMIN — CEFEPIME 1000 MG: 1 INJECTION, POWDER, FOR SOLUTION INTRAMUSCULAR; INTRAVENOUS at 05:18

## 2023-11-09 RX ADMIN — ALBUTEROL SULFATE 2 PUFF: 90 AEROSOL, METERED RESPIRATORY (INHALATION) at 08:03

## 2023-11-09 RX ADMIN — ACETAMINOPHEN 650 MG: 325 TABLET ORAL at 20:09

## 2023-11-09 ASSESSMENT — PULMONARY FUNCTION TESTS
PIF_VALUE: 40
PIF_VALUE: 23
PIF_VALUE: 29
PIF_VALUE: 22
PIF_VALUE: 3
PIF_VALUE: 23
PIF_VALUE: 32

## 2023-11-09 ASSESSMENT — PAIN SCALES - WONG BAKER

## 2023-11-09 ASSESSMENT — PAIN DESCRIPTION - DESCRIPTORS
DESCRIPTORS: ACHING
DESCRIPTORS: ACHING

## 2023-11-09 ASSESSMENT — PAIN SCALES - GENERAL
PAINLEVEL_OUTOF10: 7
PAINLEVEL_OUTOF10: 0
PAINLEVEL_OUTOF10: 6
PAINLEVEL_OUTOF10: 0

## 2023-11-09 ASSESSMENT — PAIN DESCRIPTION - ORIENTATION
ORIENTATION: RIGHT
ORIENTATION: RIGHT

## 2023-11-09 ASSESSMENT — PAIN DESCRIPTION - LOCATION
LOCATION: NECK
LOCATION: NECK

## 2023-11-09 NOTE — CARE COORDINATION
Cm in to see pt. Pt was extubated today/a short while before CM entered room. Pt is asleep at present. No family present. Per record review. Pt was recently in [facility] and discharged to home from the SNF several days prior to being readmitted to the hospital. CM is concerned that pt may need SNF again at discharge. Will follow up with pt 11/10.

## 2023-11-10 ENCOUNTER — APPOINTMENT (OUTPATIENT)
Dept: ULTRASOUND IMAGING | Age: 68
DRG: 871 | End: 2023-11-10
Attending: STUDENT IN AN ORGANIZED HEALTH CARE EDUCATION/TRAINING PROGRAM
Payer: MEDICARE

## 2023-11-10 LAB
ALBUMIN SERPL-MCNC: 2 GM/DL (ref 3.4–5)
ALP BLD-CCNC: 56 IU/L (ref 40–129)
ALT SERPL-CCNC: 12 U/L (ref 10–40)
ANION GAP SERPL CALCULATED.3IONS-SCNC: 6 MMOL/L (ref 4–16)
ANION GAP SERPL CALCULATED.3IONS-SCNC: 6 MMOL/L (ref 4–16)
AST SERPL-CCNC: 19 IU/L (ref 15–37)
BASOPHILS ABSOLUTE: 0 K/CU MM
BASOPHILS RELATIVE PERCENT: 0.1 % (ref 0–1)
BILIRUB SERPL-MCNC: 0.2 MG/DL (ref 0–1)
BILIRUBIN DIRECT: 0.2 MG/DL (ref 0–0.3)
BILIRUBIN, INDIRECT: 0 MG/DL (ref 0–0.7)
BUN SERPL-MCNC: 10 MG/DL (ref 6–23)
BUN SERPL-MCNC: 9 MG/DL (ref 6–23)
CALCIUM SERPL-MCNC: 7 MG/DL (ref 8.3–10.6)
CALCIUM SERPL-MCNC: 8 MG/DL (ref 8.3–10.6)
CHLORIDE BLD-SCNC: 114 MMOL/L (ref 99–110)
CHLORIDE BLD-SCNC: 97 MMOL/L (ref 99–110)
CO2: 28 MMOL/L (ref 21–32)
CO2: 34 MMOL/L (ref 21–32)
CREAT SERPL-MCNC: 0.2 MG/DL (ref 0.9–1.3)
CREAT SERPL-MCNC: 0.2 MG/DL (ref 0.9–1.3)
DIFFERENTIAL TYPE: ABNORMAL
EOSINOPHILS ABSOLUTE: 0 K/CU MM
EOSINOPHILS RELATIVE PERCENT: 0 % (ref 0–3)
GFR SERPL CREATININE-BSD FRML MDRD: >60 ML/MIN/1.73M2
GFR SERPL CREATININE-BSD FRML MDRD: >60 ML/MIN/1.73M2
GLUCOSE BLD-MCNC: 119 MG/DL (ref 70–99)
GLUCOSE BLD-MCNC: 132 MG/DL (ref 70–99)
GLUCOSE BLD-MCNC: 182 MG/DL (ref 70–99)
GLUCOSE BLD-MCNC: 189 MG/DL (ref 70–99)
GLUCOSE BLD-MCNC: 61 MG/DL (ref 70–99)
GLUCOSE BLD-MCNC: 73 MG/DL (ref 70–99)
GLUCOSE BLD-MCNC: 78 MG/DL (ref 70–99)
GLUCOSE SERPL-MCNC: 181 MG/DL (ref 70–99)
GLUCOSE SERPL-MCNC: 56 MG/DL (ref 70–99)
HCT VFR BLD CALC: 29.1 % (ref 42–52)
HEMOGLOBIN: 8.6 GM/DL (ref 13.5–18)
IMMATURE NEUTROPHIL %: 0.6 % (ref 0–0.43)
LYMPHOCYTES ABSOLUTE: 1 K/CU MM
LYMPHOCYTES RELATIVE PERCENT: 7.8 % (ref 24–44)
MAGNESIUM: 1.3 MG/DL (ref 1.8–2.4)
MAGNESIUM: 2.5 MG/DL (ref 1.8–2.4)
MCH RBC QN AUTO: 27.3 PG (ref 27–31)
MCHC RBC AUTO-ENTMCNC: 29.6 % (ref 32–36)
MCV RBC AUTO: 92.4 FL (ref 78–100)
MONOCYTES ABSOLUTE: 0.7 K/CU MM
MONOCYTES RELATIVE PERCENT: 5.5 % (ref 0–4)
NUCLEATED RBC %: 0 %
PDW BLD-RTO: 15 % (ref 11.7–14.9)
PHOSPHORUS: 1.9 MG/DL (ref 2.5–4.9)
PHOSPHORUS: 3.2 MG/DL (ref 2.5–4.9)
PLATELET # BLD: 215 K/CU MM (ref 140–440)
PMV BLD AUTO: 9.4 FL (ref 7.5–11.1)
POTASSIUM SERPL-SCNC: 2.8 MMOL/L (ref 3.5–5.1)
POTASSIUM SERPL-SCNC: 3.7 MMOL/L (ref 3.5–5.1)
PRO-BNP: 648 PG/ML
RBC # BLD: 3.15 M/CU MM (ref 4.6–6.2)
SEGMENTED NEUTROPHILS ABSOLUTE COUNT: 11.4 K/CU MM
SEGMENTED NEUTROPHILS RELATIVE PERCENT: 86 % (ref 36–66)
SODIUM BLD-SCNC: 137 MMOL/L (ref 135–145)
SODIUM BLD-SCNC: 148 MMOL/L (ref 135–145)
TOTAL IMMATURE NEUTOROPHIL: 0.08 K/CU MM
TOTAL NUCLEATED RBC: 0 K/CU MM
TOTAL PROTEIN: 3.5 GM/DL (ref 6.4–8.2)
TROPONIN, HIGH SENSITIVITY: 32 NG/L (ref 0–22)
TROPONIN, HIGH SENSITIVITY: 32 NG/L (ref 0–22)
TROPONIN, HIGH SENSITIVITY: 36 NG/L (ref 0–22)
WBC # BLD: 13.2 K/CU MM (ref 4–10.5)

## 2023-11-10 PROCEDURE — 6370000000 HC RX 637 (ALT 250 FOR IP): Performed by: STUDENT IN AN ORGANIZED HEALTH CARE EDUCATION/TRAINING PROGRAM

## 2023-11-10 PROCEDURE — 82962 GLUCOSE BLOOD TEST: CPT

## 2023-11-10 PROCEDURE — 85025 COMPLETE CBC W/AUTO DIFF WBC: CPT

## 2023-11-10 PROCEDURE — 94640 AIRWAY INHALATION TREATMENT: CPT

## 2023-11-10 PROCEDURE — 6360000002 HC RX W HCPCS

## 2023-11-10 PROCEDURE — 6360000002 HC RX W HCPCS: Performed by: STUDENT IN AN ORGANIZED HEALTH CARE EDUCATION/TRAINING PROGRAM

## 2023-11-10 PROCEDURE — 6370000000 HC RX 637 (ALT 250 FOR IP)

## 2023-11-10 PROCEDURE — 6370000000 HC RX 637 (ALT 250 FOR IP): Performed by: NURSE PRACTITIONER

## 2023-11-10 PROCEDURE — 94761 N-INVAS EAR/PLS OXIMETRY MLT: CPT

## 2023-11-10 PROCEDURE — 76937 US GUIDE VASCULAR ACCESS: CPT

## 2023-11-10 PROCEDURE — 80048 BASIC METABOLIC PNL TOTAL CA: CPT

## 2023-11-10 PROCEDURE — 94660 CPAP INITIATION&MGMT: CPT

## 2023-11-10 PROCEDURE — 2500000003 HC RX 250 WO HCPCS: Performed by: NURSE PRACTITIONER

## 2023-11-10 PROCEDURE — 2700000000 HC OXYGEN THERAPY PER DAY

## 2023-11-10 PROCEDURE — 80053 COMPREHEN METABOLIC PANEL: CPT

## 2023-11-10 PROCEDURE — 93005 ELECTROCARDIOGRAM TRACING: CPT

## 2023-11-10 PROCEDURE — 36410 VNPNXR 3YR/> PHY/QHP DX/THER: CPT

## 2023-11-10 PROCEDURE — 2580000003 HC RX 258: Performed by: NURSE PRACTITIONER

## 2023-11-10 PROCEDURE — 6360000002 HC RX W HCPCS: Performed by: NURSE PRACTITIONER

## 2023-11-10 PROCEDURE — 2000000000 HC ICU R&B

## 2023-11-10 PROCEDURE — 2060000000 HC ICU INTERMEDIATE R&B

## 2023-11-10 PROCEDURE — 82248 BILIRUBIN DIRECT: CPT

## 2023-11-10 PROCEDURE — 83880 ASSAY OF NATRIURETIC PEPTIDE: CPT

## 2023-11-10 PROCEDURE — 2580000003 HC RX 258: Performed by: STUDENT IN AN ORGANIZED HEALTH CARE EDUCATION/TRAINING PROGRAM

## 2023-11-10 PROCEDURE — 2500000003 HC RX 250 WO HCPCS

## 2023-11-10 PROCEDURE — 84484 ASSAY OF TROPONIN QUANT: CPT

## 2023-11-10 PROCEDURE — 83735 ASSAY OF MAGNESIUM: CPT

## 2023-11-10 PROCEDURE — 2580000003 HC RX 258

## 2023-11-10 PROCEDURE — 93971 EXTREMITY STUDY: CPT

## 2023-11-10 PROCEDURE — 84100 ASSAY OF PHOSPHORUS: CPT

## 2023-11-10 RX ORDER — SODIUM CHLORIDE 9 MG/ML
INJECTION, SOLUTION INTRAVENOUS PRN
Status: DISCONTINUED | OUTPATIENT
Start: 2023-11-10 | End: 2023-11-20 | Stop reason: HOSPADM

## 2023-11-10 RX ORDER — LIDOCAINE HYDROCHLORIDE 10 MG/ML
5 INJECTION, SOLUTION EPIDURAL; INFILTRATION; INTRACAUDAL; PERINEURAL ONCE
Status: DISCONTINUED | OUTPATIENT
Start: 2023-11-10 | End: 2023-11-15

## 2023-11-10 RX ORDER — MORPHINE SULFATE 2 MG/ML
INJECTION, SOLUTION INTRAMUSCULAR; INTRAVENOUS
Status: COMPLETED
Start: 2023-11-10 | End: 2023-11-10

## 2023-11-10 RX ORDER — MAGNESIUM SULFATE IN WATER 40 MG/ML
2000 INJECTION, SOLUTION INTRAVENOUS PRN
Status: DISCONTINUED | OUTPATIENT
Start: 2023-11-10 | End: 2023-11-20 | Stop reason: HOSPADM

## 2023-11-10 RX ORDER — SODIUM CHLORIDE 0.9 % (FLUSH) 0.9 %
5-40 SYRINGE (ML) INJECTION EVERY 12 HOURS SCHEDULED
Status: DISCONTINUED | OUTPATIENT
Start: 2023-11-10 | End: 2023-11-20 | Stop reason: HOSPADM

## 2023-11-10 RX ORDER — POTASSIUM CHLORIDE 20 MEQ/1
40 TABLET, EXTENDED RELEASE ORAL PRN
Status: DISCONTINUED | OUTPATIENT
Start: 2023-11-10 | End: 2023-11-20 | Stop reason: HOSPADM

## 2023-11-10 RX ORDER — POTASSIUM CHLORIDE 7.45 MG/ML
10 INJECTION INTRAVENOUS PRN
Status: DISCONTINUED | OUTPATIENT
Start: 2023-11-10 | End: 2023-11-20 | Stop reason: HOSPADM

## 2023-11-10 RX ORDER — SODIUM CHLORIDE 0.9 % (FLUSH) 0.9 %
5-40 SYRINGE (ML) INJECTION PRN
Status: DISCONTINUED | OUTPATIENT
Start: 2023-11-10 | End: 2023-11-20 | Stop reason: HOSPADM

## 2023-11-10 RX ORDER — MORPHINE SULFATE 2 MG/ML
2 INJECTION, SOLUTION INTRAMUSCULAR; INTRAVENOUS ONCE
Status: COMPLETED | OUTPATIENT
Start: 2023-11-10 | End: 2023-11-10

## 2023-11-10 RX ORDER — MAGNESIUM SULFATE 4 G/50ML
4000 INJECTION INTRAVENOUS ONCE
Status: COMPLETED | OUTPATIENT
Start: 2023-11-10 | End: 2023-11-10

## 2023-11-10 RX ADMIN — CEFEPIME 1000 MG: 1 INJECTION, POWDER, FOR SOLUTION INTRAMUSCULAR; INTRAVENOUS at 18:58

## 2023-11-10 RX ADMIN — MAGNESIUM SULFATE HEPTAHYDRATE 4000 MG: 80 INJECTION, SOLUTION INTRAVENOUS at 14:49

## 2023-11-10 RX ADMIN — BUDESONIDE AND FORMOTEROL FUMARATE DIHYDRATE 2 PUFF: 160; 4.5 AEROSOL RESPIRATORY (INHALATION) at 19:44

## 2023-11-10 RX ADMIN — CHLORHEXIDINE GLUCONATE: 4 LIQUID TOPICAL at 09:47

## 2023-11-10 RX ADMIN — POTASSIUM CHLORIDE 10 MEQ: 7.46 INJECTION, SOLUTION INTRAVENOUS at 03:33

## 2023-11-10 RX ADMIN — IPRATROPIUM BROMIDE 2 PUFF: 17 AEROSOL, METERED RESPIRATORY (INHALATION) at 05:22

## 2023-11-10 RX ADMIN — CEFEPIME 1000 MG: 1 INJECTION, POWDER, FOR SOLUTION INTRAMUSCULAR; INTRAVENOUS at 05:07

## 2023-11-10 RX ADMIN — OXYCODONE AND ACETAMINOPHEN 1 TABLET: 5; 325 TABLET ORAL at 10:43

## 2023-11-10 RX ADMIN — CEFEPIME 1000 MG: 1 INJECTION, POWDER, FOR SOLUTION INTRAMUSCULAR; INTRAVENOUS at 11:13

## 2023-11-10 RX ADMIN — SODIUM CHLORIDE, PRESERVATIVE FREE 10 ML: 5 INJECTION INTRAVENOUS at 08:34

## 2023-11-10 RX ADMIN — ALBUTEROL SULFATE 2 PUFF: 90 AEROSOL, METERED RESPIRATORY (INHALATION) at 07:07

## 2023-11-10 RX ADMIN — CEFEPIME 1000 MG: 1 INJECTION, POWDER, FOR SOLUTION INTRAMUSCULAR; INTRAVENOUS at 23:05

## 2023-11-10 RX ADMIN — Medication: at 08:32

## 2023-11-10 RX ADMIN — Medication: at 19:58

## 2023-11-10 RX ADMIN — FAMOTIDINE 20 MG: 10 INJECTION, SOLUTION INTRAVENOUS at 08:34

## 2023-11-10 RX ADMIN — OXYCODONE AND ACETAMINOPHEN 1 TABLET: 5; 325 TABLET ORAL at 04:13

## 2023-11-10 RX ADMIN — SODIUM PHOSPHATE, MONOBASIC, MONOHYDRATE AND SODIUM PHOSPHATE, DIBASIC, ANHYDROUS 15 MMOL: 142; 276 INJECTION, SOLUTION INTRAVENOUS at 11:30

## 2023-11-10 RX ADMIN — ALBUTEROL SULFATE 2 PUFF: 90 AEROSOL, METERED RESPIRATORY (INHALATION) at 11:05

## 2023-11-10 RX ADMIN — FAMOTIDINE 20 MG: 10 INJECTION, SOLUTION INTRAVENOUS at 19:58

## 2023-11-10 RX ADMIN — IPRATROPIUM BROMIDE 2 PUFF: 17 AEROSOL, METERED RESPIRATORY (INHALATION) at 19:43

## 2023-11-10 RX ADMIN — FOLIC ACID 1 MG: 1 TABLET ORAL at 08:32

## 2023-11-10 RX ADMIN — IPRATROPIUM BROMIDE 2 PUFF: 17 AEROSOL, METERED RESPIRATORY (INHALATION) at 11:06

## 2023-11-10 RX ADMIN — DEXTROSE MONOHYDRATE 125 ML: 100 INJECTION, SOLUTION INTRAVENOUS at 06:16

## 2023-11-10 RX ADMIN — MORPHINE SULFATE 2 MG: 2 INJECTION, SOLUTION INTRAMUSCULAR; INTRAVENOUS at 01:19

## 2023-11-10 RX ADMIN — POTASSIUM CHLORIDE 10 MEQ: 7.46 INJECTION, SOLUTION INTRAVENOUS at 07:12

## 2023-11-10 RX ADMIN — SODIUM CHLORIDE, PRESERVATIVE FREE 10 ML: 5 INJECTION INTRAVENOUS at 19:58

## 2023-11-10 RX ADMIN — OXYCODONE AND ACETAMINOPHEN 1 TABLET: 5; 325 TABLET ORAL at 19:59

## 2023-11-10 RX ADMIN — MULTIPLE VITAMINS W/ MINERALS TAB 1 TABLET: TAB at 08:32

## 2023-11-10 RX ADMIN — BUDESONIDE AND FORMOTEROL FUMARATE DIHYDRATE 2 PUFF: 160; 4.5 AEROSOL RESPIRATORY (INHALATION) at 07:07

## 2023-11-10 RX ADMIN — MAGNESIUM SULFATE HEPTAHYDRATE 2000 MG: 40 INJECTION, SOLUTION INTRAVENOUS at 08:05

## 2023-11-10 RX ADMIN — POTASSIUM CHLORIDE 10 MEQ: 7.46 INJECTION, SOLUTION INTRAVENOUS at 06:08

## 2023-11-10 RX ADMIN — ASPIRIN 81 MG 81 MG: 81 TABLET ORAL at 08:32

## 2023-11-10 RX ADMIN — DEXTROSE MONOHYDRATE 125 ML: 100 INJECTION, SOLUTION INTRAVENOUS at 02:16

## 2023-11-10 RX ADMIN — POTASSIUM CHLORIDE 10 MEQ: 7.46 INJECTION, SOLUTION INTRAVENOUS at 04:36

## 2023-11-10 RX ADMIN — POTASSIUM CHLORIDE 10 MEQ: 7.46 INJECTION, SOLUTION INTRAVENOUS at 02:31

## 2023-11-10 RX ADMIN — ALBUTEROL SULFATE 2 PUFF: 90 AEROSOL, METERED RESPIRATORY (INHALATION) at 19:42

## 2023-11-10 RX ADMIN — THIAMINE HYDROCHLORIDE 100 MG: 100 INJECTION, SOLUTION INTRAMUSCULAR; INTRAVENOUS at 08:33

## 2023-11-10 RX ADMIN — IPRATROPIUM BROMIDE 2 PUFF: 17 AEROSOL, METERED RESPIRATORY (INHALATION) at 07:07

## 2023-11-10 RX ADMIN — ENOXAPARIN SODIUM 30 MG: 100 INJECTION SUBCUTANEOUS at 08:33

## 2023-11-10 RX ADMIN — VANCOMYCIN HYDROCHLORIDE 750 MG: 750 INJECTION, POWDER, LYOPHILIZED, FOR SOLUTION INTRAVENOUS at 06:21

## 2023-11-10 RX ADMIN — ALBUTEROL SULFATE 2 PUFF: 90 AEROSOL, METERED RESPIRATORY (INHALATION) at 05:21

## 2023-11-10 ASSESSMENT — PAIN DESCRIPTION - DESCRIPTORS
DESCRIPTORS: ACHING;DISCOMFORT
DESCRIPTORS: ACHING;DISCOMFORT
DESCRIPTORS: ACHING;DISCOMFORT;SORE
DESCRIPTORS: ACHING;DISCOMFORT
DESCRIPTORS: ACHING;SHARP
DESCRIPTORS: ACHING;DISCOMFORT;SORE

## 2023-11-10 ASSESSMENT — PAIN DESCRIPTION - FREQUENCY
FREQUENCY: CONTINUOUS
FREQUENCY: CONTINUOUS

## 2023-11-10 ASSESSMENT — PAIN - FUNCTIONAL ASSESSMENT
PAIN_FUNCTIONAL_ASSESSMENT: ACTIVITIES ARE NOT PREVENTED

## 2023-11-10 ASSESSMENT — PAIN SCALES - GENERAL
PAINLEVEL_OUTOF10: 2
PAINLEVEL_OUTOF10: 7
PAINLEVEL_OUTOF10: 8
PAINLEVEL_OUTOF10: 7
PAINLEVEL_OUTOF10: 7
PAINLEVEL_OUTOF10: 8
PAINLEVEL_OUTOF10: 5
PAINLEVEL_OUTOF10: 8

## 2023-11-10 ASSESSMENT — PAIN SCALES - WONG BAKER
WONGBAKER_NUMERICALRESPONSE: 0

## 2023-11-10 ASSESSMENT — PAIN DESCRIPTION - ORIENTATION
ORIENTATION: MID
ORIENTATION: RIGHT
ORIENTATION: RIGHT

## 2023-11-10 ASSESSMENT — PAIN DESCRIPTION - ONSET
ONSET: ON-GOING
ONSET: ON-GOING

## 2023-11-10 ASSESSMENT — PAIN DESCRIPTION - LOCATION
LOCATION: NECK
LOCATION: CHEST

## 2023-11-10 ASSESSMENT — PAIN DESCRIPTION - PAIN TYPE
TYPE: CHRONIC PAIN
TYPE: CHRONIC PAIN

## 2023-11-11 LAB
ANION GAP SERPL CALCULATED.3IONS-SCNC: 3 MMOL/L (ref 4–16)
BASOPHILS ABSOLUTE: 0 K/CU MM
BASOPHILS RELATIVE PERCENT: 0.1 % (ref 0–1)
BUN SERPL-MCNC: 6 MG/DL (ref 6–23)
CALCIUM IONIZED: 3.84 MG/DL (ref 4.48–5.28)
CALCIUM SERPL-MCNC: 6.4 MG/DL (ref 8.3–10.6)
CHLORIDE BLD-SCNC: 107 MMOL/L (ref 99–110)
CO2: 30 MMOL/L (ref 21–32)
CREAT SERPL-MCNC: 0.2 MG/DL (ref 0.9–1.3)
DIFFERENTIAL TYPE: ABNORMAL
EKG ATRIAL RATE: 78 BPM
EKG DIAGNOSIS: NORMAL
EKG P AXIS: 83 DEGREES
EKG P-R INTERVAL: 136 MS
EKG Q-T INTERVAL: 480 MS
EKG QRS DURATION: 86 MS
EKG QTC CALCULATION (BAZETT): 547 MS
EKG R AXIS: 63 DEGREES
EKG T AXIS: 72 DEGREES
EKG VENTRICULAR RATE: 78 BPM
EOSINOPHILS ABSOLUTE: 0 K/CU MM
EOSINOPHILS RELATIVE PERCENT: 0.1 % (ref 0–3)
GFR SERPL CREATININE-BSD FRML MDRD: >60 ML/MIN/1.73M2
GLUCOSE BLD-MCNC: 134 MG/DL (ref 70–99)
GLUCOSE BLD-MCNC: 87 MG/DL (ref 70–99)
GLUCOSE SERPL-MCNC: 80 MG/DL (ref 70–99)
HCT VFR BLD CALC: 30.4 % (ref 42–52)
HEMOGLOBIN: 9.1 GM/DL (ref 13.5–18)
IMMATURE NEUTROPHIL %: 0.5 % (ref 0–0.43)
IONIZED CA: 0.96 MMOL/L (ref 1.12–1.32)
LYMPHOCYTES ABSOLUTE: 1 K/CU MM
LYMPHOCYTES RELATIVE PERCENT: 7.7 % (ref 24–44)
MAGNESIUM: 1.6 MG/DL (ref 1.8–2.4)
MCH RBC QN AUTO: 27.4 PG (ref 27–31)
MCHC RBC AUTO-ENTMCNC: 29.9 % (ref 32–36)
MCV RBC AUTO: 91.6 FL (ref 78–100)
MONOCYTES ABSOLUTE: 0.6 K/CU MM
MONOCYTES RELATIVE PERCENT: 5.2 % (ref 0–4)
NUCLEATED RBC %: 0 %
PDW BLD-RTO: 14.5 % (ref 11.7–14.9)
PHOSPHORUS: 2.3 MG/DL (ref 2.5–4.9)
PLATELET # BLD: 227 K/CU MM (ref 140–440)
PMV BLD AUTO: 9.3 FL (ref 7.5–11.1)
POTASSIUM SERPL-SCNC: 3.4 MMOL/L (ref 3.5–5.1)
RBC # BLD: 3.32 M/CU MM (ref 4.6–6.2)
SEGMENTED NEUTROPHILS ABSOLUTE COUNT: 10.7 K/CU MM
SEGMENTED NEUTROPHILS RELATIVE PERCENT: 86.4 % (ref 36–66)
SODIUM BLD-SCNC: 140 MMOL/L (ref 135–145)
TOTAL IMMATURE NEUTOROPHIL: 0.06 K/CU MM
TOTAL NUCLEATED RBC: 0 K/CU MM
WBC # BLD: 12.4 K/CU MM (ref 4–10.5)

## 2023-11-11 PROCEDURE — 6370000000 HC RX 637 (ALT 250 FOR IP): Performed by: STUDENT IN AN ORGANIZED HEALTH CARE EDUCATION/TRAINING PROGRAM

## 2023-11-11 PROCEDURE — 6360000002 HC RX W HCPCS: Performed by: NURSE PRACTITIONER

## 2023-11-11 PROCEDURE — 82330 ASSAY OF CALCIUM: CPT

## 2023-11-11 PROCEDURE — 83735 ASSAY OF MAGNESIUM: CPT

## 2023-11-11 PROCEDURE — 2500000003 HC RX 250 WO HCPCS: Performed by: NURSE PRACTITIONER

## 2023-11-11 PROCEDURE — 82962 GLUCOSE BLOOD TEST: CPT

## 2023-11-11 PROCEDURE — 76937 US GUIDE VASCULAR ACCESS: CPT

## 2023-11-11 PROCEDURE — 80048 BASIC METABOLIC PNL TOTAL CA: CPT

## 2023-11-11 PROCEDURE — 2580000003 HC RX 258: Performed by: STUDENT IN AN ORGANIZED HEALTH CARE EDUCATION/TRAINING PROGRAM

## 2023-11-11 PROCEDURE — 2500000003 HC RX 250 WO HCPCS: Performed by: STUDENT IN AN ORGANIZED HEALTH CARE EDUCATION/TRAINING PROGRAM

## 2023-11-11 PROCEDURE — 6360000002 HC RX W HCPCS: Performed by: STUDENT IN AN ORGANIZED HEALTH CARE EDUCATION/TRAINING PROGRAM

## 2023-11-11 PROCEDURE — 97166 OT EVAL MOD COMPLEX 45 MIN: CPT

## 2023-11-11 PROCEDURE — 97535 SELF CARE MNGMENT TRAINING: CPT

## 2023-11-11 PROCEDURE — 94640 AIRWAY INHALATION TREATMENT: CPT

## 2023-11-11 PROCEDURE — 6370000000 HC RX 637 (ALT 250 FOR IP)

## 2023-11-11 PROCEDURE — 2060000000 HC ICU INTERMEDIATE R&B

## 2023-11-11 PROCEDURE — 2580000003 HC RX 258: Performed by: NURSE PRACTITIONER

## 2023-11-11 PROCEDURE — 94761 N-INVAS EAR/PLS OXIMETRY MLT: CPT

## 2023-11-11 PROCEDURE — 6360000002 HC RX W HCPCS

## 2023-11-11 PROCEDURE — 84100 ASSAY OF PHOSPHORUS: CPT

## 2023-11-11 PROCEDURE — 6370000000 HC RX 637 (ALT 250 FOR IP): Performed by: NURSE PRACTITIONER

## 2023-11-11 PROCEDURE — 2700000000 HC OXYGEN THERAPY PER DAY

## 2023-11-11 PROCEDURE — 93010 ELECTROCARDIOGRAM REPORT: CPT | Performed by: INTERNAL MEDICINE

## 2023-11-11 PROCEDURE — 85025 COMPLETE CBC W/AUTO DIFF WBC: CPT

## 2023-11-11 RX ORDER — CALCIUM GLUCONATE 20 MG/ML
1000 INJECTION, SOLUTION INTRAVENOUS ONCE
Status: COMPLETED | OUTPATIENT
Start: 2023-11-11 | End: 2023-11-11

## 2023-11-11 RX ORDER — SODIUM CHLORIDE 0.9 % (FLUSH) 0.9 %
5-40 SYRINGE (ML) INJECTION EVERY 12 HOURS SCHEDULED
Status: DISCONTINUED | OUTPATIENT
Start: 2023-11-11 | End: 2023-11-20 | Stop reason: HOSPADM

## 2023-11-11 RX ORDER — SODIUM CHLORIDE 0.9 % (FLUSH) 0.9 %
5-40 SYRINGE (ML) INJECTION PRN
Status: DISCONTINUED | OUTPATIENT
Start: 2023-11-11 | End: 2023-11-20 | Stop reason: HOSPADM

## 2023-11-11 RX ORDER — GUAIFENESIN/DEXTROMETHORPHAN 100-10MG/5
5 SYRUP ORAL EVERY 8 HOURS PRN
Status: DISCONTINUED | OUTPATIENT
Start: 2023-11-11 | End: 2023-11-20 | Stop reason: HOSPADM

## 2023-11-11 RX ORDER — SODIUM CHLORIDE 9 MG/ML
INJECTION, SOLUTION INTRAVENOUS PRN
Status: DISCONTINUED | OUTPATIENT
Start: 2023-11-11 | End: 2023-11-20 | Stop reason: HOSPADM

## 2023-11-11 RX ADMIN — BUDESONIDE AND FORMOTEROL FUMARATE DIHYDRATE 2 PUFF: 160; 4.5 AEROSOL RESPIRATORY (INHALATION) at 19:10

## 2023-11-11 RX ADMIN — CHLORHEXIDINE GLUCONATE 118 ML: 4 LIQUID TOPICAL at 10:30

## 2023-11-11 RX ADMIN — ALBUTEROL SULFATE 2 PUFF: 90 AEROSOL, METERED RESPIRATORY (INHALATION) at 08:20

## 2023-11-11 RX ADMIN — CEFEPIME 1000 MG: 1 INJECTION, POWDER, FOR SOLUTION INTRAMUSCULAR; INTRAVENOUS at 23:29

## 2023-11-11 RX ADMIN — CEFEPIME 1000 MG: 1 INJECTION, POWDER, FOR SOLUTION INTRAMUSCULAR; INTRAVENOUS at 05:21

## 2023-11-11 RX ADMIN — BUDESONIDE AND FORMOTEROL FUMARATE DIHYDRATE 2 PUFF: 160; 4.5 AEROSOL RESPIRATORY (INHALATION) at 08:23

## 2023-11-11 RX ADMIN — SODIUM CHLORIDE, PRESERVATIVE FREE 10 ML: 5 INJECTION INTRAVENOUS at 10:29

## 2023-11-11 RX ADMIN — IPRATROPIUM BROMIDE 2 PUFF: 17 AEROSOL, METERED RESPIRATORY (INHALATION) at 19:10

## 2023-11-11 RX ADMIN — POTASSIUM CHLORIDE 40 MEQ: 1500 TABLET, EXTENDED RELEASE ORAL at 05:22

## 2023-11-11 RX ADMIN — SODIUM CHLORIDE, PRESERVATIVE FREE 10 ML: 5 INJECTION INTRAVENOUS at 10:09

## 2023-11-11 RX ADMIN — OXYCODONE AND ACETAMINOPHEN 1 TABLET: 5; 325 TABLET ORAL at 05:22

## 2023-11-11 RX ADMIN — ALBUTEROL SULFATE 2 PUFF: 90 AEROSOL, METERED RESPIRATORY (INHALATION) at 19:10

## 2023-11-11 RX ADMIN — CEFEPIME 1000 MG: 1 INJECTION, POWDER, FOR SOLUTION INTRAMUSCULAR; INTRAVENOUS at 18:17

## 2023-11-11 RX ADMIN — Medication: at 10:09

## 2023-11-11 RX ADMIN — THIAMINE HYDROCHLORIDE 100 MG: 100 INJECTION, SOLUTION INTRAMUSCULAR; INTRAVENOUS at 13:07

## 2023-11-11 RX ADMIN — GUAIFENESIN AND DEXTROMETHORPHAN 5 ML: 100; 10 SYRUP ORAL at 20:42

## 2023-11-11 RX ADMIN — FAMOTIDINE 20 MG: 10 INJECTION, SOLUTION INTRAVENOUS at 10:09

## 2023-11-11 RX ADMIN — OXYCODONE AND ACETAMINOPHEN 1 TABLET: 5; 325 TABLET ORAL at 19:28

## 2023-11-11 RX ADMIN — CALCIUM GLUCONATE 1000 MG: 20 INJECTION, SOLUTION INTRAVENOUS at 10:19

## 2023-11-11 RX ADMIN — ASPIRIN 81 MG 81 MG: 81 TABLET ORAL at 10:31

## 2023-11-11 RX ADMIN — SODIUM CHLORIDE, PRESERVATIVE FREE 10 ML: 5 INJECTION INTRAVENOUS at 20:42

## 2023-11-11 RX ADMIN — CEFEPIME 1000 MG: 1 INJECTION, POWDER, FOR SOLUTION INTRAMUSCULAR; INTRAVENOUS at 13:06

## 2023-11-11 RX ADMIN — ALBUTEROL SULFATE 2 PUFF: 90 AEROSOL, METERED RESPIRATORY (INHALATION) at 12:13

## 2023-11-11 RX ADMIN — IPRATROPIUM BROMIDE 2 PUFF: 17 AEROSOL, METERED RESPIRATORY (INHALATION) at 08:21

## 2023-11-11 RX ADMIN — Medication: at 20:42

## 2023-11-11 RX ADMIN — IPRATROPIUM BROMIDE 2 PUFF: 17 AEROSOL, METERED RESPIRATORY (INHALATION) at 12:11

## 2023-11-11 RX ADMIN — CALCIUM GLUCONATE 1000 MG: 20 INJECTION, SOLUTION INTRAVENOUS at 06:39

## 2023-11-11 RX ADMIN — FOLIC ACID 1 MG: 1 TABLET ORAL at 10:09

## 2023-11-11 RX ADMIN — MULTIPLE VITAMINS W/ MINERALS TAB 1 TABLET: TAB at 10:09

## 2023-11-11 RX ADMIN — FAMOTIDINE 20 MG: 10 INJECTION, SOLUTION INTRAVENOUS at 20:42

## 2023-11-11 RX ADMIN — ENOXAPARIN SODIUM 30 MG: 100 INJECTION SUBCUTANEOUS at 10:09

## 2023-11-11 RX ADMIN — OXYCODONE AND ACETAMINOPHEN 1 TABLET: 5; 325 TABLET ORAL at 13:04

## 2023-11-11 ASSESSMENT — PAIN SCALES - GENERAL
PAINLEVEL_OUTOF10: 0
PAINLEVEL_OUTOF10: 7
PAINLEVEL_OUTOF10: 4
PAINLEVEL_OUTOF10: 7

## 2023-11-11 ASSESSMENT — PAIN DESCRIPTION - LOCATION
LOCATION: GENERALIZED
LOCATION: NECK
LOCATION: BACK

## 2023-11-11 ASSESSMENT — PAIN DESCRIPTION - ORIENTATION: ORIENTATION: MID

## 2023-11-11 ASSESSMENT — PAIN DESCRIPTION - DESCRIPTORS
DESCRIPTORS: ACHING
DESCRIPTORS: ACHING

## 2023-11-12 LAB
ANION GAP SERPL CALCULATED.3IONS-SCNC: 4 MMOL/L (ref 4–16)
BASOPHILS ABSOLUTE: 0 K/CU MM
BASOPHILS RELATIVE PERCENT: 0.2 % (ref 0–1)
BUN SERPL-MCNC: 5 MG/DL (ref 6–23)
CALCIUM SERPL-MCNC: 7.2 MG/DL (ref 8.3–10.6)
CHLORIDE BLD-SCNC: 104 MMOL/L (ref 99–110)
CO2: 34 MMOL/L (ref 21–32)
CREAT SERPL-MCNC: 0.2 MG/DL (ref 0.9–1.3)
CULTURE: ABNORMAL
CULTURE: ABNORMAL
CULTURE: NORMAL
DIFFERENTIAL TYPE: ABNORMAL
EOSINOPHILS ABSOLUTE: 0 K/CU MM
EOSINOPHILS RELATIVE PERCENT: 0 % (ref 0–3)
GFR SERPL CREATININE-BSD FRML MDRD: >60 ML/MIN/1.73M2
GLUCOSE SERPL-MCNC: 91 MG/DL (ref 70–99)
GRAM SMEAR: ABNORMAL
HCT VFR BLD CALC: 28.7 % (ref 42–52)
HEMOGLOBIN: 8.6 GM/DL (ref 13.5–18)
IMMATURE NEUTROPHIL %: 0.3 % (ref 0–0.43)
LYMPHOCYTES ABSOLUTE: 1 K/CU MM
LYMPHOCYTES RELATIVE PERCENT: 8.6 % (ref 24–44)
Lab: ABNORMAL
Lab: NORMAL
MAGNESIUM: 1.7 MG/DL (ref 1.8–2.4)
MCH RBC QN AUTO: 27.4 PG (ref 27–31)
MCHC RBC AUTO-ENTMCNC: 30 % (ref 32–36)
MCV RBC AUTO: 91.4 FL (ref 78–100)
MONOCYTES ABSOLUTE: 0.7 K/CU MM
MONOCYTES RELATIVE PERCENT: 6.4 % (ref 0–4)
NUCLEATED RBC %: 0 %
PDW BLD-RTO: 14.4 % (ref 11.7–14.9)
PHOSPHORUS: 2.5 MG/DL (ref 2.5–4.9)
PLATELET # BLD: 235 K/CU MM (ref 140–440)
PMV BLD AUTO: 9.6 FL (ref 7.5–11.1)
POTASSIUM SERPL-SCNC: 4.2 MMOL/L (ref 3.5–5.1)
RBC # BLD: 3.14 M/CU MM (ref 4.6–6.2)
SEGMENTED NEUTROPHILS ABSOLUTE COUNT: 9.4 K/CU MM
SEGMENTED NEUTROPHILS RELATIVE PERCENT: 84.5 % (ref 36–66)
SODIUM BLD-SCNC: 142 MMOL/L (ref 135–145)
SPECIMEN: ABNORMAL
SPECIMEN: NORMAL
TOTAL IMMATURE NEUTOROPHIL: 0.03 K/CU MM
TOTAL NUCLEATED RBC: 0 K/CU MM
WBC # BLD: 11.1 K/CU MM (ref 4–10.5)

## 2023-11-12 PROCEDURE — 2580000003 HC RX 258: Performed by: STUDENT IN AN ORGANIZED HEALTH CARE EDUCATION/TRAINING PROGRAM

## 2023-11-12 PROCEDURE — 85025 COMPLETE CBC W/AUTO DIFF WBC: CPT

## 2023-11-12 PROCEDURE — 84100 ASSAY OF PHOSPHORUS: CPT

## 2023-11-12 PROCEDURE — 2580000003 HC RX 258: Performed by: NURSE PRACTITIONER

## 2023-11-12 PROCEDURE — 94664 DEMO&/EVAL PT USE INHALER: CPT

## 2023-11-12 PROCEDURE — 94761 N-INVAS EAR/PLS OXIMETRY MLT: CPT

## 2023-11-12 PROCEDURE — 2500000003 HC RX 250 WO HCPCS: Performed by: NURSE PRACTITIONER

## 2023-11-12 PROCEDURE — 6360000002 HC RX W HCPCS

## 2023-11-12 PROCEDURE — 80048 BASIC METABOLIC PNL TOTAL CA: CPT

## 2023-11-12 PROCEDURE — 2060000000 HC ICU INTERMEDIATE R&B

## 2023-11-12 PROCEDURE — 6360000002 HC RX W HCPCS: Performed by: NURSE PRACTITIONER

## 2023-11-12 PROCEDURE — 94640 AIRWAY INHALATION TREATMENT: CPT

## 2023-11-12 PROCEDURE — 6370000000 HC RX 637 (ALT 250 FOR IP): Performed by: STUDENT IN AN ORGANIZED HEALTH CARE EDUCATION/TRAINING PROGRAM

## 2023-11-12 PROCEDURE — 83735 ASSAY OF MAGNESIUM: CPT

## 2023-11-12 PROCEDURE — 2700000000 HC OXYGEN THERAPY PER DAY

## 2023-11-12 PROCEDURE — 6370000000 HC RX 637 (ALT 250 FOR IP): Performed by: NURSE PRACTITIONER

## 2023-11-12 PROCEDURE — 6370000000 HC RX 637 (ALT 250 FOR IP)

## 2023-11-12 PROCEDURE — 6360000002 HC RX W HCPCS: Performed by: STUDENT IN AN ORGANIZED HEALTH CARE EDUCATION/TRAINING PROGRAM

## 2023-11-12 PROCEDURE — 94150 VITAL CAPACITY TEST: CPT

## 2023-11-12 RX ORDER — PREDNISONE 5 MG/1
5 TABLET ORAL DAILY
Status: DISCONTINUED | OUTPATIENT
Start: 2023-11-12 | End: 2023-11-20 | Stop reason: HOSPADM

## 2023-11-12 RX ADMIN — BUDESONIDE AND FORMOTEROL FUMARATE DIHYDRATE 2 PUFF: 160; 4.5 AEROSOL RESPIRATORY (INHALATION) at 07:33

## 2023-11-12 RX ADMIN — ALBUTEROL SULFATE 2 PUFF: 90 AEROSOL, METERED RESPIRATORY (INHALATION) at 12:21

## 2023-11-12 RX ADMIN — SODIUM CHLORIDE, PRESERVATIVE FREE 10 ML: 5 INJECTION INTRAVENOUS at 07:54

## 2023-11-12 RX ADMIN — SODIUM CHLORIDE, PRESERVATIVE FREE 10 ML: 5 INJECTION INTRAVENOUS at 07:55

## 2023-11-12 RX ADMIN — CEFEPIME 1000 MG: 1 INJECTION, POWDER, FOR SOLUTION INTRAMUSCULAR; INTRAVENOUS at 06:32

## 2023-11-12 RX ADMIN — GUAIFENESIN AND DEXTROMETHORPHAN 5 ML: 100; 10 SYRUP ORAL at 22:35

## 2023-11-12 RX ADMIN — CEFEPIME 1000 MG: 1 INJECTION, POWDER, FOR SOLUTION INTRAMUSCULAR; INTRAVENOUS at 23:36

## 2023-11-12 RX ADMIN — IPRATROPIUM BROMIDE 2 PUFF: 17 AEROSOL, METERED RESPIRATORY (INHALATION) at 16:48

## 2023-11-12 RX ADMIN — MULTIPLE VITAMINS W/ MINERALS TAB 1 TABLET: TAB at 07:53

## 2023-11-12 RX ADMIN — ALBUTEROL SULFATE 2 PUFF: 90 AEROSOL, METERED RESPIRATORY (INHALATION) at 19:55

## 2023-11-12 RX ADMIN — FAMOTIDINE 20 MG: 10 INJECTION, SOLUTION INTRAVENOUS at 07:53

## 2023-11-12 RX ADMIN — Medication: at 07:53

## 2023-11-12 RX ADMIN — PREDNISONE 5 MG: 5 TABLET ORAL at 11:20

## 2023-11-12 RX ADMIN — FAMOTIDINE 20 MG: 10 INJECTION, SOLUTION INTRAVENOUS at 20:20

## 2023-11-12 RX ADMIN — IPRATROPIUM BROMIDE 2 PUFF: 17 AEROSOL, METERED RESPIRATORY (INHALATION) at 19:55

## 2023-11-12 RX ADMIN — Medication: at 20:20

## 2023-11-12 RX ADMIN — CEFEPIME 1000 MG: 1 INJECTION, POWDER, FOR SOLUTION INTRAMUSCULAR; INTRAVENOUS at 15:29

## 2023-11-12 RX ADMIN — OXYCODONE AND ACETAMINOPHEN 1 TABLET: 5; 325 TABLET ORAL at 17:29

## 2023-11-12 RX ADMIN — GUAIFENESIN AND DEXTROMETHORPHAN 5 ML: 100; 10 SYRUP ORAL at 15:28

## 2023-11-12 RX ADMIN — IPRATROPIUM BROMIDE 2 PUFF: 17 AEROSOL, METERED RESPIRATORY (INHALATION) at 07:31

## 2023-11-12 RX ADMIN — GUAIFENESIN AND DEXTROMETHORPHAN 5 ML: 100; 10 SYRUP ORAL at 05:53

## 2023-11-12 RX ADMIN — OXYCODONE AND ACETAMINOPHEN 1 TABLET: 5; 325 TABLET ORAL at 23:36

## 2023-11-12 RX ADMIN — ENOXAPARIN SODIUM 30 MG: 100 INJECTION SUBCUTANEOUS at 07:53

## 2023-11-12 RX ADMIN — BUDESONIDE AND FORMOTEROL FUMARATE DIHYDRATE 2 PUFF: 160; 4.5 AEROSOL RESPIRATORY (INHALATION) at 19:55

## 2023-11-12 RX ADMIN — OXYCODONE AND ACETAMINOPHEN 1 TABLET: 5; 325 TABLET ORAL at 09:43

## 2023-11-12 RX ADMIN — SODIUM CHLORIDE, PRESERVATIVE FREE 10 ML: 5 INJECTION INTRAVENOUS at 20:21

## 2023-11-12 RX ADMIN — ALBUTEROL SULFATE 2 PUFF: 90 AEROSOL, METERED RESPIRATORY (INHALATION) at 07:30

## 2023-11-12 RX ADMIN — OXYCODONE AND ACETAMINOPHEN 1 TABLET: 5; 325 TABLET ORAL at 02:00

## 2023-11-12 RX ADMIN — THIAMINE HYDROCHLORIDE 100 MG: 100 INJECTION, SOLUTION INTRAMUSCULAR; INTRAVENOUS at 07:53

## 2023-11-12 RX ADMIN — ALBUTEROL SULFATE 2 PUFF: 90 AEROSOL, METERED RESPIRATORY (INHALATION) at 16:49

## 2023-11-12 RX ADMIN — ASPIRIN 81 MG 81 MG: 81 TABLET ORAL at 07:52

## 2023-11-12 RX ADMIN — CEFEPIME 1000 MG: 1 INJECTION, POWDER, FOR SOLUTION INTRAMUSCULAR; INTRAVENOUS at 11:22

## 2023-11-12 RX ADMIN — IPRATROPIUM BROMIDE 2 PUFF: 17 AEROSOL, METERED RESPIRATORY (INHALATION) at 12:20

## 2023-11-12 RX ADMIN — CHLORHEXIDINE GLUCONATE: 4 LIQUID TOPICAL at 07:54

## 2023-11-12 RX ADMIN — FOLIC ACID 1 MG: 1 TABLET ORAL at 07:52

## 2023-11-12 ASSESSMENT — PAIN DESCRIPTION - DESCRIPTORS
DESCRIPTORS: ACHING;CRUSHING
DESCRIPTORS: ACHING;THROBBING

## 2023-11-12 ASSESSMENT — PAIN DESCRIPTION - LOCATION
LOCATION: NECK

## 2023-11-12 ASSESSMENT — PAIN SCALES - GENERAL
PAINLEVEL_OUTOF10: 0
PAINLEVEL_OUTOF10: 8
PAINLEVEL_OUTOF10: 0
PAINLEVEL_OUTOF10: 5
PAINLEVEL_OUTOF10: 9
PAINLEVEL_OUTOF10: 9
PAINLEVEL_OUTOF10: 5
PAINLEVEL_OUTOF10: 8
PAINLEVEL_OUTOF10: 0

## 2023-11-12 ASSESSMENT — PAIN DESCRIPTION - ORIENTATION
ORIENTATION: RIGHT
ORIENTATION: MID

## 2023-11-13 LAB
ANION GAP SERPL CALCULATED.3IONS-SCNC: 4 MMOL/L (ref 4–16)
BASOPHILS ABSOLUTE: 0 K/CU MM
BASOPHILS RELATIVE PERCENT: 0.2 % (ref 0–1)
BUN SERPL-MCNC: 5 MG/DL (ref 6–23)
CALCIUM SERPL-MCNC: 9 MG/DL (ref 8.3–10.6)
CHLORIDE BLD-SCNC: 98 MMOL/L (ref 99–110)
CO2: 36 MMOL/L (ref 21–32)
CREAT SERPL-MCNC: 0.3 MG/DL (ref 0.9–1.3)
DIFFERENTIAL TYPE: ABNORMAL
EOSINOPHILS ABSOLUTE: 0 K/CU MM
EOSINOPHILS RELATIVE PERCENT: 0.1 % (ref 0–3)
GFR SERPL CREATININE-BSD FRML MDRD: >60 ML/MIN/1.73M2
GLUCOSE BLD-MCNC: 156 MG/DL (ref 70–99)
GLUCOSE SERPL-MCNC: 120 MG/DL (ref 70–99)
HCT VFR BLD CALC: 32.7 % (ref 42–52)
HEMOGLOBIN: 9.7 GM/DL (ref 13.5–18)
IMMATURE NEUTROPHIL %: 0.5 % (ref 0–0.43)
LYMPHOCYTES ABSOLUTE: 1.2 K/CU MM
LYMPHOCYTES RELATIVE PERCENT: 8 % (ref 24–44)
MAGNESIUM: 1.9 MG/DL (ref 1.8–2.4)
MCH RBC QN AUTO: 26.9 PG (ref 27–31)
MCHC RBC AUTO-ENTMCNC: 29.7 % (ref 32–36)
MCV RBC AUTO: 90.8 FL (ref 78–100)
MONOCYTES ABSOLUTE: 1.2 K/CU MM
MONOCYTES RELATIVE PERCENT: 8.6 % (ref 0–4)
NUCLEATED RBC %: 0 %
PDW BLD-RTO: 14.3 % (ref 11.7–14.9)
PHOSPHORUS: 3 MG/DL (ref 2.5–4.9)
PLATELET # BLD: 337 K/CU MM (ref 140–440)
PMV BLD AUTO: 9.3 FL (ref 7.5–11.1)
POTASSIUM SERPL-SCNC: 4.1 MMOL/L (ref 3.5–5.1)
RBC # BLD: 3.6 M/CU MM (ref 4.6–6.2)
SEGMENTED NEUTROPHILS ABSOLUTE COUNT: 11.8 K/CU MM
SEGMENTED NEUTROPHILS RELATIVE PERCENT: 82.6 % (ref 36–66)
SODIUM BLD-SCNC: 138 MMOL/L (ref 135–145)
TOTAL IMMATURE NEUTOROPHIL: 0.07 K/CU MM
TOTAL NUCLEATED RBC: 0 K/CU MM
WBC # BLD: 14.3 K/CU MM (ref 4–10.5)

## 2023-11-13 PROCEDURE — 6370000000 HC RX 637 (ALT 250 FOR IP): Performed by: STUDENT IN AN ORGANIZED HEALTH CARE EDUCATION/TRAINING PROGRAM

## 2023-11-13 PROCEDURE — 2060000000 HC ICU INTERMEDIATE R&B

## 2023-11-13 PROCEDURE — 6360000002 HC RX W HCPCS: Performed by: STUDENT IN AN ORGANIZED HEALTH CARE EDUCATION/TRAINING PROGRAM

## 2023-11-13 PROCEDURE — 94640 AIRWAY INHALATION TREATMENT: CPT

## 2023-11-13 PROCEDURE — 2580000003 HC RX 258: Performed by: NURSE PRACTITIONER

## 2023-11-13 PROCEDURE — 80048 BASIC METABOLIC PNL TOTAL CA: CPT

## 2023-11-13 PROCEDURE — 6370000000 HC RX 637 (ALT 250 FOR IP)

## 2023-11-13 PROCEDURE — 83735 ASSAY OF MAGNESIUM: CPT

## 2023-11-13 PROCEDURE — 36415 COLL VENOUS BLD VENIPUNCTURE: CPT

## 2023-11-13 PROCEDURE — 2500000003 HC RX 250 WO HCPCS: Performed by: NURSE PRACTITIONER

## 2023-11-13 PROCEDURE — 85025 COMPLETE CBC W/AUTO DIFF WBC: CPT

## 2023-11-13 PROCEDURE — 2580000003 HC RX 258: Performed by: STUDENT IN AN ORGANIZED HEALTH CARE EDUCATION/TRAINING PROGRAM

## 2023-11-13 PROCEDURE — 94761 N-INVAS EAR/PLS OXIMETRY MLT: CPT

## 2023-11-13 PROCEDURE — 6360000002 HC RX W HCPCS: Performed by: NURSE PRACTITIONER

## 2023-11-13 PROCEDURE — 2700000000 HC OXYGEN THERAPY PER DAY

## 2023-11-13 PROCEDURE — 84100 ASSAY OF PHOSPHORUS: CPT

## 2023-11-13 PROCEDURE — 6370000000 HC RX 637 (ALT 250 FOR IP): Performed by: NURSE PRACTITIONER

## 2023-11-13 PROCEDURE — 82962 GLUCOSE BLOOD TEST: CPT

## 2023-11-13 PROCEDURE — 6360000002 HC RX W HCPCS

## 2023-11-13 RX ADMIN — ALBUTEROL SULFATE 2 PUFF: 90 AEROSOL, METERED RESPIRATORY (INHALATION) at 15:17

## 2023-11-13 RX ADMIN — SODIUM CHLORIDE, PRESERVATIVE FREE 10 ML: 5 INJECTION INTRAVENOUS at 08:08

## 2023-11-13 RX ADMIN — ALBUTEROL SULFATE 2 PUFF: 90 AEROSOL, METERED RESPIRATORY (INHALATION) at 10:56

## 2023-11-13 RX ADMIN — ENOXAPARIN SODIUM 30 MG: 100 INJECTION SUBCUTANEOUS at 09:04

## 2023-11-13 RX ADMIN — ALBUTEROL SULFATE 2 PUFF: 90 AEROSOL, METERED RESPIRATORY (INHALATION) at 19:38

## 2023-11-13 RX ADMIN — CEFTRIAXONE SODIUM 1000 MG: 1 INJECTION, POWDER, FOR SOLUTION INTRAMUSCULAR; INTRAVENOUS at 10:11

## 2023-11-13 RX ADMIN — FAMOTIDINE 20 MG: 10 INJECTION, SOLUTION INTRAVENOUS at 09:05

## 2023-11-13 RX ADMIN — Medication: at 09:04

## 2023-11-13 RX ADMIN — FOLIC ACID 1 MG: 1 TABLET ORAL at 09:04

## 2023-11-13 RX ADMIN — MULTIPLE VITAMINS W/ MINERALS TAB 1 TABLET: TAB at 09:05

## 2023-11-13 RX ADMIN — SODIUM CHLORIDE, PRESERVATIVE FREE 10 ML: 5 INJECTION INTRAVENOUS at 19:37

## 2023-11-13 RX ADMIN — IPRATROPIUM BROMIDE 2 PUFF: 17 AEROSOL, METERED RESPIRATORY (INHALATION) at 10:56

## 2023-11-13 RX ADMIN — PREDNISONE 5 MG: 5 TABLET ORAL at 09:04

## 2023-11-13 RX ADMIN — ASPIRIN 81 MG 81 MG: 81 TABLET ORAL at 09:05

## 2023-11-13 RX ADMIN — IPRATROPIUM BROMIDE 2 PUFF: 17 AEROSOL, METERED RESPIRATORY (INHALATION) at 07:34

## 2023-11-13 RX ADMIN — OXYCODONE AND ACETAMINOPHEN 1 TABLET: 5; 325 TABLET ORAL at 19:36

## 2023-11-13 RX ADMIN — BUDESONIDE AND FORMOTEROL FUMARATE DIHYDRATE 2 PUFF: 160; 4.5 AEROSOL RESPIRATORY (INHALATION) at 19:40

## 2023-11-13 RX ADMIN — Medication: at 19:36

## 2023-11-13 RX ADMIN — OXYCODONE AND ACETAMINOPHEN 1 TABLET: 5; 325 TABLET ORAL at 12:58

## 2023-11-13 RX ADMIN — CEFEPIME 1000 MG: 1 INJECTION, POWDER, FOR SOLUTION INTRAMUSCULAR; INTRAVENOUS at 05:24

## 2023-11-13 RX ADMIN — THIAMINE HYDROCHLORIDE 100 MG: 100 INJECTION, SOLUTION INTRAMUSCULAR; INTRAVENOUS at 09:04

## 2023-11-13 RX ADMIN — FAMOTIDINE 20 MG: 10 INJECTION, SOLUTION INTRAVENOUS at 19:36

## 2023-11-13 RX ADMIN — BUDESONIDE AND FORMOTEROL FUMARATE DIHYDRATE 2 PUFF: 160; 4.5 AEROSOL RESPIRATORY (INHALATION) at 07:34

## 2023-11-13 RX ADMIN — IPRATROPIUM BROMIDE 2 PUFF: 17 AEROSOL, METERED RESPIRATORY (INHALATION) at 19:39

## 2023-11-13 RX ADMIN — ALBUTEROL SULFATE 2 PUFF: 90 AEROSOL, METERED RESPIRATORY (INHALATION) at 07:33

## 2023-11-13 RX ADMIN — SODIUM CHLORIDE, PRESERVATIVE FREE 10 ML: 5 INJECTION INTRAVENOUS at 08:07

## 2023-11-13 RX ADMIN — IPRATROPIUM BROMIDE 2 PUFF: 17 AEROSOL, METERED RESPIRATORY (INHALATION) at 15:18

## 2023-11-13 RX ADMIN — CHLORHEXIDINE GLUCONATE: 4 LIQUID TOPICAL at 09:05

## 2023-11-13 RX ADMIN — OXYCODONE AND ACETAMINOPHEN 1 TABLET: 5; 325 TABLET ORAL at 06:53

## 2023-11-13 ASSESSMENT — PAIN DESCRIPTION - DESCRIPTORS
DESCRIPTORS: ACHING;DISCOMFORT;HEAVINESS;PRESSURE
DESCRIPTORS: ACHING;DISCOMFORT;PRESSURE

## 2023-11-13 ASSESSMENT — PAIN DESCRIPTION - ORIENTATION
ORIENTATION: RIGHT;LEFT;ANTERIOR;POSTERIOR
ORIENTATION: RIGHT;LEFT;POSTERIOR

## 2023-11-13 ASSESSMENT — PAIN DESCRIPTION - LOCATION
LOCATION: NECK

## 2023-11-13 ASSESSMENT — PAIN SCALES - GENERAL
PAINLEVEL_OUTOF10: 0
PAINLEVEL_OUTOF10: 0
PAINLEVEL_OUTOF10: 9
PAINLEVEL_OUTOF10: 0
PAINLEVEL_OUTOF10: 6
PAINLEVEL_OUTOF10: 5
PAINLEVEL_OUTOF10: 9
PAINLEVEL_OUTOF10: 9
PAINLEVEL_OUTOF10: 3
PAINLEVEL_OUTOF10: 3
PAINLEVEL_OUTOF10: 5

## 2023-11-13 ASSESSMENT — PAIN SCALES - WONG BAKER: WONGBAKER_NUMERICALRESPONSE: 2

## 2023-11-13 ASSESSMENT — PAIN DESCRIPTION - PAIN TYPE: TYPE: CHRONIC PAIN

## 2023-11-13 ASSESSMENT — PAIN DESCRIPTION - DIRECTION: RADIATING_TOWARDS: POSTERIOR NECK

## 2023-11-13 ASSESSMENT — PAIN - FUNCTIONAL ASSESSMENT
PAIN_FUNCTIONAL_ASSESSMENT: PREVENTS OR INTERFERES WITH MANY ACTIVE NOT PASSIVE ACTIVITIES
PAIN_FUNCTIONAL_ASSESSMENT: PREVENTS OR INTERFERES WITH MANY ACTIVE NOT PASSIVE ACTIVITIES

## 2023-11-13 ASSESSMENT — PAIN DESCRIPTION - FREQUENCY: FREQUENCY: CONTINUOUS

## 2023-11-13 ASSESSMENT — PAIN DESCRIPTION - ONSET: ONSET: ON-GOING

## 2023-11-13 NOTE — CARE COORDINATION
CM reviewed chart. CM put in whiteboard note: OT eval recommends SNF. Awaiting PT eval.   Referral called to [facility](per previous discussion with pt)  Precert will be needed -CD    1345- F/u visit with pt. Pt remains in agreement with [facility]. Precert will be required.

## 2023-11-13 NOTE — ACP (ADVANCE CARE PLANNING)
Advance Care Planning     Advance Care Planning Inpatient Note  [facility] Department    Today's Date: 11/13/2023  Unit: [facility] ICU    Received request from patient. Upon review of chart and communication with care team, patient's decision making abilities are not in question. . Patient was/were present in the room during visit. Goals of ACP Conversation:  Discuss advance care planning documents    Health Care Decision Makers:     No healthcare decision makers have been documented. Click here to complete [address] including selection of the Healthcare Decision Maker Relationship (ie \"Primary\")  Summary:  Completed New Documents   Verified agent to be healthcare POA. Advance Care Planning Documents (Patient Wishes):  Healthcare Power of /Advance Directive Appointment of Health Care Agent  Living Will/Advance Directive     Assessment: We were able to complete the AD. PT was alert. PT understood both the healthcare POA and Living will. PT was feeling better today. PT appreciates the care he is receiving. Interventions:  Provided education on documents for clarity and greater understanding  Discussed and provided education on state decision maker hierarchy  Assisted in the completion of documents according to patient's wishes at this time    Care Preferences Communicated:   No    Outcomes/Plan:  ACP Discussion: Completed  New advance directive completed. Returned original document(s) to patient, as well as copies for distribution to appointed agents  Copy of advance directive given to staff to scan into medical record.     Electronically signed by Jeffery Loja on 11/13/2023 at 3:17 PM

## 2023-11-14 LAB
ANION GAP SERPL CALCULATED.3IONS-SCNC: 6 MMOL/L (ref 4–16)
BASOPHILS ABSOLUTE: 0 K/CU MM
BASOPHILS RELATIVE PERCENT: 0.2 % (ref 0–1)
BUN SERPL-MCNC: 6 MG/DL (ref 6–23)
CALCIUM SERPL-MCNC: 8.7 MG/DL (ref 8.3–10.6)
CHLORIDE BLD-SCNC: 98 MMOL/L (ref 99–110)
CO2: 37 MMOL/L (ref 21–32)
CREAT SERPL-MCNC: 0.4 MG/DL (ref 0.9–1.3)
DIFFERENTIAL TYPE: ABNORMAL
EOSINOPHILS ABSOLUTE: 0 K/CU MM
EOSINOPHILS RELATIVE PERCENT: 0.1 % (ref 0–3)
GFR SERPL CREATININE-BSD FRML MDRD: >60 ML/MIN/1.73M2
GLUCOSE BLD-MCNC: 127 MG/DL (ref 70–99)
GLUCOSE SERPL-MCNC: 145 MG/DL (ref 70–99)
HCT VFR BLD CALC: 31.3 % (ref 42–52)
HEMOGLOBIN: 9.5 GM/DL (ref 13.5–18)
IMMATURE NEUTROPHIL %: 0.7 % (ref 0–0.43)
LYMPHOCYTES ABSOLUTE: 1.3 K/CU MM
LYMPHOCYTES RELATIVE PERCENT: 9.1 % (ref 24–44)
MAGNESIUM: 1.8 MG/DL (ref 1.8–2.4)
MCH RBC QN AUTO: 27.4 PG (ref 27–31)
MCHC RBC AUTO-ENTMCNC: 30.4 % (ref 32–36)
MCV RBC AUTO: 90.2 FL (ref 78–100)
MONOCYTES ABSOLUTE: 1.3 K/CU MM
MONOCYTES RELATIVE PERCENT: 9 % (ref 0–4)
NUCLEATED RBC %: 0 %
PDW BLD-RTO: 14.3 % (ref 11.7–14.9)
PHOSPHORUS: 3.1 MG/DL (ref 2.5–4.9)
PLATELET # BLD: 405 K/CU MM (ref 140–440)
PMV BLD AUTO: 9.4 FL (ref 7.5–11.1)
POTASSIUM SERPL-SCNC: 4.5 MMOL/L (ref 3.5–5.1)
RBC # BLD: 3.47 M/CU MM (ref 4.6–6.2)
SEGMENTED NEUTROPHILS ABSOLUTE COUNT: 11.5 K/CU MM
SEGMENTED NEUTROPHILS RELATIVE PERCENT: 80.9 % (ref 36–66)
SODIUM BLD-SCNC: 141 MMOL/L (ref 135–145)
TOTAL IMMATURE NEUTOROPHIL: 0.1 K/CU MM
TOTAL NUCLEATED RBC: 0 K/CU MM
WBC # BLD: 14.2 K/CU MM (ref 4–10.5)

## 2023-11-14 PROCEDURE — 80048 BASIC METABOLIC PNL TOTAL CA: CPT

## 2023-11-14 PROCEDURE — 2700000000 HC OXYGEN THERAPY PER DAY

## 2023-11-14 PROCEDURE — 6370000000 HC RX 637 (ALT 250 FOR IP): Performed by: NURSE PRACTITIONER

## 2023-11-14 PROCEDURE — 6370000000 HC RX 637 (ALT 250 FOR IP): Performed by: STUDENT IN AN ORGANIZED HEALTH CARE EDUCATION/TRAINING PROGRAM

## 2023-11-14 PROCEDURE — 2580000003 HC RX 258: Performed by: STUDENT IN AN ORGANIZED HEALTH CARE EDUCATION/TRAINING PROGRAM

## 2023-11-14 PROCEDURE — 2580000003 HC RX 258: Performed by: NURSE PRACTITIONER

## 2023-11-14 PROCEDURE — 94761 N-INVAS EAR/PLS OXIMETRY MLT: CPT

## 2023-11-14 PROCEDURE — 94150 VITAL CAPACITY TEST: CPT

## 2023-11-14 PROCEDURE — 6360000002 HC RX W HCPCS: Performed by: NURSE PRACTITIONER

## 2023-11-14 PROCEDURE — 2060000000 HC ICU INTERMEDIATE R&B

## 2023-11-14 PROCEDURE — 94640 AIRWAY INHALATION TREATMENT: CPT

## 2023-11-14 PROCEDURE — 97163 PT EVAL HIGH COMPLEX 45 MIN: CPT

## 2023-11-14 PROCEDURE — 6360000002 HC RX W HCPCS

## 2023-11-14 PROCEDURE — 6360000002 HC RX W HCPCS: Performed by: STUDENT IN AN ORGANIZED HEALTH CARE EDUCATION/TRAINING PROGRAM

## 2023-11-14 PROCEDURE — 97530 THERAPEUTIC ACTIVITIES: CPT

## 2023-11-14 PROCEDURE — 82962 GLUCOSE BLOOD TEST: CPT

## 2023-11-14 PROCEDURE — 83735 ASSAY OF MAGNESIUM: CPT

## 2023-11-14 PROCEDURE — 97535 SELF CARE MNGMENT TRAINING: CPT

## 2023-11-14 PROCEDURE — 85025 COMPLETE CBC W/AUTO DIFF WBC: CPT

## 2023-11-14 PROCEDURE — 6370000000 HC RX 637 (ALT 250 FOR IP)

## 2023-11-14 PROCEDURE — 84100 ASSAY OF PHOSPHORUS: CPT

## 2023-11-14 PROCEDURE — 2500000003 HC RX 250 WO HCPCS: Performed by: NURSE PRACTITIONER

## 2023-11-14 RX ADMIN — OXYCODONE AND ACETAMINOPHEN 1 TABLET: 5; 325 TABLET ORAL at 10:22

## 2023-11-14 RX ADMIN — THIAMINE HYDROCHLORIDE 100 MG: 100 INJECTION, SOLUTION INTRAMUSCULAR; INTRAVENOUS at 09:47

## 2023-11-14 RX ADMIN — SODIUM CHLORIDE, PRESERVATIVE FREE 10 ML: 5 INJECTION INTRAVENOUS at 20:47

## 2023-11-14 RX ADMIN — ENOXAPARIN SODIUM 30 MG: 100 INJECTION SUBCUTANEOUS at 09:48

## 2023-11-14 RX ADMIN — ASPIRIN 81 MG 81 MG: 81 TABLET ORAL at 09:47

## 2023-11-14 RX ADMIN — FAMOTIDINE 20 MG: 10 INJECTION, SOLUTION INTRAVENOUS at 09:47

## 2023-11-14 RX ADMIN — BUDESONIDE AND FORMOTEROL FUMARATE DIHYDRATE 2 PUFF: 160; 4.5 AEROSOL RESPIRATORY (INHALATION) at 07:54

## 2023-11-14 RX ADMIN — IPRATROPIUM BROMIDE 2 PUFF: 17 AEROSOL, METERED RESPIRATORY (INHALATION) at 19:56

## 2023-11-14 RX ADMIN — ALBUTEROL SULFATE 2 PUFF: 90 AEROSOL, METERED RESPIRATORY (INHALATION) at 02:01

## 2023-11-14 RX ADMIN — IPRATROPIUM BROMIDE 2 PUFF: 17 AEROSOL, METERED RESPIRATORY (INHALATION) at 02:02

## 2023-11-14 RX ADMIN — OXYCODONE AND ACETAMINOPHEN 1 TABLET: 5; 325 TABLET ORAL at 01:45

## 2023-11-14 RX ADMIN — CEFTRIAXONE SODIUM 1000 MG: 1 INJECTION, POWDER, FOR SOLUTION INTRAMUSCULAR; INTRAVENOUS at 09:49

## 2023-11-14 RX ADMIN — GUAIFENESIN AND DEXTROMETHORPHAN 5 ML: 100; 10 SYRUP ORAL at 12:21

## 2023-11-14 RX ADMIN — ALBUTEROL SULFATE 2 PUFF: 90 AEROSOL, METERED RESPIRATORY (INHALATION) at 15:47

## 2023-11-14 RX ADMIN — IPRATROPIUM BROMIDE 2 PUFF: 17 AEROSOL, METERED RESPIRATORY (INHALATION) at 15:47

## 2023-11-14 RX ADMIN — IPRATROPIUM BROMIDE 2 PUFF: 17 AEROSOL, METERED RESPIRATORY (INHALATION) at 11:56

## 2023-11-14 RX ADMIN — PREDNISONE 5 MG: 5 TABLET ORAL at 09:47

## 2023-11-14 RX ADMIN — ALBUTEROL SULFATE 2 PUFF: 90 AEROSOL, METERED RESPIRATORY (INHALATION) at 07:54

## 2023-11-14 RX ADMIN — MULTIPLE VITAMINS W/ MINERALS TAB 1 TABLET: TAB at 10:24

## 2023-11-14 RX ADMIN — ALBUTEROL SULFATE 2 PUFF: 90 AEROSOL, METERED RESPIRATORY (INHALATION) at 19:55

## 2023-11-14 RX ADMIN — SODIUM CHLORIDE, PRESERVATIVE FREE 10 ML: 5 INJECTION INTRAVENOUS at 09:49

## 2023-11-14 RX ADMIN — SODIUM CHLORIDE, PRESERVATIVE FREE 10 ML: 5 INJECTION INTRAVENOUS at 09:48

## 2023-11-14 RX ADMIN — ALBUTEROL SULFATE 2 PUFF: 90 AEROSOL, METERED RESPIRATORY (INHALATION) at 11:56

## 2023-11-14 RX ADMIN — GUAIFENESIN AND DEXTROMETHORPHAN 5 ML: 100; 10 SYRUP ORAL at 02:46

## 2023-11-14 RX ADMIN — FAMOTIDINE 20 MG: 10 INJECTION, SOLUTION INTRAVENOUS at 20:47

## 2023-11-14 RX ADMIN — FOLIC ACID 1 MG: 1 TABLET ORAL at 09:47

## 2023-11-14 RX ADMIN — OXYCODONE AND ACETAMINOPHEN 1 TABLET: 5; 325 TABLET ORAL at 18:39

## 2023-11-14 RX ADMIN — BUDESONIDE AND FORMOTEROL FUMARATE DIHYDRATE 2 PUFF: 160; 4.5 AEROSOL RESPIRATORY (INHALATION) at 19:57

## 2023-11-14 RX ADMIN — IPRATROPIUM BROMIDE 2 PUFF: 17 AEROSOL, METERED RESPIRATORY (INHALATION) at 07:54

## 2023-11-14 ASSESSMENT — PAIN - FUNCTIONAL ASSESSMENT
PAIN_FUNCTIONAL_ASSESSMENT: ACTIVITIES ARE NOT PREVENTED
PAIN_FUNCTIONAL_ASSESSMENT: ACTIVITIES ARE NOT PREVENTED
PAIN_FUNCTIONAL_ASSESSMENT: PREVENTS OR INTERFERES WITH MANY ACTIVE NOT PASSIVE ACTIVITIES
PAIN_FUNCTIONAL_ASSESSMENT: ACTIVITIES ARE NOT PREVENTED

## 2023-11-14 ASSESSMENT — PAIN DESCRIPTION - ORIENTATION
ORIENTATION: RIGHT;LEFT;MID
ORIENTATION: RIGHT;LEFT;MID
ORIENTATION: RIGHT;LEFT;POSTERIOR

## 2023-11-14 ASSESSMENT — PAIN DESCRIPTION - PAIN TYPE
TYPE: CHRONIC PAIN

## 2023-11-14 ASSESSMENT — PAIN SCALES - GENERAL
PAINLEVEL_OUTOF10: 8
PAINLEVEL_OUTOF10: 9
PAINLEVEL_OUTOF10: 9
PAINLEVEL_OUTOF10: 2
PAINLEVEL_OUTOF10: 9

## 2023-11-14 ASSESSMENT — PAIN DESCRIPTION - DIRECTION: RADIATING_TOWARDS: NECK

## 2023-11-14 ASSESSMENT — PAIN DESCRIPTION - FREQUENCY
FREQUENCY: CONTINUOUS
FREQUENCY: INTERMITTENT
FREQUENCY: INTERMITTENT
FREQUENCY: CONTINUOUS

## 2023-11-14 ASSESSMENT — PAIN DESCRIPTION - ONSET
ONSET: GRADUAL
ONSET: GRADUAL
ONSET: ON-GOING
ONSET: ON-GOING

## 2023-11-14 ASSESSMENT — PAIN DESCRIPTION - LOCATION
LOCATION: NECK

## 2023-11-14 ASSESSMENT — PAIN DESCRIPTION - DESCRIPTORS
DESCRIPTORS: ACHING
DESCRIPTORS: ACHING
DESCRIPTORS: ACHING;DISCOMFORT
DESCRIPTORS: ACHING

## 2023-11-14 NOTE — CARE COORDINATION
CM contacted [facility] admissions re; status of referral. Admissions advises that pt owes a balance to the NH in excess of 1000.00 and therefore they will not be able to accept. Cm asked if hospital applied for medicaid for pt and if pt agreed to long term Care would there be an option for reconsideration. CM explained that this CM did discuss likely need for LTC with pt and he advised that he was going to have his family/friends start to sell of some of his belongings. Cm requested if faciliity will re-consider that perhaps their admissions team could come to hospital to speak with pt. Cm will await response form [facility] admissions. 1520 Cm met with Pt re; SNF placement. Pt discussed the 1300.00 balance pt owes the SNF. Pt does not have the ability to pay that as she lives month to month/paycheck to paycheck. Pt does confirm that he needs and is agreeable to  LTC and plans to give up his apt. Pt would like to stay in [facility]. Cm will re-discuss with [facility].    1530 Cm contacted [facility] and spoke with Natanael Pena. Cm discussed the above. Cm discussed that if medicaid application were submitted pt would likely qualify with no difficulty and the medicaid would be retroactive up to 3 months. Natanael Pena states that situation has been discussed with regional management and they will not accept pt back. Plan will be to discuss referral to [address] [place] [address] and other facilities as needed.

## 2023-11-15 LAB
ANION GAP SERPL CALCULATED.3IONS-SCNC: 9 MMOL/L (ref 4–16)
BASOPHILS ABSOLUTE: 0 K/CU MM
BASOPHILS RELATIVE PERCENT: 0.2 % (ref 0–1)
BUN SERPL-MCNC: 8 MG/DL (ref 6–23)
CALCIUM SERPL-MCNC: 9.1 MG/DL (ref 8.3–10.6)
CHLORIDE BLD-SCNC: 94 MMOL/L (ref 99–110)
CO2: 34 MMOL/L (ref 21–32)
CREAT SERPL-MCNC: 0.4 MG/DL (ref 0.9–1.3)
DIFFERENTIAL TYPE: ABNORMAL
EOSINOPHILS ABSOLUTE: 0 K/CU MM
EOSINOPHILS RELATIVE PERCENT: 0 % (ref 0–3)
GFR SERPL CREATININE-BSD FRML MDRD: >60 ML/MIN/1.73M2
GLUCOSE BLD-MCNC: 121 MG/DL (ref 70–99)
GLUCOSE SERPL-MCNC: 96 MG/DL (ref 70–99)
HCT VFR BLD CALC: 31.7 % (ref 42–52)
HEMOGLOBIN: 9.6 GM/DL (ref 13.5–18)
IMMATURE NEUTROPHIL %: 0.8 % (ref 0–0.43)
LYMPHOCYTES ABSOLUTE: 1.3 K/CU MM
LYMPHOCYTES RELATIVE PERCENT: 11 % (ref 24–44)
MAGNESIUM: 1.8 MG/DL (ref 1.8–2.4)
MCH RBC QN AUTO: 27.4 PG (ref 27–31)
MCHC RBC AUTO-ENTMCNC: 30.3 % (ref 32–36)
MCV RBC AUTO: 90.3 FL (ref 78–100)
MONOCYTES ABSOLUTE: 1.4 K/CU MM
MONOCYTES RELATIVE PERCENT: 12.4 % (ref 0–4)
NUCLEATED RBC %: 0 %
PDW BLD-RTO: 14.6 % (ref 11.7–14.9)
PHOSPHORUS: 3.6 MG/DL (ref 2.5–4.9)
PLATELET # BLD: 471 K/CU MM (ref 140–440)
PMV BLD AUTO: 9.1 FL (ref 7.5–11.1)
POTASSIUM SERPL-SCNC: 4.4 MMOL/L (ref 3.5–5.1)
RBC # BLD: 3.51 M/CU MM (ref 4.6–6.2)
SEGMENTED NEUTROPHILS ABSOLUTE COUNT: 8.8 K/CU MM
SEGMENTED NEUTROPHILS RELATIVE PERCENT: 75.6 % (ref 36–66)
SODIUM BLD-SCNC: 137 MMOL/L (ref 135–145)
TOTAL IMMATURE NEUTOROPHIL: 0.09 K/CU MM
TOTAL NUCLEATED RBC: 0 K/CU MM
WBC # BLD: 11.6 K/CU MM (ref 4–10.5)

## 2023-11-15 PROCEDURE — 6360000002 HC RX W HCPCS: Performed by: NURSE PRACTITIONER

## 2023-11-15 PROCEDURE — 82962 GLUCOSE BLOOD TEST: CPT

## 2023-11-15 PROCEDURE — 2580000003 HC RX 258: Performed by: NURSE PRACTITIONER

## 2023-11-15 PROCEDURE — 6370000000 HC RX 637 (ALT 250 FOR IP): Performed by: STUDENT IN AN ORGANIZED HEALTH CARE EDUCATION/TRAINING PROGRAM

## 2023-11-15 PROCEDURE — 84100 ASSAY OF PHOSPHORUS: CPT

## 2023-11-15 PROCEDURE — 94761 N-INVAS EAR/PLS OXIMETRY MLT: CPT

## 2023-11-15 PROCEDURE — 2500000003 HC RX 250 WO HCPCS: Performed by: NURSE PRACTITIONER

## 2023-11-15 PROCEDURE — 6370000000 HC RX 637 (ALT 250 FOR IP)

## 2023-11-15 PROCEDURE — 80048 BASIC METABOLIC PNL TOTAL CA: CPT

## 2023-11-15 PROCEDURE — 85025 COMPLETE CBC W/AUTO DIFF WBC: CPT

## 2023-11-15 PROCEDURE — 2060000000 HC ICU INTERMEDIATE R&B

## 2023-11-15 PROCEDURE — 2580000003 HC RX 258: Performed by: STUDENT IN AN ORGANIZED HEALTH CARE EDUCATION/TRAINING PROGRAM

## 2023-11-15 PROCEDURE — 6370000000 HC RX 637 (ALT 250 FOR IP): Performed by: NURSE PRACTITIONER

## 2023-11-15 PROCEDURE — 83735 ASSAY OF MAGNESIUM: CPT

## 2023-11-15 PROCEDURE — 6360000002 HC RX W HCPCS

## 2023-11-15 PROCEDURE — 2700000000 HC OXYGEN THERAPY PER DAY

## 2023-11-15 PROCEDURE — 6360000002 HC RX W HCPCS: Performed by: STUDENT IN AN ORGANIZED HEALTH CARE EDUCATION/TRAINING PROGRAM

## 2023-11-15 PROCEDURE — 94640 AIRWAY INHALATION TREATMENT: CPT

## 2023-11-15 PROCEDURE — 94150 VITAL CAPACITY TEST: CPT

## 2023-11-15 RX ORDER — TROLAMINE SALICYLATE 10 G/100G
CREAM TOPICAL 2 TIMES DAILY PRN
Status: DISCONTINUED | OUTPATIENT
Start: 2023-11-15 | End: 2023-11-20 | Stop reason: HOSPADM

## 2023-11-15 RX ORDER — LANOLIN ALCOHOL/MO/W.PET/CERES
100 CREAM (GRAM) TOPICAL DAILY
Status: DISCONTINUED | OUTPATIENT
Start: 2023-11-15 | End: 2023-11-20 | Stop reason: HOSPADM

## 2023-11-15 RX ADMIN — ASPIRIN 81 MG 81 MG: 81 TABLET ORAL at 08:22

## 2023-11-15 RX ADMIN — IPRATROPIUM BROMIDE 2 PUFF: 17 AEROSOL, METERED RESPIRATORY (INHALATION) at 19:22

## 2023-11-15 RX ADMIN — ALBUTEROL SULFATE 2 PUFF: 90 AEROSOL, METERED RESPIRATORY (INHALATION) at 23:55

## 2023-11-15 RX ADMIN — MULTIPLE VITAMINS W/ MINERALS TAB 1 TABLET: TAB at 08:22

## 2023-11-15 RX ADMIN — IPRATROPIUM BROMIDE 2 PUFF: 17 AEROSOL, METERED RESPIRATORY (INHALATION) at 23:56

## 2023-11-15 RX ADMIN — GUAIFENESIN AND DEXTROMETHORPHAN 5 ML: 100; 10 SYRUP ORAL at 02:30

## 2023-11-15 RX ADMIN — IPRATROPIUM BROMIDE 2 PUFF: 17 AEROSOL, METERED RESPIRATORY (INHALATION) at 14:48

## 2023-11-15 RX ADMIN — CEFTRIAXONE SODIUM 1000 MG: 1 INJECTION, POWDER, FOR SOLUTION INTRAMUSCULAR; INTRAVENOUS at 11:28

## 2023-11-15 RX ADMIN — OXYCODONE AND ACETAMINOPHEN 1 TABLET: 5; 325 TABLET ORAL at 20:42

## 2023-11-15 RX ADMIN — SODIUM CHLORIDE, PRESERVATIVE FREE 10 ML: 5 INJECTION INTRAVENOUS at 20:42

## 2023-11-15 RX ADMIN — ALBUTEROL SULFATE 2 PUFF: 90 AEROSOL, METERED RESPIRATORY (INHALATION) at 19:21

## 2023-11-15 RX ADMIN — THIAMINE HYDROCHLORIDE 100 MG: 100 INJECTION, SOLUTION INTRAMUSCULAR; INTRAVENOUS at 11:15

## 2023-11-15 RX ADMIN — OXYCODONE AND ACETAMINOPHEN 1 TABLET: 5; 325 TABLET ORAL at 02:33

## 2023-11-15 RX ADMIN — TROLAMINE SALICYLATE: 100 CREAM TOPICAL at 13:45

## 2023-11-15 RX ADMIN — BUDESONIDE AND FORMOTEROL FUMARATE DIHYDRATE 2 PUFF: 160; 4.5 AEROSOL RESPIRATORY (INHALATION) at 19:23

## 2023-11-15 RX ADMIN — IPRATROPIUM BROMIDE 2 PUFF: 17 AEROSOL, METERED RESPIRATORY (INHALATION) at 07:13

## 2023-11-15 RX ADMIN — BUDESONIDE AND FORMOTEROL FUMARATE DIHYDRATE 2 PUFF: 160; 4.5 AEROSOL RESPIRATORY (INHALATION) at 07:13

## 2023-11-15 RX ADMIN — ALBUTEROL SULFATE 2 PUFF: 90 AEROSOL, METERED RESPIRATORY (INHALATION) at 07:13

## 2023-11-15 RX ADMIN — ALBUTEROL SULFATE 2 PUFF: 90 AEROSOL, METERED RESPIRATORY (INHALATION) at 14:48

## 2023-11-15 RX ADMIN — ALBUTEROL SULFATE 2 PUFF: 90 AEROSOL, METERED RESPIRATORY (INHALATION) at 11:16

## 2023-11-15 RX ADMIN — CHLORHEXIDINE GLUCONATE 118 ML: 4 LIQUID TOPICAL at 08:32

## 2023-11-15 RX ADMIN — ENOXAPARIN SODIUM 30 MG: 100 INJECTION SUBCUTANEOUS at 08:22

## 2023-11-15 RX ADMIN — GUAIFENESIN AND DEXTROMETHORPHAN 5 ML: 100; 10 SYRUP ORAL at 20:42

## 2023-11-15 RX ADMIN — SODIUM CHLORIDE, PRESERVATIVE FREE 5 ML: 5 INJECTION INTRAVENOUS at 11:15

## 2023-11-15 RX ADMIN — OXYCODONE AND ACETAMINOPHEN 1 TABLET: 5; 325 TABLET ORAL at 08:21

## 2023-11-15 RX ADMIN — OXYCODONE AND ACETAMINOPHEN 1 TABLET: 5; 325 TABLET ORAL at 14:46

## 2023-11-15 RX ADMIN — FOLIC ACID 1 MG: 1 TABLET ORAL at 08:22

## 2023-11-15 RX ADMIN — IPRATROPIUM BROMIDE 2 PUFF: 17 AEROSOL, METERED RESPIRATORY (INHALATION) at 11:16

## 2023-11-15 RX ADMIN — FAMOTIDINE 20 MG: 10 INJECTION, SOLUTION INTRAVENOUS at 11:15

## 2023-11-15 RX ADMIN — PREDNISONE 5 MG: 5 TABLET ORAL at 08:22

## 2023-11-15 ASSESSMENT — PAIN SCALES - GENERAL
PAINLEVEL_OUTOF10: 8
PAINLEVEL_OUTOF10: 3
PAINLEVEL_OUTOF10: 4
PAINLEVEL_OUTOF10: 4
PAINLEVEL_OUTOF10: 7
PAINLEVEL_OUTOF10: 7
PAINLEVEL_OUTOF10: 4
PAINLEVEL_OUTOF10: 6
PAINLEVEL_OUTOF10: 7
PAINLEVEL_OUTOF10: 9
PAINLEVEL_OUTOF10: 4
PAINLEVEL_OUTOF10: 3

## 2023-11-15 ASSESSMENT — PAIN DESCRIPTION - ORIENTATION: ORIENTATION: MID;RIGHT;LEFT

## 2023-11-15 ASSESSMENT — PAIN DESCRIPTION - LOCATION
LOCATION: NECK
LOCATION: BACK
LOCATION: NECK

## 2023-11-15 ASSESSMENT — PAIN DESCRIPTION - DESCRIPTORS
DESCRIPTORS: ACHING;DISCOMFORT
DESCRIPTORS: ACHING

## 2023-11-15 ASSESSMENT — PAIN DESCRIPTION - PAIN TYPE
TYPE: CHRONIC PAIN

## 2023-11-15 NOTE — CARE COORDINATION
CM followed up with pt. Pt is agreeable to [employer] [facility]. CM called and made referral to Manasa Pelayo of [facility]. Kye Brannon will review and call back. The pt will require precert and PAS. Good alerted this CM that they are unable to accept the pt for LTC since he does not have Medicaid. They will accept the pt as a SNF if the pt is agreeable to participate with therapy and stop smoking since they are a strict non smoking campus. CM followed up with the pt. The pt is not agreeable. Instead of [facility], the pt would like to go to [facility] living SNF as a fist option and [facility] as a second option. CM to make Kye Brannon aware and make referral to [facility]. [address] [place] [address] is aware of pt refusal. Referal faxed to [facility] for the pt. She will look it over. Hiren De La Cruz is aware of the pt's need for help in completing Medicaid process for LTC placement eventually. She stated that they sometimes are able to help, but would need to speak with corporate. Referral faxed. Second choice SNF if denied for [facility] is [facility]. Discharge plan is [facility] at this time. Pending referral. The pt will need precert and PAS. PS sent to doc to inform him of placement status.

## 2023-11-16 LAB
ANION GAP SERPL CALCULATED.3IONS-SCNC: 3 MMOL/L (ref 4–16)
BASOPHILS ABSOLUTE: 0 K/CU MM
BASOPHILS RELATIVE PERCENT: 0.3 % (ref 0–1)
BUN SERPL-MCNC: 8 MG/DL (ref 6–23)
CALCIUM SERPL-MCNC: 8.9 MG/DL (ref 8.3–10.6)
CHLORIDE BLD-SCNC: 99 MMOL/L (ref 99–110)
CO2: 36 MMOL/L (ref 21–32)
CREAT SERPL-MCNC: 0.3 MG/DL (ref 0.9–1.3)
DIFFERENTIAL TYPE: ABNORMAL
EOSINOPHILS ABSOLUTE: 0 K/CU MM
EOSINOPHILS RELATIVE PERCENT: 0 % (ref 0–3)
GFR SERPL CREATININE-BSD FRML MDRD: >60 ML/MIN/1.73M2
GLUCOSE SERPL-MCNC: 95 MG/DL (ref 70–99)
HCT VFR BLD CALC: 29.4 % (ref 42–52)
HEMOGLOBIN: 8.9 GM/DL (ref 13.5–18)
IMMATURE NEUTROPHIL %: 0.8 % (ref 0–0.43)
LYMPHOCYTES ABSOLUTE: 1.2 K/CU MM
LYMPHOCYTES RELATIVE PERCENT: 12.3 % (ref 24–44)
MAGNESIUM: 1.9 MG/DL (ref 1.8–2.4)
MCH RBC QN AUTO: 27.6 PG (ref 27–31)
MCHC RBC AUTO-ENTMCNC: 30.3 % (ref 32–36)
MCV RBC AUTO: 91 FL (ref 78–100)
MONOCYTES ABSOLUTE: 1.3 K/CU MM
MONOCYTES RELATIVE PERCENT: 13.6 % (ref 0–4)
NUCLEATED RBC %: 0 %
PDW BLD-RTO: 14.6 % (ref 11.7–14.9)
PHOSPHORUS: 3.5 MG/DL (ref 2.5–4.9)
PLATELET # BLD: 533 K/CU MM (ref 140–440)
PMV BLD AUTO: 8.9 FL (ref 7.5–11.1)
POTASSIUM SERPL-SCNC: 4.4 MMOL/L (ref 3.5–5.1)
RBC # BLD: 3.23 M/CU MM (ref 4.6–6.2)
SEGMENTED NEUTROPHILS ABSOLUTE COUNT: 7 K/CU MM
SEGMENTED NEUTROPHILS RELATIVE PERCENT: 73 % (ref 36–66)
SODIUM BLD-SCNC: 138 MMOL/L (ref 135–145)
TOTAL IMMATURE NEUTOROPHIL: 0.08 K/CU MM
TOTAL NUCLEATED RBC: 0 K/CU MM
WBC # BLD: 9.5 K/CU MM (ref 4–10.5)

## 2023-11-16 PROCEDURE — 97535 SELF CARE MNGMENT TRAINING: CPT

## 2023-11-16 PROCEDURE — 6370000000 HC RX 637 (ALT 250 FOR IP): Performed by: STUDENT IN AN ORGANIZED HEALTH CARE EDUCATION/TRAINING PROGRAM

## 2023-11-16 PROCEDURE — 6370000000 HC RX 637 (ALT 250 FOR IP): Performed by: NURSE PRACTITIONER

## 2023-11-16 PROCEDURE — 85025 COMPLETE CBC W/AUTO DIFF WBC: CPT

## 2023-11-16 PROCEDURE — 2060000000 HC ICU INTERMEDIATE R&B

## 2023-11-16 PROCEDURE — 2700000000 HC OXYGEN THERAPY PER DAY

## 2023-11-16 PROCEDURE — 2580000003 HC RX 258: Performed by: STUDENT IN AN ORGANIZED HEALTH CARE EDUCATION/TRAINING PROGRAM

## 2023-11-16 PROCEDURE — 94640 AIRWAY INHALATION TREATMENT: CPT

## 2023-11-16 PROCEDURE — 94761 N-INVAS EAR/PLS OXIMETRY MLT: CPT

## 2023-11-16 PROCEDURE — 84100 ASSAY OF PHOSPHORUS: CPT

## 2023-11-16 PROCEDURE — 36415 COLL VENOUS BLD VENIPUNCTURE: CPT

## 2023-11-16 PROCEDURE — 6360000002 HC RX W HCPCS: Performed by: STUDENT IN AN ORGANIZED HEALTH CARE EDUCATION/TRAINING PROGRAM

## 2023-11-16 PROCEDURE — 2580000003 HC RX 258: Performed by: NURSE PRACTITIONER

## 2023-11-16 PROCEDURE — 83735 ASSAY OF MAGNESIUM: CPT

## 2023-11-16 PROCEDURE — 6360000002 HC RX W HCPCS: Performed by: NURSE PRACTITIONER

## 2023-11-16 PROCEDURE — 6370000000 HC RX 637 (ALT 250 FOR IP)

## 2023-11-16 PROCEDURE — 80048 BASIC METABOLIC PNL TOTAL CA: CPT

## 2023-11-16 RX ORDER — OXYCODONE HYDROCHLORIDE 10 MG/1
10 TABLET ORAL EVERY 6 HOURS PRN
Status: DISCONTINUED | OUTPATIENT
Start: 2023-11-16 | End: 2023-11-20 | Stop reason: HOSPADM

## 2023-11-16 RX ADMIN — IPRATROPIUM BROMIDE 2 PUFF: 17 AEROSOL, METERED RESPIRATORY (INHALATION) at 16:28

## 2023-11-16 RX ADMIN — IPRATROPIUM BROMIDE 2 PUFF: 17 AEROSOL, METERED RESPIRATORY (INHALATION) at 11:50

## 2023-11-16 RX ADMIN — BUDESONIDE AND FORMOTEROL FUMARATE DIHYDRATE 2 PUFF: 160; 4.5 AEROSOL RESPIRATORY (INHALATION) at 07:26

## 2023-11-16 RX ADMIN — SODIUM CHLORIDE, PRESERVATIVE FREE 10 ML: 5 INJECTION INTRAVENOUS at 08:07

## 2023-11-16 RX ADMIN — GUAIFENESIN AND DEXTROMETHORPHAN 5 ML: 100; 10 SYRUP ORAL at 20:09

## 2023-11-16 RX ADMIN — OXYCODONE HYDROCHLORIDE 10 MG: 10 TABLET ORAL at 22:26

## 2023-11-16 RX ADMIN — OXYCODONE HYDROCHLORIDE 10 MG: 10 TABLET ORAL at 15:48

## 2023-11-16 RX ADMIN — ALBUTEROL SULFATE 2 PUFF: 90 AEROSOL, METERED RESPIRATORY (INHALATION) at 11:50

## 2023-11-16 RX ADMIN — SODIUM CHLORIDE, PRESERVATIVE FREE 10 ML: 5 INJECTION INTRAVENOUS at 08:06

## 2023-11-16 RX ADMIN — BUDESONIDE AND FORMOTEROL FUMARATE DIHYDRATE 2 PUFF: 160; 4.5 AEROSOL RESPIRATORY (INHALATION) at 21:04

## 2023-11-16 RX ADMIN — ALBUTEROL SULFATE 2 PUFF: 90 AEROSOL, METERED RESPIRATORY (INHALATION) at 16:27

## 2023-11-16 RX ADMIN — CEFTRIAXONE SODIUM 1000 MG: 1 INJECTION, POWDER, FOR SOLUTION INTRAMUSCULAR; INTRAVENOUS at 09:20

## 2023-11-16 RX ADMIN — ALBUTEROL SULFATE 2 PUFF: 90 AEROSOL, METERED RESPIRATORY (INHALATION) at 21:03

## 2023-11-16 RX ADMIN — SODIUM CHLORIDE, PRESERVATIVE FREE 10 ML: 5 INJECTION INTRAVENOUS at 20:09

## 2023-11-16 RX ADMIN — ENOXAPARIN SODIUM 30 MG: 100 INJECTION SUBCUTANEOUS at 09:00

## 2023-11-16 RX ADMIN — ASPIRIN 81 MG 81 MG: 81 TABLET ORAL at 08:08

## 2023-11-16 RX ADMIN — ALBUTEROL SULFATE 2 PUFF: 90 AEROSOL, METERED RESPIRATORY (INHALATION) at 07:26

## 2023-11-16 RX ADMIN — OXYCODONE HYDROCHLORIDE 10 MG: 10 TABLET ORAL at 09:28

## 2023-11-16 RX ADMIN — OXYCODONE AND ACETAMINOPHEN 1 TABLET: 5; 325 TABLET ORAL at 03:29

## 2023-11-16 RX ADMIN — PREDNISONE 5 MG: 5 TABLET ORAL at 08:09

## 2023-11-16 RX ADMIN — CHLORHEXIDINE GLUCONATE: 4 LIQUID TOPICAL at 08:09

## 2023-11-16 RX ADMIN — IPRATROPIUM BROMIDE 2 PUFF: 17 AEROSOL, METERED RESPIRATORY (INHALATION) at 07:26

## 2023-11-16 RX ADMIN — MULTIPLE VITAMINS W/ MINERALS TAB 1 TABLET: TAB at 08:08

## 2023-11-16 RX ADMIN — Medication 100 MG: at 08:08

## 2023-11-16 RX ADMIN — FOLIC ACID 1 MG: 1 TABLET ORAL at 08:09

## 2023-11-16 ASSESSMENT — PAIN DESCRIPTION - ORIENTATION
ORIENTATION: MID
ORIENTATION: LOWER

## 2023-11-16 ASSESSMENT — PAIN DESCRIPTION - LOCATION
LOCATION: NECK

## 2023-11-16 ASSESSMENT — PAIN DESCRIPTION - DESCRIPTORS
DESCRIPTORS: ACHING
DESCRIPTORS: ACHING;SORE
DESCRIPTORS: ACHING;DISCOMFORT
DESCRIPTORS: ACHING

## 2023-11-16 ASSESSMENT — PAIN SCALES - GENERAL
PAINLEVEL_OUTOF10: 3
PAINLEVEL_OUTOF10: 3
PAINLEVEL_OUTOF10: 9
PAINLEVEL_OUTOF10: 3
PAINLEVEL_OUTOF10: 0
PAINLEVEL_OUTOF10: 8
PAINLEVEL_OUTOF10: 8
PAINLEVEL_OUTOF10: 4
PAINLEVEL_OUTOF10: 4

## 2023-11-16 ASSESSMENT — PAIN DESCRIPTION - PAIN TYPE: TYPE: CHRONIC PAIN

## 2023-11-16 ASSESSMENT — PAIN DESCRIPTION - FREQUENCY: FREQUENCY: INTERMITTENT

## 2023-11-16 NOTE — CARE COORDINATION
[facility], here to meet with pt. Tre explained to Kim Barkley with pt present that it is expected that pt will need LTC. Pt will need medicaid application. Pt understands that he will likely need to give up his apt and he understands. Tre advised [employer] [facility] Rep[facility] that Cm will request pt financial counselor to initiate a medicaid application for pt. Bette zazueta will discuss with her admissions team and will let CM if they can accept. Tre received call from [facility] admissions advising that they are still working on the case. Admissions advises that they suspect pt may have medicaid already and are working to confirm.

## 2023-11-17 LAB
ANION GAP SERPL CALCULATED.3IONS-SCNC: 0 MMOL/L (ref 4–16)
BASOPHILS ABSOLUTE: 0 K/CU MM
BASOPHILS RELATIVE PERCENT: 0.2 % (ref 0–1)
BUN SERPL-MCNC: 7 MG/DL (ref 6–23)
CALCIUM SERPL-MCNC: 8.8 MG/DL (ref 8.3–10.6)
CHLORIDE BLD-SCNC: 98 MMOL/L (ref 99–110)
CO2: 39 MMOL/L (ref 21–32)
CREAT SERPL-MCNC: 0.3 MG/DL (ref 0.9–1.3)
DIFFERENTIAL TYPE: ABNORMAL
EOSINOPHILS ABSOLUTE: 0 K/CU MM
EOSINOPHILS RELATIVE PERCENT: 0 % (ref 0–3)
GFR SERPL CREATININE-BSD FRML MDRD: >60 ML/MIN/1.73M2
GLUCOSE SERPL-MCNC: 93 MG/DL (ref 70–99)
HCT VFR BLD CALC: 28 % (ref 42–52)
HEMOGLOBIN: 8.5 GM/DL (ref 13.5–18)
IMMATURE NEUTROPHIL %: 1 % (ref 0–0.43)
LYMPHOCYTES ABSOLUTE: 1.3 K/CU MM
LYMPHOCYTES RELATIVE PERCENT: 16.2 % (ref 24–44)
MAGNESIUM: 1.9 MG/DL (ref 1.8–2.4)
MCH RBC QN AUTO: 27.3 PG (ref 27–31)
MCHC RBC AUTO-ENTMCNC: 30.4 % (ref 32–36)
MCV RBC AUTO: 90 FL (ref 78–100)
MONOCYTES ABSOLUTE: 1.1 K/CU MM
MONOCYTES RELATIVE PERCENT: 13.5 % (ref 0–4)
NUCLEATED RBC %: 0 %
PDW BLD-RTO: 14.6 % (ref 11.7–14.9)
PHOSPHORUS: 3.5 MG/DL (ref 2.5–4.9)
PLATELET # BLD: 546 K/CU MM (ref 140–440)
PMV BLD AUTO: 8.5 FL (ref 7.5–11.1)
POTASSIUM SERPL-SCNC: 4.4 MMOL/L (ref 3.5–5.1)
RBC # BLD: 3.11 M/CU MM (ref 4.6–6.2)
SEGMENTED NEUTROPHILS ABSOLUTE COUNT: 5.7 K/CU MM
SEGMENTED NEUTROPHILS RELATIVE PERCENT: 69.1 % (ref 36–66)
SODIUM BLD-SCNC: 137 MMOL/L (ref 135–145)
TOTAL IMMATURE NEUTOROPHIL: 0.08 K/CU MM
TOTAL NUCLEATED RBC: 0 K/CU MM
WBC # BLD: 8.3 K/CU MM (ref 4–10.5)

## 2023-11-17 PROCEDURE — 2060000000 HC ICU INTERMEDIATE R&B

## 2023-11-17 PROCEDURE — 97164 PT RE-EVAL EST PLAN CARE: CPT

## 2023-11-17 PROCEDURE — 2700000000 HC OXYGEN THERAPY PER DAY

## 2023-11-17 PROCEDURE — 2580000003 HC RX 258: Performed by: NURSE PRACTITIONER

## 2023-11-17 PROCEDURE — 80048 BASIC METABOLIC PNL TOTAL CA: CPT

## 2023-11-17 PROCEDURE — 94761 N-INVAS EAR/PLS OXIMETRY MLT: CPT

## 2023-11-17 PROCEDURE — 6360000002 HC RX W HCPCS: Performed by: NURSE PRACTITIONER

## 2023-11-17 PROCEDURE — 94640 AIRWAY INHALATION TREATMENT: CPT

## 2023-11-17 PROCEDURE — 6370000000 HC RX 637 (ALT 250 FOR IP): Performed by: STUDENT IN AN ORGANIZED HEALTH CARE EDUCATION/TRAINING PROGRAM

## 2023-11-17 PROCEDURE — 6360000002 HC RX W HCPCS: Performed by: STUDENT IN AN ORGANIZED HEALTH CARE EDUCATION/TRAINING PROGRAM

## 2023-11-17 PROCEDURE — 85025 COMPLETE CBC W/AUTO DIFF WBC: CPT

## 2023-11-17 PROCEDURE — 6370000000 HC RX 637 (ALT 250 FOR IP): Performed by: NURSE PRACTITIONER

## 2023-11-17 PROCEDURE — 6370000000 HC RX 637 (ALT 250 FOR IP)

## 2023-11-17 PROCEDURE — 36415 COLL VENOUS BLD VENIPUNCTURE: CPT

## 2023-11-17 PROCEDURE — 2580000003 HC RX 258: Performed by: STUDENT IN AN ORGANIZED HEALTH CARE EDUCATION/TRAINING PROGRAM

## 2023-11-17 PROCEDURE — 84100 ASSAY OF PHOSPHORUS: CPT

## 2023-11-17 PROCEDURE — 83735 ASSAY OF MAGNESIUM: CPT

## 2023-11-17 RX ADMIN — SODIUM CHLORIDE, PRESERVATIVE FREE 10 ML: 5 INJECTION INTRAVENOUS at 08:25

## 2023-11-17 RX ADMIN — SODIUM CHLORIDE, PRESERVATIVE FREE 10 ML: 5 INJECTION INTRAVENOUS at 08:26

## 2023-11-17 RX ADMIN — CHLORHEXIDINE GLUCONATE: 4 LIQUID TOPICAL at 08:26

## 2023-11-17 RX ADMIN — FOLIC ACID 1 MG: 1 TABLET ORAL at 08:25

## 2023-11-17 RX ADMIN — PREDNISONE 5 MG: 5 TABLET ORAL at 08:26

## 2023-11-17 RX ADMIN — ASPIRIN 81 MG 81 MG: 81 TABLET ORAL at 08:27

## 2023-11-17 RX ADMIN — IPRATROPIUM BROMIDE 2 PUFF: 17 AEROSOL, METERED RESPIRATORY (INHALATION) at 15:21

## 2023-11-17 RX ADMIN — ENOXAPARIN SODIUM 30 MG: 100 INJECTION SUBCUTANEOUS at 08:26

## 2023-11-17 RX ADMIN — GUAIFENESIN AND DEXTROMETHORPHAN 5 ML: 100; 10 SYRUP ORAL at 08:29

## 2023-11-17 RX ADMIN — BUDESONIDE AND FORMOTEROL FUMARATE DIHYDRATE 2 PUFF: 160; 4.5 AEROSOL RESPIRATORY (INHALATION) at 20:17

## 2023-11-17 RX ADMIN — IPRATROPIUM BROMIDE 2 PUFF: 17 AEROSOL, METERED RESPIRATORY (INHALATION) at 08:16

## 2023-11-17 RX ADMIN — CEFTRIAXONE SODIUM 1000 MG: 1 INJECTION, POWDER, FOR SOLUTION INTRAMUSCULAR; INTRAVENOUS at 08:34

## 2023-11-17 RX ADMIN — IPRATROPIUM BROMIDE 2 PUFF: 17 AEROSOL, METERED RESPIRATORY (INHALATION) at 05:19

## 2023-11-17 RX ADMIN — IPRATROPIUM BROMIDE 2 PUFF: 17 AEROSOL, METERED RESPIRATORY (INHALATION) at 20:17

## 2023-11-17 RX ADMIN — ALBUTEROL SULFATE 2 PUFF: 90 AEROSOL, METERED RESPIRATORY (INHALATION) at 20:16

## 2023-11-17 RX ADMIN — ALBUTEROL SULFATE 2 PUFF: 90 AEROSOL, METERED RESPIRATORY (INHALATION) at 15:22

## 2023-11-17 RX ADMIN — SODIUM CHLORIDE, PRESERVATIVE FREE 10 ML: 5 INJECTION INTRAVENOUS at 08:24

## 2023-11-17 RX ADMIN — ALBUTEROL SULFATE 2 PUFF: 90 AEROSOL, METERED RESPIRATORY (INHALATION) at 11:56

## 2023-11-17 RX ADMIN — ALBUTEROL SULFATE 2 PUFF: 90 AEROSOL, METERED RESPIRATORY (INHALATION) at 23:07

## 2023-11-17 RX ADMIN — IPRATROPIUM BROMIDE 2 PUFF: 17 AEROSOL, METERED RESPIRATORY (INHALATION) at 11:56

## 2023-11-17 RX ADMIN — OXYCODONE HYDROCHLORIDE 10 MG: 10 TABLET ORAL at 04:56

## 2023-11-17 RX ADMIN — OXYCODONE HYDROCHLORIDE 10 MG: 10 TABLET ORAL at 11:02

## 2023-11-17 RX ADMIN — OXYCODONE HYDROCHLORIDE 10 MG: 10 TABLET ORAL at 17:52

## 2023-11-17 RX ADMIN — MULTIPLE VITAMINS W/ MINERALS TAB 1 TABLET: TAB at 08:26

## 2023-11-17 RX ADMIN — Medication 100 MG: at 08:25

## 2023-11-17 RX ADMIN — ALBUTEROL SULFATE 2 PUFF: 90 AEROSOL, METERED RESPIRATORY (INHALATION) at 08:16

## 2023-11-17 RX ADMIN — SODIUM CHLORIDE, PRESERVATIVE FREE 10 ML: 5 INJECTION INTRAVENOUS at 20:58

## 2023-11-17 ASSESSMENT — PAIN SCALES - GENERAL
PAINLEVEL_OUTOF10: 0
PAINLEVEL_OUTOF10: 8
PAINLEVEL_OUTOF10: 0
PAINLEVEL_OUTOF10: 0

## 2023-11-17 NOTE — CARE COORDINATION
CM received VM from Kim Barkley at [facility] that they will accept pt upon precert approval. LAURA sent [employer] [facility] message to initiate precert.

## 2023-11-17 NOTE — CARE COORDINATION
CM has not been able to initiate precert at this time due to not having updated PT notes however Electronic PAS completed  and LOC faxed to [facility] and approved. Therefore pt can go to [facility] if needed.

## 2023-11-17 NOTE — CARE COORDINATION
Tre spoke with Tristen Dominguez in admissions/SW at [facility] and advised her that a medicaid LOC has been received and to see if they would accept pt today with LOC. Tristen Dominguez states that they will only accept pt with a precert or if ins denial is received then will accept with medicaid Level.

## 2023-11-17 NOTE — CARE COORDINATION
CM spoke to LESLI at [facility] to give update that we are awaiting an updated PT note. LESLI confirms that pt has active Medicaid. CM called [facility] to confirm that pt has Medicaid. If pt's precert is denied, we will peruse a LOC through pt's medicaid for SNF placement. Discussed in IDR, [place] Will f/u with PT regarding need for updated note to be placed today.

## 2023-11-18 PROCEDURE — 94761 N-INVAS EAR/PLS OXIMETRY MLT: CPT

## 2023-11-18 PROCEDURE — 6360000002 HC RX W HCPCS: Performed by: NURSE PRACTITIONER

## 2023-11-18 PROCEDURE — 97530 THERAPEUTIC ACTIVITIES: CPT

## 2023-11-18 PROCEDURE — 2580000003 HC RX 258: Performed by: NURSE PRACTITIONER

## 2023-11-18 PROCEDURE — 6370000000 HC RX 637 (ALT 250 FOR IP): Performed by: NURSE PRACTITIONER

## 2023-11-18 PROCEDURE — 2060000000 HC ICU INTERMEDIATE R&B

## 2023-11-18 PROCEDURE — 94640 AIRWAY INHALATION TREATMENT: CPT

## 2023-11-18 PROCEDURE — 6370000000 HC RX 637 (ALT 250 FOR IP)

## 2023-11-18 PROCEDURE — 2700000000 HC OXYGEN THERAPY PER DAY

## 2023-11-18 PROCEDURE — 6370000000 HC RX 637 (ALT 250 FOR IP): Performed by: STUDENT IN AN ORGANIZED HEALTH CARE EDUCATION/TRAINING PROGRAM

## 2023-11-18 RX ADMIN — ASPIRIN 81 MG 81 MG: 81 TABLET ORAL at 08:13

## 2023-11-18 RX ADMIN — BUDESONIDE AND FORMOTEROL FUMARATE DIHYDRATE 2 PUFF: 160; 4.5 AEROSOL RESPIRATORY (INHALATION) at 19:26

## 2023-11-18 RX ADMIN — ALBUTEROL SULFATE 2 PUFF: 90 AEROSOL, METERED RESPIRATORY (INHALATION) at 11:17

## 2023-11-18 RX ADMIN — ENOXAPARIN SODIUM 30 MG: 100 INJECTION SUBCUTANEOUS at 08:13

## 2023-11-18 RX ADMIN — PREDNISONE 5 MG: 5 TABLET ORAL at 08:13

## 2023-11-18 RX ADMIN — Medication 100 MG: at 08:13

## 2023-11-18 RX ADMIN — IPRATROPIUM BROMIDE 2 PUFF: 17 AEROSOL, METERED RESPIRATORY (INHALATION) at 15:37

## 2023-11-18 RX ADMIN — ALBUTEROL SULFATE 2 PUFF: 90 AEROSOL, METERED RESPIRATORY (INHALATION) at 19:24

## 2023-11-18 RX ADMIN — OXYCODONE HYDROCHLORIDE 10 MG: 10 TABLET ORAL at 11:53

## 2023-11-18 RX ADMIN — MULTIPLE VITAMINS W/ MINERALS TAB 1 TABLET: TAB at 08:13

## 2023-11-18 RX ADMIN — SODIUM CHLORIDE, PRESERVATIVE FREE 10 ML: 5 INJECTION INTRAVENOUS at 08:15

## 2023-11-18 RX ADMIN — OXYCODONE HYDROCHLORIDE 10 MG: 10 TABLET ORAL at 05:50

## 2023-11-18 RX ADMIN — ALBUTEROL SULFATE 2 PUFF: 90 AEROSOL, METERED RESPIRATORY (INHALATION) at 07:48

## 2023-11-18 RX ADMIN — IPRATROPIUM BROMIDE 2 PUFF: 17 AEROSOL, METERED RESPIRATORY (INHALATION) at 11:29

## 2023-11-18 RX ADMIN — FOLIC ACID 1 MG: 1 TABLET ORAL at 08:13

## 2023-11-18 RX ADMIN — ALBUTEROL SULFATE 2 PUFF: 90 AEROSOL, METERED RESPIRATORY (INHALATION) at 15:37

## 2023-11-18 RX ADMIN — SODIUM CHLORIDE, PRESERVATIVE FREE 5 ML: 5 INJECTION INTRAVENOUS at 20:56

## 2023-11-18 RX ADMIN — GUAIFENESIN AND DEXTROMETHORPHAN 5 ML: 100; 10 SYRUP ORAL at 15:30

## 2023-11-18 RX ADMIN — OXYCODONE HYDROCHLORIDE 10 MG: 10 TABLET ORAL at 00:00

## 2023-11-18 RX ADMIN — BUDESONIDE AND FORMOTEROL FUMARATE DIHYDRATE 2 PUFF: 160; 4.5 AEROSOL RESPIRATORY (INHALATION) at 07:47

## 2023-11-18 RX ADMIN — OXYCODONE HYDROCHLORIDE 10 MG: 10 TABLET ORAL at 18:03

## 2023-11-18 RX ADMIN — IPRATROPIUM BROMIDE 2 PUFF: 17 AEROSOL, METERED RESPIRATORY (INHALATION) at 07:48

## 2023-11-18 RX ADMIN — IPRATROPIUM BROMIDE 2 PUFF: 17 AEROSOL, METERED RESPIRATORY (INHALATION) at 19:25

## 2023-11-18 ASSESSMENT — PAIN DESCRIPTION - DESCRIPTORS
DESCRIPTORS: ACHING
DESCRIPTORS: ACHING

## 2023-11-18 ASSESSMENT — PAIN DESCRIPTION - FREQUENCY
FREQUENCY: CONTINUOUS

## 2023-11-18 ASSESSMENT — PAIN SCALES - GENERAL
PAINLEVEL_OUTOF10: 9
PAINLEVEL_OUTOF10: 3
PAINLEVEL_OUTOF10: 0
PAINLEVEL_OUTOF10: 5
PAINLEVEL_OUTOF10: 0
PAINLEVEL_OUTOF10: 8
PAINLEVEL_OUTOF10: 0
PAINLEVEL_OUTOF10: 4
PAINLEVEL_OUTOF10: 6
PAINLEVEL_OUTOF10: 7

## 2023-11-18 ASSESSMENT — PAIN DESCRIPTION - ORIENTATION
ORIENTATION: RIGHT;LEFT
ORIENTATION: RIGHT;LEFT

## 2023-11-18 ASSESSMENT — PAIN DESCRIPTION - ONSET
ONSET: ON-GOING
ONSET: ON-GOING

## 2023-11-18 ASSESSMENT — PAIN DESCRIPTION - PAIN TYPE
TYPE: CHRONIC PAIN

## 2023-11-18 ASSESSMENT — PAIN - FUNCTIONAL ASSESSMENT
PAIN_FUNCTIONAL_ASSESSMENT: ACTIVITIES ARE NOT PREVENTED

## 2023-11-18 ASSESSMENT — PAIN DESCRIPTION - LOCATION
LOCATION: NECK
LOCATION: NECK

## 2023-11-18 ASSESSMENT — PULMONARY FUNCTION TESTS: PEFR_L/MIN: 20

## 2023-11-18 NOTE — CARE COORDINATION
LIZZIEW notified Geovany Mcelroy, RN CM Director, PT eval completed and precert can be initiated. LSW does not anticipate precert to be received this weekend. CM staff to follow.   Electronically signed by SHAKIR Cabello on 11/18/2023 at 6:57 AM

## 2023-11-19 PROCEDURE — 2580000003 HC RX 258: Performed by: NURSE PRACTITIONER

## 2023-11-19 PROCEDURE — 94150 VITAL CAPACITY TEST: CPT

## 2023-11-19 PROCEDURE — 94640 AIRWAY INHALATION TREATMENT: CPT

## 2023-11-19 PROCEDURE — 2140000000 HC CCU INTERMEDIATE R&B

## 2023-11-19 PROCEDURE — 6370000000 HC RX 637 (ALT 250 FOR IP): Performed by: STUDENT IN AN ORGANIZED HEALTH CARE EDUCATION/TRAINING PROGRAM

## 2023-11-19 PROCEDURE — 6370000000 HC RX 637 (ALT 250 FOR IP): Performed by: NURSE PRACTITIONER

## 2023-11-19 PROCEDURE — 6370000000 HC RX 637 (ALT 250 FOR IP)

## 2023-11-19 PROCEDURE — 6360000002 HC RX W HCPCS: Performed by: NURSE PRACTITIONER

## 2023-11-19 RX ADMIN — IPRATROPIUM BROMIDE 2 PUFF: 17 AEROSOL, METERED RESPIRATORY (INHALATION) at 07:49

## 2023-11-19 RX ADMIN — IPRATROPIUM BROMIDE 2 PUFF: 17 AEROSOL, METERED RESPIRATORY (INHALATION) at 20:51

## 2023-11-19 RX ADMIN — OXYCODONE HYDROCHLORIDE 10 MG: 10 TABLET ORAL at 19:05

## 2023-11-19 RX ADMIN — ASPIRIN 81 MG 81 MG: 81 TABLET ORAL at 08:45

## 2023-11-19 RX ADMIN — SODIUM CHLORIDE, PRESERVATIVE FREE 10 ML: 5 INJECTION INTRAVENOUS at 08:45

## 2023-11-19 RX ADMIN — ENOXAPARIN SODIUM 30 MG: 100 INJECTION SUBCUTANEOUS at 08:45

## 2023-11-19 RX ADMIN — ALBUTEROL SULFATE 2 PUFF: 90 AEROSOL, METERED RESPIRATORY (INHALATION) at 04:22

## 2023-11-19 RX ADMIN — ALBUTEROL SULFATE 2 PUFF: 90 AEROSOL, METERED RESPIRATORY (INHALATION) at 20:51

## 2023-11-19 RX ADMIN — MULTIPLE VITAMINS W/ MINERALS TAB 1 TABLET: TAB at 08:45

## 2023-11-19 RX ADMIN — ALBUTEROL SULFATE 2 PUFF: 90 AEROSOL, METERED RESPIRATORY (INHALATION) at 15:23

## 2023-11-19 RX ADMIN — ALBUTEROL SULFATE 2 PUFF: 90 AEROSOL, METERED RESPIRATORY (INHALATION) at 07:47

## 2023-11-19 RX ADMIN — BUDESONIDE AND FORMOTEROL FUMARATE DIHYDRATE 2 PUFF: 160; 4.5 AEROSOL RESPIRATORY (INHALATION) at 07:51

## 2023-11-19 RX ADMIN — IPRATROPIUM BROMIDE 2 PUFF: 17 AEROSOL, METERED RESPIRATORY (INHALATION) at 04:23

## 2023-11-19 RX ADMIN — OXYCODONE HYDROCHLORIDE 10 MG: 10 TABLET ORAL at 00:03

## 2023-11-19 RX ADMIN — ALBUTEROL SULFATE 2 PUFF: 90 AEROSOL, METERED RESPIRATORY (INHALATION) at 11:23

## 2023-11-19 RX ADMIN — IPRATROPIUM BROMIDE 2 PUFF: 17 AEROSOL, METERED RESPIRATORY (INHALATION) at 15:25

## 2023-11-19 RX ADMIN — BUDESONIDE AND FORMOTEROL FUMARATE DIHYDRATE 2 PUFF: 160; 4.5 AEROSOL RESPIRATORY (INHALATION) at 20:51

## 2023-11-19 RX ADMIN — Medication 100 MG: at 08:45

## 2023-11-19 RX ADMIN — SODIUM CHLORIDE, PRESERVATIVE FREE 10 ML: 5 INJECTION INTRAVENOUS at 12:41

## 2023-11-19 RX ADMIN — OXYCODONE HYDROCHLORIDE 10 MG: 10 TABLET ORAL at 12:58

## 2023-11-19 RX ADMIN — IPRATROPIUM BROMIDE 2 PUFF: 17 AEROSOL, METERED RESPIRATORY (INHALATION) at 11:25

## 2023-11-19 RX ADMIN — PREDNISONE 5 MG: 5 TABLET ORAL at 08:45

## 2023-11-19 RX ADMIN — OXYCODONE HYDROCHLORIDE 10 MG: 10 TABLET ORAL at 06:36

## 2023-11-19 RX ADMIN — GUAIFENESIN AND DEXTROMETHORPHAN 5 ML: 100; 10 SYRUP ORAL at 15:11

## 2023-11-19 RX ADMIN — FOLIC ACID 1 MG: 1 TABLET ORAL at 08:45

## 2023-11-19 ASSESSMENT — PAIN DESCRIPTION - LOCATION
LOCATION: NECK
LOCATION: BACK
LOCATION: NECK

## 2023-11-19 ASSESSMENT — PAIN SCALES - GENERAL
PAINLEVEL_OUTOF10: 7
PAINLEVEL_OUTOF10: 0
PAINLEVEL_OUTOF10: 8
PAINLEVEL_OUTOF10: 8
PAINLEVEL_OUTOF10: 0
PAINLEVEL_OUTOF10: 0

## 2023-11-19 ASSESSMENT — PAIN SCALES - WONG BAKER
WONGBAKER_NUMERICALRESPONSE: 0

## 2023-11-19 ASSESSMENT — PAIN DESCRIPTION - DESCRIPTORS
DESCRIPTORS: ACHING
DESCRIPTORS: ACHING

## 2023-11-19 ASSESSMENT — PAIN DESCRIPTION - ORIENTATION
ORIENTATION: MID
ORIENTATION: MID

## 2023-11-20 VITALS
WEIGHT: 99.87 LBS | HEART RATE: 92 BPM | RESPIRATION RATE: 21 BRPM | BODY MASS INDEX: 16.05 KG/M2 | TEMPERATURE: 98.6 F | SYSTOLIC BLOOD PRESSURE: 102 MMHG | HEIGHT: 66 IN | OXYGEN SATURATION: 97 % | DIASTOLIC BLOOD PRESSURE: 68 MMHG

## 2023-11-20 LAB
SARS-COV-2 RDRP RESP QL NAA+PROBE: NOT DETECTED
SOURCE: NORMAL

## 2023-11-20 PROCEDURE — 6370000000 HC RX 637 (ALT 250 FOR IP)

## 2023-11-20 PROCEDURE — 2700000000 HC OXYGEN THERAPY PER DAY

## 2023-11-20 PROCEDURE — 2580000003 HC RX 258: Performed by: NURSE PRACTITIONER

## 2023-11-20 PROCEDURE — 6360000002 HC RX W HCPCS: Performed by: NURSE PRACTITIONER

## 2023-11-20 PROCEDURE — 87635 SARS-COV-2 COVID-19 AMP PRB: CPT

## 2023-11-20 PROCEDURE — 94761 N-INVAS EAR/PLS OXIMETRY MLT: CPT

## 2023-11-20 PROCEDURE — 97535 SELF CARE MNGMENT TRAINING: CPT

## 2023-11-20 PROCEDURE — 6370000000 HC RX 637 (ALT 250 FOR IP): Performed by: STUDENT IN AN ORGANIZED HEALTH CARE EDUCATION/TRAINING PROGRAM

## 2023-11-20 PROCEDURE — 94640 AIRWAY INHALATION TREATMENT: CPT

## 2023-11-20 PROCEDURE — 6370000000 HC RX 637 (ALT 250 FOR IP): Performed by: NURSE PRACTITIONER

## 2023-11-20 PROCEDURE — 97530 THERAPEUTIC ACTIVITIES: CPT

## 2023-11-20 RX ORDER — TROLAMINE SALICYLATE 10 G/100G
CREAM TOPICAL
Qty: 2 EACH | Refills: 0
Start: 2023-11-20 | End: 2023-11-27

## 2023-11-20 RX ADMIN — OXYCODONE HYDROCHLORIDE 10 MG: 10 TABLET ORAL at 01:30

## 2023-11-20 RX ADMIN — ENOXAPARIN SODIUM 30 MG: 100 INJECTION SUBCUTANEOUS at 07:41

## 2023-11-20 RX ADMIN — FOLIC ACID 1 MG: 1 TABLET ORAL at 07:42

## 2023-11-20 RX ADMIN — IPRATROPIUM BROMIDE 2 PUFF: 17 AEROSOL, METERED RESPIRATORY (INHALATION) at 16:18

## 2023-11-20 RX ADMIN — Medication 100 MG: at 07:41

## 2023-11-20 RX ADMIN — OXYCODONE HYDROCHLORIDE 10 MG: 10 TABLET ORAL at 07:41

## 2023-11-20 RX ADMIN — MULTIPLE VITAMINS W/ MINERALS TAB 1 TABLET: TAB at 07:42

## 2023-11-20 RX ADMIN — ASPIRIN 81 MG 81 MG: 81 TABLET ORAL at 07:41

## 2023-11-20 RX ADMIN — IPRATROPIUM BROMIDE 2 PUFF: 17 AEROSOL, METERED RESPIRATORY (INHALATION) at 11:28

## 2023-11-20 RX ADMIN — ALBUTEROL SULFATE 2 PUFF: 90 AEROSOL, METERED RESPIRATORY (INHALATION) at 06:15

## 2023-11-20 RX ADMIN — OXYCODONE HYDROCHLORIDE 10 MG: 10 TABLET ORAL at 14:26

## 2023-11-20 RX ADMIN — BUDESONIDE AND FORMOTEROL FUMARATE DIHYDRATE 2 PUFF: 160; 4.5 AEROSOL RESPIRATORY (INHALATION) at 06:17

## 2023-11-20 RX ADMIN — ALBUTEROL SULFATE 2 PUFF: 90 AEROSOL, METERED RESPIRATORY (INHALATION) at 11:28

## 2023-11-20 RX ADMIN — IPRATROPIUM BROMIDE 2 PUFF: 17 AEROSOL, METERED RESPIRATORY (INHALATION) at 06:16

## 2023-11-20 RX ADMIN — PREDNISONE 5 MG: 5 TABLET ORAL at 07:41

## 2023-11-20 RX ADMIN — SODIUM CHLORIDE, PRESERVATIVE FREE 10 ML: 5 INJECTION INTRAVENOUS at 07:42

## 2023-11-20 RX ADMIN — ALBUTEROL SULFATE 2 PUFF: 90 AEROSOL, METERED RESPIRATORY (INHALATION) at 16:17

## 2023-11-20 ASSESSMENT — PAIN SCALES - WONG BAKER
WONGBAKER_NUMERICALRESPONSE: 0

## 2023-11-20 ASSESSMENT — PAIN DESCRIPTION - ORIENTATION
ORIENTATION: MID
ORIENTATION: MID
ORIENTATION: RIGHT;LEFT

## 2023-11-20 ASSESSMENT — PAIN DESCRIPTION - FREQUENCY
FREQUENCY: CONTINUOUS
FREQUENCY: INTERMITTENT

## 2023-11-20 ASSESSMENT — PAIN SCALES - GENERAL
PAINLEVEL_OUTOF10: 10
PAINLEVEL_OUTOF10: 8
PAINLEVEL_OUTOF10: 0
PAINLEVEL_OUTOF10: 8

## 2023-11-20 ASSESSMENT — PAIN DESCRIPTION - PAIN TYPE: TYPE: CHRONIC PAIN

## 2023-11-20 ASSESSMENT — PAIN DESCRIPTION - DESCRIPTORS
DESCRIPTORS: ACHING
DESCRIPTORS: SHARP
DESCRIPTORS: ACHING;DISCOMFORT

## 2023-11-20 ASSESSMENT — PAIN DESCRIPTION - LOCATION
LOCATION: NECK

## 2023-11-20 ASSESSMENT — PAIN DESCRIPTION - ONSET
ONSET: ON-GOING
ONSET: ON-GOING

## 2023-11-20 NOTE — PLAN OF CARE
Problem: Discharge Planning  Goal: Discharge to home or other facility with appropriate resources  11/16/2023 0756 by Waldemar Laws RN  Outcome: Progressing  11/15/2023 2244 by Mary Newton RN  Outcome: Progressing     Problem: Pain  Goal: Verbalizes/displays adequate comfort level or baseline comfort level  11/16/2023 0756 by Waldemar Laws RN  Outcome: Progressing  11/15/2023 2244 by Mary Newton RN  Outcome: Progressing     Problem: Safety - Adult  Goal: Free from fall injury  11/16/2023 0756 by Waldemar Laws RN  Outcome: Progressing  11/15/2023 2244 by Mary Newton RN  Outcome: Progressing     Problem: Nutrition Deficit:  Goal: Optimize nutritional status  11/16/2023 0756 by Waldemar Laws RN  Outcome: Progressing  11/15/2023 2244 by Mary Newton RN  Outcome: Progressing     Problem: Skin/Tissue Integrity  Goal: Absence of new skin breakdown  Description: 1. Monitor for areas of redness and/or skin breakdown  2. Assess vascular access sites hourly  3. Every 4-6 hours minimum:  Change oxygen saturation probe site  4. Every 4-6 hours:  If on nasal continuous positive airway pressure, respiratory therapy assess nares and determine need for appliance change or resting period.   11/16/2023 0756 by Waldemar Laws RN  Outcome: Progressing  11/15/2023 2244 by Mary Newton RN  Outcome: Progressing
Problem: Discharge Planning  Goal: Discharge to home or other facility with appropriate resources  11/20/2023 0932 by Bettina Garcia RN  Outcome: Progressing  11/20/2023 0508 by Lanie Duffy RN  Outcome: Progressing  Flowsheets  Taken 11/20/2023 0400  Discharge to home or other facility with appropriate resources:   Identify barriers to discharge with patient and caregiver   Arrange for needed discharge resources and transportation as appropriate   Identify discharge learning needs (meds, wound care, etc)   Arrange for interpreters to assist at discharge as needed   Refer to discharge planning if patient needs post-hospital services based on physician order or complex needs related to functional status, cognitive ability or social support system  Taken 11/20/2023 0000  Discharge to home or other facility with appropriate resources:   Identify barriers to discharge with patient and caregiver   Arrange for needed discharge resources and transportation as appropriate   Identify discharge learning needs (meds, wound care, etc)   Arrange for interpreters to assist at discharge as needed   Refer to discharge planning if patient needs post-hospital services based on physician order or complex needs related to functional status, cognitive ability or social support system  Taken 11/19/2023 2000  Discharge to home or other facility with appropriate resources:   Arrange for needed discharge resources and transportation as appropriate   Identify barriers to discharge with patient and caregiver   Identify discharge learning needs (meds, wound care, etc)   Arrange for interpreters to assist at discharge as needed   Refer to discharge planning if patient needs post-hospital services based on physician order or complex needs related to functional status, cognitive ability or social support system     Problem: Pain  Goal: Verbalizes/displays adequate comfort level or baseline comfort level  11/20/2023 0932 by Faloln Argueta
Problem: Discharge Planning  Goal: Discharge to home or other facility with appropriate resources  11/8/2023 0042 by Antonio Parsons RN  Outcome: Not Progressing  11/8/2023 0042 by Antonio Parsons RN  Outcome: Not Progressing     Problem: Pain  Goal: Verbalizes/displays adequate comfort level or baseline comfort level  11/8/2023 0042 by Antonio Parsons RN  Outcome: Not Progressing  11/8/2023 0042 by Antonio Parsons RN  Outcome: Progressing     Problem: Safety - Adult  Goal: Free from fall injury  11/8/2023 0042 by Antonio Parsons RN  Outcome: Not Progressing  11/8/2023 0042 by Antonio Parsons RN  Outcome: Progressing     Problem: Safety - Medical Restraint  Goal: Remains free of injury from restraints (Restraint for Interference with Medical Device)  Description: INTERVENTIONS:  1. Determine that other, less restrictive measures have been tried or would not be effective before applying the restraint  2. Evaluate the patient's condition at the time of restraint application  3. Inform patient/family regarding the reason for restraint  4.  Q2H: Monitor safety, psychosocial status, comfort, nutrition and hydration  11/8/2023 0042 by Antonio Parsons RN  Outcome: Not Progressing  11/8/2023 0042 by Antonio Parsons RN  Outcome: Progressing  Flowsheets  Taken 11/8/2023 0000  Remains free of injury from restraints (restraint for interference with medical device): Every 2 hours: Monitor safety, psychosocial status, comfort, nutrition and hydration  Taken 11/7/2023 2300  Remains free of injury from restraints (restraint for interference with medical device): Every 2 hours: Monitor safety, psychosocial status, comfort, nutrition and hydration
Problem: Discharge Planning  Goal: Discharge to home or other facility with appropriate resources  Outcome: Progressing     Problem: Pain  Goal: Verbalizes/displays adequate comfort level or baseline comfort level  11/10/2023 2208 by Kala Villa RN  Outcome: Progressing  11/10/2023 1153 by Quoc Zavala RN  Outcome: Progressing     Problem: Safety - Adult  Goal: Free from fall injury  11/10/2023 2208 by Kala Villa RN  Outcome: Progressing  11/10/2023 1153 by Quoc Zavala RN  Outcome: Progressing     Problem: Nutrition Deficit:  Goal: Optimize nutritional status  11/10/2023 2208 by Kala Villa RN  Outcome: Progressing  11/10/2023 1430 by Goyo Escobar RD  Outcome: Progressing  Flowsheets (Taken 11/10/2023 1423)  Nutrient intake appropriate for improving, restoring, or maintaining nutritional needs:   Assess nutritional status and recommend course of action   Monitor oral intake, labs, and treatment plans   Recommend, monitor, and adjust tube feedings and TPN/PPN based on assessed needs
Problem: Discharge Planning  Goal: Discharge to home or other facility with appropriate resources  Outcome: Progressing     Problem: Pain  Goal: Verbalizes/displays adequate comfort level or baseline comfort level  Outcome: Progressing     Problem: Safety - Adult  Goal: Free from fall injury  Outcome: Progressing     Problem: Nutrition Deficit:  Goal: Optimize nutritional status  11/15/2023 0236 by Lizzie Price RN  Outcome: Progressing  11/14/2023 1433 by Carlo Cox RD  Outcome: Progressing     Problem: Skin/Tissue Integrity  Goal: Absence of new skin breakdown  Description: 1. Monitor for areas of redness and/or skin breakdown  2. Assess vascular access sites hourly  3. Every 4-6 hours minimum:  Change oxygen saturation probe site  4. Every 4-6 hours:  If on nasal continuous positive airway pressure, respiratory therapy assess nares and determine need for appliance change or resting period.   Outcome: Progressing
Problem: Discharge Planning  Goal: Discharge to home or other facility with appropriate resources  Outcome: Progressing     Problem: Pain  Goal: Verbalizes/displays adequate comfort level or baseline comfort level  Outcome: Progressing     Problem: Safety - Adult  Goal: Free from fall injury  Outcome: Progressing     Problem: Nutrition Deficit:  Goal: Optimize nutritional status  Outcome: Progressing     Problem: Safety - Medical Restraint  Goal: Remains free of injury from restraints (Restraint for Interference with Medical Device)  Description: INTERVENTIONS:  1. Determine that other, less restrictive measures have been tried or would not be effective before applying the restraint  2. Evaluate the patient's condition at the time of restraint application  3. Inform patient/family regarding the reason for restraint  4.  Q2H: Monitor safety, psychosocial status, comfort, nutrition and hydration  Outcome: Completed  Flowsheets  Taken 11/9/2023 1200 by Evangelist George RN  Remains free of injury from restraints (restraint for interference with medical device): Every 2 hours: Monitor safety, psychosocial status, comfort, nutrition and hydration  Taken 11/9/2023 1100 by Evangelist George RN  Remains free of injury from restraints (restraint for interference with medical device): Every 2 hours: Monitor safety, psychosocial status, comfort, nutrition and hydration  Taken 11/9/2023 1013 by Raine Vergara free of injury from restraints (restraint for interference with medical device): Every 2 hours: Monitor safety, psychosocial status, comfort, nutrition and hydration  Taken 11/9/2023 1000 by Evangelist George RN  Remains free of injury from restraints (restraint for interference with medical device): Every 2 hours: Monitor safety, psychosocial status, comfort, nutrition and hydration  Taken 11/9/2023 0900 by Evangelist George RN  Remains free of injury from restraints (restraint for interference with
Problem: Discharge Planning  Goal: Discharge to home or other facility with appropriate resources  Outcome: Progressing     Problem: Pain  Goal: Verbalizes/displays adequate comfort level or baseline comfort level  Outcome: Progressing     Problem: Safety - Adult  Goal: Free from fall injury  Outcome: Progressing     Problem: Nutrition Deficit:  Goal: Optimize nutritional status  Outcome: Progressing     Problem: Skin/Tissue Integrity  Goal: Absence of new skin breakdown  Description: 1. Monitor for areas of redness and/or skin breakdown  2. Assess vascular access sites hourly  3. Every 4-6 hours minimum:  Change oxygen saturation probe site  4. Every 4-6 hours:  If on nasal continuous positive airway pressure, respiratory therapy assess nares and determine need for appliance change or resting period.   Outcome: Progressing
Problem: Discharge Planning  Goal: Discharge to home or other facility with appropriate resources  Outcome: Progressing     Problem: Pain  Goal: Verbalizes/displays adequate comfort level or baseline comfort level  Outcome: Progressing     Problem: Safety - Adult  Goal: Free from fall injury  Outcome: Progressing     Problem: Nutrition Deficit:  Goal: Optimize nutritional status  Outcome: Progressing     Problem: Skin/Tissue Integrity  Goal: Absence of new skin breakdown  Description: 1. Monitor for areas of redness and/or skin breakdown  2. Assess vascular access sites hourly  3. Every 4-6 hours minimum:  Change oxygen saturation probe site  4. Every 4-6 hours:  If on nasal continuous positive airway pressure, respiratory therapy assess nares and determine need for appliance change or resting period.   Outcome: Progressing
Problem: Discharge Planning  Goal: Discharge to home or other facility with appropriate resources  Outcome: Progressing     Problem: Pain  Goal: Verbalizes/displays adequate comfort level or baseline comfort level  Outcome: Progressing     Problem: Safety - Adult  Goal: Free from fall injury  Outcome: Progressing     Problem: Skin/Tissue Integrity  Goal: Absence of new skin breakdown  Description: 1. Monitor for areas of redness and/or skin breakdown  2. Assess vascular access sites hourly  3. Every 4-6 hours minimum:  Change oxygen saturation probe site  4. Every 4-6 hours:  If on nasal continuous positive airway pressure, respiratory therapy assess nares and determine need for appliance change or resting period.   Outcome: Progressing
Problem: Discharge Planning  Goal: Discharge to home or other facility with appropriate resources  Outcome: Progressing  Flowsheets (Taken 11/19/2023 2000)  Discharge to home or other facility with appropriate resources:   Arrange for needed discharge resources and transportation as appropriate   Identify barriers to discharge with patient and caregiver   Identify discharge learning needs (meds, wound care, etc)   Arrange for interpreters to assist at discharge as needed   Refer to discharge planning if patient needs post-hospital services based on physician order or complex needs related to functional status, cognitive ability or social support system     Problem: Pain  Goal: Verbalizes/displays adequate comfort level or baseline comfort level  Outcome: Progressing  Flowsheets  Taken 11/20/2023 0108  Verbalizes/displays adequate comfort level or baseline comfort level:   Encourage patient to monitor pain and request assistance   Assess pain using appropriate pain scale   Administer analgesics based on type and severity of pain and evaluate response   Implement non-pharmacological measures as appropriate and evaluate response   Consider cultural and social influences on pain and pain management   Notify Licensed Independent Practitioner if interventions unsuccessful or patient reports new pain  Taken 11/19/2023 2000  Verbalizes/displays adequate comfort level or baseline comfort level:   Encourage patient to monitor pain and request assistance   Assess pain using appropriate pain scale   Administer analgesics based on type and severity of pain and evaluate response   Implement non-pharmacological measures as appropriate and evaluate response   Consider cultural and social influences on pain and pain management   Notify Licensed Independent Practitioner if interventions unsuccessful or patient reports new pain     Problem: Safety - Adult  Goal: Free from fall injury  Outcome: Progressing     Problem: Nutrition
Problem: Pain  Goal: Verbalizes/displays adequate comfort level or baseline comfort level  Outcome: Progressing     Problem: Safety - Adult  Goal: Free from fall injury  Outcome: Progressing
hours:  Monitor safety, psychosocial status, comfort, nutrition and hydration     Problem: Nutrition Deficit:  Goal: Optimize nutritional status  Outcome: Progressing

## 2023-11-20 NOTE — CARE COORDINATION
Pt on discharge. CM set stretcher transportation with [address] [place] [address] Leti Arts for 1630. Pt updated.   Caren/Whitley Santamaria updated  Pt RN Meli Suárez updated

## 2023-11-20 NOTE — CARE COORDINATION
Precert via [facility]: Pending at this time 11:55 AM       Pt's skilled stay has been denied as [place] is OON and pt has no OON benefits  Pt's PAS/ LOC has already been completed therefore pt can admit LTC to [facility].

## 2023-11-20 NOTE — DISCHARGE SUMMARY
V2.0  Discharge Summary    Name:  Candie Corley /Age/Sex: 1955 (89 y.o. male)   Admit Date: 2023  Discharge Date: 23    MRN & CSN:  [id number] & [id number] Encounter Date and Time 23 2:16 PM EST    Attending:  Adolfo Abrams MD Discharging Provider: Adolfo Abrams MD       Hospital Course:     Brief HPI: Candie Corley is a 79 y.o. male who presented with acute hypoxic respiratory failure    Brief Problem Based Course:   Acute hypoxic hypercapnic respiratory failure 2/ CAP   -CT chest without contrast showed consolidation in right middle, right lower, left lower lobes-multifocal pneumonia versus aspiration pneumonia  -Patient intubated on arrival from [facility] for airway protection, admitted to ICU  -Broad-spectrum antibiotic coverage. Cefepime and vacomycin  -s/p extubation   -resp Cx growing Strep Pneumonia, pan sensitivities Abx switched to ceftriaxone on , completed a 10 day total Abx course.   -on baseline O2, wean O2 as tolerated     Acute encephalopathy - resolved  -patient was found down  -Unclear etiology  -CT head without contrast negative for any acute abnormalities  -Broad-spectrum coverage   -agitated initially, but now calm and following commands     Atrial fibrillation  -Patient reportedly in A-fib RVR when presented to the ED requiring cardioversion  -Currently sinus tachycardia. Hyperkalemia  -Resolved     COPD,   -Inhalers to be continued      The patient expressed appropriate understanding of, and agreement with the discharge recommendations, medications, and plan.      Consults this admission:  IP CONSULT TO DIETITIAN  IP CONSULT TO SPIRITUAL SERVICES    Discharge Diagnosis:   Hypercapnic respiratory failure ([address])    Community-acquired pneumonia    Discharge Instruction:   Follow up appointments: PCP  Primary care physician: Lety Hood MD within 2 weeks  Diet: cardiac diet   Activity: activity as tolerated  Disposition: Discharged to:   []Home, []HHC, [x]SNF,

## 2023-12-19 NOTE — ED NOTES
Pt is sleeping in recliner, no signs of distress. Even, unlabored breathing observed.   Report received and care assumed from [facility].       Jose Judge RN  06/06/20 Florence Kirk

## 2024-01-12 ENCOUNTER — HOSPITAL ENCOUNTER (INPATIENT)
Age: 69
LOS: 4 days | Discharge: NURSING, REHAB OR LONG-TERM CARE | DRG: 871 | End: 2024-01-16
Attending: EMERGENCY MEDICINE | Admitting: STUDENT IN AN ORGANIZED HEALTH CARE EDUCATION/TRAINING PROGRAM
Payer: MEDICARE

## 2024-01-12 ENCOUNTER — APPOINTMENT (OUTPATIENT)
Dept: GENERAL RADIOLOGY | Age: 69
DRG: 871 | End: 2024-01-12
Payer: MEDICARE

## 2024-01-12 DIAGNOSIS — J44.1 COPD EXACERBATION (HCC): ICD-10-CM

## 2024-01-12 DIAGNOSIS — J96.21 ACUTE ON CHRONIC RESPIRATORY FAILURE WITH HYPOXEMIA (HCC): Primary | ICD-10-CM

## 2024-01-12 DIAGNOSIS — A41.9 SEVERE SEPSIS (HCC): ICD-10-CM

## 2024-01-12 DIAGNOSIS — R65.20 SEVERE SEPSIS (HCC): ICD-10-CM

## 2024-01-12 LAB
ALBUMIN SERPL-MCNC: 3 GM/DL (ref 3.4–5)
ALP BLD-CCNC: 50 IU/L (ref 40–128)
ALT SERPL-CCNC: 10 U/L (ref 10–40)
ANION GAP SERPL CALCULATED.3IONS-SCNC: 10 MMOL/L (ref 7–16)
AST SERPL-CCNC: 15 IU/L (ref 15–37)
B PARAP IS1001 DNA NPH QL NAA+NON-PROBE: NOT DETECTED
B PERT.PT PRMT NPH QL NAA+NON-PROBE: NOT DETECTED
BASE EXCESS MIXED: 15.8 (ref 0–3)
BASOPHILS ABSOLUTE: 0.1 K/CU MM
BASOPHILS RELATIVE PERCENT: 0.3 % (ref 0–1)
BILIRUB SERPL-MCNC: 0.2 MG/DL (ref 0–1)
BUN SERPL-MCNC: 9 MG/DL (ref 6–23)
C PNEUM DNA NPH QL NAA+NON-PROBE: NOT DETECTED
CALCIUM SERPL-MCNC: 8.4 MG/DL (ref 8.3–10.6)
CHLORIDE BLD-SCNC: 96 MMOL/L (ref 99–110)
CO2: 31 MMOL/L (ref 21–32)
COMMENT: ABNORMAL
CREAT SERPL-MCNC: 0.5 MG/DL (ref 0.9–1.3)
DIFFERENTIAL TYPE: ABNORMAL
EOSINOPHILS ABSOLUTE: 0 K/CU MM
EOSINOPHILS RELATIVE PERCENT: 0 % (ref 0–3)
FLUAV H1 2009 PAN RNA NPH NAA+NON-PROBE: NOT DETECTED
FLUAV H1 RNA NPH QL NAA+NON-PROBE: NOT DETECTED
FLUAV H3 RNA NPH QL NAA+NON-PROBE: NOT DETECTED
FLUAV RNA NPH QL NAA+NON-PROBE: NOT DETECTED
FLUBV RNA NPH QL NAA+NON-PROBE: NOT DETECTED
GFR SERPL CREATININE-BSD FRML MDRD: >60 ML/MIN/1.73M2
GLUCOSE SERPL-MCNC: 160 MG/DL (ref 70–99)
HADV DNA NPH QL NAA+NON-PROBE: NOT DETECTED
HCO3 VENOUS: 46.8 MMOL/L (ref 22–29)
HCOV 229E RNA NPH QL NAA+NON-PROBE: NOT DETECTED
HCOV HKU1 RNA NPH QL NAA+NON-PROBE: NOT DETECTED
HCOV NL63 RNA NPH QL NAA+NON-PROBE: NOT DETECTED
HCOV OC43 RNA NPH QL NAA+NON-PROBE: NOT DETECTED
HCT VFR BLD CALC: 41.6 % (ref 42–52)
HEMOGLOBIN: 12 GM/DL (ref 13.5–18)
HMPV RNA NPH QL NAA+NON-PROBE: NOT DETECTED
HPIV1 RNA NPH QL NAA+NON-PROBE: NOT DETECTED
HPIV2 RNA NPH QL NAA+NON-PROBE: NOT DETECTED
HPIV3 RNA NPH QL NAA+NON-PROBE: NOT DETECTED
HPIV4 RNA NPH QL NAA+NON-PROBE: NOT DETECTED
IMMATURE NEUTROPHIL %: 0.5 % (ref 0–0.43)
LACTATE: 2.1 MMOL/L (ref 0.5–1.9)
LACTIC ACID, SEPSIS: 2.2 MMOL/L (ref 0.4–2)
LACTIC ACID, SEPSIS: 2.4 MMOL/L (ref 0.4–2)
LYMPHOCYTES ABSOLUTE: 0.4 K/CU MM
LYMPHOCYTES RELATIVE PERCENT: 1.1 % (ref 24–44)
M PNEUMO DNA NPH QL NAA+NON-PROBE: NOT DETECTED
MCH RBC QN AUTO: 26.6 PG (ref 27–31)
MCHC RBC AUTO-ENTMCNC: 28.8 % (ref 32–36)
MCV RBC AUTO: 92.2 FL (ref 78–100)
MONOCYTES ABSOLUTE: 2.2 K/CU MM
MONOCYTES RELATIVE PERCENT: 6.5 % (ref 0–4)
NUCLEATED RBC %: 0 %
O2 SAT, VEN: 62.2 % (ref 50–70)
PCO2, VEN: 93 MMHG (ref 41–51)
PDW BLD-RTO: 14.6 % (ref 11.7–14.9)
PH VENOUS: 7.31 (ref 7.32–7.43)
PLATELET # BLD: 411 K/CU MM (ref 140–440)
PMV BLD AUTO: 9 FL (ref 7.5–11.1)
PO2, VEN: 40 MMHG (ref 28–48)
POTASSIUM SERPL-SCNC: 4.1 MMOL/L (ref 3.5–5.1)
PROCALCITONIN SERPL-MCNC: 5.27 NG/ML
RBC # BLD: 4.51 M/CU MM (ref 4.6–6.2)
REASON FOR REJECTION: NORMAL
REJECTED TEST: NORMAL
RSV RNA NPH QL NAA+NON-PROBE: NOT DETECTED
RV+EV RNA NPH QL NAA+NON-PROBE: NOT DETECTED
SARS-COV-2 RNA NPH QL NAA+NON-PROBE: NOT DETECTED
SEGMENTED NEUTROPHILS ABSOLUTE COUNT: 31.1 K/CU MM
SEGMENTED NEUTROPHILS RELATIVE PERCENT: 91.6 % (ref 36–66)
SODIUM BLD-SCNC: 137 MMOL/L (ref 135–145)
TOTAL IMMATURE NEUTOROPHIL: 0.17 K/CU MM
TOTAL NUCLEATED RBC: 0 K/CU MM
TOTAL PROTEIN: 5.6 GM/DL (ref 6.4–8.2)
WBC # BLD: 34.8 K/CU MM (ref 4–10.5)

## 2024-01-12 PROCEDURE — 6370000000 HC RX 637 (ALT 250 FOR IP): Performed by: EMERGENCY MEDICINE

## 2024-01-12 PROCEDURE — 96367 TX/PROPH/DG ADDL SEQ IV INF: CPT

## 2024-01-12 PROCEDURE — 87040 BLOOD CULTURE FOR BACTERIA: CPT

## 2024-01-12 PROCEDURE — 87641 MR-STAPH DNA AMP PROBE: CPT

## 2024-01-12 PROCEDURE — 82803 BLOOD GASES ANY COMBINATION: CPT

## 2024-01-12 PROCEDURE — 80053 COMPREHEN METABOLIC PANEL: CPT

## 2024-01-12 PROCEDURE — 6360000002 HC RX W HCPCS: Performed by: EMERGENCY MEDICINE

## 2024-01-12 PROCEDURE — 96365 THER/PROPH/DIAG IV INF INIT: CPT

## 2024-01-12 PROCEDURE — 86738 MYCOPLASMA ANTIBODY: CPT

## 2024-01-12 PROCEDURE — 96375 TX/PRO/DX INJ NEW DRUG ADDON: CPT

## 2024-01-12 PROCEDURE — 71045 X-RAY EXAM CHEST 1 VIEW: CPT

## 2024-01-12 PROCEDURE — 93005 ELECTROCARDIOGRAM TRACING: CPT | Performed by: EMERGENCY MEDICINE

## 2024-01-12 PROCEDURE — 2580000003 HC RX 258: Performed by: EMERGENCY MEDICINE

## 2024-01-12 PROCEDURE — 5A09357 ASSISTANCE WITH RESPIRATORY VENTILATION, LESS THAN 24 CONSECUTIVE HOURS, CONTINUOUS POSITIVE AIRWAY PRESSURE: ICD-10-PCS | Performed by: INTERNAL MEDICINE

## 2024-01-12 PROCEDURE — 2140000000 HC CCU INTERMEDIATE R&B

## 2024-01-12 PROCEDURE — 36415 COLL VENOUS BLD VENIPUNCTURE: CPT

## 2024-01-12 PROCEDURE — 0202U NFCT DS 22 TRGT SARS-COV-2: CPT

## 2024-01-12 PROCEDURE — 87081 CULTURE SCREEN ONLY: CPT

## 2024-01-12 PROCEDURE — 82805 BLOOD GASES W/O2 SATURATION: CPT

## 2024-01-12 PROCEDURE — 6360000002 HC RX W HCPCS: Performed by: STUDENT IN AN ORGANIZED HEALTH CARE EDUCATION/TRAINING PROGRAM

## 2024-01-12 PROCEDURE — 94640 AIRWAY INHALATION TREATMENT: CPT

## 2024-01-12 PROCEDURE — 85025 COMPLETE CBC W/AUTO DIFF WBC: CPT

## 2024-01-12 PROCEDURE — 83605 ASSAY OF LACTIC ACID: CPT

## 2024-01-12 PROCEDURE — 84145 PROCALCITONIN (PCT): CPT

## 2024-01-12 PROCEDURE — 99285 EMERGENCY DEPT VISIT HI MDM: CPT

## 2024-01-12 PROCEDURE — 94664 DEMO&/EVAL PT USE INHALER: CPT

## 2024-01-12 PROCEDURE — 2700000000 HC OXYGEN THERAPY PER DAY

## 2024-01-12 PROCEDURE — 2580000003 HC RX 258: Performed by: STUDENT IN AN ORGANIZED HEALTH CARE EDUCATION/TRAINING PROGRAM

## 2024-01-12 PROCEDURE — 94660 CPAP INITIATION&MGMT: CPT

## 2024-01-12 RX ORDER — ONDANSETRON 2 MG/ML
4 INJECTION INTRAMUSCULAR; INTRAVENOUS EVERY 6 HOURS PRN
Status: DISCONTINUED | OUTPATIENT
Start: 2024-01-12 | End: 2024-01-16 | Stop reason: HOSPADM

## 2024-01-12 RX ORDER — ALBUTEROL SULFATE 90 UG/1
2 AEROSOL, METERED RESPIRATORY (INHALATION) EVERY 6 HOURS PRN
Status: DISCONTINUED | OUTPATIENT
Start: 2024-01-12 | End: 2024-01-16 | Stop reason: HOSPADM

## 2024-01-12 RX ORDER — SODIUM CHLORIDE 0.9 % (FLUSH) 0.9 %
5-40 SYRINGE (ML) INJECTION EVERY 12 HOURS SCHEDULED
Status: DISCONTINUED | OUTPATIENT
Start: 2024-01-12 | End: 2024-01-16 | Stop reason: HOSPADM

## 2024-01-12 RX ORDER — SODIUM CHLORIDE 9 MG/ML
INJECTION, SOLUTION INTRAVENOUS CONTINUOUS
Status: DISPENSED | OUTPATIENT
Start: 2024-01-12 | End: 2024-01-13

## 2024-01-12 RX ORDER — ONDANSETRON 4 MG/1
4 TABLET, ORALLY DISINTEGRATING ORAL EVERY 8 HOURS PRN
Status: DISCONTINUED | OUTPATIENT
Start: 2024-01-12 | End: 2024-01-16 | Stop reason: HOSPADM

## 2024-01-12 RX ORDER — SODIUM CHLORIDE 0.9 % (FLUSH) 0.9 %
5-40 SYRINGE (ML) INJECTION PRN
Status: DISCONTINUED | OUTPATIENT
Start: 2024-01-12 | End: 2024-01-16 | Stop reason: HOSPADM

## 2024-01-12 RX ORDER — IPRATROPIUM BROMIDE AND ALBUTEROL SULFATE 2.5; .5 MG/3ML; MG/3ML
1 SOLUTION RESPIRATORY (INHALATION)
Status: DISCONTINUED | OUTPATIENT
Start: 2024-01-12 | End: 2024-01-16 | Stop reason: HOSPADM

## 2024-01-12 RX ORDER — OXYCODONE HYDROCHLORIDE 10 MG/1
10 TABLET ORAL EVERY 6 HOURS PRN
Status: DISCONTINUED | OUTPATIENT
Start: 2024-01-12 | End: 2024-01-16 | Stop reason: HOSPADM

## 2024-01-12 RX ORDER — 0.9 % SODIUM CHLORIDE 0.9 %
1000 INTRAVENOUS SOLUTION INTRAVENOUS ONCE
Status: COMPLETED | OUTPATIENT
Start: 2024-01-12 | End: 2024-01-12

## 2024-01-12 RX ORDER — MAGNESIUM SULFATE IN WATER 40 MG/ML
2000 INJECTION, SOLUTION INTRAVENOUS ONCE
Status: COMPLETED | OUTPATIENT
Start: 2024-01-12 | End: 2024-01-12

## 2024-01-12 RX ORDER — ENOXAPARIN SODIUM 100 MG/ML
30 INJECTION SUBCUTANEOUS DAILY
Status: DISCONTINUED | OUTPATIENT
Start: 2024-01-12 | End: 2024-01-16 | Stop reason: HOSPADM

## 2024-01-12 RX ORDER — ASPIRIN 81 MG/1
81 TABLET, CHEWABLE ORAL DAILY
Status: DISCONTINUED | OUTPATIENT
Start: 2024-01-12 | End: 2024-01-16 | Stop reason: HOSPADM

## 2024-01-12 RX ORDER — GUAIFENESIN/DEXTROMETHORPHAN 100-10MG/5
5 SYRUP ORAL EVERY 4 HOURS PRN
Status: DISCONTINUED | OUTPATIENT
Start: 2024-01-12 | End: 2024-01-16 | Stop reason: HOSPADM

## 2024-01-12 RX ORDER — 0.9 % SODIUM CHLORIDE 0.9 %
30 INTRAVENOUS SOLUTION INTRAVENOUS ONCE
Status: COMPLETED | OUTPATIENT
Start: 2024-01-12 | End: 2024-01-12

## 2024-01-12 RX ORDER — POLYETHYLENE GLYCOL 3350 17 G/17G
17 POWDER, FOR SOLUTION ORAL DAILY PRN
Status: DISCONTINUED | OUTPATIENT
Start: 2024-01-12 | End: 2024-01-16 | Stop reason: HOSPADM

## 2024-01-12 RX ORDER — LANOLIN ALCOHOL/MO/W.PET/CERES
400 CREAM (GRAM) TOPICAL DAILY
Status: DISCONTINUED | OUTPATIENT
Start: 2024-01-12 | End: 2024-01-14 | Stop reason: CLARIF

## 2024-01-12 RX ORDER — IPRATROPIUM BROMIDE AND ALBUTEROL SULFATE 2.5; .5 MG/3ML; MG/3ML
3 SOLUTION RESPIRATORY (INHALATION)
Status: DISCONTINUED | OUTPATIENT
Start: 2024-01-12 | End: 2024-01-12

## 2024-01-12 RX ORDER — SODIUM CHLORIDE 9 MG/ML
INJECTION, SOLUTION INTRAVENOUS PRN
Status: DISCONTINUED | OUTPATIENT
Start: 2024-01-12 | End: 2024-01-16 | Stop reason: HOSPADM

## 2024-01-12 RX ORDER — PREDNISONE 20 MG/1
40 TABLET ORAL DAILY
Status: DISCONTINUED | OUTPATIENT
Start: 2024-01-12 | End: 2024-01-16 | Stop reason: HOSPADM

## 2024-01-12 RX ORDER — ACETAMINOPHEN 325 MG/1
650 TABLET ORAL EVERY 6 HOURS PRN
Status: DISCONTINUED | OUTPATIENT
Start: 2024-01-12 | End: 2024-01-16 | Stop reason: HOSPADM

## 2024-01-12 RX ORDER — LANOLIN ALCOHOL/MO/W.PET/CERES
100 CREAM (GRAM) TOPICAL DAILY
Status: DISCONTINUED | OUTPATIENT
Start: 2024-01-12 | End: 2024-01-16 | Stop reason: HOSPADM

## 2024-01-12 RX ORDER — ACETAMINOPHEN 650 MG/1
650 SUPPOSITORY RECTAL EVERY 6 HOURS PRN
Status: DISCONTINUED | OUTPATIENT
Start: 2024-01-12 | End: 2024-01-16 | Stop reason: HOSPADM

## 2024-01-12 RX ADMIN — MAGNESIUM SULFATE HEPTAHYDRATE 2000 MG: 40 INJECTION, SOLUTION INTRAVENOUS at 14:09

## 2024-01-12 RX ADMIN — AZITHROMYCIN MONOHYDRATE 500 MG: 500 INJECTION, POWDER, LYOPHILIZED, FOR SOLUTION INTRAVENOUS at 14:56

## 2024-01-12 RX ADMIN — SODIUM CHLORIDE 1000 ML: 9 INJECTION, SOLUTION INTRAVENOUS at 20:41

## 2024-01-12 RX ADMIN — METHYLPREDNISOLONE SODIUM SUCCINATE 125 MG: 125 INJECTION, POWDER, FOR SOLUTION INTRAMUSCULAR; INTRAVENOUS at 14:10

## 2024-01-12 RX ADMIN — SODIUM CHLORIDE 1000 ML: 9 INJECTION, SOLUTION INTRAVENOUS at 17:17

## 2024-01-12 RX ADMIN — SODIUM CHLORIDE: 9 INJECTION, SOLUTION INTRAVENOUS at 23:06

## 2024-01-12 RX ADMIN — ENOXAPARIN SODIUM 30 MG: 100 INJECTION SUBCUTANEOUS at 20:43

## 2024-01-12 RX ADMIN — IPRATROPIUM BROMIDE AND ALBUTEROL SULFATE 3 DOSE: 2.5; .5 SOLUTION RESPIRATORY (INHALATION) at 14:05

## 2024-01-12 RX ADMIN — SODIUM CHLORIDE: 9 INJECTION, SOLUTION INTRAVENOUS at 19:16

## 2024-01-12 RX ADMIN — CEFTRIAXONE 1000 MG: 1 INJECTION, POWDER, FOR SOLUTION INTRAMUSCULAR; INTRAVENOUS at 14:10

## 2024-01-12 RX ADMIN — SODIUM CHLORIDE 1362 ML: 9 INJECTION, SOLUTION INTRAVENOUS at 14:55

## 2024-01-12 RX ADMIN — SODIUM CHLORIDE: 9 INJECTION, SOLUTION INTRAVENOUS at 20:40

## 2024-01-12 NOTE — H&P
Results   Component Value Date/Time    TROPONINT 0.027 07/03/2023 04:37 AM    TROPONINT 0.041 07/03/2023 02:30 AM    TROPONINT 0.052 07/02/2023 08:25 PM     Lactic Acid: No results for input(s): \"LACTA\" in the last 72 hours.  BNP: No results for input(s): \"PROBNP\" in the last 72 hours.  UA:  Lab Results   Component Value Date/Time    NITRU NEGATIVE 11/07/2023 02:00 PM    COLORU YELLOW 11/07/2023 02:00 PM    WBCUA 3 11/07/2023 02:00 PM    RBCUA 0 11/07/2023 02:00 PM    MUCUS RARE 07/02/2023 06:57 PM    TRICHOMONAS NONE SEEN 07/02/2023 06:57 PM    BACTERIA RARE 11/07/2023 02:00 PM    CLARITYU CLEAR 11/07/2023 02:00 PM    SPECGRAV 1.025 11/07/2023 02:00 PM    LEUKOCYTESUR NEGATIVE 11/07/2023 02:00 PM    UROBILINOGEN 0.2 11/07/2023 02:00 PM    BILIRUBINUR NEGATIVE 11/07/2023 02:00 PM    BLOODU TRACE 11/07/2023 02:00 PM    KETUA 40 11/07/2023 02:00 PM     Urine Cultures: No results found for: \"LABURIN\"  Blood Cultures: No results found for: \"BC\"  No results found for: \"BLOODCULT2\"  Organism:   Lab Results   Component Value Date/Time    ORG ST 11/27/2018 03:00 AM             Electronically signed by Leeann Harris MD on 1/12/2024 at 5:13 PM

## 2024-01-12 NOTE — ED PROVIDER NOTES
range)   ipratropium 0.5 mg-albuterol 2.5 mg (DUONEB) nebulizer solution 3 Dose (has no administration in time range)   magnesium sulfate 2000 mg in 50 mL IVPB premix (has no administration in time range)       Independent Imaging Interpretation by me: CXR with large LLL pna    Chronic conditions affecting care: respiratory failure with hypoxemia acute on chronic    Discussion with Other Profesionals : Admitting Team (hospitalist)    Social Determinants : None    Records Reviewed : None    Disposition Considerations (tests considered but not done, Shared Decision Making, Pt Expectation of Test or Tx.):   Patient admitted.  Questions sought and answered with the patient. They voice understanding and agree with plan.     I am the Primary Clinician of Record.    Is this patient to be included in the SEP-1 Core Measure due to severe sepsis or septic shock?   Yes   SEP-1 CORE MEASURE DATA      Sepsis Criteria   Severe Sepsis Criteria   Septic Shock Criteria     Must be confirmed or suspected to move forward with diagnosis of sepsis.    Must meet 2:    [] Temperature > 100.9 F (38.3 C)        or < 96.8 F (36 C)  [] HR > 90  [] RR > 20  [] WBC > 12 or < 4 or 10% bands      AND:      [] Infection Confirmed or        Suspected.     Must meet 1:    [] Lactate > 2       or   [] Signs of Organ Dysfunction:    - SBP < 90 or MAP < 65  - Altered mental status  - Creatinine > 2 or increased from      baseline  - Urine Output < 0.5 ml/kg/hr  - Bilirubin > 2  - INR > 1.5 (not anticoagulated)  - Platelets < 100,000  - Acute Respiratory Failure as     evidenced by new need for NIPPV     or mechanical ventilation      [] No criteria met for Severe Sepsis.   Must meet 1:    [] Lactate > 4        or   [] SBP < 90 or MAP < 65 for at        least two readings in the first        hour after fluid bolus        administration      [] Vasopressors initiated (if hypotension persists after fluid resuscitation)        [] No criteria met for Septic

## 2024-01-12 NOTE — ED NOTES
3rd green top has been sent to lab. Dr. Ashanti jackson.   
Dr. Burrell at bedside  
Medication History  [facility]    Patient Name: Stoney Moreno 1955     Medication history has been completed by: Razia Angelo CPhT    Source(s) of information: Medication list provided by [facility]     Primary Care Physician: Giovanni Beckwith MD     Pharmacy:     Allergies as of 01/12/2024    (No Known Allergies)        Prior to Admission medications    Medication Sig Start Date End Date Taking? Authorizing Provider   folic acid (FOLVITE) 400 MCG tablet Take 1 tablet by mouth daily    Juanita Chauhan MD   aspirin 81 MG chewable tablet Take 1 tablet by mouth daily 7/12/23   Renate Grover MD   budesonide-formoterol (SYMBICORT) 160-4.5 MCG/ACT AERO Inhale 2 puffs into the lungs in the morning and 2 puffs in the evening. 7/11/23   Renate Grover MD   ipratropium 0.5 mg-albuterol 2.5 mg (DUONEB) 0.5-2.5 (3) MG/3ML SOLN nebulizer solution Inhale 3 mLs into the lungs every 4 hours (while awake) 7/11/23   Renate Grover MD   thiamine 100 MG tablet Take 1 tablet by mouth daily 6/3/23   Cassie Nation MD   oxyCODONE HCl (OXY-IR) 10 MG immediate release tablet Take 1 tablet by mouth every 6 hours as needed. 4/20/23   Juanita Chauhan MD   Ascorbic Acid (VITAMIN C) 1000 MG tablet Take 1 tablet by mouth daily    Juanita Chauhan MD   albuterol sulfate HFA (PROVENTIL;VENTOLIN;PROAIR) 108 (90 Base) MCG/ACT inhaler INHALE 2 PUFFS EVERY 6 HOURS AS NEEDED FOR WHEEZING 12/15/22   Giovanni Beckwith MD   predniSONE (DELTASONE) 5 MG tablet TAKE 1 TABLET EVERY DAY 6/27/22   Kunal Anna MD   Cholecalciferol (VITAMIN D3) 125 MCG (5000 UT) CAPS Take 1 capsule by mouth daily    Juanita Chauhan MD   Multiple Vitamins-Minerals (MULTIVITAMIN ADULTS PO) Take by mouth daily     Juanita Chauhan MD     Medications removed from list (include reason, ex. noncompliance, medication cost, therapy complete etc.):   Tumeric not on nursing facility med 
Pt spo2 80% with a good pleth, pt FIO2 increased to 100$. RT White at bedside  
Pt to ED with complaint of respiratory distress. Per EMS, pt was at nursing facility, on 2L o2 via NC, was tripoding in bed with a spo2 in the 50's. Pt spo2 was in the 70's upon arrival with EMS at 4L o2 via NC.   
O2 Device: PAP (positive airway pressure)    Cardiac Rhythm: sinus tach  Last documented pain medication administered: na  NIH Score: NIH     Active LDA's:   Peripheral IV 01/12/24 Right Forearm (Active)   Site Assessment Clean, dry & intact 01/12/24 1401   Line Status Normal saline locked 01/12/24 1401   Phlebitis Assessment No symptoms 01/12/24 1401   Infiltration Assessment 0 01/12/24 1401   Alcohol Cap Used No 01/12/24 1401   Dressing Status New dressing applied 01/12/24 1401   Dressing Type Transparent 01/12/24 1401   Dressing Intervention New 01/12/24 1401       Peripheral IV 01/12/24 Left (Active)   Site Assessment Clean, dry & intact 01/12/24 1402   Line Status Normal saline locked 01/12/24 1402   Phlebitis Assessment No symptoms 01/12/24 1402   Infiltration Assessment 0 01/12/24 1402   Alcohol Cap Used No 01/12/24 1402   Dressing Status New dressing applied 01/12/24 1402   Dressing Type Transparent 01/12/24 1402   Dressing Intervention New 01/12/24 1402       Pertinent or High Risk Medications/Drips: no   If Yes, please provide details:   Blood Product Administration: no  If Yes, please provide details:     Recommendation    Incomplete orders repeat lactic   Additional Comments: pt on BIPAP, continues to pull at mask.    If any further questions, please call Sending RN at [phone]    Electronically signed by: Electronically signed by Tg Cervantes RN on 1/12/2024 at 6:25 PM

## 2024-01-12 NOTE — PROGRESS NOTES
1855  Received to room slide over to bed. Pt will open eyes but non verbal.  Bi-Pap on  correct settings and functioning. no distress noted. Monitor placed on pt call light in reach laying on left side.

## 2024-01-12 NOTE — PROGRESS NOTES
Patient was transported from ER to 3 on BiPAP 16/10. 100%. No respiratory complications in route. He remains on Bipap at this time.

## 2024-01-12 NOTE — CARE COORDINATION
MCG criteria for Respiratory Failure reviewed at this time, criteria supports Inpatient Admission. DAMIEN,RN/CM

## 2024-01-13 LAB
ANION GAP SERPL CALCULATED.3IONS-SCNC: 7 MMOL/L (ref 7–16)
BASE EXCESS MIXED: 10.4 (ref 0–3)
BASE EXCESS MIXED: 7.1 (ref 0–3)
BASOPHILS ABSOLUTE: 0.1 K/CU MM
BASOPHILS RELATIVE PERCENT: 0.3 % (ref 0–1)
BILIRUBIN URINE: NEGATIVE MG/DL
BLOOD, URINE: NEGATIVE
BUN SERPL-MCNC: 11 MG/DL (ref 6–23)
CALCIUM SERPL-MCNC: 8.3 MG/DL (ref 8.3–10.6)
CARBON MONOXIDE, BLOOD: 1.6 % (ref 0–5)
CHLORIDE BLD-SCNC: 100 MMOL/L (ref 99–110)
CLARITY: CLEAR
CO2 CONTENT: 38.8 MMOL/L (ref 21–32)
CO2: 32 MMOL/L (ref 21–32)
COLOR: YELLOW
COMMENT UA: NORMAL
COMMENT: ABNORMAL
COMMENT: ABNORMAL
CREAT SERPL-MCNC: 0.5 MG/DL (ref 0.9–1.3)
DIFFERENTIAL TYPE: ABNORMAL
EKG ATRIAL RATE: 115 BPM
EKG DIAGNOSIS: NORMAL
EKG P AXIS: 90 DEGREES
EKG P-R INTERVAL: 142 MS
EKG Q-T INTERVAL: 326 MS
EKG QRS DURATION: 88 MS
EKG QTC CALCULATION (BAZETT): 450 MS
EKG R AXIS: 19 DEGREES
EKG T AXIS: 85 DEGREES
EKG VENTRICULAR RATE: 115 BPM
EOSINOPHILS ABSOLUTE: 0 K/CU MM
EOSINOPHILS RELATIVE PERCENT: 0 % (ref 0–3)
GFR SERPL CREATININE-BSD FRML MDRD: >60 ML/MIN/1.73M2
GLUCOSE SERPL-MCNC: 95 MG/DL (ref 70–99)
GLUCOSE, URINE: NEGATIVE MG/DL
HCO3 ARTERIAL: 36.5 MMOL/L (ref 21–28)
HCO3 VENOUS: 40.3 MMOL/L (ref 22–29)
HCT VFR BLD CALC: 36 % (ref 42–52)
HEMOGLOBIN: 10.3 GM/DL (ref 13.5–18)
IMMATURE NEUTROPHIL %: 1.1 % (ref 0–0.43)
KETONES, URINE: NEGATIVE MG/DL
L PNEUMO AG UR QL IA: NEGATIVE
LEUKOCYTE ESTERASE, URINE: NEGATIVE
LYMPHOCYTES ABSOLUTE: 0.6 K/CU MM
LYMPHOCYTES RELATIVE PERCENT: 1.7 % (ref 24–44)
MCH RBC QN AUTO: 26.8 PG (ref 27–31)
MCHC RBC AUTO-ENTMCNC: 28.6 % (ref 32–36)
MCV RBC AUTO: 93.8 FL (ref 78–100)
METHEMOGLOBIN ARTERIAL: 1.4 %
MONOCYTES ABSOLUTE: 1.3 K/CU MM
MONOCYTES RELATIVE PERCENT: 4 % (ref 0–4)
MRSA, DNA, NASAL: ABNORMAL
NITRITE URINE, QUANTITATIVE: NEGATIVE
NUCLEATED RBC %: 0 %
O2 SAT, VEN: 51.6 % (ref 50–70)
O2 SATURATION: 95.9 % (ref 94–98)
PCO2 ARTERIAL: 76 MMHG (ref 35–48)
PCO2, VEN: 82 MMHG (ref 41–51)
PDW BLD-RTO: 15 % (ref 11.7–14.9)
PH BLOOD: 7.29 (ref 7.35–7.45)
PH VENOUS: 7.3 (ref 7.32–7.43)
PH, URINE: 5.5 (ref 5–8)
PLATELET # BLD: 303 K/CU MM (ref 140–440)
PMV BLD AUTO: 9.4 FL (ref 7.5–11.1)
PO2 ARTERIAL: 125 MMHG (ref 83–108)
PO2, VEN: 35 MMHG (ref 28–48)
POTASSIUM SERPL-SCNC: 4.7 MMOL/L (ref 3.5–5.1)
PROTEIN UA: NEGATIVE MG/DL
RBC # BLD: 3.84 M/CU MM (ref 4.6–6.2)
SEGMENTED NEUTROPHILS ABSOLUTE COUNT: 29.4 K/CU MM
SEGMENTED NEUTROPHILS RELATIVE PERCENT: 92.9 % (ref 36–66)
SODIUM BLD-SCNC: 139 MMOL/L (ref 135–145)
SPECIFIC GRAVITY UA: >1.03 (ref 1–1.03)
SPECIMEN DESCRIPTION: ABNORMAL
TOTAL IMMATURE NEUTOROPHIL: 0.36 K/CU MM
TOTAL NUCLEATED RBC: 0 K/CU MM
UROBILINOGEN, URINE: 0.2 MG/DL (ref 0.2–1)
WBC # BLD: 31.6 K/CU MM (ref 4–10.5)

## 2024-01-13 PROCEDURE — 82805 BLOOD GASES W/O2 SATURATION: CPT

## 2024-01-13 PROCEDURE — 2580000003 HC RX 258

## 2024-01-13 PROCEDURE — 2580000003 HC RX 258: Performed by: INTERNAL MEDICINE

## 2024-01-13 PROCEDURE — 6360000002 HC RX W HCPCS: Performed by: STUDENT IN AN ORGANIZED HEALTH CARE EDUCATION/TRAINING PROGRAM

## 2024-01-13 PROCEDURE — 2700000000 HC OXYGEN THERAPY PER DAY

## 2024-01-13 PROCEDURE — 94640 AIRWAY INHALATION TREATMENT: CPT

## 2024-01-13 PROCEDURE — 92610 EVALUATE SWALLOWING FUNCTION: CPT | Performed by: SPEECH-LANGUAGE PATHOLOGIST

## 2024-01-13 PROCEDURE — 93010 ELECTROCARDIOGRAM REPORT: CPT | Performed by: INTERNAL MEDICINE

## 2024-01-13 PROCEDURE — 2580000003 HC RX 258: Performed by: STUDENT IN AN ORGANIZED HEALTH CARE EDUCATION/TRAINING PROGRAM

## 2024-01-13 PROCEDURE — 94660 CPAP INITIATION&MGMT: CPT

## 2024-01-13 PROCEDURE — 87449 NOS EACH ORGANISM AG IA: CPT

## 2024-01-13 PROCEDURE — 2140000000 HC CCU INTERMEDIATE R&B

## 2024-01-13 PROCEDURE — 82803 BLOOD GASES ANY COMBINATION: CPT

## 2024-01-13 PROCEDURE — 6370000000 HC RX 637 (ALT 250 FOR IP): Performed by: STUDENT IN AN ORGANIZED HEALTH CARE EDUCATION/TRAINING PROGRAM

## 2024-01-13 PROCEDURE — 94761 N-INVAS EAR/PLS OXIMETRY MLT: CPT

## 2024-01-13 PROCEDURE — 36600 WITHDRAWAL OF ARTERIAL BLOOD: CPT

## 2024-01-13 PROCEDURE — 81003 URINALYSIS AUTO W/O SCOPE: CPT

## 2024-01-13 PROCEDURE — 36415 COLL VENOUS BLD VENIPUNCTURE: CPT

## 2024-01-13 PROCEDURE — 80048 BASIC METABOLIC PNL TOTAL CA: CPT

## 2024-01-13 PROCEDURE — 85025 COMPLETE CBC W/AUTO DIFF WBC: CPT

## 2024-01-13 PROCEDURE — 6360000002 HC RX W HCPCS: Performed by: INTERNAL MEDICINE

## 2024-01-13 PROCEDURE — 6360000002 HC RX W HCPCS

## 2024-01-13 RX ORDER — AZITHROMYCIN 250 MG/1
500 TABLET, FILM COATED ORAL DAILY
Status: COMPLETED | OUTPATIENT
Start: 2024-01-14 | End: 2024-01-16

## 2024-01-13 RX ADMIN — ALBUTEROL SULFATE 2 PUFF: 90 AEROSOL, METERED RESPIRATORY (INHALATION) at 02:59

## 2024-01-13 RX ADMIN — VANCOMYCIN HYDROCHLORIDE 1000 MG: 1 INJECTION, POWDER, LYOPHILIZED, FOR SOLUTION INTRAVENOUS at 17:59

## 2024-01-13 RX ADMIN — OXYCODONE HYDROCHLORIDE 10 MG: 10 TABLET ORAL at 22:20

## 2024-01-13 RX ADMIN — Medication 100 MG: at 10:34

## 2024-01-13 RX ADMIN — SODIUM CHLORIDE, PRESERVATIVE FREE 10 ML: 5 INJECTION INTRAVENOUS at 21:05

## 2024-01-13 RX ADMIN — OXYCODONE HYDROCHLORIDE 10 MG: 10 TABLET ORAL at 10:38

## 2024-01-13 RX ADMIN — IPRATROPIUM BROMIDE AND ALBUTEROL SULFATE 1 DOSE: 2.5; .5 SOLUTION RESPIRATORY (INHALATION) at 15:37

## 2024-01-13 RX ADMIN — GUAIFENESIN AND DEXTROMETHORPHAN 5 ML: 100; 10 SYRUP ORAL at 16:39

## 2024-01-13 RX ADMIN — AZITHROMYCIN MONOHYDRATE 500 MG: 500 INJECTION, POWDER, LYOPHILIZED, FOR SOLUTION INTRAVENOUS at 14:59

## 2024-01-13 RX ADMIN — CEFTRIAXONE 1000 MG: 1 INJECTION, POWDER, FOR SOLUTION INTRAMUSCULAR; INTRAVENOUS at 14:10

## 2024-01-13 RX ADMIN — CEFEPIME 2000 MG: 2 INJECTION, POWDER, FOR SOLUTION INTRAVENOUS at 21:04

## 2024-01-13 RX ADMIN — PREDNISONE 40 MG: 20 TABLET ORAL at 10:34

## 2024-01-13 RX ADMIN — IPRATROPIUM BROMIDE AND ALBUTEROL SULFATE 1 DOSE: 2.5; .5 SOLUTION RESPIRATORY (INHALATION) at 22:21

## 2024-01-13 RX ADMIN — IPRATROPIUM BROMIDE AND ALBUTEROL SULFATE 1 DOSE: 2.5; .5 SOLUTION RESPIRATORY (INHALATION) at 07:20

## 2024-01-13 RX ADMIN — ASPIRIN 81 MG: 81 TABLET, CHEWABLE ORAL at 10:34

## 2024-01-13 RX ADMIN — OXYCODONE HYDROCHLORIDE 10 MG: 10 TABLET ORAL at 16:39

## 2024-01-13 RX ADMIN — ALBUTEROL SULFATE 2 PUFF: 90 AEROSOL, METERED RESPIRATORY (INHALATION) at 17:50

## 2024-01-13 RX ADMIN — IPRATROPIUM BROMIDE AND ALBUTEROL SULFATE 1 DOSE: 2.5; .5 SOLUTION RESPIRATORY (INHALATION) at 11:19

## 2024-01-13 ASSESSMENT — PAIN SCALES - GENERAL
PAINLEVEL_OUTOF10: 9
PAINLEVEL_OUTOF10: 9
PAINLEVEL_OUTOF10: 7
PAINLEVEL_OUTOF10: 8

## 2024-01-13 ASSESSMENT — PAIN DESCRIPTION - DESCRIPTORS
DESCRIPTORS: ACHING;BURNING;THROBBING
DESCRIPTORS: ACHING
DESCRIPTORS: ACHING;DISCOMFORT;SORE
DESCRIPTORS: ACHING

## 2024-01-13 ASSESSMENT — PAIN - FUNCTIONAL ASSESSMENT
PAIN_FUNCTIONAL_ASSESSMENT: PREVENTS OR INTERFERES SOME ACTIVE ACTIVITIES AND ADLS
PAIN_FUNCTIONAL_ASSESSMENT: ACTIVITIES ARE NOT PREVENTED
PAIN_FUNCTIONAL_ASSESSMENT: ACTIVITIES ARE NOT PREVENTED

## 2024-01-13 ASSESSMENT — PAIN DESCRIPTION - LOCATION
LOCATION: NECK;ARM
LOCATION: NECK

## 2024-01-13 ASSESSMENT — PAIN DESCRIPTION - ORIENTATION: ORIENTATION: RIGHT

## 2024-01-13 NOTE — PROGRESS NOTES
Pt iv infiltrated- zithromax infusing- pt arm swollen, red and warm and c/o pain. Dr velazquez notified. This nurse also called pharmacy to see if anything special needed to be done.

## 2024-01-13 NOTE — FLOWSHEET NOTE
This RN into room as RT alerted staff patient pulled out IV to LFA.  Placed dressing and attached IVF to RFA PIV.  This RN walked out of room to obtain a saline flush and during this time, patient pulled out second IV to RFA.  Patient now without IV access at this time until IV access can be established again.

## 2024-01-13 NOTE — PROGRESS NOTES
Comprehensive Nutrition Assessment    Type and Reason for Visit:  Initial, Positive Nutrition Screen (BMI 16)    Nutrition Recommendations/Plan:   Continue regular diet   Ordered high calorie ensure TID      Malnutrition Assessment:  Malnutrition Status:  Insufficient data (01/13/24 4322)    Context:  Chronic Illness       Nutrition Assessment:    Pt admitted with COPD exacerbation. H/o severe malnutrition. Will order ensure as during previous hospital stays. No PO intake recorded yet this admission. Unable to meet with pt at bedside today.    Nutrition Related Findings:    +thiamin, folic acid, steroid, atb. 4L oxyen Wound Type: None       Current Nutrition Intake & Therapies:    Average Meal Intake: Unable to assess  Average Supplements Intake: None Ordered  ADULT DIET; Regular    Anthropometric Measures:  Height: 167.6 cm (5' 6\")  Ideal Body Weight (IBW): 142 lbs (65 kg)    Admission Body Weight: 45.4 kg (100 lb 1.4 oz)  Current Body Weight: 45.4 kg (100 lb 1.4 oz), 70.5 % IBW. Weight Source: Bed Scale  Current BMI (kg/m2): 16.2  Usual Body Weight: 43.9 kg (96 lb 12.5 oz) (July 2023)  % Weight Change (Calculated): 3.4                    BMI Categories: Underweight (BMI less than 18.5)    Estimated Daily Nutrient Needs:  Energy Requirements Based On: Kcal/kg  Weight Used for Energy Requirements: Current  Energy (kcal/day): 0819-3955  Weight Used for Protein Requirements: Current  Protein (g/day): 55-65  Method Used for Fluid Requirements: 1 ml/kcal  Fluid (ml/day): 8023-3695    Nutrition Diagnosis:   Inadequate protein-energy intake related to impaired respiratory function as evidenced by BMI    Nutrition Interventions:   Food and/or Nutrient Delivery: Continue Current Diet, Start Oral Nutrition Supplement  Nutrition Education/Counseling: No recommendation at this time  Coordination of Nutrition Care: Continue to monitor while inpatient       Goals:     Goals: PO intake 75% or greater, by next RD assessment

## 2024-01-13 NOTE — PROGRESS NOTES
SLP ALL NOTES  Facility/Department: [address]   CLINICAL BEDSIDE SWALLOW EVALUATION    NAME: Stoney Moreno  : 1955  MRN: [id number]    ADMISSION DATE: 2024  ADMITTING DIAGNOSIS: has Tobacco abuse disorder; COPD, severe (HCC); Cervical radiculopathy; Dupuytren's contracture; Weight loss; Alcohol abuse; Hand muscle weakness; Pain management; Chronic respiratory failure ([facility]); Current moderate episode of major depressive disorder without prior episode (HCC); Adjustment insomnia; COPD exacerbation ([facility]); Acute on chronic respiratory failure with hypoxia ([facility]); Nicotine abuse; Pneumonia of right lung due to infectious organism, unspecified part of lung; Acute respiratory failure ([facility]); Lung nodule; Hip fracture requiring operative repair, right, closed, initial encounter ([facility]); Admitted with pneumonia; Severe malnutrition ([facility]); and Hypercapnic respiratory failure ([facility]) on their problem list.    Impression  Dysphagia Diagnosis: Swallow function appears WFL;No clinical indicators of dysphagia  Dysphagia Outcome Severity Scale: Level 7: Normal in all situations     Stoney Moreno was seen for a clinical bedside swallow evaluation following admission for Acute on chronic respiratory failure, Sepsis, Acute COPD exacerbation, and possible PNA.  H/o severe COPD, resides in LTC.  Pt was alert, pleasant, and cooperative.  Now tolerating O2 via nc, off BiPAP.  He followed all complex commands for the oral mechanism exam which was WNL.  Vocal quality was reduced in intensity, cough strength was WNL.  PO trials of thins by straw, pureed and regular solids completed.  Mildly reduced mastication for regular solids with no dentition.  Pt reports he can eat most regular solids but independently avoids problematic textures that he cannot chew.  Pt's pharyngeal swallow appears WFL with likely normal swallow timing and 0 s/s aspiration.  Hyolaryngeal elevation/excursion appeared to be WFL.    Recommend:  Easy to Chew/Thins.  Pt

## 2024-01-13 NOTE — PROGRESS NOTES
V2.0    [facility]      Name:  Stoney Moreno /Age/Sex: 1955  (68 y.o. male)   MRN & CSN:  [id number] & [id number] Encounter Date/Time: 2024 7:15 AM EST   Location:  [facility]- PCP: Giovanni Beckwith MD     Attending:Mark Caban MD       Hospital Day: 2    Assessment and Recommendations       Stoney Moreno is a 68 y.o. male with a history of atrial fibrillation not on anticoagulation at this time, COPD who presented to [facility] 2024 with shortness of breath     Acute on chronic respiratory failure with hypoxia on 2 L nasal cannula at baseline however presented with worsening hypoxia on that  Started BiPAP  Chest x-ray done showed left basilar opacity suspicious for pneumonia with small left-sided pleural effusion  ABG ordered     Sepsis probably secondary from pneumonia presented with hypotension tachycardia and tachypnea  Chest x-ray shows evidence of pneumonia  Lactic acidosis  Started on fluids  Azithromycin and Rocephin given  Leukocytosis present     Acute COPD exacerbation with underlying pneumonia started on breathing treatments and prednisone 40 mg     Acute hypotension possibly secondary from sepsis  Acute lactic acidosis  Acute sinusvtachycardia possibly secondary from sepsis versus concerns for PE will check D-dimer           Diet ADULT DIET; Regular  ADULT ORAL NUTRITION SUPPLEMENT; Breakfast, Lunch, Dinner; Standard High Calorie/High Protein Oral Supplement   DVT Prophylaxis [x] Lovenox, []  Heparin, [] SCDs, [] Ambulation,  [] Eliquis, [] Xarelto  [] Coumadin   Code Status Full Code   Disposition From: [facility] - permanent resident  Expected Disposition: same  Estimated Date of Discharge: In about 3 days  Patient requires continued admission due to respiratory failure   Surrogate Decision Maker/ Ede Sanderson (Other) is listed as the alternate contact on the chart       Subjective:     Chief Complaint:     Stoney Moreno is a 68 y.o. male       Chief Complaint

## 2024-01-14 LAB
ANION GAP SERPL CALCULATED.3IONS-SCNC: 2 MMOL/L (ref 7–16)
BASOPHILS ABSOLUTE: 0 K/CU MM
BASOPHILS RELATIVE PERCENT: 0.1 % (ref 0–1)
BUN SERPL-MCNC: 8 MG/DL (ref 6–23)
CALCIUM SERPL-MCNC: 8.2 MG/DL (ref 8.3–10.6)
CHLORIDE BLD-SCNC: 99 MMOL/L (ref 99–110)
CO2: 38 MMOL/L (ref 21–32)
CREAT SERPL-MCNC: 0.3 MG/DL (ref 0.9–1.3)
CRP SERPL HS-MCNC: 225.3 MG/L
DIFFERENTIAL TYPE: ABNORMAL
EOSINOPHILS ABSOLUTE: 0 K/CU MM
EOSINOPHILS RELATIVE PERCENT: 0 % (ref 0–3)
GFR SERPL CREATININE-BSD FRML MDRD: >60 ML/MIN/1.73M2
GLUCOSE SERPL-MCNC: 108 MG/DL (ref 70–99)
HCT VFR BLD CALC: 30.7 % (ref 42–52)
HEMOGLOBIN: 8.8 GM/DL (ref 13.5–18)
IMMATURE NEUTROPHIL %: 1 % (ref 0–0.43)
LYMPHOCYTES ABSOLUTE: 0.9 K/CU MM
LYMPHOCYTES RELATIVE PERCENT: 4.6 % (ref 24–44)
MCH RBC QN AUTO: 26.4 PG (ref 27–31)
MCHC RBC AUTO-ENTMCNC: 28.7 % (ref 32–36)
MCV RBC AUTO: 92.2 FL (ref 78–100)
MONOCYTES ABSOLUTE: 1.3 K/CU MM
MONOCYTES RELATIVE PERCENT: 6.9 % (ref 0–4)
NUCLEATED RBC %: 0 %
PDW BLD-RTO: 15 % (ref 11.7–14.9)
PLATELET # BLD: 241 K/CU MM (ref 140–440)
PMV BLD AUTO: 9.1 FL (ref 7.5–11.1)
POTASSIUM SERPL-SCNC: 3.7 MMOL/L (ref 3.5–5.1)
PROCALCITONIN SERPL-MCNC: 4.09 NG/ML
RBC # BLD: 3.33 M/CU MM (ref 4.6–6.2)
SEGMENTED NEUTROPHILS ABSOLUTE COUNT: 16.7 K/CU MM
SEGMENTED NEUTROPHILS RELATIVE PERCENT: 87.4 % (ref 36–66)
SODIUM BLD-SCNC: 139 MMOL/L (ref 135–145)
TOTAL IMMATURE NEUTOROPHIL: 0.2 K/CU MM
TOTAL NUCLEATED RBC: 0 K/CU MM
WBC # BLD: 19.2 K/CU MM (ref 4–10.5)

## 2024-01-14 PROCEDURE — 6360000002 HC RX W HCPCS: Performed by: INTERNAL MEDICINE

## 2024-01-14 PROCEDURE — 2580000003 HC RX 258: Performed by: INTERNAL MEDICINE

## 2024-01-14 PROCEDURE — 80048 BASIC METABOLIC PNL TOTAL CA: CPT

## 2024-01-14 PROCEDURE — 2700000000 HC OXYGEN THERAPY PER DAY

## 2024-01-14 PROCEDURE — 6360000002 HC RX W HCPCS: Performed by: STUDENT IN AN ORGANIZED HEALTH CARE EDUCATION/TRAINING PROGRAM

## 2024-01-14 PROCEDURE — 6370000000 HC RX 637 (ALT 250 FOR IP): Performed by: STUDENT IN AN ORGANIZED HEALTH CARE EDUCATION/TRAINING PROGRAM

## 2024-01-14 PROCEDURE — 86140 C-REACTIVE PROTEIN: CPT

## 2024-01-14 PROCEDURE — 2140000000 HC CCU INTERMEDIATE R&B

## 2024-01-14 PROCEDURE — 94640 AIRWAY INHALATION TREATMENT: CPT

## 2024-01-14 PROCEDURE — 2580000003 HC RX 258

## 2024-01-14 PROCEDURE — 2580000003 HC RX 258: Performed by: STUDENT IN AN ORGANIZED HEALTH CARE EDUCATION/TRAINING PROGRAM

## 2024-01-14 PROCEDURE — 6370000000 HC RX 637 (ALT 250 FOR IP): Performed by: INTERNAL MEDICINE

## 2024-01-14 PROCEDURE — 6360000002 HC RX W HCPCS

## 2024-01-14 PROCEDURE — 94761 N-INVAS EAR/PLS OXIMETRY MLT: CPT

## 2024-01-14 PROCEDURE — 36415 COLL VENOUS BLD VENIPUNCTURE: CPT

## 2024-01-14 PROCEDURE — 85025 COMPLETE CBC W/AUTO DIFF WBC: CPT

## 2024-01-14 PROCEDURE — 84145 PROCALCITONIN (PCT): CPT

## 2024-01-14 RX ORDER — FOLIC ACID 1 MG/1
500 TABLET ORAL DAILY
Status: DISCONTINUED | OUTPATIENT
Start: 2024-01-14 | End: 2024-01-16 | Stop reason: HOSPADM

## 2024-01-14 RX ADMIN — ASPIRIN 81 MG: 81 TABLET, CHEWABLE ORAL at 09:11

## 2024-01-14 RX ADMIN — AZITHROMYCIN DIHYDRATE 500 MG: 250 TABLET ORAL at 09:11

## 2024-01-14 RX ADMIN — Medication 100 MG: at 09:10

## 2024-01-14 RX ADMIN — VANCOMYCIN HYDROCHLORIDE 1000 MG: 1 INJECTION, POWDER, LYOPHILIZED, FOR SOLUTION INTRAVENOUS at 02:56

## 2024-01-14 RX ADMIN — IPRATROPIUM BROMIDE AND ALBUTEROL SULFATE 1 DOSE: 2.5; .5 SOLUTION RESPIRATORY (INHALATION) at 20:27

## 2024-01-14 RX ADMIN — FOLIC ACID 500 MCG: 1 TABLET ORAL at 10:47

## 2024-01-14 RX ADMIN — OXYCODONE HYDROCHLORIDE 10 MG: 10 TABLET ORAL at 12:25

## 2024-01-14 RX ADMIN — SODIUM CHLORIDE, PRESERVATIVE FREE 10 ML: 5 INJECTION INTRAVENOUS at 09:11

## 2024-01-14 RX ADMIN — IPRATROPIUM BROMIDE AND ALBUTEROL SULFATE 1 DOSE: 2.5; .5 SOLUTION RESPIRATORY (INHALATION) at 15:54

## 2024-01-14 RX ADMIN — IPRATROPIUM BROMIDE AND ALBUTEROL SULFATE 1 DOSE: 2.5; .5 SOLUTION RESPIRATORY (INHALATION) at 11:48

## 2024-01-14 RX ADMIN — GUAIFENESIN AND DEXTROMETHORPHAN 5 ML: 100; 10 SYRUP ORAL at 20:24

## 2024-01-14 RX ADMIN — CEFEPIME 2000 MG: 2 INJECTION, POWDER, FOR SOLUTION INTRAVENOUS at 04:11

## 2024-01-14 RX ADMIN — SODIUM CHLORIDE, PRESERVATIVE FREE 10 ML: 5 INJECTION INTRAVENOUS at 20:20

## 2024-01-14 RX ADMIN — CEFEPIME 2000 MG: 2 INJECTION, POWDER, FOR SOLUTION INTRAVENOUS at 12:28

## 2024-01-14 RX ADMIN — PREDNISONE 40 MG: 20 TABLET ORAL at 09:11

## 2024-01-14 RX ADMIN — CEFEPIME 2000 MG: 2 INJECTION, POWDER, FOR SOLUTION INTRAVENOUS at 21:37

## 2024-01-14 RX ADMIN — OXYCODONE HYDROCHLORIDE 10 MG: 10 TABLET ORAL at 05:41

## 2024-01-14 RX ADMIN — ENOXAPARIN SODIUM 30 MG: 100 INJECTION SUBCUTANEOUS at 09:11

## 2024-01-14 RX ADMIN — IPRATROPIUM BROMIDE AND ALBUTEROL SULFATE 1 DOSE: 2.5; .5 SOLUTION RESPIRATORY (INHALATION) at 07:58

## 2024-01-14 ASSESSMENT — PAIN - FUNCTIONAL ASSESSMENT
PAIN_FUNCTIONAL_ASSESSMENT: PREVENTS OR INTERFERES WITH MANY ACTIVE NOT PASSIVE ACTIVITIES
PAIN_FUNCTIONAL_ASSESSMENT: PREVENTS OR INTERFERES SOME ACTIVE ACTIVITIES AND ADLS

## 2024-01-14 ASSESSMENT — PAIN SCALES - GENERAL
PAINLEVEL_OUTOF10: 9
PAINLEVEL_OUTOF10: 0
PAINLEVEL_OUTOF10: 9

## 2024-01-14 ASSESSMENT — PAIN DESCRIPTION - DESCRIPTORS
DESCRIPTORS: ACHING;SHARP;THROBBING
DESCRIPTORS: ACHING;DISCOMFORT

## 2024-01-14 ASSESSMENT — PAIN DESCRIPTION - LOCATION
LOCATION: NECK
LOCATION: NECK

## 2024-01-14 ASSESSMENT — PAIN DESCRIPTION - ORIENTATION: ORIENTATION: RIGHT;LEFT;ANTERIOR;POSTERIOR

## 2024-01-14 NOTE — PROGRESS NOTES
V2.0    [facility] [place] Progress Note      Name:  Stoney Moreno /Age/Sex: 1955  (68 y.o. male)   MRN & CSN:  [id number] & [id number] Encounter Date/Time: 2024 7:15 AM EST   [facility]  [facility] PCP: Giovanni Beckwith MD     Attending:Mark Caban MD       Hospital Day: 3    Assessment and Recommendations       Stoney Moreno is a 68 y.o. male with a history of atrial fibrillation not on anticoagulation at this time, COPD who presented to [facility] 2024 with shortness of breath       -MRSA screen came back positive yesterday  -Continue IV vancomycin     Acute on chronic respiratory failure with hypoxia on 2 L nasal cannula at baseline however presented with worsening hypoxia on that  Started BiPAP  Chest x-ray done showed left basilar opacity suspicious for pneumonia with small left-sided pleural effusion  ABG ordered     Sepsis probably secondary from pneumonia presented with hypotension tachycardia and tachypnea  Chest x-ray shows evidence of pneumonia  Lactic acidosis  Started on fluids  Azithromycin and Rocephin given  Leukocytosis present     Acute COPD exacerbation with underlying pneumonia started on breathing treatments and prednisone 40 mg     Acute hypotension possibly secondary from sepsis  Acute lactic acidosis  Acute sinusvtachycardia possibly secondary from sepsis versus concerns for PE will check D-dimer           Diet ADULT DIET; Regular  ADULT ORAL NUTRITION SUPPLEMENT; Breakfast, Lunch, Dinner; Standard High Calorie/High Protein Oral Supplement   DVT Prophylaxis [x] Lovenox, []  Heparin, [] SCDs, [] Ambulation,  [] Eliquis, [] Xarelto  [] Coumadin   Code Status Full Code   Disposition From: [facility] - permanent resident  Expected Disposition: same  Estimated Date of Discharge: In about 3 days  Patient requires continued admission due to respiratory failure   Surrogate Decision Maker/ POA  Ede Dior (Other) is listed as the alternate contact on the chart

## 2024-01-14 NOTE — PROGRESS NOTES
pulmonary      SUBJECTIVE:  seen early am and sleeping     OBJECTIVE    VITALS:  /60   Pulse 93   Temp 97.6 °F (36.4 °C) (Oral)   Resp 24   Ht 1.676 m (5' 6\")   Wt 46.7 kg (102 lb 14.4 oz)   SpO2 92%   BMI 16.61 kg/m²   HEAD AND FACE EXAM:  No throat injection, no active exudate,no thrush  NECK EXAM;No JVD, no masses, symmetrical  CHEST EXAM; Expansion equal and symmetrical, no masses  LUNG EXAM; Good breath sounds bilaterally. There are expiratory wheezes both lungs, there are crackles at both lung bases  CARDIOVASCULAR EXAM: Positive S1 and S2, no S3 or S4, no clicks ,no murmurs  RIGHT AND LEFT LOWER EXTRIMITY EXAM: No edema, no swelling, no           LABS   Lab Results   Component Value Date    WBC 19.2 (H) 01/14/2024    HGB 8.8 (L) 01/14/2024    HCT 30.7 (L) 01/14/2024    MCV 92.2 01/14/2024     01/14/2024     Lab Results   Component Value Date    CREATININE 0.3 (L) 01/14/2024    BUN 8 01/14/2024     01/14/2024    K 3.7 01/14/2024    CL 99 01/14/2024    CO2 38 (H) 01/14/2024     Lab Results   Component Value Date    INR 0.9 11/07/2023    PROTIME 13.0 11/07/2023          Lab Results   Component Value Date/Time    PHOS 3.5 11/17/2023 08:01 AM    PHOS 3.5 11/16/2023 05:34 AM    PHOS 3.6 11/15/2023 04:35 AM        Recent Labs     01/12/24 1915   PH 7.29*   PO2ART 125*   CBW0BGI 76.0*   O2SAT 95.9         Wt Readings from Last 3 Encounters:   01/13/24 46.7 kg (102 lb 14.4 oz)   11/20/23 45.3 kg (99 lb 13.9 oz)   11/07/23 49.4 kg (109 lb)               ASSESMENT  Ac on ch resp failure  Pneumonia  Ac copd        PLAN  Bd rx  Antibx  On po steroids now    1/14/2024  Kunal Anna MD, MKATERYNA.

## 2024-01-15 ENCOUNTER — APPOINTMENT (OUTPATIENT)
Dept: GENERAL RADIOLOGY | Age: 69
DRG: 871 | End: 2024-01-15
Payer: MEDICARE

## 2024-01-15 LAB
ANION GAP SERPL CALCULATED.3IONS-SCNC: 1 MMOL/L (ref 7–16)
BASOPHILS ABSOLUTE: 0 K/CU MM
BASOPHILS RELATIVE PERCENT: 0.1 % (ref 0–1)
BUN SERPL-MCNC: 10 MG/DL (ref 6–23)
CALCIUM SERPL-MCNC: 8.2 MG/DL (ref 8.3–10.6)
CHLORIDE BLD-SCNC: 101 MMOL/L (ref 99–110)
CO2: 41 MMOL/L (ref 21–32)
CREAT SERPL-MCNC: 0.3 MG/DL (ref 0.9–1.3)
DIFFERENTIAL TYPE: ABNORMAL
DOSE AMOUNT: ABNORMAL
DOSE AMOUNT: ABNORMAL
DOSE TIME: ABNORMAL
DOSE TIME: ABNORMAL
EOSINOPHILS ABSOLUTE: 0 K/CU MM
EOSINOPHILS RELATIVE PERCENT: 0 % (ref 0–3)
GFR SERPL CREATININE-BSD FRML MDRD: >60 ML/MIN/1.73M2
GLUCOSE SERPL-MCNC: 120 MG/DL (ref 70–99)
HCT VFR BLD CALC: 28.5 % (ref 42–52)
HEMOGLOBIN: 8.1 GM/DL (ref 13.5–18)
IMMATURE NEUTROPHIL %: 0.3 % (ref 0–0.43)
LYMPHOCYTES ABSOLUTE: 0.9 K/CU MM
LYMPHOCYTES RELATIVE PERCENT: 7.6 % (ref 24–44)
MCH RBC QN AUTO: 26.6 PG (ref 27–31)
MCHC RBC AUTO-ENTMCNC: 28.4 % (ref 32–36)
MCV RBC AUTO: 93.4 FL (ref 78–100)
MONOCYTES ABSOLUTE: 1 K/CU MM
MONOCYTES RELATIVE PERCENT: 8.8 % (ref 0–4)
NUCLEATED RBC %: 0 %
PDW BLD-RTO: 15 % (ref 11.7–14.9)
PLATELET # BLD: 243 K/CU MM (ref 140–440)
PMV BLD AUTO: 9.2 FL (ref 7.5–11.1)
POTASSIUM SERPL-SCNC: 4.1 MMOL/L (ref 3.5–5.1)
RBC # BLD: 3.05 M/CU MM (ref 4.6–6.2)
SEGMENTED NEUTROPHILS ABSOLUTE COUNT: 9.9 K/CU MM
SEGMENTED NEUTROPHILS RELATIVE PERCENT: 83.2 % (ref 36–66)
SODIUM BLD-SCNC: 143 MMOL/L (ref 135–145)
TOTAL IMMATURE NEUTOROPHIL: 0.04 K/CU MM
TOTAL NUCLEATED RBC: 0 K/CU MM
VANCOMYCIN TROUGH: 46.4 UG/ML (ref 10–20)
VANCOMYCIN TROUGH: 6.6 UG/ML (ref 10–20)
WBC # BLD: 11.9 K/CU MM (ref 4–10.5)

## 2024-01-15 PROCEDURE — 2580000003 HC RX 258: Performed by: STUDENT IN AN ORGANIZED HEALTH CARE EDUCATION/TRAINING PROGRAM

## 2024-01-15 PROCEDURE — 85025 COMPLETE CBC W/AUTO DIFF WBC: CPT

## 2024-01-15 PROCEDURE — 6370000000 HC RX 637 (ALT 250 FOR IP): Performed by: STUDENT IN AN ORGANIZED HEALTH CARE EDUCATION/TRAINING PROGRAM

## 2024-01-15 PROCEDURE — 94761 N-INVAS EAR/PLS OXIMETRY MLT: CPT

## 2024-01-15 PROCEDURE — 6360000002 HC RX W HCPCS

## 2024-01-15 PROCEDURE — 6370000000 HC RX 637 (ALT 250 FOR IP): Performed by: INTERNAL MEDICINE

## 2024-01-15 PROCEDURE — 6360000002 HC RX W HCPCS: Performed by: INTERNAL MEDICINE

## 2024-01-15 PROCEDURE — 36415 COLL VENOUS BLD VENIPUNCTURE: CPT

## 2024-01-15 PROCEDURE — 71045 X-RAY EXAM CHEST 1 VIEW: CPT

## 2024-01-15 PROCEDURE — 94640 AIRWAY INHALATION TREATMENT: CPT

## 2024-01-15 PROCEDURE — 6360000002 HC RX W HCPCS: Performed by: STUDENT IN AN ORGANIZED HEALTH CARE EDUCATION/TRAINING PROGRAM

## 2024-01-15 PROCEDURE — 2580000003 HC RX 258

## 2024-01-15 PROCEDURE — 2700000000 HC OXYGEN THERAPY PER DAY

## 2024-01-15 PROCEDURE — 80202 ASSAY OF VANCOMYCIN: CPT

## 2024-01-15 PROCEDURE — 2580000003 HC RX 258: Performed by: INTERNAL MEDICINE

## 2024-01-15 PROCEDURE — 2140000000 HC CCU INTERMEDIATE R&B

## 2024-01-15 PROCEDURE — 80048 BASIC METABOLIC PNL TOTAL CA: CPT

## 2024-01-15 RX ADMIN — IPRATROPIUM BROMIDE AND ALBUTEROL SULFATE 1 DOSE: 2.5; .5 SOLUTION RESPIRATORY (INHALATION) at 21:42

## 2024-01-15 RX ADMIN — CEFEPIME 2000 MG: 2 INJECTION, POWDER, FOR SOLUTION INTRAVENOUS at 04:40

## 2024-01-15 RX ADMIN — FOLIC ACID 500 MCG: 1 TABLET ORAL at 08:18

## 2024-01-15 RX ADMIN — GUAIFENESIN AND DEXTROMETHORPHAN 5 ML: 100; 10 SYRUP ORAL at 12:34

## 2024-01-15 RX ADMIN — AZITHROMYCIN DIHYDRATE 500 MG: 250 TABLET ORAL at 08:18

## 2024-01-15 RX ADMIN — SODIUM CHLORIDE, PRESERVATIVE FREE 10 ML: 5 INJECTION INTRAVENOUS at 08:20

## 2024-01-15 RX ADMIN — VANCOMYCIN HYDROCHLORIDE 1000 MG: 1 INJECTION, POWDER, LYOPHILIZED, FOR SOLUTION INTRAVENOUS at 18:21

## 2024-01-15 RX ADMIN — PREDNISONE 40 MG: 20 TABLET ORAL at 08:18

## 2024-01-15 RX ADMIN — OXYCODONE HYDROCHLORIDE 10 MG: 10 TABLET ORAL at 19:14

## 2024-01-15 RX ADMIN — GUAIFENESIN AND DEXTROMETHORPHAN 5 ML: 100; 10 SYRUP ORAL at 20:20

## 2024-01-15 RX ADMIN — OXYCODONE HYDROCHLORIDE 10 MG: 10 TABLET ORAL at 08:18

## 2024-01-15 RX ADMIN — IPRATROPIUM BROMIDE AND ALBUTEROL SULFATE 1 DOSE: 2.5; .5 SOLUTION RESPIRATORY (INHALATION) at 17:13

## 2024-01-15 RX ADMIN — Medication 100 MG: at 08:19

## 2024-01-15 RX ADMIN — GUAIFENESIN AND DEXTROMETHORPHAN 5 ML: 100; 10 SYRUP ORAL at 08:18

## 2024-01-15 RX ADMIN — CEFEPIME 2000 MG: 2 INJECTION, POWDER, FOR SOLUTION INTRAVENOUS at 12:31

## 2024-01-15 RX ADMIN — OXYCODONE HYDROCHLORIDE 10 MG: 10 TABLET ORAL at 00:37

## 2024-01-15 RX ADMIN — IPRATROPIUM BROMIDE AND ALBUTEROL SULFATE 1 DOSE: 2.5; .5 SOLUTION RESPIRATORY (INHALATION) at 12:13

## 2024-01-15 RX ADMIN — ASPIRIN 81 MG: 81 TABLET, CHEWABLE ORAL at 08:18

## 2024-01-15 RX ADMIN — CEFEPIME 2000 MG: 2 INJECTION, POWDER, FOR SOLUTION INTRAVENOUS at 20:20

## 2024-01-15 RX ADMIN — VANCOMYCIN HYDROCHLORIDE 1000 MG: 1 INJECTION, POWDER, LYOPHILIZED, FOR SOLUTION INTRAVENOUS at 02:41

## 2024-01-15 RX ADMIN — IPRATROPIUM BROMIDE AND ALBUTEROL SULFATE 1 DOSE: 2.5; .5 SOLUTION RESPIRATORY (INHALATION) at 07:54

## 2024-01-15 ASSESSMENT — PAIN DESCRIPTION - ORIENTATION: ORIENTATION: MID

## 2024-01-15 ASSESSMENT — PAIN SCALES - GENERAL
PAINLEVEL_OUTOF10: 8
PAINLEVEL_OUTOF10: 0
PAINLEVEL_OUTOF10: 8
PAINLEVEL_OUTOF10: 9

## 2024-01-15 ASSESSMENT — PAIN DESCRIPTION - LOCATION
LOCATION: NECK

## 2024-01-15 ASSESSMENT — PAIN DESCRIPTION - DESCRIPTORS
DESCRIPTORS: ACHING;SHARP
DESCRIPTORS: ACHING;THROBBING
DESCRIPTORS: ACHING;SHARP

## 2024-01-15 NOTE — PROGRESS NOTES
V2.0    [facility] [place] Progress Note      Name:  Stoney Moreno /Age/Sex: 1955  (68 y.o. male)   MRN & CSN:  [id number] & [id number] Encounter Date/Time: 1/15/2024 7:15 AM EST   Location:  [address] [place], [place] [address]- PCP: Giovanni Beckwith MD     Attending:Mark Caban MD       Hospital Day: 4    Assessment and Recommendations       Stoney Moreno is a 68 y.o. male with a history of atrial fibrillation not on anticoagulation at this time, COPD who presented to [facility] 2024 with shortness of breath    January 15  -He had an episode of shortness of breath this morning and oxygen had to be turned up  -Oxygen has been turned back down  -He reports that at baseline for the last 3 months that his ability to ambulate has been limited to very short distances within his room due to dyspnea and hip pain status post hip fracture last May  -He is in long-term care  -He reports that he is able to travel in a wheelchair down to the dining room  -Anticipate discharge possibly within 48 hours if his respiratory status is stable  -Due to positive MRSA screen he may need to be discharged possibly on a 2-week anti-MRSA regimen  -Consider oral Zyvox upon discharge if there are no drug interactions  -Need CBC monitored every week while on Zyvox if Zyvox prescribed    -MRSA screen came back positive yesterday  -Continue IV vancomycin     Acute on chronic respiratory failure with hypoxia on 2 L nasal cannula at baseline however presented with worsening hypoxia on that  Started BiPAP  Chest x-ray done showed left basilar opacity suspicious for pneumonia with small left-sided pleural effusion  ABG ordered     Sepsis probably secondary from pneumonia presented with hypotension tachycardia and tachypnea  Chest x-ray shows evidence of pneumonia  Lactic acidosis  Started on fluids  Azithromycin and Rocephin given  Leukocytosis present     Acute COPD exacerbation with underlying pneumonia started on breathing treatments and

## 2024-01-15 NOTE — PROGRESS NOTES
Pt has refused the Biap machine.    Pt does not want to wear the Bipap machine.    RRT on nights took the Bipap machine out of the room, this evening.    Pt will not wear the Bipap machine or mask.    Pt tolerating on his 3lpm NC, SaO2 97% on the 3lpm NC.

## 2024-01-15 NOTE — PROGRESS NOTES
pulmonary      SUBJECTIVE:  seen early am and sleeping     OBJECTIVE    VITALS:  /68   Pulse (!) 108   Temp 98.6 °F (37 °C) (Oral)   Resp 27   Ht 1.676 m (5' 6\")   Wt 46.7 kg (102 lb 14.4 oz)   SpO2 (!) 83%   BMI 16.61 kg/m²   HEAD AND FACE EXAM:  No throat injection, no active exudate,no thrush  NECK EXAM;No JVD, no masses, symmetrical  CHEST EXAM; Expansion equal and symmetrical, no masses  LUNG EXAM; Good breath sounds bilaterally. There are expiratory wheezes both lungs, there are crackles at both lung bases  CARDIOVASCULAR EXAM: Positive S1 and S2, no S3 or S4, no clicks ,no murmurs  RIGHT AND LEFT LOWER EXTRIMITY EXAM: No edema, no swelling,           LABS   Lab Results   Component Value Date    WBC 11.9 (H) 01/15/2024    HGB 8.1 (L) 01/15/2024    HCT 28.5 (L) 01/15/2024    MCV 93.4 01/15/2024     01/15/2024     Lab Results   Component Value Date    CREATININE 0.3 (L) 01/15/2024    BUN 10 01/15/2024     01/15/2024    K 4.1 01/15/2024     01/15/2024    CO2 41 (H) 01/15/2024     Lab Results   Component Value Date    INR 0.9 11/07/2023    PROTIME 13.0 11/07/2023          Lab Results   Component Value Date/Time    PHOS 3.5 11/17/2023 08:01 AM    PHOS 3.5 11/16/2023 05:34 AM    PHOS 3.6 11/15/2023 04:35 AM        Recent Labs     01/12/24 1915   PH 7.29*   PO2ART 125*   YJS2SGO 76.0*   O2SAT 95.9         Wt Readings from Last 3 Encounters:   01/13/24 46.7 kg (102 lb 14.4 oz)   11/20/23 45.3 kg (99 lb 13.9 oz)   11/07/23 49.4 kg (109 lb)               ASSESMENT  Ac on ch resp failure  Pneumonia  Ac copd        PLAN  Bd rx  Steroids  O2 adm    1/15/2024  Kunal Anna MD, MKATERYNA.

## 2024-01-15 NOTE — PROGRESS NOTES
Pt does not have PFTs that are current. Last one was possibly in 2014, but cannot find record. Please schedule patient for PFT once discharge and recovery have happened.

## 2024-01-16 VITALS
TEMPERATURE: 97.7 F | BODY MASS INDEX: 16.54 KG/M2 | SYSTOLIC BLOOD PRESSURE: 130 MMHG | WEIGHT: 102.9 LBS | OXYGEN SATURATION: 94 % | HEART RATE: 99 BPM | RESPIRATION RATE: 21 BRPM | HEIGHT: 66 IN | DIASTOLIC BLOOD PRESSURE: 72 MMHG

## 2024-01-16 LAB
ANION GAP SERPL CALCULATED.3IONS-SCNC: 6 MMOL/L (ref 7–16)
BASOPHILS ABSOLUTE: 0 K/CU MM
BASOPHILS RELATIVE PERCENT: 0.1 % (ref 0–1)
BUN SERPL-MCNC: 9 MG/DL (ref 6–23)
CALCIUM SERPL-MCNC: 8.3 MG/DL (ref 8.3–10.6)
CHLORIDE BLD-SCNC: 94 MMOL/L (ref 99–110)
CO2: 43 MMOL/L (ref 21–32)
CREAT SERPL-MCNC: 0.3 MG/DL (ref 0.9–1.3)
DIFFERENTIAL TYPE: ABNORMAL
DOSE AMOUNT: NORMAL
DOSE TIME: NORMAL
EOSINOPHILS ABSOLUTE: 0 K/CU MM
EOSINOPHILS RELATIVE PERCENT: 0 % (ref 0–3)
GFR SERPL CREATININE-BSD FRML MDRD: >60 ML/MIN/1.73M2
GLUCOSE SERPL-MCNC: 101 MG/DL (ref 70–99)
HCT VFR BLD CALC: 32.8 % (ref 42–52)
HEMOGLOBIN: 9.3 GM/DL (ref 13.5–18)
IMMATURE NEUTROPHIL %: 0.4 % (ref 0–0.43)
LYMPHOCYTES ABSOLUTE: 1.4 K/CU MM
LYMPHOCYTES RELATIVE PERCENT: 12.3 % (ref 24–44)
M PNEUMO IGG SER IA-ACNC: 0.03 U/L
M PNEUMO IGM SER IA-ACNC: 0.1 U/L
MCH RBC QN AUTO: 26.5 PG (ref 27–31)
MCHC RBC AUTO-ENTMCNC: 28.4 % (ref 32–36)
MCV RBC AUTO: 93.4 FL (ref 78–100)
MONOCYTES ABSOLUTE: 0.9 K/CU MM
MONOCYTES RELATIVE PERCENT: 7.6 % (ref 0–4)
NUCLEATED RBC %: 0 %
PDW BLD-RTO: 14.9 % (ref 11.7–14.9)
PLATELET # BLD: 310 K/CU MM (ref 140–440)
PMV BLD AUTO: 9.1 FL (ref 7.5–11.1)
POTASSIUM SERPL-SCNC: 4 MMOL/L (ref 3.5–5.1)
RBC # BLD: 3.51 M/CU MM (ref 4.6–6.2)
SEGMENTED NEUTROPHILS ABSOLUTE COUNT: 9.1 K/CU MM
SEGMENTED NEUTROPHILS RELATIVE PERCENT: 79.6 % (ref 36–66)
SODIUM BLD-SCNC: 143 MMOL/L (ref 135–145)
TOTAL IMMATURE NEUTOROPHIL: 0.04 K/CU MM
TOTAL NUCLEATED RBC: 0 K/CU MM
VANCOMYCIN RANDOM: 7.8 UG/ML
WBC # BLD: 11.4 K/CU MM (ref 4–10.5)

## 2024-01-16 PROCEDURE — 85025 COMPLETE CBC W/AUTO DIFF WBC: CPT

## 2024-01-16 PROCEDURE — 80048 BASIC METABOLIC PNL TOTAL CA: CPT

## 2024-01-16 PROCEDURE — 6370000000 HC RX 637 (ALT 250 FOR IP): Performed by: STUDENT IN AN ORGANIZED HEALTH CARE EDUCATION/TRAINING PROGRAM

## 2024-01-16 PROCEDURE — 6360000002 HC RX W HCPCS: Performed by: STUDENT IN AN ORGANIZED HEALTH CARE EDUCATION/TRAINING PROGRAM

## 2024-01-16 PROCEDURE — 2700000000 HC OXYGEN THERAPY PER DAY

## 2024-01-16 PROCEDURE — 94640 AIRWAY INHALATION TREATMENT: CPT

## 2024-01-16 PROCEDURE — 2580000003 HC RX 258: Performed by: INTERNAL MEDICINE

## 2024-01-16 PROCEDURE — 80202 ASSAY OF VANCOMYCIN: CPT

## 2024-01-16 PROCEDURE — 6360000002 HC RX W HCPCS: Performed by: INTERNAL MEDICINE

## 2024-01-16 PROCEDURE — 6370000000 HC RX 637 (ALT 250 FOR IP): Performed by: INTERNAL MEDICINE

## 2024-01-16 PROCEDURE — 2580000003 HC RX 258

## 2024-01-16 PROCEDURE — 94761 N-INVAS EAR/PLS OXIMETRY MLT: CPT

## 2024-01-16 PROCEDURE — 6360000002 HC RX W HCPCS

## 2024-01-16 PROCEDURE — 2580000003 HC RX 258: Performed by: STUDENT IN AN ORGANIZED HEALTH CARE EDUCATION/TRAINING PROGRAM

## 2024-01-16 PROCEDURE — 36415 COLL VENOUS BLD VENIPUNCTURE: CPT

## 2024-01-16 RX ORDER — DOXYCYCLINE HYCLATE 100 MG
100 TABLET ORAL 2 TIMES DAILY
Qty: 20 TABLET | Refills: 0 | Status: SHIPPED | OUTPATIENT
Start: 2024-01-16 | End: 2024-01-26

## 2024-01-16 RX ADMIN — PREDNISONE 40 MG: 20 TABLET ORAL at 09:07

## 2024-01-16 RX ADMIN — OXYCODONE HYDROCHLORIDE 10 MG: 10 TABLET ORAL at 12:22

## 2024-01-16 RX ADMIN — VANCOMYCIN HYDROCHLORIDE 750 MG: 750 INJECTION, POWDER, LYOPHILIZED, FOR SOLUTION INTRAVENOUS at 11:13

## 2024-01-16 RX ADMIN — Medication 100 MG: at 09:08

## 2024-01-16 RX ADMIN — SODIUM CHLORIDE, PRESERVATIVE FREE 10 ML: 5 INJECTION INTRAVENOUS at 09:07

## 2024-01-16 RX ADMIN — CEFEPIME 2000 MG: 2 INJECTION, POWDER, FOR SOLUTION INTRAVENOUS at 12:18

## 2024-01-16 RX ADMIN — FOLIC ACID 500 MCG: 1 TABLET ORAL at 09:08

## 2024-01-16 RX ADMIN — ASPIRIN 81 MG: 81 TABLET, CHEWABLE ORAL at 09:07

## 2024-01-16 RX ADMIN — OXYCODONE HYDROCHLORIDE 10 MG: 10 TABLET ORAL at 02:09

## 2024-01-16 RX ADMIN — IPRATROPIUM BROMIDE AND ALBUTEROL SULFATE 1 DOSE: 2.5; .5 SOLUTION RESPIRATORY (INHALATION) at 11:25

## 2024-01-16 RX ADMIN — IPRATROPIUM BROMIDE AND ALBUTEROL SULFATE 1 DOSE: 2.5; .5 SOLUTION RESPIRATORY (INHALATION) at 07:28

## 2024-01-16 RX ADMIN — GUAIFENESIN AND DEXTROMETHORPHAN 5 ML: 100; 10 SYRUP ORAL at 12:13

## 2024-01-16 RX ADMIN — AZITHROMYCIN DIHYDRATE 500 MG: 250 TABLET ORAL at 09:07

## 2024-01-16 ASSESSMENT — PAIN DESCRIPTION - DESCRIPTORS
DESCRIPTORS: ACHING
DESCRIPTORS: ACHING

## 2024-01-16 ASSESSMENT — PAIN SCALES - GENERAL
PAINLEVEL_OUTOF10: 0
PAINLEVEL_OUTOF10: 9
PAINLEVEL_OUTOF10: 8

## 2024-01-16 ASSESSMENT — PAIN DESCRIPTION - LOCATION
LOCATION: NECK
LOCATION: NECK

## 2024-01-16 ASSESSMENT — PAIN - FUNCTIONAL ASSESSMENT: PAIN_FUNCTIONAL_ASSESSMENT: ACTIVITIES ARE NOT PREVENTED

## 2024-01-16 NOTE — PROGRESS NOTES
Called Whitley Santamaria  three times to give report and was kept on the phone till transportation arrived. Got patient ready and he has left with transportation. Still on the phone waiting for a nurse to answer that I may give report. No one answered. Charge Nurse notified.

## 2024-01-16 NOTE — CONSULTS
Subjective:   CHIEF COMPLAINT / HPI:  68 year old male with worsening sob and this is accomapnied by wheeze. He has cough with some yellow sputum. He denies any fever or hemoptysis.      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 On 7/22/16. Dr Becerra at [facility]    Colon polyps 9-    Dr. Driver    COPD (chronic obstructive pulmonary disease) ([facility]) 5/19/2014    PFTs showed severe COPD in 5/14- follows with Dr Anna    COPD exacerbation ([facility]) 6/10/2016    Diverticulosis 9-    Dupuytren's contracture 5/26/2017    Right palm    Elevated PSA 12/21/2018 12/2018 : PSA is 6    H/O colonoscopy 9-18-14    Dr. Driver    H/O echocardiogram 08/17/2016    EF 55-60%. Abnormal diastolic function. Mild TR. Borderline right atrium and RV dilatation.     Hiatal hernia     History of alcohol abuse     \"average 6 pk beer per day\"    History of drug abuse ([facility])     does use marijuana\"last used cocaine over 10 yrs ago\" per pt on 10/3/2018    Internal hemorrhoids 9-    Intervertebral disc protrusion 5/19/2014    Cervical region on MRI    Lingular pneumonia 4/3/2018    Lymphocytic colitis 9-18-14    Dr. Driver, will treat if symptomatic    On home oxygen therapy     2l/nc at night only    Pneumonia 11/11/2021    Pneumonia of left lower lobe due to infectious organism 11/27/2018    Sepsis due to pneumonia ([facility]) 6/10/2016    Spinal stenosis in cervical region 5/19/2014    MRI showed  Disc protrusion. Severe spinal stenosis at C3-C4    Spinal stenosis of cervical region 3/14/2016    S/P cervical discectomy and fusion with plate and screw. On 7/22/16. Dr Becerra at [facility]    Tobacco abuse disorder 5/19/2014    Wears glasses     to read       Past Surgical History:      
Consult completed. Procedure/rationale explained to pt & consent obtained. #22ga [employer] high-pressure-injectable PIV initiated without difficulty/complications. Pt tolerated well and no other c/o or needs noted or reported. Cindy BARR notified.       
IV consult cancelled, PIV obtained by floor staff.  
IV consult from 1/14 @ 2143 cancelled.  New access obtained by floor staff.  
PHARMACY VANCOMYCIN MONITORING SERVICE  Pharmacy consulted by Dr. Caban for monitoring and adjustment.    Indication for treatment: Vancomycin indication: CAP with risk factors  Goal trough: Trough Goal: 15-20 mcg/mL  AUC/CARLY: 400-600    Risk Factors for MRSA Identified:   Documented isolation of MRSA within 1 year , Hospitalization within the past 90 days, Received IV antibiotics within the past 90 days    Pertinent Laboratory Values:   Temp Readings from Last 3 Encounters:   01/13/24 97.9 °F (36.6 °C) (Oral)   11/20/23 98.6 °F (37 °C) (Oral)   11/07/23 97.5 °F (36.4 °C) (Oral)     Recent Labs     01/12/24  1351 01/12/24  2220 01/13/24  0202   WBC 34.8*  --  31.6*   LACTATE  --  2.1*  --      Recent Labs     01/12/24  1552 01/13/24  0202   BUN 9 11   CREATININE 0.5* 0.5*     Estimated Creatinine Clearance: 93 mL/min (A) (based on SCr of 0.5 mg/dL (L)).    Intake/Output Summary (Last 24 hours) at 1/13/2024 1426  Last data filed at 1/13/2024 1216  Gross per 24 hour   Intake --   Output 200 ml   Net -200 ml       Pertinent Cultures:   Date    Source    Results  1/12   Blood    In process  1/12   MRSA Nasal   Positive  1/12                            Resp Panel                             Negative  1/12                            Strep pneumo/Legionella        Negative    Vancomycin level:   TROUGH:  No results for input(s): \"VANCOTROUGH\" in the last 72 hours.  RANDOM:  No results for input(s): \"VANCORANDOM\" in the last 72 hours.    Assessment:  HPI: Acute on chronic respiratory failure with hypoxia on 2 L nasal cannula at baseline however presented with worsening hypoxia on that, started BiPAP.  Chest x-ray done showed left basilar opacity suspicious for pneumonia with small left-sided pleural effusion.  Sepsis probably secondary from pneumonia presented with hypotension tachycardia and tachypnea.  Chest x-ray shows evidence of pneumonia.  Lactic acidosis.  SCr, BUN, and urine output: SCR at baseline of 0.5, Limited 
PHARMACY VANCOMYCIN MONITORING SERVICE  Pharmacy consulted by Dr. Caban for monitoring and adjustment.    Indication for treatment: Vancomycin indication: CAP with risk factors  Goal trough: Trough Goal: 15-20 mcg/mL  AUC/CARLY: 400-600    Risk Factors for MRSA Identified:   Documented isolation of MRSA within 1 year , Hospitalization within the past 90 days, Received IV antibiotics within the past 90 days    Pertinent Laboratory Values:   Temp Readings from Last 3 Encounters:   01/14/24 97.6 °F (36.4 °C) (Oral)   11/20/23 98.6 °F (37 °C) (Oral)   11/07/23 97.5 °F (36.4 °C) (Oral)     Recent Labs     01/12/24  1351 01/12/24  2220 01/13/24  0202 01/14/24  0547   WBC 34.8*  --  31.6* 19.2*   LACTATE  --  2.1*  --   --        Recent Labs     01/12/24  1552 01/13/24  0202 01/14/24  0547   BUN 9 11 8   CREATININE 0.5* 0.5* 0.3*       Estimated Creatinine Clearance: 160 mL/min (A) (based on SCr of 0.3 mg/dL (L)).    Intake/Output Summary (Last 24 hours) at 1/14/2024 1528  Last data filed at 1/14/2024 1247  Gross per 24 hour   Intake 120 ml   Output 1780 ml   Net -1660 ml         Pertinent Cultures:   Date    Source    Results  1/12   Blood    No growth  1/12   MRSA Nasal   Positive  1/12                            Resp Panel                             Negative  1/12                            Strep pneumo/Legionella        Negative    Vancomycin level:   TROUGH:  No results for input(s): \"VANCOTROUGH\" in the last 72 hours.  RANDOM:  No results for input(s): \"VANCORANDOM\" in the last 72 hours.    Assessment:  HPI: Acute on chronic respiratory failure with hypoxia on 2 L nasal cannula at baseline however presented with worsening hypoxia on that, started BiPAP.  Chest x-ray done showed left basilar opacity suspicious for pneumonia with small left-sided pleural effusion.  Sepsis probably secondary from pneumonia presented with hypotension tachycardia and tachypnea.  Chest x-ray shows evidence of pneumonia.  Lactic 
PHARMACY VANCOMYCIN MONITORING SERVICE  Pharmacy consulted by Dr. Caban for monitoring and adjustment.    Indication for treatment: Vancomycin indication: CAP with risk factors  Goal trough: Trough Goal: 15-20 mcg/mL  AUC/CARLY: 400-600    Risk Factors for MRSA Identified:   Documented isolation of MRSA within 1 year , Hospitalization within the past 90 days, Received IV antibiotics within the past 90 days    Pertinent Laboratory Values:   Temp Readings from Last 3 Encounters:   01/16/24 98.9 °F (37.2 °C) (Oral)   11/20/23 98.6 °F (37 °C) (Oral)   11/07/23 97.5 °F (36.4 °C) (Oral)     Recent Labs     01/14/24  0547 01/15/24  0343 01/16/24  0327   WBC 19.2* 11.9* 11.4*     Recent Labs     01/14/24  0547 01/15/24  0343 01/16/24  0327   BUN 8 10 9   CREATININE 0.3* 0.3* 0.3*     Estimated Creatinine Clearance: 160 mL/min (A) (based on SCr of 0.3 mg/dL (L)).    Intake/Output Summary (Last 24 hours) at 1/16/2024 0949  Last data filed at 1/16/2024 0321  Gross per 24 hour   Intake --   Output 1950 ml   Net -1950 ml       Pertinent Cultures:   Date    Source    Results  1/12   Blood    No growth x72h  1/12   MRSA Nasal   Positive  1/12                            Resp Panel                             Negative  1/12                            Strep pneumo/Legionella        Negative    Vancomycin level:   TROUGH:    Recent Labs     01/15/24  0343 01/15/24  1435   VANCOTROUGH 46.4* 6.6*     RANDOM:    Recent Labs     01/16/24  0327   VANCORANDOM 7.8       Assessment:  HPI: Acute on chronic respiratory failure with hypoxia on 2 L nasal cannula at baseline however presented with worsening hypoxia on that, started BiPAP.  Now on 3L nasal canula. Chest x-ray done showed left basilar opacity suspicious for pneumonia with small left-sided pleural effusion.  Sepsis probably secondary from pneumonia presented with hypotension tachycardia and tachypnea.  Chest x-ray shows evidence of pneumonia.  Lactic acidosis.  SCr, BUN, and urine 
Patient had an order for folic acid 400mcg. Pharmacy does not carry this strength.     Order changed to 500mcg (1/2 tablet of 1mg).     Thank you,   Belinda Guerra RPh   
Lactic acidosis.  SCr, BUN, and urine output: SCR stable now at 0.3 today, UOP good.  Day(s) of therapy: 3 of 7  Vancomycin concentration:   1/15 - level was meant to be trough, but was drawn 2 minutes post infusion of this morning's dose.  Will reorder a random level for tomorrow AM to avoid drawing around admin.  Of note, patient missed PM dose of Vancomycin 1/14 due to infiltration of azithromycin    Plan:  Renal trends at baseline, SCR low.  Continue vancomycin 1000mg ivpb q12h for now.    Provides estimated AUC at steady state of 533  Check the vanco trough level tomorrow am.  Pharmacy will continue to monitor patient and adjust therapy as indicated    VANCOMYCIN CONCENTRATION SCHEDULED FOR 1/16 @06:00    Thank you for the consult.  Gaby Sarmiento RPH  1/15/2024 9:32 AM

## 2024-01-16 NOTE — CARE COORDINATION
Pt is a LTR at [facility] and may return when medically ready per Tita/AKIL.  No pre-cert required. Dr notified.  D/C INSTRUCTIONS ARE ON FRONT OF PACKET LOCATED WITH THE SOFT CHART.  TE

## 2024-01-16 NOTE — PROGRESS NOTES
pulmonary      SUBJECTIVE:  doing fair but slow improvement     OBJECTIVE    VITALS:  /79   Pulse 97   Temp 98.6 °F (37 °C)   Resp 23   Ht 1.676 m (5' 6\")   Wt 46.7 kg (102 lb 14.4 oz)   SpO2 100%   BMI 16.61 kg/m²   HEAD AND FACE EXAM:  No throat injection, no active exudate,no thrush  NECK EXAM;No JVD, no masses, symmetrical  CHEST EXAM; Expansion equal and symmetrical, no masses  LUNG EXAM; Good breath sounds bilaterally. There are expiratory wheezes both lungs, there are crackles at both lung bases  CARDIOVASCULAR EXAM: Positive S1 and S2, no S3 or S4, no clicks ,no murmurs  RIGHT AND LEFT LOWER EXTRIMITY EXAM: No edema, no swelling, no inflamation  CNS EXAM: Alert and oriented X3          LABS   Lab Results   Component Value Date    WBC 11.4 (H) 01/16/2024    HGB 9.3 (L) 01/16/2024    HCT 32.8 (L) 01/16/2024    MCV 93.4 01/16/2024     01/16/2024     Lab Results   Component Value Date    CREATININE 0.3 (L) 01/16/2024    BUN 9 01/16/2024     01/16/2024    K 4.0 01/16/2024    CL 94 (L) 01/16/2024    CO2 43 (H) 01/16/2024     Lab Results   Component Value Date    INR 0.9 11/07/2023    PROTIME 13.0 11/07/2023          Lab Results   Component Value Date/Time    PHOS 3.5 11/17/2023 08:01 AM    PHOS 3.5 11/16/2023 05:34 AM    PHOS 3.6 11/15/2023 04:35 AM      No results for input(s): \"PH\", \"PO2ART\", \"NNU2VZD\", \"HCO3\", \"BEART\", \"O2SAT\" in the last 72 hours.      Wt Readings from Last 3 Encounters:   01/13/24 46.7 kg (102 lb 14.4 oz)   11/20/23 45.3 kg (99 lb 13.9 oz)   11/07/23 49.4 kg (109 lb)               ASSESMENT  Ac on ch resp failure  Pneumonia  Ac copd        PLAN  Bd rx  Steroids  Cont o2    1/16/2024  Kunal Anna MD, M.D.

## 2024-01-16 NOTE — DISCHARGE INSTR - DIET

## 2024-01-16 NOTE — CARE COORDINATION
Transport arranged with [facility].  ETA is 1400.  Notified pt, pt's nurse, and Tita/ZE QUINONES

## 2024-01-16 NOTE — DISCHARGE INSTR - COC
Continuity of Care Form    Patient Name: Stoney Moreno   :  1955  MRN:  [id number]    Admit date:  2024  Discharge date: 2024      Code Status Order: Full Code   Advance Directives:     Admitting Physician:  Leeann Harris MD  PCP: Giovanni Beckwith MD    Discharging Nurse: Umm Vital RN  Discharging Hospital Unit/Room#: 3119/3119-A  Discharging Unit Phone Number: [phone]    Emergency Contact:   Extended Emergency Contact Information  Primary Emergency Contact: [place] [address] [facility]  Home Phone: [phone]  Mobile Phone: [phone]  Relation: Other  Secondary Emergency Contact: Stoney Ramirez  Home Phone: [phone]  Mobile Phone: [phone]  Relation: None    Past Surgical History:  Past Surgical History:   Procedure Laterality Date    CERVICAL SPINE SURGERY      \"have plate in neck done at [facility]    COLONOSCOPY      2014 Dr Sawant. f/u 3 years    COLONOSCOPY  10/04/2018    COLONOSCOPY N/A 10/4/2018    COLONOSCOPY POLYPECTOMY SNARE/COLD BIOPSY performed by Jayesh Zavala MD at [facility] ENDOSCOPY   recheck in 5 years    DENTAL SURGERY  1974    HIP SURGERY Right 2023    RIGHT HIP PINNING performed by Clinton Lawler DO at [facility] OR    POLYPECTOMY  10/04/2018    2 Polyps Removed    SIGMOIDOSCOPY      UPPER GASTROINTESTINAL ENDOSCOPY  10/04/2018    with Biopsy    UPPER GASTROINTESTINAL ENDOSCOPY N/A 10/4/2018    EGD BIOPSY performed by Jayesh Zavala MD at [facility] ENDOSCOPY       Immunization History:   Immunization History   Administered Date(s) Administered    COVID-19, J&J, (age 18y+), IM, 0.5 mL 2021, 2021, 2021    INFLUENZA, INTRADERMAL, QUADRIVALENT, PRESERVATIVE FREE 2016    Influenza Virus Vaccine 2015    Influenza, FLUARIX, FLULAVAL, FLUZONE (age 6 mo+) AND AFLURIA, (age 3 y+), PF, 0.5mL 2017, 10/09/2019, 2020, 2021    Influenza, Quadv, 6 mo and older, IM, PF (Flulaval, Fluarix) 2018

## 2024-01-17 LAB
CULTURE: NORMAL
Lab: NORMAL
SPECIMEN: NORMAL

## 2024-01-18 LAB
DEPRECATED S PNEUM 1 IGG SER-MCNC: 0.07 UG/ML
DEPRECATED S PNEUM12 IGG SER-MCNC: 0.18 UG/ML
DEPRECATED S PNEUM14 IGG SER-MCNC: 0.23 UG/ML
DEPRECATED S PNEUM19 IGG SER-MCNC: 0.32 UG/ML
DEPRECATED S PNEUM23 IGG SER-MCNC: 0.24 UG/ML
DEPRECATED S PNEUM3 IGG SER-MCNC: 0.41 UG/ML
DEPRECATED S PNEUM4 IGG SER-MCNC: 0.02 UG/ML
DEPRECATED S PNEUM5 IGG SER-MCNC: 0.06 UG/ML
DEPRECATED S PNEUM8 IGG SER-MCNC: 0.07 UG/ML
DEPRECATED S PNEUM9 IGG SER-MCNC: <0.05 UG/ML
S PNEUM DA 18C IGG SER-MCNC: <0.05 UG/ML
S PNEUM DA 6B IGG SER-MCNC: 0.02 UG/ML
S PNEUM DA 7F IGG SER-MCNC: 0.13 UG/ML
S PNEUM DA 9V IGG SER-MCNC: <0.03 UG/ML
S PNEUM SEROTYPE IGG SER-IMP: NORMAL

## 2024-06-07 ENCOUNTER — HOSPITAL ENCOUNTER (EMERGENCY)
Age: 69
Discharge: HOME | End: 2024-06-07
Attending: EMERGENCY MEDICINE
Payer: MEDICAID

## 2024-06-07 VITALS
SYSTOLIC BLOOD PRESSURE: 115 MMHG | HEART RATE: 84 BPM | HEIGHT: 66 IN | RESPIRATION RATE: 21 BRPM | OXYGEN SATURATION: 100 % | DIASTOLIC BLOOD PRESSURE: 73 MMHG | BODY MASS INDEX: 18.64 KG/M2 | TEMPERATURE: 98.4 F | WEIGHT: 116 LBS
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
## 2024-06-07 PROCEDURE — 6370000000 HC RX 637 (ALT 250 FOR IP): Performed by: EMERGENCY MEDICINE

## 2024-06-07 PROCEDURE — 99284 EMERGENCY DEPT VISIT MOD MDM: CPT

## 2024-06-07 RX ORDER — OXYCODONE HYDROCHLORIDE 5 MG/1
10 TABLET ORAL EVERY 6 HOURS PRN
COMMUNITY

## 2024-06-07 RX ORDER — FLUTICASONE PROPIONATE AND SALMETEROL 100; 50 UG/1; UG/1
1 POWDER RESPIRATORY (INHALATION) EVERY 12 HOURS
COMMUNITY

## 2024-06-07 RX ORDER — GUAIFENESIN/DEXTROMETHORPHAN 100-10MG/5
10 SYRUP ORAL EVERY 6 HOURS PRN
COMMUNITY

## 2024-06-07 RX ORDER — TROLAMINE SALICYLATE 10 G/100G
1 CREAM TOPICAL 2 TIMES DAILY
COMMUNITY

## 2024-06-07 RX ORDER — AMOXICILLIN AND CLAVULANATE POTASSIUM 875; 125 MG/1; MG/1
1 TABLET, FILM COATED ORAL 2 TIMES DAILY
Qty: 14 TABLET | Refills: 0 | Status: SHIPPED | OUTPATIENT
Start: 2024-06-07 | End: 2024-06-14

## 2024-06-07 RX ORDER — AMOXICILLIN AND CLAVULANATE POTASSIUM 875; 125 MG/1; MG/1
1 TABLET, FILM COATED ORAL ONCE
Status: COMPLETED | OUTPATIENT
Start: 2024-06-07 | End: 2024-06-07

## 2024-06-07 RX ADMIN — AMOXICILLIN AND CLAVULANATE POTASSIUM 1 TABLET: 875; 125 TABLET, FILM COATED ORAL at 04:26

## 2024-06-07 ASSESSMENT — PAIN SCALES - GENERAL
PAINLEVEL_OUTOF10: 8
PAINLEVEL_OUTOF10: 8

## 2024-06-07 ASSESSMENT — PAIN DESCRIPTION - LOCATION: LOCATION: ARM

## 2024-06-07 ASSESSMENT — PAIN DESCRIPTION - PAIN TYPE: TYPE: ACUTE PAIN

## 2024-06-07 ASSESSMENT — PAIN - FUNCTIONAL ASSESSMENT
PAIN_FUNCTIONAL_ASSESSMENT: 0-10
PAIN_FUNCTIONAL_ASSESSMENT: 0-10

## 2024-06-07 ASSESSMENT — PAIN DESCRIPTION - ORIENTATION: ORIENTATION: RIGHT

## 2024-06-07 NOTE — ED NOTES
Patient unable to verify medication list. States \"I don't know what they're giving me over there.\" He is a resident at [facility].

## 2024-06-07 NOTE — ED TRIAGE NOTES
Patient presents from nursing home with cat bite to right forearm. Swelling and redness noted. Patient reports pain 8/10. Incident occurred yesterday.

## 2024-06-07 NOTE — ED PROVIDER NOTES
CHIEF COMPLAINT    No chief complaint on file.    HPI  Stoney Moreno is a 69 y.o. male with history of COPD and chronic respiratory failure with oxygen dependency who presents to the ED via EMS from his nursing home facility with reports of cat bite.  The patient apparently sustained cat bite to flexor surface of right forearm yesterday.  The cat is a resident cat and is up-to-date on vaccinations.  Patient states that his tetanus status has been updated within the last 5 years.  He has complaints of minimal pain at the bite site described as a aching pain exacerbated by palpation.  Nothing makes it better.  Denies fevers, chills, numbness, tingling, nausea, vomiting      REVIEW OF SYSTEMS  Constitutional: No fever, chills   Eye: No visual changes  HENT: No earache or sore throat.  Resp: No SOB or productive cough.  Cardio: No chest pain or palpitations.  GI: No abdominal pain, nausea, vomiting, constipation or diarrhea. No melena.  : No dysuria, urgency or frequency.  Endocrine: No heat intolerance, no cold intolerance, no polydipsia   Lymphatics: No adenopathy  Musculoskeletal: No new muscle aches or joint pain.  Neuro: No headaches.  Psych: No homicidal or suicidal thoughts  Skin: Complains of cat bite to right forearm  ?  ?  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 On 7/22/16. Dr Becerra at [facility]    Colon polyps 9-    Dr. Driver    COPD (chronic obstructive pulmonary disease) ([facility]) 5/19/2014    PFTs showed severe COPD in 5/14- follows with Dr Anna    COPD exacerbation ([facility]) 6/10/2016    Diverticulosis 9-    Dupuytren's contracture 5/26/2017    Right palm    Elevated PSA 12/21/2018 12/2018 : PSA is 6    H/O colonoscopy 9-18-14    Dr. Driver     160-4.5 MCG/ACT AERO    Inhale 2 puffs into the lungs in the morning and 2 puffs in the evening.    CHOLECALCIFEROL (VITAMIN D3) 125 MCG (5000 UT) CAPS    Take 1 capsule by mouth daily    FOLIC ACID (FOLVITE) 400 MCG TABLET    Take 1 tablet by mouth daily    IPRATROPIUM 0.5 MG-ALBUTEROL 2.5 MG (DUONEB) 0.5-2.5 (3) MG/3ML SOLN NEBULIZER SOLUTION    Inhale 3 mLs into the lungs every 4 hours (while awake)    MULTIPLE VITAMINS-MINERALS (MULTIVITAMIN ADULTS PO)    Take by mouth daily     PREDNISONE (DELTASONE) 5 MG TABLET    TAKE 1 TABLET EVERY DAY    THIAMINE 100 MG TABLET    Take 1 tablet by mouth daily     ALLERGIES  No Known Allergies    Nursing notes reviewed by myself for past medical history, family history, social history, surgical history, current medications, and allergies.    PHYSICAL EXAM  VITAL SIGNS: Triage VS:    ED Triage Vitals   Enc Vitals Group      BP       Pulse       Resp       Temp       Temp src       SpO2       Weight       Height       Head Circumference       Peak Flow       Pain Score       Pain Loc       Pain Edu?       Excl. in GC?      Constitutional: Well developed, chronically ill appearing, malnourished  HENT: Normocephalic, Atraumatic, Bilateral external ears normal, Nose normal.   Eyes: Conjunctiva normal, No discharge. No scleral icterus.  Neck: Normal range of motion, No tenderness, Supple.  Lymphatic: No lymphadenopathy noted.   Cardiovascular: Normal heart rate  Thorax & Lungs: No respiratory distress  Skin: Warm, Dry  Extremities: Singular punctate wound to volar aspect of right forearm with a small amount of surrounding ecchymosis.  No overlying erythema. no palpable fluctuance or induration.  Right upper extremity is neurovascularly intact  Musculoskeletal: Good range of motion in all major joints as observed. No major deformities noted.   Neurologic: Alert & oriented x 3, No focal deficits noted.       RADIOLOGY  Labs Reviewed - No data to display  I personally reviewed the

## 2024-06-07 NOTE — ED NOTES
Report called to Christa, receiving nurse at [facility]. [phone]. Patient in route via [place].

## 2024-11-18 NOTE — PROGRESS NOTES
Medicare Annual Wellness Visit  Name: Obie Bhatt Date: 12/3/2021   MRN: [id number] Sex: Male   Age: 61 y.o. Ethnicity: Non- / Non    : 1955 Race: White (non-)      Suzan Reed is here for Medicare AWV    Screenings for behavioral, psychosocial and functional/safety risks, and cognitive dysfunction are all negative except as indicated below. These results, as well as other patient data from the [address] form, are documented in Flowsheets linked to this Encounter. No Known Allergies      Prior to Visit Medications    Medication Sig Taking? Authorizing Provider   albuterol sulfate  (90 Base) MCG/ACT inhaler INHALE 2 PUFFS EVERY 6 HOURS AS NEEDED FOR WHEEZING Yes Jordon Huerta MD   predniSONE (DELTASONE) 5 MG tablet Take 1 tablet by mouth daily Yes Zhanna Merrill MD   ipratropium-albuterol (DUONEB) 0.5-2.5 (3) MG/3ML SOLN nebulizer solution INHALE THE CONTENTS OF 1 VIAL VIA NEBULIZER FOUR TIMES DAILY Yes Jordon Huerta MD   traZODone (DESYREL) 100 MG tablet TAKE 1 TABLET BY MOUTH NIGHTLY  Patient taking differently: Take 100 mg by mouth nightly as needed  Yes Jordon Huerta MD   SYMBICORT 160-4.5 MCG/ACT AERO INHALE 2 PUFFS TWICE DAILY Yes Kevin Rausch MD   Turmeric 500 MG CAPS Take 1 capsule by mouth daily Yes Historical Provider, MD   Cholecalciferol (VITAMIN D3) 1.25 MG (67576 UT) CAPS Take 1 capsule by mouth daily  Yes Historical Provider, MD   oxyCODONE (ROXICODONE) 5 MG immediate release tablet Take 5 mg by mouth every 6 hours as needed for Pain.  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 Cervical radiculopathy 8/30/2016    S/P cervical discectomy and fusion with plate and screw. On 7/22/16. Dr Ania Wells at [address] [facility] polyps 8-    Dr. Hanna Ponce COPD (chronic obstructive pulmonary disease) ([facility] [address] 5/19/2014    PFTs showed severe COPD in 5/14- follows with Dr Silveira Hem COPD exacerbation [facility]) 6/10/2016    Diverticulosis 9-    Dupuytren's contracture 5/26/2017    Right palm    Elevated PSA 12/21/2018 12/2018 : PSA is 6    H/O colonoscopy 9-18-14    Dr. Hanna Ponce H/O echocardiogram 08/17/2016    EF 55-60%. Abnormal diastolic function. Mild TR. Borderline right atrium and RV dilatation.  Hiatal hernia     History of alcohol abuse     \"average 6 pk beer per day\"    History of drug abuse ([facility] [address]     does use marijuana\"last used cocaine over 10 yrs ago\" per pt on 10/3/2018    Internal hemorrhoids 9-    Intervertebral disc protrusion 5/19/2014    Cervical region on MRI    Lingular pneumonia 4/3/2018    Lymphocytic colitis 9-18-14    Dr. Franky Ortiz, will treat if symptomatic    On home oxygen therapy     2l/nc at night only    Pneumonia of left lower lobe due to infectious organism 11/27/2018    Sepsis due to pneumonia ([facility] [address] 6/10/2016    Spinal stenosis in cervical region 5/19/2014    MRI showed  Disc protrusion. Severe spinal stenosis at C3-C4    Spinal stenosis of cervical region 3/14/2016    S/P cervical discectomy and fusion with plate and screw. On 7/22/16. Dr Ania Wells at [address] [employer] abuse disorder 5/19/2014    Wears glasses     to read       Past Surgical History:   Procedure Laterality Date    CERVICAL SPINE SURGERY  2015    \"have plate in neck done at [address] [facility]      9- Dr Stephan Ortiz.  f/u 3 years    COLONOSCOPY  10/04/2018    COLONOSCOPY N/A 10/4/2018    COLONOSCOPY POLYPECTOMY SNARE/COLD BIOPSY performed by Jazmine Valle MD at [facility] in 5 years   [address] POLYPECTOMY  10/04/2018    2 Polyps Removed    SIGMOIDOSCOPY      UPPER GASTROINTESTINAL ENDOSCOPY  10/04/2018    with Biopsy    UPPER GASTROINTESTINAL ENDOSCOPY N/A 10/4/2018    EGD BIOPSY performed by Craig Khan MD at [facility] ENDOSCOPY         Family History   Problem Relation Age of Onset    Cancer Mother         lung ca    Heart Disease Father     Heart Attack Father     Diabetes Father     Other Brother         hep C    Colon Cancer Neg Hx        CareTeam (Including outside providers/suppliers regularly involved in providing care):   Patient Care Team:  Emmanuelle Otto MD as PCP - General (Internal Medicine)  Emmanuelle Otto MD as PCP - [facility] Empaneled Provider  Nestor Dhillon MD as Consulting Physician (Pulmonary Disease)    Wt Readings from Last 3 Encounters:   11/29/21 107 lb (48.5 kg)   11/18/21 101 lb 9.6 oz (46.1 kg)   11/12/21 104 lb 14.4 oz (47.6 kg)     There were no vitals filed for this visit. There is no height or weight on file to calculate BMI. Based upon direct observation of the patient, evaluation of cognition reveals recent and remote memory intact. Patient's complete Health Risk Assessment and screening values have been reviewed and are found in Flowsheets. The following problems were reviewed today and where indicated follow up appointments were made and/or referrals ordered.     Positive Risk Factor Screenings with Interventions:     Fall Risk:  2 or more falls in past year?: (!) yes  Fall with injury in past year?: no  Fall Risk Interventions:    · Patient declines any further evaluation/treatment for this issue       Substance History:  Social History     Tobacco History     Smoking Status  Current Every Day Smoker Smoking Start Date  1/1/1970 Smoking Frequency  0.5 packs/day for 64 years (32 pk yrs) Smoking Tobacco Type  Cigarettes    Smokeless Tobacco Use  Never Used          Alcohol History     Alcohol Use Status  Yes Drinks/Week  6 Cans of beer per week Amount  6.0 standard drinks of alcohol/wk Comment  3 beers daily          Drug Use     Drug Use Status  Yes Types  Marijuana (Weed) Comment  1-2 times per week marijuana          Sexual Activity     Sexually Active  Not Currently               Alcohol Screening: Audit-C Score: 4  Total Score: 4    A score of 8 or more is associated with harmful or hazardous drinking. A score of 13 or more in women, and 15 or more in men, is likely to indicate alcohol dependence. Substance Abuse Interventions:  · Tobacco abuse:  patient agrees to try the following tobacco cessation strategies:  willpower  · Recreational drug use:  patient is not ready to change his/her recreational drug use behavior at this time   · Patient states he has cut back to 1/2 ppd with willpower and patches    General Health and ACP:  General  In general, how would you say your health is?: Good  In the past 7 days, have you experienced any of the following?  New or Increased Pain, New or Increased Fatigue, Loneliness, Social Isolation, Stress or Anger?: None of These  Do you get the social and emotional support that you need?: Yes  Do you have a Living Will?: (!) No  Advance Directives     Power of [facility] Will ACP-Advance Directive ACP-Power of     Not on File Not on File Not on File Not on File      General Health Risk Interventions:  · No Living Will: Advance Care Planning addressed with patient today    Health Habits/Nutrition:  Health Habits/Nutrition  Do you exercise for at least 20 minutes 2-3 times per week?: (!) No  Have you lost any weight without trying in the past 3 months?: No  Do you eat only one meal per day?: No  Have you seen the dentist within the past year?: N/A - wear dentures     Health Habits/Nutrition Interventions:  · Inadequate physical activity:  patient is not ready to increase his/her physical activity level at this time    Hearing/Vision:  No exam data present  Hearing/Vision  Do you or your family notice any trouble with your hearing that hasn't been managed with hearing aids?: No  Do you have difficulty driving, watching TV, or doing any of your daily activities because of your eyesight?: No  Have you had an eye exam within the past year?: (!) No  Hearing/Vision Interventions:  · Vision concerns:  patient encouraged to make appointment with his/her eye specialist      Personalized Preventive Plan   Current Health Maintenance Status  Immunization History   Administered Date(s) Administered    COVID-19, J&J, PF, 0.5 mL 03/17/2021    Influenza Virus Vaccine 09/21/2015    Influenza, Intradermal, Quadrivalent, Preservative Free 11/23/2016    Influenza, Quadv, 6 mo and older, IM, PF (Flulaval, Fluarix) 11/28/2018    Influenza, Quadv, IM, PF (6 mo and older Fluzone, Flulaval, Fluarix, and 3 yrs and older Afluria) 12/04/2017, 10/09/2019, 11/06/2020, 11/13/2021    Pneumococcal Conjugate 13-valent ([id number]) 12/12/2019    Pneumococcal Polysaccharide (Ssaheikrt19) 12/04/2017    Tdap (Boostrix, Adacel) 06/26/2014        Health Maintenance   Topic Date Due    Shingles Vaccine (1 of 2) Never done    COVID-19 Vaccine (2 - Booster for Conservis series) 05/12/2021    Annual Wellness Visit (AWV)  11/21/2021    Low dose CT lung screening  06/24/2022    Colon cancer screen colonoscopy  10/04/2023    DTaP/Tdap/Td vaccine (2 - Td or Tdap) 06/26/2024    Pneumococcal 0-64 years Vaccine (2 of 2 - PPSV23) 12/12/2024    Lipid screen  03/09/2026    Flu vaccine  Completed    Hepatitis C screen  Completed    HIV screen  Completed    Hepatitis A vaccine  Aged Out    Hepatitis B vaccine  Aged Out    Hib vaccine  Aged Out    Meningococcal (ACWY) vaccine  Aged Out     Recommendations for GOSO Due: see orders and patient instructions/AVS. Patient states he will talk to his pharmacist today about COVID booster.   Patient would like to discuss shingles vaccinations with PCP at office visit 1/2022    Recommended screening schedule for the next 5-10 years is provided to the patient in written form: see Patient Instructions/AVS.    Maximo Kennedy LPN, 84/1/9606, performed the documented evaluation under the direct supervision of the attending physician. Adrienne Medina, was evaluated through a synchronous (real-time) audio encounter. The patient (or guardian if applicable) is aware that this is a billable service. Verbal consent to proceed has been obtained within the past 12 months. The visit was conducted pursuant to the emergency declaration under the [address] [place] [address], [address] [place] [address] authority and the [place] General Act. Patient identification was verified, and a caregiver was present when appropriate. The patient was located in the state of [place], where the provider was credentialed to provide care. Total time spent for this encounter: Not billed by time    --Aditya Goins LPN on 30/0/7529 at 8:32 AM    An electronic signature was used to authenticate this note.         kristan  2-calculated by average/Not able to assess (calculate score using [facility] averaging method)

## 2025-08-19 ENCOUNTER — APPOINTMENT (OUTPATIENT)
Dept: GENERAL RADIOLOGY | Age: 70
End: 2025-08-19
Payer: COMMERCIAL

## 2025-08-19 ENCOUNTER — HOSPITAL ENCOUNTER (EMERGENCY)
Age: 70
Discharge: NURSING, REHAB OR LONG-TERM CARE | End: 2025-08-19
Attending: EMERGENCY MEDICINE
Payer: COMMERCIAL

## 2025-08-19 VITALS
HEART RATE: 100 BPM | RESPIRATION RATE: 16 BRPM | DIASTOLIC BLOOD PRESSURE: 82 MMHG | SYSTOLIC BLOOD PRESSURE: 130 MMHG | TEMPERATURE: 97.6 F | OXYGEN SATURATION: 95 %

## 2025-08-19 DIAGNOSIS — J18.9 PNEUMONIA OF LEFT LOWER LOBE DUE TO INFECTIOUS ORGANISM: ICD-10-CM

## 2025-08-19 DIAGNOSIS — J44.1 COPD EXACERBATION (HCC): Primary | ICD-10-CM

## 2025-08-19 DIAGNOSIS — J96.11 CHRONIC RESPIRATORY FAILURE WITH HYPOXIA (HCC): ICD-10-CM

## 2025-08-19 LAB
ALBUMIN SERPL-MCNC: 3.4 G/DL (ref 3.4–5)
ALBUMIN/GLOB SERPL: 1.4 {RATIO} (ref 1.1–2.2)
ALP SERPL-CCNC: 90 U/L (ref 40–129)
ALT SERPL-CCNC: 15 U/L (ref 10–40)
ANION GAP SERPL CALCULATED.3IONS-SCNC: 10 MMOL/L (ref 9–17)
ARTERIAL PATENCY WRIST A: ABNORMAL
AST SERPL-CCNC: 16 U/L (ref 15–37)
BASOPHILS # BLD: 0.03 K/UL
BASOPHILS NFR BLD: 0 % (ref 0–1)
BILIRUB SERPL-MCNC: 0.3 MG/DL (ref 0–1)
BNP SERPL-MCNC: 88 PG/ML (ref 0–125)
BODY TEMPERATURE: 37
BUN SERPL-MCNC: 14 MG/DL (ref 7–20)
CALCIUM SERPL-MCNC: 9.2 MG/DL (ref 8.3–10.6)
CHLORIDE SERPL-SCNC: 99 MMOL/L (ref 99–110)
CO2 SERPL-SCNC: 35 MMOL/L (ref 21–32)
COHGB MFR BLD: 1 % (ref 0.5–1.5)
CREAT SERPL-MCNC: 0.5 MG/DL (ref 0.8–1.3)
EOSINOPHIL # BLD: 0 K/UL
EOSINOPHILS RELATIVE PERCENT: 0 % (ref 0–3)
ERYTHROCYTE [DISTWIDTH] IN BLOOD BY AUTOMATED COUNT: 15.1 % (ref 11.7–14.9)
GFR, ESTIMATED: >90 ML/MIN/1.73M2
GLUCOSE SERPL-MCNC: 113 MG/DL (ref 74–99)
HCO3 VENOUS: 41.5 MMOL/L (ref 22–29)
HCT VFR BLD AUTO: 37.7 % (ref 42–52)
HGB BLD-MCNC: 11.3 G/DL (ref 13.5–18)
IMM GRANULOCYTES # BLD AUTO: 0.04 K/UL
IMM GRANULOCYTES NFR BLD: 0 %
LYMPHOCYTES NFR BLD: 0.61 K/UL
LYMPHOCYTES RELATIVE PERCENT: 6 % (ref 24–44)
MCH RBC QN AUTO: 26.8 PG (ref 27–31)
MCHC RBC AUTO-ENTMCNC: 30 G/DL (ref 32–36)
MCV RBC AUTO: 89.3 FL (ref 78–100)
METHEMOGLOBIN: 0.7 % (ref 0.5–1.5)
MONOCYTES NFR BLD: 0.72 K/UL
MONOCYTES NFR BLD: 7 % (ref 0–5)
NEUTROPHILS NFR BLD: 87 % (ref 36–66)
NEUTS SEG NFR BLD: 9.12 K/UL
OXYHGB MFR BLD: 25 %
PCO2 VENOUS: 80.7 MM HG (ref 38–54)
PH VENOUS: 7.33 (ref 7.32–7.43)
PLATELET # BLD AUTO: 406 K/UL (ref 140–440)
PMV BLD AUTO: 8.8 FL (ref 7.5–11.1)
PO2 VENOUS: 19.2 MM HG (ref 23–48)
POSITIVE BASE EXCESS, VEN: 12.5 MMOL/L (ref 0–3)
POTASSIUM SERPL-SCNC: 4.7 MMOL/L (ref 3.5–5.1)
PROT SERPL-MCNC: 5.8 G/DL (ref 6.4–8.2)
RBC # BLD AUTO: 4.22 M/UL (ref 4.6–6.2)
SODIUM SERPL-SCNC: 144 MMOL/L (ref 136–145)
TEXT FOR RESPIRATORY: ABNORMAL
TROPONIN I SERPL HS-MCNC: 35 NG/L (ref 0–22)
TROPONIN I SERPL HS-MCNC: 39 NG/L (ref 0–22)
WBC OTHER # BLD: 10.5 K/UL (ref 4–10.5)

## 2025-08-19 PROCEDURE — 96374 THER/PROPH/DIAG INJ IV PUSH: CPT

## 2025-08-19 PROCEDURE — 6370000000 HC RX 637 (ALT 250 FOR IP): Performed by: EMERGENCY MEDICINE

## 2025-08-19 PROCEDURE — 83880 ASSAY OF NATRIURETIC PEPTIDE: CPT

## 2025-08-19 PROCEDURE — 2500000003 HC RX 250 WO HCPCS: Performed by: EMERGENCY MEDICINE

## 2025-08-19 PROCEDURE — 82805 BLOOD GASES W/O2 SATURATION: CPT

## 2025-08-19 PROCEDURE — 80053 COMPREHEN METABOLIC PANEL: CPT

## 2025-08-19 PROCEDURE — 84484 ASSAY OF TROPONIN QUANT: CPT

## 2025-08-19 PROCEDURE — 99285 EMERGENCY DEPT VISIT HI MDM: CPT

## 2025-08-19 PROCEDURE — 71045 X-RAY EXAM CHEST 1 VIEW: CPT

## 2025-08-19 PROCEDURE — 85025 COMPLETE CBC W/AUTO DIFF WBC: CPT

## 2025-08-19 PROCEDURE — 93005 ELECTROCARDIOGRAM TRACING: CPT | Performed by: EMERGENCY MEDICINE

## 2025-08-19 PROCEDURE — 94640 AIRWAY INHALATION TREATMENT: CPT

## 2025-08-19 PROCEDURE — 6360000002 HC RX W HCPCS: Performed by: EMERGENCY MEDICINE

## 2025-08-19 PROCEDURE — 36415 COLL VENOUS BLD VENIPUNCTURE: CPT

## 2025-08-19 RX ORDER — IPRATROPIUM BROMIDE AND ALBUTEROL SULFATE 2.5; .5 MG/3ML; MG/3ML
1 SOLUTION RESPIRATORY (INHALATION) ONCE
Status: COMPLETED | OUTPATIENT
Start: 2025-08-19 | End: 2025-08-19

## 2025-08-19 RX ORDER — ATORVASTATIN CALCIUM 10 MG/1
10 TABLET, FILM COATED ORAL NIGHTLY
COMMUNITY
Start: 2025-02-24

## 2025-08-19 RX ORDER — ESCITALOPRAM OXALATE 5 MG/1
5 TABLET ORAL DAILY
COMMUNITY
Start: 2025-08-17

## 2025-08-19 RX ORDER — HYDROPHILIC CREAM
PASTE (GRAM) TOPICAL 2 TIMES DAILY
COMMUNITY

## 2025-08-19 RX ORDER — ROFLUMILAST 250 UG/1
500 TABLET ORAL DAILY
COMMUNITY
Start: 2025-08-08

## 2025-08-19 RX ORDER — BUDESONIDE, GLYCOPYRROLATE, AND FORMOTEROL FUMARATE 160; 9; 4.8 UG/1; UG/1; UG/1
2 AEROSOL, METERED RESPIRATORY (INHALATION) 2 TIMES DAILY
COMMUNITY
Start: 2025-03-20

## 2025-08-19 RX ORDER — LIDOCAINE 50 MG/G
1 PATCH TOPICAL DAILY
COMMUNITY

## 2025-08-19 RX ORDER — HYDROCODONE BITARTRATE AND ACETAMINOPHEN 5; 325 MG/1; MG/1
1 TABLET ORAL ONCE
Status: DISCONTINUED | OUTPATIENT
Start: 2025-08-19 | End: 2025-08-19 | Stop reason: HOSPADM

## 2025-08-19 RX ORDER — OXYCODONE AND ACETAMINOPHEN 5; 325 MG/1; MG/1
1 TABLET ORAL ONCE
Refills: 0 | Status: COMPLETED | OUTPATIENT
Start: 2025-08-19 | End: 2025-08-19

## 2025-08-19 RX ORDER — BUSPIRONE HYDROCHLORIDE 10 MG/1
10 TABLET ORAL 2 TIMES DAILY
COMMUNITY
Start: 2025-03-20

## 2025-08-19 RX ORDER — ONDANSETRON 4 MG/1
4 TABLET, FILM COATED ORAL EVERY 8 HOURS PRN
COMMUNITY

## 2025-08-19 RX ORDER — ACETAMINOPHEN 325 MG/1
650 TABLET ORAL EVERY 6 HOURS PRN
COMMUNITY

## 2025-08-19 RX ADMIN — OXYCODONE AND ACETAMINOPHEN 1 TABLET: 5; 325 TABLET ORAL at 12:30

## 2025-08-19 RX ADMIN — METHYLPREDNISOLONE SODIUM SUCCINATE 40 MG: 40 INJECTION, POWDER, LYOPHILIZED, FOR SOLUTION INTRAMUSCULAR; INTRAVENOUS at 09:34

## 2025-08-19 RX ADMIN — AMOXICILLIN AND CLAVULANATE POTASSIUM 1 TABLET: 875; 125 TABLET, FILM COATED ORAL at 12:25

## 2025-08-19 RX ADMIN — IPRATROPIUM BROMIDE AND ALBUTEROL SULFATE 1 DOSE: .5; 2.5 SOLUTION RESPIRATORY (INHALATION) at 09:39

## 2025-08-19 ASSESSMENT — LIFESTYLE VARIABLES
HOW OFTEN DO YOU HAVE A DRINK CONTAINING ALCOHOL: NEVER
HOW MANY STANDARD DRINKS CONTAINING ALCOHOL DO YOU HAVE ON A TYPICAL DAY: PATIENT DOES NOT DRINK

## 2025-08-19 ASSESSMENT — PAIN DESCRIPTION - DESCRIPTORS
DESCRIPTORS: ACHING;DULL
DESCRIPTORS: ACHING
DESCRIPTORS: ACHING;DULL

## 2025-08-19 ASSESSMENT — PAIN DESCRIPTION - LOCATION
LOCATION: COCCYX
LOCATION: NECK;COCCYX
LOCATION: NECK;COCCYX

## 2025-08-19 ASSESSMENT — PAIN SCALES - GENERAL
PAINLEVEL_OUTOF10: 6
PAINLEVEL_OUTOF10: 9
PAINLEVEL_OUTOF10: 9

## 2025-08-19 ASSESSMENT — PAIN - FUNCTIONAL ASSESSMENT
PAIN_FUNCTIONAL_ASSESSMENT: 0-10

## 2025-08-21 LAB
EKG ATRIAL RATE: 85 BPM
EKG DIAGNOSIS: NORMAL
EKG P AXIS: 88 DEGREES
EKG P-R INTERVAL: 144 MS
EKG Q-T INTERVAL: 370 MS
EKG QRS DURATION: 90 MS
EKG QTC CALCULATION (BAZETT): 440 MS
EKG R AXIS: 59 DEGREES
EKG T AXIS: 83 DEGREES
EKG VENTRICULAR RATE: 85 BPM

## 2025-08-21 PROCEDURE — 93010 ELECTROCARDIOGRAM REPORT: CPT | Performed by: INTERNAL MEDICINE

## (undated) DEVICE — GLOVE SURG SZ 8 L12IN THK75MIL DK GRN LTX FREE

## (undated) DEVICE — GLOVE ORANGE PI 8   MSG9080

## (undated) DEVICE — BANDAGE,GAUZE,BULKEE II,4.5"X4.1YD,STRL: Brand: MEDLINE

## (undated) DEVICE — DRESSING HYDROFIBER AQUACEL AG ADVANT 3.5X4 IN

## (undated) DEVICE — SNARE POLYP M W27MMXL240CM OVL STIFF DISP CAPTIVATOR

## (undated) DEVICE — GLOVE SURG SZ 75 L12IN THK75MIL DK GRN LTX FREE

## (undated) DEVICE — Z DISCONTINUED NO SUB IDED TUBING ETCO2 AD L6.5FT NSL ORAL CVD PRNG NONFLARED TIP OVR

## (undated) DEVICE — BIT DRL DIA5MM CANN QUIK CONN

## (undated) DEVICE — GOWN,SIRUS,POLYRNF,BRTHSLV,XLN/XL,20/CS: Brand: MEDLINE

## (undated) DEVICE — TOWEL,OR,DSP,ST,BLUE,STD,6/PK,12PK/CS: Brand: MEDLINE

## (undated) DEVICE — SUTURE ABSORBABLE BRAIDED 2-0 CT-1 27 IN UD VICRYL J259H

## (undated) DEVICE — PACK,BASIC,SIRUS,V: Brand: MEDLINE

## (undated) DEVICE — SYRINGE 20ML LL S/C 50

## (undated) DEVICE — 6619 2 PTNT ISO SYS INCISE AREA&LT;(&GT;&&LT;)&GT;P: Brand: STERI-DRAPE™ IOBAN™ 2

## (undated) DEVICE — Device

## (undated) DEVICE — MARKER SURG SKIN UTIL REGULAR/FINE 2 TIP W/ RUL AND 9 LBL

## (undated) DEVICE — GOWN,SIRUS,FABRNF,RAGLAN,XL,ST,28/CS: Brand: MEDLINE

## (undated) DEVICE — FORCEPS BX L240CM JAW DIA2.8MM L CAP W/ NDL MIC MESH TOOTH

## (undated) DEVICE — IMPLANTABLE DEVICE
Type: IMPLANTABLE DEVICE | Site: HIP | Status: NON-FUNCTIONAL
Removed: 2023-05-25

## (undated) DEVICE — PADDING,UNDERCAST,COTTON, 4"X4YD STERILE: Brand: MEDLINE

## (undated) DEVICE — C-ARM: Brand: UNBRANDED

## (undated) DEVICE — GLOVE SURG SZ 7 CRM LTX FREE POLYISOPRENE POLYMER BEAD ANTI

## (undated) DEVICE — COUNTER NDL 30 COUNT FOAM STRP SGL MAG

## (undated) DEVICE — NEEDLE,20GX1.5",BD,YALE,DISP,RG: Brand: MEDLINE

## (undated) DEVICE — SYRINGE IRRIG 60ML SFT PLIABLE BLB EZ TO GRP 1 HND USE W/

## (undated) DEVICE — SPONGE LAP W18XL18IN WHT COT 4 PLY FLD STRUNG RADPQ DISP ST 2 PER PACK

## (undated) DEVICE — PAD,ABDOMINAL,5"X9",ST,LF,25/BX: Brand: MEDLINE INDUSTRIES, INC.